# Patient Record
Sex: MALE | Race: WHITE | Employment: OTHER | URBAN - METROPOLITAN AREA
[De-identification: names, ages, dates, MRNs, and addresses within clinical notes are randomized per-mention and may not be internally consistent; named-entity substitution may affect disease eponyms.]

---

## 2017-01-09 PROBLEM — R31.29 MICROHEMATURIA: Status: ACTIVE | Noted: 2017-01-09

## 2017-01-09 PROBLEM — Z87.442 PERSONAL HISTORY OF KIDNEY STONES: Status: ACTIVE | Noted: 2017-01-09

## 2017-01-09 PROCEDURE — 88108 CYTOPATH CONCENTRATE TECH: CPT | Performed by: UROLOGY

## 2017-01-13 ENCOUNTER — APPOINTMENT (OUTPATIENT)
Dept: LAB | Facility: HOSPITAL | Age: 72
End: 2017-01-13
Attending: UROLOGY
Payer: MEDICARE

## 2017-01-13 ENCOUNTER — ANESTHESIA EVENT (OUTPATIENT)
Dept: SURGERY | Facility: HOSPITAL | Age: 72
End: 2017-01-13
Payer: MEDICARE

## 2017-01-13 DIAGNOSIS — R31.29 MICROHEMATURIA: ICD-10-CM

## 2017-01-13 LAB
ALBUMIN SERPL-MCNC: 3.4 G/DL (ref 3.5–4.8)
ALP LIVER SERPL-CCNC: 149 U/L (ref 45–117)
ALT SERPL-CCNC: 38 U/L (ref 17–63)
AST SERPL-CCNC: 25 U/L (ref 15–41)
ATRIAL RATE: 62 BPM
BILIRUB SERPL-MCNC: 0.4 MG/DL (ref 0.1–2)
BILIRUB UR QL STRIP.AUTO: NEGATIVE
BUN BLD-MCNC: 50 MG/DL (ref 8–20)
CALCIUM BLD-MCNC: 8.8 MG/DL (ref 8.3–10.3)
CHLORIDE: 107 MMOL/L (ref 101–111)
CLARITY UR REFRACT.AUTO: CLEAR
CO2: 28 MMOL/L (ref 22–32)
CREAT BLD-MCNC: 1.53 MG/DL (ref 0.7–1.3)
ERYTHROCYTE [DISTWIDTH] IN BLOOD BY AUTOMATED COUNT: 14.2 % (ref 11.5–16)
GLUCOSE BLD-MCNC: 99 MG/DL (ref 70–99)
GLUCOSE UR STRIP.AUTO-MCNC: NEGATIVE MG/DL
HCT VFR BLD AUTO: 29.4 % (ref 37–53)
HGB BLD-MCNC: 10.1 G/DL (ref 13–17)
KETONES UR STRIP.AUTO-MCNC: NEGATIVE MG/DL
LEUKOCYTE ESTERASE UR QL STRIP.AUTO: NEGATIVE
M PROTEIN MFR SERPL ELPH: 7.5 G/DL (ref 6.1–8.3)
MCH RBC QN AUTO: 31 PG (ref 27–33.2)
MCHC RBC AUTO-ENTMCNC: 34.4 G/DL (ref 31–37)
MCV RBC AUTO: 90.2 FL (ref 80–99)
NITRITE UR QL STRIP.AUTO: NEGATIVE
P AXIS: 43 DEGREES
P-R INTERVAL: 164 MS
PH UR STRIP.AUTO: 7 [PH] (ref 4.5–8)
PLATELET # BLD AUTO: 97 10(3)UL (ref 150–450)
POTASSIUM SERPL-SCNC: 4.6 MMOL/L (ref 3.6–5.1)
PROT UR STRIP.AUTO-MCNC: 100 MG/DL
Q-T INTERVAL: 440 MS
QRS DURATION: 106 MS
QTC CALCULATION (BEZET): 446 MS
R AXIS: 42 DEGREES
RBC # BLD AUTO: 3.26 X10(6)UL (ref 3.8–5.8)
RED CELL DISTRIBUTION WIDTH-SD: 46.8 FL (ref 35.1–46.3)
SODIUM SERPL-SCNC: 141 MMOL/L (ref 136–144)
SP GR UR STRIP.AUTO: 1.01 (ref 1–1.03)
T AXIS: 44 DEGREES
UROBILINOGEN UR STRIP.AUTO-MCNC: <2 MG/DL
VENTRICULAR RATE: 62 BPM
WBC # BLD AUTO: 6 X10(3) UL (ref 4–13)

## 2017-01-13 PROCEDURE — 93005 ELECTROCARDIOGRAM TRACING: CPT

## 2017-01-13 PROCEDURE — 85027 COMPLETE CBC AUTOMATED: CPT

## 2017-01-13 PROCEDURE — 36415 COLL VENOUS BLD VENIPUNCTURE: CPT

## 2017-01-13 PROCEDURE — 81001 URINALYSIS AUTO W/SCOPE: CPT

## 2017-01-13 PROCEDURE — 80053 COMPREHEN METABOLIC PANEL: CPT

## 2017-01-13 PROCEDURE — 87086 URINE CULTURE/COLONY COUNT: CPT

## 2017-01-13 PROCEDURE — 93010 ELECTROCARDIOGRAM REPORT: CPT | Performed by: INTERNAL MEDICINE

## 2017-01-16 ENCOUNTER — ANESTHESIA (OUTPATIENT)
Dept: SURGERY | Facility: HOSPITAL | Age: 72
End: 2017-01-16
Payer: MEDICARE

## 2017-01-16 ENCOUNTER — SURGERY (OUTPATIENT)
Age: 72
End: 2017-01-16

## 2017-01-16 ENCOUNTER — HOSPITAL ENCOUNTER (OUTPATIENT)
Facility: HOSPITAL | Age: 72
Setting detail: HOSPITAL OUTPATIENT SURGERY
Discharge: HOME | End: 2017-01-16
Attending: UROLOGY | Admitting: UROLOGY
Payer: MEDICARE

## 2017-01-16 VITALS
OXYGEN SATURATION: 99 % | HEART RATE: 55 BPM | RESPIRATION RATE: 16 BRPM | BODY MASS INDEX: 31.08 KG/M2 | DIASTOLIC BLOOD PRESSURE: 86 MMHG | TEMPERATURE: 98 F | HEIGHT: 71 IN | SYSTOLIC BLOOD PRESSURE: 181 MMHG | WEIGHT: 222 LBS

## 2017-01-16 DIAGNOSIS — R31.29 MICROHEMATURIA: Primary | ICD-10-CM

## 2017-01-16 LAB
GLUCOSE BLD-MCNC: 116 MG/DL (ref 65–99)
GLUCOSE BLD-MCNC: 122 MG/DL (ref 65–99)

## 2017-01-16 PROCEDURE — 82962 GLUCOSE BLOOD TEST: CPT

## 2017-01-16 PROCEDURE — A4216 STERILE WATER/SALINE, 10 ML: HCPCS | Performed by: UROLOGY

## 2017-01-16 PROCEDURE — 0TBB8ZX EXCISION OF BLADDER, VIA NATURAL OR ARTIFICIAL OPENING ENDOSCOPIC, DIAGNOSTIC: ICD-10-PCS | Performed by: UROLOGY

## 2017-01-16 PROCEDURE — 88342 IMHCHEM/IMCYTCHM 1ST ANTB: CPT | Performed by: UROLOGY

## 2017-01-16 PROCEDURE — 3E0K705 INTRODUCTION OF OTHER ANTINEOPLASTIC INTO GENITOURINARY TRACT, VIA NATURAL OR ARTIFICIAL OPENING: ICD-10-PCS | Performed by: UROLOGY

## 2017-01-16 PROCEDURE — 88341 IMHCHEM/IMCYTCHM EA ADD ANTB: CPT | Performed by: UROLOGY

## 2017-01-16 PROCEDURE — 88307 TISSUE EXAM BY PATHOLOGIST: CPT | Performed by: UROLOGY

## 2017-01-16 RX ORDER — SODIUM CHLORIDE, SODIUM LACTATE, POTASSIUM CHLORIDE, CALCIUM CHLORIDE 600; 310; 30; 20 MG/100ML; MG/100ML; MG/100ML; MG/100ML
INJECTION, SOLUTION INTRAVENOUS CONTINUOUS
Status: DISCONTINUED | OUTPATIENT
Start: 2017-01-16 | End: 2017-01-16

## 2017-01-16 RX ORDER — DEXTROSE MONOHYDRATE 25 G/50ML
50 INJECTION, SOLUTION INTRAVENOUS
Status: DISCONTINUED | OUTPATIENT
Start: 2017-01-16 | End: 2017-01-16 | Stop reason: HOSPADM

## 2017-01-16 NOTE — ANESTHESIA POSTPROCEDURE EVALUATION
[address] Patient Status:  Hospital Outpatient Surgery   Age/Gender 73year old male MRN [id number]   [facility] SURGERY Attending Joslyn Gunn MD   Hosp Day # 0 PCP KOBI ELIZONDO       Anesthesia Post-op Note    Procedu

## 2017-01-16 NOTE — H&P
[facility]  H&P    Katerin Steele Patient Status:  Hospital Outpatient Surgery    4/15/1945 MRN [id number]   [facility] SURGERY Attending Franklin Malagon MD   [facility] Day #  PCP KOBI ELIZONDO     Impression and Plan:  Microhematuria with ur this encounter.     History:  Past Medical History   Diagnosis Date   • High blood pressure    • Disorder of thyroid    • Diabetes ([facility] [address]    • Gout    • Visual impairment      reading glasses   • Hepatitis, unspecified      hepatitis C-just completed taking

## 2017-01-16 NOTE — OPERATIVE REPORT
[address] [facility] Status:  [facility] Outpatient Surgery    4/15/1945 MRN [id number]   Location [facility] Attending Franklin Malagon MD   Hosp Day # 0 SHEMAR Galvez Candesiree     Date of Operation; 2017 the  urethra, and then a 24-Uzbek bipolar loop was used to resect the tumor. The specimen was irrigated out of the bladder with a Toomy sringe. The base of the resection was coagulated, avoiding the area of resected ureteral orifice.   The orifice appeared

## 2017-01-16 NOTE — ANESTHESIA PREPROCEDURE EVALUATION
PRE-OP EVALUATION    Patient Name: Rashawn Leblanc    Pre-op Diagnosis: MICROSCOPIC HEMATURIA    Procedure(s):  CYSTOSCOPY, BILATERAL RETROGRADE PYELOGRAM, POSSIBLE URETEROSCOPY, POSSIBLE BLADDER BIOPSY    Surgeon(s) and Role:     * Aissatou Syed MD - Primary tolerance: good         (+) obesity  (+) hypertension   (+) hyperlipidemia                                  Endo/Other      (+) diabetes     (+) hypothyroidism    (+) anemia        (+) thrombocytopenia    (+) arthritis       Pulmonary

## 2017-01-26 PROBLEM — C67.9 MALIGNANT NEOPLASM OF URINARY BLADDER, UNSPECIFIED SITE (HCC): Status: ACTIVE | Noted: 2017-01-26

## 2017-01-26 PROCEDURE — 83550 IRON BINDING TEST: CPT | Performed by: FAMILY MEDICINE

## 2017-01-26 PROCEDURE — 82728 ASSAY OF FERRITIN: CPT | Performed by: FAMILY MEDICINE

## 2017-01-26 PROCEDURE — 83540 ASSAY OF IRON: CPT | Performed by: FAMILY MEDICINE

## 2017-01-26 PROCEDURE — 85045 AUTOMATED RETICULOCYTE COUNT: CPT | Performed by: FAMILY MEDICINE

## 2017-01-26 PROCEDURE — 84080 ASSAY ALKALINE PHOSPHATASES: CPT | Performed by: FAMILY MEDICINE

## 2017-01-26 PROCEDURE — 82607 VITAMIN B-12: CPT | Performed by: FAMILY MEDICINE

## 2017-01-26 PROCEDURE — 82746 ASSAY OF FOLIC ACID SERUM: CPT | Performed by: FAMILY MEDICINE

## 2017-01-26 PROCEDURE — 36415 COLL VENOUS BLD VENIPUNCTURE: CPT | Performed by: FAMILY MEDICINE

## 2017-01-26 PROCEDURE — 84075 ASSAY ALKALINE PHOSPHATASE: CPT | Performed by: FAMILY MEDICINE

## 2017-01-26 PROCEDURE — 85025 COMPLETE CBC W/AUTO DIFF WBC: CPT | Performed by: FAMILY MEDICINE

## 2017-01-31 PROBLEM — R74.8 ELEVATED ALKALINE PHOSPHATASE LEVEL: Status: ACTIVE | Noted: 2017-01-31

## 2017-02-06 PROBLEM — C67.0 MALIGNANT NEOPLASM OF TRIGONE OF URINARY BLADDER (HCC): Status: ACTIVE | Noted: 2017-02-06

## 2017-02-10 ENCOUNTER — ANESTHESIA EVENT (OUTPATIENT)
Dept: SURGERY | Facility: HOSPITAL | Age: 72
End: 2017-02-10
Payer: MEDICARE

## 2017-02-13 PROCEDURE — 87086 URINE CULTURE/COLONY COUNT: CPT | Performed by: FAMILY MEDICINE

## 2017-02-13 NOTE — H&P
Larry Rivera is a 73year old male. HPI:    Follow up high grade T1 Bladder cancer. Cysto, TURBT, installation of Mitomycin 1/16/17. Rt u.o. resected. Mild left flank \"ache\", but none on the right. No gross hematuria.  Discussed TCCaB; need for restaging Vitro Strip  TEST FOUR TIMES DAILY  Disp: 400 strip  Rfl: 1    Insulin Pen Needle (BD PEN NEEDLE MINI U/F) 31G X 5 MM Does not apply Misc  To use 4 a day  Disp: 200 each  Rfl: 6      No Known Allergies    Past Medical History    Diagnosis  Date    •  High

## 2017-02-15 ENCOUNTER — SURGERY (OUTPATIENT)
Age: 72
End: 2017-02-15

## 2017-02-15 ENCOUNTER — APPOINTMENT (OUTPATIENT)
Dept: GENERAL RADIOLOGY | Facility: HOSPITAL | Age: 72
End: 2017-02-15
Attending: UROLOGY
Payer: MEDICARE

## 2017-02-15 ENCOUNTER — HOSPITAL ENCOUNTER (OUTPATIENT)
Facility: HOSPITAL | Age: 72
Setting detail: HOSPITAL OUTPATIENT SURGERY
Discharge: HOME | End: 2017-02-15
Attending: UROLOGY | Admitting: UROLOGY
Payer: MEDICARE

## 2017-02-15 ENCOUNTER — ANESTHESIA (OUTPATIENT)
Dept: SURGERY | Facility: HOSPITAL | Age: 72
End: 2017-02-15
Payer: MEDICARE

## 2017-02-15 VITALS
BODY MASS INDEX: 31.36 KG/M2 | HEIGHT: 71 IN | RESPIRATION RATE: 18 BRPM | SYSTOLIC BLOOD PRESSURE: 164 MMHG | OXYGEN SATURATION: 98 % | HEART RATE: 57 BPM | TEMPERATURE: 98 F | WEIGHT: 224 LBS | DIASTOLIC BLOOD PRESSURE: 69 MMHG

## 2017-02-15 LAB
GLUCOSE BLD-MCNC: 126 MG/DL (ref 65–99)
GLUCOSE BLD-MCNC: 140 MG/DL (ref 65–99)

## 2017-02-15 PROCEDURE — 0TBB8ZX EXCISION OF BLADDER, VIA NATURAL OR ARTIFICIAL OPENING ENDOSCOPIC, DIAGNOSTIC: ICD-10-PCS | Performed by: UROLOGY

## 2017-02-15 PROCEDURE — 74420 UROGRAPHY RTRGR +-KUB: CPT

## 2017-02-15 PROCEDURE — 82962 GLUCOSE BLOOD TEST: CPT

## 2017-02-15 PROCEDURE — 88305 TISSUE EXAM BY PATHOLOGIST: CPT | Performed by: UROLOGY

## 2017-02-15 RX ORDER — MIDAZOLAM HYDROCHLORIDE 1 MG/ML
1 INJECTION INTRAMUSCULAR; INTRAVENOUS EVERY 5 MIN PRN
Status: DISCONTINUED | OUTPATIENT
Start: 2017-02-15 | End: 2017-02-15

## 2017-02-15 RX ORDER — DEXTROSE MONOHYDRATE 25 G/50ML
50 INJECTION, SOLUTION INTRAVENOUS
Status: DISCONTINUED | OUTPATIENT
Start: 2017-02-15 | End: 2017-02-15 | Stop reason: HOSPADM

## 2017-02-15 RX ORDER — SODIUM CHLORIDE, SODIUM LACTATE, POTASSIUM CHLORIDE, CALCIUM CHLORIDE 600; 310; 30; 20 MG/100ML; MG/100ML; MG/100ML; MG/100ML
INJECTION, SOLUTION INTRAVENOUS CONTINUOUS
Status: DISCONTINUED | OUTPATIENT
Start: 2017-02-15 | End: 2017-02-15

## 2017-02-15 RX ORDER — ONDANSETRON 2 MG/ML
4 INJECTION INTRAMUSCULAR; INTRAVENOUS AS NEEDED
Status: DISCONTINUED | OUTPATIENT
Start: 2017-02-15 | End: 2017-02-15

## 2017-02-15 RX ORDER — DEXTROSE MONOHYDRATE 25 G/50ML
50 INJECTION, SOLUTION INTRAVENOUS
Status: DISCONTINUED | OUTPATIENT
Start: 2017-02-15 | End: 2017-02-15

## 2017-02-15 RX ORDER — CEPHALEXIN 500 MG/1
500 CAPSULE ORAL 3 TIMES DAILY
Qty: 4 CAPSULE | Refills: 0 | Status: SHIPPED | OUTPATIENT
Start: 2017-02-15 | End: 2017-02-17

## 2017-02-15 RX ORDER — MEPERIDINE HYDROCHLORIDE 25 MG/ML
12.5 INJECTION INTRAMUSCULAR; INTRAVENOUS; SUBCUTANEOUS AS NEEDED
Status: DISCONTINUED | OUTPATIENT
Start: 2017-02-15 | End: 2017-02-15

## 2017-02-15 RX ORDER — NALOXONE HYDROCHLORIDE 0.4 MG/ML
80 INJECTION, SOLUTION INTRAMUSCULAR; INTRAVENOUS; SUBCUTANEOUS AS NEEDED
Status: DISCONTINUED | OUTPATIENT
Start: 2017-02-15 | End: 2017-02-15

## 2017-02-15 RX ORDER — METOCLOPRAMIDE HYDROCHLORIDE 5 MG/ML
10 INJECTION INTRAMUSCULAR; INTRAVENOUS AS NEEDED
Status: DISCONTINUED | OUTPATIENT
Start: 2017-02-15 | End: 2017-02-15

## 2017-02-15 RX ORDER — HYDROMORPHONE HYDROCHLORIDE 1 MG/ML
0.4 INJECTION, SOLUTION INTRAMUSCULAR; INTRAVENOUS; SUBCUTANEOUS EVERY 5 MIN PRN
Status: DISCONTINUED | OUTPATIENT
Start: 2017-02-15 | End: 2017-02-15

## 2017-02-15 RX ORDER — HYDROCODONE BITARTRATE AND ACETAMINOPHEN 5; 325 MG/1; MG/1
2 TABLET ORAL AS NEEDED
Status: DISCONTINUED | OUTPATIENT
Start: 2017-02-15 | End: 2017-02-15

## 2017-02-15 RX ORDER — LABETALOL HYDROCHLORIDE 5 MG/ML
5 INJECTION, SOLUTION INTRAVENOUS EVERY 5 MIN PRN
Status: DISCONTINUED | OUTPATIENT
Start: 2017-02-15 | End: 2017-02-15

## 2017-02-15 RX ORDER — HYDROCODONE BITARTRATE AND ACETAMINOPHEN 5; 325 MG/1; MG/1
1 TABLET ORAL AS NEEDED
Status: DISCONTINUED | OUTPATIENT
Start: 2017-02-15 | End: 2017-02-15

## 2017-02-15 NOTE — PROGRESS NOTES
Interviewed, examined pt, answered questions. No new history or findings. Discussed plan with pt ad son. Lungs: clear  Heart: No M,G,R  Abd: soft, Non tender  Neuro: Alert. Moving all extr  Procede as planned.   Letitia Saenz MD

## 2017-02-15 NOTE — OPERATIVE REPORT
[address] [facility] Patient Status:  Hospital Outpatient Surgery    4/15/1945 MRN [id number]   [facility] SURGERY Attending Rhona Ballesteros MD   [facility] Day # 0 [facility] Aziza Urena     Date of Operation; 2/15/2017    Procedure: Patel Lambert time-out was reviewed. A cystoscope was passed through the urethra under direct vision and the urethra and bladder were examined, with the findings as described above.   I used a cold cup biopsy forceps to biopsy normal-appearing tissue on the right and

## 2017-02-15 NOTE — ANESTHESIA POSTPROCEDURE EVALUATION
[address] Patient Status:  Hospital Outpatient Surgery   Age/Gender 73year old male MRN [id number]   [facility] SURGERY Attending Ivelisse Palacios MD   Hosp Day # 0 PCP KOBI ELIZONDO       Anesthesia Post-op Note    Procedu

## 2017-02-15 NOTE — ANESTHESIA PREPROCEDURE EVALUATION
PRE-OP EVALUATION    Patient Name: Kun Gutierrez    Pre-op Diagnosis: BLADDER CANCER    Procedure(s):  CYSTOSCOPY TRANSURETHRAL RESECTION OF BLADDER, BLADDER BIOPSY, BILATERAL RETROGRADE PYELOGRAM    Surgeon(s) and Role:     * Julieta Mueller MD - Primary    P Strip To test 4 times daily.  DX E 11.9 Disp: 400 each Rfl: 3   allopurinol 300 MG Oral Tab TK 1 T PO QD Disp:  Rfl: 6   lisinopril 40 MG Oral Tab TK 1 T PO D Disp:  Rfl: 6   carvedilol 12.5 MG Oral Tab Take 1 tablet (12.5 mg total) by mouth 2 (two) times d 10.3* 01/26/2017   HCT 30.8* 01/26/2017   MCV 90.9 01/26/2017   MCH 30.4 01/26/2017   MCHC 33.4 01/26/2017   RDW 14.1 01/26/2017   PLT 85.0* 01/26/2017       Lab Results  Component Value Date    01/13/2017   K 4.6 01/13/2017    01/13/2017   CO2

## 2017-02-28 PROCEDURE — 36415 COLL VENOUS BLD VENIPUNCTURE: CPT | Performed by: FAMILY MEDICINE

## 2017-02-28 PROCEDURE — 83036 HEMOGLOBIN GLYCOSYLATED A1C: CPT | Performed by: FAMILY MEDICINE

## 2017-02-28 PROCEDURE — 82570 ASSAY OF URINE CREATININE: CPT | Performed by: FAMILY MEDICINE

## 2017-02-28 PROCEDURE — 82306 VITAMIN D 25 HYDROXY: CPT | Performed by: FAMILY MEDICINE

## 2017-02-28 PROCEDURE — 80061 LIPID PANEL: CPT | Performed by: FAMILY MEDICINE

## 2017-02-28 PROCEDURE — 80053 COMPREHEN METABOLIC PANEL: CPT | Performed by: FAMILY MEDICINE

## 2017-02-28 PROCEDURE — 84443 ASSAY THYROID STIM HORMONE: CPT | Performed by: FAMILY MEDICINE

## 2017-02-28 PROCEDURE — 84550 ASSAY OF BLOOD/URIC ACID: CPT | Performed by: FAMILY MEDICINE

## 2017-02-28 PROCEDURE — 82043 UR ALBUMIN QUANTITATIVE: CPT | Performed by: FAMILY MEDICINE

## 2017-02-28 PROCEDURE — 81001 URINALYSIS AUTO W/SCOPE: CPT | Performed by: FAMILY MEDICINE

## 2017-02-28 PROCEDURE — 85025 COMPLETE CBC W/AUTO DIFF WBC: CPT | Performed by: FAMILY MEDICINE

## 2017-04-24 PROBLEM — N18.30 TYPE 2 DIABETES MELLITUS WITH STAGE 3 CHRONIC KIDNEY DISEASE, WITH LONG-TERM CURRENT USE OF INSULIN (HCC): Status: ACTIVE | Noted: 2017-04-24

## 2017-04-24 PROBLEM — I10 ESSENTIAL HYPERTENSION WITH GOAL BLOOD PRESSURE LESS THAN 130/80: Status: ACTIVE | Noted: 2017-04-24

## 2017-04-24 PROBLEM — Z79.4 TYPE 2 DIABETES MELLITUS WITH STAGE 3 CHRONIC KIDNEY DISEASE, WITH LONG-TERM CURRENT USE OF INSULIN (HCC): Status: ACTIVE | Noted: 2017-04-24

## 2017-04-24 PROBLEM — E11.22 TYPE 2 DIABETES MELLITUS WITH STAGE 3 CHRONIC KIDNEY DISEASE, WITH LONG-TERM CURRENT USE OF INSULIN (HCC): Status: ACTIVE | Noted: 2017-04-24

## 2017-07-26 PROCEDURE — 87081 CULTURE SCREEN ONLY: CPT | Performed by: FAMILY MEDICINE

## 2017-07-28 PROCEDURE — 81001 URINALYSIS AUTO W/SCOPE: CPT | Performed by: FAMILY MEDICINE

## 2017-08-04 ENCOUNTER — ANESTHESIA EVENT (OUTPATIENT)
Dept: SURGERY | Facility: HOSPITAL | Age: 72
End: 2017-08-04
Payer: MEDICARE

## 2017-08-04 PROBLEM — R94.39 ABNORMAL STRESS TEST: Status: ACTIVE | Noted: 2017-08-04

## 2017-08-04 NOTE — OR NURSING
Abnormal Stress test/elevated creatinine  reviewed by /anesthesia. He requests note of cardiac clearance pre op. Spoke ly Lowe (cardiologist office). She states she will notify MD and fax note to [facility] when complete.

## 2017-08-07 ENCOUNTER — ANESTHESIA (OUTPATIENT)
Dept: SURGERY | Facility: HOSPITAL | Age: 72
End: 2017-08-07
Payer: MEDICARE

## 2017-08-07 ENCOUNTER — SURGERY (OUTPATIENT)
Age: 72
End: 2017-08-07

## 2017-08-07 ENCOUNTER — HOSPITAL ENCOUNTER (OUTPATIENT)
Facility: HOSPITAL | Age: 72
Setting detail: HOSPITAL OUTPATIENT SURGERY
Discharge: HOME | End: 2017-08-07
Attending: UROLOGY | Admitting: UROLOGY
Payer: MEDICARE

## 2017-08-07 VITALS
WEIGHT: 228 LBS | DIASTOLIC BLOOD PRESSURE: 63 MMHG | RESPIRATION RATE: 16 BRPM | SYSTOLIC BLOOD PRESSURE: 141 MMHG | HEART RATE: 49 BPM | BODY MASS INDEX: 31.92 KG/M2 | HEIGHT: 71 IN | TEMPERATURE: 98 F | OXYGEN SATURATION: 98 %

## 2017-08-07 LAB
GLUCOSE BLD-MCNC: 129 MG/DL (ref 65–99)
GLUCOSE BLD-MCNC: 132 MG/DL (ref 65–99)

## 2017-08-07 PROCEDURE — 82962 GLUCOSE BLOOD TEST: CPT

## 2017-08-07 PROCEDURE — A4216 STERILE WATER/SALINE, 10 ML: HCPCS | Performed by: UROLOGY

## 2017-08-07 PROCEDURE — 0TBB8ZX EXCISION OF BLADDER, VIA NATURAL OR ARTIFICIAL OPENING ENDOSCOPIC, DIAGNOSTIC: ICD-10-PCS | Performed by: UROLOGY

## 2017-08-07 PROCEDURE — 88307 TISSUE EXAM BY PATHOLOGIST: CPT | Performed by: UROLOGY

## 2017-08-07 PROCEDURE — 3E0N805 INTRODUCTION OF OTHER ANTINEOPLASTIC INTO MALE REPRODUCTIVE, VIA NATURAL OR ARTIFICIAL OPENING ENDOSCOPIC: ICD-10-PCS | Performed by: UROLOGY

## 2017-08-07 RX ORDER — SODIUM CHLORIDE, SODIUM LACTATE, POTASSIUM CHLORIDE, CALCIUM CHLORIDE 600; 310; 30; 20 MG/100ML; MG/100ML; MG/100ML; MG/100ML
INJECTION, SOLUTION INTRAVENOUS CONTINUOUS
Status: DISCONTINUED | OUTPATIENT
Start: 2017-08-07 | End: 2017-08-07

## 2017-08-07 RX ORDER — ACETAMINOPHEN AND CODEINE PHOSPHATE 300; 30 MG/1; MG/1
1 TABLET ORAL AS NEEDED
Status: DISCONTINUED | OUTPATIENT
Start: 2017-08-07 | End: 2017-08-07

## 2017-08-07 RX ORDER — DEXTROSE MONOHYDRATE 25 G/50ML
50 INJECTION, SOLUTION INTRAVENOUS
Status: DISCONTINUED | OUTPATIENT
Start: 2017-08-07 | End: 2017-08-07 | Stop reason: HOSPADM

## 2017-08-07 RX ORDER — HYDROMORPHONE HYDROCHLORIDE 1 MG/ML
0.4 INJECTION, SOLUTION INTRAMUSCULAR; INTRAVENOUS; SUBCUTANEOUS EVERY 5 MIN PRN
Status: DISCONTINUED | OUTPATIENT
Start: 2017-08-07 | End: 2017-08-07

## 2017-08-07 RX ORDER — NALOXONE HYDROCHLORIDE 0.4 MG/ML
80 INJECTION, SOLUTION INTRAMUSCULAR; INTRAVENOUS; SUBCUTANEOUS AS NEEDED
Status: DISCONTINUED | OUTPATIENT
Start: 2017-08-07 | End: 2017-08-07

## 2017-08-07 RX ORDER — ONDANSETRON 2 MG/ML
4 INJECTION INTRAMUSCULAR; INTRAVENOUS AS NEEDED
Status: DISCONTINUED | OUTPATIENT
Start: 2017-08-07 | End: 2017-08-07

## 2017-08-07 RX ORDER — INSULIN ASPART 100 [IU]/ML
INJECTION, SOLUTION INTRAVENOUS; SUBCUTANEOUS ONCE
Status: DISCONTINUED | OUTPATIENT
Start: 2017-08-07 | End: 2017-08-07

## 2017-08-07 RX ORDER — CEFAZOLIN SODIUM 1 G/3ML
INJECTION, POWDER, FOR SOLUTION INTRAMUSCULAR; INTRAVENOUS
Status: DISCONTINUED | OUTPATIENT
Start: 2017-08-07 | End: 2017-08-07 | Stop reason: HOSPADM

## 2017-08-07 RX ORDER — ACETAMINOPHEN AND CODEINE PHOSPHATE 300; 30 MG/1; MG/1
2 TABLET ORAL AS NEEDED
Status: DISCONTINUED | OUTPATIENT
Start: 2017-08-07 | End: 2017-08-07

## 2017-08-07 RX ORDER — DEXTROSE MONOHYDRATE 25 G/50ML
50 INJECTION, SOLUTION INTRAVENOUS
Status: DISCONTINUED | OUTPATIENT
Start: 2017-08-07 | End: 2017-08-07

## 2017-08-07 NOTE — ANESTHESIA POSTPROCEDURE EVALUATION
[address] Patient Status:  Hospital Outpatient Surgery   Age/Gender 81year old male MRN [id number]   [facility] SURGERY Attending Fox Butcher MD   Hosp Day # 0 PCP KOBI ELIZONDO       Anesthesia Post-op Note    Procedu

## 2017-08-07 NOTE — OPERATIVE REPORT
[address] Patient Status:  [facility] Outpatient Surgery    4/15/1945 MRN [id number]   Location [address] [place] POST ANESTHESIA CARE UNIT Attending Qi Salazar MD    Day # 0 SHEMAR Isbell     Date of Operation;  2017 slightest pink tinge. The resection came up to the lateral aspect of the ureteral orifice. I passed a guidewire into the orifice and it was still patent. An 25 Thai three-way Vásquez catheter was placed and then the mitomycin solution was instilled.   Miller Wilkins

## 2017-08-07 NOTE — PROGRESS NOTES
Interviewed, examined pt, answered questions. Potassium was slightly elevated related to  Spironolactone. Normal today. Creat higher but U/S normal in Feb 2017. Dr May Ron spoke to cardiologist and he is cleared.   Lungs: clear  Heart: No M,G,R  Abd: soft,

## 2017-08-07 NOTE — PROGRESS NOTES
Rooming Clinician:      Pt presents for Cystoscopy   H/o Microhematuria, Kidney stones, UTI   S/p Cysto, TURBT, instillation of mitomycin 1/16/17  S/p Cysto, TURBT, B/L RGP 2/15/17                           LOV 4/6/17     Indication: Hx CaB     +F/H CaB-Br Disp: 1 kit Rfl: 0   Levothyroxine Sodium 200 MCG Oral Tab TAKE 1 TABLET(200 MCG) BY MOUTH BEFORE BREAKFAST Disp: 30 tablet Rfl: 5   LEVEMIR FLEXTOUCH 100 UNIT/ML Subcutaneous Solution Pen-injector INJECT 85 UNITS EVERY EVENING Disp: 90 mL Rfl: 1   Insulin pain on exertion  GI: denies abdominal pain and denies heartburn  :As in HPI  NEURO: no sensory or motor complaint      Lab Results  Component Value Date/Time   PSA 2.45 06/19/2017 02:23 PM   PSA 2.3 01/12/2012 02:34 PM         No results found for: [facility]

## 2017-08-07 NOTE — ANESTHESIA PREPROCEDURE EVALUATION
PRE-OP EVALUATION    Patient Name: Chari Leary    Pre-op Diagnosis: BLADDER CANCER      Procedure(s):  CYSTOSCOPY, TRANSURETHRAL RESECTION OF BLADDER TUMOR      Surgeon(s) and Role:     * Marya Martinez MD - Primary    Pre-op vitals reviewed.         Body ma maximal hr. Cardiology evaluation deemed low risk for anesthesia and surgery by [facility]  cardiologist Gail Dos Santos    ECG reviewed.             (+) hypertension   (+) hyperlipidemia               (+) dysrhythmias                   Endo/Other      (+) diabetes trauma, pain management modalities, transfusion if needed, etc. Questions answered. Accepts. The consent was signed without further questions.   Plan/risks discussed with: patient                Present on Admission:  **None**

## 2017-08-09 PROBLEM — E03.9 ACQUIRED HYPOTHYROIDISM: Status: ACTIVE | Noted: 2017-08-09

## 2017-08-23 PROBLEM — I25.10 CORONARY ARTERY DISEASE INVOLVING NATIVE CORONARY ARTERY OF NATIVE HEART WITHOUT ANGINA PECTORIS: Status: ACTIVE | Noted: 2017-08-23

## 2017-08-23 PROCEDURE — 81001 URINALYSIS AUTO W/SCOPE: CPT

## 2017-08-23 PROCEDURE — 82570 ASSAY OF URINE CREATININE: CPT

## 2017-08-23 PROCEDURE — 80053 COMPREHEN METABOLIC PANEL: CPT

## 2017-08-23 PROCEDURE — 84156 ASSAY OF PROTEIN URINE: CPT

## 2017-08-23 PROCEDURE — 36415 COLL VENOUS BLD VENIPUNCTURE: CPT

## 2017-08-24 ENCOUNTER — LAB ENCOUNTER (OUTPATIENT)
Dept: LAB | Age: 72
End: 2017-08-24
Attending: INTERNAL MEDICINE
Payer: MEDICARE

## 2017-08-24 DIAGNOSIS — N18.30 CHRONIC KIDNEY DISEASE, STAGE III (MODERATE) (HCC): Primary | ICD-10-CM

## 2017-08-24 LAB
ALBUMIN SERPL-MCNC: 3.9 G/DL (ref 3.5–4.8)
ALP LIVER SERPL-CCNC: 113 U/L (ref 45–117)
ALT SERPL-CCNC: 39 U/L (ref 17–63)
AST SERPL-CCNC: 20 U/L (ref 15–41)
BILIRUB SERPL-MCNC: 0.5 MG/DL (ref 0.1–2)
BILIRUB UR QL STRIP.AUTO: NEGATIVE
BUN BLD-MCNC: 62 MG/DL (ref 8–20)
CALCIUM BLD-MCNC: 9.1 MG/DL (ref 8.3–10.3)
CHLORIDE: 111 MMOL/L (ref 101–111)
CLARITY UR REFRACT.AUTO: CLEAR
CO2: 23 MMOL/L (ref 22–32)
CREAT BLD-MCNC: 1.68 MG/DL (ref 0.7–1.3)
CREAT UR-SCNC: 19.7 MG/DL
GLUCOSE BLD-MCNC: 37 MG/DL (ref 70–99)
GLUCOSE UR STRIP.AUTO-MCNC: NEGATIVE MG/DL
KETONES UR STRIP.AUTO-MCNC: NEGATIVE MG/DL
LEUKOCYTE ESTERASE UR QL STRIP.AUTO: NEGATIVE
M PROTEIN MFR SERPL ELPH: 7.4 G/DL (ref 6.1–8.3)
NITRITE UR QL STRIP.AUTO: NEGATIVE
PH UR STRIP.AUTO: 5 [PH] (ref 4.5–8)
POTASSIUM SERPL-SCNC: 4.7 MMOL/L (ref 3.6–5.1)
PROT UR STRIP.AUTO-MCNC: 100 MG/DL
PROT UR-MCNC: 71.9 MG/DL
RBC #/AREA URNS AUTO: >10 /HPF
SODIUM SERPL-SCNC: 142 MMOL/L (ref 136–144)
SP GR UR STRIP.AUTO: 1.01 (ref 1–1.03)
UROBILINOGEN UR STRIP.AUTO-MCNC: <2 MG/DL

## 2017-10-03 PROBLEM — Z79.4 TYPE 2 DIABETES MELLITUS WITH HYPERGLYCEMIA, WITH LONG-TERM CURRENT USE OF INSULIN (HCC): Status: ACTIVE | Noted: 2017-04-24

## 2017-10-03 PROBLEM — E11.65 TYPE 2 DIABETES MELLITUS WITH HYPERGLYCEMIA, WITH LONG-TERM CURRENT USE OF INSULIN (HCC): Status: ACTIVE | Noted: 2017-04-24

## 2017-10-03 PROBLEM — D64.9 ANEMIA, UNSPECIFIED TYPE: Status: ACTIVE | Noted: 2017-10-03

## 2017-12-11 PROBLEM — Z85.51 PERSONAL HISTORY OF BLADDER CANCER: Status: ACTIVE | Noted: 2017-12-11

## 2017-12-11 PROCEDURE — 81015 MICROSCOPIC EXAM OF URINE: CPT | Performed by: UROLOGY

## 2018-05-08 PROCEDURE — 82746 ASSAY OF FOLIC ACID SERUM: CPT | Performed by: FAMILY MEDICINE

## 2018-05-08 PROCEDURE — 82607 VITAMIN B-12: CPT | Performed by: FAMILY MEDICINE

## 2018-06-18 PROCEDURE — 88108 CYTOPATH CONCENTRATE TECH: CPT | Performed by: UROLOGY

## 2018-08-09 ENCOUNTER — LAB ENCOUNTER (OUTPATIENT)
Dept: LAB | Age: 73
End: 2018-08-09
Attending: INTERNAL MEDICINE
Payer: MEDICARE

## 2018-08-09 DIAGNOSIS — E11.21 DIABETIC GLOMERULOPATHY (HCC): ICD-10-CM

## 2018-08-09 DIAGNOSIS — N18.30 CHRONIC KIDNEY DISEASE, STAGE III (MODERATE) (HCC): Primary | ICD-10-CM

## 2018-08-09 PROCEDURE — 82570 ASSAY OF URINE CREATININE: CPT

## 2018-08-09 PROCEDURE — 84156 ASSAY OF PROTEIN URINE: CPT

## 2018-08-09 PROCEDURE — 81001 URINALYSIS AUTO W/SCOPE: CPT

## 2018-08-09 PROCEDURE — 80053 COMPREHEN METABOLIC PANEL: CPT

## 2018-08-10 LAB
ALBUMIN SERPL-MCNC: 3.2 G/DL (ref 3.5–4.8)
ALBUMIN/GLOB SERPL: 0.8 {RATIO} (ref 1–2)
ALP LIVER SERPL-CCNC: 149 U/L (ref 45–117)
ALT SERPL-CCNC: 34 U/L (ref 17–63)
ANION GAP SERPL CALC-SCNC: 7 MMOL/L (ref 0–18)
AST SERPL-CCNC: 24 U/L (ref 15–41)
BILIRUB SERPL-MCNC: 0.3 MG/DL (ref 0.1–2)
BILIRUB UR QL STRIP.AUTO: NEGATIVE
BUN BLD-MCNC: 70 MG/DL (ref 8–20)
BUN/CREAT SERPL: 32.4 (ref 10–20)
CALCIUM BLD-MCNC: 8.6 MG/DL (ref 8.3–10.3)
CHLORIDE SERPL-SCNC: 116 MMOL/L (ref 101–111)
CLARITY UR REFRACT.AUTO: CLEAR
CO2 SERPL-SCNC: 20 MMOL/L (ref 22–32)
COLOR UR AUTO: YELLOW
CREAT BLD-MCNC: 2.16 MG/DL (ref 0.7–1.3)
CREAT UR-SCNC: 68 MG/DL
GLOBULIN PLAS-MCNC: 3.8 G/DL (ref 2.5–3.7)
GLUCOSE BLD-MCNC: 101 MG/DL (ref 70–99)
GLUCOSE UR STRIP.AUTO-MCNC: NEGATIVE MG/DL
KETONES UR STRIP.AUTO-MCNC: NEGATIVE MG/DL
LEUKOCYTE ESTERASE UR QL STRIP.AUTO: NEGATIVE
M PROTEIN MFR SERPL ELPH: 7 G/DL (ref 6.1–8.3)
NITRITE UR QL STRIP.AUTO: NEGATIVE
OSMOLALITY SERPL CALC.SUM OF ELEC: 317 MOSM/KG (ref 275–295)
PH UR STRIP.AUTO: 5 [PH] (ref 4.5–8)
POTASSIUM SERPL-SCNC: 5.1 MMOL/L (ref 3.6–5.1)
PROT UR STRIP.AUTO-MCNC: >=500 MG/DL
PROT UR-MCNC: 283.9 MG/DL
SODIUM SERPL-SCNC: 143 MMOL/L (ref 136–144)
SP GR UR STRIP.AUTO: 1.01 (ref 1–1.03)
URINE PROTEIN/CREATININE RATIO, RANDOM: 4.18
UROBILINOGEN UR STRIP.AUTO-MCNC: <2 MG/DL

## 2018-08-28 PROBLEM — E11.22 CONTROLLED TYPE 2 DIABETES MELLITUS WITH CHRONIC KIDNEY DISEASE, WITH LONG-TERM CURRENT USE OF INSULIN, UNSPECIFIED CKD STAGE (HCC): Status: ACTIVE | Noted: 2017-04-24

## 2018-08-28 PROBLEM — Z79.4 CONTROLLED TYPE 2 DIABETES MELLITUS WITH CHRONIC KIDNEY DISEASE, WITH LONG-TERM CURRENT USE OF INSULIN, UNSPECIFIED CKD STAGE (HCC): Status: ACTIVE | Noted: 2017-04-24

## 2018-08-28 PROBLEM — E11.22 CONTROLLED TYPE 2 DIABETES MELLITUS WITH CHRONIC KIDNEY DISEASE, WITHOUT LONG-TERM CURRENT USE OF INSULIN, UNSPECIFIED CKD STAGE (HCC): Status: ACTIVE | Noted: 2017-04-24

## 2018-10-15 ENCOUNTER — LAB ENCOUNTER (OUTPATIENT)
Dept: LAB | Age: 73
End: 2018-10-15
Attending: INTERNAL MEDICINE
Payer: MEDICARE

## 2018-10-15 DIAGNOSIS — E79.0 URICACIDEMIA: ICD-10-CM

## 2018-10-15 DIAGNOSIS — R80.8 NEPHROGENOUS PROTEINURIA: ICD-10-CM

## 2018-10-15 DIAGNOSIS — N18.30 CHRONIC KIDNEY DISEASE, STAGE III (MODERATE) (HCC): Primary | ICD-10-CM

## 2018-10-15 PROCEDURE — 81001 URINALYSIS AUTO W/SCOPE: CPT

## 2018-10-15 PROCEDURE — 80053 COMPREHEN METABOLIC PANEL: CPT

## 2018-10-15 PROCEDURE — 84156 ASSAY OF PROTEIN URINE: CPT

## 2018-10-15 PROCEDURE — 84550 ASSAY OF BLOOD/URIC ACID: CPT

## 2018-10-15 PROCEDURE — 82570 ASSAY OF URINE CREATININE: CPT

## 2018-12-17 PROBLEM — R94.39 ABNORMAL STRESS TEST: Status: RESOLVED | Noted: 2017-08-04 | Resolved: 2018-12-17

## 2018-12-17 PROBLEM — R31.29 MICROHEMATURIA: Status: RESOLVED | Noted: 2017-01-09 | Resolved: 2018-12-17

## 2019-01-17 PROCEDURE — 88108 CYTOPATH CONCENTRATE TECH: CPT | Performed by: UROLOGY

## 2019-02-11 ENCOUNTER — LAB ENCOUNTER (OUTPATIENT)
Dept: LAB | Age: 74
End: 2019-02-11
Attending: INTERNAL MEDICINE
Payer: MEDICARE

## 2019-02-11 DIAGNOSIS — E11.21 DIABETIC GLOMERULOPATHY (HCC): Primary | ICD-10-CM

## 2019-02-11 PROBLEM — Z79.4 TYPE 2 DIABETES MELLITUS WITH KIDNEY COMPLICATION, WITH LONG-TERM CURRENT USE OF INSULIN (HCC): Status: ACTIVE | Noted: 2019-02-11

## 2019-02-11 PROBLEM — E11.29 TYPE 2 DIABETES MELLITUS WITH KIDNEY COMPLICATION, WITH LONG-TERM CURRENT USE OF INSULIN (HCC): Status: ACTIVE | Noted: 2019-02-11

## 2019-02-11 LAB
BILIRUB UR QL STRIP.AUTO: NEGATIVE
CLARITY UR REFRACT.AUTO: CLEAR
COLOR UR AUTO: YELLOW
CREAT UR-SCNC: 89.6 MG/DL
GLUCOSE UR STRIP.AUTO-MCNC: NEGATIVE MG/DL
KETONES UR STRIP.AUTO-MCNC: NEGATIVE MG/DL
LEUKOCYTE ESTERASE UR QL STRIP.AUTO: NEGATIVE
NITRITE UR QL STRIP.AUTO: NEGATIVE
PH UR STRIP.AUTO: 5 [PH] (ref 4.5–8)
PROT UR STRIP.AUTO-MCNC: 100 MG/DL
PROT UR-MCNC: 271.3 MG/DL
RBC UR QL AUTO: NEGATIVE
SP GR UR STRIP.AUTO: 1.01 (ref 1–1.03)
URINE PROTEIN/CREATININE RATIO, RANDOM: 3.03
UROBILINOGEN UR STRIP.AUTO-MCNC: <2 MG/DL

## 2019-02-11 PROCEDURE — 81001 URINALYSIS AUTO W/SCOPE: CPT

## 2019-02-11 PROCEDURE — 84156 ASSAY OF PROTEIN URINE: CPT

## 2019-02-11 PROCEDURE — 82570 ASSAY OF URINE CREATININE: CPT

## 2019-02-13 PROBLEM — I70.0 THORACIC AORTA ATHEROSCLEROSIS (HCC): Status: ACTIVE | Noted: 2019-02-13

## 2019-02-13 PROBLEM — E11.22 TYPE 2 DIABETES MELLITUS WITH STAGE 4 CHRONIC KIDNEY DISEASE, WITH LONG-TERM CURRENT USE OF INSULIN (HCC): Status: ACTIVE | Noted: 2019-02-11

## 2019-02-13 PROBLEM — Z79.4 TYPE 2 DIABETES MELLITUS WITH STAGE 4 CHRONIC KIDNEY DISEASE, WITH LONG-TERM CURRENT USE OF INSULIN (HCC): Status: ACTIVE | Noted: 2019-02-11

## 2019-02-13 PROBLEM — E11.65 TYPE 2 DIABETES MELLITUS WITH HYPERGLYCEMIA, WITH LONG-TERM CURRENT USE OF INSULIN (HCC): Status: ACTIVE | Noted: 2019-02-13

## 2019-02-13 PROBLEM — N18.4 TYPE 2 DIABETES MELLITUS WITH STAGE 4 CHRONIC KIDNEY DISEASE, WITH LONG-TERM CURRENT USE OF INSULIN (HCC): Status: ACTIVE | Noted: 2019-02-11

## 2019-02-13 PROBLEM — I70.0 THORACIC AORTA ATHEROSCLEROSIS: Status: ACTIVE | Noted: 2019-02-13

## 2019-02-13 PROBLEM — Z79.4 TYPE 2 DIABETES MELLITUS WITH HYPERGLYCEMIA, WITH LONG-TERM CURRENT USE OF INSULIN (HCC): Status: ACTIVE | Noted: 2019-02-13

## 2019-04-09 PROBLEM — C67.0 MALIGNANT NEOPLASM OF TRIGONE OF URINARY BLADDER (HCC): Status: RESOLVED | Noted: 2017-02-06 | Resolved: 2019-04-09

## 2019-05-03 ENCOUNTER — LAB ENCOUNTER (OUTPATIENT)
Dept: LAB | Age: 74
End: 2019-05-03
Attending: INTERNAL MEDICINE
Payer: MEDICARE

## 2019-05-03 DIAGNOSIS — D64.9 ANEMIA: Primary | ICD-10-CM

## 2019-05-03 PROBLEM — Z79.4 TYPE 2 DIABETES MELLITUS WITH COMPLICATION, WITH LONG-TERM CURRENT USE OF INSULIN (HCC): Status: ACTIVE | Noted: 2019-02-13

## 2019-05-03 PROBLEM — I10 ESSENTIAL HYPERTENSION: Status: ACTIVE | Noted: 2017-04-24

## 2019-05-03 PROBLEM — E06.3 HYPOTHYROIDISM DUE TO HASHIMOTO'S THYROIDITIS: Status: ACTIVE | Noted: 2019-05-03

## 2019-05-03 PROBLEM — E11.8 TYPE 2 DIABETES MELLITUS WITH COMPLICATION, WITH LONG-TERM CURRENT USE OF INSULIN (HCC): Status: ACTIVE | Noted: 2019-02-13

## 2019-05-03 PROBLEM — E03.8 HYPOTHYROIDISM DUE TO HASHIMOTO'S THYROIDITIS: Status: ACTIVE | Noted: 2019-05-03

## 2019-05-03 PROCEDURE — 85025 COMPLETE CBC W/AUTO DIFF WBC: CPT

## 2019-05-03 PROCEDURE — 83540 ASSAY OF IRON: CPT

## 2019-05-03 PROCEDURE — 85014 HEMATOCRIT: CPT

## 2019-05-03 PROCEDURE — 84443 ASSAY THYROID STIM HORMONE: CPT

## 2019-05-03 PROCEDURE — 82728 ASSAY OF FERRITIN: CPT

## 2019-05-03 PROCEDURE — 82607 VITAMIN B-12: CPT

## 2019-05-03 PROCEDURE — 85045 AUTOMATED RETICULOCYTE COUNT: CPT

## 2019-05-03 PROCEDURE — 83550 IRON BINDING TEST: CPT

## 2019-05-03 PROCEDURE — 82747 ASSAY OF FOLIC ACID RBC: CPT

## 2019-06-27 PROBLEM — K44.9 HIATAL HERNIA: Status: ACTIVE | Noted: 2019-06-27

## 2019-06-27 PROBLEM — I85.00 ESOPHAGEAL VARICES WITHOUT BLEEDING, UNSPECIFIED ESOPHAGEAL VARICES TYPE (HCC): Status: ACTIVE | Noted: 2019-06-27

## 2019-07-18 PROCEDURE — 88108 CYTOPATH CONCENTRATE TECH: CPT | Performed by: UROLOGY

## 2019-07-18 PROCEDURE — 81001 URINALYSIS AUTO W/SCOPE: CPT | Performed by: UROLOGY

## 2019-10-31 PROBLEM — E11.59 OBESITY, DIABETES, AND HYPERTENSION SYNDROME (HCC): Status: ACTIVE | Noted: 2019-10-31

## 2019-10-31 PROBLEM — E66.9 OBESITY, DIABETES, AND HYPERTENSION SYNDROME (HCC): Status: ACTIVE | Noted: 2019-10-31

## 2019-10-31 PROBLEM — I15.2 OBESITY, DIABETES, AND HYPERTENSION SYNDROME (HCC): Status: ACTIVE | Noted: 2019-10-31

## 2019-10-31 PROBLEM — E11.69 OBESITY, DIABETES, AND HYPERTENSION SYNDROME (HCC): Status: ACTIVE | Noted: 2019-10-31

## 2019-11-04 PROBLEM — K63.5 POLYP OF COLON, UNSPECIFIED PART OF COLON, UNSPECIFIED TYPE: Status: ACTIVE | Noted: 2019-11-04

## 2020-02-04 PROBLEM — E11.8 TYPE 2 DIABETES MELLITUS WITH COMPLICATION, WITH LONG-TERM CURRENT USE OF INSULIN (HCC): Status: RESOLVED | Noted: 2019-02-13 | Resolved: 2020-02-04

## 2020-02-04 PROBLEM — E66.9 OBESITY, DIABETES, AND HYPERTENSION SYNDROME (HCC): Status: RESOLVED | Noted: 2019-10-31 | Resolved: 2020-02-04

## 2020-02-04 PROBLEM — E11.69 OBESITY, DIABETES, AND HYPERTENSION SYNDROME (HCC): Status: RESOLVED | Noted: 2019-10-31 | Resolved: 2020-02-04

## 2020-02-04 PROBLEM — I15.2 OBESITY, DIABETES, AND HYPERTENSION SYNDROME (HCC): Status: RESOLVED | Noted: 2019-10-31 | Resolved: 2020-02-04

## 2020-02-04 PROBLEM — Z79.4 TYPE 2 DIABETES MELLITUS WITH COMPLICATION, WITH LONG-TERM CURRENT USE OF INSULIN (HCC): Status: RESOLVED | Noted: 2019-02-13 | Resolved: 2020-02-04

## 2020-02-04 PROBLEM — E11.59 OBESITY, DIABETES, AND HYPERTENSION SYNDROME (HCC): Status: RESOLVED | Noted: 2019-10-31 | Resolved: 2020-02-04

## 2020-02-06 PROCEDURE — 88108 CYTOPATH CONCENTRATE TECH: CPT | Performed by: UROLOGY

## 2020-02-25 PROBLEM — N18.30 ANEMIA DUE TO STAGE 3 CHRONIC KIDNEY DISEASE (HCC): Status: ACTIVE | Noted: 2017-10-03

## 2020-02-25 PROBLEM — D63.1 ANEMIA DUE TO STAGE 3 CHRONIC KIDNEY DISEASE (HCC): Status: ACTIVE | Noted: 2017-10-03

## 2020-02-25 PROBLEM — R94.31 ABNORMAL EKG: Status: ACTIVE | Noted: 2020-02-25

## 2020-02-25 PROBLEM — R07.89 CHEST TIGHTNESS: Status: ACTIVE | Noted: 2020-02-25

## 2020-02-25 PROBLEM — K92.1 MELENA: Status: ACTIVE | Noted: 2020-02-25

## 2020-03-03 PROBLEM — D50.0 IRON DEFICIENCY ANEMIA DUE TO CHRONIC BLOOD LOSS: Status: ACTIVE | Noted: 2020-03-03

## 2021-06-03 PROBLEM — I13.10 HYPERTENSIVE HEART AND KIDNEY DISEASE WITHOUT HEART FAILURE AND WITH STAGE 4 CHRONIC KIDNEY DISEASE (HCC): Status: ACTIVE | Noted: 2021-06-03

## 2021-06-03 PROBLEM — C67.9 MALIGNANT NEOPLASM OF URINARY BLADDER, UNSPECIFIED SITE (HCC): Status: RESOLVED | Noted: 2017-01-26 | Resolved: 2021-06-03

## 2021-06-03 PROBLEM — N18.4 HYPERTENSIVE HEART AND KIDNEY DISEASE WITHOUT HEART FAILURE AND WITH STAGE 4 CHRONIC KIDNEY DISEASE (HCC): Status: ACTIVE | Noted: 2021-06-03

## 2021-06-14 PROBLEM — I85.00 ESOPHAGEAL VARICES WITHOUT BLEEDING, UNSPECIFIED ESOPHAGEAL VARICES TYPE (HCC): Status: RESOLVED | Noted: 2019-06-27 | Resolved: 2021-06-14

## 2021-06-14 PROBLEM — E66.01 OBESITY, MORBID (HCC): Status: ACTIVE | Noted: 2021-06-14

## 2021-06-14 PROBLEM — K29.80 DUODENITIS: Status: ACTIVE | Noted: 2021-06-14

## 2021-06-14 PROBLEM — E13.8 OTHER SPECIFIED DIABETES MELLITUS WITH UNSPECIFIED COMPLICATIONS (HCC): Status: ACTIVE | Noted: 2019-02-11

## 2021-06-14 PROBLEM — R07.89 CHEST TIGHTNESS: Status: RESOLVED | Noted: 2020-02-25 | Resolved: 2021-06-14

## 2021-06-14 PROBLEM — K92.1 MELENA: Status: RESOLVED | Noted: 2020-02-25 | Resolved: 2021-06-14

## 2021-06-14 PROBLEM — G62.9 NEUROPATHY: Status: ACTIVE | Noted: 2021-06-14

## 2021-06-14 PROBLEM — R97.20 ELEVATED PSA: Status: ACTIVE | Noted: 2021-06-14

## 2022-01-20 PROBLEM — G62.9 NEUROPATHY: Status: RESOLVED | Noted: 2021-06-14 | Resolved: 2022-01-20

## 2022-01-20 PROBLEM — Z79.4 TYPE 2 DIABETES MELLITUS WITH CHRONIC KIDNEY DISEASE ON CHRONIC DIALYSIS, WITH LONG-TERM CURRENT USE OF INSULIN (HCC): Status: ACTIVE | Noted: 2022-01-20

## 2022-01-20 PROBLEM — Z99.2 HYPERTENSIVE HEART AND KIDNEY DISEASE WITHOUT HEART FAILURE AND WITH END STAGE CHRONIC KIDNEY DISEASE ON DIALYSIS (HCC): Status: ACTIVE | Noted: 2021-06-03

## 2022-01-20 PROBLEM — E11.29 TYPE 2 DIABETES MELLITUS WITH MICROALBUMINURIA, WITH LONG-TERM CURRENT USE OF INSULIN (HCC): Status: ACTIVE | Noted: 2022-01-20

## 2022-01-20 PROBLEM — I48.0 PAROXYSMAL ATRIAL FIBRILLATION (HCC): Status: ACTIVE | Noted: 2022-01-20

## 2022-01-20 PROBLEM — I25.119 ATHEROSCLEROSIS OF NATIVE CORONARY ARTERY OF NATIVE HEART WITH ANGINA PECTORIS: Status: ACTIVE | Noted: 2022-01-20

## 2022-01-20 PROBLEM — Z86.19 HISTORY OF HEPATITIS C: Status: ACTIVE | Noted: 2022-01-20

## 2022-01-20 PROBLEM — N18.6 TYPE 2 DIABETES MELLITUS WITH CHRONIC KIDNEY DISEASE ON CHRONIC DIALYSIS, WITH LONG-TERM CURRENT USE OF INSULIN (HCC): Status: ACTIVE | Noted: 2022-01-20

## 2022-01-20 PROBLEM — Z79.4 TYPE 2 DIABETES MELLITUS WITH MICROALBUMINURIA, WITH LONG-TERM CURRENT USE OF INSULIN (HCC): Status: ACTIVE | Noted: 2022-01-20

## 2022-01-20 PROBLEM — I27.20 MILD PULMONARY HYPERTENSION (HCC): Status: ACTIVE | Noted: 2022-01-20

## 2022-01-20 PROBLEM — I25.119 ATHEROSCLEROSIS OF NATIVE CORONARY ARTERY OF NATIVE HEART WITH ANGINA PECTORIS (HCC): Status: ACTIVE | Noted: 2022-01-20

## 2022-01-20 PROBLEM — E11.22 TYPE 2 DIABETES MELLITUS WITH STAGE 4 CHRONIC KIDNEY DISEASE, WITH LONG-TERM CURRENT USE OF INSULIN (HCC): Status: ACTIVE | Noted: 2022-01-20

## 2022-01-20 PROBLEM — N18.6 ESRD (END STAGE RENAL DISEASE) (HCC): Status: ACTIVE | Noted: 2022-01-20

## 2022-01-20 PROBLEM — Z01.818 PRE-OP EVALUATION: Status: ACTIVE | Noted: 2022-01-20

## 2022-01-20 PROBLEM — I13.11 HYPERTENSIVE HEART AND KIDNEY DISEASE WITHOUT HEART FAILURE AND WITH END STAGE CHRONIC KIDNEY DISEASE ON DIALYSIS (HCC): Status: ACTIVE | Noted: 2021-06-03

## 2022-01-20 PROBLEM — I25.10 CORONARY ARTERY DISEASE INVOLVING NATIVE CORONARY ARTERY OF NATIVE HEART WITHOUT ANGINA PECTORIS: Status: RESOLVED | Noted: 2017-08-23 | Resolved: 2022-01-20

## 2022-01-20 PROBLEM — Z79.4 TYPE 2 DIABETES MELLITUS WITH STAGE 4 CHRONIC KIDNEY DISEASE, WITH LONG-TERM CURRENT USE OF INSULIN (HCC): Status: ACTIVE | Noted: 2022-01-20

## 2022-01-20 PROBLEM — N18.4 TYPE 2 DIABETES MELLITUS WITH STAGE 4 CHRONIC KIDNEY DISEASE, WITH LONG-TERM CURRENT USE OF INSULIN (HCC): Status: ACTIVE | Noted: 2022-01-20

## 2022-01-20 PROBLEM — N18.6 HYPERTENSIVE HEART AND KIDNEY DISEASE WITHOUT HEART FAILURE AND WITH END STAGE CHRONIC KIDNEY DISEASE ON DIALYSIS (HCC): Status: ACTIVE | Noted: 2021-06-03

## 2022-01-20 PROBLEM — E11.22 TYPE 2 DIABETES MELLITUS WITH CHRONIC KIDNEY DISEASE ON CHRONIC DIALYSIS, WITH LONG-TERM CURRENT USE OF INSULIN (HCC): Status: ACTIVE | Noted: 2022-01-20

## 2022-01-20 PROBLEM — Z99.2 TYPE 2 DIABETES MELLITUS WITH CHRONIC KIDNEY DISEASE ON CHRONIC DIALYSIS, WITH LONG-TERM CURRENT USE OF INSULIN (HCC): Status: ACTIVE | Noted: 2022-01-20

## 2022-01-20 PROBLEM — R80.9 TYPE 2 DIABETES MELLITUS WITH MICROALBUMINURIA, WITH LONG-TERM CURRENT USE OF INSULIN (HCC): Status: ACTIVE | Noted: 2022-01-20

## 2022-01-20 PROBLEM — E66.01 OBESITY, MORBID (HCC): Status: RESOLVED | Noted: 2021-06-14 | Resolved: 2022-01-20

## 2022-03-17 PROBLEM — Z95.5 S/P PRIMARY ANGIOPLASTY WITH CORONARY STENT: Status: ACTIVE | Noted: 2022-03-17

## 2022-03-17 PROBLEM — I13.2 HYPERTENSIVE HEART AND KIDNEY DISEASE WITH CHRONIC SYSTOLIC CONGESTIVE HEART FAILURE AND STAGE 5 CHRONIC KIDNEY DISEASE ON CHRONIC DIALYSIS (HCC): Status: ACTIVE | Noted: 2021-06-03

## 2022-03-17 PROBLEM — I50.22 HYPERTENSIVE HEART AND KIDNEY DISEASE WITH CHRONIC SYSTOLIC CONGESTIVE HEART FAILURE AND STAGE 5 CHRONIC KIDNEY DISEASE ON CHRONIC DIALYSIS (HCC): Status: ACTIVE | Noted: 2021-06-03

## 2022-03-17 PROBLEM — Z01.818 PRE-OP EXAM: Status: ACTIVE | Noted: 2022-01-20

## 2022-03-17 PROBLEM — K57.91 GASTROINTESTINAL HEMORRHAGE ASSOCIATED WITH INTESTINAL DIVERTICULOSIS: Status: ACTIVE | Noted: 2022-03-17

## 2022-03-17 PROBLEM — Z99.2 HYPERTENSIVE HEART AND KIDNEY DISEASE WITH CHRONIC SYSTOLIC CONGESTIVE HEART FAILURE AND STAGE 5 CHRONIC KIDNEY DISEASE ON CHRONIC DIALYSIS (HCC): Status: ACTIVE | Noted: 2021-06-03

## 2022-03-17 PROBLEM — E11.36 DIABETES MELLITUS WITH CATARACT (HCC): Status: ACTIVE | Noted: 2022-03-17

## 2022-03-17 PROBLEM — N18.6 HYPERTENSIVE HEART AND KIDNEY DISEASE WITH CHRONIC SYSTOLIC CONGESTIVE HEART FAILURE AND STAGE 5 CHRONIC KIDNEY DISEASE ON CHRONIC DIALYSIS (HCC): Status: ACTIVE | Noted: 2021-06-03

## 2022-03-17 PROBLEM — D63.1 ANEMIA DUE TO STAGE 5 CHRONIC KIDNEY DISEASE, NOT ON CHRONIC DIALYSIS (HCC): Status: ACTIVE | Noted: 2022-03-17

## 2022-03-17 PROBLEM — N18.5 ANEMIA DUE TO STAGE 5 CHRONIC KIDNEY DISEASE, NOT ON CHRONIC DIALYSIS (HCC): Status: ACTIVE | Noted: 2022-03-17

## 2022-07-03 ENCOUNTER — HOSPITAL ENCOUNTER (INPATIENT)
Facility: HOSPITAL | Age: 77
LOS: 1 days | Discharge: HOME | End: 2022-07-05
Attending: EMERGENCY MEDICINE | Admitting: HOSPITALIST
Payer: MEDICARE

## 2022-07-03 ENCOUNTER — APPOINTMENT (OUTPATIENT)
Dept: CT IMAGING | Facility: HOSPITAL | Age: 77
End: 2022-07-03
Attending: EMERGENCY MEDICINE
Payer: MEDICARE

## 2022-07-03 DIAGNOSIS — R73.9 HYPERGLYCEMIA: ICD-10-CM

## 2022-07-03 DIAGNOSIS — D69.6 THROMBOCYTOPENIA (HCC): ICD-10-CM

## 2022-07-03 DIAGNOSIS — N18.6 ESRD ON HEMODIALYSIS (HCC): ICD-10-CM

## 2022-07-03 DIAGNOSIS — K62.5 RECTAL BLEEDING: Primary | ICD-10-CM

## 2022-07-03 DIAGNOSIS — R06.00 DYSPNEA ON EXERTION: ICD-10-CM

## 2022-07-03 DIAGNOSIS — D64.9 ANEMIA, UNSPECIFIED TYPE: ICD-10-CM

## 2022-07-03 DIAGNOSIS — K52.9 COLITIS: ICD-10-CM

## 2022-07-03 DIAGNOSIS — K57.90 DIVERTICULOSIS: ICD-10-CM

## 2022-07-03 DIAGNOSIS — Z99.2 ESRD ON HEMODIALYSIS (HCC): ICD-10-CM

## 2022-07-03 LAB
ALBUMIN SERPL-MCNC: 3.4 G/DL (ref 3.4–5)
ALBUMIN/GLOB SERPL: 1.1 {RATIO} (ref 1–2)
ALP LIVER SERPL-CCNC: 130 U/L
ALT SERPL-CCNC: 40 U/L
ANION GAP SERPL CALC-SCNC: 14 MMOL/L (ref 0–18)
ANTIBODY SCREEN: NEGATIVE
AST SERPL-CCNC: 13 U/L (ref 15–37)
BASOPHILS # BLD AUTO: 0.03 X10(3) UL (ref 0–0.2)
BASOPHILS NFR BLD AUTO: 0.6 %
BILIRUB SERPL-MCNC: 0.3 MG/DL (ref 0.1–2)
BUN BLD-MCNC: 97 MG/DL (ref 7–18)
CALCIUM BLD-MCNC: 8.9 MG/DL (ref 8.5–10.1)
CHLORIDE SERPL-SCNC: 100 MMOL/L (ref 98–112)
CO2 SERPL-SCNC: 24 MMOL/L (ref 21–32)
CREAT BLD-MCNC: 7.97 MG/DL
EOSINOPHIL # BLD AUTO: 0.21 X10(3) UL (ref 0–0.7)
EOSINOPHIL NFR BLD AUTO: 4 %
ERYTHROCYTE [DISTWIDTH] IN BLOOD BY AUTOMATED COUNT: 15.8 %
GLOBULIN PLAS-MCNC: 3 G/DL (ref 2.8–4.4)
GLUCOSE BLD-MCNC: 132 MG/DL (ref 70–99)
HCT VFR BLD AUTO: 24.6 %
HGB BLD-MCNC: 7.9 G/DL
IMM GRANULOCYTES # BLD AUTO: 0.02 X10(3) UL (ref 0–1)
IMM GRANULOCYTES NFR BLD: 0.4 %
LYMPHOCYTES # BLD AUTO: 0.97 X10(3) UL (ref 1–4)
LYMPHOCYTES NFR BLD AUTO: 18.3 %
MCH RBC QN AUTO: 32.5 PG (ref 26–34)
MCHC RBC AUTO-ENTMCNC: 32.1 G/DL (ref 31–37)
MCV RBC AUTO: 101.2 FL
MONOCYTES # BLD AUTO: 0.41 X10(3) UL (ref 0.1–1)
MONOCYTES NFR BLD AUTO: 7.8 %
NEUTROPHILS # BLD AUTO: 3.65 X10 (3) UL (ref 1.5–7.7)
NEUTROPHILS # BLD AUTO: 3.65 X10(3) UL (ref 1.5–7.7)
NEUTROPHILS NFR BLD AUTO: 68.9 %
OSMOLALITY SERPL CALC.SUM OF ELEC: 318 MOSM/KG (ref 275–295)
PLATELET # BLD AUTO: 82 10(3)UL (ref 150–450)
POTASSIUM SERPL-SCNC: 3.7 MMOL/L (ref 3.5–5.1)
PROT SERPL-MCNC: 6.4 G/DL (ref 6.4–8.2)
RBC # BLD AUTO: 2.43 X10(6)UL
RH BLOOD TYPE: POSITIVE
SARS-COV-2 RNA RESP QL NAA+PROBE: NOT DETECTED
SODIUM SERPL-SCNC: 138 MMOL/L (ref 136–145)
WBC # BLD AUTO: 5.3 X10(3) UL (ref 4–11)

## 2022-07-03 PROCEDURE — 30233N1 TRANSFUSION OF NONAUTOLOGOUS RED BLOOD CELLS INTO PERIPHERAL VEIN, PERCUTANEOUS APPROACH: ICD-10-PCS | Performed by: INTERNAL MEDICINE

## 2022-07-03 PROCEDURE — 74178 CT ABD&PLV WO CNTR FLWD CNTR: CPT | Performed by: EMERGENCY MEDICINE

## 2022-07-03 RX ORDER — IOHEXOL 350 MG/ML
90 INJECTION, SOLUTION INTRAVENOUS
Status: COMPLETED | OUTPATIENT
Start: 2022-07-03 | End: 2022-07-03

## 2022-07-04 PROBLEM — K62.5 RECTAL BLEEDING: Status: ACTIVE | Noted: 2022-07-04

## 2022-07-04 PROBLEM — R06.09 DYSPNEA ON EXERTION: Status: ACTIVE | Noted: 2022-07-04

## 2022-07-04 PROBLEM — N18.6 ESRD ON HEMODIALYSIS (HCC): Status: ACTIVE | Noted: 2022-07-04

## 2022-07-04 PROBLEM — R06.00 DYSPNEA ON EXERTION: Status: ACTIVE | Noted: 2022-07-04

## 2022-07-04 PROBLEM — Z99.2 ESRD ON HEMODIALYSIS (HCC): Status: ACTIVE | Noted: 2022-07-04

## 2022-07-04 PROBLEM — D64.9 ANEMIA, UNSPECIFIED TYPE: Status: ACTIVE | Noted: 2022-07-04

## 2022-07-04 PROBLEM — K57.90 DIVERTICULOSIS: Status: ACTIVE | Noted: 2022-07-04

## 2022-07-04 PROBLEM — K52.9 COLITIS: Status: ACTIVE | Noted: 2022-07-04

## 2022-07-04 PROBLEM — R73.9 HYPERGLYCEMIA: Status: ACTIVE | Noted: 2022-07-04

## 2022-07-04 LAB
GLUCOSE BLD-MCNC: 117 MG/DL (ref 70–99)
GLUCOSE BLD-MCNC: 141 MG/DL (ref 70–99)
GLUCOSE BLD-MCNC: 192 MG/DL (ref 70–99)
GLUCOSE BLD-MCNC: 227 MG/DL (ref 70–99)
HBV CORE AB SERPL QL IA: NONREACTIVE
HBV SURFACE AB SER QL: REACTIVE
HBV SURFACE AB SERPL IA-ACNC: 174.46 MIU/ML
HBV SURFACE AG SER-ACNC: <0.1 [IU]/L
HBV SURFACE AG SERPL QL IA: NONREACTIVE
HGB BLD-MCNC: 8.8 G/DL

## 2022-07-04 PROCEDURE — 99222 1ST HOSP IP/OBS MODERATE 55: CPT | Performed by: INTERNAL MEDICINE

## 2022-07-04 PROCEDURE — 5A1D70Z PERFORMANCE OF URINARY FILTRATION, INTERMITTENT, LESS THAN 6 HOURS PER DAY: ICD-10-PCS | Performed by: INTERNAL MEDICINE

## 2022-07-04 RX ORDER — SEVELAMER CARBONATE 800 MG/1
800 TABLET, FILM COATED ORAL
COMMUNITY

## 2022-07-04 RX ORDER — AMIODARONE HYDROCHLORIDE 200 MG/1
200 TABLET ORAL DAILY
Status: DISCONTINUED | OUTPATIENT
Start: 2022-07-04 | End: 2022-07-05

## 2022-07-04 RX ORDER — CARVEDILOL 12.5 MG/1
12.5 TABLET ORAL 2 TIMES DAILY WITH MEALS
Status: DISCONTINUED | OUTPATIENT
Start: 2022-07-04 | End: 2022-07-05

## 2022-07-04 RX ORDER — ACETAMINOPHEN 500 MG
500 TABLET ORAL EVERY 4 HOURS PRN
Status: DISCONTINUED | OUTPATIENT
Start: 2022-07-04 | End: 2022-07-05

## 2022-07-04 RX ORDER — ROSUVASTATIN CALCIUM 10 MG/1
10 TABLET, COATED ORAL NIGHTLY
Status: DISCONTINUED | OUTPATIENT
Start: 2022-07-04 | End: 2022-07-04

## 2022-07-04 RX ORDER — ZOLPIDEM TARTRATE 5 MG/1
5 TABLET ORAL NIGHTLY
Status: DISCONTINUED | OUTPATIENT
Start: 2022-07-04 | End: 2022-07-05

## 2022-07-04 RX ORDER — NICOTINE POLACRILEX 4 MG
30 LOZENGE BUCCAL
Status: DISCONTINUED | OUTPATIENT
Start: 2022-07-04 | End: 2022-07-05

## 2022-07-04 RX ORDER — LEVOTHYROXINE SODIUM 0.15 MG/1
150 TABLET ORAL EVERY OTHER DAY
Status: DISCONTINUED | OUTPATIENT
Start: 2022-07-04 | End: 2022-07-04

## 2022-07-04 RX ORDER — NICOTINE POLACRILEX 4 MG
15 LOZENGE BUCCAL
Status: DISCONTINUED | OUTPATIENT
Start: 2022-07-04 | End: 2022-07-05

## 2022-07-04 RX ORDER — NITROGLYCERIN 0.3 MG/1
0.3 TABLET SUBLINGUAL EVERY 5 MIN PRN
COMMUNITY

## 2022-07-04 RX ORDER — ROSUVASTATIN CALCIUM 20 MG/1
20 TABLET, COATED ORAL NIGHTLY
Status: DISCONTINUED | OUTPATIENT
Start: 2022-07-04 | End: 2022-07-05

## 2022-07-04 RX ORDER — AMLODIPINE BESYLATE 2.5 MG/1
2.5 TABLET ORAL DAILY
Status: DISCONTINUED | OUTPATIENT
Start: 2022-07-04 | End: 2022-07-05

## 2022-07-04 RX ORDER — ONDANSETRON 2 MG/ML
4 INJECTION INTRAMUSCULAR; INTRAVENOUS EVERY 6 HOURS PRN
Status: DISCONTINUED | OUTPATIENT
Start: 2022-07-04 | End: 2022-07-05

## 2022-07-04 RX ORDER — PANTOPRAZOLE SODIUM 40 MG/1
40 TABLET, DELAYED RELEASE ORAL
Status: DISCONTINUED | OUTPATIENT
Start: 2022-07-04 | End: 2022-07-05

## 2022-07-04 RX ORDER — METOCLOPRAMIDE HYDROCHLORIDE 5 MG/ML
5 INJECTION INTRAMUSCULAR; INTRAVENOUS EVERY 8 HOURS PRN
Status: DISCONTINUED | OUTPATIENT
Start: 2022-07-04 | End: 2022-07-05

## 2022-07-04 RX ORDER — LEVOTHYROXINE SODIUM 0.15 MG/1
150 TABLET ORAL
Status: DISCONTINUED | OUTPATIENT
Start: 2022-07-04 | End: 2022-07-05

## 2022-07-04 RX ORDER — HEPARIN SODIUM 1000 [USP'U]/ML
1.5 INJECTION, SOLUTION INTRAVENOUS; SUBCUTANEOUS ONCE
Status: COMPLETED | OUTPATIENT
Start: 2022-07-04 | End: 2022-07-04

## 2022-07-04 RX ORDER — DEXTROSE MONOHYDRATE 25 G/50ML
50 INJECTION, SOLUTION INTRAVENOUS
Status: DISCONTINUED | OUTPATIENT
Start: 2022-07-04 | End: 2022-07-05

## 2022-07-04 NOTE — ED QUICK NOTES
Orders for admission, patient is aware of plan and ready to go upstairs. Any questions, please call ED RN Klaus  at extension [phone]. Vaccinated? yes  Type of COVID test sent:rapid  COVID Suspicion level: Low      Titratable drug(s) infusing:prbc  Rate:125mL/hr    LOC at time of transport:a&ox4    Other pertinent information:    CIWA score=  NIH score=

## 2022-07-04 NOTE — PROGRESS NOTES
07/04/22 1507   Clinical Encounter Type   Visited With Health care provider  (FRANCIA Shabazz)   Nurse: Indigo Deshpande received POLST consult. After a physician's order for DNAR/ has been entered into Epic, please enter a POLST consult to the , followed by contacting pager [phone] . Spiritual care can be provided by a . For spiritual care requests, please enter a spiritual care consult followed by contacting pager [phone].

## 2022-07-04 NOTE — PLAN OF CARE
Received pt admitted to [facility] from ED for rectal bleeding. First unit of PRBCs infusing. Admission navigator completed. A&Ox4, forgetful at times  O2 sat WNL on RA. States he has shortness of breath with exertion. SR on tele  Continent of bladder and bowel   HD pt m-w-f.  Diaylsis scheduled for morning on 7/4   Up with SBA    Plan: Consults to see, NPO, monitor hgb, HD 7/4    Problem: Diabetes/Glucose Control  Goal: Glucose maintained within prescribed range  Description: INTERVENTIONS:  - Monitor Blood Glucose as ordered  - Assess for signs and symptoms of hyperglycemia and hypoglycemia  - Administer ordered medications to maintain glucose within target range  - Assess barriers to adequate nutritional intake and initiate nutrition consult as needed  - Instruct patient on self management of diabetes  Outcome: Progressing     Problem: Patient/Family Goals  Goal: Patient/Family Long Term Goal  Description: Patient's Long Term Goal: to stay out of the hospital     Interventions:  - understand POC  - take medications as prescribed by MD  - follow up appointments    - See additional Care Plan goals for specific interventions  Outcome: Progressing  Goal: Patient/Family Short Term Goal  Description: Patient's Short Term Goal: to feel better    Interventions:   - monitor on tele  - labs  - GI to see    - See additional Care Plan goals for specific interventions  Outcome: Progressing

## 2022-07-04 NOTE — PLAN OF CARE
Shift Note:  Assumed care of patient. Patient alert and oriented x4, glasses at bedside. Patient on room air, denies difficulty breathing, lung sounds clear. Denies any cardiac symptoms, NSR on tele. Denies pain at this time. Continent of bowel and bladder, last BM 7/3. Came in with 3 episodes of bloody stools, has not had a BM since 7/3 in afternoon and received 2 PRBC for drop in Hgb. Ambulates with stand by assist, call light within reach, tolerating care well.      POC:  - HD  - GI to see       Problem: Diabetes/Glucose Control  Goal: Glucose maintained within prescribed range  Description: INTERVENTIONS:  - Monitor Blood Glucose as ordered  - Assess for signs and symptoms of hyperglycemia and hypoglycemia  - Administer ordered medications to maintain glucose within target range  - Assess barriers to adequate nutritional intake and initiate nutrition consult as needed  - Instruct patient on self management of diabetes  Outcome: Progressing     Problem: Patient/Family Goals  Goal: Patient/Family Long Term Goal  Description: Patient's Long Term Goal: to stay out of the hospital     Interventions:  - understand POC  - take medications as prescribed by MD  - follow up appointments    - See additional Care Plan goals for specific interventions  Outcome: Progressing  Goal: Patient/Family Short Term Goal  Description: Patient's Short Term Goal: to feel better    Interventions:   - monitor on tele  - labs  - GI to see    - See additional Care Plan goals for specific interventions  Outcome: Progressing

## 2022-07-05 ENCOUNTER — APPOINTMENT (OUTPATIENT)
Dept: NUCLEAR MEDICINE | Facility: HOSPITAL | Age: 77
End: 2022-07-05
Attending: INTERNAL MEDICINE
Payer: MEDICARE

## 2022-07-05 VITALS
WEIGHT: 182.31 LBS | HEART RATE: 69 BPM | BODY MASS INDEX: 24.69 KG/M2 | HEIGHT: 72 IN | OXYGEN SATURATION: 99 % | DIASTOLIC BLOOD PRESSURE: 66 MMHG | RESPIRATION RATE: 15 BRPM | TEMPERATURE: 98 F | SYSTOLIC BLOOD PRESSURE: 141 MMHG

## 2022-07-05 LAB
ALBUMIN SERPL-MCNC: 3.1 G/DL (ref 3.4–5)
ALBUMIN/GLOB SERPL: 0.9 {RATIO} (ref 1–2)
ALP LIVER SERPL-CCNC: 115 U/L
ALT SERPL-CCNC: 30 U/L
ANION GAP SERPL CALC-SCNC: 8 MMOL/L (ref 0–18)
AST SERPL-CCNC: 22 U/L (ref 15–37)
ATRIAL RATE: 68 BPM
BASOPHILS # BLD AUTO: 0.05 X10(3) UL (ref 0–0.2)
BASOPHILS NFR BLD AUTO: 1 %
BILIRUB SERPL-MCNC: 0.4 MG/DL (ref 0.1–2)
BLOOD TYPE BARCODE: 5100
BLOOD TYPE BARCODE: 5100
BUN BLD-MCNC: 59 MG/DL (ref 7–18)
CALCIUM BLD-MCNC: 8.6 MG/DL (ref 8.5–10.1)
CHLORIDE SERPL-SCNC: 101 MMOL/L (ref 98–112)
CO2 SERPL-SCNC: 27 MMOL/L (ref 21–32)
CREAT BLD-MCNC: 6.16 MG/DL
EOSINOPHIL # BLD AUTO: 0.28 X10(3) UL (ref 0–0.7)
EOSINOPHIL NFR BLD AUTO: 5.7 %
ERYTHROCYTE [DISTWIDTH] IN BLOOD BY AUTOMATED COUNT: 18.2 %
GLOBULIN PLAS-MCNC: 3.3 G/DL (ref 2.8–4.4)
GLUCOSE BLD-MCNC: 109 MG/DL (ref 70–99)
GLUCOSE BLD-MCNC: 115 MG/DL (ref 70–99)
GLUCOSE BLD-MCNC: 131 MG/DL (ref 70–99)
GLUCOSE BLD-MCNC: 134 MG/DL (ref 70–99)
HCT VFR BLD AUTO: 28.3 %
HGB BLD-MCNC: 9.4 G/DL
IMM GRANULOCYTES # BLD AUTO: 0.02 X10(3) UL (ref 0–1)
IMM GRANULOCYTES NFR BLD: 0.4 %
LYMPHOCYTES # BLD AUTO: 1.36 X10(3) UL (ref 1–4)
LYMPHOCYTES NFR BLD AUTO: 27.5 %
MAGNESIUM SERPL-MCNC: 2.1 MG/DL (ref 1.6–2.6)
MCH RBC QN AUTO: 32.2 PG (ref 26–34)
MCHC RBC AUTO-ENTMCNC: 33.2 G/DL (ref 31–37)
MCV RBC AUTO: 96.9 FL
MONOCYTES # BLD AUTO: 0.47 X10(3) UL (ref 0.1–1)
MONOCYTES NFR BLD AUTO: 9.5 %
NEUTROPHILS # BLD AUTO: 2.76 X10 (3) UL (ref 1.5–7.7)
NEUTROPHILS # BLD AUTO: 2.76 X10(3) UL (ref 1.5–7.7)
NEUTROPHILS NFR BLD AUTO: 55.9 %
OSMOLALITY SERPL CALC.SUM OF ELEC: 299 MOSM/KG (ref 275–295)
P AXIS: 49 DEGREES
P-R INTERVAL: 178 MS
PLATELET # BLD AUTO: 85 10(3)UL (ref 150–450)
POTASSIUM SERPL-SCNC: 4.3 MMOL/L (ref 3.5–5.1)
PROT SERPL-MCNC: 6.4 G/DL (ref 6.4–8.2)
Q-T INTERVAL: 496 MS
QRS DURATION: 126 MS
QTC CALCULATION (BEZET): 527 MS
R AXIS: 33 DEGREES
RBC # BLD AUTO: 2.92 X10(6)UL
SODIUM SERPL-SCNC: 136 MMOL/L (ref 136–145)
T AXIS: 79 DEGREES
VENTRICULAR RATE: 68 BPM
WBC # BLD AUTO: 4.9 X10(3) UL (ref 4–11)

## 2022-07-05 PROCEDURE — 78830 RP LOCLZJ TUM SPECT W/CT 1: CPT | Performed by: INTERNAL MEDICINE

## 2022-07-05 PROCEDURE — 78290 INTESTINE IMAGING: CPT | Performed by: INTERNAL MEDICINE

## 2022-07-05 PROCEDURE — 99232 SBSQ HOSP IP/OBS MODERATE 35: CPT | Performed by: INTERNAL MEDICINE

## 2022-07-05 NOTE — PLAN OF CARE
A&Ox4  O2 sat WNL on RA, denies shortness of breath   SR on tele  Continent of bladder and bowel  Up with SBA     Small bloody, formed BM noted x1 overnight    Problem: Diabetes/Glucose Control  Goal: Glucose maintained within prescribed range  Description: INTERVENTIONS:  - Monitor Blood Glucose as ordered  - Assess for signs and symptoms of hyperglycemia and hypoglycemia  - Administer ordered medications to maintain glucose within target range  - Assess barriers to adequate nutritional intake and initiate nutrition consult as needed  - Instruct patient on self management of diabetes  Outcome: Progressing     Problem: Patient/Family Goals  Goal: Patient/Family Long Term Goal  Description: Patient's Long Term Goal: to stay out of the hospital     Interventions:  - understand POC  - take medications as prescribed by MD  - follow up appointments    - See additional Care Plan goals for specific interventions  Outcome: Progressing  Goal: Patient/Family Short Term Goal  Description: Patient's Short Term Goal: to feel better    Interventions:   - monitor on tele  - labs  - GI to see    - See additional Care Plan goals for specific interventions  Outcome: Progressing

## 2022-07-05 NOTE — OCCUPATIONAL THERAPY NOTE
OT order received and pt chart reviewed. Per RN discussion with pt, pt has no concerns about ADLs at this time. Pt d/c from PT, MOD I for 500ft mobility and up ad-travon. Pt presents with no skilled IP OT needs at this time. OT will sign off.

## 2022-07-05 NOTE — PROGRESS NOTES
Reviewed the AVS with patient. All questions answered. Information provided on GI low fiber/soft diet per VO/RB Dr. Ne Gallegos. Patient awaiting ride home.

## 2022-07-05 NOTE — PLAN OF CARE
N: AOx4  HEENT: Glasses  R: CTA bilaterally. Room air. C: SB on tele. No edema. GI: Patient reports a black bowel movement this morning between 00:30 and 01:00. Hgb stabe at this time. Merckles Scan today at 14:30. : WNL. HD on M, W,F. Right chest tunneled HD cath with dressing change dated 07/04/2022 and biopatch present. Dressing is C/D/I.     Will fax medical records request to [address] [place] [address],[address] [phone]      Problem: Patient/Family Goals  Goal: Patient/Family Long Term Goal  Description: Patient's Long Term Goal: to stay out of the hospital     Interventions:  - understand POC  - take medications as prescribed by MD  - follow up appointments    - See additional Care Plan goals for specific interventions  Outcome: Progressing  Goal: Patient/Family Short Term Goal  Description: Patient's Short Term Goal: to feel better    Interventions:   - monitor on tele  - labs  - GI to see    - See additional Care Plan goals for specific interventions  Outcome: Progressing     Problem: GASTROINTESTINAL - ADULT  Goal: Minimal or absence of nausea and vomiting  Description: INTERVENTIONS:  - Maintain adequate hydration with IV or PO as ordered and tolerated  - Nasogastric tube to low intermittent suction as ordered  - Evaluate effectiveness of ordered antiemetic medications  - Provide nonpharmacologic comfort measures as appropriate  - Advance diet as tolerated, if ordered  - Obtain nutritional consult as needed  - Evaluate fluid balance  Outcome: Progressing  Goal: Maintains or returns to baseline bowel function  Description: INTERVENTIONS:  - Assess bowel function  - Maintain adequate hydration with IV or PO as ordered and tolerated  - Evaluate effectiveness of GI medications  - Encourage mobilization and activity  - Obtain nutritional consult as needed  - Establish a toileting routine/schedule  - Consider collaborating with pharmacy to review patient's medication profile  Outcome: Not Progressing     Problem: HEMATOLOGIC - ADULT  Goal: Maintains hematologic stability  Description: INTERVENTIONS  - Assess for signs and symptoms of bleeding or hemorrhage  - Monitor labs and vital signs for trends  - Administer supportive blood products/factors, fluids and medications as ordered and appropriate  - Administer supportive blood products/factors as ordered and appropriate  Outcome: Progressing

## 2022-07-06 NOTE — PROGRESS NOTES
Pt wheeled down to [facility]/ transport via wheelchair at [facility]. Son present with pt. Belongings and paperwork sent with patient.

## 2022-07-26 ENCOUNTER — PATIENT OUTREACH (OUTPATIENT)
Dept: CASE MANAGEMENT | Age: 77
End: 2022-07-26

## 2022-07-26 NOTE — PROGRESS NOTES
VM received; pt requesting assistance w/scheduling apt (dc 07/05)  Nuno Rosales, Internal Medicine  [address] [facility]  [address] [phone]  Follow up 2-3 weeks    Dr. Virginia Mao office will call pt to schedule apt    Closing encounter

## 2022-07-26 NOTE — PROGRESS NOTES
VM received; pt requesting assistance w/scheduling apt (lorna 07/05)    Dr Nilam Turner [address] [facility]  [phone]  Follow up 1 week    Dr Kirk David, Internal Medicine  [address] [facility]  [address] [phone]  Follow up 2-3 weeks

## 2022-08-10 ENCOUNTER — PATIENT OUTREACH (OUTPATIENT)
Dept: CASE MANAGEMENT | Age: 77
End: 2022-08-10

## 2022-08-10 NOTE — PROGRESS NOTES
VM received; pt requesting assistance w/scheduling apt (lorna 07/05)    [address] [id number] [phone]  Apt for Aug 17 @ 9:20am    Confirmed w/pt  Closing encounter

## 2022-09-28 ENCOUNTER — HOSPITAL ENCOUNTER (INPATIENT)
Facility: HOSPITAL | Age: 77
LOS: 1 days | Discharge: HOME | End: 2022-09-30
Attending: EMERGENCY MEDICINE | Admitting: HOSPITALIST
Payer: MEDICARE

## 2022-09-28 ENCOUNTER — APPOINTMENT (OUTPATIENT)
Dept: GENERAL RADIOLOGY | Facility: HOSPITAL | Age: 77
End: 2022-09-28
Attending: EMERGENCY MEDICINE
Payer: MEDICARE

## 2022-09-28 DIAGNOSIS — K92.2 GASTROINTESTINAL HEMORRHAGE, UNSPECIFIED GASTROINTESTINAL HEMORRHAGE TYPE: ICD-10-CM

## 2022-09-28 DIAGNOSIS — R07.9 ACUTE CHEST PAIN: ICD-10-CM

## 2022-09-28 DIAGNOSIS — E87.6 HYPOKALEMIA: ICD-10-CM

## 2022-09-28 DIAGNOSIS — N18.6 ESRD (END STAGE RENAL DISEASE) (HCC): ICD-10-CM

## 2022-09-28 DIAGNOSIS — D62 ACUTE BLOOD LOSS ANEMIA: Primary | ICD-10-CM

## 2022-09-28 LAB
BASOPHILS # BLD AUTO: 0.02 X10(3) UL (ref 0–0.2)
BASOPHILS NFR BLD AUTO: 0.5 %
EOSINOPHIL # BLD AUTO: 0.22 X10(3) UL (ref 0–0.7)
EOSINOPHIL NFR BLD AUTO: 5.2 %
ERYTHROCYTE [DISTWIDTH] IN BLOOD BY AUTOMATED COUNT: 17.8 %
HCT VFR BLD AUTO: 20.5 %
HGB BLD-MCNC: 6.3 G/DL
IMM GRANULOCYTES # BLD AUTO: 0.02 X10(3) UL (ref 0–1)
IMM GRANULOCYTES NFR BLD: 0.5 %
INR BLD: 1.1 (ref 0.85–1.16)
LYMPHOCYTES # BLD AUTO: 0.97 X10(3) UL (ref 1–4)
LYMPHOCYTES NFR BLD AUTO: 23.1 %
MCH RBC QN AUTO: 32.3 PG (ref 26–34)
MCHC RBC AUTO-ENTMCNC: 30.7 G/DL (ref 31–37)
MCV RBC AUTO: 105.1 FL
MONOCYTES # BLD AUTO: 0.37 X10(3) UL (ref 0.1–1)
MONOCYTES NFR BLD AUTO: 8.8 %
NEUTROPHILS # BLD AUTO: 2.6 X10 (3) UL (ref 1.5–7.7)
NEUTROPHILS # BLD AUTO: 2.6 X10(3) UL (ref 1.5–7.7)
NEUTROPHILS NFR BLD AUTO: 61.9 %
PLATELET # BLD AUTO: 63 10(3)UL (ref 150–450)
PROTHROMBIN TIME: 14.2 SECONDS (ref 11.6–14.8)
RBC # BLD AUTO: 1.95 X10(6)UL
SARS-COV-2 RNA RESP QL NAA+PROBE: NOT DETECTED
WBC # BLD AUTO: 4.2 X10(3) UL (ref 4–11)

## 2022-09-28 PROCEDURE — 30233N1 TRANSFUSION OF NONAUTOLOGOUS RED BLOOD CELLS INTO PERIPHERAL VEIN, PERCUTANEOUS APPROACH: ICD-10-PCS | Performed by: INTERNAL MEDICINE

## 2022-09-28 PROCEDURE — 71045 X-RAY EXAM CHEST 1 VIEW: CPT | Performed by: EMERGENCY MEDICINE

## 2022-09-29 ENCOUNTER — APPOINTMENT (OUTPATIENT)
Dept: NUCLEAR MEDICINE | Facility: HOSPITAL | Age: 77
End: 2022-09-29
Attending: INTERNAL MEDICINE
Payer: MEDICARE

## 2022-09-29 PROBLEM — D62 ACUTE BLOOD LOSS ANEMIA: Status: ACTIVE | Noted: 2022-09-29

## 2022-09-29 PROBLEM — E87.6 HYPOKALEMIA: Status: ACTIVE | Noted: 2022-09-29

## 2022-09-29 PROBLEM — R07.9 ACUTE CHEST PAIN: Status: ACTIVE | Noted: 2022-09-29

## 2022-09-29 PROBLEM — K92.2 GI BLEED: Status: ACTIVE | Noted: 2022-09-29

## 2022-09-29 PROBLEM — K92.2 GASTROINTESTINAL HEMORRHAGE, UNSPECIFIED GASTROINTESTINAL HEMORRHAGE TYPE: Status: ACTIVE | Noted: 2022-09-29

## 2022-09-29 LAB
ALBUMIN SERPL-MCNC: 3.2 G/DL (ref 3.4–5)
ALBUMIN/GLOB SERPL: 1 {RATIO} (ref 1–2)
ALP LIVER SERPL-CCNC: 134 U/L
ALT SERPL-CCNC: 40 U/L
ANION GAP SERPL CALC-SCNC: 10 MMOL/L (ref 0–18)
ANTIBODY SCREEN: NEGATIVE
AST SERPL-CCNC: 30 U/L (ref 15–37)
ATRIAL RATE: 74 BPM
BILIRUB SERPL-MCNC: 0.3 MG/DL (ref 0.1–2)
BUN BLD-MCNC: 76 MG/DL (ref 7–18)
CALCIUM BLD-MCNC: 8 MG/DL (ref 8.5–10.1)
CHLORIDE SERPL-SCNC: 103 MMOL/L (ref 98–112)
CO2 SERPL-SCNC: 26 MMOL/L (ref 21–32)
CREAT BLD-MCNC: 6.2 MG/DL
GFR SERPLBLD BASED ON 1.73 SQ M-ARVRAT: 9 ML/MIN/1.73M2 (ref 60–?)
GLOBULIN PLAS-MCNC: 3.1 G/DL (ref 2.8–4.4)
GLUCOSE BLD-MCNC: 106 MG/DL (ref 70–99)
GLUCOSE BLD-MCNC: 111 MG/DL (ref 70–99)
GLUCOSE BLD-MCNC: 115 MG/DL (ref 70–99)
GLUCOSE BLD-MCNC: 220 MG/DL (ref 70–99)
GLUCOSE BLD-MCNC: 241 MG/DL (ref 70–99)
GLUCOSE BLD-MCNC: 314 MG/DL (ref 70–99)
HCT VFR BLD AUTO: 22.1 %
HGB BLD-MCNC: 6.6 G/DL
HGB BLD-MCNC: 7 G/DL
HGB BLD-MCNC: 7.5 G/DL
MAGNESIUM SERPL-MCNC: 1.9 MG/DL (ref 1.6–2.6)
NT-PROBNP SERPL-MCNC: 1954 PG/ML (ref ?–450)
OSMOLALITY SERPL CALC.SUM OF ELEC: 311 MOSM/KG (ref 275–295)
P AXIS: 70 DEGREES
P-R INTERVAL: 190 MS
POTASSIUM SERPL-SCNC: 3.2 MMOL/L (ref 3.5–5.1)
PROT SERPL-MCNC: 6.3 G/DL (ref 6.4–8.2)
Q-T INTERVAL: 482 MS
QRS DURATION: 128 MS
QTC CALCULATION (BEZET): 535 MS
R AXIS: 37 DEGREES
RH BLOOD TYPE: POSITIVE
SODIUM SERPL-SCNC: 139 MMOL/L (ref 136–145)
T AXIS: 87 DEGREES
TROPONIN I HIGH SENSITIVITY: 24 NG/L
TROPONIN I HIGH SENSITIVITY: 29 NG/L
VENTRICULAR RATE: 74 BPM

## 2022-09-29 PROCEDURE — 5A1D70Z PERFORMANCE OF URINARY FILTRATION, INTERMITTENT, LESS THAN 6 HOURS PER DAY: ICD-10-PCS | Performed by: INTERNAL MEDICINE

## 2022-09-29 PROCEDURE — 78830 RP LOCLZJ TUM SPECT W/CT 1: CPT | Performed by: INTERNAL MEDICINE

## 2022-09-29 PROCEDURE — 78278 ACUTE GI BLOOD LOSS IMAGING: CPT | Performed by: INTERNAL MEDICINE

## 2022-09-29 PROCEDURE — 99223 1ST HOSP IP/OBS HIGH 75: CPT | Performed by: INTERNAL MEDICINE

## 2022-09-29 RX ORDER — SODIUM CHLORIDE 9 MG/ML
INJECTION, SOLUTION INTRAVENOUS ONCE
Status: COMPLETED | OUTPATIENT
Start: 2022-09-29 | End: 2022-09-29

## 2022-09-29 RX ORDER — NICOTINE POLACRILEX 4 MG
15 LOZENGE BUCCAL
Status: DISCONTINUED | OUTPATIENT
Start: 2022-09-29 | End: 2022-09-30

## 2022-09-29 RX ORDER — NICOTINE POLACRILEX 4 MG
30 LOZENGE BUCCAL
Status: DISCONTINUED | OUTPATIENT
Start: 2022-09-29 | End: 2022-09-30

## 2022-09-29 RX ORDER — AMIODARONE HYDROCHLORIDE 200 MG/1
200 TABLET ORAL DAILY
Status: DISCONTINUED | OUTPATIENT
Start: 2022-09-29 | End: 2022-09-29

## 2022-09-29 RX ORDER — ACETAMINOPHEN 500 MG
500 TABLET ORAL EVERY 4 HOURS PRN
Status: DISCONTINUED | OUTPATIENT
Start: 2022-09-29 | End: 2022-09-30

## 2022-09-29 RX ORDER — HEPARIN SODIUM 1000 [USP'U]/ML
1.5 INJECTION, SOLUTION INTRAVENOUS; SUBCUTANEOUS ONCE
Status: COMPLETED | OUTPATIENT
Start: 2022-09-29 | End: 2022-09-29

## 2022-09-29 RX ORDER — CARVEDILOL 12.5 MG/1
12.5 TABLET ORAL 2 TIMES DAILY WITH MEALS
Status: DISCONTINUED | OUTPATIENT
Start: 2022-09-29 | End: 2022-09-30

## 2022-09-29 RX ORDER — SODIUM CHLORIDE 9 MG/ML
INJECTION, SOLUTION INTRAVENOUS CONTINUOUS
Status: DISCONTINUED | OUTPATIENT
Start: 2022-09-29 | End: 2022-09-30

## 2022-09-29 RX ORDER — SEVELAMER CARBONATE 800 MG/1
800 TABLET, FILM COATED ORAL
Status: DISCONTINUED | OUTPATIENT
Start: 2022-09-29 | End: 2022-09-30

## 2022-09-29 RX ORDER — AMLODIPINE BESYLATE 2.5 MG/1
2.5 TABLET ORAL DAILY
Status: DISCONTINUED | OUTPATIENT
Start: 2022-09-29 | End: 2022-09-30

## 2022-09-29 RX ORDER — ROSUVASTATIN CALCIUM 20 MG/1
20 TABLET, COATED ORAL NIGHTLY
Status: DISCONTINUED | OUTPATIENT
Start: 2022-09-29 | End: 2022-09-30

## 2022-09-29 RX ORDER — DEXTROSE MONOHYDRATE 25 G/50ML
50 INJECTION, SOLUTION INTRAVENOUS
Status: DISCONTINUED | OUTPATIENT
Start: 2022-09-29 | End: 2022-09-30

## 2022-09-29 RX ORDER — ISOSORBIDE MONONITRATE 30 MG/1
30 TABLET, EXTENDED RELEASE ORAL DAILY
Status: DISCONTINUED | OUTPATIENT
Start: 2022-09-29 | End: 2022-09-30

## 2022-09-29 RX ORDER — LEVOTHYROXINE SODIUM 0.15 MG/1
150 TABLET ORAL EVERY OTHER DAY
Status: DISCONTINUED | OUTPATIENT
Start: 2022-09-29 | End: 2022-09-30

## 2022-09-29 RX ORDER — ASPIRIN 81 MG/1
81 TABLET ORAL DAILY
COMMUNITY

## 2022-09-29 RX ORDER — ALBUMIN (HUMAN) 12.5 G/50ML
100 SOLUTION INTRAVENOUS AS NEEDED
Status: DISCONTINUED | OUTPATIENT
Start: 2022-09-29 | End: 2022-09-30

## 2022-09-29 NOTE — ED QUICK NOTES
Orders for admission, patient is aware of plan and ready to go upstairs. Any questions, please call ED RN [phone] at [address]. Patient Covid vaccination status: Fully vaccinated     COVID Test Ordered in ED: Rapid SARS-CoV-2 by PCR    COVID Suspicion at Admission: Low clinical suspicion for COVID    Running Infusions:  #1 PRBC INFUSING    Mental Status/LOC at time of transport: ALERT X 4    Other pertinent information: HEMODIALYSIS PT WITH KADI TUBE.    CIWA score: N/A   NIH score:  N/A

## 2022-09-29 NOTE — PROGRESS NOTES
NURSING ADMISSION NOTE      Patient admitted via Cart  Oriented to room. Safety precautions initiated. Bed in low position. Call light in reach. Patient a/ ox4. Denies pain. C/o black stool at home for weeks. Had completed GI work up in [address] [place] [address] recently. Admitted for Hgb 6.3, 1 unit prbc transfusing. HD on Tuesday, Thursday, Saturday. Navigators completed. Will call nephrology, cardiology in the morning. 0700- hgb 6.6 this am. 2 unit prbc transfusing. All consults paged.

## 2022-09-29 NOTE — ED INITIAL ASSESSMENT (HPI)
Presents with chest pain this evening while getting ready for bed. Pt took 1 SL NTG tab at 2140 with relief noted. Hgb from yesterday 6. 3(last hgb 7.1 last week)  Pt diaylsis patient T/TH/ Sat   family states intermittent chest pain over last couple days

## 2022-09-29 NOTE — CM/SW NOTE
MARGARET spoke with Dr. Jacki Bowers regarding recommended video capsule studio for occult GI bleed. Patient's son is not wanting surgery unless it can be verified that insurance will cover the procedure. MARGARET called patient's insurance to verify if surgery would be covered and was told by their representative that coverage cannot be verified until clinical documentation for the procedure is sent to them. MARGARET notified MD. Liliana Kerr will continue to follow for plan of care changes and remain available for any additional DC needs or concerns.      Damion Rea MSW, LSW  Discharge Planner   [phone]

## 2022-09-30 VITALS
DIASTOLIC BLOOD PRESSURE: 61 MMHG | SYSTOLIC BLOOD PRESSURE: 117 MMHG | WEIGHT: 198.19 LBS | HEART RATE: 61 BPM | RESPIRATION RATE: 16 BRPM | TEMPERATURE: 98 F | HEIGHT: 72 IN | OXYGEN SATURATION: 100 % | BODY MASS INDEX: 26.84 KG/M2

## 2022-09-30 LAB
ANION GAP SERPL CALC-SCNC: 9 MMOL/L (ref 0–18)
BASOPHILS # BLD AUTO: 0.03 X10(3) UL (ref 0–0.2)
BASOPHILS NFR BLD AUTO: 0.9 %
BLOOD TYPE BARCODE: 5100
BUN BLD-MCNC: 41 MG/DL (ref 7–18)
CALCIUM BLD-MCNC: 8.5 MG/DL (ref 8.5–10.1)
CHLORIDE SERPL-SCNC: 109 MMOL/L (ref 98–112)
CO2 SERPL-SCNC: 26 MMOL/L (ref 21–32)
CREAT BLD-MCNC: 4.13 MG/DL
EOSINOPHIL # BLD AUTO: 0.23 X10(3) UL (ref 0–0.7)
EOSINOPHIL NFR BLD AUTO: 7 %
ERYTHROCYTE [DISTWIDTH] IN BLOOD BY AUTOMATED COUNT: 19 %
GFR SERPLBLD BASED ON 1.73 SQ M-ARVRAT: 14 ML/MIN/1.73M2 (ref 60–?)
GLUCOSE BLD-MCNC: 230 MG/DL (ref 70–99)
GLUCOSE BLD-MCNC: 65 MG/DL (ref 70–99)
GLUCOSE BLD-MCNC: 76 MG/DL (ref 70–99)
GLUCOSE BLD-MCNC: 78 MG/DL (ref 70–99)
HBV SURFACE AG SER-ACNC: <0.1 [IU]/L
HBV SURFACE AG SERPL QL IA: NONREACTIVE
HCT VFR BLD AUTO: 24.9 %
HCT VFR BLD AUTO: 25.9 %
HCT VFR BLD AUTO: 29 %
HGB BLD-MCNC: 8.1 G/DL
HGB BLD-MCNC: 8.3 G/DL
HGB BLD-MCNC: 9.2 G/DL
IMM GRANULOCYTES # BLD AUTO: 0.01 X10(3) UL (ref 0–1)
IMM GRANULOCYTES NFR BLD: 0.3 %
LYMPHOCYTES # BLD AUTO: 0.78 X10(3) UL (ref 1–4)
LYMPHOCYTES NFR BLD AUTO: 23.9 %
MCH RBC QN AUTO: 31 PG (ref 26–34)
MCHC RBC AUTO-ENTMCNC: 32 G/DL (ref 31–37)
MCV RBC AUTO: 96.6 FL
MONOCYTES # BLD AUTO: 0.3 X10(3) UL (ref 0.1–1)
MONOCYTES NFR BLD AUTO: 9.2 %
NEUTROPHILS # BLD AUTO: 1.92 X10 (3) UL (ref 1.5–7.7)
NEUTROPHILS # BLD AUTO: 1.92 X10(3) UL (ref 1.5–7.7)
NEUTROPHILS NFR BLD AUTO: 58.7 %
OSMOLALITY SERPL CALC.SUM OF ELEC: 306 MOSM/KG (ref 275–295)
PLATELET # BLD AUTO: 57 10(3)UL (ref 150–450)
POTASSIUM SERPL-SCNC: 3.3 MMOL/L (ref 3.5–5.1)
RBC # BLD AUTO: 2.68 X10(6)UL
SODIUM SERPL-SCNC: 144 MMOL/L (ref 136–145)
WBC # BLD AUTO: 3.3 X10(3) UL (ref 4–11)

## 2022-09-30 PROCEDURE — 99232 SBSQ HOSP IP/OBS MODERATE 35: CPT | Performed by: INTERNAL MEDICINE

## 2022-09-30 RX ORDER — PANTOPRAZOLE SODIUM 40 MG/1
40 TABLET, DELAYED RELEASE ORAL
COMMUNITY

## 2022-09-30 RX ORDER — POTASSIUM CHLORIDE 20 MEQ/1
20 TABLET, EXTENDED RELEASE ORAL ONCE
Status: COMPLETED | OUTPATIENT
Start: 2022-09-30 | End: 2022-09-30

## 2022-09-30 RX ORDER — PANTOPRAZOLE SODIUM 40 MG/1
40 TABLET, DELAYED RELEASE ORAL
Qty: 30 TABLET | Refills: 0 | Status: SHIPPED | OUTPATIENT
Start: 2022-09-30 | End: 2022-09-30

## 2022-09-30 NOTE — PLAN OF CARE
Patient alert and oriented, on RA, vitals stable. Pt denies pain or SOB. Hgb stable post transfusion. Pt had blood loss study done last night, no active bleed noted. NPO after midnight. . IVF infusing at 100 ml/hr, tolerating well. No new complaints or acute events overnight. Will continue to monitor.      Problem: HEMATOLOLGIC  Goal: Maintain hematologic stability  Description: Interventions:  - Assess for signs and symptoms of bleeding or hemorrhage  - Monitor labs for bleeding or clotting disorders  - Administer blood products/factors as ordered  - Educate parent/family on condition and treatment plan  Outcome: Progressing     Problem: ANEMIA OF PREMATURITY  Goal: Hematocrit/Hemoblobin within normal range  Description: Interventions:  - Monitor CBC as ordered  - Administer Iron and nutrition supplements as ordered  - Administer epoetin eunice as ordered  - Administer blood products as ordered  - Educate parent/family on condition and treatment plan  Outcome: Progressing

## 2022-09-30 NOTE — PROGRESS NOTES
Patient discharged. RN removed PIV. Reviewed discharge instructions including stopping medications. Reviewed all continued medication including dosages/purpose and side effect. RN reviewed when to report to the ED for any future bleeding. Patient and son verbalized understanding with no additional questions. Patient escorted to [facility] by RN and son with no distress noted upon departure.

## 2022-10-25 ENCOUNTER — HOSPITAL ENCOUNTER (INPATIENT)
Facility: HOSPITAL | Age: 77
LOS: 2 days | Discharge: HOME | End: 2022-10-27
Attending: EMERGENCY MEDICINE | Admitting: HOSPITALIST
Payer: MEDICARE

## 2022-10-25 ENCOUNTER — APPOINTMENT (OUTPATIENT)
Dept: CT IMAGING | Facility: HOSPITAL | Age: 77
End: 2022-10-25
Attending: EMERGENCY MEDICINE
Payer: MEDICARE

## 2022-10-25 DIAGNOSIS — K92.2 GASTROINTESTINAL HEMORRHAGE, UNSPECIFIED GASTROINTESTINAL HEMORRHAGE TYPE: Primary | ICD-10-CM

## 2022-10-25 DIAGNOSIS — D69.6 THROMBOCYTOPENIA (HCC): ICD-10-CM

## 2022-10-25 LAB
ALBUMIN SERPL-MCNC: 3.3 G/DL (ref 3.4–5)
ALBUMIN/GLOB SERPL: 1 {RATIO} (ref 1–2)
ALP LIVER SERPL-CCNC: 173 U/L
ALT SERPL-CCNC: 47 U/L
ANION GAP SERPL CALC-SCNC: 15 MMOL/L (ref 0–18)
ANTIBODY SCREEN: NEGATIVE
AST SERPL-CCNC: 34 U/L (ref 15–37)
ATRIAL RATE: 64 BPM
BASOPHILS # BLD AUTO: 0.04 X10(3) UL (ref 0–0.2)
BASOPHILS NFR BLD AUTO: 0.7 %
BILIRUB SERPL-MCNC: 0.4 MG/DL (ref 0.1–2)
BUN BLD-MCNC: 149 MG/DL (ref 7–18)
CALCIUM BLD-MCNC: 7.8 MG/DL (ref 8.5–10.1)
CHLORIDE SERPL-SCNC: 105 MMOL/L (ref 98–112)
CO2 SERPL-SCNC: 18 MMOL/L (ref 21–32)
CREAT BLD-MCNC: 8.34 MG/DL
EOSINOPHIL # BLD AUTO: 0.35 X10(3) UL (ref 0–0.7)
EOSINOPHIL NFR BLD AUTO: 5.9 %
ERYTHROCYTE [DISTWIDTH] IN BLOOD BY AUTOMATED COUNT: 16.3 %
EST. AVERAGE GLUCOSE BLD GHB EST-MCNC: 108 MG/DL (ref 68–126)
GFR SERPLBLD BASED ON 1.73 SQ M-ARVRAT: 6 ML/MIN/1.73M2 (ref 60–?)
GLOBULIN PLAS-MCNC: 3.3 G/DL (ref 2.8–4.4)
GLUCOSE BLD-MCNC: 110 MG/DL (ref 70–99)
GLUCOSE BLD-MCNC: 124 MG/DL (ref 70–99)
GLUCOSE BLD-MCNC: 131 MG/DL (ref 70–99)
GLUCOSE BLD-MCNC: 137 MG/DL (ref 70–99)
HBA1C MFR BLD: 5.4 % (ref ?–5.7)
HCT VFR BLD AUTO: 26.5 %
HGB BLD-MCNC: 9 G/DL
IMM GRANULOCYTES # BLD AUTO: 0.02 X10(3) UL (ref 0–1)
IMM GRANULOCYTES NFR BLD: 0.3 %
LYMPHOCYTES # BLD AUTO: 1.14 X10(3) UL (ref 1–4)
LYMPHOCYTES NFR BLD AUTO: 19.4 %
MCH RBC QN AUTO: 33.2 PG (ref 26–34)
MCHC RBC AUTO-ENTMCNC: 34 G/DL (ref 31–37)
MCV RBC AUTO: 97.8 FL
MONOCYTES # BLD AUTO: 0.42 X10(3) UL (ref 0.1–1)
MONOCYTES NFR BLD AUTO: 7.1 %
NEUTROPHILS # BLD AUTO: 3.92 X10 (3) UL (ref 1.5–7.7)
NEUTROPHILS # BLD AUTO: 3.92 X10(3) UL (ref 1.5–7.7)
NEUTROPHILS NFR BLD AUTO: 66.6 %
OSMOLALITY SERPL CALC.SUM OF ELEC: 336 MOSM/KG (ref 275–295)
P AXIS: 46 DEGREES
P-R INTERVAL: 184 MS
PLATELET # BLD AUTO: 79 10(3)UL (ref 150–450)
POTASSIUM SERPL-SCNC: 4.3 MMOL/L (ref 3.5–5.1)
PROT SERPL-MCNC: 6.6 G/DL (ref 6.4–8.2)
Q-T INTERVAL: 498 MS
QRS DURATION: 126 MS
QTC CALCULATION (BEZET): 513 MS
R AXIS: 10 DEGREES
RBC # BLD AUTO: 2.71 X10(6)UL
RH BLOOD TYPE: POSITIVE
SARS-COV-2 RNA RESP QL NAA+PROBE: NOT DETECTED
SODIUM SERPL-SCNC: 138 MMOL/L (ref 136–145)
T AXIS: 82 DEGREES
VENTRICULAR RATE: 64 BPM
WBC # BLD AUTO: 5.9 X10(3) UL (ref 4–11)

## 2022-10-25 PROCEDURE — 74178 CT ABD&PLV WO CNTR FLWD CNTR: CPT | Performed by: EMERGENCY MEDICINE

## 2022-10-25 PROCEDURE — 5A1D70Z PERFORMANCE OF URINARY FILTRATION, INTERMITTENT, LESS THAN 6 HOURS PER DAY: ICD-10-PCS | Performed by: INTERNAL MEDICINE

## 2022-10-25 RX ORDER — HEPARIN SODIUM 1000 [USP'U]/ML
1.5 INJECTION, SOLUTION INTRAVENOUS; SUBCUTANEOUS ONCE
Status: COMPLETED | OUTPATIENT
Start: 2022-10-25 | End: 2022-10-25

## 2022-10-25 RX ORDER — ALBUMIN (HUMAN) 12.5 G/50ML
100 SOLUTION INTRAVENOUS AS NEEDED
Status: DISCONTINUED | OUTPATIENT
Start: 2022-10-25 | End: 2022-10-27

## 2022-10-25 RX ORDER — NICOTINE POLACRILEX 4 MG
30 LOZENGE BUCCAL
Status: DISCONTINUED | OUTPATIENT
Start: 2022-10-25 | End: 2022-10-27

## 2022-10-25 RX ORDER — NICOTINE POLACRILEX 4 MG
15 LOZENGE BUCCAL
Status: DISCONTINUED | OUTPATIENT
Start: 2022-10-25 | End: 2022-10-27

## 2022-10-25 RX ORDER — ONDANSETRON 2 MG/ML
4 INJECTION INTRAMUSCULAR; INTRAVENOUS ONCE
Status: COMPLETED | OUTPATIENT
Start: 2022-10-25 | End: 2022-10-25

## 2022-10-25 RX ORDER — ROSUVASTATIN CALCIUM 10 MG/1
10 TABLET, COATED ORAL NIGHTLY
Status: DISCONTINUED | OUTPATIENT
Start: 2022-10-25 | End: 2022-10-27

## 2022-10-25 RX ORDER — DEXTROSE MONOHYDRATE 25 G/50ML
50 INJECTION, SOLUTION INTRAVENOUS
Status: DISCONTINUED | OUTPATIENT
Start: 2022-10-25 | End: 2022-10-27

## 2022-10-25 RX ORDER — ONDANSETRON 2 MG/ML
4 INJECTION INTRAMUSCULAR; INTRAVENOUS EVERY 6 HOURS PRN
Status: DISCONTINUED | OUTPATIENT
Start: 2022-10-25 | End: 2022-10-27

## 2022-10-25 RX ORDER — SODIUM CHLORIDE 9 MG/ML
INJECTION, SOLUTION INTRAVENOUS CONTINUOUS
Status: DISCONTINUED | OUTPATIENT
Start: 2022-10-25 | End: 2022-10-27

## 2022-10-25 RX ORDER — METOCLOPRAMIDE HYDROCHLORIDE 5 MG/ML
5 INJECTION INTRAMUSCULAR; INTRAVENOUS EVERY 8 HOURS PRN
Status: DISCONTINUED | OUTPATIENT
Start: 2022-10-25 | End: 2022-10-27

## 2022-10-25 RX ORDER — IOHEXOL 350 MG/ML
100 INJECTION, SOLUTION INTRAVENOUS
Status: COMPLETED | OUTPATIENT
Start: 2022-10-25 | End: 2022-10-25

## 2022-10-25 RX ORDER — ACETAMINOPHEN 500 MG
500 TABLET ORAL EVERY 4 HOURS PRN
Status: DISCONTINUED | OUTPATIENT
Start: 2022-10-25 | End: 2022-10-27

## 2022-10-25 RX ORDER — LEVOTHYROXINE SODIUM 0.15 MG/1
150 TABLET ORAL EVERY OTHER DAY
Status: DISCONTINUED | OUTPATIENT
Start: 2022-10-25 | End: 2022-10-27

## 2022-10-25 NOTE — ED INITIAL ASSESSMENT (HPI)
Rectal bleeding since last night 3-4 episodes since last night . No abdominal pain . Patient had similer episode 1 month ago and his hemoglobin was low that time.  No vomiting,

## 2022-10-25 NOTE — PLAN OF CARE
Argelia contacted for inpatient hemodialysis. Patient name and room number noted. Ruthius to see patient today for hemodialysis.

## 2022-10-25 NOTE — ED PROVIDER NOTES
Rober Villagomez called about this patient, would like stat bleeding scan and a phone call afterwards

## 2022-10-25 NOTE — ED QUICK NOTES
Orders for admission, patient is aware of plan and ready to go upstairs. Any questions, please call ED RN Ashanti at extension [phone].      Patient Covid vaccination status: Fully vaccinated     COVID Test Ordered in ED: Rapid SARS-CoV-2 by PCR    COVID Suspicion at Admission: Low clinical suspicion for COVID    Running Infusions:  None    Mental Status/LOC at time of transport: A&Ox3    Other pertinent information:   CIWA score: N/A   NIH score:  N/A

## 2022-10-26 PROBLEM — Z99.2 DIABETES MELLITUS DUE TO UNDERLYING CONDITION WITH CHRONIC KIDNEY DISEASE ON CHRONIC DIALYSIS, WITH LONG-TERM CURRENT USE OF INSULIN (HCC): Status: ACTIVE | Noted: 2022-03-17

## 2022-10-26 PROBLEM — Z79.4 DIABETES MELLITUS DUE TO UNDERLYING CONDITION WITH CHRONIC KIDNEY DISEASE ON CHRONIC DIALYSIS, WITH LONG-TERM CURRENT USE OF INSULIN (HCC): Status: ACTIVE | Noted: 2022-03-17

## 2022-10-26 PROBLEM — E08.22 DIABETES MELLITUS DUE TO UNDERLYING CONDITION WITH CHRONIC KIDNEY DISEASE ON CHRONIC DIALYSIS, WITH LONG-TERM CURRENT USE OF INSULIN (HCC): Status: ACTIVE | Noted: 2022-03-17

## 2022-10-26 PROBLEM — N18.6 DIABETES MELLITUS DUE TO UNDERLYING CONDITION WITH CHRONIC KIDNEY DISEASE ON CHRONIC DIALYSIS, WITH LONG-TERM CURRENT USE OF INSULIN (HCC): Status: ACTIVE | Noted: 2022-03-17

## 2022-10-26 LAB
ANION GAP SERPL CALC-SCNC: 11 MMOL/L (ref 0–18)
BUN BLD-MCNC: 83 MG/DL (ref 7–18)
CALCIUM BLD-MCNC: 8.5 MG/DL (ref 8.5–10.1)
CHLORIDE SERPL-SCNC: 105 MMOL/L (ref 98–112)
CO2 SERPL-SCNC: 23 MMOL/L (ref 21–32)
CREAT BLD-MCNC: 5.67 MG/DL
ERYTHROCYTE [DISTWIDTH] IN BLOOD BY AUTOMATED COUNT: 16.5 %
ERYTHROCYTE [DISTWIDTH] IN BLOOD BY AUTOMATED COUNT: 16.6 %
ERYTHROCYTE [DISTWIDTH] IN BLOOD BY AUTOMATED COUNT: 16.7 %
GFR SERPLBLD BASED ON 1.73 SQ M-ARVRAT: 10 ML/MIN/1.73M2 (ref 60–?)
GLUCOSE BLD-MCNC: 100 MG/DL (ref 70–99)
GLUCOSE BLD-MCNC: 122 MG/DL (ref 70–99)
GLUCOSE BLD-MCNC: 145 MG/DL (ref 70–99)
GLUCOSE BLD-MCNC: 173 MG/DL (ref 70–99)
GLUCOSE BLD-MCNC: 181 MG/DL (ref 70–99)
HBV SURFACE AG SER-ACNC: <0.1 [IU]/L
HBV SURFACE AG SERPL QL IA: NONREACTIVE
HCT VFR BLD AUTO: 24 %
HCT VFR BLD AUTO: 24.2 %
HCT VFR BLD AUTO: 27.9 %
HGB BLD-MCNC: 7.9 G/DL
HGB BLD-MCNC: 8 G/DL
HGB BLD-MCNC: 9 G/DL
MCH RBC QN AUTO: 32.5 PG (ref 26–34)
MCH RBC QN AUTO: 32.7 PG (ref 26–34)
MCH RBC QN AUTO: 32.7 PG (ref 26–34)
MCHC RBC AUTO-ENTMCNC: 32.3 G/DL (ref 31–37)
MCHC RBC AUTO-ENTMCNC: 32.6 G/DL (ref 31–37)
MCHC RBC AUTO-ENTMCNC: 33.3 G/DL (ref 31–37)
MCV RBC AUTO: 101.5 FL
MCV RBC AUTO: 98 FL
MCV RBC AUTO: 99.6 FL
OSMOLALITY SERPL CALC.SUM OF ELEC: 316 MOSM/KG (ref 275–295)
PLATELET # BLD AUTO: 70 10(3)UL (ref 150–450)
PLATELET # BLD AUTO: 78 10(3)UL (ref 150–450)
PLATELET # BLD AUTO: 90 10(3)UL (ref 150–450)
POTASSIUM SERPL-SCNC: 3.7 MMOL/L (ref 3.5–5.1)
RBC # BLD AUTO: 2.43 X10(6)UL
RBC # BLD AUTO: 2.45 X10(6)UL
RBC # BLD AUTO: 2.75 X10(6)UL
SODIUM SERPL-SCNC: 139 MMOL/L (ref 136–145)
WBC # BLD AUTO: 3.9 X10(3) UL (ref 4–11)
WBC # BLD AUTO: 4 X10(3) UL (ref 4–11)
WBC # BLD AUTO: 7.1 X10(3) UL (ref 4–11)

## 2022-10-26 PROCEDURE — 99223 1ST HOSP IP/OBS HIGH 75: CPT | Performed by: INTERNAL MEDICINE

## 2022-10-26 NOTE — PLAN OF CARE
Patient A/O x4, VSS on RA, denies pain at this time. , tele, SCDs. HD TTS, had HD today. CLD started. Oliguric, last BM 10/25. Plan TBD. Safety measures in place.

## 2022-10-26 NOTE — PROGRESS NOTES
Nephrology Note    Went to see pt in ER- pt in radiology undergoing CT. Labs reviewed. Next HD Tuesday. Damaris Yu / Josef Salas to f/u in [place]ckenna Stevens MD  10/25/2022  725 PM

## 2022-10-26 NOTE — PLAN OF CARE
Patient alert and oriented x4. VSS, on RA. No pain reported. Patient up SBA. No BM reported. Diminished urine production, HD Tues/Thurs/Sat. Tolerating clear liquid diet, no reports of N/V.       Plan: Monitor hemoglobin        1458: patient had 1 medium sized dark/blood tinged stool

## 2022-10-27 VITALS
HEIGHT: 72 IN | RESPIRATION RATE: 20 BRPM | DIASTOLIC BLOOD PRESSURE: 77 MMHG | BODY MASS INDEX: 27.09 KG/M2 | WEIGHT: 200 LBS | OXYGEN SATURATION: 99 % | SYSTOLIC BLOOD PRESSURE: 139 MMHG | HEART RATE: 63 BPM | TEMPERATURE: 99 F

## 2022-10-27 LAB
ANION GAP SERPL CALC-SCNC: 9 MMOL/L (ref 0–18)
BUN BLD-MCNC: 95 MG/DL (ref 7–18)
CALCIUM BLD-MCNC: 8.6 MG/DL (ref 8.5–10.1)
CHLORIDE SERPL-SCNC: 107 MMOL/L (ref 98–112)
CO2 SERPL-SCNC: 22 MMOL/L (ref 21–32)
CREAT BLD-MCNC: 6.82 MG/DL
ERYTHROCYTE [DISTWIDTH] IN BLOOD BY AUTOMATED COUNT: 16 %
ERYTHROCYTE [DISTWIDTH] IN BLOOD BY AUTOMATED COUNT: 16.1 %
ERYTHROCYTE [DISTWIDTH] IN BLOOD BY AUTOMATED COUNT: 16.3 %
GFR SERPLBLD BASED ON 1.73 SQ M-ARVRAT: 8 ML/MIN/1.73M2 (ref 60–?)
GLUCOSE BLD-MCNC: 124 MG/DL (ref 70–99)
GLUCOSE BLD-MCNC: 205 MG/DL (ref 70–99)
GLUCOSE BLD-MCNC: 217 MG/DL (ref 70–99)
HCT VFR BLD AUTO: 23.8 %
HCT VFR BLD AUTO: 24 %
HCT VFR BLD AUTO: 29.1 %
HGB BLD-MCNC: 7.9 G/DL
HGB BLD-MCNC: 8.2 G/DL
HGB BLD-MCNC: 9.8 G/DL
MCH RBC QN AUTO: 32.9 PG (ref 26–34)
MCH RBC QN AUTO: 32.9 PG (ref 26–34)
MCH RBC QN AUTO: 33.3 PG (ref 26–34)
MCHC RBC AUTO-ENTMCNC: 33.2 G/DL (ref 31–37)
MCHC RBC AUTO-ENTMCNC: 33.7 G/DL (ref 31–37)
MCHC RBC AUTO-ENTMCNC: 34.2 G/DL (ref 31–37)
MCV RBC AUTO: 97.6 FL
MCV RBC AUTO: 97.7 FL
MCV RBC AUTO: 99.2 FL
OSMOLALITY SERPL CALC.SUM OF ELEC: 321 MOSM/KG (ref 275–295)
PLATELET # BLD AUTO: 121 10(3)UL (ref 150–450)
PLATELET # BLD AUTO: 67 10(3)UL (ref 150–450)
PLATELET # BLD AUTO: 68 10(3)UL (ref 150–450)
POTASSIUM SERPL-SCNC: 3.6 MMOL/L (ref 3.5–5.1)
RBC # BLD AUTO: 2.4 X10(6)UL
RBC # BLD AUTO: 2.46 X10(6)UL
RBC # BLD AUTO: 2.98 X10(6)UL
SODIUM SERPL-SCNC: 138 MMOL/L (ref 136–145)
WBC # BLD AUTO: 3.8 X10(3) UL (ref 4–11)
WBC # BLD AUTO: 4.5 X10(3) UL (ref 4–11)
WBC # BLD AUTO: 6.5 X10(3) UL (ref 4–11)

## 2022-10-27 PROCEDURE — 90935 HEMODIALYSIS ONE EVALUATION: CPT | Performed by: INTERNAL MEDICINE

## 2022-10-27 RX ORDER — ASPIRIN 81 MG/1
81 TABLET ORAL DAILY
Refills: 0 | Status: SHIPPED | COMMUNITY
Start: 2022-10-27

## 2022-10-27 RX ORDER — HEPARIN SODIUM 1000 [USP'U]/ML
1.5 INJECTION, SOLUTION INTRAVENOUS; SUBCUTANEOUS ONCE
Status: DISCONTINUED | OUTPATIENT
Start: 2022-10-27 | End: 2022-10-27

## 2022-10-27 NOTE — PLAN OF CARE
Assumed care at 1930  1 black stool at night shift  Denies pain  Latest Hgb - 7.9  Plan:HD, labs.   Problem: Patient/Family Goals  Goal: Patient/Family Long Term Goal  Description: Patient's Long Term Goal: discharge    Interventions:  - monitor HGB  - follow up care  -  - See additional Care Plan goals for specific interventions  Outcome: Progressing  Goal: Patient/Family Short Term Goal  Description: Patient's Short Term Goal: monitor for bleeding    Interventions:   - CLD  -GI consult  - See additional Care Plan goals for specific interventions  Outcome: Progressing     Problem: DISCHARGE PLANNING  Goal: Discharge to home or other facility with appropriate resources  Description: INTERVENTIONS:  - Identify barriers to discharge w/pt and caregiver  - Include patient/family/discharge partner in discharge planning  - Arrange for needed discharge resources and transportation as appropriate  - Identify discharge learning needs (meds, wound care, etc)  - Arrange for interpreters to assist at discharge as needed  - Consider post-discharge preferences of patient/family/discharge partner  - Complete POLST form as appropriate  - Assess patient's ability to be responsible for managing their own health  - Refer to Case Management Department for coordinating discharge planning if the patient needs post-hospital services based on physician/LIP order or complex needs related to functional status, cognitive ability or social support system  Outcome: Progressing     Problem: HEMATOLOGIC - ADULT  Goal: Maintains hematologic stability  Description: INTERVENTIONS  - Assess for signs and symptoms of bleeding or hemorrhage  - Monitor labs and vital signs for trends  - Administer supportive blood products/factors, fluids and medications as ordered and appropriate  - Administer supportive blood products/factors as ordered and appropriate  Outcome: Progressing  Goal: Free from bleeding injury  Description: (Example usage: patient with low platelets)  INTERVENTIONS:  - Avoid intramuscular injections, enemas and rectal medication administration  - Ensure safe mobilization of patient  - Hold pressure on venipuncture sites to achieve adequate hemostasis  - Assess for signs and symptoms of internal bleeding  - Monitor lab trends  - Patient is to report abnormal signs of bleeding to staff  - Avoid use of toothpicks and dental floss  - Use electric shaver for shaving  - Use soft bristle tooth brush  - Limit straining and forceful nose blowing  Outcome: Progressing

## 2022-10-27 NOTE — PLAN OF CARE
Patient alert and oriented x4. VSS, on RA. Tele in place; NSR. Patient denies pain. IVF infusing. Voiding in urinal/ up in the bathroom. One black soft stool this morning. Patient tolerating clear liquid diet, denies N/V.        Plan: monitor, HD, poss discharge home this afternoon

## 2022-10-28 NOTE — PAYOR COMM NOTE
--------------  DISCHARGE REVIEW    Payor: [employer]et[employer]  Subscriber #:  [id number]  Authorization Number: [id number]    Admit date: 10/25/22  Admit time:   6:03 PM  Discharge Date: 10/27/2022  7:27 PM     Admitting Physician: Yaz Morgan MD  Attending Physician:  Luz Marina lynn. providers found  Primary Care Physician: Collette Noa, MD

## 2023-04-04 ENCOUNTER — APPOINTMENT (OUTPATIENT)
Dept: GENERAL RADIOLOGY | Facility: HOSPITAL | Age: 78
End: 2023-04-04
Attending: EMERGENCY MEDICINE
Payer: MEDICARE

## 2023-04-04 ENCOUNTER — HOSPITAL ENCOUNTER (OUTPATIENT)
Facility: HOSPITAL | Age: 78
Setting detail: OBSERVATION
Discharge: HOME | End: 2023-04-07
Attending: EMERGENCY MEDICINE | Admitting: HOSPITALIST
Payer: MEDICARE

## 2023-04-04 DIAGNOSIS — K92.2 GASTROINTESTINAL HEMORRHAGE, UNSPECIFIED GASTROINTESTINAL HEMORRHAGE TYPE: ICD-10-CM

## 2023-04-04 DIAGNOSIS — R07.9 CHEST PAIN OF UNCERTAIN ETIOLOGY: Primary | ICD-10-CM

## 2023-04-04 DIAGNOSIS — E03.8 HYPOTHYROIDISM DUE TO HASHIMOTO'S THYROIDITIS: ICD-10-CM

## 2023-04-04 DIAGNOSIS — E06.3 HYPOTHYROIDISM DUE TO HASHIMOTO'S THYROIDITIS: ICD-10-CM

## 2023-04-04 LAB
ALBUMIN SERPL-MCNC: 3.4 G/DL (ref 3.4–5)
ALBUMIN/GLOB SERPL: 1 {RATIO} (ref 1–2)
ALP LIVER SERPL-CCNC: 107 U/L
ALT SERPL-CCNC: 29 U/L
ANION GAP SERPL CALC-SCNC: 15 MMOL/L (ref 0–18)
ANTIBODY SCREEN: NEGATIVE
APTT PPP: 30 SECONDS (ref 23.3–35.6)
AST SERPL-CCNC: 17 U/L (ref 15–37)
BASOPHILS # BLD AUTO: 0.04 X10(3) UL (ref 0–0.2)
BASOPHILS NFR BLD AUTO: 0.7 %
BILIRUB SERPL-MCNC: 0.4 MG/DL (ref 0.1–2)
BUN BLD-MCNC: 121 MG/DL (ref 7–18)
CALCIUM BLD-MCNC: 8.5 MG/DL (ref 8.5–10.1)
CHLORIDE SERPL-SCNC: 106 MMOL/L (ref 98–112)
CO2 SERPL-SCNC: 20 MMOL/L (ref 21–32)
CREAT BLD-MCNC: 7.35 MG/DL
EOSINOPHIL # BLD AUTO: 0.36 X10(3) UL (ref 0–0.7)
EOSINOPHIL NFR BLD AUTO: 6.2 %
ERYTHROCYTE [DISTWIDTH] IN BLOOD BY AUTOMATED COUNT: 18.6 %
GFR SERPLBLD BASED ON 1.73 SQ M-ARVRAT: 7 ML/MIN/1.73M2 (ref 60–?)
GLOBULIN PLAS-MCNC: 3.3 G/DL (ref 2.8–4.4)
GLUCOSE BLD-MCNC: 135 MG/DL (ref 70–99)
HCT VFR BLD AUTO: 26.9 %
HGB BLD-MCNC: 8.8 G/DL
IMM GRANULOCYTES # BLD AUTO: 0.02 X10(3) UL (ref 0–1)
IMM GRANULOCYTES NFR BLD: 0.3 %
INR BLD: 1.15 (ref 0.85–1.16)
LIPASE SERPL-CCNC: 41 U/L (ref 13–75)
LYMPHOCYTES # BLD AUTO: 0.76 X10(3) UL (ref 1–4)
LYMPHOCYTES NFR BLD AUTO: 13.1 %
MCH RBC QN AUTO: 30.9 PG (ref 26–34)
MCHC RBC AUTO-ENTMCNC: 32.7 G/DL (ref 31–37)
MCV RBC AUTO: 94.4 FL
MONOCYTES # BLD AUTO: 0.32 X10(3) UL (ref 0.1–1)
MONOCYTES NFR BLD AUTO: 5.5 %
NEUTROPHILS # BLD AUTO: 4.31 X10 (3) UL (ref 1.5–7.7)
NEUTROPHILS # BLD AUTO: 4.31 X10(3) UL (ref 1.5–7.7)
NEUTROPHILS NFR BLD AUTO: 74.2 %
OSMOLALITY SERPL CALC.SUM OF ELEC: 333 MOSM/KG (ref 275–295)
PLATELET # BLD AUTO: 70 10(3)UL (ref 150–450)
POTASSIUM SERPL-SCNC: 3.8 MMOL/L (ref 3.5–5.1)
PROT SERPL-MCNC: 6.7 G/DL (ref 6.4–8.2)
PROTHROMBIN TIME: 14.7 SECONDS (ref 11.6–14.8)
RBC # BLD AUTO: 2.85 X10(6)UL
RH BLOOD TYPE: POSITIVE
SODIUM SERPL-SCNC: 141 MMOL/L (ref 136–145)
TROPONIN I HIGH SENSITIVITY: 32 NG/L
WBC # BLD AUTO: 5.8 X10(3) UL (ref 4–11)

## 2023-04-04 PROCEDURE — 71045 X-RAY EXAM CHEST 1 VIEW: CPT | Performed by: EMERGENCY MEDICINE

## 2023-04-04 RX ORDER — FERRIC CITRATE 210 MG/1
TABLET, COATED ORAL
COMMUNITY
Start: 2022-07-16

## 2023-04-04 RX ORDER — LANTHANUM CARBONATE 1000 MG/1
1000 TABLET, CHEWABLE ORAL DAILY
COMMUNITY
Start: 2022-10-11

## 2023-04-05 ENCOUNTER — APPOINTMENT (OUTPATIENT)
Dept: CV DIAGNOSTICS | Facility: HOSPITAL | Age: 78
End: 2023-04-05
Attending: PHYSICIAN ASSISTANT
Payer: MEDICARE

## 2023-04-05 PROBLEM — R07.9 CHEST PAIN OF UNCERTAIN ETIOLOGY: Status: ACTIVE | Noted: 2023-04-05

## 2023-04-05 PROBLEM — K92.1 MELENA: Status: ACTIVE | Noted: 2023-04-05

## 2023-04-05 LAB
ANION GAP SERPL CALC-SCNC: 10 MMOL/L (ref 0–18)
ATRIAL RATE: 90 BPM
ATRIAL RATE: 91 BPM
BUN BLD-MCNC: 122 MG/DL (ref 7–18)
CALCIUM BLD-MCNC: 8.3 MG/DL (ref 8.5–10.1)
CHLORIDE SERPL-SCNC: 110 MMOL/L (ref 98–112)
CHOLEST SERPL-MCNC: 104 MG/DL (ref ?–200)
CO2 SERPL-SCNC: 21 MMOL/L (ref 21–32)
CREAT BLD-MCNC: 7.27 MG/DL
ERYTHROCYTE [DISTWIDTH] IN BLOOD BY AUTOMATED COUNT: 18.9 %
GFR SERPLBLD BASED ON 1.73 SQ M-ARVRAT: 7 ML/MIN/1.73M2 (ref 60–?)
GLUCOSE BLD-MCNC: 122 MG/DL (ref 70–99)
GLUCOSE BLD-MCNC: 129 MG/DL (ref 70–99)
GLUCOSE BLD-MCNC: 141 MG/DL (ref 70–99)
GLUCOSE BLD-MCNC: 147 MG/DL (ref 70–99)
GLUCOSE BLD-MCNC: 162 MG/DL (ref 70–99)
GLUCOSE BLD-MCNC: 183 MG/DL (ref 70–99)
GLUCOSE BLD-MCNC: 320 MG/DL (ref 70–99)
HCT VFR BLD AUTO: 25.4 %
HDLC SERPL-MCNC: 25 MG/DL (ref 40–59)
HGB BLD-MCNC: 10.8 G/DL
HGB BLD-MCNC: 7.4 G/DL
HGB BLD-MCNC: 8.4 G/DL
IRON SATN MFR SERPL: 20 %
IRON SERPL-MCNC: 64 UG/DL
LDLC SERPL CALC-MCNC: 46 MG/DL (ref ?–100)
MCH RBC QN AUTO: 31.6 PG (ref 26–34)
MCHC RBC AUTO-ENTMCNC: 33.1 G/DL (ref 31–37)
MCV RBC AUTO: 95.5 FL
NONHDLC SERPL-MCNC: 79 MG/DL (ref ?–130)
OSMOLALITY SERPL CALC.SUM OF ELEC: 333 MOSM/KG (ref 275–295)
P AXIS: 68 DEGREES
P AXIS: 74 DEGREES
P-R INTERVAL: 164 MS
P-R INTERVAL: 164 MS
PLATELET # BLD AUTO: 80 10(3)UL (ref 150–450)
POTASSIUM SERPL-SCNC: 4 MMOL/L (ref 3.5–5.1)
Q-T INTERVAL: 400 MS
Q-T INTERVAL: 418 MS
QRS DURATION: 110 MS
QRS DURATION: 112 MS
QTC CALCULATION (BEZET): 486 MS
QTC CALCULATION (BEZET): 514 MS
R AXIS: 13 DEGREES
R AXIS: 27 DEGREES
RBC # BLD AUTO: 2.66 X10(6)UL
SARS-COV-2 RNA RESP QL NAA+PROBE: NOT DETECTED
SODIUM SERPL-SCNC: 141 MMOL/L (ref 136–145)
T AXIS: 88 DEGREES
T AXIS: 89 DEGREES
TIBC SERPL-MCNC: 326 UG/DL (ref 240–450)
TRANSFERRIN SERPL-MCNC: 219 MG/DL (ref 200–360)
TRIGL SERPL-MCNC: 202 MG/DL (ref 30–149)
TROPONIN I HIGH SENSITIVITY: 215 NG/L
TROPONIN I HIGH SENSITIVITY: 36 NG/L
TROPONIN I HIGH SENSITIVITY: 84 NG/L
VENTRICULAR RATE: 89 BPM
VENTRICULAR RATE: 91 BPM
VLDLC SERPL CALC-MCNC: 29 MG/DL (ref 0–30)
WBC # BLD AUTO: 7.7 X10(3) UL (ref 4–11)

## 2023-04-05 PROCEDURE — 99222 1ST HOSP IP/OBS MODERATE 55: CPT | Performed by: INTERNAL MEDICINE

## 2023-04-05 PROCEDURE — 93306 TTE W/DOPPLER COMPLETE: CPT | Performed by: PHYSICIAN ASSISTANT

## 2023-04-05 RX ORDER — ISOSORBIDE MONONITRATE 60 MG/1
60 TABLET, EXTENDED RELEASE ORAL DAILY
Status: DISCONTINUED | OUTPATIENT
Start: 2023-04-06 | End: 2023-04-05

## 2023-04-05 RX ORDER — ACETAMINOPHEN 500 MG
500 TABLET ORAL EVERY 4 HOURS PRN
Status: DISCONTINUED | OUTPATIENT
Start: 2023-04-05 | End: 2023-04-07

## 2023-04-05 RX ORDER — SEVELAMER CARBONATE 800 MG/1
800 TABLET, FILM COATED ORAL
Status: DISCONTINUED | OUTPATIENT
Start: 2023-04-05 | End: 2023-04-07

## 2023-04-05 RX ORDER — AMLODIPINE BESYLATE 2.5 MG/1
2.5 TABLET ORAL DAILY
Status: DISCONTINUED | OUTPATIENT
Start: 2023-04-05 | End: 2023-04-07

## 2023-04-05 RX ORDER — NITROGLYCERIN 0.1MG/HR
1 PATCH, TRANSDERMAL 24 HOURS TRANSDERMAL DAILY
Status: DISCONTINUED | OUTPATIENT
Start: 2023-04-05 | End: 2023-04-05

## 2023-04-05 RX ORDER — AMLODIPINE BESYLATE 2.5 MG/1
2.5 TABLET ORAL DAILY
Status: DISCONTINUED | OUTPATIENT
Start: 2023-04-05 | End: 2023-04-05

## 2023-04-05 RX ORDER — HYDROMORPHONE HYDROCHLORIDE 1 MG/ML
0.5 INJECTION, SOLUTION INTRAMUSCULAR; INTRAVENOUS; SUBCUTANEOUS
Status: DISCONTINUED | OUTPATIENT
Start: 2023-04-05 | End: 2023-04-07

## 2023-04-05 RX ORDER — CARVEDILOL 12.5 MG/1
12.5 TABLET ORAL 2 TIMES DAILY WITH MEALS
Status: DISCONTINUED | OUTPATIENT
Start: 2023-04-05 | End: 2023-04-05

## 2023-04-05 RX ORDER — HEPARIN SODIUM 1000 [USP'U]/ML
1.5 INJECTION, SOLUTION INTRAVENOUS; SUBCUTANEOUS AS NEEDED
Status: DISCONTINUED | OUTPATIENT
Start: 2023-04-05 | End: 2023-04-07

## 2023-04-05 RX ORDER — SODIUM CHLORIDE 9 MG/ML
INJECTION, SOLUTION INTRAVENOUS ONCE
Status: COMPLETED | OUTPATIENT
Start: 2023-04-05 | End: 2023-04-05

## 2023-04-05 RX ORDER — ISOSORBIDE MONONITRATE 30 MG/1
30 TABLET, EXTENDED RELEASE ORAL DAILY
Status: DISCONTINUED | OUTPATIENT
Start: 2023-04-05 | End: 2023-04-05

## 2023-04-05 RX ORDER — NICOTINE POLACRILEX 4 MG
30 LOZENGE BUCCAL
Status: DISCONTINUED | OUTPATIENT
Start: 2023-04-05 | End: 2023-04-07

## 2023-04-05 RX ORDER — ROSUVASTATIN CALCIUM 10 MG/1
10 TABLET, COATED ORAL NIGHTLY
Status: DISCONTINUED | OUTPATIENT
Start: 2023-04-05 | End: 2023-04-07

## 2023-04-05 RX ORDER — DEXTROSE MONOHYDRATE 25 G/50ML
50 INJECTION, SOLUTION INTRAVENOUS
Status: DISCONTINUED | OUTPATIENT
Start: 2023-04-05 | End: 2023-04-07

## 2023-04-05 RX ORDER — NITROGLYCERIN 0.4 MG/1
0.4 TABLET SUBLINGUAL EVERY 5 MIN PRN
Status: DISCONTINUED | OUTPATIENT
Start: 2023-04-05 | End: 2023-04-07

## 2023-04-05 RX ORDER — ALBUMIN (HUMAN) 12.5 G/50ML
100 SOLUTION INTRAVENOUS AS NEEDED
Status: DISCONTINUED | OUTPATIENT
Start: 2023-04-05 | End: 2023-04-07

## 2023-04-05 RX ORDER — NICOTINE POLACRILEX 4 MG
15 LOZENGE BUCCAL
Status: DISCONTINUED | OUTPATIENT
Start: 2023-04-05 | End: 2023-04-07

## 2023-04-05 RX ORDER — NITROGLYCERIN 20 MG/100ML
INJECTION INTRAVENOUS CONTINUOUS
Status: DISCONTINUED | OUTPATIENT
Start: 2023-04-05 | End: 2023-04-06

## 2023-04-05 RX ORDER — LEVOTHYROXINE SODIUM 0.15 MG/1
150 TABLET ORAL EVERY OTHER DAY
Status: DISCONTINUED | OUTPATIENT
Start: 2023-04-05 | End: 2023-04-05

## 2023-04-05 RX ORDER — LEVOTHYROXINE SODIUM 0.15 MG/1
150 TABLET ORAL
Status: DISCONTINUED | OUTPATIENT
Start: 2023-04-05 | End: 2023-04-07

## 2023-04-05 RX ORDER — ISOSORBIDE MONONITRATE 30 MG/1
30 TABLET, EXTENDED RELEASE ORAL ONCE
Status: DISCONTINUED | OUTPATIENT
Start: 2023-04-05 | End: 2023-04-05

## 2023-04-05 NOTE — PROGRESS NOTES
04/05/23 0209 04/05/23 0211 04/05/23 0212   Vital Signs   Pulse 89 97 108   Heart Rate Source Monitor Monitor Monitor   Resp 18 18 18   Respiratory Quality Normal Normal Normal   BP (!) 177/70 150/77 125/69   MAP (mmHg) 98 95 82   BP Location Left arm Left arm Left arm   BP Method Automatic Automatic Automatic   Patient Position Lying Sitting Standing

## 2023-04-05 NOTE — PROGRESS NOTES
Pt. Dereje Found his first blood transfusion well. Prior to the start of the blood, pt. Had yet another episode of CP. Blood transfusion started and within 15 minutes pain went from a 7 down to a 1/10. Did [facility] cardiology PA Bunnie Cowden about his episdoe and pace with Nitroglycerine ordered for 1 inch. Will continue to monitor. Second unit of PRBC to be given with dialysis.

## 2023-04-05 NOTE — PLAN OF CARE
Pt arrived to room from ED at around 0200. A&Ox4. 2L o2 for comfort (baseline is room air/no home o2). NSR on telemetry. Total of x3 episodes of loose, watery black/red stools since coming to ER. Pt reports stools started on Monday. Oliguric (HD pt). No pain. Up with standby assist. QID accuchecks ordered. Right upper chest permacath dressing c/d/I, pt reports last completed dialysis was on 4/1/23.     @ 0330 pt c/o new onset Chest Pain. Pt took sublingual nitroglycerin and CP improved to 0/10. EKG done. Dr. Antonio George notified and pt's son given update. Morning labs re-scheduled for now, results pending. 0800 pt with intermittent chest pain occurring almost hourly, requiring prn sublingual nitroglycerin. Chest pain resolves after sublingual nitroglycerin. Chest pain onset starts back up again about an hour (trend since 0300). Cardiology MD given update, orders to give x1 po metoprolol tartrate 25mg as well as pt's scheduled cardiac meds early (po amlodipine and po isosorbide). Meds given at 0714(See MAR). Pt with mid-sternal chest pain onset at 0745, resolved after sublingual nitroglycerin. Pt and pt's son at bedside. Both explained pt's history and past admissions. They explained to this RN previous hospitalizations/admissions in past- pt has had these similar chest pain episodes and cp episodes become more frequent as hemoglobin drops.     Cardiology MD and NP given update at . Willis Goddard 134, cardiology MD to round on pt early this morning      Problem: Patient/Family Goals  Goal: Patient/Family Long Term Goal  Description: Patient's Long Term Goal: \"stay out of the hospital\"    Interventions:  - follow up appts  - adhere to medication regimen  - daily weights  - HD tues, thurs, Saturday  - Diabetes management  - See additional Care Plan goals for specific interventions  Outcome: Progressing  Goal: Patient/Family Short Term Goal  Description: Patient's Short Term Goal: \"discharge\"    Interventions:   - Consults to evaluate- GI, Cardiology, Renal, Hospitalist  - NPO  - Monitor HGB  - Trend Troponin    - See additional Care Plan goals for specific interventions  Outcome: Progressing

## 2023-04-05 NOTE — PLAN OF CARE
Thus far denying c/o cp, malaise, or cardiac symptoms. Remains in NSR, S1, S2, murmur auscultated. Imdur seems to have prolonged episodes of CP compared to the night. Lungs clear on RA. Abdomen soft and non tender to touch with active bowel sounds in all four quadrants. Negative for edema. POC:  2d echo, trending troponins, 3rd troponin slightly elevated. One more this afternoon. Plans to scope once stable hgb and from stable cardiology standpoint. CLD ordered. Administering 2 Units of PRBC, one prior to HD and one with HD. Tolerating all medications well with no adverse effects. All needs met by staff. Will continue to monitor.       Problem: Patient/Family Goals  Goal: Patient/Family Long Term Goal  Description: Patient's Long Term Goal: \"stay out of the hospital\"    Interventions:  - follow up appts  - adhere to medication regimen  - daily weights  - HD tues, thurs, Saturday  - Diabetes management  - See additional Care Plan goals for specific interventions  4/5/2023 1103 by Hilda Del Real RN  Outcome: Progressing  4/5/2023 1103 by Hilda Del Real RN  Outcome: Progressing  4/5/2023 1102 by Hilda Del Real RN  Outcome: Progressing  Goal: Patient/Family Short Term Goal  Description: Patient's Short Term Goal: \"discharge\"    Interventions:   - Consults to evaluate- GI, Cardiology, Renal, Hospitalist  - NPO  - Monitor HGB  - Trend Troponin    - See additional Care Plan goals for specific interventions  4/5/2023 1103 by Hilda Del Real RN  Outcome: Progressing  4/5/2023 1103 by Hilda Del Real RN  Outcome: Progressing  4/5/2023 1102 by Hilda Del Real RN  Outcome: Progressing     Problem: Patient/Family Goals  Goal: Patient/Family Short Term Goal  Description: Patient's Short Term Goal: \"discharge\"    Interventions:   - Consults to evaluate- GI, Cardiology, Renal, Hospitalist  - NPO  - Monitor HGB  - Trend Troponin    - See additional Care Plan goals for specific interventions  4/5/2023 1103 by Hilda Del Real RN  Outcome: Progressing  4/5/2023 1103 by Qian Layton RN  Outcome: Progressing  4/5/2023 1102 by Qian Layotn RN  Outcome: Progressing     Problem: PAIN - ADULT  Goal: Verbalizes/displays adequate comfort level or patient's stated pain goal  Description: INTERVENTIONS:  - Encourage pt to monitor pain and request assistance  - Assess pain using appropriate pain scale  - Administer analgesics based on type and severity of pain and evaluate response  - Implement non-pharmacological measures as appropriate and evaluate response  - Consider cultural and social influences on pain and pain management  - Manage/alleviate anxiety  - Utilize distraction and/or relaxation techniques  - Monitor for opioid side effects  - Notify MD/LIP if interventions unsuccessful or patient reports new pain  - Anticipate increased pain with activity and pre-medicate as appropriate  4/5/2023 1103 by Qian Layton RN  Outcome: Progressing  4/5/2023 1103 by Qian Layton RN  Outcome: Progressing     Problem: CARDIOVASCULAR - ADULT  Goal: Maintains optimal cardiac output and hemodynamic stability  Description: INTERVENTIONS:  - Monitor vital signs, rhythm, and trends  - Monitor for bleeding, hypotension and signs of decreased cardiac output  - Evaluate effectiveness of vasoactive medications to optimize hemodynamic stability  - Monitor arterial and/or venous puncture sites for bleeding and/or hematoma  - Assess quality of pulses, skin color and temperature  - Assess for signs of decreased coronary artery perfusion - ex.  Angina  - Evaluate fluid balance, assess for edema, trend weights  4/5/2023 1103 by Qian Layton RN  Outcome: Progressing  4/5/2023 1103 by Qian Layton RN  Outcome: Progressing  Goal: Absence of cardiac arrhythmias or at baseline  Description: INTERVENTIONS:  - Continuous cardiac monitoring, monitor vital signs, obtain 12 lead EKG if indicated  - Evaluate effectiveness of antiarrhythmic and heart rate control medications as ordered  - Initiate emergency measures for life threatening arrhythmias  - Monitor electrolytes and administer replacement therapy as ordered  4/5/2023 1103 by Adriano Mak RN  Outcome: Progressing  4/5/2023 1103 by Adriano Mak RN  Outcome: Progressing     Problem: GASTROINTESTINAL - ADULT  Goal: Minimal or absence of nausea and vomiting  Description: INTERVENTIONS:  - Maintain adequate hydration with IV or PO as ordered and tolerated  - Nasogastric tube to low intermittent suction as ordered  - Evaluate effectiveness of ordered antiemetic medications  - Provide nonpharmacologic comfort measures as appropriate  - Advance diet as tolerated, if ordered  - Obtain nutritional consult as needed  - Evaluate fluid balance  4/5/2023 1103 by Adriano Mak RN  Outcome: Progressing  4/5/2023 1103 by Adriano Mak RN  Outcome: Progressing  Goal: Maintains or returns to baseline bowel function  Description: INTERVENTIONS:  - Assess bowel function  - Maintain adequate hydration with IV or PO as ordered and tolerated  - Evaluate effectiveness of GI medications  - Encourage mobilization and activity  - Obtain nutritional consult as needed  - Establish a toileting routine/schedule  - Consider collaborating with pharmacy to review patient's medication profile  4/5/2023 1103 by Adriano Mak RN  Outcome: Progressing  4/5/2023 1103 by Adriano Mak RN  Outcome: Progressing  Goal: Maintains adequate nutritional intake (undernourished)  Description: INTERVENTIONS:  - Monitor percentage of each meal consumed  - Identify factors contributing to decreased intake, treat as appropriate  - Assist with meals as needed  - Monitor I&O, WT and lab values  - Obtain nutritional consult as needed  - Optimize oral hygiene and moisture  - Encourage food from home; allow for food preferences  - Enhance eating environment  4/5/2023 1103 by Adriano Mak RN  Outcome: Progressing  4/5/2023 1103 by Adriano Mak RN  Outcome: Progressing  Goal: Achieves appropriate nutritional intake (bariatric)  Description: INTERVENTIONS:  - Monitor for over-consumption  - Identify factors contributing to increased intake, treat as appropriate  - Monitor I&O, WT and lab values  - Obtain nutritional consult as needed  - Evaluate psychosocial factors contributing to over-consumption  4/5/2023 1103 by Valentino Nieves RN  Outcome: Completed  4/5/2023 1103 by Valentino Nieves RN  Outcome: Progressing

## 2023-04-05 NOTE — ED QUICK NOTES
Assumed care of pt from [facility]. Pt resting on cart, updated on POC, awaiting inpatient room assignment. Son at bedside, denies any pain, nausea, or dizziness.  Denies any needs at this time

## 2023-04-05 NOTE — PROGRESS NOTES
[facility] Pharmacy Note:  Renal Dose Adjustment for Rosuvastatin (Crestor)    Epifanio Hickman has been prescribed Rosuvastatin (Crestor) 20 mg daily. Estimated Creatinine Clearance: 9.5 mL/min (A) (based on SCr of 7.35 mg/dL (H)). The dose has been changed to 10 mg daily per P&T approved protocol. Pharmacy will follow and resume original order if renal function improves.     Thank you,  Dinora Jenkins, [facility]  4/5/2023 4:04 AM

## 2023-04-06 LAB
ANION GAP SERPL CALC-SCNC: 8 MMOL/L (ref 0–18)
BLOOD TYPE BARCODE: 5100
BLOOD TYPE BARCODE: 5100
BUN BLD-MCNC: 53 MG/DL (ref 7–18)
CALCIUM BLD-MCNC: 8.4 MG/DL (ref 8.5–10.1)
CHLORIDE SERPL-SCNC: 105 MMOL/L (ref 98–112)
CO2 SERPL-SCNC: 27 MMOL/L (ref 21–32)
CREAT BLD-MCNC: 4.55 MG/DL
ERYTHROCYTE [DISTWIDTH] IN BLOOD BY AUTOMATED COUNT: 19.5 %
GFR SERPLBLD BASED ON 1.73 SQ M-ARVRAT: 13 ML/MIN/1.73M2 (ref 60–?)
GLUCOSE BLD-MCNC: 121 MG/DL (ref 70–99)
GLUCOSE BLD-MCNC: 140 MG/DL (ref 70–99)
GLUCOSE BLD-MCNC: 174 MG/DL (ref 70–99)
GLUCOSE BLD-MCNC: 231 MG/DL (ref 70–99)
GLUCOSE BLD-MCNC: 284 MG/DL (ref 70–99)
HBV SURFACE AG SER-ACNC: <0.1 [IU]/L
HBV SURFACE AG SERPL QL IA: NONREACTIVE
HCT VFR BLD AUTO: 27.1 %
HGB BLD-MCNC: 9 G/DL
HGB BLD-MCNC: 9.1 G/DL
MCH RBC QN AUTO: 28.8 PG (ref 26–34)
MCHC RBC AUTO-ENTMCNC: 33.2 G/DL (ref 31–37)
MCV RBC AUTO: 86.9 FL
OSMOLALITY SERPL CALC.SUM OF ELEC: 306 MOSM/KG (ref 275–295)
PLATELET # BLD AUTO: 57 10(3)UL (ref 150–450)
POTASSIUM SERPL-SCNC: 3.5 MMOL/L (ref 3.5–5.1)
RBC # BLD AUTO: 3.12 X10(6)UL
SODIUM SERPL-SCNC: 140 MMOL/L (ref 136–145)
TROPONIN I HIGH SENSITIVITY: 122 NG/L
TROPONIN I HIGH SENSITIVITY: 163 NG/L
UNIT VOLUME: 308 ML
UNIT VOLUME: 350 ML
WBC # BLD AUTO: 3.7 X10(3) UL (ref 4–11)

## 2023-04-06 PROCEDURE — 99233 SBSQ HOSP IP/OBS HIGH 50: CPT | Performed by: INTERNAL MEDICINE

## 2023-04-06 RX ORDER — CEFAZOLIN SODIUM/WATER 2 G/20 ML
2 SYRINGE (ML) INTRAVENOUS EVERY 8 HOURS
Status: CANCELLED | OUTPATIENT
Start: 2023-04-06 | End: 2023-04-07

## 2023-04-06 RX ORDER — ONDANSETRON 2 MG/ML
4 INJECTION INTRAMUSCULAR; INTRAVENOUS EVERY 6 HOURS PRN
Status: CANCELLED | OUTPATIENT
Start: 2023-04-06

## 2023-04-06 NOTE — PLAN OF CARE
Assumed care of pt this am, he is sitting in chair with no c/o pain or discomfort at this time. Order to resume paste and discontinue drip. Pt with son at bedside has declined angio and colonoscopy at this time.  Will continue to monitor

## 2023-04-06 NOTE — PLAN OF CARE
Assumed pt care at 2230, received transfer from CTU. A&Ox4, glases at bedside. RA, lung sounds clear. NSR Rhythm on tele, murmur auscultated. Voids. Up with SBA. Nitroglycerin infusing, possible cath, NPO after midnight. R permacath. POC updated with pt and family, questions answered, verbalized understanding. Will continue to monitor.      Problem: Patient/Family Goals  Goal: Patient/Family Long Term Goal  Description: Patient's Long Term Goal: \"stay out of the hospital\"    Interventions:  - follow up appts  - adhere to medication regimen  - daily weights  - HD tues, thurs, Saturday  - Diabetes management  - See additional Care Plan goals for specific interventions  Outcome: Progressing  Goal: Patient/Family Short Term Goal  Description: Patient's Short Term Goal: \"discharge\"    Interventions:   - Consults to evaluate- GI, Cardiology, Renal, Hospitalist  - NPO  - Monitor HGB  - Trend Troponin    - See additional Care Plan goals for specific interventions  Outcome: Progressing     Problem: PAIN - ADULT  Goal: Verbalizes/displays adequate comfort level or patient's stated pain goal  Description: INTERVENTIONS:  - Encourage pt to monitor pain and request assistance  - Assess pain using appropriate pain scale  - Administer analgesics based on type and severity of pain and evaluate response  - Implement non-pharmacological measures as appropriate and evaluate response  - Consider cultural and social influences on pain and pain management  - Manage/alleviate anxiety  - Utilize distraction and/or relaxation techniques  - Monitor for opioid side effects  - Notify MD/LIP if interventions unsuccessful or patient reports new pain  - Anticipate increased pain with activity and pre-medicate as appropriate  Outcome: Progressing     Problem: CARDIOVASCULAR - ADULT  Goal: Maintains optimal cardiac output and hemodynamic stability  Description: INTERVENTIONS:  - Monitor vital signs, rhythm, and trends  - Monitor for bleeding, hypotension and signs of decreased cardiac output  - Evaluate effectiveness of vasoactive medications to optimize hemodynamic stability  - Monitor arterial and/or venous puncture sites for bleeding and/or hematoma  - Assess quality of pulses, skin color and temperature  - Assess for signs of decreased coronary artery perfusion - ex.  Angina  - Evaluate fluid balance, assess for edema, trend weights  Outcome: Progressing  Goal: Absence of cardiac arrhythmias or at baseline  Description: INTERVENTIONS:  - Continuous cardiac monitoring, monitor vital signs, obtain 12 lead EKG if indicated  - Evaluate effectiveness of antiarrhythmic and heart rate control medications as ordered  - Initiate emergency measures for life threatening arrhythmias  - Monitor electrolytes and administer replacement therapy as ordered  Outcome: Progressing     Problem: GASTROINTESTINAL - ADULT  Goal: Minimal or absence of nausea and vomiting  Description: INTERVENTIONS:  - Maintain adequate hydration with IV or PO as ordered and tolerated  - Nasogastric tube to low intermittent suction as ordered  - Evaluate effectiveness of ordered antiemetic medications  - Provide nonpharmacologic comfort measures as appropriate  - Advance diet as tolerated, if ordered  - Obtain nutritional consult as needed  - Evaluate fluid balance  Outcome: Progressing  Goal: Maintains or returns to baseline bowel function  Description: INTERVENTIONS:  - Assess bowel function  - Maintain adequate hydration with IV or PO as ordered and tolerated  - Evaluate effectiveness of GI medications  - Encourage mobilization and activity  - Obtain nutritional consult as needed  - Establish a toileting routine/schedule  - Consider collaborating with pharmacy to review patient's medication profile  Outcome: Progressing  Goal: Maintains adequate nutritional intake (undernourished)  Description: INTERVENTIONS:  - Monitor percentage of each meal consumed  - Identify factors contributing to decreased intake, treat as appropriate  - Assist with meals as needed  - Monitor I&O, WT and lab values  - Obtain nutritional consult as needed  - Optimize oral hygiene and moisture  - Encourage food from home; allow for food preferences  - Enhance eating environment  Outcome: Progressing     Problem: RISK FOR INFECTION - ADULT  Goal: Absence of fever/infection during anticipated neutropenic period  Description: INTERVENTIONS  - Monitor WBC  - Administer growth factors as ordered  - Implement neutropenic guidelines  Outcome: Progressing     Problem: SAFETY ADULT - FALL  Goal: Free from fall injury  Description: INTERVENTIONS:  - Assess pt frequently for physical needs  - Identify cognitive and physical deficits and behaviors that affect risk of falls.   - Rayne fall precautions as indicated by assessment.  - Educate pt/family on patient safety including physical limitations  - Instruct pt to call for assistance with activity based on assessment  - Modify environment to reduce risk of injury  - Provide assistive devices as appropriate  - Consider OT/PT consult to assist with strengthening/mobility  - Encourage toileting schedule  Outcome: Progressing     Problem: DISCHARGE PLANNING  Goal: Discharge to home or other facility with appropriate resources  Description: INTERVENTIONS:  - Identify barriers to discharge w/pt and caregiver  - Include patient/family/discharge partner in discharge planning  - Arrange for needed discharge resources and transportation as appropriate  - Identify discharge learning needs (meds, wound care, etc)  - Arrange for interpreters to assist at discharge as needed  - Consider post-discharge preferences of patient/family/discharge partner  - Complete POLST form as appropriate  - Assess patient's ability to be responsible for managing their own health  - Refer to Case Management Department for coordinating discharge planning if the patient needs post-hospital services based on physician/LIP order or complex needs related to functional status, cognitive ability or social support system  Outcome: Progressing     Problem: Diabetes/Glucose Control  Goal: Glucose maintained within prescribed range  Description: INTERVENTIONS:  - Monitor Blood Glucose as ordered  - Assess for signs and symptoms of hyperglycemia and hypoglycemia  - Administer ordered medications to maintain glucose within target range  - Assess barriers to adequate nutritional intake and initiate nutrition consult as needed  - Instruct patient on self management of diabetes  Outcome: Progressing

## 2023-04-06 NOTE — PROGRESS NOTES
At ~8pm pt still c/o 8/10 chest pain even with the NTG paste on. MD notified and ordered transfer to CCU for NTG drip. Orders initiated and report given to VIJAY JOYA [facility] RN patient transferred to [facility]. Belongings sent with patient. Spoke with son Tesfaye Lepe before transfer to update on situation and updated him with his room via voicemail.

## 2023-04-07 VITALS
DIASTOLIC BLOOD PRESSURE: 76 MMHG | TEMPERATURE: 98 F | SYSTOLIC BLOOD PRESSURE: 147 MMHG | BODY MASS INDEX: 28 KG/M2 | WEIGHT: 204.38 LBS | HEART RATE: 82 BPM | RESPIRATION RATE: 16 BRPM | OXYGEN SATURATION: 98 %

## 2023-04-07 LAB
ANION GAP SERPL CALC-SCNC: 9 MMOL/L (ref 0–18)
BASOPHILS # BLD AUTO: 0.02 X10(3) UL (ref 0–0.2)
BASOPHILS NFR BLD AUTO: 0.6 %
BUN BLD-MCNC: 59 MG/DL (ref 7–18)
CALCIUM BLD-MCNC: 8.6 MG/DL (ref 8.5–10.1)
CHLORIDE SERPL-SCNC: 105 MMOL/L (ref 98–112)
CO2 SERPL-SCNC: 26 MMOL/L (ref 21–32)
CREAT BLD-MCNC: 5.58 MG/DL
EOSINOPHIL # BLD AUTO: 0.24 X10(3) UL (ref 0–0.7)
EOSINOPHIL NFR BLD AUTO: 6.9 %
ERYTHROCYTE [DISTWIDTH] IN BLOOD BY AUTOMATED COUNT: 19.1 %
ERYTHROCYTE [DISTWIDTH] IN BLOOD BY AUTOMATED COUNT: 19.1 %
GFR SERPLBLD BASED ON 1.73 SQ M-ARVRAT: 10 ML/MIN/1.73M2 (ref 60–?)
GLUCOSE BLD-MCNC: 134 MG/DL (ref 70–99)
GLUCOSE BLD-MCNC: 138 MG/DL (ref 70–99)
GLUCOSE BLD-MCNC: 146 MG/DL (ref 70–99)
GLUCOSE BLD-MCNC: 162 MG/DL (ref 70–99)
HCT VFR BLD AUTO: 27 %
HCT VFR BLD AUTO: 27 %
HGB BLD-MCNC: 9 G/DL
HGB BLD-MCNC: 9 G/DL
IMM GRANULOCYTES # BLD AUTO: 0.01 X10(3) UL (ref 0–1)
IMM GRANULOCYTES NFR BLD: 0.3 %
LYMPHOCYTES # BLD AUTO: 1.03 X10(3) UL (ref 1–4)
LYMPHOCYTES NFR BLD AUTO: 29.7 %
MCH RBC QN AUTO: 29.7 PG (ref 26–34)
MCH RBC QN AUTO: 29.7 PG (ref 26–34)
MCHC RBC AUTO-ENTMCNC: 33.3 G/DL (ref 31–37)
MCHC RBC AUTO-ENTMCNC: 33.3 G/DL (ref 31–37)
MCV RBC AUTO: 89.1 FL
MCV RBC AUTO: 89.1 FL
MONOCYTES # BLD AUTO: 0.33 X10(3) UL (ref 0.1–1)
MONOCYTES NFR BLD AUTO: 9.5 %
NEUTROPHILS # BLD AUTO: 1.84 X10 (3) UL (ref 1.5–7.7)
NEUTROPHILS # BLD AUTO: 1.84 X10(3) UL (ref 1.5–7.7)
NEUTROPHILS NFR BLD AUTO: 53 %
OSMOLALITY SERPL CALC.SUM OF ELEC: 309 MOSM/KG (ref 275–295)
PLATELET # BLD AUTO: 46 10(3)UL (ref 150–450)
PLATELET # BLD AUTO: 46 10(3)UL (ref 150–450)
POTASSIUM SERPL-SCNC: 3.4 MMOL/L (ref 3.5–5.1)
RBC # BLD AUTO: 3.03 X10(6)UL
RBC # BLD AUTO: 3.03 X10(6)UL
SODIUM SERPL-SCNC: 140 MMOL/L (ref 136–145)
WBC # BLD AUTO: 3.5 X10(3) UL (ref 4–11)
WBC # BLD AUTO: 3.5 X10(3) UL (ref 4–11)

## 2023-04-07 PROCEDURE — 99233 SBSQ HOSP IP/OBS HIGH 50: CPT | Performed by: INTERNAL MEDICINE

## 2023-04-07 RX ORDER — ISOSORBIDE MONONITRATE 60 MG/1
60 TABLET, EXTENDED RELEASE ORAL DAILY
Status: DISCONTINUED | OUTPATIENT
Start: 2023-04-07 | End: 2023-04-07

## 2023-04-07 RX ORDER — ASPIRIN 81 MG/1
81 TABLET ORAL EVERY OTHER DAY
Refills: 0 | Status: SHIPPED | COMMUNITY
Start: 2023-04-07

## 2023-04-07 RX ORDER — LEVOTHYROXINE SODIUM 0.15 MG/1
150 TABLET ORAL EVERY OTHER DAY
Status: SHIPPED | COMMUNITY
Start: 2023-04-07

## 2023-04-07 RX ORDER — ISOSORBIDE MONONITRATE 60 MG/1
60 TABLET, EXTENDED RELEASE ORAL DAILY
Qty: 90 TABLET | Refills: 3 | Status: SHIPPED | OUTPATIENT
Start: 2023-04-07

## 2023-04-07 NOTE — PLAN OF CARE
Assumed care of patient at approximately 1930. Pt A&Ox4. Pt maintaining saturations on room air . On telemetry, NSR . Pt denies c/o pain. Pt up  with standby assist.  Nitroglycerin paste L chest. Pt updated on plan of care and verbalized understanding.      Problem: Patient/Family Goals  Goal: Patient/Family Long Term Goal  Description: Patient's Long Term Goal: \"stay out of the hospital\"    Interventions:  - follow up appts  - adhere to medication regimen  - daily weights  - HD tues, thurs, Saturday  - Diabetes management  - See additional Care Plan goals for specific interventions  Outcome: Progressing  Goal: Patient/Family Short Term Goal  Description: Patient's Short Term Goal: \"discharge\"    Interventions:   - Consults to evaluate- GI, Cardiology, Renal, Hospitalist  - NPO  - Monitor HGB  - Trend Troponin    - See additional Care Plan goals for specific interventions  Outcome: Progressing     Problem: PAIN - ADULT  Goal: Verbalizes/displays adequate comfort level or patient's stated pain goal  Description: INTERVENTIONS:  - Encourage pt to monitor pain and request assistance  - Assess pain using appropriate pain scale  - Administer analgesics based on type and severity of pain and evaluate response  - Implement non-pharmacological measures as appropriate and evaluate response  - Consider cultural and social influences on pain and pain management  - Manage/alleviate anxiety  - Utilize distraction and/or relaxation techniques  - Monitor for opioid side effects  - Notify MD/LIP if interventions unsuccessful or patient reports new pain  - Anticipate increased pain with activity and pre-medicate as appropriate  Outcome: Progressing     Problem: CARDIOVASCULAR - ADULT  Goal: Maintains optimal cardiac output and hemodynamic stability  Description: INTERVENTIONS:  - Monitor vital signs, rhythm, and trends  - Monitor for bleeding, hypotension and signs of decreased cardiac output  - Evaluate effectiveness of vasoactive medications to optimize hemodynamic stability  - Monitor arterial and/or venous puncture sites for bleeding and/or hematoma  - Assess quality of pulses, skin color and temperature  - Assess for signs of decreased coronary artery perfusion - ex.  Angina  - Evaluate fluid balance, assess for edema, trend weights  Outcome: Progressing  Goal: Absence of cardiac arrhythmias or at baseline  Description: INTERVENTIONS:  - Continuous cardiac monitoring, monitor vital signs, obtain 12 lead EKG if indicated  - Evaluate effectiveness of antiarrhythmic and heart rate control medications as ordered  - Initiate emergency measures for life threatening arrhythmias  - Monitor electrolytes and administer replacement therapy as ordered  Outcome: Progressing     Problem: GASTROINTESTINAL - ADULT  Goal: Minimal or absence of nausea and vomiting  Description: INTERVENTIONS:  - Maintain adequate hydration with IV or PO as ordered and tolerated  - Nasogastric tube to low intermittent suction as ordered  - Evaluate effectiveness of ordered antiemetic medications  - Provide nonpharmacologic comfort measures as appropriate  - Advance diet as tolerated, if ordered  - Obtain nutritional consult as needed  - Evaluate fluid balance  Outcome: Progressing  Goal: Maintains or returns to baseline bowel function  Description: INTERVENTIONS:  - Assess bowel function  - Maintain adequate hydration with IV or PO as ordered and tolerated  - Evaluate effectiveness of GI medications  - Encourage mobilization and activity  - Obtain nutritional consult as needed  - Establish a toileting routine/schedule  - Consider collaborating with pharmacy to review patient's medication profile  Outcome: Progressing  Goal: Maintains adequate nutritional intake (undernourished)  Description: INTERVENTIONS:  - Monitor percentage of each meal consumed  - Identify factors contributing to decreased intake, treat as appropriate  - Assist with meals as needed  - Monitor I&O, WT and lab values  - Obtain nutritional consult as needed  - Optimize oral hygiene and moisture  - Encourage food from home; allow for food preferences  - Enhance eating environment  Outcome: Progressing     Problem: RISK FOR INFECTION - ADULT  Goal: Absence of fever/infection during anticipated neutropenic period  Description: INTERVENTIONS  - Monitor WBC  - Administer growth factors as ordered  - Implement neutropenic guidelines  Outcome: Progressing     Problem: SAFETY ADULT - FALL  Goal: Free from fall injury  Description: INTERVENTIONS:  - Assess pt frequently for physical needs  - Identify cognitive and physical deficits and behaviors that affect risk of falls.   - [place] fall precautions as indicated by assessment.  - Educate pt/family on patient safety including physical limitations  - Instruct pt to call for assistance with activity based on assessment  - Modify environment to reduce risk of injury  - Provide assistive devices as appropriate  - Consider OT/PT consult to assist with strengthening/mobility  - Encourage toileting schedule  Outcome: Progressing     Problem: DISCHARGE PLANNING  Goal: Discharge to home or other facility with appropriate resources  Description: INTERVENTIONS:  - Identify barriers to discharge w/pt and caregiver  - Include patient/family/discharge partner in discharge planning  - Arrange for needed discharge resources and transportation as appropriate  - Identify discharge learning needs (meds, wound care, etc)  - Arrange for interpreters to assist at discharge as needed  - Consider post-discharge preferences of patient/family/discharge partner  - Complete POLST form as appropriate  - Assess patient's ability to be responsible for managing their own health  - Refer to Case Management Department for coordinating discharge planning if the patient needs post-hospital services based on physician/LIP order or complex needs related to functional status, cognitive ability or social support system  Outcome: Progressing     Problem: Diabetes/Glucose Control  Goal: Glucose maintained within prescribed range  Description: INTERVENTIONS:  - Monitor Blood Glucose as ordered  - Assess for signs and symptoms of hyperglycemia and hypoglycemia  - Administer ordered medications to maintain glucose within target range  - Assess barriers to adequate nutritional intake and initiate nutrition consult as needed  - Instruct patient on self management of diabetes  Outcome: Progressing

## 2023-04-07 NOTE — PLAN OF CARE
Assumed pt care at 0730. Pt up in chair. HD this am- 1L off. Patient and son requesting for patient to receive 1 unit prbc. No signs of active bleeding. Hgb 9. VSS. Denies pain. Per patient- having dark stools. Renal, cardiology, GI and hospitalist notified. No new orders. Pt and son updated. Plan for discharge this afternoon. Will continue to monitor. Discharge instructions given and reviewed with patient. All questions answered.

## 2024-03-17 ENCOUNTER — APPOINTMENT (OUTPATIENT)
Dept: GENERAL RADIOLOGY | Facility: HOSPITAL | Age: 79
End: 2024-03-17
Attending: EMERGENCY MEDICINE
Payer: MEDICARE

## 2024-03-17 ENCOUNTER — APPOINTMENT (OUTPATIENT)
Dept: INTERVENTIONAL RADIOLOGY/VASCULAR | Facility: HOSPITAL | Age: 79
End: 2024-03-17
Attending: INTERNAL MEDICINE
Payer: MEDICARE

## 2024-03-17 ENCOUNTER — HOSPITAL ENCOUNTER (INPATIENT)
Facility: HOSPITAL | Age: 79
LOS: 6 days | Discharge: HOME | End: 2024-03-24
Attending: EMERGENCY MEDICINE | Admitting: INTERNAL MEDICINE
Payer: MEDICARE

## 2024-03-17 DIAGNOSIS — R79.89 ELEVATED TROPONIN: ICD-10-CM

## 2024-03-17 DIAGNOSIS — R07.9 ACUTE CHEST PAIN: Primary | ICD-10-CM

## 2024-03-17 DIAGNOSIS — Z99.2 ESRD (END STAGE RENAL DISEASE) ON DIALYSIS (HCC): ICD-10-CM

## 2024-03-17 DIAGNOSIS — I44.7 NEW ONSET LEFT BUNDLE BRANCH BLOCK (LBBB): ICD-10-CM

## 2024-03-17 DIAGNOSIS — N18.6 ESRD (END STAGE RENAL DISEASE) ON DIALYSIS (HCC): ICD-10-CM

## 2024-03-17 LAB
BASOPHILS # BLD AUTO: 0.03 X10(3) UL (ref 0–0.2)
BASOPHILS NFR BLD AUTO: 0.4 %
EOSINOPHIL # BLD AUTO: 0.34 X10(3) UL (ref 0–0.7)
EOSINOPHIL NFR BLD AUTO: 4 %
ERYTHROCYTE [DISTWIDTH] IN BLOOD BY AUTOMATED COUNT: 14.5 %
HCT VFR BLD AUTO: 27.6 %
HGB BLD-MCNC: 9.2 G/DL
IMM GRANULOCYTES # BLD AUTO: 0.05 X10(3) UL (ref 0–1)
IMM GRANULOCYTES NFR BLD: 0.6 %
LYMPHOCYTES # BLD AUTO: 2.22 X10(3) UL (ref 1–4)
LYMPHOCYTES NFR BLD AUTO: 26.2 %
MCH RBC QN AUTO: 34.7 PG (ref 26–34)
MCHC RBC AUTO-ENTMCNC: 33.3 G/DL (ref 31–37)
MCV RBC AUTO: 104.2 FL
MONOCYTES # BLD AUTO: 0.7 X10(3) UL (ref 0.1–1)
MONOCYTES NFR BLD AUTO: 8.3 %
NEUTROPHILS # BLD AUTO: 5.13 X10 (3) UL (ref 1.5–7.7)
NEUTROPHILS # BLD AUTO: 5.13 X10(3) UL (ref 1.5–7.7)
NEUTROPHILS NFR BLD AUTO: 60.5 %
PLATELET # BLD AUTO: 118 10(3)UL (ref 150–450)
RBC # BLD AUTO: 2.65 X10(6)UL
SARS-COV-2 RNA RESP QL NAA+PROBE: NOT DETECTED
WBC # BLD AUTO: 8.5 X10(3) UL (ref 4–11)

## 2024-03-17 PROCEDURE — 71045 X-RAY EXAM CHEST 1 VIEW: CPT | Performed by: EMERGENCY MEDICINE

## 2024-03-17 PROCEDURE — 4A023N7 MEASUREMENT OF CARDIAC SAMPLING AND PRESSURE, LEFT HEART, PERCUTANEOUS APPROACH: ICD-10-PCS | Performed by: INTERNAL MEDICINE

## 2024-03-17 PROCEDURE — B211YZZ FLUOROSCOPY OF MULTIPLE CORONARY ARTERIES USING OTHER CONTRAST: ICD-10-PCS | Performed by: INTERNAL MEDICINE

## 2024-03-17 RX ORDER — LIDOCAINE HYDROCHLORIDE 10 MG/ML
INJECTION, SOLUTION EPIDURAL; INFILTRATION; INTRACAUDAL; PERINEURAL
Status: COMPLETED
Start: 2024-03-17 | End: 2024-03-17

## 2024-03-17 RX ORDER — IODIXANOL 320 MG/ML
100 INJECTION, SOLUTION INTRAVASCULAR
Status: COMPLETED | OUTPATIENT
Start: 2024-03-17 | End: 2024-03-18

## 2024-03-17 RX ORDER — NITROGLYCERIN 0.4 MG/1
0.4 TABLET SUBLINGUAL ONCE
Status: COMPLETED | OUTPATIENT
Start: 2024-03-17 | End: 2024-03-17

## 2024-03-17 RX ORDER — ASPIRIN 81 MG/1
324 TABLET, CHEWABLE ORAL ONCE
Status: COMPLETED | OUTPATIENT
Start: 2024-03-17 | End: 2024-03-17

## 2024-03-17 RX ORDER — HEPARIN SODIUM 5000 [USP'U]/ML
INJECTION, SOLUTION INTRAVENOUS; SUBCUTANEOUS
Status: COMPLETED
Start: 2024-03-17 | End: 2024-03-17

## 2024-03-17 RX ORDER — NITROGLYCERIN 20 MG/100ML
INJECTION INTRAVENOUS
Status: DISCONTINUED | OUTPATIENT
Start: 2024-03-17 | End: 2024-03-19

## 2024-03-18 ENCOUNTER — APPOINTMENT (OUTPATIENT)
Dept: CV DIAGNOSTICS | Facility: HOSPITAL | Age: 79
End: 2024-03-18
Attending: INTERNAL MEDICINE
Payer: MEDICARE

## 2024-03-18 PROBLEM — I44.7 NEW ONSET LEFT BUNDLE BRANCH BLOCK (LBBB): Status: ACTIVE | Noted: 2024-03-18

## 2024-03-18 PROBLEM — R09.02 HYPOXIA: Status: ACTIVE | Noted: 2024-03-18

## 2024-03-18 PROBLEM — D63.1 ANEMIA DUE TO CHRONIC KIDNEY DISEASE, ON CHRONIC DIALYSIS (HCC): Status: ACTIVE | Noted: 2024-03-18

## 2024-03-18 PROBLEM — Z99.2 ESRD (END STAGE RENAL DISEASE) ON DIALYSIS (HCC): Status: ACTIVE | Noted: 2024-03-18

## 2024-03-18 PROBLEM — N18.6 ESRD (END STAGE RENAL DISEASE) ON DIALYSIS (HCC): Status: ACTIVE | Noted: 2024-03-18

## 2024-03-18 PROBLEM — R79.89 ELEVATED TROPONIN: Status: ACTIVE | Noted: 2024-03-18

## 2024-03-18 PROBLEM — I50.30 HEART FAILURE WITH PRESERVED EJECTION FRACTION (HCC): Status: ACTIVE | Noted: 2024-03-18

## 2024-03-18 PROBLEM — N18.6 ANEMIA DUE TO CHRONIC KIDNEY DISEASE, ON CHRONIC DIALYSIS (HCC): Status: ACTIVE | Noted: 2024-03-18

## 2024-03-18 PROBLEM — Z99.2 ANEMIA DUE TO CHRONIC KIDNEY DISEASE, ON CHRONIC DIALYSIS (HCC): Status: ACTIVE | Noted: 2024-03-18

## 2024-03-18 LAB
ALBUMIN SERPL-MCNC: 3.7 G/DL (ref 3.4–5)
ALBUMIN/GLOB SERPL: 1 {RATIO} (ref 1–2)
ALP LIVER SERPL-CCNC: 181 U/L
ALT SERPL-CCNC: 37 U/L
ANION GAP SERPL CALC-SCNC: 12 MMOL/L (ref 0–18)
APTT PPP: 30.9 SECONDS (ref 23.3–35.6)
AST SERPL-CCNC: 33 U/L (ref 15–37)
BILIRUB SERPL-MCNC: 0.7 MG/DL (ref 0.1–2)
BUN BLD-MCNC: 66 MG/DL (ref 9–23)
CALCIUM BLD-MCNC: 8.6 MG/DL (ref 8.5–10.1)
CHLORIDE SERPL-SCNC: 98 MMOL/L (ref 98–112)
CHOLEST SERPL-MCNC: 174 MG/DL (ref ?–200)
CO2 SERPL-SCNC: 25 MMOL/L (ref 21–32)
CREAT BLD-MCNC: 7.26 MG/DL
DEPRECATED HBV CORE AB SER IA-ACNC: 416 NG/ML
EGFRCR SERPLBLD CKD-EPI 2021: 7 ML/MIN/1.73M2 (ref 60–?)
EST. AVERAGE GLUCOSE BLD GHB EST-MCNC: 126 MG/DL (ref 68–126)
GLOBULIN PLAS-MCNC: 3.6 G/DL (ref 2.8–4.4)
GLUCOSE BLD-MCNC: 165 MG/DL (ref 70–99)
GLUCOSE BLD-MCNC: 220 MG/DL (ref 70–99)
GLUCOSE BLD-MCNC: 237 MG/DL (ref 70–99)
GLUCOSE BLD-MCNC: 256 MG/DL (ref 70–99)
GLUCOSE BLD-MCNC: 279 MG/DL (ref 70–99)
HBA1C MFR BLD: 6 % (ref ?–5.7)
HDLC SERPL-MCNC: 44 MG/DL (ref 40–59)
INR BLD: 1.04 (ref 0.8–1.2)
IRON SATN MFR SERPL: 34 %
IRON SERPL-MCNC: 130 UG/DL
LDLC SERPL CALC-MCNC: 93 MG/DL (ref ?–100)
MRSA DNA SPEC QL NAA+PROBE: NEGATIVE
NONHDLC SERPL-MCNC: 130 MG/DL (ref ?–130)
NT-PROBNP SERPL-MCNC: ABNORMAL PG/ML (ref ?–450)
OSMOLALITY SERPL CALC.SUM OF ELEC: 307 MOSM/KG (ref 275–295)
POTASSIUM SERPL-SCNC: 4.5 MMOL/L (ref 3.5–5.1)
PROT SERPL-MCNC: 7.3 G/DL (ref 6.4–8.2)
PROTHROMBIN TIME: 13.6 SECONDS (ref 11.6–14.8)
SODIUM SERPL-SCNC: 135 MMOL/L (ref 136–145)
TIBC SERPL-MCNC: 381 UG/DL (ref 240–450)
TRANSFERRIN SERPL-MCNC: 256 MG/DL (ref 200–360)
TRIGL SERPL-MCNC: 215 MG/DL (ref 30–149)
TROPONIN I SERPL HS-MCNC: 1590 NG/L
TROPONIN I SERPL HS-MCNC: 2138 NG/L
TROPONIN I SERPL HS-MCNC: 321 NG/L
VLDLC SERPL CALC-MCNC: 35 MG/DL (ref 0–30)

## 2024-03-18 PROCEDURE — 99223 1ST HOSP IP/OBS HIGH 75: CPT | Performed by: INTERNAL MEDICINE

## 2024-03-18 PROCEDURE — 93306 TTE W/DOPPLER COMPLETE: CPT | Performed by: INTERNAL MEDICINE

## 2024-03-18 RX ORDER — INSULIN DEGLUDEC 100 U/ML
10 INJECTION, SOLUTION SUBCUTANEOUS NIGHTLY
Status: DISCONTINUED | OUTPATIENT
Start: 2024-03-18 | End: 2024-03-19

## 2024-03-18 RX ORDER — ASPIRIN 81 MG/1
81 TABLET ORAL DAILY
Status: DISCONTINUED | OUTPATIENT
Start: 2024-03-18 | End: 2024-03-22

## 2024-03-18 RX ORDER — PANTOPRAZOLE SODIUM 40 MG/1
40 TABLET, DELAYED RELEASE ORAL
Status: DISCONTINUED | OUTPATIENT
Start: 2024-03-18 | End: 2024-03-24

## 2024-03-18 RX ORDER — AMLODIPINE BESYLATE 2.5 MG/1
2.5 TABLET ORAL DAILY
Status: DISCONTINUED | OUTPATIENT
Start: 2024-03-18 | End: 2024-03-18

## 2024-03-18 RX ORDER — NITROGLYCERIN 0.3 MG/1
0.3 TABLET SUBLINGUAL EVERY 5 MIN PRN
Status: DISCONTINUED | OUTPATIENT
Start: 2024-03-18 | End: 2024-03-18

## 2024-03-18 RX ORDER — ZOLPIDEM TARTRATE 5 MG/1
5 TABLET ORAL NIGHTLY PRN
Status: DISCONTINUED | OUTPATIENT
Start: 2024-03-18 | End: 2024-03-24

## 2024-03-18 RX ORDER — ONDANSETRON 2 MG/ML
4 INJECTION INTRAMUSCULAR; INTRAVENOUS EVERY 6 HOURS PRN
Status: DISCONTINUED | OUTPATIENT
Start: 2024-03-18 | End: 2024-03-24

## 2024-03-18 RX ORDER — MELATONIN
3 NIGHTLY PRN
Status: DISCONTINUED | OUTPATIENT
Start: 2024-03-18 | End: 2024-03-24

## 2024-03-18 RX ORDER — ROSUVASTATIN CALCIUM 10 MG/1
10 TABLET, COATED ORAL NIGHTLY
Status: DISCONTINUED | OUTPATIENT
Start: 2024-03-18 | End: 2024-03-24

## 2024-03-18 RX ORDER — ASPIRIN 81 MG/1
81 TABLET ORAL EVERY OTHER DAY
Status: DISCONTINUED | OUTPATIENT
Start: 2024-03-18 | End: 2024-03-18 | Stop reason: DRUGHIGH

## 2024-03-18 RX ORDER — POLYETHYLENE GLYCOL 3350 17 G/17G
17 POWDER, FOR SOLUTION ORAL DAILY PRN
Status: DISCONTINUED | OUTPATIENT
Start: 2024-03-18 | End: 2024-03-24

## 2024-03-18 RX ORDER — HEPARIN SODIUM 5000 [USP'U]/ML
5000 INJECTION, SOLUTION INTRAVENOUS; SUBCUTANEOUS EVERY 8 HOURS SCHEDULED
Status: DISCONTINUED | OUTPATIENT
Start: 2024-03-18 | End: 2024-03-24

## 2024-03-18 RX ORDER — NICOTINE POLACRILEX 4 MG
15 LOZENGE BUCCAL
Status: DISCONTINUED | OUTPATIENT
Start: 2024-03-18 | End: 2024-03-24

## 2024-03-18 RX ORDER — SENNOSIDES 8.6 MG
17.2 TABLET ORAL NIGHTLY PRN
Status: DISCONTINUED | OUTPATIENT
Start: 2024-03-18 | End: 2024-03-24

## 2024-03-18 RX ORDER — DEXTROSE MONOHYDRATE 25 G/50ML
50 INJECTION, SOLUTION INTRAVENOUS
Status: DISCONTINUED | OUTPATIENT
Start: 2024-03-18 | End: 2024-03-24

## 2024-03-18 RX ORDER — ISOSORBIDE MONONITRATE 60 MG/1
60 TABLET, EXTENDED RELEASE ORAL DAILY
Status: DISCONTINUED | OUTPATIENT
Start: 2024-03-18 | End: 2024-03-24

## 2024-03-18 RX ORDER — NICOTINE POLACRILEX 4 MG
30 LOZENGE BUCCAL
Status: DISCONTINUED | OUTPATIENT
Start: 2024-03-18 | End: 2024-03-24

## 2024-03-18 RX ORDER — SEVELAMER CARBONATE 800 MG/1
800 TABLET, FILM COATED ORAL
Status: DISCONTINUED | OUTPATIENT
Start: 2024-03-18 | End: 2024-03-24

## 2024-03-18 RX ORDER — ACETAMINOPHEN 500 MG
500 TABLET ORAL EVERY 6 HOURS PRN
Status: DISCONTINUED | OUTPATIENT
Start: 2024-03-18 | End: 2024-03-24

## 2024-03-18 RX ORDER — MIDAZOLAM HYDROCHLORIDE 1 MG/ML
INJECTION INTRAMUSCULAR; INTRAVENOUS
Status: COMPLETED
Start: 2024-03-18 | End: 2024-03-18

## 2024-03-18 RX ORDER — BISACODYL 10 MG
10 SUPPOSITORY, RECTAL RECTAL
Status: DISCONTINUED | OUTPATIENT
Start: 2024-03-18 | End: 2024-03-24

## 2024-03-18 RX ORDER — NITROGLYCERIN 0.4 MG/1
0.4 TABLET SUBLINGUAL EVERY 5 MIN PRN
Status: DISCONTINUED | OUTPATIENT
Start: 2024-03-18 | End: 2024-03-24

## 2024-03-18 RX ORDER — LEVOTHYROXINE SODIUM 0.15 MG/1
150 TABLET ORAL EVERY OTHER DAY
Status: DISCONTINUED | OUTPATIENT
Start: 2024-03-18 | End: 2024-03-24

## 2024-03-18 NOTE — CONSULTS
[facility] Cardiology  Report of Consultation    Jonny Haque Patient Status:  Emergency    4/15/1945 MRN [id number]   Location [facility] EMERGENCY DEPARTMENT Attending Bharat Frias,    Hosp Day # 0 PCP Gee Jimenez MD     Reason for Consultation:   Cardiac alert, LBBB    History of Present Illness:   Jonny Haque is a(n) 78 year old male with known complex ischemic CM (although EF now normal by recent echo), CAD (rota stenting to LAD x 2) and ESRD.  He also has a significant hx of cirrhosis, varacies and GIB. He has been intolerant of ASA and has recently been off all antiplatelet agents.  From previous notes it appears he would have angina when his hemoglobin dropped below 8g/dl approximately.      He had some CP this AM. It was \"dull\".  Center chest.  8/10 severity.  No radiation.  Associated mild dyspnea.  Resolved with NTG.  Was non-exertional.  Came back 12 hrs later tonight at 10PM.  His son noted him having CP and convinced him to go to the ER.    He is presently CP free and comfortable. He denies dyspnea or nausea.      ROS recently is otherwise negative.  No recent covid.    Past Medical History:   Past Medical History:   Diagnosis Date    Calculus of kidney     Coronary atherosclerosis     bare metal stents at [facility] 2021    Diabetes (HCC)     Disorder of thyroid     Esophageal varices without bleeding, unspecified esophageal varices type (HCC) 2019    Hepatitis, unspecified diagnosed many years ago.    hepatitis C-just completed taking Harvoni in 2016    High blood pressure     Malignant neoplasm of trigone of urinary bladder (HCC) 2017    Malignant neoplasm of urinary bladder, unspecified site (HCC) 2017    Malignant neoplasm of urinary bladder, unspecified site (HCC) 2017    Renal disorder     Visual impairment     reading glasses       Social History:   Smoking:  None  Alcohol:  None    Family History:   No family history of  premature arthrosclerotic heart disease     Medications:   Scheduled:       Continuous Infusion:    nitroGLYCERIN in dextrose 5% 5 mcg/min (03/17/24 7095)       PRN Medications:   iodixanol    Outpatient Medications:   Current Facility-Administered Medications on File Prior to Encounter   Medication Dose Route Frequency Provider Last Rate Last Admin    BCG Live 50 mg in sodium chloride 0.9 % 50 mL Intravesicle  50 mg Intravesical Once Siva Lees MD         Current Outpatient Medications on File Prior to Encounter   Medication Sig Dispense Refill    isosorbide mononitrate ER 60 MG Oral Tablet 24 Hr Take 1 tablet (60 mg total) by mouth daily. 90 tablet 3    aspirin 81 MG Oral Tab EC Take 1 tablet (81 mg total) by mouth every other day.  0    levothyroxine 150 MCG Oral Tab Take 1 tablet (150 mcg total) by mouth every other day. Take one tablet every morning before breakfast      Ferric Citrate (AURYXIA) 1  MG(Fe) Oral Tab       Lanthanum Carbonate 1000 MG Oral Chew Tab Chew 1 tablet (1,000 mg total) by mouth daily.      pantoprazole 40 MG Oral Tab EC Take 1 tablet (40 mg total) by mouth 2 (two) times daily before meals.      Sevelamer 800 MG Oral Tab Take 1 tablet (800 mg total) by mouth 3 (three) times daily with meals.      nitroGLYCERIN 0.3 MG Sublingual SL Tab Place 1 tablet (0.3 mg total) under the tongue every 5 (five) minutes as needed for Chest pain (as needed for chest pain).      amLODIPine 2.5 MG Oral Tab Take 1 tablet (2.5 mg total) by mouth daily.      zolpidem 10 MG Oral Tab 1 tablet (10 mg total) nightly as needed.      Insulin Pen Needle (TRUEPLUS PEN NEEDLES) 31G X 5 MM Does not apply Misc Use 5 per day 500 each 2    Insulin Glargine, 2 Unit Dial, (TOUJEO MAX SOLOSTAR) 300 UNIT/ML Subcutaneous Solution Pen-injector Inject 20 Units into the skin nightly. 6 mL 2    rosuvastatin 10 MG Oral Tab Take 1 tablet (10 mg total) by mouth nightly. (Patient taking differently: Take 1 tablet (10 mg total)  by mouth nightly.) 90 tablet 0    Tuberculin PPD (TUBERSOL ID) Inject 0.1 mL into the skin.      Glucose Blood (ONETOUCH ULTRA) In Vitro Strip 6 times a day 400 each 3    Insulin Lispro, 1 Unit Dial, (HUMALOG KWIKPEN) 100 UNIT/ML Subcutaneous Solution Pen-injector 12 units before meals with sliding scale. Max daily dose: 50 units. (Patient taking differently: 10 Units. Three times a day. Before meals) 60 mL 3    CONTOUR NEXT TEST In Vitro Strip TEST BLOOD SUGAR FOUR TIMES DAILY 400 strip 3    Blood Glucose Monitoring Suppl ([email or web address] CONTOUR NEXT MONITOR) W/DEVICE Does not apply Kit 1 Device by Does not apply route 4 (four) times daily. 1 kit 0       Allergies:   No Known Allergies    Review of Systems:   No fevers, chills, change in weight or bowel habits.  Ten point review of systems is otherwise negative or unremarkable.    Physical Exam:   Vitals:    03/18/24 0011   BP: 118/71   Pulse: 81   Resp: 14     Wt Readings from Last 3 Encounters:   04/06/23 204 lb 6.4 oz (92.7 kg)   10/25/22 200 lb (90.7 kg)   09/30/22 198 lb 3.2 oz (89.9 kg)           General: Well developed, well nourished male.  Pt is in no acute distress.  HEENT:   Normocephalic.  Atraumatic.  Eyes with no scleral icterus.  Neck: Supple.  No JVD.  Carotids 2+ and equal in symmetric fashion.  No bruits are noted.  Cardiac: Regular rate and rhythm.   There is a normal S1 and S2.  No S3 or S4.  No murmurs, rubs, or gallops.  PMI is non-displaced with a normal apical impulse.  Lungs: Clear to ascultation bilaterally.  No focal rales, rhonchi, or wheezes.  Good air movement is noted throughout all lung fields.  Abdomen: Soft.  Non-distended.  Non-tender.  Bowel sounds are present and normoactive.  No guarding or rebound.   Extremities: Extremities do not demonstrate any evidence of peripheral edema.   No cyanosis or clubbing of the digits is appreciated.  Femoral, Dorsalis Pedis, and Posterior Tibialis  pulses are 2+ and equal in a symmetric  fashion.  Neurologic: Alert and oriented, normal affect.  No gross deficit appreciated.  Integument:  No visible rashes are appreciated.      Laboratories and Data:   Labs:    No results for input(s): \"GLU\", \"BUN\", \"CREATSERUM\", \"GFRAA\", \"GFRNAA\", \"CA\", \"ALB\", \"NA\", \"K\", \"CL\", \"CO2\", \"ALKPHO\", \"AST\", \"ALT\", \"BILT\", \"TP\" in the last 168 hours.    Recent Labs   Lab 03/17/24  2329   RBC 2.65*   HGB 9.2*   HCT 27.6*   .2*   MCH 34.7*   MCHC 33.3   RDW 14.5   NEPRELIM 5.13   WBC 8.5   .0*       Recent Labs   Lab 03/17/24  2335   PTP 13.6   INR 1.04       No results for input(s): \"TROP\", \"CK\" in the last 168 hours.    Diagnostics:   Tele: Sinus.  EKG: Sinus with left bundle.  No Sgarbossa STEMI criteria met.  Had IVCD/ILBBB/ALBBB in the past (see 9/28/34).  Echo: Conclusions:     1. Left ventricle: The cavity size was normal. Wall thickness was at the      upper limits of normal. Systolic function was normal. The estimated      ejection fraction was 55-60%. Unable to assess LV diastolic function.   2. Left atrium: The left atrial volume was moderately increased.   3. Aortic valve: Transvalvular velocity was increased, due to stenosis. The      findings were consistent with mild stenosis. The peak systolic gradient      was 31mm Hg. The valve area (VTI) was 1.32cm^2. The valve area (VTI)      index was 0.61cm^2/m^2.   4. Aortic root: The aortic root was 3.7cm diameter.   5. Ascending aorta: The ascending aorta was 3.6cm diameter.   6. Pulmonic valve: There was trace regurgitation.   7. Pulmonary arteries: Systolic pressure was within the normal range, in the      range of 25mm Hg to 30mm Hg.       Assessment:  1. CAD  2. CP  3. LBBB (previous IVCD, atypical LBBB)  4. Moderate aortic stenosis  5. GIB, ASA intolerance  6. ESRD, HD dependent  7. DM  8. HTN      Plan:  1. I discussed options at length with the patient and his son.  We agreed diagnostic angio given CP at rest several times today and dificulty in  reading ischemia with LBBB.  We will be conservative and unless find an occluded vessel will be unlikely to immediately stent.  2. Medical management of CAD.  3. Nephrology consult.  4. Hospitalist consult.    Satya Donald MD  3/18/2024  12:15 AM

## 2024-03-18 NOTE — ED QUICK NOTES
Orders for admission, patient is aware of plan and ready to go upstairs. Any questions, please call ED FRANCIA Garner at extension [phone].     Patient Covid vaccination status: Fully vaccinated     COVID Test Ordered in ED: Rapid SARS-CoV-2 by PCR    COVID Suspicion at Admission: N/A    Running Infusions:    nitroGLYCERIN in dextrose 5% 5 mcg/min (03/17/24 6243)        Mental Status/LOC at time of transport: x4    Other pertinent information: HD TU/TH/SAT  CIWA score: N/A   NIH score:  N/A

## 2024-03-18 NOTE — PROCEDURES
[facility]       Jonny Haque Location: Cath Lab    CSN [id number] MRN [id number]   Admission Date 3/17/2024 Procedure Date 3/18/2024   Attending Physician Bharat Frias DO Procedure Physician Satya Donald MD         CARDIAC CATHETERIZATION/PERCUTANEOUS CORONARY INTERVENTION      PREOPERATIVE DIAGNOSIS:  CAD  POSTOPERATIVE DIAGNOSIS:  CAD  PROCEDURE PERFORMED:  Coronary angiogram      PROCEDURE:  The patient was brought to the cardiac catheterization lab in the fasting state.  Informed consent was obtained.  Moderate sedation was employed using 2mg IV Versed and 100mcg IV fentanyl.  I directly observed the patient from 0036 to 0056, watching the heart rate, blood pressure, oximetry, and rhythm.     ACCESS/CATHETER PLACEMENT:  6F right femoral, slender sheath.  JL$ catheter for LM and JR4 fpr RCA engagement.  Standard technique.  Standard views.       FINDINGS:    Selective coronary angiography:    LMCA: The left main artery is a large vessel with eccentric, ostial 90% disease.  See cranial views in particular.  LAD:  The left anterior descending artery is a large vessel with proximal 75%  disease proximal to stents which are patent with no ISR.  There is a mid 60% stenosis.  LCX: The left circumflex artery is a dominant vessel, large.  There is an OM1 with ostial 80% disease, eccentric (see [place] caudal view) and then tapers to a 90% stenosis.  OM1 is large in caliber overall.  The CX continues in the AV groove with mild to moderate disease and gives rise to posterolaterals and a moderate PDA system with moderate, diffuse, non-obstructive CAD.   RCA:  The right coronary artery is a essentially non-dominant vessel (the AM does swing around but is not significant in territory or caliber).  Moderate disease.     MEDICATIONS:  See nursing record.     COMPLICATIONS:  None.     IMPRESSION:    CAD  90% ostial LM  90% OM1  75% proximal LAD  Moderate, diffuse disease    RECOMMENDATIONS:   The patient is stable and has TIMI3  flow in all vessels; no urgent/emergent PCI.  Will need to consider revascularization options.  Likewise need to re-assess the aortic stenosis as it may be severe now.  Will consider CABG vs impella supported PCI vs medical management.  For tonight will follow clinically and medically manage pain.  Start with ASA 81 and see  how it's tolerated.  NTG drip for pain.  Low dose BB and home meds.  Need several services to consult before we proceed including GI.  Lastly we should contact his primary cardiologist to involve him in any complex decision making.     CARLEY HOWE MD

## 2024-03-18 NOTE — CONSULTS
[facility]  Report of Consultation    Jonny Haque Patient Status:  Inpatient    4/15/1945 MRN [id number]   Location [facility] 6NE-A Attending Jem Buckley MD   Hosp Day # 0 PCP Gee Jimenez MD       Assessment / Plan:    1) ESRD- due to longstanding DM 2; on HD since . Lytes / volume OK- next HD Tues per usual routine    2) CAD s/p intervention  with preserved EF- presents with CP at rest -> for complex PCI today (Dr. Donald)    3) h/o hep C with cirrhosis / varices / h/o GIB    4) DM 2 / HTN    5) Anemia- due to CKD. Check Fe stores; EPO with HD    D/W son at bedside      Reason for Consultation:  ESRD / anemia    History of Present Illness:  Jonny Haque is a a(n) 78 year old male.  Very pleasant 78-year-old male well-known to our service with a history of ESRD, complex ischemic cardiomyopathy, multiple interventions, cirrhosis with varices and history of GI bleeding due to hepatitis C, type 2 diabetes and hypertension presents with dull chest discomfort on the morning of admission 8 out of 10 in severity.  There was some associated dyspnea but did not radiate.  Resolved with nitroglycerin.  It recurred later in the evening.  He has not been having issues prior to this.  His last dialysis was on Saturday.    History:  Past Medical History:   Diagnosis Date    Calculus of kidney     Coronary atherosclerosis     bare metal stents at [facility] 2021    Diabetes (HCC)     Disorder of thyroid     Esophageal varices without bleeding, unspecified esophageal varices type (HCC) 2019    Hepatitis, unspecified diagnosed many years ago.    hepatitis C-just completed taking Harvoni in 2016    High blood pressure     Malignant neoplasm of trigone of urinary bladder (HCC) 2017    Malignant neoplasm of urinary bladder, unspecified site (HCC) 2017    Malignant neoplasm of urinary bladder, unspecified site (HCC) 2017    Renal disorder     Visual impairment      reading glasses     Past Surgical History:   Procedure Laterality Date    ANGIOGRAM  08/15/2017    small caliber RCA with 80% mid lesion.  LAD and left circumflex with 50% lesions.  LV shows inferobasilar aneurysm with scar.  Overall LV function preserved at the exterior 65%.    CATH BARE METAL STENT ([facility])      OTHER SURGICAL HISTORY      Upper GI surgery    OTHER SURGICAL HISTORY  12/11/2017    Caroline Lees    OTHER SURGICAL HISTORY  07/18/2019    [facility] Cysttrell Lees     OTHER SURGICAL HISTORY  02/06/2020    [facility] Cysto (Dr. Lees)     Family History   Problem Relation Age of Onset    Cancer Father     Hypertension Mother      Denies family history of kidney disease.    reports that he quit smoking about 44 years ago. He has a 7.5 pack-year smoking history. He has never used smokeless tobacco. He reports that he does not drink alcohol and does not use drugs.    Allergies:  No Known Allergies    Medications:    Current Facility-Administered Medications:     rosuvastatin (Crestor) tab 10 mg, 10 mg, Oral, Nightly    glucose (Dex4) 15 GM/59ML oral liquid 15 g, 15 g, Oral, Q15 Min PRN **OR** glucose (Glutose) 40% oral gel 15 g, 15 g, Oral, Q15 Min PRN **OR** glucose-vitamin C (Dex-4) chewable tab 4 tablet, 4 tablet, Oral, Q15 Min PRN **OR** dextrose 50% injection 50 mL, 50 mL, Intravenous, Q15 Min PRN **OR** glucose (Dex4) 15 GM/59ML oral liquid 30 g, 30 g, Oral, Q15 Min PRN **OR** glucose (Glutose) 40% oral gel 30 g, 30 g, Oral, Q15 Min PRN **OR** glucose-vitamin C (Dex-4) chewable tab 8 tablet, 8 tablet, Oral, Q15 Min PRN    acetaminophen (Tylenol Extra Strength) tab 500 mg, 500 mg, Oral, Q6H PRN    melatonin tab 3 mg, 3 mg, Oral, Nightly PRN    polyethylene glycol (PEG 3350) (Miralax) 17 g oral packet 17 g, 17 g, Oral, Daily PRN    sennosides (Senokot) tab 17.2 mg, 17.2 mg, Oral, Nightly PRN    bisacodyl (Dulcolax) 10 MG rectal suppository 10 mg, 10 mg, Rectal, Daily PRN    ondansetron (Zofran) 4 MG/2ML injection 4  mg, 4 mg, Intravenous, Q6H PRN    insulin aspart (NovoLOG) 100 Units/mL FlexPen 1-5 Units, 1-5 Units, Subcutaneous, TID AC and HS    amLODIPine (Norvasc) tab 2.5 mg, 2.5 mg, Oral, Daily    isosorbide mononitrate ER (Imdur) 24 hr tab 60 mg, 60 mg, Oral, Daily    levothyroxine (Synthroid) tab 150 mcg, 150 mcg, Oral, QOD    [Held by provider] nitroGLYCERIN (NITROSTAT) SL tab 0.3 mg, 0.3 mg, Sublingual, Q5 Min PRN    pantoprazole (Protonix) DR tab 40 mg, 40 mg, Oral, BID AC    zolpidem (Ambien) tab 5 mg, 5 mg, Oral, Nightly PRN    metoprolol tartrate (Lopressor) partial tab 12.5 mg, 12.5 mg, Oral, 2x Daily(Beta Blocker)    aspirin DR tab 81 mg, 81 mg, Oral, Daily    nitroGLYCERIN in dextrose 5% 50 mg/250mL infusion premix, 5-20 mcg/min, Intravenous, Titrated    Facility-Administered Medications Ordered in Other Encounters:     BCG Live 50 mg in sodium chloride 0.9 % 50 mL Intravesicle, 50 mg, Intravesical, Once  No current outpatient medications on file.       Review of Systems:  Please see HPI for pertinent positives. 10 point review of systems otherwise reviewed and negative.     Physical Exam:  /74   Pulse 82   Temp 97.7 °F (36.5 °C) (Temporal)   Resp 17   Wt 221 lb 9 oz (100.5 kg)   SpO2 90%   BMI 30.05 kg/m²   Temp (24hrs), Av.8 °F (36.6 °C), Min:97.6 °F (36.4 °C), Max:98 °F (36.7 °C)       Intake/Output Summary (Last 24 hours) at 3/18/2024 0912  Last data filed at 3/18/2024 0800  Gross per 24 hour   Intake 485 ml   Output --   Net 485 ml     Wt Readings from Last 3 Encounters:   24 221 lb 9 oz (100.5 kg)   23 204 lb 6.4 oz (92.7 kg)   10/25/22 200 lb (90.7 kg)     General: awake alert  HEENT: No scleral icterus, MMM  Neck: Supple, no JADE or thyromegaly  Cardiac: Regular rate and rhythm, S1, S2 normal, no murmur, rub, or gallop  Lungs: Decreased breath sounds at the bases bilaterally.   Abdomen: Soft, non-tender. + bowel sounds, no palpable organomegaly  Extremities: Without clubbing,  cyanosis; no edema  Neurologic: Cranial nerves grossly intact, moving all extremities  Skin: Warm and dry, no rashes      Laboratory Data:  Lab Results   Component Value Date    WBC 8.5 03/17/2024    HGB 9.2 03/17/2024    HCT 27.6 03/17/2024    .0 03/17/2024    CREATSERUM 7.26 03/17/2024    BUN 66 03/17/2024     03/17/2024    K 4.5 03/17/2024    CL 98 03/17/2024    CO2 25.0 03/17/2024     03/17/2024    CA 8.6 03/17/2024    ALB 3.7 03/17/2024    ALKPHO 181 03/17/2024    BILT 0.7 03/17/2024    TP 7.3 03/17/2024    AST 33 03/17/2024    ALT 37 03/17/2024    PTT 30.9 03/17/2024    INR 1.04 03/17/2024    PTP 13.6 03/17/2024    PGLU 165 03/18/2024       Imaging:  All imaging studies reviewed.      Thank you for allowing me to participate in the care of your patient.    Durga Fernando MD  3/18/2024  9:12 AM

## 2024-03-18 NOTE — PLAN OF CARE
Assumed care this morning. Pt denies pain at rest but c/o some \"heaviness\" with minimal activity, like using the washroom. NTG gtt started. Pressure resolved with rest and NTG gtt. NSR on monitor, BP stable. Right groin site, c/d/I, soft/ no bleeding or hematoma. Tolerating diet. Dialysis called for tomorrow. Dr MARIAM Montgomery here today to discuss options. At this point will have cards revisit complex PCI. Family at bedside, questions encouraged and answered. POC discussed.     Problem: CARDIOVASCULAR - ADULT  Goal: Maintains optimal cardiac output and hemodynamic stability  Description: INTERVENTIONS:  - Monitor vital signs, rhythm, and trends  - Monitor for bleeding, hypotension and signs of decreased cardiac output  - Evaluate effectiveness of vasoactive medications to optimize hemodynamic stability  - Monitor arterial and/or venous puncture sites for bleeding and/or hematoma  - Assess quality of pulses, skin color and temperature  - Assess for signs of decreased coronary artery perfusion - ex. Angina  - Evaluate fluid balance, assess for edema, trend weights  Outcome: Progressing  Goal: Absence of cardiac arrhythmias or at baseline  Description: INTERVENTIONS:  - Continuous cardiac monitoring, monitor vital signs, obtain 12 lead EKG if indicated  - Evaluate effectiveness of antiarrhythmic and heart rate control medications as ordered  - Initiate emergency measures for life threatening arrhythmias  - Monitor electrolytes and administer replacement therapy as ordered  Outcome: Progressing     Problem: PAIN - ADULT  Goal: Verbalizes/displays adequate comfort level or patient's stated pain goal  Description: INTERVENTIONS:  - Encourage pt to monitor pain and request assistance  - Assess pain using appropriate pain scale  - Administer analgesics based on type and severity of pain and evaluate response  - Implement non-pharmacological measures as appropriate and evaluate response  - Consider cultural and social influences  on pain and pain management  - Manage/alleviate anxiety  - Utilize distraction and/or relaxation techniques  - Monitor for opioid side effects  - Notify MD/LIP if interventions unsuccessful or patient reports new pain  - Anticipate increased pain with activity and pre-medicate as appropriate  Outcome: Progressing

## 2024-03-18 NOTE — ED INITIAL ASSESSMENT (HPI)
Patient here for chest pain that started about 90 minutes ago associated with shortness of breath.    Aspirin Enteric Coated 81 mg oral delayed release tablet: 1 tab(s) orally once a day  Cozaar 50 mg oral tablet: 1 tab(s) orally once a day  cyanocobalamin 1000 mcg oral tablet: 1 tab(s) orally once a day  ergocalciferol 1.25 mg (50,000 intl units) oral tablet: orally once a week  famotidine 40 mg oral tablet: 1 tab(s) orally once a day  ferrous fumarate 325 mg (106 mg elemental iron) oral tablet: 1 tab(s) orally once a day  folic acid 1 mg oral tablet: 1 tab(s) orally once a day  Lexapro 5 mg oral tablet: 2 tab(s) orally once a day  Lipitor 40 mg oral tablet: 1 tab(s) orally once a day  Melatonin 3 mg oral tablet: 1 tab(s) orally once a day  Senna 8.6 mg oral tablet: 2 tab(s) orally once a day (at bedtime)  Synthroid 137 mcg (0.137 mg) oral tablet: 1 tab(s) orally once a day  Xalatan 0.005% ophthalmic solution: 1 drop(s) in each eye once a day (at bedtime)   aspirin 81 mg oral delayed release tablet: 1 tab(s) orally once a day  Cozaar 50 mg oral tablet: 1 tab(s) orally once a day  cyanocobalamin 1000 mcg oral tablet: 1 tab(s) orally once a day  ergocalciferol 1.25 mg (50,000 intl units) oral tablet: orally once a week  famotidine 40 mg oral tablet: 1 tab(s) orally once a day  ferrous fumarate 325 mg (106 mg elemental iron) oral tablet: 1 tab(s) orally once a day  folic acid 1 mg oral tablet: 1 tab(s) orally once a day  Lexapro 5 mg oral tablet: 2 tab(s) orally once a day  Lipitor 40 mg oral tablet: 1 tab(s) orally once a day  Metoprolol Tartrate 25 mg oral tablet: 0.5 tab(s) orally 2 times a day  Senna 8.6 mg oral tablet: 2 tab(s) orally once a day (at bedtime)  Synthroid 137 mcg (0.137 mg) oral tablet: 1 tab(s) orally once a day  Xalatan 0.005% ophthalmic solution: 1 drop(s) in each eye once a day (at bedtime)

## 2024-03-18 NOTE — H&P
[facility] Hospitalist H&P       CC:   Chief Complaint   Patient presents with    Chest Pain Angina        PCP: Gee Jimenez MD    History of Present Illness:      Patient is 78-year-old male significant past medical history of ESRD on dialysis, known a complex ischemic cardiomyopathy with recently normalized ejection fraction ON echo, CAD with stent to the LAD x 2, history of cirrhosis, history of GI bleed in the past, type 2 diabetes, hypothyroidism presented with chest pain.  Patient stated that chest pain began with associated shortness of breath last night, persisted which is why he came into the emergency room..  Chest pain was worst at rest as well as ambulation.  Cardiac alert was called because of chest pain, initial troponin was 321 proBNP was at 17,634      BMP showed labs consistent with ESRD creatinine 7.26 glucose was elevated at 237, chest x-ray was done that was remarkable for pulmonary venous congestion mild, increased interstitial markings secondary to pulmonary edema lipids show an elevation of triglycerides    Hemoglobin was 9.2, platelets of 118    Cardiology was consulted and patient was taken to the Cath Lab secondary to new left bundle branch block and elevated troponin-concerning for STEMI      Patient's chest pain also resolved with nitroglycerin with recurrence 12 hours later.      He currently denies any chest pain or shortness of breath no nausea no vomiting, however he did have some chest pain when he got up and went to the bathroom so nitroglycerin drip was started    Patient also completed the angiogram last night that showed a 90% ostial, 90% OM1, 75% proximal LAD and moderate diffuse disease.  There was no PCI done and aspirin was started, nitroglycerin for chest pain was also started and beta-blocker.  CV surgery was also contacted    PM  Past Medical History:   Diagnosis Date    Calculus of kidney     Coronary atherosclerosis     bare metal stents at [facility] Jan 2021     Diabetes (HCC)     Disorder of thyroid     Esophageal varices without bleeding, unspecified esophageal varices type (HCC) 06/27/2019    Hepatitis, unspecified diagnosed many years ago.    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  LAD and left circumflex with 50% lesions.  LV shows inferobasilar aneurysm with scar.  Overall LV function preserved at the exterior 65%.    CATH BARE METAL STENT (BMS)      OTHER SURGICAL HISTORY      Upper GI surgery    OTHER SURGICAL HISTORY  12/11/2017    Cysto Dr. Lees    OTHER SURGICAL HISTORY  07/18/2019    BTS Cysto Dr. Lees     OTHER SURGICAL HISTORY  02/06/2020    BTS Cysto (Dr. Lees)        ALL:  No Known Allergies     Home Medications:  Outpatient Medications Marked as Taking for the 3/17/24 encounter (Hospital Encounter)   Medication Sig Dispense Refill    isosorbide mononitrate ER 60 MG Oral Tablet 24 Hr Take 1 tablet (60 mg total) by mouth daily. 90 tablet 3    aspirin 81 MG Oral Tab EC Take 1 tablet (81 mg total) by mouth every other day.  0    levothyroxine 150 MCG Oral Tab Take 1 tablet (150 mcg total) by mouth every other day. Take one tablet every morning before breakfast      Ferric Citrate (AURYXIA) 1  MG(Fe) Oral Tab       Lanthanum Carbonate 1000 MG Oral Chew Tab Chew 1 tablet (1,000 mg total) by mouth daily.      pantoprazole 40 MG Oral Tab EC Take 1 tablet (40 mg total) by mouth 2 (two) times daily before meals.      Sevelamer 800 MG Oral Tab Take 1 tablet (800 mg total) by mouth 3 (three) times daily with meals.       nitroGLYCERIN 0.3 MG Sublingual SL Tab Place 1 tablet (0.3 mg total) under the tongue every 5 (five) minutes as needed for Chest pain (as needed for chest pain).      amLODIPine 2.5 MG Oral Tab Take 1 tablet (2.5 mg total) by mouth daily.      Insulin Pen Needle (TRUEPLUS PEN NEEDLES) 31G X 5 MM Does not apply Misc Use 5 per day 500 each 2    Insulin Glargine, 2 Unit Dial, (TOUJEO MAX SOLOSTAR) 300 UNIT/ML Subcutaneous Solution Pen-injector Inject 20 Units into the skin nightly. 6 mL 2    rosuvastatin 10 MG Oral Tab Take 1 tablet (10 mg total) by mouth nightly. (Patient taking differently: Take 1 tablet (10 mg total) by mouth nightly.) 90 tablet 0    Tuberculin PPD (TUBERSOL ID) Inject 0.1 mL into the skin.      Glucose Blood (ONETOUCH ULTRA) In Vitro Strip 6 times a day 400 each 3    Insulin Lispro, 1 Unit Dial, (HUMALOG KWIKPEN) 100 UNIT/ML Subcutaneous Solution Pen-injector 12 units before meals with sliding scale. Max daily dose: 50 units. (Patient taking differently: 10 Units. Three times a day. Before meals) 60 mL 3    CONTOUR NEXT TEST In Vitro Strip TEST BLOOD SUGAR FOUR TIMES DAILY 400 strip 3    Blood Glucose Monitoring Suppl ([employer] CONTOUR NEXT MONITOR) W/DEVICE Does not apply Kit 1 Device by Does not apply route 4 (four) times daily. 1 kit 0         Soc Hx  Social History     Tobacco Use    Smoking status: Former     Packs/day: 0.50     Years: 15.00     Additional pack years: 0.00     Total pack years: 7.50     Types: Cigarettes     Quit date: 1980     Years since quittin.1    Smokeless tobacco: Never    Tobacco comments:     quit at age 34   Substance Use Topics    Alcohol use: No        Fam Hx  Family History   Problem Relation Age of Onset    Cancer Father     Hypertension Mother        Review of Systems  General: Denies unintentional weight loss, fevers, or chills-negative except for above  HEENT: Denies vision loss or double vision, denies hearing loss  Cardiovascular: Denies chest pain,  palpitations, peripheral edema  Pulmonary: Denies cough, shortness of breath, or wheezing  Gastrointestinal: Denies abdominal pain, melena, or hematochezia  Genitourinary: Denies urinary frequency, urgency, and dysuria  Neurologic: Denies numbness, headaches, focal weakness  Skin: Denies rashes, sores  Endocrine: Denies heat or cold intolerance, denies polydipsia  Hematologic: Denies abnormal bleeding or bruising     OBJECTIVE:  /76   Pulse 81   Temp 98.4 °F (36.9 °C) (Temporal)   Resp 17   Wt 221 lb 9 oz (100.5 kg)   SpO2 97%   BMI 30.05 kg/m²     BP Readings from Last 3 Encounters:   24 129/76   23 147/76   10/27/22 139/77     Wt Readings from Last 3 Encounters:   24 221 lb 9 oz (100.5 kg)   23 204 lb 6.4 oz (92.7 kg)   10/25/22 200 lb (90.7 kg)       Wt Readings from Last 6 Encounters:   24 221 lb 9 oz (100.5 kg)   23 204 lb 6.4 oz (92.7 kg)   10/25/22 200 lb (90.7 kg)   22 198 lb 3.2 oz (89.9 kg)   22 182 lb 5.1 oz (82.7 kg)   22 205 lb (93 kg)     Gen: No acute distress, alert and oriented x 3  Pulm: Lungs clear bilaterally, good inspiratory effort   CV:  nL S1/S2  Abd: soft, NT/ND, no hepatomegaly, +BS  MSK: moving all extremities, no edema-right groin without hematoma, dressing clean dry and intact  Neuro: no focal deficits  Skin: no rashes/lesions  Psych: normal mood/affect          Diagnostic Data:    CBC/Chem  Recent Labs   Lab 24  2335   WBC 8.5  --    HGB 9.2*  --    .2*  --    .0*  --    INR  --  1.04       Recent Labs   Lab 24   *   K 4.5   CL 98   CO2 25.0   BUN 66*   CREATSERUM 7.26*   *   CA 8.6       Recent Labs   Lab 03/17/24  2329   ALT 37   AST 33   ALB 3.7       No results for input(s): \"TROP\" in the last 168 hours.      Radiology: CARD ECHO 2D DOPPLER (CPT=93306)    Result Date: 3/18/2024  Transthoracic Echocardiogram Name:Jonny Haque Date: 2024 :  04/15/1945  Ht:  (72in)  BP: 138 / 86 MRN:  [id number]    Age:  78years    Wt:  (221lb) HR: 82bpm Loc:  [facility]       Gndr: M          BSA: 2.22m^2 Sonographer: Aj SAMSS RVT Ordering:    Satya Donald MD Consulting:  Wandy Alcocer ---------------------------------------------------------------------------- History/Indications:  Chest Tightness for few days. ---------------------------------------------------------------------------- Procedure information:  A transthoracic complete 2D study was performed. Additional evaluation included M-mode, complete spectral Doppler, and color Doppler.  Patient status:  Inpatient.  Location:  [facility].    Comparison was made to the study of 04/05/2023.    This was a STAT study. Transthoracic echocardiography for ventricular function evaluation and assessment of valvular function. Image quality was adequate. ECG rhythm:   Normal sinus ---------------------------------------------------------------------------- Conclusions: 1. Left ventricle: The cavity size was normal. Wall thickness was normal.    Systolic function was moderately reduced. The estimated ejection fraction    was 35%, by visual assessment. Hypokinesis of the anterior wall.    Hypokinesis of the apical wall. Hypokinesis of the anteroseptal wall.    Left ventricular diastolic function parameters were normal for the    patient's age. 2. Left atrium: The left atrial volume was mildly increased. 3. Aortic valve: Transvalvular velocity was increased. The findings were    consistent with moderate stenosis. The peak systolic velocity was    2.4m/sec. The mean systolic gradient was 17mm Hg. The valve area (VTI)    was 1.34cm^2. The valve area (VTI) index was 0.6cm^2/m^2. 4. Mitral valve: There was mild regurgitation. 5. Pulmonary arteries: Systolic pressure was within the normal range,    estimated to be 25mm Hg. Impressions:  This study is compared with previous dated 4/5/23: *  ---------------------------------------------------------------------------- * Findings: Left ventricle:  The cavity size was normal. Wall thickness was normal. Systolic function was moderately reduced. The estimated ejection fraction was 35%, by visual assessment.  Regional wall motion abnormalities: Hypokinesis of the anterior wall.  Hypokinesis of the apical wall. Hypokinesis of the anteroseptal wall. Left ventricular diastolic function parameters were normal for the patient's age. Left atrium:  The left atrial volume was mildly increased. Right ventricle:  The cavity size was normal. Systolic function was normal. Systolic pressure was within the normal range. Right atrium:  The atrium was normal in size. Mitral valve:  The annulus was mildly calcified. The leaflets were mildly calcified. Leaflet separation was normal.  Doppler:  Transvalvular velocity was within the normal range. There was no evidence for stenosis. There was mild regurgitation. Aortic valve:   The valve was trileaflet. The leaflets were mildly to moderately calcified. Cusp separation was reduced.  Doppler:  Transvalvular velocity was increased. The findings were consistent with moderate stenosis. There was no significant regurgitation.    The valve area (VTI) was 1.34cm^2. The valve area (VTI) index was 0.6cm^2/m^2.    The mean systolic gradient was 17mm Hg. The peak systolic gradient was 29mm Hg. Tricuspid valve:  The valve is structurally normal. Leaflet separation was normal.  Doppler:  Transvalvular velocity was within the normal range. There was no evidence for stenosis. There was mild regurgitation. Pulmonic valve:   The valve is structurally normal. Cusp separation was normal.  Doppler:  Transvalvular velocity was within the normal range. There was no evidence for stenosis. There was no significant regurgitation. Pericardium:   There was no pericardial effusion. Aorta: Aortic root: The aortic root was normal. Ascending aorta: The  ascending aorta was normal. Pulmonary arteries: The main pulmonary artery was normal-sized. Systolic pressure was within the normal range, estimated to be 25mm Hg. ---------------------------------------------------------------------------- Measurements  Left ventricle       Value          Ref       04/05/2023  IVS thickness,   (H) 1.1   cm       0.6 - 1.0 ----------  ED, PLAX  LV ID, ED, PLAX      5.7   cm       4.2 - 5.8 ----------  LV ID, ES, PLAX  (H) 4.4   cm       2.5 - 4.0 ----------  LV PW thickness, (H) 1.1   cm       0.6 - 1.0 ----------  ED, PLAX  IVS/LV PW ratio,     1.09           --------- ----------  ED, PLAX  LV PW/LV ID          0.18           --------- ----------  ratio, ED, PLAX  LV ejection      (L) 46    %        52 - 72   ----------  fraction  Stroke               34    ml/m^2   --------- 38  volume/bsa, 2D  LV e', lateral   (L) 5.8   cm/sec   >=10.0    3.8  LV E/e', lateral     13             <=13      ----------  LV e', medial    (L) 5.1   cm/sec   >=7.0     3.9  LV E/e', medial      15             --------- ----------  LV e', average       5.4   cm/sec   --------- 3.9  LV E/e', average     14             <=14      ----------  LVOT                 Value          Ref       04/05/2023  LVOT ID              2.3   cm       --------- 2.2  LVOT peak            0.86  m/sec    --------- 0.97  velocity, S  LVOT VTI, S          18.4  cm       --------- 21.3  LVOT mean            2     mm Hg    --------- 2  gradient, S  Stroke volume        76    ml       --------- 81  (SV), LVOT DP  Stroke index         34    ml/m^2   --------- 37  (SV/bsa), LVOT  DP  Aortic valve         Value          Ref       04/05/2023  Aortic valve         2.4   m/sec    --------- ----------  peak velocity, S  Aortic valve         56.9  cm       --------- ----------  VTI, S  Aortic mean          17    mm Hg    --------- ----------  gradient, S  Aortic peak          29    mm Hg    --------- 31  gradient, S  Aortic valve          1.34  cm^2     --------- 1.32  area, VTI  Aortic valve         0.6   cm^2/m^2 --------- 0.61  area/bsa, VTI  Velocity ratio,      0.32           --------- 0.35  peak, LVOT/AV  Aortic root          Value          Ref       04/05/2023  Aortic root ID,      3.6   cm       2.8 - 4.3 3.7  ED  Ascending aorta      Value          Ref       04/05/2023  Ascending aorta      3.2   cm       2.2 - 3.8 3.6  ID, A-P, ED  Left atrium          Value          Ref       04/05/2023  LA ID, A-P, ES   (H) 4.3   cm       3.0 - 4.0 ----------  LA volume, S     (H) 83    ml       18 - 58   96  LA volume/bsa, S (H) 37    ml/m^2   16 - 34   44  LA volume, ES,   (H) 70    ml       18 - 58   ----------  1-p A4C  LA volume, ES,   (H) 90    ml       18 - 58   112  1-p A2C  LA volume, ES,       84    ml       --------- ----------  A/L  LA volume/bsa,   (H) 38    ml/m^2   16 - 34   ----------  ES, A/L  LA/aortic root       1.19           --------- ----------  ratio  Mitral valve         Value          Ref       04/05/2023  Mitral E-wave        0.75  m/sec    --------- ----------  peak velocity  Mitral A-wave        0.99  m/sec    --------- ----------  peak velocity  Mitral               95    ms       --------- ----------  deceleration  time  Mitral peak          2     mm Hg    --------- ----------  gradient, D  Mitral E/A           0.8            --------- ----------  ratio, peak  Pulmonary artery     Value          Ref       04/05/2023  PA pressure, S,      25    mm Hg    --------- ----------  DP  Tricuspid valve      Value          Ref       04/05/2023  Tricuspid regurg     2.37  m/sec    <=2.8     2.44  peak velocity  Tricuspid peak       22    mm Hg    --------- 24  RV-RA gradient  Systemic veins       Value          Ref       04/05/2023  Estimated CVP        3     mm Hg    --------- 3  Right ventricle      Value          [facility]       04/05/2023  TAPSE, 2D            2.25  cm       >=1.70    ----------  RV pressure, S,      25    mm Hg     --------- 27  DP Legend: (L)  and  (H)  gali values outside specified reference range. ---------------------------------------------------------------------------- Prepared and electronically signed by Francisco Espinoza MD 03/18/2024 10:28     CATH ANGIO    Result Date: 3/18/2024  This exam has been completed. Please refer to Notes for the results to this procedure.    XR CHEST AP PORTABLE  (CPT=71045)    Result Date: 3/17/2024  PROCEDURE:  XR CHEST AP PORTABLE  (CPT=71045)  TECHNIQUE:  AP chest radiograph was obtained.  COMPARISON:  [facility] , XR, XR CHEST AP PORTABLE  (CPT=71045), 4/04/2023, 9:57 PM.  INDICATIONS:  chest tightness for a few days  PATIENT STATED HISTORY: (As transcribed by Technologist)               CONCLUSION:  Heart size upper limits of normal.  Mild pulmonary venous congestion.  Increased interstitial markings in the mid-lungs and bases.  This may be sec dairy to pulmonary edema versus an acute infectious process.  No focal consolidation.  Central venous catheter tips in RA and SVC.   LOCATION:  [facility]      Dictated by (CST): Linnea Conn MD on 3/17/2024 at 11:46 PM     Finalized by (CST): Linnea Conn MD on 3/17/2024 at 11:47 PM              ASSESSMENT / PLAN:       Patient is 78-year-old male significant past medical history of ESRD on dialysis, known a complex ischemic cardiomyopathy with recently normalized ejection fraction ON echo, CAD with stent to the LAD x 2, history of cirrhosis, history of GI bleed in the past, type 2 diabetes, hypothyroidism presented with chest pain.  Patient stated that chest pain began with associated shortness of breath last night, persisted which is why he came into the emergency room.    # Chest pain likely cardiac in nature, STEMI  -Elevated troponin, EKG with new onset left bundle branch block  -Recent drug-eluting stent to the proximal and mid LAD, x 2 in 2022  -Repeat angiogram done shows left main, LAD and left circumflex stenosis  -Titrate  beta-blocker per cardiology,  -Echo completed-  -CV surgery and consult, on nitroglycerin drip  -Given aspirin 325 in the past, aspirin 81 to continue  -Continue statin  -Outpatient patient was on Imdur, hydralazine, carvedilol and amlodipine as well as Ranexa defer these medications to cardiology-      # ESRD on dialysis  -Last dialysis session was Saturday, resume dialysis as per nephrology  -Dr. Reyna on board    # Ischemic cardiomyopathy  Echo completed that shows an EF of 35%-  -worsened since the prior echo, will need guideline directed medical therapy likely secondary to ischemic disease  -Already on aspirin, statin, beta-blocker, defer ACE as well as Entresto to cardiology    # History of cirrhosis  -No recent bleed, continue aspirin at this point    # Type 2 diabetes  -Patient on long-acting and short acting insulin at home, resume long-acting insulin, sliding scale insulin titrate as needed    # Hypothyroidism  -Synthroid    # 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  # Chronic pancytopenia  -Appears to be compensated at this time, platelets with normal limits no recent episodes of any GI bleed      Prophy:  DVT: Heparin subcu  Deconditioning prevention: PT OT    Dispo: admit to ICU 95    Outpatient records reviewed confirming patient's medical history and medications.     Further recommendations pending patient's clinical course.  [facility] hospitalist to continue to follow patient while in house    Time spent: greater than 95 minutes spent in d/w pt/family, coordination of care, and d/w staff.   Lance delgado MD   Internal Medicine  DM Hospitalist  Pager: [phone]      **Certification      PHYSICIAN Certification of Need for Inpatient Hospitalization - Initial Certification    Patient will require inpatient services that will reasonably be expected to span two midnight's based on the clinical documentation in H+P.   Based on patients current state of illness, I anticipate that, after discharge, patient will  require TBD.

## 2024-03-18 NOTE — PROGRESS NOTES
[facility] Cardiology Progress Note        Jonny Haque Patient Status:  Inpatient    4/15/1945 MRN [id number]   Location [facility] 6NE-A Attending Jem Buckley MD   Hosp Day # 0 PCP Gee Jimenez MD     Subjective:  The patient denies  shortness of breath.  He reports chest pain with minor activity      Medications:   rosuvastatin  10 mg Oral Nightly    insulin aspart  1-5 Units Subcutaneous TID AC and HS    amLODIPine  2.5 mg Oral Daily    isosorbide mononitrate ER  60 mg Oral Daily    levothyroxine  150 mcg Oral QOD    pantoprazole  40 mg Oral BID AC    metoprolol tartrate  12.5 mg Oral 2x Daily(Beta Blocker)    aspirin  81 mg Oral Daily    [START ON 3/19/2024] epoetin eunice  10,000 Units Intravenous Once       Continuous Infusions:   nitroGLYCERIN in dextrose 5% Stopped (24 0400)         Allergies:  No Known Allergies      Objective:        Intake/Output:      Intake/Output Summary (Last 24 hours) at 3/18/2024 0944  Last data filed at 3/18/2024 0800  Gross per 24 hour   Intake 485 ml   Output --   Net 485 ml     Wt Readings from Last 3 Encounters:   24 221 lb 9 oz (100.5 kg)   23 204 lb 6.4 oz (92.7 kg)   10/25/22 200 lb (90.7 kg)       Physical Exam:        Vitals:    24 0600 24 0700 24 0800 24 0900   BP: 126/82 138/86 113/74 131/84   BP Location:       Pulse: 81 80 82 86   Resp: 15 17 17 16   Temp:   97.7 °F (36.5 °C)    TempSrc:   Temporal    SpO2: 98% 96% 90% 96%   Weight:           Temp:  [97.6 °F (36.4 °C)-98 °F (36.7 °C)] 97.7 °F (36.5 °C)  Pulse:  [80-92] 86  Resp:  [12-20] 16  BP: (113-138)/(68-90) 131/84  SpO2:  [89 %-100 %] 96 %      Temp: 97.7 °F (36.5 °C)  Pulse: 86  Resp: 16  BP: 131/84  General:  Appears comfortable  HEENT: No focal deficits.  Neck: No JVD, carotids 2+ no bruits.  Cardiac: Regular S1S2.  No S3, S4, rub, click.  No murmur.  Lungs: Clear to auscultation and percussion.  Abdomen: Soft, non-tender.    Extremities: No LE edema.  No clubbing or cyanosis.    Neurologic: Alert and oriented, normal affect.  Skin: Warm and dry.           LABS:      HEM:  Recent Labs   Lab 03/17/24 2329   WBC 8.5   HGB 9.2*   HCT 27.6*   .0*       Chem:  Recent Labs   Lab 03/17/24 2329   *   K 4.5   CL 98   CO2 25.0   BUN 66*   CREATSERUM 7.26*   CA 8.6   *       Recent Labs   Lab 03/17/24 2329   ALT 37   AST 33   ALB 3.7       Recent Labs   Lab 03/17/24  2335   PTT 30.9   INR 1.04           No results found for: \"TROP\", \"CKMB\"      Invalid input(s): \"PBNPML\"                       Diagnostics:   Telemetry: SR    EKG, 3/18/2024,         Echo:  3/18/24:    1. Left ventricle: The cavity size was normal. Wall thickness was normal.      Systolic function was moderately reduced. The estimated ejection fraction      was 35%, by visual assessment. Hypokinesis of the anterior wall.      Hypokinesis of the apical wall. Hypokinesis of the anteroseptal wall.      Left ventricular diastolic function parameters were normal for the      patient's age.   2. Left atrium: The left atrial volume was mildly increased.   3. Aortic valve: Transvalvular velocity was increased. The findings were      consistent with moderate stenosis. The peak systolic velocity was      2.4m/sec. The mean systolic gradient was 17mm Hg. The valve area (VTI)      was 1.34cm^2. The valve area (VTI) index was 0.6cm^2/m^2.   4. Mitral valve: There was mild regurgitation.   5. Pulmonary arteries: Systolic pressure was within the normal range,      estimated to be 25mm Hg.         Cardiac Cath. 3/18/24      90% ostial LM  90% OM1  75% proximal LAD  Moderate, diffuse disease           Impression:      1. CAD:     - s/p rotablation and TRACY to 90% proximal LAD stenosis and rotablation and TRACY to 90% mid LAD stenosis to 0% residual 1/24/22 .       -cath, 3/18/24:  LM, LAD, LCx stenoses as above      2. Ischemic Cardiomyopathy.  LVEF has declined to 35% .  May  reflect stunning   3. LBBB (previous IVCD, atypical LBBB)  4. Moderate aortic stenosis  5. GIB, ASA intolerance.  GI eval's in past have been unrevealing.  No varices seen  6. ESRD, HD dependent  7. DM  8. HTN    Plan:    CCU today  Advance beta blocker  Trend troponins  CV surgery consultation      Yogi Espinoza MD  3/18/2024  9:44 AM

## 2024-03-18 NOTE — PAYOR COMM NOTE
--------------  ADMISSION REVIEW     Payor: [employer] MEDICARE ADV PPO  Subscriber #:  [id number]  Authorization Number: [id number]    Admit date: 3/18/24  Admit time:  1:17 AM       REVIEW DOCUMENTATION:  Patient Seen in: [facility] Emergency Department      History     Chief Complaint   Patient presents with    Chest Pain Angina     Stated Complaint: chest tightness for a few days    Subjective:   HPI    78-year-old male with history of end-stage renal disease status Tuesday, Thursday, Saturday, coronary disease, presents emergency room for evaluation of chest pain.  Patient reports this week he has been having intermittent chest discomfort, has been using nitroglycerin more frequent, tonight he started having increasing chest discomfort, took nitro and the symptoms did not improve, he feels sweaty and nauseous.  Admits to feeling some shortness of breath.  Denies any fevers or chills denies calf pain.  Denies missing any dialysis appointments    Review of Systems    Positive for stated complaint: chest tightness for a few days  Other systems are as noted in HPI.  Constitutional and vital signs reviewed.      All other systems reviewed and negative except as noted above.      ED Course     Labs Reviewed   COMP METABOLIC PANEL (14) - Abnormal; Notable for the following components:       Result Value    Glucose 237 (*)     Sodium 135 (*)     BUN 66 (*)     Creatinine 7.26 (*)     Calculated Osmolality 307 (*)     eGFR-Cr 7 (*)     Alkaline Phosphatase 181 (*)     All other components within normal limits   TROPONIN I HIGH SENSITIVITY - Abnormal; Notable for the following components:    Troponin I (High Sensitivity) 321 (*)     All other components within normal limits   PRO BETA NATRIURETIC PEPTIDE - Abnormal; Notable for the following components:    Pro-Beta Natriuretic Peptide 17,634 (*)     All other components within normal limits   CBC W/ DIFFERENTIAL - Abnormal; Notable for the following components:    RBC  2.65 (*)     HGB 9.2 (*)     HCT 27.6 (*)     .0 (*)     .2 (*)     MCH 34.7 (*)    EKG    Rate, intervals and axes as noted on EKG Report.  Rate: 100  Rhythm: Sinus Rhythm  Reading: Normal sinus rhythm.  Left bundle branch block.  This is new compared to previous EKG dated April 5, 2023         A total of 45 minutes of critical care time (exclusive of billable procedures) was administered to manage the patient's cardiovascular instability due to his acute chest pain with new left bundle branch block taken to cardiac Cath Lab for  STEMI.  This involved direct patient intervention, complex decision making, and/or extensive discussions with the patient, family, and clinical staff.          MDM        Medications Provided: Aspirin, sublingual nitroglycerin, nitroglycerin infusion    Course of Events during Emergency Room Visit include upon arrival to emergency room EKG performed, patient did have new left bundle branch block, patient with active chest pain and was diaphoretic, cardiac alert was called.  Discussed with duly cardiology, patient has been given aspirin and nitroglycerin, patient reports his symptoms have been improving.  Chemistry sodium 135 potassium 4.5 BUN 66 creatinine 7.26 glucose 237.  Troponin elevated 321.  CBC white count 8.5 hemoglobin 9.2 platelet 118.  Discussed with hospitalist physician, nephrology was also put on consult.  Patient was taken to cardiac Cath Lab  By Dr Donald.     Shared decision making was utilized     Disposition:    Admission  I have discussed with the patient the results of test, differential diagnosis, and treatment plan. They expressed clear understanding of these instructions and agrees to the plan provided.           Disposition and Plan     Clinical Impression:  1. Acute chest pain    2. New onset left bundle branch block (LBBB)    3. Elevated troponin    4. ESRD (end stage renal disease) on dialysis (HCC)         Disposition:  Admit  3/18/2024 12:16  am          [facility] Hospitalist H&P       CC:   Chief Complaint   Patient presents with    Chest Pain Angina        PCP: Gee Jimenez MD    History of Present Illness:      Patient is 78-year-old male significant past medical history of ESRD on dialysis, known a complex ischemic cardiomyopathy with recently normalized ejection fraction ON echo, CAD with stent to the LAD x 2, history of cirrhosis, history of GI bleed in the past, type 2 diabetes, hypothyroidism presented with chest pain.  Patient stated that chest pain began with associated shortness of breath last night, persisted which is why he came into the emergency room..  Chest pain was worst at rest as well as ambulation.  Cardiac alert was called because of chest pain, initial troponin was 321 proBNP was at 17,634      BMP showed labs consistent with ESRD creatinine 7.26 glucose was elevated at 237, chest x-ray was done that was remarkable for pulmonary venous congestion mild, increased interstitial markings secondary to pulmonary edema lipids show an elevation of triglycerides    Hemoglobin was 9.2, platelets of 118    Cardiology was consulted and patient was taken to the Cath Lab secondary to new left bundle branch block and elevated troponin-concerning for STEMI      Patient's chest pain also resolved with nitroglycerin with recurrence 12 hours later.      He currently denies any chest pain or shortness of breath no nausea no vomiting, however he did have some chest pain when he got up and went to the bathroom so nitroglycerin drip was started    Patient also completed the angiogram last night that showed a 90% ostial, 90% OM1, 75% proximal LAD and moderate diffuse disease.  There was no PCI done and aspirin was started, nitroglycerin for chest pain was also started and beta-blocker.  CV surgery was also contacted    PMH  Past Medical History:   Diagnosis Date    Calculus of kidney     Coronary atherosclerosis     [place] [facility]    Diabetes (HCC)     Disorder of thyroid     Esophageal varices without bleeding, unspecified esophageal varices type (HCC) 06/27/2019    Hepatitis, unspecified diagnosed many years ago.    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  LAD and left circumflex with 50% lesions.  LV shows inferobasilar aneurysm with scar.  Overall LV function preserved at the exterior 65%.    CATH BARE METAL STENT (BMS)      OTHER SURGICAL HISTORY      Upper GI surgery    OTHER SURGICAL HISTORY  12/11/2017    Cysto Dr. Lees    OTHER SURGICAL HISTORY  07/18/2019    BTS Cysto Dr. Lees     OTHER SURGICAL HISTORY  02/06/2020    BTS Cysto (Dr. Lees)        ALL:  No Known Allergies     Home Medications:  Outpatient Medications Marked as Taking for the 3/17/24 encounter (Hospital Encounter)   Medication Sig Dispense Refill    isosorbide mononitrate ER 60 MG Oral Tablet 24 Hr Take 1 tablet (60 mg total) by mouth daily. 90 tablet 3    aspirin 81 MG Oral Tab EC Take 1 tablet (81 mg total) by mouth every other day.  0    levothyroxine 150 MCG Oral Tab Take 1 tablet (150 mcg total) by mouth every other day. Take one tablet every morning before breakfast      Ferric Citrate (AURYXIA) 1  MG(Fe) Oral Tab       Lanthanum Carbonate 1000 MG Oral Chew Tab Chew 1 tablet (1,000 mg total) by mouth daily.      pantoprazole 40 MG Oral Tab EC Take 1 tablet (40 mg total) by mouth 2 (two) times daily before meals.      Sevelamer 800 MG Oral Tab Take 1 tablet (800 mg total) by mouth 3 (three) times daily  with meals.      nitroGLYCERIN 0.3 MG Sublingual SL Tab Place 1 tablet (0.3 mg total) under the tongue every 5 (five) minutes as needed for Chest pain (as needed for chest pain).      amLODIPine 2.5 MG Oral Tab Take 1 tablet (2.5 mg total) by mouth daily.      Insulin Pen Needle (TRUEPLUS PEN NEEDLES) 31G X 5 MM Does not apply Misc Use 5 per day 500 each 2    Insulin Glargine, 2 Unit Dial, (TOUJEO MAX SOLOSTAR) 300 UNIT/ML Subcutaneous Solution Pen-injector Inject 20 Units into the skin nightly. 6 mL 2    rosuvastatin 10 MG Oral Tab Take 1 tablet (10 mg total) by mouth nightly. (Patient taking differently: Take 1 tablet (10 mg total) by mouth nightly.) 90 tablet 0    Tuberculin PPD (TUBERSOL ID) Inject 0.1 mL into the skin.      Glucose Blood (ONETOUCH ULTRA) In Vitro Strip 6 times a day 400 each 3    Insulin Lispro, 1 Unit Dial, (HUMALOG KWIKPEN) 100 UNIT/ML Subcutaneous Solution Pen-injector 12 units before meals with sliding scale. Max daily dose: 50 units. (Patient taking differently: 10 Units. Three times a day. Before meals) 60 mL 3    CONTOUR NEXT TEST In Vitro Strip TEST BLOOD SUGAR FOUR TIMES DAILY 400 strip 3    Blood Glucose Monitoring Suppl ([employer] [place] [employer] CONTOUR NEXT MONITOR) W/DEVICE Does not apply Kit 1 Device by Does not apply route 4 (four) times daily. 1 kit 0         Soc Hx  Social History     Tobacco Use    Smoking status: Former     Packs/day: 0.50     Years: 15.00     Additional pack years: 0.00     Total pack years: 7.50     Types: Cigarettes     Quit date: 1980     Years since quittin.1    Smokeless tobacco: Never    Tobacco comments:     quit at age 34   Substance Use Topics    Alcohol use: No          Diagnostic Data:    CBC/Chem  Radiology: CARD ECHO 2D DOPPLER (CPT=93306)    Result Date: 3/18/2024  Transthoracic Echocardiogram Name:Jonny Haque Date: 2024 :  04/15/1945 Ht:  (72in)  BP: 138 / 86 MRN:  [id number]    Age:  78years    Wt:  (221lb) HR: 82bpm Loc:  [facility]       Gndr: M           BSA: 2.22m^2 Sonographer: Aj AKBAR RVS RVT Ordering:    Satya Donald MD Consulting:  Wandy Alcocer ---------------------------------------------------------------------------- History/Indications:  Chest Tightness for few days. ---------------------------------------------------------------------------- Procedure information:  A transthoracic complete 2D study was performed. Additional evaluation included M-mode, complete spectral Doppler, and color Doppler.  Patient status:  Inpatient.  Location:  [facility].    Comparison was made to the study of 04/05/2023.    This was a STAT study. Transthoracic echocardiography for ventricular function evaluation and assessment of valvular function. Image quality was adequate. ECG rhythm:   Normal sinus ---------------------------------------------------------------------------- Conclusions: 1. Left ventricle: The cavity size was normal. Wall thickness was normal.    Systolic function was moderately reduced. The estimated ejection fraction    was 35%, by visual assessment. Hypokinesis of the anterior wall.    Hypokinesis of the apical wall. Hypokinesis of the anteroseptal wall.    Left ventricular diastolic function parameters were normal for the    patient's age. 2. Left atrium: The left atrial volume was mildly increased. 3. Aortic valve: Transvalvular velocity was increased. The findings were    consistent with moderate stenosis. The peak systolic velocity was    2.4m/sec. The mean systolic gradient was 17mm Hg. The valve area (VTI)    was 1.34cm^2. The valve area (VTI) index was 0.6cm^2/m^2. 4. Mitral valve: There was mild regurgitation. 5. Pulmonary arteries: Systolic pressure was within the normal range,    estimated to be 25mm Hg. Impressions:  This study is compared with previous dated 4/5/23: * ---------------------------------------------------------------------------- * Findings: Left ventricle:  The cavity size was normal. Wall thickness was normal. Systolic  function was moderately reduced. The estimated ejection fraction was 35%, by visual assessment.  Regional wall motion abnormalities: Hypokinesis of the anterior wall.  Hypokinesis of the apical wall. Hypokinesis of the anteroseptal wall. Left ventricular diastolic function parameters were normal for the patient's age. Left atrium:  The left atrial volume was mildly increased. Right ventricle:  The cavity size was normal. Systolic function was normal. Systolic pressure was within the normal range. Right atrium:  The atrium was normal in size. Mitral valve:  The annulus was mildly calcified. The leaflets were mildly calcified. Leaflet separation was normal.  Doppler:  Transvalvular velocity was within the normal range. There was no evidence for stenosis. There was mild regurgitation. Aortic valve:   The valve was trileaflet. The leaflets were mildly to moderately calcified. Cusp separation was reduced.  Doppler:  Transvalvular velocity was increased. The findings were consistent with moderate stenosis. There was no significant regurgitation.    The valve area (VTI) was 1.34cm^2. The valve area (VTI) index was 0.6cm^2/m^2.    The mean systolic gradient was 17mm Hg. The peak systolic gradient was 29mm Hg. Tricuspid valve:  The valve is structurally normal. Leaflet separation was normal.  Doppler:  Transvalvular velocity was within the normal range. There was no evidence for stenosis. There was mild regurgitation. Pulmonic valve:   The valve is structurally normal. Cusp separation was normal.  Doppler:  Transvalvular velocity was within the normal range. There was no evidence for stenosis. There was no significant regurgitation. Pericardium:   There was no pericardial effusion. Aorta: Aortic root: The aortic root was normal. Ascending aorta: The ascending aorta was normal. Pulmonary arteries: The main pulmonary artery was normal-sized. Systolic pressure was within the normal range, estimated to be 25mm Hg.  ---------------------------------------------------------------------------- Measurements  Left ventricle       Value          Ref       04/05/2023  IVS thickness,   (H) 1.1   cm       0.6 - 1.0 ----------  ED, PLAX  LV ID, ED, PLAX      5.7   cm       4.2 - 5.8 ----------  LV ID, ES, PLAX  (H) 4.4   cm       2.5 - 4.0 ----------  LV PW thickness, (H) 1.1   cm       0.6 - 1.0 ----------  ED, PLAX  IVS/LV PW ratio,     1.09           --------- ----------  ED, PLAX  LV PW/LV ID          0.18           --------- ----------  ratio, ED, PLAX  LV ejection      (L) 46    %        52 - 72   ----------  fraction  Stroke               34    ml/m^2   --------- 38  volume/bsa, 2D  LV e', lateral   (L) 5.8   cm/sec   >=10.0    3.8  LV E/e', lateral     13             <=13      ----------  LV e', medial    (L) 5.1   cm/sec   >=7.0     3.9  LV E/e', medial      15             --------- ----------  LV e', average       5.4   cm/sec   --------- 3.9  LV E/e', average     14             <=14      ----------  LVOT                 Value          Ref       04/05/2023  LVOT ID              2.3   cm       --------- 2.2  LVOT peak            0.86  m/sec    --------- 0.97  velocity, S  LVOT VTI, S          18.4  cm       --------- 21.3  LVOT mean            2     mm Hg    --------- 2  gradient, S  Stroke volume        76    ml       --------- 81  (SV), LVOT DP  Stroke index         34    ml/m^2   --------- 37  (SV/bsa), LVOT  DP  Aortic valve         Value          Ref       04/05/2023  Aortic valve         2.4   m/sec    --------- ----------  peak velocity, S  Aortic valve         56.9  cm       --------- ----------  VTI, S  Aortic mean          17    mm Hg    --------- ----------  gradient, S  Aortic peak          29    mm Hg    --------- 31  gradient, S  Aortic valve         1.34  cm^2     --------- 1.32  area, VTI  Aortic valve         0.6   cm^2/m^2 --------- 0.61  area/bsa, VTI  Velocity ratio,      0.32           --------- 0.35  peak,  LVOT/AV  Aortic root          Value          Ref       04/05/2023  Aortic root ID,      3.6   cm       2.8 - 4.3 3.7  ED  Ascending aorta      Value          Ref       04/05/2023  Ascending aorta      3.2   cm       2.2 - 3.8 3.6  ID, A-P, ED  Left atrium          Value          Ref       04/05/2023  LA ID, A-P, ES   (H) 4.3   cm       3.0 - 4.0 ----------  LA volume, S     (H) 83    ml       18 - 58   96  LA volume/bsa, S (H) 37    ml/m^2   16 - 34   44  LA volume, ES,   (H) 70    ml       18 - 58   ----------  1-p A4C  LA volume, ES,   (H) 90    ml       18 - 58   112  1-p A2C  LA volume, ES,       84    ml       --------- ----------  A/L  LA volume/bsa,   (H) 38    ml/m^2   16 - 34   ----------  ES, A/L  LA/aortic root       1.19           --------- ----------  ratio  Mitral valve         Value          Ref       04/05/2023  Mitral E-wave        0.75  m/sec    --------- ----------  peak velocity  Mitral A-wave        0.99  m/sec    --------- ----------  peak velocity  Mitral               95    ms       --------- ----------  deceleration  time  Mitral peak          2     mm Hg    --------- ----------  gradient, D  Mitral E/A           0.8            --------- ----------  ratio, peak  Pulmonary artery     Value          Ref       04/05/2023  PA pressure, S,      25    mm Hg    --------- ----------  DP  Tricuspid valve      Value          Ref       04/05/2023  Tricuspid regurg     2.37  m/sec    <=2.8     2.44  peak velocity  Tricuspid peak       22    mm Hg    --------- 24  RV-RA gradient  Systemic veins       Value          Ref       04/05/2023  Estimated CVP        3     mm Hg    --------- 3  Right ventricle      Value          Ref       04/05/2023  TAPSE, 2D            2.25  cm       >=1.70    ----------  RV pressure, S,      25    mm Hg    --------- 27  DP Legend: (L)  and  (H)  gali values outside specified reference range. ---------------------------------------------------------------------------- Prepared  and electronically signed by Francisco Espinoza MD 03/18/2024 10:28     CATH ANGIO    Result Date: 3/18/2024  This exam has been completed. Please refer to Notes for the results to this procedure.    XR CHEST AP PORTABLE  (CPT=71045)    Result Date: 3/17/2024  PROCEDURE:  XR CHEST AP PORTABLE  (CPT=71045)  TECHNIQUE:  AP chest radiograph was obtained.  COMPARISON:  [facility] , XR, XR CHEST AP PORTABLE  (CPT=71045), 4/04/2023, 9:57 PM.  INDICATIONS:  chest tightness for a few days  PATIENT STATED HISTORY: (As transcribed by Technologist)               CONCLUSION:  Heart size upper limits of normal.  Mild pulmonary venous congestion.  Increased interstitial markings in the mid-lungs and bases.  This may be sec dairy to pulmonary edema versus an acute infectious process.  No focal consolidation.  Central venous catheter tips in RA and SVC.   LOCATION:  [facility]      Dictated by (CST): Linnea Conn MD on 3/17/2024 at 11:46 PM     Finalized by (CST): Linnea Conn MD on 3/17/2024 at 11:47 PM              ASSESSMENT / PLAN:       Patient is 78-year-old male significant past medical history of ESRD on dialysis, known a complex ischemic cardiomyopathy with recently normalized ejection fraction ON echo, CAD with stent to the LAD x 2, history of cirrhosis, history of GI bleed in the past, type 2 diabetes, hypothyroidism presented with chest pain.  Patient stated that chest pain began with associated shortness of breath last night, persisted which is why he came into the emergency room.    # Chest pain likely cardiac in nature, STEMI  -Elevated troponin, EKG with new onset left bundle branch block  -Recent drug-eluting stent to the proximal and mid LAD, x 2 in 2022  -Repeat angiogram done shows left main, LAD and left circumflex stenosis  -Titrate beta-blocker per cardiology,  -Echo completed-  -CV surgery and consult, on nitroglycerin drip  -Given aspirin 325 in the past, aspirin 81 to continue  -Continue statin  -Outpatient  patient was on Imdur, hydralazine, carvedilol and amlodipine as well as Ranexa defer these medications to cardiology-      # ESRD on dialysis  -Last dialysis session was Saturday, resume dialysis as per nephrology  -Dr. Reyna on board    # Ischemic cardiomyopathy  Echo completed that shows an EF of 35%-  -worsened since the prior echo, will need guideline directed medical therapy likely secondary to ischemic disease  -Already on aspirin, statin, beta-blocker, defer ACE as well as Entresto to cardiology    # History of cirrhosis  -No recent bleed, continue aspirin at this point    # Type 2 diabetes  -Patient on long-acting and short acting insulin at home, resume long-acting insulin, sliding scale insulin titrate as needed    # Hypothyroidism  -Synthroid    # 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  # Chronic pancytopenia  -Appears to be compensated at this time, platelets with normal limits no recent episodes of any GI bleed      Prophy:  DVT: Heparin subcu  Deconditioning prevention: PT OT    Dispo: admit to ICU 95    Outpatient records reviewed confirming patient's medical history and medications.     Further recommendations pending patient's clinical course.  DM hospitalist to continue to follow patient while in house    Time spent: greater than 95 minutes spent in d/w pt/family, coordination of care, and d/w staff.   Lance delgado MD   Internal Medicine  [facility] Hospitalist  Pager: [phone]      **Certification  PHYSICIAN Certification of Need for Inpatient Hospitalization - Initial Certification      3/18/24 Cardiology   Reason for Consultation:   Cardiac alert, LBBB     History of Present Illness:   Jonny Haque is a(n) 78 year old male with known complex ischemic CM (although EF now normal by recent echo), CAD (rota stenting to LAD x 2) and ESRD.  He also has a significant hx of cirrhosis, varacies and GIB. He has been intolerant of ASA and has recently been off all antiplatelet agents.  From previous notes it  appears he would have angina when his hemoglobin dropped below 8g/dl approximately.       He had some CP this AM. It was \"dull\".  Center chest.  8/10 severity.  No radiation.  Associated mild dyspnea.  Resolved with NTG.  Was non-exertional.  Came back 12 hrs later tonight at 10PM.  His son noted him having CP and convinced him to go to the ER.  Diagnostics:   Tele: Sinus.  EKG: Sinus with left bundle.  No Sgarbossa STEMI criteria met.  Had IVCD/ILBBB/ALBBB in the past (see 9/28/34).  Echo: Conclusions:     1. Left ventricle: The cavity size was normal. Wall thickness was at the      upper limits of normal. Systolic function was normal. The estimated      ejection fraction was 55-60%. Unable to assess LV diastolic function.   2. Left atrium: The left atrial volume was moderately increased.   3. Aortic valve: Transvalvular velocity was increased, due to stenosis. The      findings were consistent with mild stenosis. The peak systolic gradient      was 31mm Hg. The valve area (VTI) was 1.32cm^2. The valve area (VTI)      index was 0.61cm^2/m^2.   4. Aortic root: The aortic root was 3.7cm diameter.   5. Ascending aorta: The ascending aorta was 3.6cm diameter.   6. Pulmonic valve: There was trace regurgitation.   7. Pulmonary arteries: Systolic pressure was within the normal range, in the      range of 25mm Hg to 30mm Hg.         Assessment:  1. CAD  2. CP  3. LBBB (previous IVCD, atypical LBBB)  4. Moderate aortic stenosis  5. GIB, ASA intolerance  6. ESRD, HD dependent  7. DM  8. HTN        Plan:  1. I discussed options at length with the patient and his son.  We agreed diagnostic angio given CP at rest several times today and dificulty in reading ischemia with LBBB.  We will be conservative and unless find an occluded vessel will be unlikely to immediately stent.  2. Medical management of CAD.  3. Nephrology consult.  4. Hospitalist consult.     Satya Donald MD    PREOPERATIVE DIAGNOSIS:  CAD  POSTOPERATIVE  DIAGNOSIS:  CAD  PROCEDURE PERFORMED:  Coronary angiogram      PROCEDURE:  The patient was brought to the cardiac catheterization lab in the fasting state.  Informed consent was obtained.  Moderate sedation was employed using 2mg IV Versed and 100mcg IV fentanyl.  I directly observed the patient from 0036 to 0056, watching the heart rate, blood pressure, oximetry, and rhythm.     ACCESS/CATHETER PLACEMENT:  6F right femoral, slender sheath.  JL$ catheter for LM and JR4 fpr RCA engagement.  Standard technique.  Standard views.       FINDINGS:    Selective coronary angiography:    LMCA: The left main artery is a large vessel with eccentric, ostial 90% disease.  See cranial views in particular.  LAD:  The left anterior descending artery is a large vessel with proximal 75%  disease proximal to stents which are patent with no ISR.  There is a mid 60% stenosis.  LCX: The left circumflex artery is a dominant vessel, large.  There is an OM1 with ostial 80% disease, eccentric (see GOPAL caudal view) and then tapers to a 90% stenosis.  OM1 is large in caliber overall.  The CX continues in the AV groove with mild to moderate disease and gives rise to posterolaterals and a moderate PDA system with moderate, diffuse, non-obstructive CAD.   RCA:  The right coronary artery is a essentially non-dominant vessel (the AM does swing around but is not significant in territory or caliber).  Moderate disease.     MEDICATIONS:  See nursing record.     COMPLICATIONS:  None.     IMPRESSION:    CAD  90% ostial LM  90% OM1  75% proximal LAD  Moderate, diffuse disease     RECOMMENDATIONS:   The patient is stable and has TIMI3 flow in all vessels; no urgent/emergent PCI.  Will need to consider revascularization options.  Likewise need to re-assess the aortic stenosis as it may be severe now.  Will consider CABG vs impella supported PCI vs medical management.  For tonight will follow clinically and medically manage pain.  Start with ASA 81 and see   how it's tolerated.  NTG drip for pain.  Low dose BB and home meds.  Need several services to consult before we proceed including GI.  Lastly we should contact his primary cardiologist to involve him in any complex decision making.        3/18/24 Cardiology     cath, 3/18/24:  LM, LAD, LCx stenoses as above        2. Ischemic Cardiomyopathy.  LVEF has declined to 35% .  May reflect stunning   3. LBBB (previous IVCD, atypical LBBB)  4. Moderate aortic stenosis  5. GIB, ASA intolerance.  GI eval's in past have been unrevealing.  No varices seen  6. ESRD, HD dependent  7. DM  8. HTN     Plan:     CCU today  Advance beta blocker  Trend troponins  CV surgery consultation       Latest Reference Range & Units 03/17/24 23:29 03/18/24 09:53   Troponin I (High Sensitivity) <=79 ng/L 321 (HH) 1,590 (HH)   pro-BETA NATRIURETIC PEPTIDE <450 pg/mL 17,634 (H)    (HH): Data is critically high  (H): Data is abnormally high    MEDICATIONS ADMINISTERED IN LAST 1 DAY:  amLODIPine (Norvasc) tab 2.5 mg       Date Action Dose Route User    3/18/2024 0927 Given 2.5 mg Oral Yasmine Paris RN    3/18/2024 0255 Given 2.5 mg Oral Shai Blanchard RN          aspirin chewable tab 324 mg       Date Action Dose Route User    3/17/2024 2327 Given 324 mg Oral Patsy Gallo RN          aspirin DR tab 81 mg       Date Action Dose Route User    3/18/2024 0927 Given 81 mg Oral Yasmine Paris RN          heparin (Porcine) 5000 UNIT/ML injection 5,000 Units       Date Action Dose Route User    3/18/2024 1612 Given 5,000 Units Subcutaneous (Left Lower Abdomen) Yasmine Paris RN          insulin aspart (NovoLOG) 100 Units/mL FlexPen 1-5 Units       Date Action Dose Route User    3/18/2024 1203 Given 4 Units Subcutaneous (Left Lower Abdomen) Emperatriz Worthington RN    3/18/2024 0654 Given 1 Units Subcutaneous (Right Upper Arm) Shai Blanchard RN          iodixanol (VISIPAQUE) 320 MG/ML injection 100 mL       Date Action Dose Route User    3/18/2024  0102 Given 90 mL Injection Satya Donald MD          isosorbide mononitrate ER (Imdur) 24 hr tab 60 mg       Date Action Dose Route User    3/18/2024 0927 Given 60 mg Oral Yasmine Paris RN    3/18/2024 0254 Given 60 mg Oral Shai Blanchard RN          levothyroxine (Synthroid) tab 150 mcg       Date Action Dose Route User    3/18/2024 0654 Given 150 mcg Oral Shai Blanchard RN          metoprolol tartrate (Lopressor) partial tab 12.5 mg       Date Action Dose Route User    3/18/2024 0622 Given 12.5 mg Oral Shai Blanchard RN          metoprolol tartrate (Lopressor) tab 25 mg       Date Action Dose Route User    3/18/2024 1612 Given 25 mg Oral Yasmine Paris RN          nitroglycerin (Nitrostat) SL tab 0.4 mg       Date Action Dose Route User    3/17/2024 2327 Given 0.4 mg Sublingual Patsy Gallo RN          nitroGLYCERIN in dextrose 5% 50 mg/250mL infusion premix       Date Action Dose Route User    3/18/2024 1042 Restarted 5 mcg/min Intravenous Yasmine Paris RN    3/18/2024 0122 New Bag 5 mcg/min Intravenous Shai Blanchard RN    3/17/2024 2333 New Bag 5 mcg/min Intravenous Patsy Gallo RN          pantoprazole (Protonix) DR tab 40 mg       Date Action Dose Route User    3/18/2024 1612 Given 40 mg Oral Yasmine Paris RN    3/18/2024 0622 Given 40 mg Oral Shai Blanchard RN          rosuvastatin (Crestor) tab 10 mg       Date Action Dose Route User    3/18/2024 0255 Given 10 mg Oral Shai Blanchard RN            Vitals (last day)       Date/Time Temp Pulse Resp BP SpO2 Weight O2 Device O2 Flow Rate (L/min) [facility]    03/18/24 1600 98 °F (36.7 °C) 82 17 126/77 99 % -- Nasal cannula 3 L/min AM    03/18/24 1500 -- 81 19 119/77 97 % -- -- -- AM    03/18/24 1400 -- 81 17 129/76 97 % -- -- -- AM    03/18/24 1300 -- 82 20 135/72 98 % -- -- -- AM    03/18/24 1200 -- 84 19 119/70 98 % -- Nasal cannula 3 L/min HB    03/18/24 1200 98.4 °F (36.9 °C) -- -- -- -- -- -- -- AM    03/18/24 1100 -- 81 18 105/78 98 %  -- -- -- AM    03/18/24 1042 -- 84 20 126/75 93 % -- -- -- AM    03/18/24 1023 -- 81 17 130/98 97 % -- Nasal cannula 3 L/min AM    03/18/24 0900 -- 86 16 131/84 96 % -- Nasal cannula 3 L/min AM    03/18/24 0800 97.7 °F (36.5 °C) 82 17 113/74 90 % -- Nasal cannula 5 L/min AM    03/18/24 0757 -- -- -- -- -- -- Nasal cannula 4 L/min MA    03/18/24 0700 -- 80 17 138/86 96 % -- -- -- TT    03/18/24 0600 -- 81 15 126/82 98 % -- -- -- TT    03/18/24 0500 -- 81 17 134/80 98 % -- -- -- TT    03/18/24 0400 98 °F (36.7 °C) 83 13 113/68 91 % -- -- 4 L/min TT    03/18/24 0300 -- 86 17 133/86 95 % -- -- 4 L/min TT    03/18/24 0200 -- 89 20 129/90 92 % -- -- -- TT    03/18/24 0150 -- 88 19 124/75 91 % -- -- -- TT    03/18/24 0145 -- 87 19 124/75 91 % -- -- -- TT    03/18/24 0130 -- 92 19 121/74 93 % -- -- -- TT    03/18/24 0122 97.6 °F (36.4 °C) 92 14 125/82 91 % 221 lb 9 oz Nasal cannula 6 L/min TT    03/18/24 0011 -- 81 14 118/71 100 % -- -- -- ET    03/18/24 0000 -- 80 14 124/71 100 % -- -- -- ET    03/17/24 2352 -- -- -- -- -- -- -- 2 L/min ET    03/17/24 2351 -- 84 18 123/69 100 % -- -- -- ET    03/17/24 2342 -- 84 14 118/72 93 % -- -- -- ET    03/17/24 2341 -- -- -- -- 92 % -- Nasal cannula 4 L/min ET    03/17/24 2331 -- -- -- -- -- -- Nasal cannula 3 L/min ET    03/17/24 2330 -- 84 12 119/71 89 %  -- -- -- ET    03/17/24 2324 -- 92 17 124/76 94 % -- None (Room air) -- ET

## 2024-03-18 NOTE — PLAN OF CARE
Rec'd pt from CCL @ 0122 s/p LHC w/ nitroglycerin gtt infusing. Consults notified. Right groin access site soft w/o hematoma. Right pedal pulse palpable. CMS intact. Initially c/o 3/10 CP. Currently denies any CP. Ntg weaned off. NSR w/ LBBB on tele. No ectopy. Titrating 02 flow rate via NC to maintain 02 saturations >90%. Plan for ECHO this AM. Pt updated on POC.    Problem: CARDIOVASCULAR - ADULT  Goal: Maintains optimal cardiac output and hemodynamic stability  Description: INTERVENTIONS:  - Monitor vital signs, rhythm, and trends  - Monitor for bleeding, hypotension and signs of decreased cardiac output  - Evaluate effectiveness of vasoactive medications to optimize hemodynamic stability  - Monitor arterial and/or venous puncture sites for bleeding and/or hematoma  - Assess quality of pulses, skin color and temperature  - Assess for signs of decreased coronary artery perfusion - ex. Angina  - Evaluate fluid balance, assess for edema, trend weights  Outcome: Progressing  Goal: Absence of cardiac arrhythmias or at baseline  Description: INTERVENTIONS:  - Continuous cardiac monitoring, monitor vital signs, obtain 12 lead EKG if indicated  - Evaluate effectiveness of antiarrhythmic and heart rate control medications as ordered  - Initiate emergency measures for life threatening arrhythmias  - Monitor electrolytes and administer replacement therapy as ordered  Outcome: Progressing     Problem: PAIN - ADULT  Goal: Verbalizes/displays adequate comfort level or patient's stated pain goal  Description: INTERVENTIONS:  - Encourage pt to monitor pain and request assistance  - Assess pain using appropriate pain scale  - Administer analgesics based on type and severity of pain and evaluate response  - Implement non-pharmacological measures as appropriate and evaluate response  - Consider cultural and social influences on pain and pain management  - Manage/alleviate anxiety  - Utilize distraction and/or relaxation  techniques  - Monitor for opioid side effects  - Notify MD/LIP if interventions unsuccessful or patient reports new pain  - Anticipate increased pain with activity and pre-medicate as appropriate  Outcome: Progressing

## 2024-03-18 NOTE — ED PROVIDER NOTES
Patient Seen in: [facility] Emergency Department      History     Chief Complaint   Patient presents with    Chest Pain Angina     Stated Complaint: chest tightness for a few days    Subjective:   HPI    78-year-old male with history of end-stage renal disease status Tuesday, Thursday, Saturday, coronary disease, presents emergency room for evaluation of chest pain.  Patient reports this week he has been having intermittent chest discomfort, has been using nitroglycerin more frequent, tonight he started having increasing chest discomfort, took nitro and the symptoms did not improve, he feels sweaty and nauseous.  Admits to feeling some shortness of breath.  Denies any fevers or chills denies calf pain.  Denies missing any dialysis appointments    Objective:   Past Medical History:   Diagnosis Date    Calculus of kidney     Coronary atherosclerosis     bare metal stents at [facility] Jan 2021    Diabetes (HCC)     Disorder of thyroid     Esophageal varices without bleeding, unspecified esophageal varices type (HCC) 06/27/2019    Hepatitis, unspecified diagnosed many years ago.    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  LAD and left circumflex with 50% lesions.  LV shows inferobasilar aneurysm with scar.  Overall LV function preserved at the exterior 65%.    CATH BARE METAL STENT (BMS)      OTHER SURGICAL HISTORY      Upper GI surgery    OTHER SURGICAL HISTORY  12/11/2017    Caroline Lees    OTHER SURGICAL HISTORY  07/18/2019    S Caroline Lees     OTHER  SURGICAL HISTORY  2020    [facility] Cysto (Dr. Lees)                Social History     Socioeconomic History    Marital status:    Tobacco Use    Smoking status: Former     Packs/day: 0.50     Years: 15.00     Additional pack years: 0.00     Total pack years: 7.50     Types: Cigarettes     Quit date: 1980     Years since quittin.1    Smokeless tobacco: Never    Tobacco comments:     quit at age 34   Vaping Use    Vaping Use: Never used   Substance and Sexual Activity    Alcohol use: No    Drug use: No              Review of Systems    Positive for stated complaint: chest tightness for a few days  Other systems are as noted in HPI.  Constitutional and vital signs reviewed.      All other systems reviewed and negative except as noted above.    Physical Exam     ED Triage Vitals [24 2324]   /76   Pulse 92   Resp 17   Temp    Temp src    SpO2 94 %   O2 Device None (Room air)       Current:/71   Pulse 81   Resp 14   SpO2 100%         Physical Exam    GENERAL: Patient is awake, alert,  HEENT:  no scleral icterus.  Mucous membranes are moist  HEART: Regular rate and rhythm, no murmurs.  LUNGS: Clear to auscultation bilaterally.  No Rales, no rhonchi, no wheezing, no stridor.  ABDOMEN: Soft, nondistended,non tender  EXTREMITIES: No peripheral edema, no calf tenderness,  ED Course     Labs Reviewed   COMP METABOLIC PANEL (14) - Abnormal; Notable for the following components:       Result Value    Glucose 237 (*)     Sodium 135 (*)     BUN 66 (*)     Creatinine 7.26 (*)     Calculated Osmolality 307 (*)     eGFR-Cr 7 (*)     Alkaline Phosphatase 181 (*)     All other components within normal limits   TROPONIN I HIGH SENSITIVITY - Abnormal; Notable for the following components:    Troponin I (High Sensitivity) 321 (*)     All other components within normal limits   PRO BETA NATRIURETIC PEPTIDE - Abnormal; Notable for the following components:    Pro-Beta Natriuretic Peptide 17,634 (*)      All other components within normal limits   CBC W/ DIFFERENTIAL - Abnormal; Notable for the following components:    RBC 2.65 (*)     HGB 9.2 (*)     HCT 27.6 (*)     .0 (*)     .2 (*)     MCH 34.7 (*)     All other components within normal limits   PROTHROMBIN TIME (PT) - Normal   PTT, ACTIVATED - Normal   RAPID SARS-COV-2 BY PCR - Normal   CBC WITH DIFFERENTIAL WITH PLATELET    Narrative:     The following orders were created for panel order CBC With Differential With Platelet.  Procedure                               Abnormality         Status                     ---------                               -----------         ------                     CBC W/ DIFFERENTIAL[[id number]]          Abnormal            Final result                 Please view results for these tests on the individual orders.   LIPID PANEL     EKG    Rate, intervals and axes as noted on EKG Report.  Rate: 100  Rhythm: Sinus Rhythm  Reading: Normal sinus rhythm.  Left bundle branch block.  This is new compared to previous EKG dated April 5, 2023                 A total of 45 minutes of critical care time (exclusive of billable procedures) was administered to manage the patient's cardiovascular instability due to his acute chest pain with new left bundle branch block taken to cardiac Cath Lab for  STEMI.  This involved direct patient intervention, complex decision making, and/or extensive discussions with the patient, family, and clinical staff.           MDM        Differential diagnosis before testing includes but not limited to acute coronary syndrome, pneumonia, pneumothorax, electrolyte abnormality, dysrhythmia, which is a medical condition that poses a threat to life/function    Past Medical History/comorbidities-coronary disease, end-stage renal disease on dialysis    Radiographic images  I personally reviewed the radiographs and my individual interpretation shows chest x-ray no pneumothorax  I also reviewed the official  reports that showed chest x-ray heart size upper limits normal, mild pulmonary venous congestion.  Interstitial markings mid lungs and bases.    Discussion of management (consult/physicians, social work, pharmacy,ect) Dr. Donald cardiology, santy hospitalist physician    Medications Provided: Aspirin, sublingual nitroglycerin, nitroglycerin infusion    Course of Events during Emergency Room Visit include upon arrival to emergency room EKG performed, patient did have new left bundle branch block, patient with active chest pain and was diaphoretic, cardiac alert was called.  Discussed with santy cardiology, patient has been given aspirin and nitroglycerin, patient reports his symptoms have been improving.  Chemistry sodium 135 potassium 4.5 BUN 66 creatinine 7.26 glucose 237.  Troponin elevated 321.  CBC white count 8.5 hemoglobin 9.2 platelet 118.  Discussed with hospitalist physician, nephrology was also put on consult.  Patient was taken to cardiac Cath Lab  By Dr Donald.     Shared decision making was utilized           Disposition:    Admission  I have discussed with the patient the results of test, differential diagnosis, and treatment plan. They expressed clear understanding of these instructions and agrees to the plan provided.       Note to patient: The 21st Century Cures Act makes medical notes like these available to patients in the interest of transparency. However, this is a medical document intended as peer to peer communication. It is written in medical language and may contain abbreviations or verbiage that are unfamiliar. It may appear blunt or direct. Medical documents are intended to carry relevant information, facts as evident, and the clinical opinion of the practitioner.           Admission disposition: 3/18/2024 12:16 AM                                        Medical Decision Making      Disposition and Plan     Clinical Impression:  1. Acute chest pain    2. New onset left bundle branch block (LBBB)     3. Elevated troponin    4. ESRD (end stage renal disease) on dialysis (HCC)         Disposition:  Admit  3/18/2024 12:16 am    Follow-up:  No follow-up provider specified.        Medications Prescribed:  Current Discharge Medication List                            Hospital Problems       Present on Admission  Date Reviewed: 3/17/2022            ICD-10-CM Noted POA    * (Principal) Acute chest pain R07.9 9/29/2022 Unknown

## 2024-03-19 ENCOUNTER — APPOINTMENT (OUTPATIENT)
Dept: INTERVENTIONAL RADIOLOGY/VASCULAR | Facility: HOSPITAL | Age: 79
End: 2024-03-19
Attending: INTERNAL MEDICINE
Payer: MEDICARE

## 2024-03-19 LAB
ANION GAP SERPL CALC-SCNC: 7 MMOL/L (ref 0–18)
ATRIAL RATE: 100 BPM
ATRIAL RATE: 79 BPM
ATRIAL RATE: 82 BPM
BASOPHILS # BLD AUTO: 0.03 X10(3) UL (ref 0–0.2)
BASOPHILS NFR BLD AUTO: 0.4 %
BUN BLD-MCNC: 85 MG/DL (ref 9–23)
CALCIUM BLD-MCNC: 8.4 MG/DL (ref 8.5–10.1)
CHLORIDE SERPL-SCNC: 99 MMOL/L (ref 98–112)
CHOLEST SERPL-MCNC: 154 MG/DL (ref ?–200)
CO2 SERPL-SCNC: 27 MMOL/L (ref 21–32)
CREAT BLD-MCNC: 8.83 MG/DL
EGFRCR SERPLBLD CKD-EPI 2021: 6 ML/MIN/1.73M2 (ref 60–?)
EOSINOPHIL # BLD AUTO: 0.3 X10(3) UL (ref 0–0.7)
EOSINOPHIL NFR BLD AUTO: 3.6 %
ERYTHROCYTE [DISTWIDTH] IN BLOOD BY AUTOMATED COUNT: 15.6 %
ERYTHROCYTE [DISTWIDTH] IN BLOOD BY AUTOMATED COUNT: 16.4 %
GLUCOSE BLD-MCNC: 129 MG/DL (ref 70–99)
GLUCOSE BLD-MCNC: 146 MG/DL (ref 70–99)
GLUCOSE BLD-MCNC: 168 MG/DL (ref 70–99)
GLUCOSE BLD-MCNC: 196 MG/DL (ref 70–99)
GLUCOSE BLD-MCNC: 217 MG/DL (ref 70–99)
HCT VFR BLD AUTO: 24.4 %
HCT VFR BLD AUTO: 24.8 %
HDLC SERPL-MCNC: 33 MG/DL (ref 40–59)
HGB BLD-MCNC: 8.1 G/DL
HGB BLD-MCNC: 8.4 G/DL
IMM GRANULOCYTES # BLD AUTO: 0.05 X10(3) UL (ref 0–1)
IMM GRANULOCYTES NFR BLD: 0.6 %
ISTAT ACTIVATED CLOTTING TIME: 250 SECONDS (ref 74–137)
LDLC SERPL CALC-MCNC: 72 MG/DL (ref ?–100)
LYMPHOCYTES # BLD AUTO: 1.08 X10(3) UL (ref 1–4)
LYMPHOCYTES NFR BLD AUTO: 12.9 %
MAGNESIUM SERPL-MCNC: 1.6 MG/DL (ref 1.6–2.6)
MCH RBC QN AUTO: 35.1 PG (ref 26–34)
MCH RBC QN AUTO: 35.7 PG (ref 26–34)
MCHC RBC AUTO-ENTMCNC: 32.7 G/DL (ref 31–37)
MCHC RBC AUTO-ENTMCNC: 34.4 G/DL (ref 31–37)
MCV RBC AUTO: 103.8 FL
MCV RBC AUTO: 107.4 FL
MONOCYTES # BLD AUTO: 0.68 X10(3) UL (ref 0.1–1)
MONOCYTES NFR BLD AUTO: 8.1 %
NEUTROPHILS # BLD AUTO: 6.21 X10 (3) UL (ref 1.5–7.7)
NEUTROPHILS # BLD AUTO: 6.21 X10(3) UL (ref 1.5–7.7)
NEUTROPHILS NFR BLD AUTO: 74.4 %
NONHDLC SERPL-MCNC: 121 MG/DL (ref ?–130)
OSMOLALITY SERPL CALC.SUM OF ELEC: 304 MOSM/KG (ref 275–295)
P AXIS: 49 DEGREES
P AXIS: 68 DEGREES
P AXIS: 76 DEGREES
P-R INTERVAL: 142 MS
P-R INTERVAL: 168 MS
P-R INTERVAL: 186 MS
PLATELET # BLD AUTO: 77 10(3)UL (ref 150–450)
PLATELET # BLD AUTO: 80 10(3)UL (ref 150–450)
POTASSIUM SERPL-SCNC: 4.1 MMOL/L (ref 3.5–5.1)
Q-T INTERVAL: 412 MS
Q-T INTERVAL: 426 MS
Q-T INTERVAL: 434 MS
QRS DURATION: 124 MS
QRS DURATION: 140 MS
QRS DURATION: 148 MS
QTC CALCULATION (BEZET): 481 MS
QTC CALCULATION (BEZET): 497 MS
QTC CALCULATION (BEZET): 549 MS
R AXIS: -6 DEGREES
R AXIS: 5 DEGREES
R AXIS: 7 DEGREES
RBC # BLD AUTO: 2.31 X10(6)UL
RBC # BLD AUTO: 2.35 X10(6)UL
SODIUM SERPL-SCNC: 133 MMOL/L (ref 136–145)
T AXIS: 113 DEGREES
T AXIS: 120 DEGREES
T AXIS: 151 DEGREES
TRIGL SERPL-MCNC: 304 MG/DL (ref 30–149)
VENTRICULAR RATE: 100 BPM
VENTRICULAR RATE: 79 BPM
VENTRICULAR RATE: 82 BPM
VLDLC SERPL CALC-MCNC: 47 MG/DL (ref 0–30)
WBC # BLD AUTO: 13.4 X10(3) UL (ref 4–11)
WBC # BLD AUTO: 8.4 X10(3) UL (ref 4–11)

## 2024-03-19 PROCEDURE — 02C03Z7 EXTIRPATION OF MATTER FROM CORONARY ARTERY, ONE ARTERY, ORBITAL ATHERECTOMY TECHNIQUE, PERCUTANEOUS APPROACH: ICD-10-PCS | Performed by: INTERNAL MEDICINE

## 2024-03-19 PROCEDURE — B240ZZ3 ULTRASONOGRAPHY OF SINGLE CORONARY ARTERY, INTRAVASCULAR: ICD-10-PCS | Performed by: INTERNAL MEDICINE

## 2024-03-19 PROCEDURE — 99232 SBSQ HOSP IP/OBS MODERATE 35: CPT | Performed by: INTERNAL MEDICINE

## 2024-03-19 PROCEDURE — 5A0221D ASSISTANCE WITH CARDIAC OUTPUT USING IMPELLER PUMP, CONTINUOUS: ICD-10-PCS | Performed by: INTERNAL MEDICINE

## 2024-03-19 PROCEDURE — 027035Z DILATION OF CORONARY ARTERY, ONE ARTERY WITH TWO DRUG-ELUTING INTRALUMINAL DEVICES, PERCUTANEOUS APPROACH: ICD-10-PCS | Performed by: INTERNAL MEDICINE

## 2024-03-19 PROCEDURE — B215YZZ FLUOROSCOPY OF LEFT HEART USING OTHER CONTRAST: ICD-10-PCS | Performed by: INTERNAL MEDICINE

## 2024-03-19 PROCEDURE — 5A1D70Z PERFORMANCE OF URINARY FILTRATION, INTERMITTENT, LESS THAN 6 HOURS PER DAY: ICD-10-PCS | Performed by: INTERNAL MEDICINE

## 2024-03-19 PROCEDURE — 4A023N7 MEASUREMENT OF CARDIAC SAMPLING AND PRESSURE, LEFT HEART, PERCUTANEOUS APPROACH: ICD-10-PCS | Performed by: INTERNAL MEDICINE

## 2024-03-19 PROCEDURE — 02HA3RJ INSERTION OF SHORT-TERM EXTERNAL HEART ASSIST SYSTEM INTO HEART, INTRAOPERATIVE, PERCUTANEOUS APPROACH: ICD-10-PCS | Performed by: INTERNAL MEDICINE

## 2024-03-19 PROCEDURE — B211YZZ FLUOROSCOPY OF MULTIPLE CORONARY ARTERIES USING OTHER CONTRAST: ICD-10-PCS | Performed by: INTERNAL MEDICINE

## 2024-03-19 RX ORDER — CLOPIDOGREL BISULFATE 75 MG/1
TABLET ORAL
Status: COMPLETED
Start: 2024-03-19 | End: 2024-03-19

## 2024-03-19 RX ORDER — IODIXANOL 320 MG/ML
100 INJECTION, SOLUTION INTRAVASCULAR
Status: COMPLETED | OUTPATIENT
Start: 2024-03-19 | End: 2024-03-19

## 2024-03-19 RX ORDER — CLOPIDOGREL BISULFATE 75 MG/1
75 TABLET ORAL DAILY
Status: DISCONTINUED | OUTPATIENT
Start: 2024-03-20 | End: 2024-03-22

## 2024-03-19 RX ORDER — DOPAMINE HYDROCHLORIDE 320 MG/100ML
INJECTION, SOLUTION INTRAVENOUS
Status: COMPLETED
Start: 2024-03-19 | End: 2024-03-19

## 2024-03-19 RX ORDER — HEPARIN SODIUM 5000 [USP'U]/ML
INJECTION, SOLUTION INTRAVENOUS; SUBCUTANEOUS
Status: COMPLETED
Start: 2024-03-19 | End: 2024-03-19

## 2024-03-19 RX ORDER — INSULIN DEGLUDEC 100 U/ML
15 INJECTION, SOLUTION SUBCUTANEOUS NIGHTLY
Status: DISCONTINUED | OUTPATIENT
Start: 2024-03-19 | End: 2024-03-20

## 2024-03-19 RX ORDER — NOREPINEPHRINE BITARTRATE 1 MG/ML
INJECTION, SOLUTION INTRAVENOUS
Status: COMPLETED
Start: 2024-03-19 | End: 2024-03-19

## 2024-03-19 RX ORDER — MORPHINE SULFATE 4 MG/ML
4 INJECTION, SOLUTION INTRAMUSCULAR; INTRAVENOUS
Status: DISCONTINUED | OUTPATIENT
Start: 2024-03-19 | End: 2024-03-21

## 2024-03-19 RX ORDER — MIDAZOLAM HYDROCHLORIDE 1 MG/ML
INJECTION INTRAMUSCULAR; INTRAVENOUS
Status: COMPLETED
Start: 2024-03-19 | End: 2024-03-19

## 2024-03-19 RX ORDER — PROTAMINE SULFATE 10 MG/ML
INJECTION, SOLUTION INTRAVENOUS
Status: COMPLETED
Start: 2024-03-19 | End: 2024-03-19

## 2024-03-19 RX ORDER — SODIUM CHLORIDE 9 MG/ML
INJECTION, SOLUTION INTRAVENOUS CONTINUOUS
Status: ACTIVE | OUTPATIENT
Start: 2024-03-19 | End: 2024-03-19

## 2024-03-19 RX ORDER — LIDOCAINE HYDROCHLORIDE 10 MG/ML
INJECTION, SOLUTION EPIDURAL; INFILTRATION; INTRACAUDAL; PERINEURAL
Status: COMPLETED
Start: 2024-03-19 | End: 2024-03-19

## 2024-03-19 RX ORDER — HEPARIN SODIUM 1000 [USP'U]/ML
4000 INJECTION, SOLUTION INTRAVENOUS; SUBCUTANEOUS
Status: DISCONTINUED | OUTPATIENT
Start: 2024-03-19 | End: 2024-03-24

## 2024-03-19 RX ORDER — MORPHINE SULFATE 4 MG/ML
4 INJECTION, SOLUTION INTRAMUSCULAR; INTRAVENOUS EVERY 2 HOUR PRN
Status: DISCONTINUED | OUTPATIENT
Start: 2024-03-19 | End: 2024-03-19

## 2024-03-19 RX ORDER — NITROGLYCERIN 20 MG/100ML
INJECTION INTRAVENOUS
Status: DISCONTINUED | OUTPATIENT
Start: 2024-03-19 | End: 2024-03-24

## 2024-03-19 RX ORDER — MORPHINE SULFATE 4 MG/ML
INJECTION, SOLUTION INTRAMUSCULAR; INTRAVENOUS
Status: COMPLETED
Start: 2024-03-19 | End: 2024-03-19

## 2024-03-19 RX ORDER — ASPIRIN 81 MG/1
81 TABLET ORAL DAILY
Status: DISCONTINUED | OUTPATIENT
Start: 2024-03-20 | End: 2024-03-19

## 2024-03-19 RX ORDER — PROTAMINE SULFATE 10 MG/ML
20 INJECTION, SOLUTION INTRAVENOUS ONCE
Status: COMPLETED | OUTPATIENT
Start: 2024-03-19 | End: 2024-03-19

## 2024-03-19 NOTE — CDS QUERY
Dear Dr Aguayo,    Clinical information contains conflicting types of Myocardial Infarction.  Please clarify the diagnosis that applies:    (  x ) Non-ST elevation myocardial infarction confirmed, ST elevation myocardial infarction ruled out    (   ) Other, please specify: ____________        Clinical indicators: 3/18 ED 78 Y/M- chest tightness for a few days    Troponin- 321> 1590> 2138    EKG- Normal sinus rhythm Left bundle branch block     Echo- EF 35%, Hypokinesis of the anterior wall. Hypokinesis of the apical wall. Hypokinesis of the anteroseptal wall     LHC: 90% ostial LM 3. 90% OM1 4. 75% proximal LAD    3/18 Hospitalist PN- # Chest pain likely cardiac in nature, STEMI    3/18 Cardiology PN- EKG: Sinus with left bundle.  No Sgarbossa STEMI criteria met.  Had IVCD/ILBBB/ALBBB in the past (see 9/28/34).       3/18 CV surgery- severe multivessel coronary artery disease complicated by NSTEMI in addition to moderate aortic valve stenosis.     Risks: CAD with stents, obesity, HLD, former smoker    Treatment: Cardiology Consult, EKG, Serial Cardiac Troponins, Echocardiogram, cardiac catheterization, CV surgery consult, ASA, statin, nitro gtt, BB, CNICU monitoring    Use of terms such as suspected, possible, or probable (associated with a specific diagnosis that is being evaluated, monitored, or treated as if it exists) are acceptable and can be coded in the inpatient setting, when documented at the time of discharge.     Please add any additional documentation to your progress note and continue to document this through discharge.      For questions, please contact clinical  Eda Montano RN, [phone]. Thank you.

## 2024-03-19 NOTE — PROGRESS NOTES
[facility] Cardiology Progress Note        Jonny Haque Patient Status:  Inpatient    4/15/1945 MRN [id number]   Location [facility] 6NE-A Attending Jem Buckley MD   Hosp Day # 1 PCP Gee Jimenez MD     Subjective:  Having CP with rest today.  Didn't resolve with NTG drip.  Improved with morphine but came back.      Medications:   rosuvastatin  10 mg Oral Nightly    isosorbide mononitrate ER  60 mg Oral Daily    levothyroxine  150 mcg Oral QOD    pantoprazole  40 mg Oral BID AC    aspirin  81 mg Oral Daily    metoprolol tartrate  25 mg Oral TID Beta Blocker/Cardiac    sevelamer carbonate  800 mg Oral TID CC    insulin degludec  10 Units Subcutaneous Nightly    insulin aspart  4-20 Units Subcutaneous TID AC and HS    heparin  5,000 Units Subcutaneous Q8H ANNEL       Continuous Infusions:   nitroGLYCERIN in dextrose 5% 100 mcg/min (24 1056)         Allergies:  No Known Allergies      Objective:        Intake/Output:      Intake/Output Summary (Last 24 hours) at 3/19/2024 1222  Last data filed at 3/19/2024 1112  Gross per 24 hour   Intake 1092 ml   Output 2200 ml   Net -1108 ml     Wt Readings from Last 3 Encounters:   24 223 lb 3.2 oz (101.2 kg)   23 204 lb 6.4 oz (92.7 kg)   10/25/22 200 lb (90.7 kg)       Physical Exam:        Vitals:    24 0800 24 0900 24 1000 24 1100   BP: 115/71 112/55 106/54 130/72   BP Location: Left arm      Pulse: 82 80 87 91   Resp: 15 13 16 20   Temp: 98.4 °F (36.9 °C)      TempSrc: Temporal      SpO2: 100% 98% 98% 98%   Weight:           Temp:  [97.8 °F (36.6 °C)-98.5 °F (36.9 °C)] 98.4 °F (36.9 °C)  Pulse:  [72-91] 91  Resp:  [13-23] 20  BP: ()/(54-80) 130/72  SpO2:  [94 %-100 %] 98 %      Temp: 98.4 °F (36.9 °C)  Pulse: 91  Resp: 20  BP: 130/72  General:  Appears comfortable  HEENT: No focal deficits.  Neck: No JVD, carotids 2+ no bruits.  Cardiac: Regular S1S2.  No S3, S4, rub, click.  No  murmur.  Lungs: Clear to auscultation and percussion.  Abdomen: Soft, non-tender.   Extremities: No LE edema.  No clubbing or cyanosis.    Neurologic: Alert and oriented, normal affect.  Skin: Warm and dry.           LABS:      HEM:  Recent Labs   Lab 03/17/24 2329 03/19/24 0451   WBC 8.5 8.4   HGB 9.2* 8.4*   HCT 27.6* 24.4*   .0* 80.0*       Chem:  Recent Labs   Lab 03/17/24 2329 03/19/24 0451   * 133*   K 4.5 4.1   CL 98 99   CO2 25.0 27.0   BUN 66* 85*   CREATSERUM 7.26* 8.83*   CA 8.6 8.4*   MG  --  1.6   * 129*       Recent Labs   Lab 03/17/24 2329   ALT 37   AST 33   ALB 3.7       Recent Labs   Lab 03/17/24 2335   PTT 30.9   INR 1.04           No results found for: \"TROP\", \"CKMB\"      Invalid input(s): \"PBNPML\"                       Diagnostics:   Telemetry: SR    EKG, 3/18/2024,         Echo:  3/18/24:    1. Left ventricle: The cavity size was normal. Wall thickness was normal.      Systolic function was moderately reduced. The estimated ejection fraction      was 35%, by visual assessment. Hypokinesis of the anterior wall.      Hypokinesis of the apical wall. Hypokinesis of the anteroseptal wall.      Left ventricular diastolic function parameters were normal for the      patient's age.   2. Left atrium: The left atrial volume was mildly increased.   3. Aortic valve: Transvalvular velocity was increased. The findings were      consistent with moderate stenosis. The peak systolic velocity was      2.4m/sec. The mean systolic gradient was 17mm Hg. The valve area (VTI)      was 1.34cm^2. The valve area (VTI) index was 0.6cm^2/m^2.   4. Mitral valve: There was mild regurgitation.   5. Pulmonary arteries: Systolic pressure was within the normal range,      estimated to be 25mm Hg.         Cardiac Cath. 3/18/24      90% ostial LM  90% OM1  75% proximal LAD  Moderate, diffuse disease           Impression:      1. CAD:     - s/p rotablation and TRACY to 90% proximal LAD stenosis and  rotablation and TRCAY to 90% mid LAD stenosis to 0% residual 1/24/22 .       -cath, 3/18/24:  LM, LAD, LCx stenoses as above      2. Ischemic Cardiomyopathy.  LVEF has declined to 35% .  May reflect stunning   3. LBBB (previous IVCD, atypical LBBB)  4. Moderate aortic stenosis  5. GIB, ASA intolerance.  GI eval's in past have been unrevealing.  No varices seen  6. ESRD, HD dependent  7. DM  8. HTN    Plan:  CT surgery felt he would be severe risk for CABG.  Patient and family do not want conservative therapy at this point.  Thus they have opted for PCI.  I discussed risks including risk of death.  I quoted approximately 3% mortality risk with PCI.  We also discussed his previous ASA/plavix/AC intolerance.  He will need 1 year of DAPT with LM stenting, no exceptions.  Standard PCI risks clearly also apply.  They understand and wish to proceed.      CARLEY DODGE      >35 min critical care time spent on this complex CCU patient

## 2024-03-19 NOTE — PROGRESS NOTES
[facility]   part of [facility]     Hospitalist Progress Note     Jonny Haque Patient Status:  Inpatient    4/15/1945 MRN [id number]   Location [facility] 6NE-A Attending Lance Aguayo MD   Hosp Day # 1 PCP Gee Jimenez MD     Chief Complaint: NSTEMI    Subjective:     Patient is doing well no chest pain or shortness of breath at this time just returned from PCI and angiogram, did have chest pain early this morning not relieved by nitroglycerin        Objective:    Review of Systems:   A comprehensive review of systems was completed; pertinent positive and negatives stated in subjective.    Vital signs:  Temp:  [97.8 °F (36.6 °C)-98.5 °F (36.9 °C)] 98.4 °F (36.9 °C)  Pulse:  [72-91] 91  Resp:  [13-23] 20  BP: ()/(54-80) 130/72  SpO2:  [94 %-100 %] 98 %    Physical Exam:      Gen: No acute distress, alert and oriented x 3  Pulm: Lungs clear bilaterally, good inspiratory effort   CV:  nL S1/S2  Abd: soft, NT/ND, no hepatomegaly, +BS  MSK: moving all extremities, no edema-right groin without hematoma, dressing clean dry and intact  Neuro: no focal deficits  Skin: no rashes/lesions  Psych: normal mood/affect            Diagnostic Data:    Labs:  Recent Labs   Lab 24  0451   WBC 8.5  --  8.4   HGB 9.2*  --  8.4*   .2*  --  103.8*   .0*  --  80.0*   INR  --  1.04  --        Recent Labs   Lab 24  0451   * 129*   BUN 66* 85*   CREATSERUM 7.26* 8.83*   CA 8.6 8.4*   ALB 3.7  --    * 133*   K 4.5 4.1   CL 98 99   CO2 25.0 27.0   ALKPHO 181*  --    AST 33  --    ALT 37  --    BILT 0.7  --    TP 7.3  --        CrCl cannot be calculated (Unknown ideal weight.).    Recent Labs   Lab 24  0953 24  1803   TROPHS 321* 1,590* 2,138*       Recent Labs   Lab 24  2335   PTP 13.6   INR 1.04                  Microbiology    No results found for this visit on 24.      Imaging: Reviewed  in Epic.    Medications:    [START ON 3/20/2024] clopidogrel  75 mg Oral Daily    insulin degludec  15 Units Subcutaneous Nightly    rosuvastatin  10 mg Oral Nightly    isosorbide mononitrate ER  60 mg Oral Daily    levothyroxine  150 mcg Oral QOD    pantoprazole  40 mg Oral BID AC    aspirin  81 mg Oral Daily    metoprolol tartrate  25 mg Oral TID Beta Blocker/Cardiac    sevelamer carbonate  800 mg Oral TID CC    insulin aspart  4-20 Units Subcutaneous TID AC and HS    heparin  5,000 Units Subcutaneous Q8H [facility]       Assessment & Plan:        Patient is 78-year-old male significant past medical history of ESRD on dialysis, known a complex ischemic cardiomyopathy with recently normalized ejection fraction ON echo, CAD with stent to the LAD x 2, history of cirrhosis, history of GI bleed in the past, type 2 diabetes, hypothyroidism presented with chest pain.  Patient stated that chest pain began with associated shortness of breath last night, persisted which is why he came into the emergency room.     # Chest pain likely cardiac in nature, NSTEMI  -Elevated troponin, EKG with new onset left bundle branch block  -Recent drug-eluting stent to the proximal and mid LAD, x 2 in 2022  -Repeat angiogram done shows left main, LAD and left circumflex stenosis  -Titrate beta-blocker per cardiology,  -Echo completed-  -CV surgery and consult, has poor candidate for CABG given ESRD as well as cirrhosis  -Given recurrence of chest pain patient taken for repeat angiogram by Dr. Carlos in the effort to do rotablation and left main stenting  -Given aspirin 325 in the past, aspirin 81 to continue, Plavix,  -Continue statin  -Outpatient patient was on Imdur, hydralazine, carvedilol and amlodipine as well as Ranexa defer these medications to cardiology-        # ESRD on dialysis  -Last dialysis session was Saturday, resume dialysis as per nephrology  -Dr. Reyna on board     # Ischemic cardiomyopathy  Echo completed that shows an EF of  35%-  -worsened since the prior echo, will need guideline directed medical therapy likely secondary to ischemic disease  -Already on aspirin, statin, beta-blocker, defer ACE as well as Entresto to cardiology     # History of cirrhosis  -No recent bleed, continue aspirin at this point  -GI was consulted however no acute intervention at this time continue PPI twice daily     # Type 2 diabetes  -Patient on long-acting and short acting insulin at home, resume long-acting insulin, sliding scale insulin titrate as needed  -Titrate his insulin as patient was hyperglycemic     # Hypothyroidism  -Synthroid     # 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  # Chronic pancytopenia  -Appears to be compensated at this time, platelets with normal limits no recent episodes of any GI bleed          Lance Aguayo MD    Supplementary Documentation:     Quality:  DVT Mechanical Prophylaxis:   SCDs,    DVT Pharmacologic Prophylaxis   Medication    heparin (Porcine) 1000 UNIT/ML injection 4,000 Units    heparin (Porcine) 5000 UNIT/ML injection 5,000 Units         DVT Pharmacologic prophylaxis: Aspirin 81 mg      Code Status: Full Code  Vásquez: No urinary catheter in place  Vásquez Duration (in days):   Central line:    MOUNA:     The 21st Century Cures Act makes medical notes like these available to patients in the interest of transparency. Please be advised this is a medical document. Medical documents are intended to carry relevant information, facts as evident, and the clinical opinion of the practitioner. The medical note is intended as peer to peer communication and may appear blunt or direct. It is written in medical language and may contain abbreviations or verbiage that are unfamiliar.

## 2024-03-19 NOTE — PROGRESS NOTES
Cards    PCI:  RCFA  Preclose  15F impella sheath  Impella CP in LV  Rota 1.75mm jani  3.0mm NC  IVUS  4.0x18mm [place] to ostium LAD  3.0x12mm [place] to mid LAD  4.5mm NC to LAD  IVUS  Perclose for hemostasis.  Some oozing  Femstop to RCFA        PLAN:  DAPT  Med management CHF and CAD  Consider PCI to OM later this week or as outpatient  NTG off  Pressors only used during case.  Off now        CARLEY DODGE

## 2024-03-19 NOTE — PLAN OF CARE
Assumed pt care this evening. VSS on 3L02 per NC. NSR on tele. No ectopy. Chest pressure noted w/ exertion. Titrating nitroglycerin gtt as needed. R groin site intact. Pt updated on POC.    0400- C/o increasing chest pressure at rest. Ntg up-titrated. 12-lead EKG taken. VS remain stable. Relief w/ higher ntg dose.     Problem: CARDIOVASCULAR - ADULT  Goal: Maintains optimal cardiac output and hemodynamic stability  Description: INTERVENTIONS:  - Monitor vital signs, rhythm, and trends  - Monitor for bleeding, hypotension and signs of decreased cardiac output  - Evaluate effectiveness of vasoactive medications to optimize hemodynamic stability  - Monitor arterial and/or venous puncture sites for bleeding and/or hematoma  - Assess quality of pulses, skin color and temperature  - Assess for signs of decreased coronary artery perfusion - ex. Angina  - Evaluate fluid balance, assess for edema, trend weights  Outcome: Progressing  Goal: Absence of cardiac arrhythmias or at baseline  Description: INTERVENTIONS:  - Continuous cardiac monitoring, monitor vital signs, obtain 12 lead EKG if indicated  - Evaluate effectiveness of antiarrhythmic and heart rate control medications as ordered  - Initiate emergency measures for life threatening arrhythmias  - Monitor electrolytes and administer replacement therapy as ordered  Outcome: Progressing     Problem: PAIN - ADULT  Goal: Verbalizes/displays adequate comfort level or patient's stated pain goal  Description: INTERVENTIONS:  - Encourage pt to monitor pain and request assistance  - Assess pain using appropriate pain scale  - Administer analgesics based on type and severity of pain and evaluate response  - Implement non-pharmacological measures as appropriate and evaluate response  - Consider cultural and social influences on pain and pain management  - Manage/alleviate anxiety  - Utilize distraction and/or relaxation techniques  - Monitor for opioid side effects  - Notify  MD/LIP if interventions unsuccessful or patient reports new pain  - Anticipate increased pain with activity and pre-medicate as appropriate  Outcome: Progressing

## 2024-03-19 NOTE — CONSULTS
[facility] IP Report of Consultation Gastroenterology    3/19/2024    Jonny Haque  male   Berlin Ibrahim MD     [id number]  4/15/1945 Primary Care Physician  Gee Jimenez MD     Reason for Consultation: Chest pain    HPI: 78M w/ extensive multivessel complicated CAD on a/c, ESRD on HD, chronic HCV cirrhosis c/b portal HTN,  among multiple other medical issues. Consulted for ongoing CP in setting of significant multivessel CAD.    Case reviewed w/ Cardiology today, went for PCI this afternoon (see Cards note). No active GI concerns at this time; no overt GIB sxs, no yuliet dysphagia, no other localizing GI sxs.    PROBLEM LIST:     Patient Active Problem List   Diagnosis    Arthritis    Hepatic cirrhosis (HCC)    Diabetic peripheral neuropathy (HCC)    Encounter for screening for malignant neoplasm of prostate    Erectile dysfunction of nonorganic origin    Hyperlipidemia    Portal hypertension (HCC)    Thrombocytopenia (HCC)    PAC (premature atrial contraction)    Personal history of kidney stones    Elevated alkaline phosphatase level    Essential hypertension    Personal history of bladder cancer    Thoracic aorta atherosclerosis (HCC)    Hypothyroidism due to Hashimoto's thyroiditis    Hiatal hernia    Polyp of colon, unspecified part of colon, unspecified type    Abnormal EKG    Iron deficiency anemia due to chronic blood loss    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 stage 5 chronic kidney disease on chronic dialysis (HCC)    Duodenitis    Elevated PSA    Type 2 diabetes mellitus with chronic kidney disease on chronic dialysis, with long-term current use of insulin (HCC)    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)    ESRD (end stage renal disease) (HCC)    History of hepatitis C    Type 2 diabetes mellitus with microalbuminuria, with long-term current use of insulin (HCC)    Mild pulmonary hypertension (HCC)     Paroxysmal atrial fibrillation (HCC)    Pre-op exam    S/P primary angioplasty with coronary stent    Gastrointestinal hemorrhage associated with intestinal diverticulosis    Diabetes mellitus due to underlying condition with chronic kidney disease on chronic dialysis, with long-term current use of insulin (HCC)    Anemia due to stage 5 chronic kidney disease, not on chronic dialysis (HCC)    Rectal bleeding    Anemia, unspecified type    Dyspnea on exertion    Colitis    Diverticulosis    ESRD on hemodialysis (HCC)    Hyperglycemia    Primary hypertension    Type 2 diabetes mellitus with diabetic nephropathy (HCC)    GI bleed    Acute blood loss anemia    Gastrointestinal hemorrhage, unspecified gastrointestinal hemorrhage type    Acute chest pain    Hypokalemia    Anemia in chronic kidney disease, on chronic dialysis (HCC)    Chest pain of uncertain etiology    Melena    Diabetes mellitus due to underlying condition with chronic kidney disease on chronic dialysis (HCC)    New onset left bundle branch block (LBBB)    Elevated troponin    Anemia due to chronic kidney disease, on chronic dialysis (HCC)    Heart failure with preserved ejection fraction (HCC)    Hypoxia       PATIENT HISTORY:     Past Medical History:   Diagnosis Date    Calculus of kidney     Coronary atherosclerosis     bare metal stents at [facility] Jan 2021    Diabetes (HCC)     Disorder of thyroid     Esophageal varices without bleeding, unspecified esophageal varices type (HCC) 06/27/2019    Hepatitis, unspecified diagnosed many years ago.    hepatitis C-just completed taking Harvoni in November 2016    High blood pressure     Malignant neoplasm of trigone of urinary bladder (HCC) 02/06/2017    Malignant neoplasm of urinary bladder, unspecified site (HCC) 01/26/2017    Malignant neoplasm of urinary bladder, unspecified site (HCC) 01/26/2017    Renal disorder     Visual impairment     reading glasses      Past Surgical History:    Procedure Laterality Date    ANGIOGRAM  08/15/2017    small caliber RCA with 80% mid lesion.  LAD and left circumflex with 50% lesions.  LV shows inferobasilar aneurysm with scar.  Overall LV function preserved at the exterior 65%.    CATH BARE METAL STENT (BMS)      OTHER SURGICAL HISTORY      Upper GI surgery    OTHER SURGICAL HISTORY  2017    Caroline Lees    OTHER SURGICAL HISTORY  2019    [facility]TS Cysttrell Lees     OTHER SURGICAL HISTORY  2020    [facility]TS Cysto (Dr. Lees)      Family History   Problem Relation Age of Onset    Cancer Father     Hypertension Mother       Social History     Socioeconomic History    Marital status:    Tobacco Use    Smoking status: Former     Packs/day: 0.50     Years: 15.00     Additional pack years: 0.00     Total pack years: 7.50     Types: Cigarettes     Quit date: 1980     Years since quittin.1    Smokeless tobacco: Never    Tobacco comments:     quit at age 34   Vaping Use    Vaping Use: Never used   Substance and Sexual Activity    Alcohol use: No    Drug use: No     Social Determinants of Health     Food Insecurity: No Food Insecurity (3/18/2024)    Food Insecurity     Food Insecurity: Never true   Transportation Needs: No Transportation Needs (3/18/2024)    Transportation Needs     Lack of Transportation: No   Housing Stability: Low Risk  (3/18/2024)    Housing Stability     Housing Instability: No        Allergies;  No Known Allergies     Medications:    Current Facility-Administered Medications:     nitroGLYCERIN in dextrose 5% 50 mg/250mL infusion premix, 5-300 mcg/min, Intravenous, Titrated    morphINE PF 4 MG/ML injection 4 mg, 4 mg, Intravenous, Q1H PRN    heparin (Porcine) 1000 UNIT/ML injection 4,000 Units, 4,000 Units, Intravenous, PRN Dialysis    sodium chloride 0.9% infusion, , Intravenous, Continuous    [START ON 3/20/2024] clopidogrel (Plavix) tab 75 mg, 75 mg, Oral, Daily    [START ON 3/20/2024] aspirin DR tab 81 mg, 81 mg, Oral,  Daily    rosuvastatin (Crestor) tab 10 mg, 10 mg, Oral, Nightly    glucose (Dex4) 15 GM/59ML oral liquid 15 g, 15 g, Oral, Q15 Min PRN **OR** glucose (Glutose) 40% oral gel 15 g, 15 g, Oral, Q15 Min PRN **OR** glucose-vitamin C (Dex-4) chewable tab 4 tablet, 4 tablet, Oral, Q15 Min PRN **OR** dextrose 50% injection 50 mL, 50 mL, Intravenous, Q15 Min PRN **OR** glucose (Dex4) 15 GM/59ML oral liquid 30 g, 30 g, Oral, Q15 Min PRN **OR** glucose (Glutose) 40% oral gel 30 g, 30 g, Oral, Q15 Min PRN **OR** glucose-vitamin C (Dex-4) chewable tab 8 tablet, 8 tablet, Oral, Q15 Min PRN    acetaminophen (Tylenol Extra Strength) tab 500 mg, 500 mg, Oral, Q6H PRN    melatonin tab 3 mg, 3 mg, Oral, Nightly PRN    polyethylene glycol (PEG 3350) (Miralax) 17 g oral packet 17 g, 17 g, Oral, Daily PRN    sennosides (Senokot) tab 17.2 mg, 17.2 mg, Oral, Nightly PRN    bisacodyl (Dulcolax) 10 MG rectal suppository 10 mg, 10 mg, Rectal, Daily PRN    ondansetron (Zofran) 4 MG/2ML injection 4 mg, 4 mg, Intravenous, Q6H PRN    isosorbide mononitrate ER (Imdur) 24 hr tab 60 mg, 60 mg, Oral, Daily    levothyroxine (Synthroid) tab 150 mcg, 150 mcg, Oral, QOD    pantoprazole (Protonix) DR tab 40 mg, 40 mg, Oral, BID AC    zolpidem (Ambien) tab 5 mg, 5 mg, Oral, Nightly PRN    aspirin DR tab 81 mg, 81 mg, Oral, Daily    metoprolol tartrate (Lopressor) tab 25 mg, 25 mg, Oral, TID Beta Blocker/Cardiac    nitroglycerin (Nitrostat) SL tab 0.4 mg, 0.4 mg, Sublingual, Q5 Min PRN    sevelamer carbonate (Renvela) tab 800 mg, 800 mg, Oral, TID CC    insulin degludec 100 units/mL flextouch 10 Units, 10 Units, Subcutaneous, Nightly    insulin aspart (NovoLOG) 100 Units/mL FlexPen 4-20 Units, 4-20 Units, Subcutaneous, TID AC and HS    heparin (Porcine) 5000 UNIT/ML injection 5,000 Units, 5,000 Units, Subcutaneous, Q8H [facility]    Facility-Administered Medications Ordered in Other Encounters:     BCG Live 50 mg in sodium chloride 0.9 % 50 mL Intravesicle, 50 mg,  Intravesical, Once     REVIEW OF SYSTEMS:   10pt ROS performed and o/w negative, see HPI for pertinent details.      EXAM:   /72   Pulse 91   Temp 98.4 °F (36.9 °C) (Temporal)   Resp 20   Wt 223 lb 3.2 oz (101.2 kg)   SpO2 98%   BMI 30.27 kg/m²  Body mass index is 30.27 kg/m².  Gen: cooperative, pleasant, not diaphoretic, nad   HEENT: ncat, normal neck ROM, normal op w/o ulcer/exudate, anicteric, mmm   Resp/Chest: normal respiratory effort, not dyspneic, no incr WOB/cough  CV: no reported palpitations or syncope  Abd: nt, nd, no clinical features suggestive of peritoneal s/s noted  Ext: no c/c/e   Skin: warm, perfused, no jaundice, no pallor  Neuro: aaox3, grossly intact, no asterixis noted, normal speech       LAB/IMAGING RESULTS:     Lab Results   Component Value Date    WBC 8.4 03/19/2024    HGB 8.4 03/19/2024    HCT 24.4 03/19/2024    PLT 80.0 03/19/2024     [unfilled]  Lab Results   Component Value Date    WBC 8.4 03/19/2024    HGB 8.4 03/19/2024    HCT 24.4 03/19/2024    PLT 80.0 03/19/2024    CREATSERUM 8.83 03/19/2024    BUN 85 03/19/2024     03/19/2024    K 4.1 03/19/2024    CL 99 03/19/2024    CO2 27.0 03/19/2024     03/19/2024    CA 8.4 03/19/2024    MG 1.6 03/19/2024    PGLU 146 03/19/2024     Per GI Consult 4/5/23  Pt has had numerous GI evaluations for the bleeding with numerous EGD, colonoscopies, small bowel video capsule endoscopies, CT angiogram.  All negative for active bleeding at the time of the exams.  After having numerous GI evaluations, pt and his son have been very adamant about not pursuing any further GI w/u for GI bleeding and have refused ongoing w/u during bleeding events last year with Dr. Stover, Dr. Aponte and Dr. Bragg.    CT AP 10/2022  LUNG BASE:  Coronary artery disease.  LIVER:  Cirrhotic liver morphology.  BILIARY:  Mild to moderate intrahepatic bile duct dilatation.  SPLEEN:  Unremarkable.  PANCREAS:  There are small hypodense lesions within the  uncinate process of the pancreas.  A contrast-enhanced abdominal MRI is recommended for further assessment.  ADRENALS:  Unremarkable.  KIDNEYS:  There are subcentimeter hypodense foci within the kidneys, which are too small to characterize, but statistically represent simple cysts.  AORTA/VASCULAR:  Prominent perisplenic varix noted.  Extensive atherosclerosis.  RETROPERITONEUM:  Unremarkable.  BOWEL/MESENTERY:  Colonic diverticulosis.  Unremarkable appendix.  There is nonspecific hyperdensity within the hepatic flexure which may represent blood products or debris.  No definitive active extravasation is identified.  ABDOMINAL WALL:  Moderate-sized fat containing umbilical hernia.  Soft tissue attenuation within the lower left anterior abdominal subcutaneous fat may relate to subcutaneous injections but clinical correlation with physical exam is recommended to  exclude cellulitis.  There is a nonspecific dermal nodule at the superior anterior right pelvic subcutaneous fat.  Clinical correlation with physical exam is recommended.  Small fat containing right inguinal hernia.  PELVIC ORGANS:  Decompressed bladder.  There is bladder wall thickening.  Prominent prostate gland.  LYMPH NODES:  Unremarkable.  BONES:  Degenerative changes in the spine.  OTHER:  None.                   Impression   CONCLUSION:    1. No active extravasation is identified within the colon.  2. Cirrhotic liver morphology is noted.  There are sequelae of portal hypertension such as perisplenic varices.  3. There are small hypodense lesions within the uncinate process of the pancreas, incompletely characterized on this CT examination.  A contrast-enhanced abdominal MRI is recommended for further assessment.  4. There is bladder wall thickening.  Clinical correlation with urinalysis is recommended to assess for cystitis.  5. Mild to moderate intrahepatic bile duct dilatation.  Correlation with liver function tests is suggested.  6. Moderate-sized  fat containing umbilical hernia.  There is soft tissue attenuation within the lower left anterior abdominal subcutaneous fat which may relate to subcutaneous injections but cellulitis is not excluded.  There is a dermal nodule within  the superior right pelvic subcutaneous fat.  Clinical correlation with physical exam is recommended.        MELD-Na 23    HBsAb+ w/ negative sAg from 2022, sAb+ titer c/w likely prior vaccination.    ASSESSMENT AND PLAN:   #Chest pain + multivessel occlusive CAD + NSTEMI, s/p repeat PCI today  #H/o chronic HCV cirrhosis, portal HTN features on prior imaging  #ESRD on HD    No indication for EGD at this time in light of the ongoing chest pain s/p PCI today.  Continue Protonix BID for now.  Supportive care advised, pt/family preferred non-invasive approach per prior consultations.  Longstanding likely compensated cirrhosis (presumably d/t chronic HCV), normal Tbili + INR in setting of ESRD on HD.  Could obtain Comp Abd US + Doppler study this admission, but likely no acute mgmt required at this time.  Viral hepatitis markers ordered, but given largely normal LFTs, mgmt is purely supportive for now. Unlikely additional chronic hepatitis serologic markers will change mgmt at present.  MELD currently calculated at 23, but this is predominantly being driven by ESRD rather than liver synthetic markers (may be essentially Humberto A o/w).   Still may have impacts on overall morbidity/mortality w/ surgery, may warrant academic Hepatology consultation if concerns re: chronic liver disease impacts on higher-risk surgery (could be pursued as an outpt if responds well to repeat PCI?).  Will follow peripherally for now, call if questions/issues arise.  Case reviewed w/ primary team + Cardiology this afternoon.    The patient indicates understanding of these issues and agrees to the plan.      A total of 60 minutes was spent in direct patient care + assessment, chart review, and care coordination  today.    Berlin Ibrahim MD  Department of Gastroenterology  [facility]  3/19/2024  3:03 PM

## 2024-03-19 NOTE — PROGRESS NOTES
[facility]  Nephrology Progress Note    Jonny Haque Attending:  Lance Aguayo MD       Assessment and Plan:    1) ESRD- due to longstanding DM 2; on HD since . Lytes / volume OK- HD today per usual routine     2) CAD s/p complex intervention  with preserved EF- presents with CP at rest -> for angio per cards     3) h/o hep C with cirrhosis / varices / h/o GIB     4) DM 2 / HTN     5) Anemia- due to CKD. Fe stores OK; continue EPO with HD      Subjective:  Pt in cath lab    Physical Exam:   /72   Pulse 91   Temp 98.4 °F (36.9 °C) (Temporal)   Resp 20   Wt 223 lb 3.2 oz (101.2 kg)   SpO2 98%   BMI 30.27 kg/m²   Temp (24hrs), Av.1 °F (36.7 °C), Min:97.8 °F (36.6 °C), Max:98.5 °F (36.9 °C)       Intake/Output Summary (Last 24 hours) at 3/19/2024 1236  Last data filed at 3/19/2024 1112  Gross per 24 hour   Intake 1092 ml   Output 2200 ml   Net -1108 ml     Wt Readings from Last 3 Encounters:   24 223 lb 3.2 oz (101.2 kg)   23 204 lb 6.4 oz (92.7 kg)   10/25/22 200 lb (90.7 kg)     General: awake  HEENT: No scleral icterus, MMM  Neck: Supple, no JADE or thyromegaly  Cardiac: Regular rate and rhythm, S1, S2 normal, no murmur or tub  Lungs: Decreased BS at bases bilaterally   Abdomen: Soft, non-tender. + bowel sounds, no palpable organomegaly  Extremities: Without clubbing, cyanosis; no edema  Neurologic: Cranial nerves grossly intact, moving all extremities  Skin: Warm and dry, no rashes       Labs:   Lab Results   Component Value Date    WBC 8.4 2024    HGB 8.4 2024    HCT 24.4 2024    PLT 80.0 2024    CREATSERUM 8.83 2024    BUN 85 2024     2024    K 4.1 2024    CL 99 2024    CO2 27.0 2024     2024    CA 8.4 2024    MG 1.6 2024    PGLU 146 2024       Imaging:  All imaging studies reviewed.    Meds:   Current Facility-Administered Medications   Medication Dose Route Frequency     nitroGLYCERIN in dextrose 5% 50 mg/250mL infusion premix  5-300 mcg/min Intravenous Titrated    morphINE PF 4 MG/ML injection 4 mg  4 mg Intravenous Q1H PRN    heparin (Porcine) 1000 UNIT/ML injection 4,000 Units  4,000 Units Intravenous PRN Dialysis    iodixanol (VISIPAQUE) 320 MG/ML injection 100 mL  100 mL Injection ONCE PRN    rosuvastatin (Crestor) tab 10 mg  10 mg Oral Nightly    glucose (Dex4) 15 GM/59ML oral liquid 15 g  15 g Oral Q15 Min PRN    Or    glucose (Glutose) 40% oral gel 15 g  15 g Oral Q15 Min PRN    Or    glucose-vitamin C (Dex-4) chewable tab 4 tablet  4 tablet Oral Q15 Min PRN    Or    dextrose 50% injection 50 mL  50 mL Intravenous Q15 Min PRN    Or    glucose (Dex4) 15 GM/59ML oral liquid 30 g  30 g Oral Q15 Min PRN    Or    glucose (Glutose) 40% oral gel 30 g  30 g Oral Q15 Min PRN    Or    glucose-vitamin C (Dex-4) chewable tab 8 tablet  8 tablet Oral Q15 Min PRN    acetaminophen (Tylenol Extra Strength) tab 500 mg  500 mg Oral Q6H PRN    melatonin tab 3 mg  3 mg Oral Nightly PRN    polyethylene glycol (PEG 3350) (Miralax) 17 g oral packet 17 g  17 g Oral Daily PRN    sennosides (Senokot) tab 17.2 mg  17.2 mg Oral Nightly PRN    bisacodyl (Dulcolax) 10 MG rectal suppository 10 mg  10 mg Rectal Daily PRN    ondansetron (Zofran) 4 MG/2ML injection 4 mg  4 mg Intravenous Q6H PRN    isosorbide mononitrate ER (Imdur) 24 hr tab 60 mg  60 mg Oral Daily    levothyroxine (Synthroid) tab 150 mcg  150 mcg Oral QOD    pantoprazole (Protonix) DR tab 40 mg  40 mg Oral BID AC    zolpidem (Ambien) tab 5 mg  5 mg Oral Nightly PRN    aspirin DR tab 81 mg  81 mg Oral Daily    metoprolol tartrate (Lopressor) tab 25 mg  25 mg Oral TID Beta Blocker/Cardiac    nitroglycerin (Nitrostat) SL tab 0.4 mg  0.4 mg Sublingual Q5 Min PRN    sevelamer carbonate (Renvela) tab 800 mg  800 mg Oral TID CC    insulin degludec 100 units/mL flextouch 10 Units  10 Units Subcutaneous Nightly    insulin aspart (NovoLOG) 100  Units/mL FlexPen 4-20 Units  4-20 Units Subcutaneous TID AC and HS    heparin (Porcine) 5000 UNIT/ML injection 5,000 Units  5,000 Units Subcutaneous Q8H [facility]     Facility-Administered Medications Ordered in Other Encounters   Medication Dose Route Frequency    BCG Live 50 mg in sodium chloride 0.9 % 50 mL Intravesicle  50 mg Intravesical Once         Questions/concerns were discussed with patient and/or family by bedside.          Durga Fernando MD  3/19/2024  12:36 PM

## 2024-03-19 NOTE — DIETARY NOTE
Clinical Nutrition     Dietitian consult received per cardiac rehab standing order. Pt to be educated by cardiac rehab staff and encouraged to attend outpatient classes taught by [facility]. [facility] available PRN.    Gi Hagan RD, LDN, [facility]  Clinical Dietitian  Phone [phone]

## 2024-03-20 ENCOUNTER — APPOINTMENT (OUTPATIENT)
Dept: ULTRASOUND IMAGING | Facility: HOSPITAL | Age: 79
End: 2024-03-20
Attending: INTERNAL MEDICINE
Payer: MEDICARE

## 2024-03-20 PROBLEM — E87.5 HYPERKALEMIA: Status: ACTIVE | Noted: 2024-03-20

## 2024-03-20 LAB
ANION GAP SERPL CALC-SCNC: 7 MMOL/L (ref 0–18)
ATRIAL RATE: 84 BPM
BUN BLD-MCNC: 55 MG/DL (ref 9–23)
CALCIUM BLD-MCNC: 8.8 MG/DL (ref 8.5–10.1)
CHLORIDE SERPL-SCNC: 101 MMOL/L (ref 98–112)
CO2 SERPL-SCNC: 27 MMOL/L (ref 21–32)
CREAT BLD-MCNC: 6.89 MG/DL
EGFRCR SERPLBLD CKD-EPI 2021: 8 ML/MIN/1.73M2 (ref 60–?)
ERYTHROCYTE [DISTWIDTH] IN BLOOD BY AUTOMATED COUNT: 17 %
GLUCOSE BLD-MCNC: 185 MG/DL (ref 70–99)
GLUCOSE BLD-MCNC: 200 MG/DL (ref 70–99)
GLUCOSE BLD-MCNC: 210 MG/DL (ref 70–99)
GLUCOSE BLD-MCNC: 228 MG/DL (ref 70–99)
GLUCOSE BLD-MCNC: 253 MG/DL (ref 70–99)
GLUCOSE BLD-MCNC: 257 MG/DL (ref 70–99)
HBV CORE AB SERPL QL IA: NONREACTIVE
HCT VFR BLD AUTO: 24.5 %
HGB BLD-MCNC: 8 G/DL
MCH RBC QN AUTO: 34.9 PG (ref 26–34)
MCHC RBC AUTO-ENTMCNC: 32.7 G/DL (ref 31–37)
MCV RBC AUTO: 107 FL
OSMOLALITY SERPL CALC.SUM OF ELEC: 300 MOSM/KG (ref 275–295)
P AXIS: 49 DEGREES
P-R INTERVAL: 176 MS
PLATELET # BLD AUTO: 82 10(3)UL (ref 150–450)
POTASSIUM SERPL-SCNC: 5.4 MMOL/L (ref 3.5–5.1)
Q-T INTERVAL: 398 MS
QRS DURATION: 128 MS
QTC CALCULATION (BEZET): 470 MS
R AXIS: 15 DEGREES
RBC # BLD AUTO: 2.29 X10(6)UL
SODIUM SERPL-SCNC: 135 MMOL/L (ref 136–145)
T AXIS: 97 DEGREES
VENTRICULAR RATE: 84 BPM
WBC # BLD AUTO: 10.3 X10(3) UL (ref 4–11)

## 2024-03-20 PROCEDURE — 99232 SBSQ HOSP IP/OBS MODERATE 35: CPT | Performed by: INTERNAL MEDICINE

## 2024-03-20 RX ORDER — INSULIN DEGLUDEC 100 U/ML
20 INJECTION, SOLUTION SUBCUTANEOUS NIGHTLY
Status: DISCONTINUED | OUTPATIENT
Start: 2024-03-20 | End: 2024-03-24

## 2024-03-20 NOTE — PLAN OF CARE
Assumed care of patient this morning. Patient c/o pressure/pain to chest 8-9/10. Morphine ivp given q1h. Cardiology updated. At bedside. Taken to cath lab approx 1200 for complex pci. Back from cath lab approx 1500 with successful  stenting to LAD/L main. Right groin with fem stop continued. Attempted to remove fem stop but bleeding noted. Reapplied dressing and fem stop in place. Cardiology updated. Protamine ivp given per orders. Pulses remain intact. Patient remains free from cp/chest pressure post intervention. Vss. See assessment for complete details. Sons updated on poc. Will continue to monitor.

## 2024-03-20 NOTE — PLAN OF CARE
Assumed pt care this evening. No acute events overnight. Femstop to R groin. Site soft & w/o hematoma. Palpable pedal pulse. CMS intact. C/o chest pressure, 6/10 at highest. Tolerated well w/ morphine prn. Remains on 3L02 per NC. NSR on tele. No ectopy. Up to chair in AM. Ambulated in kamara x1 SBA. Groin site remains stable. Pt updated on POC.    Problem: CARDIOVASCULAR - ADULT  Goal: Maintains optimal cardiac output and hemodynamic stability  Description: INTERVENTIONS:  - Monitor vital signs, rhythm, and trends  - Monitor for bleeding, hypotension and signs of decreased cardiac output  - Evaluate effectiveness of vasoactive medications to optimize hemodynamic stability  - Monitor arterial and/or venous puncture sites for bleeding and/or hematoma  - Assess quality of pulses, skin color and temperature  - Assess for signs of decreased coronary artery perfusion - ex. Angina  - Evaluate fluid balance, assess for edema, trend weights  Outcome: Progressing  Goal: Absence of cardiac arrhythmias or at baseline  Description: INTERVENTIONS:  - Continuous cardiac monitoring, monitor vital signs, obtain 12 lead EKG if indicated  - Evaluate effectiveness of antiarrhythmic and heart rate control medications as ordered  - Initiate emergency measures for life threatening arrhythmias  - Monitor electrolytes and administer replacement therapy as ordered  Outcome: Progressing     Problem: PAIN - ADULT  Goal: Verbalizes/displays adequate comfort level or patient's stated pain goal  Description: INTERVENTIONS:  - Encourage pt to monitor pain and request assistance  - Assess pain using appropriate pain scale  - Administer analgesics based on type and severity of pain and evaluate response  - Implement non-pharmacological measures as appropriate and evaluate response  - Consider cultural and social influences on pain and pain management  - Manage/alleviate anxiety  - Utilize distraction and/or relaxation techniques  - Monitor for opioid  side effects  - Notify MD/LIP if interventions unsuccessful or patient reports new pain  - Anticipate increased pain with activity and pre-medicate as appropriate  Outcome: Progressing

## 2024-03-20 NOTE — PAYOR COMM NOTE
--------------  CONTINUED STAY REVIEW    Payor: [employer] MED[employer] ADV PPO  Subscriber #:  [id number]  Authorization Number: [id number]    Admit date: 3/18/24  Admit time:  1:17 AM    Admitting Physician: Jem Buckley MD  Attending Physician:  Lance Aguayo MD  Primary Care Physician: Gee Jimenez MD    REVIEW DOCUMENTATION:    3/18/24 CVS    arch 2024     TO:     CHRISTINA Urias M.D.     RE:     Jonny HAQUE  :  04/15/1945     I had the pleasure of seeing your patient, Mr. Jonny Haque, today in consultation for his severe multivessel coronary artery disease complicated by NSTEMI in addition to moderate aortic valve stenosis.       Mr. Haque is a 78-year-old male with an extensive past medical history including coronary disease status post prior stenting to his LAD in 2021, hepatitis C treated in  complicated by cirrhosis and esophageal varices with recurrent GI bleeds when he was previously on dual antiplatelet therapy, and end-stage renal disease on hemodialysis.  The patient presented overnight with worsening chest pain over the past week and was subsequently found to have no ST changes on EKG but a slightly elevated troponin up to 122 that subsequently has risen to 1,500 today.  The patient underwent coronary catheterization which showed moderate disease in a nondominant right, 90% ostial mid left main lesion, 75% ostial LAD lesion with no clinically significant in-stent stenosis of the LAD stent, as well as a 90% proximal lesion in the first OM.  The patient subsequently underwent an echocardiogram which showed an EF of about at best 35% with diffuse hypokinesis of the anteroseptal and apical walls.  The aortic valve had a mean gradient of 17 mmHg with trivial aortic insufficiency and a peak velocity of 2.4 m/second with a valve area of 1.34 cm2 all consistent with at least moderate aortic valve stenosis.  The patient was also noted  to have mild mitral regurgitation, mild tricuspid regurgitation, and PA systolic pressure of 25 mmHg.       The patient states that he has been having worsening chest pain with activity for the past week or two which the son confirms.  The patient is not experiencing any active pain currently.  He hasn't noticed any increased swelling of his legs or new lightheadedness, or palpitations.  He primarily describes the pain more as an achy pressure sensation like someone is sitting on his chest.       The patient has a past medical history of cirrhosis confirmed on CT and ultrasound from hepatitis C which was treated with antiviral therapy in 2017, coronary artery stents status post prior stenting to his LAD in July of 2021, diabetes for which he is on insulin, and hyperlipidemia for which he is on rosuvastatin.  He has also been taking nitroglycerin to help with the chest pressure as well as sevelamer to help with phosphate for the hemodialysis which he gets three times a week.  He is also on Norvasc and Synthroid for hypothyroid and zolpidem to help him with sleeping.  The patient is not currently taking any aspirin due to the prior history of GI bleeds most recently noted in July and October of 2022.  Of note, the patient also at baseline has anemia.  The patient quit smoking after 15 pack years in 1980.  He denies alcohol or illicit drug use.       On review of systems, the patient complains of no pain at this moment but says that it was a very severe chest pressure that would resolve with nitroglycerin but then immediately come back; it was worse with activity.  He denies new lightheadedness, dizziness, syncope, fevers, chills, nausea, vomiting, or unintentional weight loss.  There is no gross motor sensory deficit or vision changes.       On physical examination, this is a 78-year-old gentleman who is 6' tall and weighs 221 pounds.  BMI is 30.  Eyes are equal and reactive to light.  There is no scleral icterus.   There is no jaundice.  Gross motor sensory is intact.  Lungs are clear to auscultation bilaterally with nonlabored breathing on nasal cannula.  Cardiac examination shows a normal S1 and S2 with a regular rate and rhythm; there is a 3/6 holosystolic murmur.  Bilateral lower extremities reveal scattered spider veins, varicose veins, and mild nonpitting edema.           The patient's white blood cell count is 8.5, hematocrit 27.6, hemoglobin 9.2, platelets 118,000, most recent creatinine 7.28, total bilirubin 0.7 with alkaline phosphatase of 180 and AST and ALT both in the 30s.  He has an albumen of 3.7, troponin as mentioned as trended up now to 1,500.  BNP at presentation was 17,600.  INR is 1.04.  EKG as mentioned showed no ST elevation but did show a new left bundle branch block.       Based on these lab values, the patient's MELD is calculated to be 21-22 with a three month mortality risk at baseline of 19.6%.       I had a lengthy conversation with Mr. Haque and his son today regarding his current cardiac condition of severe multivessel coronary artery disease complicated by NSTEMI and moderate aortic valve stenosis.  I discussed with the patient his current treatment options which include continued medical management, versus possible complex high-risk percutaneous intervention, versus possible surgical revascularization with consideration for also performing an aortic valve replacement for the moderate aortic valve stenosis.  I discussed with the patient that although his vessels do appear to have adequate targets to bypass, that he is not a candidate for surgery primarily due to his underlying comorbidities especially his cirrhosis.  His STS risk score for mortality is approximately 24.4% for CABG and AVR with risk of stroke of 2.2%.  His risk for CABG alone would be about 15.8% with a risk of stroke of 1.71%.  I have discussed the STS risk factors and score at length with the patient.  I mentioned that based on  an ejection fraction of 35% with his STS risk score for mortality jumping up to 28.7% for CABG and AVR if his ejection fraction was more along the lines of 25%.  However, this does not fully capture the risk that the cirrhosis presents for the patient if he underwent such a massive surgery in his current state.  The [facility] postoperative mortality risk for patients with cirrhosis suggest that the patient's seven day mortality after such a major operation would be about 18.2%, 30 day mortality would be 56.2%, and his 90 day mortality would be approximately 73.63%.  After discussing all of the risk factors and risk scores with the patient and his son they are in agreement that he would not want to proceed with surgery as he was already leaning against proceeding with surgery knowing his underlying comorbidities and his current state.  I did suggest that if they would like to pursue an opinion from an outside hospital, as I have already discussed the patient's status with my partner Doctor John Reyes, that we would be more than happy to assist with trying to get another opinion and whether it might be feasible.  At this point they would like to pursue getting further information on whether a complex percutaneous intervention may be feasible with the understanding that (1) it would be a high-risk intervention; and (2) the GI bleed risk for antiplatelet therapy would still be existent with his history of recurrent GI bleeds.       All the patient's questions and concerns were addressed as well as those of his son and we are all in agreement with the plan going forward to have further conversations about whether or not complex PCI may be feasible with the understanding that this option would likely be palliative in nature.       3/19/24 Cardiology     elemetry:      EKG, 3/18/2024,            Echo:  3/18/24:     1. Left ventricle: The cavity size was normal. Wall thickness was normal.      Systolic function was  moderately reduced. The estimated ejection fraction      was 35%, by visual assessment. Hypokinesis of the anterior wall.      Hypokinesis of the apical wall. Hypokinesis of the anteroseptal wall.      Left ventricular diastolic function parameters were normal for the      patient's age.   2. Left atrium: The left atrial volume was mildly increased.   3. Aortic valve: Transvalvular velocity was increased. The findings were      consistent with moderate stenosis. The peak systolic velocity was      2.4m/sec. The mean systolic gradient was 17mm Hg. The valve area (VTI)      was 1.34cm^2. The valve area (VTI) index was 0.6cm^2/m^2.   4. Mitral valve: There was mild regurgitation.   5. Pulmonary arteries: Systolic pressure was within the normal range,      estimated to be 25mm Hg.         Cardiac Cath. 3/18/24      90% ostial LM  90% OM1  75% proximal LAD  Moderate, diffuse disease              Impression:        1. CAD:      - s/p rotablation and TRACY to 90% proximal LAD stenosis and rotablation and TRACY to 90% mid LAD stenosis to 0% residual 1/24/22 .        -cath, 3/18/24:  LM, LAD, LCx stenoses as above        2. Ischemic Cardiomyopathy.  LVEF has declined to 35% .  May reflect stunning   3. LBBB (previous IVCD, atypical LBBB)  4. Moderate aortic stenosis  5. GIB, ASA intolerance.  GI eval's in past have been unrevealing.  No varices seen  6. ESRD, HD dependent  7. DM  8. HTN     Plan:  CT surgery felt he would be severe risk for CABG.  Patient and family do not want conservative therapy at this point.  Thus they have opted for PCI.  I discussed risks including risk of death.  I quoted approximately 3% mortality risk with PCI.  We also discussed his previous ASA/plavix/AC intolerance.  He will need 1 year of DAPT with LM stenting, no exceptions.  Standard PCI risks clearly also apply.  They understand and wish to proceed.       CARLEY DODGE  CI:  RCFA  Preclose  15F impella sheath  Impella CP in LV  Rota 1.75mm jani  3.0mm  NC  IVUS  4.0x18mm [place] to ostium LAD  3.0x12mm [place] to mid LAD  4.5mm NC to LAD  IVUS  Perclose for hemostasis.  Some oozing  Femstop to RCFA      PLAN:  DAPT  Med management CHF and CAD  Consider PCI to OM later this week or as outpatient  NTG off  Pressors only used during case.  Off now          3/19/24 Hospitalist      Patient is doing well no chest pain or shortness of breath at this time just returned from PCI and angiogram, did have chest pain early this morning not relieved by nitroglycerin     Medications:    [START ON 3/20/2024] clopidogrel  75 mg Oral Daily    insulin degludec  15 Units Subcutaneous Nightly    rosuvastatin  10 mg Oral Nightly    isosorbide mononitrate ER  60 mg Oral Daily    levothyroxine  150 mcg Oral QOD    pantoprazole  40 mg Oral BID AC    aspirin  81 mg Oral Daily    metoprolol tartrate  25 mg Oral TID Beta Blocker/Cardiac    sevelamer carbonate  800 mg Oral TID CC    insulin aspart  4-20 Units Subcutaneous TID AC and HS    heparin  5,000 Units Subcutaneous Q8H ANNEL          Assessment & Plan:   Patient is 78-year-old male significant past medical history of ESRD on dialysis, known a complex ischemic cardiomyopathy with recently normalized ejection fraction ON echo, CAD with stent to the LAD x 2, history of cirrhosis, history of GI bleed in the past, type 2 diabetes, hypothyroidism presented with chest pain.  Patient stated that chest pain began with associated shortness of breath last night, persisted which is why he came into the emergency room.     # Chest pain likely cardiac in nature, NSTEMI  -Elevated troponin, EKG with new onset left bundle branch block  -Recent drug-eluting stent to the proximal and mid LAD, x 2 in 2022  -Repeat angiogram done shows left main, LAD and left circumflex stenosis  -Titrate beta-blocker per cardiology,  -Echo completed-  -CV surgery and consult, has poor candidate for CABG given ESRD as well as cirrhosis  -Given recurrence of chest pain patient  taken for repeat angiogram by Dr. Carlos in the effort to do rotablation and left main stenting  -Given aspirin 325 in the past, aspirin 81 to continue, Plavix,  -Continue statin  -Outpatient patient was on Imdur, hydralazine, carvedilol and amlodipine as well as Ranexa defer these medications to cardiology-        # ESRD on dialysis  -Last dialysis session was Saturday, resume dialysis as per nephrology  -Dr. Reyna on board     # Ischemic cardiomyopathy  Echo completed that shows an EF of 35%-  -worsened since the prior echo, will need guideline directed medical therapy likely secondary to ischemic disease  -Already on aspirin, statin, beta-blocker, defer ACE as well as Entresto to cardiology     # History of cirrhosis  -No recent bleed, continue aspirin at this point  -GI was consulted however no acute intervention at this time continue PPI twice daily     # Type 2 diabetes  -Patient on long-acting and short acting insulin at home, resume long-acting insulin, sliding scale insulin titrate as needed  -Titrate his insulin as patient was hyperglycemic     # Hypothyroidism  -Synthroid     # 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  # Chronic pancytopenia  -Appears to be compensated at this time, platelets with normal limits no recent episodes of any GI bleed     Lance Aguayo MD         3/19 GI    MELD-Na 23     HBsAb+ w/ negative sAg from 2022, sAb+ titer c/w likely prior vaccination.     ASSESSMENT AND PLAN:   #Chest pain + multivessel occlusive CAD + NSTEMI, s/p repeat PCI today  #H/o chronic HCV cirrhosis, portal HTN features on prior imaging  #ESRD on HD     No indication for EGD at this time in light of the ongoing chest pain s/p PCI today.  Continue Protonix BID for now.  Supportive care advised, pt/family preferred non-invasive approach per prior consultations.  Longstanding likely compensated cirrhosis (presumably d/t chronic HCV), normal Tbili + INR in setting of ESRD on HD.  Could obtain Comp Abd US +  Doppler study this admission, but likely no acute mgmt required at this time.  Viral hepatitis markers ordered, but given largely normal LFTs, mgmt is purely supportive for now. Unlikely additional chronic hepatitis serologic markers will change mgmt at present.  MELD currently calculated at 23, but this is predominantly being driven by ESRD rather than liver synthetic markers (may be essentially Humberto A o/w).   Still may have impacts on overall morbidity/mortality w/ surgery, may warrant academic Hepatology consultation if concerns re: chronic liver disease impacts on higher-risk surgery (could be pursued as an outpt if responds well to repeat PCI?).  Will follow peripherally for now, call if questions/issues arise.  Case reviewed w/ primary team + Cardiology this afternoon.     The patient indicates understanding of these issues and agrees to the plan.   Berlin Ibrahim MD  Department of Gastroenterology           3/20/24  Nephrology    Assessment and Plan:     1) ESRD- due to longstanding DM 2; on HD since 7/21. Lytes / volume OK- next HD Thurs per usual routine   2) CAD with preserved EF- s/p complex PCI / stents LAD; may need further intervention OM- mgmt as outlined by Dr. Donald   3) h/o hep C with cirrhosis / varices / h/o GIB   4) DM 2 / HTN   5) Anemia- due to CKD. Fe stores OK; continue EPO with HD      3/20/24 GI    ASSESSMENT AND PLAN:   #Chest pain + multivessel occlusive CAD + NSTEMI, s/p repeat PCI 3/19  #H/o chronic HCV cirrhosis, portal HTN features on prior imaging  #ESRD on HD     Await HBcAb testing to r/o prior HBV exposure (though prior HBsAb testing most suggestive of post-vax immune response).  HCV Ab positive, awaiting confirmation of genotype + viral load.  Could potentially consider outpt Hepatology f/u if desired to discuss HCV treatment options, unlikely to change acute outcomes though. No urgency to this given the other significant medical comorbidities at play (particularly ongoing  anginal sxs + post-PCI recovery).  Comp Abd US + Doppler order to reassess underlying portal HTN findings, unlikely to change current acute mgmt. Still pending.  No immediate indication for endoscopic evaluation at this time.  Will follow peripherally for now until US returns, call if questions/issues arise.  Case reviewed w/ primary team yesterday.  Case reviewed w/ pt/family this morning.     The patient indicates understanding of these issues and agrees to the plan.     A total of 35 minutes was spent in direct patient care + assessment, chart review, and care coordination today.     Berlin Ibrahim MD  Department of Gastroenterology  [facility]      3/20/24 Hospitalist    Outpatient patient was on Imdur, hydralazine, carvedilol and amlodipine as well as Ranexa defer these medications to cardiology-  -further angiograms and stent placement per cardiology        # ESRD on dialysis  -Last dialysis session was Saturday, resume dialysis as per nephrology  -Dr. Reyna on board     # Ischemic cardiomyopathy  Echo completed that shows an EF of 35%-  -worsened since the prior echo, will need guideline directed medical therapy likely secondary to ischemic disease  -Already on aspirin, statin, beta-blocker, defer ACE as well as Entresto to cardiology     # History of cirrhosis  # HCV antibody positive  -No recent bleed, continue aspirin at this point  -GI was consulted however no acute intervention at this time continue PPI twice daily  -Getting viral load as well as complete abdominal ultrasound to assess portal hypertension       Latest Reference Range & Units 03/19/24 04:51 03/20/24 04:16   Glucose 70 - 99 mg/dL 129 (H) 185 (H)   Sodium 136 - 145 mmol/L 133 (L) 135 (L)   Potassium 3.5 - 5.1 mmol/L 4.1 5.4 (H)   Chloride 98 - 112 mmol/L 99 101   Carbon Dioxide, Total 21.0 - 32.0 mmol/L 27.0 27.0   BUN 9 - 23 mg/dL 85 (H) 55 (H)   CREATININE 0.70 - 1.30 mg/dL 8.83 (H) 6.89 (H)   (H): Data is abnormally high  (L):  Data is abnormally low     Latest Reference Range & Units 03/19/24 17:17 03/20/24 04:15   WBC 4.0 - 11.0 x10(3) uL 13.4 (H) 10.3   Hemoglobin 13.0 - 17.5 g/dL 8.1 (L) 8.0 (L)   Hematocrit 39.0 - 53.0 % 24.8 (L) 24.5 (L)   Platelet Count 150.0 - 450.0 10(3)uL 77.0 (L) 82.0 (L)   (H): Data is abnormally high  (L): Data is abnormally low    MEDICATIONS ADMINISTERED IN LAST 1 DAY:  aspirin DR tab 81 mg       Date Action Dose Route User    3/20/2024 0913 Given 81 mg Oral Fela Larkin RN          clopidogrel (Plavix) tab 75 mg       Date Action Dose Route User    3/20/2024 0912 Given 75 mg Oral Fela Larkin RN          heparin (Porcine) 5000 UNIT/ML injection 5,000 Units       Date Action Dose Route User    3/20/2024 1447 Given 5,000 Units Subcutaneous (Right Lower Abdomen) Fela Larkin RN    3/20/2024 0535 Given 5,000 Units Subcutaneous (Left Lower Abdomen) Shai Blanchard RN    3/19/2024 2100 Given 5,000 Units Subcutaneous (Right Lower Abdomen) Shai Blanchard RN          insulin aspart (NovoLOG) 100 Units/mL FlexPen 4-20 Units       Date Action Dose Route User    3/20/2024 1230 Given 12 Units Subcutaneous (Right Lower Abdomen) Fela Larkin RN    3/20/2024 0604 Given 8 Units Subcutaneous (Left Lower Abdomen) Shai Blanchard RN    3/19/2024 2100 Given 8 Units Subcutaneous (Right Lower Abdomen) Shai Blanchard RN    3/19/2024 1713 Given 8 Units Subcutaneous (Right Upper Arm) Beverly Ramon RN          insulin degludec 100 units/mL flextouch 15 Units       Date Action Dose Route User    3/19/2024 2100 Given 15 Units Subcutaneous (Left Lower Abdomen) Shai Blanchard RN          isosorbide mononitrate ER (Imdur) 24 hr tab 60 mg       Date Action Dose Route User    3/20/2024 0913 Given 60 mg Oral Fela Larkin, RN          levothyroxine (Synthroid) tab 150 mcg       Date Action Dose Route User    3/20/2024 0604 Given 150 mcg Oral Shai Blanchard, RN          metoprolol tartrate  (Lopressor) tab 25 mg       Date Action Dose Route User    3/20/2024 1453 Given 25 mg Oral Fela Larkin RN    3/20/2024 0604 Given 25 mg Oral Shai Blanchard RN    3/19/2024 2100 Given 25 mg Oral Shai Blanchard RN          morphINE PF 4 MG/ML injection 4 mg       Date Action Dose Route User    3/20/2024 0919 Given 4 mg Intravenous Fela Larkin RN    3/20/2024 0604 Given 4 mg Intravenous Shai Blanchard RN    3/20/2024 0424 Given 4 mg Intravenous Shai Blanchard RN          pantoprazole (Protonix) DR tab 40 mg       Date Action Dose Route User    3/20/2024 0604 Given 40 mg Oral Shai Blanchard RN    3/19/2024 1713 Given 40 mg Oral Beverly Ramon RN          protamine 10 mg/mL injection 20 mg       Date Action Dose Route User    3/19/2024 1735 Given 20 mg Intravenous Beverly Ramon RN          rosuvastatin (Crestor) tab 10 mg       Date Action Dose Route User    3/19/2024 2100 Given 10 mg Oral Shai Blanchard RN          sevelamer carbonate (Renvela) tab 800 mg       Date Action Dose Route User    3/20/2024 1226 Given 800 mg Oral Fela Larkin RN    3/20/2024 0912 Given 800 mg Oral Fela Larkin RN          Sodium Zirconium Cyclosilicate (Lokelma) oral packet 5 g       Date Action Dose Route User    3/20/2024 1448 Given 5 g Oral Fela Larkin RN            Vitals (last day)       Date/Time Temp Pulse Resp BP SpO2 Weight O2 Device O2 Flow Rate (L/min) Who    03/20/24 1500 -- 78 15 110/58 99 % -- -- -- AN    03/20/24 1410 -- 80 15 94/79 99 % -- -- -- AN    03/20/24 1400 -- 82 18 94/79 88 % -- -- -- AN    03/20/24 1300 -- 79 18 109/61 99 % -- -- -- KT    03/20/24 1200 -- -- -- -- -- -- Nasal cannula -- AN    03/20/24 1200 98 °F (36.7 °C) 83 16 118/59 96 % -- -- 2 L/min KT    03/20/24 1100 -- 79 21 110/74 94 % -- -- -- KT    03/20/24 1000 -- 79 15 103/56 100 % -- -- -- KT    03/20/24 0905 -- 85 17 118/49 99 % -- -- -- KT    03/20/24 0900 -- 84 18 81/45 100 % -- -- --  KT    03/20/24 0800 97.6 °F (36.4 °C) -- -- -- -- -- Nasal cannula 3 L/min AN    03/20/24 0800 -- 92 13 133/80 99 % -- -- -- KT    03/20/24 0700 -- 93 21 118/73 93 % -- -- -- TT    03/20/24 0600 -- 101 16 108/66 100 % 221 lb 1.9 oz -- -- TT    03/20/24 0500 -- 85 22 128/72 93 % -- -- -- TT    03/20/24 0400 98 °F (36.7 °C) 83 20 108/72 100 % -- Nasal cannula 3 L/min TT    03/20/24 0300 -- 79 20 114/65 99 % -- -- -- TT    03/20/24 0200 -- 78 15 108/63 100 % -- -- -- TT    03/20/24 0100 -- 79 14 103/78 100 % -- -- -- TT    03/20/24 0000 97.8 °F (36.6 °C) 77 16 109/63 100 % -- Nasal cannula 3 L/min TT    03/19/24 2300 -- 76 14 108/64 99 % -- -- -- TT    03/19/24 2200 -- 76 17 103/57 100 % -- -- -- TT    03/19/24 2100 -- 78 18 91/70 93 % -- -- -- TT    03/19/24 2000 98.5 °F (36.9 °C) 79 19 106/59 100 % -- Nasal cannula 3 L/min TT    03/19/24 1900 -- 84 14 108/73 99 % -- -- -- AC    03/19/24 1800 -- 80 23 110/74 100 % -- -- -- AC    03/19/24 1700 -- 86 18 113/62 95 % -- -- -- AC    03/19/24 1630 -- 84 16 118/53 100 % -- -- -- AC    03/19/24 1615 -- 84 16 102/56 100 % -- -- -- AC    03/19/24 1600 98.2 °F (36.8 °C) 87 18 102/58 94 % -- Simple mask 6 L/min AC    03/19/24 1545 -- 86 16 112/53 100 % -- -- -- AC    03/19/24 1530 -- 93 14 123/63 100 % -- -- -- AC    03/19/24 1515 -- 90 11 106/70 100 % -- -- -- AC    03/19/24 1500 -- 90 15 122/61 100 % -- -- -- AC    03/19/24 1140 -- 90 17 -- 99 % -- -- -- AC    03/19/24 1100 -- 91 20 130/72 98 % -- -- -- AC    03/19/24 1000 -- 87 16 106/54 98 % -- -- -- AC    03/19/24 0900 -- 80 13 112/55 98 % -- -- -- AC    03/19/24 0800 98.4 °F (36.9 °C) 82 15 115/71 100 % -- Nasal cannula 3 L/min AC    03/19/24 0700 -- 84 15 100/67 99 % -- -- -- TT    03/19/24 0630 -- 83 15 97/65 97 % -- -- -- TT    03/19/24 0600 -- 79 13 103/69 99 % -- -- -- TT    03/19/24 0500 -- 80 23 134/76 94 % -- -- -- TT    03/19/24 0400 98 °F (36.7 °C) 74 17 135/75 100 % 223 lb 3.2 oz Nasal cannula 3 L/min TT     03/19/24 0300 -- 75 18 120/72 100 % -- -- -- TT    03/19/24 0200 -- 72 17 120/66 98 % -- -- -- TT    03/19/24 0100 -- 74 16 137/80 96 % -- -- -- TT    03/19/24 0000 97.8 °F (36.6 °C) 77 17 121/75 100 % -- Nasal cannula 3 L/min TT          CIWA Scores (since admission)       None              Procedures:      Plan:

## 2024-03-20 NOTE — PLAN OF CARE
Assumed patient care at 0730. Patient sitting up in chair, alert/oriented. Denies SOB. Weaning O2 as tolerated. Monitor shows sinus rhythm. SBP stable. Right groin soft/no hematoma. Pulses palpable.  Patient complained of 9/10 chest pain this morning, stated pain worsens w/ deep breathing and activity, morphine prn given, Dr. Donald notified. Plan for possible PCI tomorrow. NPO at midnight. Patient and family updated with plan of care, all questions answered.

## 2024-03-20 NOTE — PROGRESS NOTES
[facility]   part of [facility]     Hospitalist Progress Note     Jonny Haque Patient Status:  Inpatient    4/15/1945 MRN [id number]   Location [facility] 6NE-A Attending Lance Aguayo MD   Hosp Day # 2 PCP Gee Jimenez MD     Chief Complaint: NSTEMI    Subjective:       Doing well, sitting up, patient had slight oozing in the right groin after the angiogram yesterday which has not been controlled, son at bedside no complaints at this time    Stated that was he told by the cardiologist about going for repeat angiogram      Objective:    Review of Systems:   A comprehensive review of systems was completed; pertinent positive and negatives stated in subjective.    Vital signs:  Temp:  [97.6 °F (36.4 °C)-98.5 °F (36.9 °C)] 97.6 °F (36.4 °C)  Pulse:  [] 80  Resp:  [11-23] 15  BP: ()/(45-80) 94/79  SpO2:  [88 %-100 %] 99 %    Physical Exam:      Gen: No acute distress, alert and oriented x 3  Pulm: Lungs clear bilaterally, good inspiratory effort   CV:  nL S1/S2  Abd: soft, NT/ND, no hepatomegaly, +BS  MSK: moving all extremities, no edema-right groin without hematoma, dressing clean dry and intact  Neuro: no focal deficits  Skin: no rashes/lesions  Psych: normal mood/affect            Diagnostic Data:    Labs:  Recent Labs   Lab 247 245   WBC 8.5  --  8.4 13.4* 10.3   HGB 9.2*  --  8.4* 8.1* 8.0*   .2*  --  103.8* 107.4* 107.0*   .0*  --  80.0* 77.0* 82.0*   INR  --  1.04  --   --   --        Recent Labs   Lab 24  0416   * 129* 185*   BUN 66* 85* 55*   CREATSERUM 7.26* 8.83* 6.89*   CA 8.6 8.4* 8.8   ALB 3.7  --   --    * 133* 135*   K 4.5 4.1 5.4*   CL 98 99 101   CO2 25.0 27.0 27.0   ALKPHO 181*  --   --    AST 33  --   --    ALT 37  --   --    BILT 0.7  --   --    TP 7.3  --   --        CrCl cannot be calculated (Unknown ideal weight.).    Recent  Labs   Lab 03/17/24  2329 03/18/24  0953 03/18/24  1803   TROPHS 321* 1,590* 2,138*       Recent Labs   Lab 03/17/24  2335   PTP 13.6   INR 1.04                  Microbiology    No results found for this visit on 03/17/24.      Imaging: Reviewed in Epic.    Medications:    sodium zirconium cyclosilicate  5 g Oral Q8H    [START ON 3/21/2024] epoetin eunice  10,000 Units Intravenous Once    insulin degludec  20 Units Subcutaneous Nightly    clopidogrel  75 mg Oral Daily    rosuvastatin  10 mg Oral Nightly    isosorbide mononitrate ER  60 mg Oral Daily    levothyroxine  150 mcg Oral QOD    pantoprazole  40 mg Oral BID AC    aspirin  81 mg Oral Daily    metoprolol tartrate  25 mg Oral TID Beta Blocker/Cardiac    sevelamer carbonate  800 mg Oral TID CC    insulin aspart  4-20 Units Subcutaneous TID AC and HS    heparin  5,000 Units Subcutaneous Q8H ANNEL       Assessment & Plan:        Patient is 78-year-old male significant past medical history of ESRD on dialysis, known a complex ischemic cardiomyopathy with recently normalized ejection fraction ON echo, CAD with stent to the LAD x 2, history of cirrhosis, history of GI bleed in the past, type 2 diabetes, hypothyroidism presented with chest pain.  Patient stated that chest pain began with associated shortness of breath last night, persisted which is why he came into the emergency room.     # Chest pain likely cardiac in nature, NSTEMI  -Elevated troponin, EKG with new onset left bundle branch block  -Recent drug-eluting stent to the proximal and mid LAD, x 2 in 2022  -Repeat angiogram done shows left main, LAD and left circumflex stenosis  -Titrate beta-blocker per cardiology,  -Echo completed-  -CV surgery and consult, has poor candidate for CABG given ESRD as well as cirrhosis  -Given recurrence of chest pain patient taken for repeat angiogram by Dr. Carlos in the effort to do rotablation and left main stenting  -Given aspirin 325 in the past, aspirin 81 to continue,  Plavix,  -Continue statin  -Outpatient patient was on Imdur, hydralazine, carvedilol and amlodipine as well as Ranexa defer these medications to cardiology-  -further angiograms and stent placement per cardiology        # ESRD on dialysis  -Last dialysis session was Saturday, resume dialysis as per nephrology  -Dr. Reyna on board     # Ischemic cardiomyopathy  Echo completed that shows an EF of 35%-  -worsened since the prior echo, will need guideline directed medical therapy likely secondary to ischemic disease  -Already on aspirin, statin, beta-blocker, defer ACE as well as Entresto to cardiology     # History of cirrhosis  # HCV antibody positive  -No recent bleed, continue aspirin at this point  -GI was consulted however no acute intervention at this time continue PPI twice daily  -Getting viral load as well as complete abdominal ultrasound to assess portal hypertension     # Type 2 diabetes  -Patient on long-acting and short acting insulin at home, resume long-acting insulin, sliding scale insulin titrate as needed  -Titrate his insulin as patient was hyperglycemic     # Hypothyroidism  -Synthroid     # 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  # Chronic pancytopenia  -Appears to be compensated at this time, platelets with normal limits no recent episodes of any GI bleed  -As above          Lance Aguayo MD    Supplementary Documentation:     Quality:  DVT Mechanical Prophylaxis:   SCDs,    DVT Pharmacologic Prophylaxis   Medication    heparin (Porcine) 1000 UNIT/ML injection 4,000 Units    heparin (Porcine) 5000 UNIT/ML injection 5,000 Units         DVT Pharmacologic prophylaxis: Aspirin 81 mg      Code Status: Full Code  Vásquez: No urinary catheter in place  Vásquez Duration (in days):   Central line:    MOUNA:     The 21st Century Cures Act makes medical notes like these available to patients in the interest of transparency. Please be advised this is a medical document. Medical documents are intended to carry  relevant information, facts as evident, and the clinical opinion of the practitioner. The medical note is intended as peer to peer communication and may appear blunt or direct. It is written in medical language and may contain abbreviations or verbiage that are unfamiliar.

## 2024-03-20 NOTE — PROGRESS NOTES
[facility] GI Follow-up    3/20/2024    Jonny Haque  male   Berlin Ibrahim MD     [id number]  4/15/1945 Primary Care Physician  Gee Jimenez MD     S: NAEO, less CP than yesterday following repeat PCI + angioplasty. No localizing GI sxs, no overt GIB noted.    PROBLEM LIST:     Patient Active Problem List   Diagnosis    Arthritis    Hepatic cirrhosis (HCC)    Diabetic peripheral neuropathy (HCC)    Encounter for screening for malignant neoplasm of prostate    Erectile dysfunction of nonorganic origin    Hyperlipidemia    Portal hypertension (HCC)    Thrombocytopenia (HCC)    PAC (premature atrial contraction)    Personal history of kidney stones    Elevated alkaline phosphatase level    Essential hypertension    Personal history of bladder cancer    Thoracic aorta atherosclerosis (HCC)    Hypothyroidism due to Hashimoto's thyroiditis    Hiatal hernia    Polyp of colon, unspecified part of colon, unspecified type    Abnormal EKG    Iron deficiency anemia due to chronic blood loss    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 stage 5 chronic kidney disease on chronic dialysis (HCC)    Duodenitis    Elevated PSA    Type 2 diabetes mellitus with chronic kidney disease on chronic dialysis, with long-term current use of insulin (HCC)    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)    ESRD (end stage renal disease) (HCC)    History of hepatitis C    Type 2 diabetes mellitus with microalbuminuria, with long-term current use of insulin (HCC)    Mild pulmonary hypertension (HCC)    Paroxysmal atrial fibrillation (HCC)    Pre-op exam    S/P primary angioplasty with coronary stent    Gastrointestinal hemorrhage associated with intestinal diverticulosis    Diabetes mellitus due to underlying condition with chronic kidney disease on chronic dialysis, with long-term current use of insulin (HCC)    Anemia due to stage 5 chronic  kidney disease, not on chronic dialysis ([facility])    Rectal bleeding    Anemia, unspecified type    Dyspnea on exertion    Colitis    Diverticulosis    ESRD on hemodialysis ([facility])    Hyperglycemia    Primary hypertension    Type 2 diabetes mellitus with diabetic nephropathy ([facility])    GI bleed    Acute blood loss anemia    Gastrointestinal hemorrhage, unspecified gastrointestinal hemorrhage type    Acute chest pain    Hypokalemia    Anemia in chronic kidney disease, on chronic dialysis ([facility])    Chest pain of uncertain etiology    Melena    Diabetes mellitus due to underlying condition with chronic kidney disease on chronic dialysis ([facility])    New onset left bundle branch block (LBBB)    Elevated troponin    Anemia due to chronic kidney disease, on chronic dialysis ([facility])    Heart failure with preserved ejection fraction ([facility])    Hypoxia    Hyperkalemia       Medications:    Current Facility-Administered Medications:     Sodium Zirconium Cyclosilicate (Lokelma) oral packet 5 g, 5 g, Oral, Q8H    [START ON 3/21/2024] epoetin eunice (Epogen, Procrit) 81516 UNIT/ML injection 10,000 Units, 10,000 Units, Intravenous, Once    nitroGLYCERIN in dextrose 5% 50 mg/250mL infusion premix, 5-300 mcg/min, Intravenous, Titrated    morphINE PF 4 MG/ML injection 4 mg, 4 mg, Intravenous, Q1H PRN    heparin (Porcine) 1000 UNIT/ML injection 4,000 Units, 4,000 Units, Intravenous, PRN Dialysis    clopidogrel (Plavix) tab 75 mg, 75 mg, Oral, Daily    insulin degludec 100 units/mL flextouch 15 Units, 15 Units, Subcutaneous, Nightly    rosuvastatin (Crestor) tab 10 mg, 10 mg, Oral, Nightly    glucose (Dex4) 15 GM/59ML oral liquid 15 g, 15 g, Oral, Q15 Min PRN **OR** glucose (Glutose) 40% oral gel 15 g, 15 g, Oral, Q15 Min PRN **OR** glucose-vitamin C (Dex-4) chewable tab 4 tablet, 4 tablet, Oral, Q15 Min PRN **OR** dextrose 50% injection 50 mL, 50 mL, Intravenous, Q15 Min PRN **OR** glucose (Dex4) 15 GM/59ML oral liquid 30 g, 30 g, Oral, Q15 Min PRN  **OR** glucose (Glutose) 40% oral gel 30 g, 30 g, Oral, Q15 Min PRN **OR** glucose-vitamin C (Dex-4) chewable tab 8 tablet, 8 tablet, Oral, Q15 Min PRN    acetaminophen (Tylenol Extra Strength) tab 500 mg, 500 mg, Oral, Q6H PRN    melatonin tab 3 mg, 3 mg, Oral, Nightly PRN    polyethylene glycol (PEG 3350) (Miralax) 17 g oral packet 17 g, 17 g, Oral, Daily PRN    sennosides (Senokot) tab 17.2 mg, 17.2 mg, Oral, Nightly PRN    bisacodyl (Dulcolax) 10 MG rectal suppository 10 mg, 10 mg, Rectal, Daily PRN    ondansetron (Zofran) 4 MG/2ML injection 4 mg, 4 mg, Intravenous, Q6H PRN    isosorbide mononitrate ER (Imdur) 24 hr tab 60 mg, 60 mg, Oral, Daily    levothyroxine (Synthroid) tab 150 mcg, 150 mcg, Oral, QOD    pantoprazole (Protonix) DR tab 40 mg, 40 mg, Oral, BID AC    zolpidem (Ambien) tab 5 mg, 5 mg, Oral, Nightly PRN    aspirin DR tab 81 mg, 81 mg, Oral, Daily    metoprolol tartrate (Lopressor) tab 25 mg, 25 mg, Oral, TID Beta Blocker/Cardiac    nitroglycerin (Nitrostat) SL tab 0.4 mg, 0.4 mg, Sublingual, Q5 Min PRN    sevelamer carbonate (Renvela) tab 800 mg, 800 mg, Oral, TID CC    insulin aspart (NovoLOG) 100 Units/mL FlexPen 4-20 Units, 4-20 Units, Subcutaneous, TID AC and HS    heparin (Porcine) 5000 UNIT/ML injection 5,000 Units, 5,000 Units, Subcutaneous, Q8H [facility]    Facility-Administered Medications Ordered in Other Encounters:     BCG Live 50 mg in sodium chloride 0.9 % 50 mL Intravesicle, 50 mg, Intravesical, Once       EXAM:   /56   Pulse 79   Temp 97.6 °F (36.4 °C) (Temporal)   Resp 15   Wt 221 lb 1.9 oz (100.3 kg)   SpO2 100%   BMI 29.99 kg/m²  Body mass index is 29.99 kg/m².  Gen: cooperative, pleasant, not diaphoretic, nad   HEENT: ncat, normal neck ROM, normal op w/o ulcer/exudate, anicteric, mmm   Resp/Chest: normal respiratory effort, not dyspneic, no incr WOB/cough  CV: no reported palpitations or syncope  Abd: nt, nd, no clinical features suggestive of peritoneal s/s noted  Ext:  no c/c/e   Skin: warm, perfused, no jaundice, no pallor  Neuro: aaox3, grossly intact, no asterixis noted, normal speech       LAB/IMAGING RESULTS:     Lab Results   Component Value Date    WBC 10.3 03/20/2024    HGB 8.0 03/20/2024    HCT 24.5 03/20/2024    PLT 82.0 03/20/2024     [unfilled]  Lab Results   Component Value Date    WBC 10.3 03/20/2024    HGB 8.0 03/20/2024    HCT 24.5 03/20/2024    PLT 82.0 03/20/2024    CREATSERUM 6.89 03/20/2024    BUN 55 03/20/2024     03/20/2024    K 5.4 03/20/2024     03/20/2024    CO2 27.0 03/20/2024     03/20/2024    CA 8.8 03/20/2024    PGLU 228 03/20/2024         ASSESSMENT AND PLAN:   #Chest pain + multivessel occlusive CAD + NSTEMI, s/p repeat PCI 3/19  #H/o chronic HCV cirrhosis, portal HTN features on prior imaging  #ESRD on HD    Await HBcAb testing to r/o prior HBV exposure (though prior HBsAb testing most suggestive of post-vax immune response).  HCV Ab positive, awaiting confirmation of genotype + viral load.  Could potentially consider outpt Hepatology f/u if desired to discuss HCV treatment options, unlikely to change acute outcomes though. No urgency to this given the other significant medical comorbidities at play (particularly ongoing anginal sxs + post-PCI recovery).  Comp Abd US + Doppler order to reassess underlying portal HTN findings, unlikely to change current acute mgmt. Still pending.  No immediate indication for endoscopic evaluation at this time.  Will follow peripherally for now until US returns, call if questions/issues arise.  Case reviewed w/ primary team yesterday.  Case reviewed w/ pt/family this morning.    The patient indicates understanding of these issues and agrees to the plan.    A total of 35 minutes was spent in direct patient care + assessment, chart review, and care coordination today.    Berlin Ibrahim MD  Department of Gastroenterology  [facility]  3/20/2024  1:09 PM

## 2024-03-20 NOTE — PROGRESS NOTES
[facility]  Nephrology Progress Note    Jonny Haque Attending:  Lance Aguayo MD       Assessment and Plan:    1) ESRD- due to longstanding DM 2; on HD since . Lytes / volume OK- next HD Thurs per usual routine     2) CAD with preserved EF- s/p complex PCI / stents LAD; may need further intervention OM- mgmt as outlined by Dr. Donald     3) h/o hep C with cirrhosis / varices / h/o GIB     4) DM 2 / HTN     5) Anemia- due to CKD. Fe stores OK; continue EPO with HD      Subjective:  Pt awake alert up in chair no c/o some chest discomfort since procedure    Physical Exam:   /56   Pulse 79   Temp 97.6 °F (36.4 °C) (Temporal)   Resp 15   Wt 221 lb 1.9 oz (100.3 kg)   SpO2 100%   BMI 29.99 kg/m²   Temp (24hrs), Av °F (36.7 °C), Min:97.6 °F (36.4 °C), Max:98.5 °F (36.9 °C)       Intake/Output Summary (Last 24 hours) at 3/20/2024 1133  Last data filed at 3/20/2024 0400  Gross per 24 hour   Intake 639 ml   Output --   Net 639 ml     Wt Readings from Last 3 Encounters:   24 221 lb 1.9 oz (100.3 kg)   23 204 lb 6.4 oz (92.7 kg)   10/25/22 200 lb (90.7 kg)     General: awake  HEENT: No scleral icterus, MMM  Neck: Supple, no JADE or thyromegaly  Cardiac: Regular rate and rhythm, S1, S2 normal, no murmur or tub  Lungs: Decreased BS at bases bilaterally   Abdomen: Soft, non-tender. + bowel sounds, no palpable organomegaly  Extremities: Without clubbing, cyanosis; no edema  Neurologic: Cranial nerves grossly intact, moving all extremities  Skin: Warm and dry, no rashes       Labs:   Lab Results   Component Value Date    WBC 10.3 2024    HGB 8.0 2024    HCT 24.5 2024    PLT 82.0 2024    CREATSERUM 6.89 2024    BUN 55 2024     2024    K 5.4 2024     2024    CO2 27.0 2024     2024    CA 8.8 2024    PGLU 253 2024       Imaging:  All imaging studies reviewed.    Meds:           Questions/concerns were  discussed with patient and/or family by bedside.          Durga Fernando MD  3/20/2024  1133 AM

## 2024-03-20 NOTE — CARDIAC REHAB
CAD teaching initiated with Pt and son ( Juan José) Pt lives with Ashwin not present currently. Folder left at bedside stent card given to son. Pt sleepy just received some pain medication. Cardiac rehab explained not sure approp.  to come. Attends dialysis Tues, Thurs Sat. Will check back when Pt more alert and son Ashwin at bedside.

## 2024-03-21 ENCOUNTER — APPOINTMENT (OUTPATIENT)
Dept: ULTRASOUND IMAGING | Facility: HOSPITAL | Age: 79
End: 2024-03-21
Attending: INTERNAL MEDICINE
Payer: MEDICARE

## 2024-03-21 LAB
ANION GAP SERPL CALC-SCNC: 12 MMOL/L (ref 0–18)
BUN BLD-MCNC: 73 MG/DL (ref 9–23)
CALCIUM BLD-MCNC: 8.5 MG/DL (ref 8.5–10.1)
CHLORIDE SERPL-SCNC: 94 MMOL/L (ref 98–112)
CO2 SERPL-SCNC: 24 MMOL/L (ref 21–32)
CREAT BLD-MCNC: 8.98 MG/DL
EGFRCR SERPLBLD CKD-EPI 2021: 6 ML/MIN/1.73M2 (ref 60–?)
ERYTHROCYTE [DISTWIDTH] IN BLOOD BY AUTOMATED COUNT: 16.8 %
GLUCOSE BLD-MCNC: 133 MG/DL (ref 70–99)
GLUCOSE BLD-MCNC: 172 MG/DL (ref 70–99)
GLUCOSE BLD-MCNC: 175 MG/DL (ref 70–99)
GLUCOSE BLD-MCNC: 247 MG/DL (ref 70–99)
GLUCOSE BLD-MCNC: 261 MG/DL (ref 70–99)
HCT VFR BLD AUTO: 23.3 %
HGB BLD-MCNC: 7.7 G/DL
MCH RBC QN AUTO: 35 PG (ref 26–34)
MCHC RBC AUTO-ENTMCNC: 33 G/DL (ref 31–37)
MCV RBC AUTO: 105.9 FL
OSMOLALITY SERPL CALC.SUM OF ELEC: 293 MOSM/KG (ref 275–295)
PLATELET # BLD AUTO: 72 10(3)UL (ref 150–450)
POTASSIUM SERPL-SCNC: 4.2 MMOL/L (ref 3.5–5.1)
RBC # BLD AUTO: 2.2 X10(6)UL
SODIUM SERPL-SCNC: 130 MMOL/L (ref 136–145)
WBC # BLD AUTO: 8.2 X10(3) UL (ref 4–11)

## 2024-03-21 PROCEDURE — 99232 SBSQ HOSP IP/OBS MODERATE 35: CPT | Performed by: INTERNAL MEDICINE

## 2024-03-21 PROCEDURE — 93975 VASCULAR STUDY: CPT | Performed by: INTERNAL MEDICINE

## 2024-03-21 PROCEDURE — 76700 US EXAM ABDOM COMPLETE: CPT | Performed by: INTERNAL MEDICINE

## 2024-03-21 RX ORDER — DILTIAZEM HYDROCHLORIDE 5 MG/ML
5 INJECTION INTRAVENOUS ONCE
Status: DISCONTINUED | OUTPATIENT
Start: 2024-03-21 | End: 2024-03-21

## 2024-03-21 NOTE — PLAN OF CARE
Assumed care of pt at 0723. Pt A/Ox4. 2LNC attempted to wean off to RA. Pt desats to mid 80s. LS clear/dim. Mild chest pain/tightness with inspiration. Dr Donald aware. -120s. Pt ate 2 meals, pt felt nauseous at 1700 Zofran given. Pt refusing to eat dinner. Pt up to the commode pt unable to go feeling constipated. Miralax given. Rt groin soft, bruising, no hematoma occlusive dressing in place. Old blood noted. HD today 1.5L off. Pt and pt's sons updated with plan of care.    1100: Converted to Afib with -160s. Dr Plummer and Dr Donald aware. Pt started on Amio gtt with bolus per orders.    1646: Pt converted to NSR 60s while on the commode.    Problem: CARDIOVASCULAR - ADULT  Goal: Maintains optimal cardiac output and hemodynamic stability  Description: INTERVENTIONS:  - Monitor vital signs, rhythm, and trends  - Monitor for bleeding, hypotension and signs of decreased cardiac output  - Evaluate effectiveness of vasoactive medications to optimize hemodynamic stability  - Monitor arterial and/or venous puncture sites for bleeding and/or hematoma  - Assess quality of pulses, skin color and temperature  - Assess for signs of decreased coronary artery perfusion - ex. Angina  - Evaluate fluid balance, assess for edema, trend weights  Outcome: Progressing  Goal: Absence of cardiac arrhythmias or at baseline  Description: INTERVENTIONS:  - Continuous cardiac monitoring, monitor vital signs, obtain 12 lead EKG if indicated  - Evaluate effectiveness of antiarrhythmic and heart rate control medications as ordered  - Initiate emergency measures for life threatening arrhythmias  - Monitor electrolytes and administer replacement therapy as ordered  Outcome: Progressing     Problem: PAIN - ADULT  Goal: Verbalizes/displays adequate comfort level or patient's stated pain goal  Description: INTERVENTIONS:  - Encourage pt to monitor pain and request assistance  - Assess pain using appropriate pain scale  - Administer  analgesics based on type and severity of pain and evaluate response  - Implement non-pharmacological measures as appropriate and evaluate response  - Consider cultural and social influences on pain and pain management  - Manage/alleviate anxiety  - Utilize distraction and/or relaxation techniques  - Monitor for opioid side effects  - Notify MD/LIP if interventions unsuccessful or patient reports new pain  - Anticipate increased pain with activity and pre-medicate as appropriate  Outcome: Progressing     Problem: SAFETY ADULT - FALL  Goal: Free from fall injury  Description: INTERVENTIONS:  - Assess pt frequently for physical needs  - Identify cognitive and physical deficits and behaviors that affect risk of falls.  - [place] fall precautions as indicated by assessment.  - Educate pt/family on patient safety including physical limitations  - Instruct pt to call for assistance with activity based on assessment  - Modify environment to reduce risk of injury  - Provide assistive devices as appropriate  - Consider OT/PT consult to assist with strengthening/mobility  - Encourage toileting schedule  Outcome: Progressing     Problem: DISCHARGE PLANNING  Goal: Discharge to home or other facility with appropriate resources  Description: INTERVENTIONS:  - Identify barriers to discharge w/pt and caregiver  - Include patient/family/discharge partner in discharge planning  - Arrange for needed discharge resources and transportation as appropriate  - Identify discharge learning needs (meds, wound care, etc)  - Arrange for interpreters to assist at discharge as needed  - Consider post-discharge preferences of patient/family/discharge partner  - Complete POLST form as appropriate  - Assess patient's ability to be responsible for managing their own health  - Refer to Case Management Department for coordinating discharge planning if the patient needs post-hospital services based on physician/LIP order or complex needs related to  functional status, cognitive ability or social support system  Outcome: Progressing

## 2024-03-21 NOTE — PLAN OF CARE
Assumed care of pt around 1930. A+Ox4. Mild chest pain with inspiration. SR. Breath sounds clear. Pulses palpable. R groin soft, no hematoma. NPO at midnight for abdominal US and possible PCI tomorrow.    Forgetful this AM, able to reorient.

## 2024-03-21 NOTE — PROGRESS NOTES
[facility] [place]  Nephrology Progress Note    Jonny Haque Attending:  Lance Aguayo MD       Assessment and Plan:    1) ESRD- due to longstanding DM 2; on HD since . Lytes / volume OK- HD today per usual routine     2) CAD with preserved EF- s/p complex PCI / stents LAD; for PCI OM today per Dr. Donald     3) h/o hep C with cirrhosis / varices / h/o GIB- US pending; hep C PCR pending per GI     4) DM 2 / HTN     5) Anemia- due to CKD. Fe stores OK; continue EPO with HD      Subjective:  Pt awake alert no issues overnight; was confused yesterday after receiving morphine    Physical Exam:   /74   Pulse 78   Temp 97.9 °F (36.6 °C) (Temporal)   Resp 16   Wt 225 lb 8 oz (102.3 kg)   SpO2 100%   BMI 30.58 kg/m²   Temp (24hrs), Av.8 °F (36.6 °C), Min:97.5 °F (36.4 °C), Max:98.2 °F (36.8 °C)       Intake/Output Summary (Last 24 hours) at 3/21/2024 0940  Last data filed at 3/21/2024 0900  Gross per 24 hour   Intake 795 ml   Output --   Net 795 ml     Wt Readings from Last 3 Encounters:   24 225 lb 8 oz (102.3 kg)   23 204 lb 6.4 oz (92.7 kg)   10/25/22 200 lb (90.7 kg)     General: awake  HEENT: No scleral icterus, MMM  Neck: Supple, no JADE or thyromegaly  Cardiac: Regular rate and rhythm, S1, S2 normal, no murmur or tub  Lungs: Decreased BS at bases bilaterally   Abdomen: Soft, non-tender. + bowel sounds, no palpable organomegaly  Extremities: Without clubbing, cyanosis; no edema  Neurologic: Cranial nerves grossly intact, moving all extremities  Skin: Warm and dry, no rashes       Labs:   Lab Results   Component Value Date    WBC 8.2 2024    HGB 7.7 2024    HCT 23.3 2024    PLT 72.0 2024    CREATSERUM 8.98 2024    BUN 73 2024     2024    K 4.2 2024    CL 94 2024    CO2 24.0 2024     2024    CA 8.5 2024    PGLU 172 2024       Imaging:  All imaging studies reviewed.    Meds:           Questions/concerns  were discussed with patient and/or family by bedside.          Durga Fernando MD  3/21/2024  940 AM

## 2024-03-21 NOTE — PROGRESS NOTES
[facility]   part of [facility]     Hospitalist Progress Note     Jonny Haque Patient Status:  Inpatient    4/15/1945 MRN [id number]   Location [facility] 6NE-A Attending Lance Aguayo MD   Hosp Day # 3 PCP Gee Jimenez MD     Chief Complaint: NSTEMI    Subjective:     Patient has looked into A-fib with RVR today, started on amiodarone bolus as well as drip, currently much more awake and alert, son at bedside feels like the morphine yesterday had needed more out of it.  Seen while patient was getting dialysis no plan for any procedure today  Given A-fib      Objective:    Review of Systems:   A comprehensive review of systems was completed; pertinent positive and negatives stated in subjective.    Vital signs:  Temp:  [97.5 °F (36.4 °C)-98.2 °F (36.8 °C)] 97.9 °F (36.6 °C)  Pulse:  [] 150  Resp:  [11-22] 16  BP: ()/() 118/67  SpO2:  [85 %-100 %] 98 %    Physical Exam:      Gen: No acute distress, alert and oriented x 3  Pulm: Lungs clear bilaterally, good inspiratory effort   CV:  nL S1/S2  Abd: soft, NT/ND, no hepatomegaly, +BS  MSK: moving all extremities, no edema-right groin without hematoma, dressing clean dry and intact  Neuro: no focal deficits  Skin: no rashes/lesions  Psych: normal mood/affect            Diagnostic Data:    Labs:  Recent Labs   Lab 24  23324  1717 24  0415 24  0455   WBC 8.5  --  8.4 13.4* 10.3 8.2   HGB 9.2*  --  8.4* 8.1* 8.0* 7.7*   .2*  --  103.8* 107.4* 107.0* 105.9*   .0*  --  80.0* 77.0* 82.0* 72.0*   INR  --  1.04  --   --   --   --        Recent Labs   Lab 24  0416 24  0455   * 129* 185* 133*   BUN 66* 85* 55* 73*   CREATSERUM 7.26* 8.83* 6.89* 8.98*   CA 8.6 8.4* 8.8 8.5   ALB 3.7  --   --   --    * 133* 135* 130*   K 4.5 4.1 5.4* 4.2   CL 98 99 101 94*   CO2 25.0 27.0 27.0 24.0   ALKPHO 181*  --   --   --    AST  33  --   --   --    ALT 37  --   --   --    BILT 0.7  --   --   --    TP 7.3  --   --   --        CrCl cannot be calculated (Unknown ideal weight.).    Recent Labs   Lab 03/17/24  2329 03/18/24  0953 03/18/24  1803   TROPHS 321* 1,590* 2,138*       Recent Labs   Lab 03/17/24  2335   PTP 13.6   INR 1.04                  Microbiology    No results found for this visit on 03/17/24.      Imaging: Reviewed in Epic.    Medications:    insulin degludec  20 Units Subcutaneous Nightly    clopidogrel  75 mg Oral Daily    rosuvastatin  10 mg Oral Nightly    isosorbide mononitrate ER  60 mg Oral Daily    levothyroxine  150 mcg Oral QOD    pantoprazole  40 mg Oral BID AC    aspirin  81 mg Oral Daily    metoprolol tartrate  25 mg Oral TID Beta Blocker/Cardiac    sevelamer carbonate  800 mg Oral TID CC    insulin aspart  4-20 Units Subcutaneous TID AC and HS    heparin  5,000 Units Subcutaneous Q8H ANNEL       Assessment & Plan:        Patient is 78-year-old male significant past medical history of ESRD on dialysis, known a complex ischemic cardiomyopathy with recently normalized ejection fraction ON echo, CAD with stent to the LAD x 2, history of cirrhosis, history of GI bleed in the past, type 2 diabetes, hypothyroidism presented with chest pain.  Patient stated that chest pain began with associated shortness of breath last night, persisted which is why he came into the emergency room.     # Chest pain likely cardiac in nature, NSTEMI  -Elevated troponin, EKG with new onset left bundle branch block  -Recent drug-eluting stent to the proximal and mid LAD, x 2 in 2022  -Repeat angiogram done shows left main, LAD and left circumflex stenosis  -Titrate beta-blocker per cardiology,  -Echo completed-  -CV surgery and consult, has poor candidate for CABG given ESRD as well as cirrhosis  -Given recurrence of chest pain patient taken for repeat angiogram by Dr. Donald in the effort to do rotablation and left main stenting, completed  -Given  aspirin 325 in the past, aspirin 81 to continue, Plavix,  -Continue statin  -Outpatient patient was on Imdur, hydralazine, carvedilol and amlodipine as well as Ranexa defer these medications to cardiology-  -further angiograms and stent placement per cardiology  -Plan to do third angiogram however awaiting heart rate to stabilize    # New onset A-fib with RVR  -Started on amiodarone bolus as well as drip, heart rate continues to be elevated in the 150s with symptoms  -Continue beta-blocker  Patient currently not on any anticoagulation at this time, will defer anticoagulation to cardiology-Only on prophylactic anticoagulation as well as dual antiplatelet therapy        # ESRD on dialysis  -Last dialysis session was Saturday, resume dialysis as per nephrology  -Dr. Reyna on board     # Ischemic cardiomyopathy  Echo completed that shows an EF of 35%-  -worsened since the prior echo, will need guideline directed medical therapy likely secondary to ischemic disease  -Already on aspirin, statin, beta-blocker, defer ACE as well as Entresto to cardiology     # History of cirrhosis  # HCV antibody positive  -No recent bleed, continue aspirin at this point  -GI was consulted however no acute intervention at this time continue PPI twice daily  -Abdominal ultrasound completed, shows cirrhotic liver however no evidence of any portal hypertension at this time  -GI signed off     # Type 2 diabetes  -Patient on long-acting and short acting insulin at home, resume long-acting insulin, sliding scale insulin titrate as needed  -Titrate his insulin as patient was hyperglycemic  -Glucose much better controlled currently     # Hypothyroidism  -Synthroid     # 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  # Chronic pancytopenia  -Appears to be compensated at this time, platelets with normal limits no recent episodes of any GI bleed  -As above          Lance Aguayo MD    Supplementary Documentation:     Quality:  DVT Mechanical Prophylaxis:    SCDs,    DVT Pharmacologic Prophylaxis   Medication    heparin (Porcine) 1000 UNIT/ML injection 4,000 Units    heparin (Porcine) 5000 UNIT/ML injection 5,000 Units         DVT Pharmacologic prophylaxis: Aspirin 81 mg      Code Status: Full Code  Vásquez: No urinary catheter in place  Vásquez Duration (in days):   Central line:    MOUNA:     The 21st Century Cures Act makes medical notes like these available to patients in the interest of transparency. Please be advised this is a medical document. Medical documents are intended to carry relevant information, facts as evident, and the clinical opinion of the practitioner. The medical note is intended as peer to peer communication and may appear blunt or direct. It is written in medical language and may contain abbreviations or verbiage that are unfamiliar.

## 2024-03-21 NOTE — PROGRESS NOTES
BRIEF DULY GI PROGRESS NOTE    Comp Abd US completed today. Asymptomatic biliary sludge + known cirrhosis w/o severe portal HTN features. No ascites, no overt IHD/EHD dilation noted. No immediate concerns at this time.    HBcAb negative as suspected, prior HBsAb positivity c/w effective vax-derived immunity.    No acute mgmt required at this time, HCV studies still pending. Can f/u outpt w/ Hepatology if HCV studies are positive for active viral load and if treatment discussion desired by pt.    LIVER:  Heterogeneous echotexture with increased echogenicity throughout the liver consistent with hepatic infiltrative process/cirrhosis.  BILIARY:  There is sludge in the gallbladder.  No intrahepatic or common bile duct dilatation.  Common bile duct measures 4 mm.  PANCREAS:  Obscured by bowel gas.  SPLEEN:  Splenomegaly.  Spleen measures 15.3 cm. .  KIDNEYS:  Echogenic renal cortices bilaterally.  No hydronephrosis.  Right kidney measures 9.9 cm, left kidney measures 10.7 cm pole to pole..    AORTA/IVC:  Abdominal aorta and IVC are obscured by bowel gas.     DOPPLER WAVE FORMS  FLOW:  There is flow in the appropriate direction involving the right middle and left hepatic veins and main right and left portal veins.  There is flow in the main hepatic artery.  There is flow in the appropriate direction involving the splenic vein.                   Impression   CONCLUSION:  Cirrhotic morphology to the liver with splenomegaly.     There is flow in the appropriate direction involving the main portal vein, right and left portal veins and middle left and right hepatic veins as well as splenic vein.     Abdominal aorta and IVC are obscured by bowel gas.     Sludge in the gallbladder.     Will sign off for now, call if questions/issues arise.    Berlin Ibrahim MD  [facility]  Department of Gastroenterology

## 2024-03-21 NOTE — PROGRESS NOTES
[facility] Cardiology Progress Note        Jonny Haque Patient Status:  Inpatient    4/15/1945 MRN [id number]   Location [facility] 6NE-A Attending Jem Buckley MD   Hosp Day # 3 PCP Gee Jimenez MD     Subjective:  Still having CP.  Somewhat atypical.  Has improved with morphine.  Groin stable after impella and fem-stop.  No events overnight.;      Medications:   insulin degludec  20 Units Subcutaneous Nightly    clopidogrel  75 mg Oral Daily    rosuvastatin  10 mg Oral Nightly    isosorbide mononitrate ER  60 mg Oral Daily    levothyroxine  150 mcg Oral QOD    pantoprazole  40 mg Oral BID AC    aspirin  81 mg Oral Daily    metoprolol tartrate  25 mg Oral TID Beta Blocker/Cardiac    sevelamer carbonate  800 mg Oral TID CC    insulin aspart  4-20 Units Subcutaneous TID AC and HS    heparin  5,000 Units Subcutaneous Q8H ANNEL       Continuous Infusions:   amiodarone      nitroGLYCERIN in dextrose 5% Stopped (24 1200)         Allergies:  No Known Allergies      Objective:        Intake/Output:      Intake/Output Summary (Last 24 hours) at 3/21/2024 1616  Last data filed at 3/21/2024 0944  Gross per 24 hour   Intake 405 ml   Output --   Net 405 ml     Wt Readings from Last 3 Encounters:   24 225 lb 8 oz (102.3 kg)   23 204 lb 6.4 oz (92.7 kg)   10/25/22 200 lb (90.7 kg)       Physical Exam:        Vitals:    24 1500 24 1600 24 1602 24 1604   BP: 118/67  (!) 77/63 107/74   BP Location:       Pulse: (!) 150 (!) 127 115 116   Resp:    Temp:  98.1 °F (36.7 °C)     TempSrc:  Temporal     SpO2: 98% 94% 93% 95%   Weight:           Temp:  [97.5 °F (36.4 °C)-98.1 °F (36.7 °C)] 98.1 °F (36.7 °C)  Pulse:  [] 116  Resp:  [11-23] 23  BP: ()/() 107/74  SpO2:  [85 %-100 %] 95 %      Temp: 98.1 °F (36.7 °C)  Pulse: 116  Resp: 23  BP: 107/74  General:  Appears comfortable  HEENT: No focal deficits.  Neck: No JVD, carotids  2+ no bruits.  Cardiac: Regular S1S2.  No S3, S4, rub, click.  No murmur.  Lungs: Clear to auscultation and percussion.  Abdomen: Soft, non-tender.   Extremities: No LE edema.  No clubbing or cyanosis.    Neurologic: Alert and oriented, normal affect.  Skin: Warm and dry.           LABS:      HEM:  Recent Labs   Lab 03/17/24 2329 03/19/24 0451 03/19/24  1717 03/20/24 0415 03/21/24  0455   WBC 8.5 8.4 13.4* 10.3 8.2   HGB 9.2* 8.4* 8.1* 8.0* 7.7*   HCT 27.6* 24.4* 24.8* 24.5* 23.3*   .0* 80.0* 77.0* 82.0* 72.0*       Chem:  Recent Labs   Lab 03/17/24 2329 03/19/24 0451 03/20/24 0416 03/21/24 0455   * 133* 135* 130*   K 4.5 4.1 5.4* 4.2   CL 98 99 101 94*   CO2 25.0 27.0 27.0 24.0   BUN 66* 85* 55* 73*   CREATSERUM 7.26* 8.83* 6.89* 8.98*   CA 8.6 8.4* 8.8 8.5   MG  --  1.6  --   --    * 129* 185* 133*       Recent Labs   Lab 03/17/24 2329   ALT 37   AST 33   ALB 3.7       Recent Labs   Lab 03/17/24  2335   PTT 30.9   INR 1.04           No results found for: \"TROP\", \"CKMB\"      Invalid input(s): \"PBNPML\"                       Diagnostics:   Telemetry: SR    EKG, 3/18/2024,         Echo:  3/18/24:    1. Left ventricle: The cavity size was normal. Wall thickness was normal.      Systolic function was moderately reduced. The estimated ejection fraction      was 35%, by visual assessment. Hypokinesis of the anterior wall.      Hypokinesis of the apical wall. Hypokinesis of the anteroseptal wall.      Left ventricular diastolic function parameters were normal for the      patient's age.   2. Left atrium: The left atrial volume was mildly increased.   3. Aortic valve: Transvalvular velocity was increased. The findings were      consistent with moderate stenosis. The peak systolic velocity was      2.4m/sec. The mean systolic gradient was 17mm Hg. The valve area (VTI)      was 1.34cm^2. The valve area (VTI) index was 0.6cm^2/m^2.   4. Mitral valve: There was mild regurgitation.   5. Pulmonary  arteries: Systolic pressure was within the normal range,      estimated to be 25mm Hg.         Cardiac Cath. 3/18/24      90% ostial LM  90% OM1  75% proximal LAD  Moderate, diffuse disease           Impression:      1. CAD:     - s/p rotablation and TRACY to 90% proximal LAD stenosis and rotablation and TRACY to 90% mid LAD stenosis to 0% residual 1/24/22 .       -cath, 3/18/24:  LM, LAD, LCx stenoses as above      2. Ischemic Cardiomyopathy.  LVEF has declined to 35% .  May reflect stunning   3. LBBB (previous IVCD, atypical LBBB)  4. Moderate aortic stenosis  5. GIB, ASA intolerance.  GI eval's in past have been unrevealing.  No varices seen  6. ESRD, HD dependent  7. DM  8. HTN    Plan:  DAPT.  Consider PCI to OM, maybe tomorrow schedule permitting.  Nephrology for ESRD.  ICU for now.  Stable post complex impella/rota PCI.    [facility] MD      >35 min critical care time spent on this complex CCU patient

## 2024-03-21 NOTE — PROGRESS NOTES
[facility] Cardiology Progress Note        Jonny Haque Patient Status:  Inpatient    4/15/1945 MRN [id number]   Location [facility] 6NE-A Attending Jem Buckley MD   Hosp Day # 3 PCP Gee Jimenez MD     Subjective:  Some CP today.  Pleurtic mainly.  Breathing comfortably.  Afib since this AM.  Amio bolus and drip started.      Medications:   insulin degludec  20 Units Subcutaneous Nightly    clopidogrel  75 mg Oral Daily    rosuvastatin  10 mg Oral Nightly    isosorbide mononitrate ER  60 mg Oral Daily    levothyroxine  150 mcg Oral QOD    pantoprazole  40 mg Oral BID AC    aspirin  81 mg Oral Daily    metoprolol tartrate  25 mg Oral TID Beta Blocker/Cardiac    sevelamer carbonate  800 mg Oral TID CC    insulin aspart  4-20 Units Subcutaneous TID AC and HS    heparin  5,000 Units Subcutaneous Q8H ANNEL       Continuous Infusions:   amiodarone      nitroGLYCERIN in dextrose 5% Stopped (24 1200)         Allergies:  No Known Allergies      Objective:        Intake/Output:      Intake/Output Summary (Last 24 hours) at 3/21/2024 1611  Last data filed at 3/21/2024 0944  Gross per 24 hour   Intake 405 ml   Output --   Net 405 ml     Wt Readings from Last 3 Encounters:   24 225 lb 8 oz (102.3 kg)   23 204 lb 6.4 oz (92.7 kg)   10/25/22 200 lb (90.7 kg)       Physical Exam:        Vitals:    24 1200 24 1300 24 1400 24 1500   BP: 129/72 138/71 (!) 123/104 118/67   BP Location: Left arm      Pulse: (!) 157 (!) 143 (!) 131 (!) 150   Resp: 15 16 17 16   Temp: 97.8 °F (36.6 °C)      TempSrc: Temporal      SpO2: 100% 100% 100% 98%   Weight:           Temp:  [97.5 °F (36.4 °C)-97.9 °F (36.6 °C)] 97.8 °F (36.6 °C)  Pulse:  [] 150  Resp:  [11-22] 16  BP: ()/() 118/67  SpO2:  [85 %-100 %] 98 %      Temp: 97.8 °F (36.6 °C)  Pulse: 150  Resp: 16  BP: 118/67  General:  Appears comfortable  HEENT: No focal deficits.  Neck: No JVD,  carotids 2+ no bruits.  Cardiac: Regular S1S2.  No S3, S4, rub, click.  No murmur.  Lungs: Clear to auscultation and percussion.  Abdomen: Soft, non-tender.   Extremities: No LE edema.  No clubbing or cyanosis.    Neurologic: Alert and oriented, normal affect.  Skin: Warm and dry.           LABS:      HEM:  Recent Labs   Lab 03/17/24 2329 03/19/24 0451 03/19/24  1717 03/20/24 0415 03/21/24 0455   WBC 8.5 8.4 13.4* 10.3 8.2   HGB 9.2* 8.4* 8.1* 8.0* 7.7*   HCT 27.6* 24.4* 24.8* 24.5* 23.3*   .0* 80.0* 77.0* 82.0* 72.0*       Chem:  Recent Labs   Lab 03/17/24 2329 03/19/24 0451 03/20/24 0416 03/21/24 0455   * 133* 135* 130*   K 4.5 4.1 5.4* 4.2   CL 98 99 101 94*   CO2 25.0 27.0 27.0 24.0   BUN 66* 85* 55* 73*   CREATSERUM 7.26* 8.83* 6.89* 8.98*   CA 8.6 8.4* 8.8 8.5   MG  --  1.6  --   --    * 129* 185* 133*       Recent Labs   Lab 03/17/24 2329   ALT 37   AST 33   ALB 3.7       Recent Labs   Lab 03/17/24 2335   PTT 30.9   INR 1.04           No results found for: \"TROP\", \"CKMB\"      Invalid input(s): \"PBNPML\"                       Diagnostics:   Telemetry: SR    EKG, 3/18/2024,         Echo:  3/18/24:    1. Left ventricle: The cavity size was normal. Wall thickness was normal.      Systolic function was moderately reduced. The estimated ejection fraction      was 35%, by visual assessment. Hypokinesis of the anterior wall.      Hypokinesis of the apical wall. Hypokinesis of the anteroseptal wall.      Left ventricular diastolic function parameters were normal for the      patient's age.   2. Left atrium: The left atrial volume was mildly increased.   3. Aortic valve: Transvalvular velocity was increased. The findings were      consistent with moderate stenosis. The peak systolic velocity was      2.4m/sec. The mean systolic gradient was 17mm Hg. The valve area (VTI)      was 1.34cm^2. The valve area (VTI) index was 0.6cm^2/m^2.   4. Mitral valve: There was mild regurgitation.   5.  Pulmonary arteries: Systolic pressure was within the normal range,      estimated to be 25mm Hg.         Cardiac Cath. 3/18/24      90% ostial LM  90% OM1  75% proximal LAD  Moderate, diffuse disease           Impression:      1. CAD:     - s/p rotablation and TRACY to 90% proximal LAD stenosis and rotablation and TRACY to 90% mid LAD stenosis to 0% residual 1/24/22 .       -cath, 3/18/24:  LM, LAD, LCx stenoses as above      2. Ischemic Cardiomyopathy.  LVEF has declined to 35% .  May reflect stunning   3. LBBB (previous IVCD, atypical LBBB)  4. Moderate aortic stenosis  5. GIB, ASA intolerance.  GI eval's in past have been unrevealing.  No varices seen  6. ESRD, HD dependent  7. DM  8. HTN    Plan:  Consider PCI to OM tomorrow.  Amiodarone for AF.  Dilt drip, low dose, if needed for rate control.  DAPT.  Metoprolol 25 TID for now.  Will assess LV for recovery as an outpatient.    CARLEY DODGE    >35 min critical care time spent on this complex CCU patient.

## 2024-03-22 ENCOUNTER — APPOINTMENT (OUTPATIENT)
Dept: INTERVENTIONAL RADIOLOGY/VASCULAR | Facility: HOSPITAL | Age: 79
End: 2024-03-22
Attending: INTERNAL MEDICINE
Payer: MEDICARE

## 2024-03-22 LAB
ANION GAP SERPL CALC-SCNC: 11 MMOL/L (ref 0–18)
ATRIAL RATE: 133 BPM
ATRIAL RATE: 144 BPM
BUN BLD-MCNC: 55 MG/DL (ref 9–23)
CALCIUM BLD-MCNC: 8.5 MG/DL (ref 8.5–10.1)
CHLORIDE SERPL-SCNC: 100 MMOL/L (ref 98–112)
CO2 SERPL-SCNC: 26 MMOL/L (ref 21–32)
CREAT BLD-MCNC: 7.2 MG/DL
EGFRCR SERPLBLD CKD-EPI 2021: 7 ML/MIN/1.73M2 (ref 60–?)
ERYTHROCYTE [DISTWIDTH] IN BLOOD BY AUTOMATED COUNT: 16.3 %
GLUCOSE BLD-MCNC: 108 MG/DL (ref 70–99)
GLUCOSE BLD-MCNC: 127 MG/DL (ref 70–99)
GLUCOSE BLD-MCNC: 146 MG/DL (ref 70–99)
GLUCOSE BLD-MCNC: 162 MG/DL (ref 70–99)
GLUCOSE BLD-MCNC: 226 MG/DL (ref 70–99)
HCT VFR BLD AUTO: 22.7 %
HGB BLD-MCNC: 7.3 G/DL
MCH RBC QN AUTO: 34.6 PG (ref 26–34)
MCHC RBC AUTO-ENTMCNC: 32.2 G/DL (ref 31–37)
MCV RBC AUTO: 107.6 FL
OSMOLALITY SERPL CALC.SUM OF ELEC: 300 MOSM/KG (ref 275–295)
PLATELET # BLD AUTO: 82 10(3)UL (ref 150–450)
POTASSIUM SERPL-SCNC: 4.3 MMOL/L (ref 3.5–5.1)
Q-T INTERVAL: 350 MS
Q-T INTERVAL: 352 MS
QRS DURATION: 134 MS
QRS DURATION: 136 MS
QTC CALCULATION (BEZET): 530 MS
QTC CALCULATION (BEZET): 533 MS
R AXIS: 11 DEGREES
R AXIS: 12 DEGREES
RBC # BLD AUTO: 2.11 X10(6)UL
SODIUM SERPL-SCNC: 137 MMOL/L (ref 136–145)
T AXIS: 194 DEGREES
T AXIS: 199 DEGREES
VENTRICULAR RATE: 138 BPM
VENTRICULAR RATE: 138 BPM
WBC # BLD AUTO: 7.9 X10(3) UL (ref 4–11)

## 2024-03-22 PROCEDURE — 99232 SBSQ HOSP IP/OBS MODERATE 35: CPT | Performed by: INTERNAL MEDICINE

## 2024-03-22 PROCEDURE — B210YZZ FLUOROSCOPY OF SINGLE CORONARY ARTERY USING OTHER CONTRAST: ICD-10-PCS | Performed by: INTERNAL MEDICINE

## 2024-03-22 PROCEDURE — 02F03ZZ FRAGMENTATION IN CORONARY ARTERY, ONE ARTERY, PERCUTANEOUS APPROACH: ICD-10-PCS | Performed by: INTERNAL MEDICINE

## 2024-03-22 PROCEDURE — 027034Z DILATION OF CORONARY ARTERY, ONE ARTERY WITH DRUG-ELUTING INTRALUMINAL DEVICE, PERCUTANEOUS APPROACH: ICD-10-PCS | Performed by: INTERNAL MEDICINE

## 2024-03-22 PROCEDURE — 4A023N7 MEASUREMENT OF CARDIAC SAMPLING AND PRESSURE, LEFT HEART, PERCUTANEOUS APPROACH: ICD-10-PCS | Performed by: INTERNAL MEDICINE

## 2024-03-22 RX ORDER — IODIXANOL 320 MG/ML
100 INJECTION, SOLUTION INTRAVASCULAR
Status: COMPLETED | OUTPATIENT
Start: 2024-03-22 | End: 2024-03-22

## 2024-03-22 RX ORDER — CLOPIDOGREL BISULFATE 75 MG/1
75 TABLET ORAL DAILY
Status: DISCONTINUED | OUTPATIENT
Start: 2024-03-23 | End: 2024-03-24

## 2024-03-22 RX ORDER — SODIUM CHLORIDE 9 MG/ML
INJECTION, SOLUTION INTRAVENOUS CONTINUOUS
Status: ACTIVE | OUTPATIENT
Start: 2024-03-22 | End: 2024-03-22

## 2024-03-22 RX ORDER — HYDROMORPHONE HYDROCHLORIDE 1 MG/ML
0.4 INJECTION, SOLUTION INTRAMUSCULAR; INTRAVENOUS; SUBCUTANEOUS EVERY 2 HOUR PRN
Status: DISCONTINUED | OUTPATIENT
Start: 2024-03-22 | End: 2024-03-24

## 2024-03-22 RX ORDER — LIDOCAINE HYDROCHLORIDE 10 MG/ML
INJECTION, SOLUTION EPIDURAL; INFILTRATION; INTRACAUDAL; PERINEURAL
Status: COMPLETED
Start: 2024-03-22 | End: 2024-03-22

## 2024-03-22 RX ORDER — HEPARIN SODIUM 1000 [USP'U]/ML
INJECTION, SOLUTION INTRAVENOUS; SUBCUTANEOUS
Status: COMPLETED
Start: 2024-03-22 | End: 2024-03-22

## 2024-03-22 RX ORDER — PROTAMINE SULFATE 10 MG/ML
INJECTION, SOLUTION INTRAVENOUS
Status: COMPLETED
Start: 2024-03-22 | End: 2024-03-22

## 2024-03-22 RX ORDER — AMIODARONE HYDROCHLORIDE 200 MG/1
200 TABLET ORAL 2 TIMES DAILY WITH MEALS
Status: DISCONTINUED | OUTPATIENT
Start: 2024-03-22 | End: 2024-03-24

## 2024-03-22 RX ORDER — MIDAZOLAM HYDROCHLORIDE 1 MG/ML
INJECTION INTRAMUSCULAR; INTRAVENOUS
Status: COMPLETED
Start: 2024-03-22 | End: 2024-03-22

## 2024-03-22 RX ORDER — HEPARIN SODIUM 5000 [USP'U]/ML
INJECTION, SOLUTION INTRAVENOUS; SUBCUTANEOUS
Status: COMPLETED
Start: 2024-03-22 | End: 2024-03-22

## 2024-03-22 RX ORDER — HYDROMORPHONE HYDROCHLORIDE 1 MG/ML
0.8 INJECTION, SOLUTION INTRAMUSCULAR; INTRAVENOUS; SUBCUTANEOUS EVERY 2 HOUR PRN
Status: DISCONTINUED | OUTPATIENT
Start: 2024-03-22 | End: 2024-03-24

## 2024-03-22 RX ORDER — HYDROMORPHONE HYDROCHLORIDE 1 MG/ML
0.2 INJECTION, SOLUTION INTRAMUSCULAR; INTRAVENOUS; SUBCUTANEOUS EVERY 2 HOUR PRN
Status: DISCONTINUED | OUTPATIENT
Start: 2024-03-22 | End: 2024-03-24

## 2024-03-22 RX ORDER — ASPIRIN 81 MG/1
81 TABLET ORAL DAILY
Status: DISCONTINUED | OUTPATIENT
Start: 2024-03-23 | End: 2024-03-24

## 2024-03-22 NOTE — CARDIAC REHAB
CAD education completed.  Pt/family to call if patient able to do outpatient cardiac rehab  Phone number left.

## 2024-03-22 NOTE — PROGRESS NOTES
[facility] Cardiology Progress Note        Jonny Haque Patient Status:  Inpatient    4/15/1945 MRN [id number]   Location [facility] 6NE-A Attending Jem Buckley MD   Hosp Day # 4 PCP Gee Jimenez MD     Subjective:  Some CP today.  Breathing comfortably.  AF converted.      Medications:   [START ON 3/23/2024] epoetin euncie  10,000 Units Intravenous Once    amiodarone  200 mg Oral BID with meals    insulin degludec  20 Units Subcutaneous Nightly    clopidogrel  75 mg Oral Daily    rosuvastatin  10 mg Oral Nightly    isosorbide mononitrate ER  60 mg Oral Daily    levothyroxine  150 mcg Oral QOD    pantoprazole  40 mg Oral BID AC    aspirin  81 mg Oral Daily    metoprolol tartrate  25 mg Oral TID Beta Blocker/Cardiac    sevelamer carbonate  800 mg Oral TID CC    insulin aspart  4-20 Units Subcutaneous TID AC and HS    heparin  5,000 Units Subcutaneous Q8H ANNEL       Continuous Infusions:   nitroGLYCERIN in dextrose 5% Stopped (24 1200)         Allergies:  No Known Allergies      Objective:        Intake/Output:      Intake/Output Summary (Last 24 hours) at 3/22/2024 1827  Last data filed at 3/22/2024 1100  Gross per 24 hour   Intake 207 ml   Output 175 ml   Net 32 ml     Wt Readings from Last 3 Encounters:   24 225 lb 8 oz (102.3 kg)   23 204 lb 6.4 oz (92.7 kg)   10/25/22 200 lb (90.7 kg)       Physical Exam:        Vitals:    24 1200 24 1300 24 1400 24 1500   BP: 110/57 124/54 116/58 120/51   BP Location: Right arm      Pulse: 64 66 66 67   Resp: 18 16 14 14   Temp: 98 °F (36.7 °C)      TempSrc: Temporal      SpO2: 100% 100% 100% 100%   Weight:           Temp:  [97.3 °F (36.3 °C)-98.2 °F (36.8 °C)] 98 °F (36.7 °C)  Pulse:  [] 67  Resp:  [12-22] 14  BP: ()/() 120/51  SpO2:  [90 %-100 %] 100 %      Temp: 98 °F (36.7 °C)  Pulse: 67  Resp: 14  BP: 120/51  General:  Appears comfortable  HEENT: No focal deficits.  Neck:  No JVD, carotids 2+ no bruits.  Cardiac: Regular S1S2.  No S3, S4, rub, click.  No murmur.  Lungs: Clear to auscultation and percussion.  Abdomen: Soft, non-tender.   Extremities: No LE edema.  No clubbing or cyanosis.    Neurologic: Alert and oriented, normal affect.  Skin: Warm and dry.           LABS:      HEM:  Recent Labs   Lab 03/19/24 0451 03/19/24  1717 03/20/24 0415 03/21/24 0455 03/22/24  0431   WBC 8.4 13.4* 10.3 8.2 7.9   HGB 8.4* 8.1* 8.0* 7.7* 7.3*   HCT 24.4* 24.8* 24.5* 23.3* 22.7*   PLT 80.0* 77.0* 82.0* 72.0* 82.0*       Chem:  Recent Labs   Lab 03/17/24 2329 03/19/24 0451 03/20/24 0416 03/21/24 0455 03/22/24  0431   * 133* 135* 130* 137   K 4.5 4.1 5.4* 4.2 4.3   CL 98 99 101 94* 100   CO2 25.0 27.0 27.0 24.0 26.0   BUN 66* 85* 55* 73* 55*   CREATSERUM 7.26* 8.83* 6.89* 8.98* 7.20*   CA 8.6 8.4* 8.8 8.5 8.5   MG  --  1.6  --   --   --    * 129* 185* 133* 108*       Recent Labs   Lab 03/17/24 2329   ALT 37   AST 33   ALB 3.7       Recent Labs   Lab 03/17/24  2335   PTT 30.9   INR 1.04           No results found for: \"TROP\", \"CKMB\"      Invalid input(s): \"PBNPML\"                       Diagnostics:   Telemetry: SR    EKG, 3/18/2024,         Echo:  3/18/24:    1. Left ventricle: The cavity size was normal. Wall thickness was normal.      Systolic function was moderately reduced. The estimated ejection fraction      was 35%, by visual assessment. Hypokinesis of the anterior wall.      Hypokinesis of the apical wall. Hypokinesis of the anteroseptal wall.      Left ventricular diastolic function parameters were normal for the      patient's age.   2. Left atrium: The left atrial volume was mildly increased.   3. Aortic valve: Transvalvular velocity was increased. The findings were      consistent with moderate stenosis. The peak systolic velocity was      2.4m/sec. The mean systolic gradient was 17mm Hg. The valve area (VTI)      was 1.34cm^2. The valve area (VTI) index was  0.6cm^2/m^2.   4. Mitral valve: There was mild regurgitation.   5. Pulmonary arteries: Systolic pressure was within the normal range,      estimated to be 25mm Hg.         Cardiac Cath. 3/18/24      90% ostial LM  90% OM1  75% proximal LAD  Moderate, diffuse disease           Impression:      1. CAD:     - s/p rotablation and TRACY to 90% proximal LAD stenosis and rotablation and TRACY to 90% mid LAD stenosis to 0% residual 1/24/22 .       -cath, 3/18/24:  LM, LAD, LCx stenoses as above      2. Ischemic Cardiomyopathy.  LVEF has declined to 35% .  May reflect stunning   3. LBBB (previous IVCD, atypical LBBB)  4. Moderate aortic stenosis  5. GIB, ASA intolerance.  GI eval's in past have been unrevealing.  No varices seen  6. ESRD, HD dependent  7. DM  8. HTN    Plan:  Consider PCI to OM today.  Amiodarone PO for AF.  DAPT.  Metoprolol 25 TID for now.  Will assess LV for recovery as an outpatient.    Prob xfer to floor tomorrow if stable and DC home Sunday if no issues post PCI.    CARLEY DODGE    >35 min critical care time spent on this complex CCU patient.

## 2024-03-22 NOTE — PLAN OF CARE
Assumed care of pt at 0723. Pt A/Ox4. [place] in R ear per pt's son. On 2L NC pt unable to be weaned off. LS clear/dim. No chest pain or SOB. NSR w/BBB on tele. Pt normotensive. Pt NPO for cath lab procedure with Dr Donald. Pt bladder scanned 400ml since. Pt voided 175ml tea colored urine. HD is ordered for tomorrow 3/23. Rt groin site CDI, soft with bruising, no hematoma ARTURO. Abhilash pedal pulses palpable. +CMS. Pt's son Carmine at the bedside. Pt and pt's family updated with POC.      1845 Received pt from cath lab. L groin perclose. CDI, occlusive dressing. Soft, tender, no hematoma. L pedal palpable. Pt's sons at the bedside.     Problem: CARDIOVASCULAR - ADULT  Goal: Maintains optimal cardiac output and hemodynamic stability  Description: INTERVENTIONS:  - Monitor vital signs, rhythm, and trends  - Monitor for bleeding, hypotension and signs of decreased cardiac output  - Evaluate effectiveness of vasoactive medications to optimize hemodynamic stability  - Monitor arterial and/or venous puncture sites for bleeding and/or hematoma  - Assess quality of pulses, skin color and temperature  - Assess for signs of decreased coronary artery perfusion - ex. Angina  - Evaluate fluid balance, assess for edema, trend weights  Outcome: Progressing  Goal: Absence of cardiac arrhythmias or at baseline  Description: INTERVENTIONS:  - Continuous cardiac monitoring, monitor vital signs, obtain 12 lead EKG if indicated  - Evaluate effectiveness of antiarrhythmic and heart rate control medications as ordered  - Initiate emergency measures for life threatening arrhythmias  - Monitor electrolytes and administer replacement therapy as ordered  Outcome: Progressing     Problem: PAIN - ADULT  Goal: Verbalizes/displays adequate comfort level or patient's stated pain goal  Description: INTERVENTIONS:  - Encourage pt to monitor pain and request assistance  - Assess pain using appropriate pain scale  - Administer analgesics based on type and severity  of pain and evaluate response  - Implement non-pharmacological measures as appropriate and evaluate response  - Consider cultural and social influences on pain and pain management  - Manage/alleviate anxiety  - Utilize distraction and/or relaxation techniques  - Monitor for opioid side effects  - Notify MD/LIP if interventions unsuccessful or patient reports new pain  - Anticipate increased pain with activity and pre-medicate as appropriate  Outcome: Progressing     Problem: SAFETY ADULT - FALL  Goal: Free from fall injury  Description: INTERVENTIONS:  - Assess pt frequently for physical needs  - Identify cognitive and physical deficits and behaviors that affect risk of falls.  - [place] fall precautions as indicated by assessment.  - Educate pt/family on patient safety including physical limitations  - Instruct pt to call for assistance with activity based on assessment  - Modify environment to reduce risk of injury  - Provide assistive devices as appropriate  - Consider OT/PT consult to assist with strengthening/mobility  - Encourage toileting schedule  Outcome: Progressing     Problem: DISCHARGE PLANNING  Goal: Discharge to home or other facility with appropriate resources  Description: INTERVENTIONS:  - Identify barriers to discharge w/pt and caregiver  - Include patient/family/discharge partner in discharge planning  - Arrange for needed discharge resources and transportation as appropriate  - Identify discharge learning needs (meds, wound care, etc)  - Arrange for interpreters to assist at discharge as needed  - Consider post-discharge preferences of patient/family/discharge partner  - Complete POLST form as appropriate  - Assess patient's ability to be responsible for managing their own health  - Refer to Case Management Department for coordinating discharge planning if the patient needs post-hospital services based on physician/LIP order or complex needs related to functional status, cognitive ability or  social support system  Outcome: Progressing

## 2024-03-22 NOTE — PROGRESS NOTES
[facility]  Nephrology Progress Note    Jonny Haque Attending:  Lance Aguayo MD       Assessment and Plan:    1) ESRD- due to longstanding DM 2; on HD since . Lytes / volume OK- next HD Sat per usual routine     2) CAD with preserved EF- s/p complex PCI / stents LAD; for PCI OM today per Dr. Donald     3) h/o hep C with cirrhosis / varices / h/o GIB- US / doppler unremarkable; hep C PCR pending     4) DM 2 / HTN     5) Anemia- due to CKD. Fe stores OK; continue EPO with HD    D/W son at bedside.       Subjective:  Pt awake alert no issues overnight notes reviewed    Physical Exam:   /67   Pulse 70   Temp 97.3 °F (36.3 °C) (Temporal)   Resp 15   Wt 225 lb 8 oz (102.3 kg)   SpO2 100%   BMI 30.58 kg/m²   Temp (24hrs), Av.8 °F (36.6 °C), Min:97.3 °F (36.3 °C), Max:98.2 °F (36.8 °C)       Intake/Output Summary (Last 24 hours) at 3/22/2024 0711  Last data filed at 3/21/2024 1800  Gross per 24 hour   Intake 1206 ml   Output 1500 ml   Net -294 ml     Wt Readings from Last 3 Encounters:   24 225 lb 8 oz (102.3 kg)   23 204 lb 6.4 oz (92.7 kg)   10/25/22 200 lb (90.7 kg)     General: awake  HEENT: No scleral icterus, MMM  Neck: Supple, no JADE or thyromegaly  Cardiac: Regular rate and rhythm, S1, S2 normal, no murmur or tub  Lungs: Decreased BS at bases bilaterally   Abdomen: Soft, non-tender. + bowel sounds, no palpable organomegaly  Extremities: Without clubbing, cyanosis; no edema  Neurologic: Cranial nerves grossly intact, moving all extremities  Skin: Warm and dry, no rashes       Labs:   Lab Results   Component Value Date    WBC 7.9 2024    HGB 7.3 2024    HCT 22.7 2024    PLT 82.0 2024    CREATSERUM 7.20 2024    BUN 55 2024     2024    K 4.3 2024     2024    CO2 26.0 2024     2024    CA 8.5 2024    PGLU 127 2024       Imaging:  All imaging studies reviewed.    Meds:            Questions/concerns were discussed with patient and/or family by bedside.          Durga Fernando MD  3/22/2024  711 AM

## 2024-03-22 NOTE — PAYOR COMM NOTE
--------------  CONTINUED STAY REVIEW    Payor: [employer] MEDICARE ADV PPO  Subscriber #:  [id number]  Authorization Number: [id number]    Admit date: 3/18/24  Admit time:  1:17 AM    Admitting Physician: Jem Buckley MD  Attending Physician:  Lance Aguayo MD  Primary Care Physician: Gee Jimenez MD    REVIEW DOCUMENTATION:    3/21/24 Hospitalist    Patient has looked into A-fib with RVR today, started on amiodarone bolus as well as drip, currently much more awake and alert, son at bedside feels like the morphine yesterday had needed more out of it.  Seen while patient was getting dialysis no plan for any procedure today  Given A-fib      Cardiologist    1. CAD:      - s/p rotablation and TRACY to 90% proximal LAD stenosis and rotablation and TRACY to 90% mid LAD stenosis to 0% residual 1/24/22 .        -cath, 3/18/24:  LM, LAD, LCx stenoses as above        2. Ischemic Cardiomyopathy.  LVEF has declined to 35% .  May reflect stunning   3. LBBB (previous IVCD, atypical LBBB)  4. Moderate aortic stenosis  5. GIB, ASA intolerance.  GI eval's in past have been unrevealing.  No varices seen  6. ESRD, HD dependent  7. DM  8. HTN     Plan:  Consider PCI to OM tomorrow.  Amiodarone for AF.  Dilt drip, low dose, if needed for rate control.  DAPT.  Metoprolol 25 TID for now.  Will assess LV for recovery as an outpatient.        3/22/ Nursing     Assumed care of pt at approximately 1930. NSR with BBB on tele. On 2L NC overnight, successfully weaned to room air while awake. Pt NPO at midnight for possible stent today. Amio gtt remains infusing. R groin site remains bruised, dressing has old drainage. Son, Ashwin, called this RN and was updated on POC. Pt had short burst of Afib RVR earlier this morning, HR in 120s - 130s, but quickly converted back to NSR.     3/22/24 Nephrology     Assessment and Plan:     1) ESRD- due to longstanding DM 2; on HD since 7/21. Lytes / volume OK- next HD Sat per usual routine     2)  CAD with preserved EF- s/p complex PCI / stents LAD; for PCI OM today per Dr. Donald     3) h/o hep C with cirrhosis / varices / h/o GIB- US / doppler unremarkable; hep C PCR pending     4) DM 2 / HTN     5) Anemia- due to CKD. Fe stores OK; continue EPO with HD            MEDICATIONS ADMINISTERED IN LAST 1 DAY:  amiodarone (Cordarone) 450 mg in dextrose 5% 250 mL infusion       Date Action Dose Route User    3/22/2024 0400 Rate/Dose Verify 0.5 mg/min Intravenous Victoriano Clay RN    3/22/2024 0000 Rate/Dose Verify 0.5 mg/min Intravenous Victoriano Clay RN    3/21/2024 2051 New Bag 0.5 mg/min Intravenous Victoriano Clay RN    3/21/2024 1800 Rate/Dose Change 0.5 mg/min Intravenous Roseline Velasquez RN          amiodarone (Pacerone) tab 200 mg       Date Action Dose Route User    3/22/2024 1138 Given 200 mg Oral Roseline Velasquez RN          amiodarone (Cordarone) 150 mg in dextrose 5% 100 mL IV bolus       Date Action Dose Route User    3/21/2024 1505 New Bag 150 mg Intravenous Roseline Velasquez RN          aspirin DR tab 81 mg       Date Action Dose Route User    3/22/2024 0836 Given 81 mg Oral Roseline Velasquez RN          clopidogrel (Plavix) tab 75 mg       Date Action Dose Route User    3/22/2024 0836 Given 75 mg Oral Roseline Velasquez RN          heparin (Porcine) 1000 UNIT/ML injection 4,000 Units       Date Action Dose Route User    3/21/2024 1407 Given 4,000 Units Intravenous Roseline Velasquez RN          heparin (Porcine) 5000 UNIT/ML injection 5,000 Units       Date Action Dose Route User    3/22/2024 0538 Given 5,000 Units Subcutaneous (Right Lower Abdomen) Victoriano Clay RN    3/21/2024 2232 Given 5,000 Units Subcutaneous (Right Upper Abdomen) Victoriano Clay RN    3/21/2024 1407 Given 5,000 Units Subcutaneous (Left Lower Abdomen) Roseline Velasquez RN          insulin aspart (NovoLOG) 100 Units/mL FlexPen 4-20 Units       Date Action Dose Route User    3/21/2024 2047  Given 12 Units Subcutaneous (Right Upper Arm) Victoriano Clay RN    3/21/2024 1814 Given 16 Units Subcutaneous (Left Lower Abdomen) Roseline Velasquez RN    3/21/2024 1302 Given 4 Units Subcutaneous (Left Upper Arm) Yasmine Paris RN          insulin degludec 100 units/mL flextouch 20 Units       Date Action Dose Route User    3/21/2024 2048 Given 20 Units Subcutaneous (Left Upper Arm) Victoriano Clay RN          isosorbide mononitrate ER (Imdur) 24 hr tab 60 mg       Date Action Dose Route User    3/22/2024 0836 Given 60 mg Oral Roseline Velasquez RN          levothyroxine (Synthroid) tab 150 mcg       Date Action Dose Route User    3/22/2024 0537 Given 150 mcg Oral Victoriano Clay RN          metoprolol tartrate (Lopressor) tab 25 mg       Date Action Dose Route User    3/22/2024 0537 Given 25 mg Oral Victoriano Clay RN    3/21/2024 2232 Given 25 mg Oral Victoriano Clay RN    3/21/2024 1407 Given 25 mg Oral Roseline Velasquez RN          ondansetron (Zofran) 4 MG/2ML injection 4 mg       Date Action Dose Route User    3/21/2024 1703 Given 4 mg Intravenous Roseline Velasquez RN          pantoprazole (Protonix) DR tab 40 mg       Date Action Dose Route User    3/22/2024 0537 Given 40 mg Oral Victoriano Clay RN    3/21/2024 1703 Given 40 mg Oral Roseline Velasquez RN          polyethylene glycol (PEG 3350) (Miralax) 17 g oral packet 17 g       Date Action Dose Route User    3/21/2024 1710 Given 17 g Oral Roseline Velasquez RN          rosuvastatin (Crestor) tab 10 mg       Date Action Dose Route User    3/21/2024 2046 Given 10 mg Oral Victoriano Clay RN          sevelamer carbonate (Renvela) tab 800 mg       Date Action Dose Route User    3/22/2024 0836 Given 800 mg Oral Roseline Velasquez RN    3/21/2024 1703 Given 800 mg Oral Roseline Velasquez RN    3/21/2024 1418 Given 800 mg Oral Roseline Velasquez, RN            Vitals (last day)       Date/Time Temp Pulse Resp BP SpO2  Weight O2 Device O2 Flow Rate (L/min) [facility]    03/22/24 1200 98 °F (36.7 °C) 64 18 110/57 100 % -- Nasal cannula 2 L/min     03/22/24 1133 -- 65 12 122/52 -- -- -- --     03/22/24 1133 -- -- -- -- 100 % -- Nasal cannula 2 L/min     03/22/24 1000 -- 66 14 132/66 100 % -- -- --     03/22/24 0900 -- 69 22 129/63 100 % -- -- --     03/22/24 0800 98.1 °F (36.7 °C) 71 16 128/65 100 % -- Nasal cannula 2 L/min     03/22/24 0700 -- 70 15 126/67 100 % -- -- -- CR    03/22/24 0642 -- 70 15 -- 90 % -- Nasal cannula 2 L/min     03/22/24 0600 -- 129 13 136/100 92 % -- -- -- CR    03/22/24 0536 -- 132 13 -- 95 % -- -- -- CR    03/22/24 0500 -- 68 14 120/64 94 % -- -- -- CR    03/22/24 0410 -- 70 16 133/63 97 % -- -- -- CR    03/22/24 0400 97.3 °F (36.3 °C) 66 14 133/63 100 % -- None (Room air) -- CR    03/22/24 0300 -- 65 16 118/59 100 % -- -- -- CR    03/22/24 0200 -- 64 13 108/55 100 % -- -- -- CR    03/22/24 0110 -- 63 14 87/47 100 % -- -- -- CR    03/22/24 0100 -- 63 15 83/47 100 % -- -- -- CR    03/22/24 0000 97.5 °F (36.4 °C) 65 14 122/63 97 % -- Nasal cannula 2 L/min CR    03/21/24 2300 -- 63 15 90/60 99 % -- -- -- CR    03/21/24 2200 -- 67 17 122/67 95 % -- -- -- CR    03/21/24 2100 -- 67 13 104/90 100 % -- -- -- CR    03/21/24 2000 98.2 °F (36.8 °C) 66 15 109/60 98 % -- Nasal cannula 2 L/min CR    03/21/24 1900 -- 67 13 110/62 98 % -- -- -- JJ    03/21/24 1800 -- 67 17 97/52 96 % -- -- -- JJ    03/21/24 1700 -- 69 18 100/60 98 % -- -- -- JJ    03/21/24 1646 -- 66 15 -- 98 % -- -- -- J    03/21/24 1604 -- 116 23 107/74 95 % -- -- -- J    03/21/24 1602 -- 115 21 77/63 93 % -- Nasal cannula 2 L/min J    03/21/24 1600 98.1 °F (36.7 °C) 127 14 -- 94 % -- None (Room air) -- J    03/21/24 1500 -- 150 16 118/67 98 % -- -- -- J    03/21/24 1430 -- 143 19 99/78 100 % -- -- -- J    03/21/24 1400 -- 131 17 123/104 100 % -- -- -- J    03/21/24 1345 -- 144 16 112/70 97 % -- -- -- J    03/21/24 1300 -- 143 16 138/71  100 % -- -- -- JJ    03/21/24 1200 97.8 °F (36.6 °C) 157 15 129/72 100 % -- Nasal cannula -- JJ    03/21/24 1145 -- 140 14 109/74 93 % -- -- -- JJ    03/21/24 1130 -- 142 21 111/84 85 % -- -- -- JJ    03/21/24 1115 -- 149 13 102/73 100 % -- -- -- JJ    03/21/24 1100 -- 128 14 114/95 -- -- -- -- JJ    03/21/24 1045 -- 71 12 128/63 100 % -- -- -- JJ    03/21/24 1000 -- 75 11 112/55 100 % -- -- -- JJ    03/21/24 0900 -- 78 16 138/74 100 % -- -- -- JJ    03/21/24 0815 -- 76 13 129/58 100 % -- -- -- JJ    03/21/24 0800 97.9 °F (36.6 °C) 86 22 -- 100 % -- Nasal cannula -- JJ    03/21/24 0700 -- 77 13 113/60 100 % -- -- -- JJ    03/21/24 0638 -- -- -- -- -- 225 lb 8 oz -- -- MW    03/21/24 0600 -- 76 15 112/63 98 % -- -- -- MW    03/21/24 0500 -- 78 16 110/56 100 % -- -- -- MW    03/21/24 0400 97.6 °F (36.4 °C) 76 13 109/55 93 % -- Nasal cannula 2 L/min MW    03/21/24 0300 -- 76 13 118/107 98 % -- -- -- MW    03/21/24 0200 -- 76 13 110/55 91 % -- -- -- MW    03/21/24 0100 -- 72 15 93/58 95 % -- -- -- MW    03/21/24 0000 97.7 °F (36.5 °C) 79 18 117/52 100 % -- Nasal cannula 2 L/min MW

## 2024-03-22 NOTE — PROCEDURES
[facility]       Jonny Haque Location: Cath Lab    CSN [id number] MRN [id number]   Admission Date 3/17/2024 Procedure Date 3/22/2024   Attending Physician Lance Aguayo MD Procedure Physician Satya Donald MD         CARDIAC CATHETERIZATION/PERCUTANEOUS CORONARY INTERVENTION      PREOPERATIVE DIAGNOSIS:  CAD  POSTOPERATIVE DIAGNOSIS:  CAD  PROCEDURE PERFORMED:  Lithotripsy angioplasty, PCI to OM      PROCEDURE:  The patient was brought to the cardiac catheterization lab in the fasting state.  Informed consent was obtained.  Moderate sedation was employed using 3mg IV Versed and 50mcg IV fentanyl.  I directly observed the patient from 1634 to 1824, watching the heart rate, blood pressure, oximetry, and rhythm.     ACCESS/CATHETER PLACEMENT:  6F right radial, slender sheath.  Candis catheter for LM and RCA engagement.  Standard technique.  Standard views.       INTERVENTIONAL PROCEDURE: Stafted with 6F.  Slender sheath.  6F EBU 3.5 guide.  Bryce blue in OM, prowater in CX.  Heparin for anticoag.  Ballooned with 2.0mm balloon, couldn't pass stent.  Ballooned more with 2.0 balloon.  Then 2.25NC.  Couldn't pass stent.  Ballooned more and accepted that 6F did not have suffient support.  Upgraded to 7F.  Used 7F EBU 3.5.  After trying the same things with 7F we tried to pass shockwave which wouldn't pass.  So I used a corsair and Bryce wire and exchanged for [employer] [place] wire.  Then ballooned with 2.5mm NC to high pressure, then finally passed Shockwave balloon.  Then made several pulse runs with the lithotripsy device in the standard fashion (inflating to 4->6 with shocks delivered).  Then got 2.0x12mm [place] to pass into the OM.  It covered the subtotal, calcific lesion.  Deployed at 16ATM.  Posted with 3.0mm NC to 20ATM.  Excellent result.  AV groove CX widely patent and no worse.  LM still widely patent and OM widely patent with good run-off.  As it has been subtotally occluded, the mid vessel should dilate with  improved perfusion/pressure.  Case ended.  Perclose for left femoral hemostasis.     MEDICATIONS:  See nursing record.     COMPLICATIONS:  None.     IMPRESSION:    CAD  S/P PCI to OM with TRACY    RECOMMENDATIONS:   DAPT 1 year.  Angio in 2-3 months for surveillance given risks (DM, ESRD).  CCU overnight.  Telemetry floor tomorrow if no events.    [facility] MD

## 2024-03-22 NOTE — PLAN OF CARE
Assumed care of pt at approximately 1930. NSR with BBB on tele. On 2L NC overnight, successfully weaned to room air while awake. Pt NPO at midnight for possible stent today. Amio gtt remains infusing. R groin site remains bruised, dressing has old drainage. Son, Ashwin, called this RN and was updated on POC. Pt had short burst of Afib RVR earlier this morning, HR in 120s - 130s, but quickly converted back to NSR. See flowsheets for full assessments.    Problem: CARDIOVASCULAR - ADULT  Goal: Maintains optimal cardiac output and hemodynamic stability  Description: INTERVENTIONS:  - Monitor vital signs, rhythm, and trends  - Monitor for bleeding, hypotension and signs of decreased cardiac output  - Evaluate effectiveness of vasoactive medications to optimize hemodynamic stability  - Monitor arterial and/or venous puncture sites for bleeding and/or hematoma  - Assess quality of pulses, skin color and temperature  - Assess for signs of decreased coronary artery perfusion - ex. Angina  - Evaluate fluid balance, assess for edema, trend weights  Outcome: Progressing  Goal: Absence of cardiac arrhythmias or at baseline  Description: INTERVENTIONS:  - Continuous cardiac monitoring, monitor vital signs, obtain 12 lead EKG if indicated  - Evaluate effectiveness of antiarrhythmic and heart rate control medications as ordered  - Initiate emergency measures for life threatening arrhythmias  - Monitor electrolytes and administer replacement therapy as ordered  Outcome: Progressing     Problem: PAIN - ADULT  Goal: Verbalizes/displays adequate comfort level or patient's stated pain goal  Description: INTERVENTIONS:  - Encourage pt to monitor pain and request assistance  - Assess pain using appropriate pain scale  - Administer analgesics based on type and severity of pain and evaluate response  - Implement non-pharmacological measures as appropriate and evaluate response  - Consider cultural and social influences on pain and pain  management  - Manage/alleviate anxiety  - Utilize distraction and/or relaxation techniques  - Monitor for opioid side effects  - Notify MD/LIP if interventions unsuccessful or patient reports new pain  - Anticipate increased pain with activity and pre-medicate as appropriate  Outcome: Progressing     Problem: SAFETY ADULT - FALL  Goal: Free from fall injury  Description: INTERVENTIONS:  - Assess pt frequently for physical needs  - Identify cognitive and physical deficits and behaviors that affect risk of falls.  - [place] fall precautions as indicated by assessment.  - Educate pt/family on patient safety including physical limitations  - Instruct pt to call for assistance with activity based on assessment  - Modify environment to reduce risk of injury  - Provide assistive devices as appropriate  - Consider OT/PT consult to assist with strengthening/mobility  - Encourage toileting schedule  Outcome: Progressing     Problem: DISCHARGE PLANNING  Goal: Discharge to home or other facility with appropriate resources  Description: INTERVENTIONS:  - Identify barriers to discharge w/pt and caregiver  - Include patient/family/discharge partner in discharge planning  - Arrange for needed discharge resources and transportation as appropriate  - Identify discharge learning needs (meds, wound care, etc)  - Arrange for interpreters to assist at discharge as needed  - Consider post-discharge preferences of patient/family/discharge partner  - Complete POLST form as appropriate  - Assess patient's ability to be responsible for managing their own health  - Refer to Case Management Department for coordinating discharge planning if the patient needs post-hospital services based on physician/LIP order or complex needs related to functional status, cognitive ability or social support system  Outcome: Progressing

## 2024-03-22 NOTE — PROGRESS NOTES
[facility]   part of [facility]     Hospitalist Progress Note     Jonny Haque Patient Status:  Inpatient    4/15/1945 MRN [id number]   Location [facility] 6NE-A Attending Lance Aguayo MD   Hosp Day # 4 PCP Gee Jimenez MD     Chief Complaint: NSTEMI    Subjective:     Patient is doing well, no chest pain very slight or minimal chest pain at this point, converted from A-fib to normal sinus rhythm with good rate control  Has been n.p.o. for angiogram again today      Objective:    Review of Systems:   A comprehensive review of systems was completed; pertinent positive and negatives stated in subjective.    Vital signs:  Temp:  [97.3 °F (36.3 °C)-98.2 °F (36.8 °C)] 98 °F (36.7 °C)  Pulse:  [] 66  Resp:  [12-23] 14  BP: ()/() 116/58  SpO2:  [90 %-100 %] 100 %    Physical Exam:      Gen: No acute distress, alert and oriented x 3  Pulm: Lungs clear bilaterally, good inspiratory effort   CV:  nL S1/S2  Abd: soft, NT/ND, no hepatomegaly, +BS  MSK: moving all extremities, no edema-right groin without hematoma, dressing clean dry and intact  Neuro: no focal deficits  Skin: no rashes/lesions  Psych: normal mood/affect            Diagnostic Data:    Labs:  Recent Labs   Lab 24  2335 24  1717 245 24  0455 24  0431   WBC  --  8.4 13.4* 10.3 8.2 7.9   HGB  --  8.4* 8.1* 8.0* 7.7* 7.3*   MCV  --  103.8* 107.4* 107.0* 105.9* 107.6*   PLT  --  80.0* 77.0* 82.0* 72.0* 82.0*   INR 1.04  --   --   --   --   --        Recent Labs   Lab 24  2329 24  0451 24  0416 24  0455 24  0431   *   < > 185* 133* 108*   BUN 66*   < > 55* 73* 55*   CREATSERUM 7.26*   < > 6.89* 8.98* 7.20*   CA 8.6   < > 8.8 8.5 8.5   ALB 3.7  --   --   --   --    *   < > 135* 130* 137   K 4.5   < > 5.4* 4.2 4.3   CL 98   < > 101 94* 100   CO2 25.0   < > 27.0 24.0 26.0   ALKPHO 181*  --   --   --   --    AST 33  --   --   --   --     ALT 37  --   --   --   --    BILT 0.7  --   --   --   --    TP 7.3  --   --   --   --     < > = values in this interval not displayed.       CrCl cannot be calculated (Unknown ideal weight.).    Recent Labs   Lab 03/17/24  2329 03/18/24  0953 03/18/24  1803   TROPHS 321* 1,590* 2,138*       Recent Labs   Lab 03/17/24  2335   PTP 13.6   INR 1.04                  Microbiology    No results found for this visit on 03/17/24.      Imaging: Reviewed in Epic.    Medications:    [START ON 3/23/2024] epoetin eunice  10,000 Units Intravenous Once    amiodarone  200 mg Oral BID with meals    insulin degludec  20 Units Subcutaneous Nightly    clopidogrel  75 mg Oral Daily    rosuvastatin  10 mg Oral Nightly    isosorbide mononitrate ER  60 mg Oral Daily    levothyroxine  150 mcg Oral QOD    pantoprazole  40 mg Oral BID AC    aspirin  81 mg Oral Daily    metoprolol tartrate  25 mg Oral TID Beta Blocker/Cardiac    sevelamer carbonate  800 mg Oral TID CC    insulin aspart  4-20 Units Subcutaneous TID AC and HS    heparin  5,000 Units Subcutaneous Q8H ANNEL       Assessment & Plan:        Patient is 78-year-old male significant past medical history of ESRD on dialysis, known a complex ischemic cardiomyopathy with recently normalized ejection fraction ON echo, CAD with stent to the LAD x 2, history of cirrhosis, history of GI bleed in the past, type 2 diabetes, hypothyroidism presented with chest pain.  Patient stated that chest pain began with associated shortness of breath last night, persisted which is why he came into the emergency room.     # Chest pain likely cardiac in nature, NSTEMI  -Elevated troponin, EKG with new onset left bundle branch block  -Recent drug-eluting stent to the proximal and mid LAD, x 2 in 2022  -Repeat angiogram done shows left main, LAD and left circumflex stenosis  -Titrate beta-blocker per cardiology,  -Echo completed-  -CV surgery and consult, has poor candidate for CABG given ESRD as well as  cirrhosis  -Given recurrence of chest pain plan is for staged PCI, patient taken for repeat angiogram by Dr. Donald in the effort to do rotablation and left main stenting, completed  -Given aspirin 325 in the past, aspirin 81 to continue, Plavix,  -Continue statin  -Outpatient patient was on Imdur, hydralazine, carvedilol and amlodipine as well as Ranexa defer these medications to cardiology-  -repeat angiogram again today for PCI to OM      # New onset A-fib with RVR  -Back in normal sinus rhythm  -Was started on amiodarone bolus as well as drip,  -Now on p.o. amnio  -Continue beta-blocker  Patient currently not on any anticoagulation at this time, will defer anticoagulation to cardiology-Only on prophylactic anticoagulation as well as dual antiplatelet therapy-will likely need anticoagulation prior to discharge        # ESRD on dialysis  -Resume dialysis per schedule     # Ischemic cardiomyopathy  Echo completed that shows an EF of 35%-  -worsened since the prior echo, will need guideline directed medical therapy likely secondary to ischemic disease  -Already on aspirin, statin, beta-blocker, defer ACE as well as Entresto to cardiology     # History of cirrhosis  # HCV antibody positive  -No recent bleed, continue aspirin at this point  -GI was consulted however no acute intervention at this time continue PPI twice daily  -Abdominal ultrasound completed, shows cirrhotic liver however no evidence of any portal hypertension at this time  -GI signed off     # Type 2 diabetes  -Patient on long-acting and short acting insulin at home, resume long-acting insulin, sliding scale insulin titrate as needed  -Titrate his insulin as patient was hyperglycemic  -Glucose much better controlled currently     # Hypothyroidism  -Synthroid     # 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  # Chronic pancytopenia  -Appears to be compensated at this time, platelets with normal limits no recent episodes of any GI bleed  -As above           Lance Aguayo MD    Supplementary Documentation:     Quality:  DVT Mechanical Prophylaxis:   SCDs,    DVT Pharmacologic Prophylaxis   Medication    heparin (Porcine) 1000 UNIT/ML injection 4,000 Units    heparin (Porcine) 5000 UNIT/ML injection 5,000 Units         DVT Pharmacologic prophylaxis: Aspirin 81 mg      Code Status: Full Code  Vásquez: No urinary catheter in place  Vásquez Duration (in days):   Central line:    MOUNA:     The 21st Century Cures Act makes medical notes like these available to patients in the interest of transparency. Please be advised this is a medical document. Medical documents are intended to carry relevant information, facts as evident, and the clinical opinion of the practitioner. The medical note is intended as peer to peer communication and may appear blunt or direct. It is written in medical language and may contain abbreviations or verbiage that are unfamiliar.

## 2024-03-23 LAB
ANION GAP SERPL CALC-SCNC: 11 MMOL/L (ref 0–18)
ANTIBODY SCREEN: NEGATIVE
BUN BLD-MCNC: 77 MG/DL (ref 9–23)
CALCIUM BLD-MCNC: 8.4 MG/DL (ref 8.5–10.1)
CHLORIDE SERPL-SCNC: 100 MMOL/L (ref 98–112)
CO2 SERPL-SCNC: 23 MMOL/L (ref 21–32)
CREAT BLD-MCNC: 8.81 MG/DL
EGFRCR SERPLBLD CKD-EPI 2021: 6 ML/MIN/1.73M2 (ref 60–?)
ERYTHROCYTE [DISTWIDTH] IN BLOOD BY AUTOMATED COUNT: 16.4 %
GLUCOSE BLD-MCNC: 155 MG/DL (ref 70–99)
GLUCOSE BLD-MCNC: 169 MG/DL (ref 70–99)
GLUCOSE BLD-MCNC: 209 MG/DL (ref 70–99)
GLUCOSE BLD-MCNC: 223 MG/DL (ref 70–99)
GLUCOSE BLD-MCNC: 263 MG/DL (ref 70–99)
HCT VFR BLD AUTO: 19.9 %
HGB BLD-MCNC: 6.4 G/DL
HGB BLD-MCNC: 7.8 G/DL
MCH RBC QN AUTO: 34.4 PG (ref 26–34)
MCHC RBC AUTO-ENTMCNC: 32.2 G/DL (ref 31–37)
MCV RBC AUTO: 107 FL
OSMOLALITY SERPL CALC.SUM OF ELEC: 304 MOSM/KG (ref 275–295)
PLATELET # BLD AUTO: 98 10(3)UL (ref 150–450)
POTASSIUM SERPL-SCNC: 5 MMOL/L (ref 3.5–5.1)
RBC # BLD AUTO: 1.86 X10(6)UL
RH BLOOD TYPE: POSITIVE
SODIUM SERPL-SCNC: 134 MMOL/L (ref 136–145)
WBC # BLD AUTO: 7.8 X10(3) UL (ref 4–11)

## 2024-03-23 PROCEDURE — 99232 SBSQ HOSP IP/OBS MODERATE 35: CPT | Performed by: INTERNAL MEDICINE

## 2024-03-23 PROCEDURE — 30233N1 TRANSFUSION OF NONAUTOLOGOUS RED BLOOD CELLS INTO PERIPHERAL VEIN, PERCUTANEOUS APPROACH: ICD-10-PCS | Performed by: HOSPITALIST

## 2024-03-23 NOTE — DIETARY NOTE
Clinical Nutrition    Dietitian consult received per cardiac rehab standing order. Pt to be educated by cardiac rehab staff and encouraged to attend outpatient classes taught by RD. RD available PRN.    Fior Perkins MS, RD, LDN  Clinical Dietitian  Pager #: [phone]

## 2024-03-23 NOTE — PLAN OF CARE
Assumed patient care at 0730. Patient resting in bed, alert/oriented. 1unit PRBC infusing, hgb recheck stable. VSS. Bilateral groins soft, bruising outlined, dressings C/D/I. Pedal pulses palpable. Tolerating diet. Minimal urine output. HD 1.5L removed. Denies pain. Per Mds, okay to transfer to [facility], waiting for a bed. Patient and family updated with plan of care, all questions answered.

## 2024-03-23 NOTE — PLAN OF CARE
Assumed care of pt at approximately 1930. NSR on tele. On RA, 2L NC while asleep. HOB flat per post-procedure protocol. Pulses palpable, groin site is C/D/I and soft to touch, no hematoma. At approximately 2030, HOB raised < 20, large left groin site hematoma formed. This RN applied manual pressure, femstop, and outlined hematoma. Dr. Randal Correia updated, pt requested pain meds, no new orders at this time. Dr. Reagan updated, ordered dilaudid, pain subsided. Hematoma resolved, manual pressure removed but femstop remained in place. Frequent groin and pulse checks per protocol, no hematoma appeared upon recheck assessments.     At approximately 2245, hematoma appeared to have returned. Dr. Tee notified again, ordered to hold heparin. Manual pressure reapplied, femstop remained in place. Hematoma decreased in size, femstop remained in place. Straight cath x1. Hospitalist notified about consistent pain per pt request, no further orders at this time. Fem stop removed early in morning.    Dr. Tee notified about low hgb, ordered 1 unit PRBC replacement, and to hold 0600 heparin dose.

## 2024-03-23 NOTE — PROGRESS NOTES
[facility]   part of [facility]     Nephrology Progress Note    Jonny Haque Patient Status:  Inpatient    4/15/1945 MRN [id number]   [facility] 6NE-A Attending Lance Aguayo MD   Hosp Day # 5 PCP Gee Jimenez MD       SUBJECTIVE:  Pt stable this AM        Physical Exam:   /60   Pulse 64   Temp 97 °F (36.1 °C) (Temporal)   Resp 12   Wt 225 lb 8 oz (102.3 kg)   SpO2 100%   BMI 30.58 kg/m²   Temp (24hrs), Av.8 °F (36.6 °C), Min:97 °F (36.1 °C), Max:98.6 °F (37 °C)       Intake/Output Summary (Last 24 hours) at 3/23/2024 0754  Last data filed at 3/23/2024 0400  Gross per 24 hour   Intake 240 ml   Output 375 ml   Net -135 ml     Last 3 Weights   24 0638 225 lb 8 oz (102.3 kg)   24 0600 221 lb 1.9 oz (100.3 kg)   24 0400 223 lb 3.2 oz (101.2 kg)   24 0122 221 lb 9 oz (100.5 kg)   23 0600 204 lb 6.4 oz (92.7 kg)   23 0212 207 lb 3.7 oz (94 kg)   10/25/22 1835 200 lb (90.7 kg)   10/25/22 1206 200 lb (90.7 kg)   22 0400 198 lb 3.2 oz (89.9 kg)   22 0305 200 lb (90.7 kg)   22 2234 200 lb (90.7 kg)   22 0753 182 lb 5.1 oz (82.7 kg)   22 0100 197 lb 12 oz (89.7 kg)   22 204 lb (92.5 kg)     General: Alert and oriented in no apparent distress.  HEENT: No scleral icterus, MMM  Neck: Supple, no JADE or thyromegaly  Cardiac: Regular rate and rhythm, S1, S2 normal, no murmur or rub  Lungs: Clear without wheezes, rales, rhonchi.    Abdomen: Soft, non-tender. + bowel sounds, no palpable organomegaly  Extremities: Without clubbing, cyanosis or edema.  Neurologic: , moving all extremities  Skin: Warm and dry, no rash        Labs:     Recent Labs   Lab 24  2335 24  0451 24  1717 24  0415 24  0455 24  0431 24  0442   WBC  --    < > 13.4* 10.3 8.2 7.9 7.8   HGB  --    < > 8.1* 8.0* 7.7* 7.3* 6.4*   MCV  --    < > 107.4* 107.0* 105.9* 107.6* 107.0*   PLT  --    < > 77.0*  82.0* 72.0* 82.0* 98.0*   INR 1.04  --   --   --   --   --   --     < > = values in this interval not displayed.       Recent Labs   Lab 03/19/24  0451 03/20/24  0416 03/21/24  0455 03/22/24  0431 03/23/24  0442   * 135* 130* 137 134*   K 4.1 5.4* 4.2 4.3 5.0   CL 99 101 94* 100 100   CO2 27.0 27.0 24.0 26.0 23.0   BUN 85* 55* 73* 55* 77*   CREATSERUM 8.83* 6.89* 8.98* 7.20* 8.81*   CA 8.4* 8.8 8.5 8.5 8.4*   MG 1.6  --   --   --   --    * 185* 133* 108* 155*       Recent Labs   Lab 03/17/24  2329   ALT 37   AST 33   ALB 3.7       Recent Labs   Lab 03/22/24  0645 03/22/24  1055 03/22/24  1543 03/22/24  2125 03/23/24  0627   PGLU 127* 146* 162* 226* 169*       Meds:   Current Facility-Administered Medications   Medication Dose Route Frequency    epoetin eunice (Epogen, Procrit) 64725 UNIT/ML injection 10,000 Units  10,000 Units Intravenous Once    amiodarone (Pacerone) tab 200 mg  200 mg Oral BID with meals    clopidogrel (Plavix) tab 75 mg  75 mg Oral Daily    aspirin DR tab 81 mg  81 mg Oral Daily    HYDROmorphone (Dilaudid) 1 MG/ML injection 0.2 mg  0.2 mg Intravenous Q2H PRN    Or    HYDROmorphone (Dilaudid) 1 MG/ML injection 0.4 mg  0.4 mg Intravenous Q2H PRN    Or    HYDROmorphone (Dilaudid) 1 MG/ML injection 0.8 mg  0.8 mg Intravenous Q2H PRN    insulin degludec 100 units/mL flextouch 20 Units  20 Units Subcutaneous Nightly    nitroGLYCERIN in dextrose 5% 50 mg/250mL infusion premix  5-300 mcg/min Intravenous Titrated    heparin (Porcine) 1000 UNIT/ML injection 4,000 Units  4,000 Units Intravenous PRN [facility]    rosuvastatin (Crestor) tab 10 mg  10 mg Oral Nightly    glucose (Dex4) 15 GM/59ML oral liquid 15 g  15 g Oral Q15 Min PRN    Or    glucose (Glutose) 40% oral gel 15 g  15 g Oral Q15 Min PRN    Or    glucose-vitamin C (Dex-4) chewable tab 4 tablet  4 tablet Oral Q15 Min PRN    Or    dextrose 50% injection 50 mL  50 mL Intravenous Q15 Min PRN    Or    glucose (Dex4) 15 GM/59ML oral liquid 30  g  30 g Oral Q15 Min PRN    Or    glucose (Glutose) 40% oral gel 30 g  30 g Oral Q15 Min PRN    Or    glucose-vitamin C (Dex-4) chewable tab 8 tablet  8 tablet Oral Q15 Min PRN    acetaminophen (Tylenol Extra Strength) tab 500 mg  500 mg Oral Q6H PRN    melatonin tab 3 mg  3 mg Oral Nightly PRN    polyethylene glycol (PEG 3350) (Miralax) 17 g oral packet 17 g  17 g Oral Daily PRN    sennosides (Senokot) tab 17.2 mg  17.2 mg Oral Nightly PRN    bisacodyl (Dulcolax) 10 MG rectal suppository 10 mg  10 mg Rectal Daily PRN    ondansetron (Zofran) 4 MG/2ML injection 4 mg  4 mg Intravenous Q6H PRN    isosorbide mononitrate ER (Imdur) 24 hr tab 60 mg  60 mg Oral Daily    levothyroxine (Synthroid) tab 150 mcg  150 mcg Oral QOD    pantoprazole (Protonix) DR tab 40 mg  40 mg Oral BID AC    zolpidem (Ambien) tab 5 mg  5 mg Oral Nightly PRN    metoprolol tartrate (Lopressor) tab 25 mg  25 mg Oral TID Beta Blocker/Cardiac    nitroglycerin (Nitrostat) SL tab 0.4 mg  0.4 mg Sublingual Q5 Min PRN    sevelamer carbonate (Renvela) tab 800 mg  800 mg Oral TID CC    insulin aspart (NovoLOG) 100 Units/mL FlexPen 4-20 Units  4-20 Units Subcutaneous TID AC and HS    heparin (Porcine) 5000 UNIT/ML injection 5,000 Units  5,000 Units Subcutaneous Q8H [facility]     Facility-Administered Medications Ordered in Other Encounters   Medication Dose Route Frequency    BCG Live 50 mg in sodium chloride 0.9 % 50 mL Intravesicle  50 mg Intravesical Once         Impression/Plan:      1) ESRD- due to longstanding DM 2; on HD since 7/21. HD to cont TTHS per usual routine     2) CAD with preserved EF- s/p complex PCI.  Mgmt per cards     3) h/o hep C with cirrhosis / varices / h/o GIB- US / doppler unremarkable; hep C PCR pending     4) Anemia- due to ESRD with acute drop in setting of hematoma. Fe stores OK; continue EPO with HD    Questions/concerns were discussed with patient and/or family by bedside.          Johnson Vera MD  3/23/2024  7:54 AM

## 2024-03-23 NOTE — PROGRESS NOTES
[facility]   part of [facility]     Hospitalist Progress Note     Jonny Haque Patient Status:  Inpatient    4/15/1945 MRN [id number]   Location [facility] 6NE-A Attending Lance Aguayo MD   Hosp Day # 5 PCP Gee Jimenez MD     Chief Complaint: NSTEMI    Subjective:     Status post angiogram yesterday with PCI to OM with a drug-eluting stent, post angiogram complicated by hematoma in the left groin that recurred, also received a unit of transfusion overnight,     Currently patient is completely symptomatic, no chest pain sitting up no oxygen  Continues to be normal sinus rhythm,  Objective:    Review of Systems:   A comprehensive review of systems was completed; pertinent positive and negatives stated in subjective.    Vital signs:  Temp:  [97 °F (36.1 °C)-98.6 °F (37 °C)] 97.2 °F (36.2 °C)  Pulse:  [64-75] 70  Resp:  [7-20] 11  BP: ()/(47-97) 108/55  SpO2:  [90 %-100 %] 99 %    Physical Exam:      Gen: No acute distress, alert and oriented x 3  Pulm: Lungs clear bilaterally, good inspiratory effort   CV:  nL S1/S2  Abd: soft, NT/ND, no hepatomegaly, +BS  MSK: moving all extremities, no edema-right groin without hematoma, dressing clean dry and intact  Neuro: no focal deficits  Skin: no rashes/lesions  Psych: normal mood/affect            Diagnostic Data:    Labs:  Recent Labs   Lab 24  2335 24  0451 24  1717 24  0415 24  0455 24  0431 24  0442 24  1052   WBC  --    < > 13.4* 10.3 8.2 7.9 7.8  --    HGB  --    < > 8.1* 8.0* 7.7* 7.3* 6.4* 7.8*   MCV  --    < > 107.4* 107.0* 105.9* 107.6* 107.0*  --    PLT  --    < > 77.0* 82.0* 72.0* 82.0* 98.0*  --    INR 1.04  --   --   --   --   --   --   --     < > = values in this interval not displayed.       Recent Labs   Lab 24  2329 24  0451 24  0455 24  0431 24  0442   *   < > 133* 108* 155*   BUN 66*   < > 73* 55* 77*   CREATSERUM 7.26*   < > 8.98* 7.20*  8.81*   CA 8.6   < > 8.5 8.5 8.4*   ALB 3.7  --   --   --   --    *   < > 130* 137 134*   K 4.5   < > 4.2 4.3 5.0   CL 98   < > 94* 100 100   CO2 25.0   < > 24.0 26.0 23.0   ALKPHO 181*  --   --   --   --    AST 33  --   --   --   --    ALT 37  --   --   --   --    BILT 0.7  --   --   --   --    TP 7.3  --   --   --   --     < > = values in this interval not displayed.       CrCl cannot be calculated (Unknown ideal weight.).    Recent Labs   Lab 03/17/24  2329 03/18/24  0953 03/18/24  1803   TROPHS 321* 1,590* 2,138*       Recent Labs   Lab 03/17/24  2335   PTP 13.6   INR 1.04                  Microbiology    No results found for this visit on 03/17/24.      Imaging: Reviewed in Epic.    Medications:    amiodarone  200 mg Oral BID with meals    clopidogrel  75 mg Oral Daily    aspirin  81 mg Oral Daily    insulin degludec  20 Units Subcutaneous Nightly    rosuvastatin  10 mg Oral Nightly    isosorbide mononitrate ER  60 mg Oral Daily    levothyroxine  150 mcg Oral QOD    pantoprazole  40 mg Oral BID AC    metoprolol tartrate  25 mg Oral TID Beta Blocker/Cardiac    sevelamer carbonate  800 mg Oral TID CC    insulin aspart  4-20 Units Subcutaneous TID AC and HS    heparin  5,000 Units Subcutaneous Q8H [facility]       Assessment & Plan:        Patient is 78-year-old male significant past medical history of ESRD on dialysis, known a complex ischemic cardiomyopathy with recently normalized ejection fraction ON echo, CAD with stent to the LAD x 2, history of cirrhosis, history of GI bleed in the past, type 2 diabetes, hypothyroidism presented with chest pain.  Patient stated that chest pain began with associated shortness of breath last night, persisted which is why he came into the emergency room.     # Chest pain likely cardiac in nature, NSTEMI  -Elevated troponin, EKG with new onset left bundle branch block  -Recent drug-eluting stent to the proximal and mid LAD, x 2 in 2022  -Repeat angiogram done shows left main,  LAD and left circumflex stenosis  -Titrate beta-blocker per cardiology,  -Echo completed-  -CV surgery and consult, has poor candidate for CABG given ESRD as well as cirrhosis  -Given recurrence of chest pain plan is for staged PCI, patient taken for repeat angiogram by Dr. Donald in the effort to do rotablation and left main stenting, completed  -Given aspirin 325 in the past, aspirin 81 to continue, Plavix,  -Continue statin  -Outpatient patient was on Imdur, hydralazine, carvedilol and amlodipine as well as Ranexa defer these medications to cardiology-  -another angiogram with PCI to the OM completed yesterday    # Acute blood loss anemia secondary to hematoma from the angiogram  Status post 1 unit of PRBCs given yesterday, postop hemoglobin is stable at 7.8-  -exacerbated by anemia chronic kidney disease  -Continue dual antiplatelet therapy  -Prophylactic heparin dose was held      # New onset A-fib with RVR  -Back in normal sinus rhythm  -Was started on amiodarone bolus as well as drip,  -Now on p.o. amnio  -Continue beta-blocker  Patient currently not on any anticoagulation at this time, will defer anticoagulation to cardiology-  -defer therapeutic anticoagulation cardiology as patient already on aspirin as well as Plavix        # ESRD on dialysis  -Resume dialysis per schedule     # Ischemic cardiomyopathy  Echo completed that shows an EF of 35%-  -worsened since the prior echo, will need guideline directed medical therapy likely secondary to ischemic disease  -Already on aspirin, statin, beta-blocker, defer ACE as well as Entresto to cardiology     # History of cirrhosis  # HCV antibody positive  -No recent bleed, continue aspirin at this point  -GI was consulted however no acute intervention at this time continue PPI twice daily  -Abdominal ultrasound completed, shows cirrhotic liver however no evidence of any portal hypertension at this time  -GI signed off     # Type 2 diabetes  -Patient on long-acting and  short acting insulin at home, resume long-acting insulin, sliding scale insulin titrate as needed  -Titrate his insulin as patient was hyperglycemic  -Glucose much better controlled currently     # Hypothyroidism  -Synthroid     # 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  # Chronic pancytopenia  -Appears to be compensated at this time, platelets with normal limits no recent episodes of any GI bleed  -As above          Lance Aguayo MD    Supplementary Documentation:     Quality:  DVT Mechanical Prophylaxis:   SCDs,    DVT Pharmacologic Prophylaxis   Medication    heparin (Porcine) 1000 UNIT/ML injection 4,000 Units    heparin (Porcine) 5000 UNIT/ML injection 5,000 Units         DVT Pharmacologic prophylaxis: Aspirin 81 mg      Code Status: Full Code  Vásquez: No urinary catheter in place  Vásquez Duration (in days):   Central line:    MOUNA:     The 21st Century Cures Act makes medical notes like these available to patients in the interest of transparency. Please be advised this is a medical document. Medical documents are intended to carry relevant information, facts as evident, and the clinical opinion of the practitioner. The medical note is intended as peer to peer communication and may appear blunt or direct. It is written in medical language and may contain abbreviations or verbiage that are unfamiliar.

## 2024-03-23 NOTE — PROGRESS NOTES
[facility] Cardiology Progress Note        Jonny Haque Patient Status:  Inpatient    4/15/1945 MRN [id number]   Location [facility] 6NE-A Attending Jem Buckley MD   Hosp Day # 5 PCP Gee Jimenez MD     Subjective:  No CP  NSR  Had hematoma last night.  Getting xfusion.  But stable and soft now.      Medications:   epoetin eunice  10,000 Units Intravenous Once    amiodarone  200 mg Oral BID with meals    clopidogrel  75 mg Oral Daily    aspirin  81 mg Oral Daily    insulin degludec  20 Units Subcutaneous Nightly    rosuvastatin  10 mg Oral Nightly    isosorbide mononitrate ER  60 mg Oral Daily    levothyroxine  150 mcg Oral QOD    pantoprazole  40 mg Oral BID AC    metoprolol tartrate  25 mg Oral TID Beta Blocker/Cardiac    sevelamer carbonate  800 mg Oral TID CC    insulin aspart  4-20 Units Subcutaneous TID AC and HS    heparin  5,000 Units Subcutaneous Q8H ANNEL       Continuous Infusions:   nitroGLYCERIN in dextrose 5% Stopped (24 1200)         Allergies:  No Known Allergies      Objective:        Intake/Output:      Intake/Output Summary (Last 24 hours) at 3/23/2024 0709  Last data filed at 3/23/2024 0400  Gross per 24 hour   Intake 240 ml   Output 375 ml   Net -135 ml     Wt Readings from Last 3 Encounters:   24 225 lb 8 oz (102.3 kg)   23 204 lb 6.4 oz (92.7 kg)   10/25/22 200 lb (90.7 kg)       Physical Exam:        Vitals:    24 0300 24 0400 24 0415 24 0500   BP: 123/66 (!) 84/56 106/56 (!) 125/97   BP Location:  Left arm     Pulse: 65 65 65 67   Resp: 13 16 (!) 9 14   Temp:  98.6 °F (37 °C)     TempSrc:  Temporal     SpO2: 94% 95% 92% 100%   Weight:           Temp:  [97.3 °F (36.3 °C)-98.6 °F (37 °C)] 98.6 °F (37 °C)  Pulse:  [64-75] 67  Resp:  [7-22] 14  BP: ()/(47-97) 125/97  SpO2:  [90 %-100 %] 100 %      Temp: 98.6 °F (37 °C)  Pulse: 67  Resp: 14  BP: 125/97  General:  Appears comfortable  HEENT: No focal  deficits.  Neck: No JVD, carotids 2+ no bruits.  Cardiac: Regular S1S2.  No S3, S4, rub, click.  No murmur.  Lungs: Clear to auscultation and percussion.  Abdomen: Soft, non-tender.   Extremities: No LE edema.  No clubbing or cyanosis.    Neurologic: Alert and oriented, normal affect.  Skin: Warm and dry.           LABS:      HEM:  Recent Labs   Lab 03/19/24  1717 03/20/24  0415 03/21/24  0455 03/22/24  0431 03/23/24  0442   WBC 13.4* 10.3 8.2 7.9 7.8   HGB 8.1* 8.0* 7.7* 7.3* 6.4*   HCT 24.8* 24.5* 23.3* 22.7* 19.9*   PLT 77.0* 82.0* 72.0* 82.0* 98.0*       Chem:  Recent Labs   Lab 03/19/24  0451 03/20/24  0416 03/21/24  0455 03/22/24  0431 03/23/24  0442   * 135* 130* 137 134*   K 4.1 5.4* 4.2 4.3 5.0   CL 99 101 94* 100 100   CO2 27.0 27.0 24.0 26.0 23.0   BUN 85* 55* 73* 55* 77*   CREATSERUM 8.83* 6.89* 8.98* 7.20* 8.81*   CA 8.4* 8.8 8.5 8.5 8.4*   MG 1.6  --   --   --   --    * 185* 133* 108* 155*       Recent Labs   Lab 03/17/24  2329   ALT 37   AST 33   ALB 3.7       Recent Labs   Lab 03/17/24  2335   PTT 30.9   INR 1.04           No results found for: \"TROP\", \"CKMB\"      Invalid input(s): \"PBNPML\"                       Diagnostics:   Telemetry:     EKG, 3/18/2024,         Echo:  3/18/24:    1. Left ventricle: The cavity size was normal. Wall thickness was normal.      Systolic function was moderately reduced. The estimated ejection fraction      was 35%, by visual assessment. Hypokinesis of the anterior wall.      Hypokinesis of the apical wall. Hypokinesis of the anteroseptal wall.      Left ventricular diastolic function parameters were normal for the      patient's age.   2. Left atrium: The left atrial volume was mildly increased.   3. Aortic valve: Transvalvular velocity was increased. The findings were      consistent with moderate stenosis. The peak systolic velocity was      2.4m/sec. The mean systolic gradient was 17mm Hg. The valve area (VTI)      was 1.34cm^2. The valve area (VTI)  index was 0.6cm^2/m^2.   4. Mitral valve: There was mild regurgitation.   5. Pulmonary arteries: Systolic pressure was within the normal range,      estimated to be 25mm Hg.         Cardiac Cath. 3/18/24      90% ostial LM  90% OM1  75% proximal LAD  Moderate, diffuse disease           Impression:      1. CAD:     - s/p rotablation and TRACY to 90% proximal LAD stenosis and rotablation and TRACY to 90% mid LAD stenosis to 0% residual 1/24/22 .       -cath, 3/18/24:  LM, LAD, LCx stenoses as above      2. Ischemic Cardiomyopathy.  LVEF has declined to 35% .  May reflect stunning   3. LBBB (previous IVCD, atypical LBBB)  4. Moderate aortic stenosis  5. GIB, ASA intolerance.  GI eval's in past have been unrevealing.  No varices seen  6. ESRD, HD dependent  7. DM  8. HTN    Plan:  DAPT  Amiodarone PO for AF.  Metoprolol 25 TID for now.  Will assess LV for recovery as an outpatient.  Pressure \"soft\" will hold entresto for now and let recover from PCI/hematoma.  Follow tele.    Prob xfer to floor today if continues to be stable and DC home Sunday if no issues.    CARLEY DODGE    >35 min critical care time spent on this complex CCU patient.

## 2024-03-24 VITALS
RESPIRATION RATE: 16 BRPM | WEIGHT: 223.13 LBS | DIASTOLIC BLOOD PRESSURE: 61 MMHG | HEART RATE: 70 BPM | BODY MASS INDEX: 30 KG/M2 | OXYGEN SATURATION: 100 % | SYSTOLIC BLOOD PRESSURE: 125 MMHG | TEMPERATURE: 97 F

## 2024-03-24 LAB
BLOOD TYPE BARCODE: 5100
GLUCOSE BLD-MCNC: 209 MG/DL (ref 70–99)
GLUCOSE BLD-MCNC: 290 MG/DL (ref 70–99)
HGB BLD-MCNC: 7.6 G/DL
UNIT VOLUME: 350 ML

## 2024-03-24 PROCEDURE — 99232 SBSQ HOSP IP/OBS MODERATE 35: CPT | Performed by: INTERNAL MEDICINE

## 2024-03-24 RX ORDER — METOPROLOL SUCCINATE 50 MG/1
50 TABLET, EXTENDED RELEASE ORAL
Qty: 90 TABLET | Refills: 3 | Status: ON HOLD | OUTPATIENT
Start: 2024-03-24

## 2024-03-24 RX ORDER — LOSARTAN POTASSIUM 50 MG/1
50 TABLET ORAL DAILY
Status: DISCONTINUED | OUTPATIENT
Start: 2024-03-24 | End: 2024-03-24

## 2024-03-24 RX ORDER — AMIODARONE HYDROCHLORIDE 200 MG/1
200 TABLET ORAL DAILY
Qty: 30 TABLET | Refills: 1 | Status: ON HOLD | OUTPATIENT
Start: 2024-03-25

## 2024-03-24 RX ORDER — METOPROLOL SUCCINATE 50 MG/1
50 TABLET, EXTENDED RELEASE ORAL
Status: DISCONTINUED | OUTPATIENT
Start: 2024-03-24 | End: 2024-03-24

## 2024-03-24 RX ORDER — CLOPIDOGREL BISULFATE 75 MG/1
75 TABLET ORAL DAILY
Qty: 90 TABLET | Refills: 3 | Status: ON HOLD | OUTPATIENT
Start: 2024-03-25

## 2024-03-24 RX ORDER — LOSARTAN POTASSIUM 50 MG/1
50 TABLET ORAL DAILY
Qty: 90 TABLET | Refills: 3 | Status: ON HOLD | OUTPATIENT
Start: 2024-03-24

## 2024-03-24 RX ORDER — AMIODARONE HYDROCHLORIDE 200 MG/1
200 TABLET ORAL DAILY
Status: DISCONTINUED | OUTPATIENT
Start: 2024-03-25 | End: 2024-03-24

## 2024-03-24 NOTE — PLAN OF CARE
Assumed patient care at 0730. Patient sitting up in chair, alert/oriented. VSS. Ambulating halls with standby assist. Per Mds okay to discharge home. AVS reviewed with patient and family, all questions answered. Patient belongings sent home with patient.

## 2024-03-24 NOTE — PLAN OF CARE
Received care at approximately 1930  A&Ox4  2L NC overnight, NSR on tele  Bilateral groin sites CDI, bruising at site remains unchanged. Soft w/ no signs of hematoma.   Tylenol given for groin pain with relief  Up in chair    POC  discussed with patient

## 2024-03-24 NOTE — PROGRESS NOTES
[facility] Cardiology Progress Note        Jonny Haque Patient Status:  Inpatient    4/15/1945 MRN [id number]   Location [facility] 6NE-A Attending Lance Aguayo MD   Hosp Day # 6 PCP Gee Jimenez MD     Subjective:  The patient denies  chest pain and shortness of breath.  Some left groin pain 2.2 hematoma    Medications:   amiodarone  200 mg Oral BID with meals    clopidogrel  75 mg Oral Daily    aspirin  81 mg Oral Daily    insulin degludec  20 Units Subcutaneous Nightly    rosuvastatin  10 mg Oral Nightly    isosorbide mononitrate ER  60 mg Oral Daily    levothyroxine  150 mcg Oral QOD    pantoprazole  40 mg Oral BID AC    metoprolol tartrate  25 mg Oral TID Beta Blocker/Cardiac    sevelamer carbonate  800 mg Oral TID CC    insulin aspart  4-20 Units Subcutaneous TID AC and HS    heparin  5,000 Units Subcutaneous Q8H ANNEL       Continuous Infusions:   nitroGLYCERIN in dextrose 5% Stopped (24 1200)         Allergies:  No Known Allergies      Objective:        Intake/Output:      Intake/Output Summary (Last 24 hours) at 3/24/2024 0812  Last data filed at 3/24/2024 0400  Gross per 24 hour   Intake 862.5 ml   Output 1500 ml   Net -637.5 ml     Wt Readings from Last 3 Encounters:   24 223 lb 1.6 oz (101.2 kg)   23 204 lb 6.4 oz (92.7 kg)   10/25/22 200 lb (90.7 kg)       Physical Exam:        Vitals:    24 0300 24 0400 24 0500 24 0600   BP: 105/60 99/81 148/66 (!) 161/77   BP Location:  Left arm     Pulse: 70 72 70 79   Resp: 12 12    Temp:  97.7 °F (36.5 °C)     TempSrc:  Temporal     SpO2: 100% 100% 100% 99%   Weight:   223 lb 1.6 oz (101.2 kg)        Temp:  [97.6 °F (36.4 °C)-98.6 °F (37 °C)] 97.7 °F (36.5 °C)  Pulse:  [64-80] 79  Resp:  [11-26] 26  BP: ()/(43-81) 161/77  SpO2:  [89 %-100 %] 99 %      Temp: 97.7 °F (36.5 °C)  Pulse: 79  Resp: 26  BP: 161/77  General:  Appears comfortable  HEENT: No focal deficits.  Neck: No  JVD, carotids 2+ no bruits.  Cardiac: Regular S1S2.  No S3, S4, rub, click.  No murmur.  Lungs: Clear to auscultation and percussion.  Abdomen: Soft, non-tender.   Extremities: No LE edema.  No clubbing or cyanosis.  + left groin hematoma.  No bruit  Neurologic: Alert and oriented, normal affect.  Skin: Warm and dry.           LABS:      HEM:  Recent Labs   Lab 03/19/24  1717 03/20/24  0415 03/21/24  0455 03/22/24  0431 03/23/24  0442 03/23/24  1052 03/24/24  0422   WBC 13.4* 10.3 8.2 7.9 7.8  --   --    HGB 8.1* 8.0* 7.7* 7.3* 6.4* 7.8* 7.6*   HCT 24.8* 24.5* 23.3* 22.7* 19.9*  --   --    PLT 77.0* 82.0* 72.0* 82.0* 98.0*  --   --        Chem:  Recent Labs   Lab 03/19/24  0451 03/20/24  0416 03/21/24  0455 03/22/24  0431 03/23/24  0442   * 135* 130* 137 134*   K 4.1 5.4* 4.2 4.3 5.0   CL 99 101 94* 100 100   CO2 27.0 27.0 24.0 26.0 23.0   BUN 85* 55* 73* 55* 77*   CREATSERUM 8.83* 6.89* 8.98* 7.20* 8.81*   CA 8.4* 8.8 8.5 8.5 8.4*   MG 1.6  --   --   --   --    * 185* 133* 108* 155*       Recent Labs   Lab 03/17/24  2329   ALT 37   AST 33   ALB 3.7       Recent Labs   Lab 03/17/24  2335   PTT 30.9   INR 1.04           No results found for: \"TROP\", \"CKMB\"      Invalid input(s): \"PBNPML\"                       Diagnostics:   Telemetry:     Echo:  3/18/24:     1. Left ventricle: The cavity size was normal. Wall thickness was normal.      Systolic function was moderately reduced. The estimated ejection fraction      was 35%, by visual assessment. Hypokinesis of the anterior wall.      Hypokinesis of the apical wall. Hypokinesis of the anteroseptal wall.      Left ventricular diastolic function parameters were normal for the      patient's age.   2. Left atrium: The left atrial volume was mildly increased.   3. Aortic valve: Transvalvular velocity was increased. The findings were      consistent with moderate stenosis. The peak systolic velocity was      2.4m/sec. The mean systolic gradient was 17mm Hg.  The valve area (VTI)      was 1.34cm^2. The valve area (VTI) index was 0.6cm^2/m^2.   4. Mitral valve: There was mild regurgitation.   5. Pulmonary arteries: Systolic pressure was within the normal range,      estimated to be 25mm Hg.            Cardiac Cath. 3/18/24        90% ostial LM  90% OM1  75% proximal LAD  Moderate, diffuse disease              Impression:        1. CAD:      - s/p rotablation and TRACY to 90% proximal LAD stenosis and rotablation and TRACY to 90% mid LAD stenosis to 0% residual 1/24/22 .        -cath, 3/18/24:  LM, LAD, LCx stenoses as above  -TRACY OM, 3/22/24        2. Ischemic Cardiomyopathy.  LVEF has declined to 35% .  May reflect stunning   3. Paroxysmal Atrial Fibrillation. NSR on amiodarone.  Avoiding ac for now given GIB hx  4. Moderate aortic stenosis  5. GIB, ASA intolerance.  GI eval's in past have been unrevealing.  No varices seen  6. ESRD, HD dependent  7. DM  8. HTN  9. Hematoma 2/2 cath of 3/22/24--> TXn.  Stable Hgb  10.  LBBB (previous IVCD, atypical LBBB)    Plan:  DAPT  Reduce amiodarone to 200 mg daily  Toprol 50 mg daily. Losartan   Will assess LV for recovery as an outpatient.  BB, ARB  Home today         Yogi Espinoza MD  3/24/2024  8:12 AM

## 2024-03-24 NOTE — PROGRESS NOTES
[facility]   part of [facility]     Nephrology Progress Note    Jonny Haque Patient Status:  Inpatient    4/15/1945 MRN [id number]   [facility] 6NE-A Attending Lance Aguayo MD   Hosp Day # 6 PCP Gee Jimenez MD       SUBJECTIVE:  Pt up in chair, stable        Physical Exam:   BP (!) 161/77   Pulse 79   Temp 97.7 °F (36.5 °C) (Temporal)   Resp 26   Wt 223 lb 1.6 oz (101.2 kg)   SpO2 99%   BMI 30.26 kg/m²   Temp (24hrs), Av.9 °F (36.6 °C), Min:97.2 °F (36.2 °C), Max:98.6 °F (37 °C)       Intake/Output Summary (Last 24 hours) at 3/24/2024 0755  Last data filed at 3/24/2024 0400  Gross per 24 hour   Intake 1112.5 ml   Output 1500 ml   Net -387.5 ml     Last 3 Weights   24 0500 223 lb 1.6 oz (101.2 kg)   24 0638 225 lb 8 oz (102.3 kg)   24 0600 221 lb 1.9 oz (100.3 kg)   24 0400 223 lb 3.2 oz (101.2 kg)   24 0122 221 lb 9 oz (100.5 kg)   23 0600 204 lb 6.4 oz (92.7 kg)   23 0212 207 lb 3.7 oz (94 kg)   10/25/22 1835 200 lb (90.7 kg)   10/25/22 1206 200 lb (90.7 kg)   22 0400 198 lb 3.2 oz (89.9 kg)   22 0305 200 lb (90.7 kg)   22 2234 200 lb (90.7 kg)   22 0753 182 lb 5.1 oz (82.7 kg)   22 0100 197 lb 12 oz (89.7 kg)   22 204 lb (92.5 kg)     General: Alert and oriented in no apparent distress.  HEENT: No scleral icterus, MMM  Neck: Supple, no JADE or thyromegaly  Cardiac: Regular rate and rhythm, S1, S2 normal, no murmur or rub  Lungs: Clear without wheezes, rales, rhonchi.    Abdomen: Soft, non-tender. + bowel sounds, no palpable organomegaly  Extremities: Without clubbing, cyanosis or edema.  Neurologic: , moving all extremities  Skin: Warm and dry, no rash        Labs:     Recent Labs   Lab 24  2335 24  0451 24  1717 24  0415 24  0455 24  0431 24  0442 24  1052 24  0422   WBC  --    < > 13.4* 10.3 8.2 7.9 7.8  --   --    HGB  --    < >  8.1* 8.0* 7.7* 7.3* 6.4* 7.8* 7.6*   MCV  --    < > 107.4* 107.0* 105.9* 107.6* 107.0*  --   --    PLT  --    < > 77.0* 82.0* 72.0* 82.0* 98.0*  --   --    INR 1.04  --   --   --   --   --   --   --   --     < > = values in this interval not displayed.       Recent Labs   Lab 03/19/24  0451 03/20/24  0416 03/21/24  0455 03/22/24  0431 03/23/24  0442   * 135* 130* 137 134*   K 4.1 5.4* 4.2 4.3 5.0   CL 99 101 94* 100 100   CO2 27.0 27.0 24.0 26.0 23.0   BUN 85* 55* 73* 55* 77*   CREATSERUM 8.83* 6.89* 8.98* 7.20* 8.81*   CA 8.4* 8.8 8.5 8.5 8.4*   MG 1.6  --   --   --   --    * 185* 133* 108* 155*       Recent Labs   Lab 03/17/24  2329   ALT 37   AST 33   ALB 3.7       Recent Labs   Lab 03/23/24  0627 03/23/24  1202 03/23/24  1742 03/23/24  2035 03/24/24  0629   PGLU 169* 223* 263* 209* 209*       Meds:           Impression/Plan:      1) ESRD- due to longstanding DM 2; on HD since 7/21. HD to cont TTHS per usual routine     2) CAD with preserved EF- s/p complex PCI.  Mgmt per cards     3)  Anemia- due to ESRD with acute drop in setting of hematoma. Fe stores OK; continue EPO with HD    Questions/concerns were discussed with patient and/or family by bedside.    No objection to home from nephrology standpoint.  F/u at outpt HD on Tuesday    Johnson Vera MD  3/24/2024  7:56 AM

## 2024-03-24 NOTE — DISCHARGE SUMMARY
General Medicine Discharge Summary     Patient ID:  Jonny Haque  78 year old  4/15/1945    Admit date: 3/17/2024    Discharge date and time: 3/24/24    Attending Physician: Lance Aguayo MD     Primary Care Physician: Gee Jimenez MD     Reason for admission: see HPI    Discharge Diagnoses: Elevated troponin [R79.89]  Acute chest pain [R07.9]  ESRD (end stage renal disease) on dialysis (HCC) [N18.6, Z99.2]  New onset left bundle branch block (LBBB) [I44.7]    Discharged Condition: good    Exam: (see progress notes for full details)  No acute distress, alert and oriented    Hospital Course: Patient is 78-year-old male significant past medical history of ESRD on dialysis, known a complex ischemic cardiomyopathy with recently normalized ejection fraction ON echo, CAD with stent to the LAD x 2, history of cirrhosis, history of GI bleed in the past, type 2 diabetes, hypothyroidism presented with chest pain.  Patient stated that chest pain began with associated shortness of breath last night, persisted which is why he came into the emergency room..  Chest pain was worst at rest as well as ambulation.  Cardiac alert was called because of chest pain, initial troponin was 321 proBNP was at 17,634        BMP showed labs consistent with ESRD creatinine 7.26 glucose was elevated at 237, chest x-ray was done that was remarkable for pulmonary venous congestion mild, increased interstitial markings secondary to pulmonary edema lipids show an elevation of triglycerides     Hemoglobin was 9.2, platelets of 118     Cardiology was consulted and patient was taken to the Cath Lab secondary to new left bundle branch block and elevated troponin-concerning for STEMI        Patient's chest pain also resolved with nitroglycerin with recurrence 12 hours later.       He currently denies any chest pain or shortness of breath no nausea no vomiting, however he did have some chest pain when he got up and went to the bathroom so  nitroglycerin drip was started     Patient also completed the angiogram last night that showed a 90% ostial, 90% OM1, 75% proximal LAD and moderate diffuse disease.  There was no PCI done and aspirin was started, nitroglycerin for chest pain was also started and beta-blocker.  CV surgery was also contacted       # Chest pain likely cardiac in nature, NSTEMI  -Elevated troponin, EKG with new onset left bundle branch block  -Recent drug-eluting stent to the proximal and mid LAD, x 2 in 2022  -Repeat angiogram done shows left main, LAD and left circumflex stenosis  -Titrate beta-blocker per cardiology,  -Echo completed-  -CV surgery and consult, has poor candidate for CABG given ESRD as well as cirrhosis  -Given recurrence of chest pain plan is for staged PCI, patient taken for repeat angiogram by Dr. Donald in the effort to do rotablation and left main stenting, completed  -Given aspirin 325 in the past, aspirin 81 to continue, Plavix,  -Continue statin  -Outpatient patient was on Imdur, hydralazine, carvedilol and amlodipine as well as Ranexa defer these medications to cardiology-  -another angiogram with PCI to the OM completed,  -Medically cleared to be discharged      # Acute blood loss anemia secondary to hematoma from the angiogram  Status post 1 unit of PRBCs given yesterday, postop hemoglobin is stable at 7.8-  -exacerbated by anemia chronic kidney disease  -Continue dual antiplatelet therapy  -Prophylactic heparin dose was held  -Holding off anticoagulation for paroxysmal A-fib        # New onset A-fib with RVR  -Back in normal sinus rhythm  -Was started on amiodarone bolus as well as drip,  -Now on p.o. amnio  -Continue beta-blocker  Patient currently not on any anticoagulation at this time, will defer anticoagulation to cardiology-  -defer therapeutic anticoagulation cardiology as patient already on aspirin as well as Plavix  -Holding off anticoagulation secondary to history of GI bleed per cardiology         # ESRD on dialysis  -Resume dialysis per schedule     # Ischemic cardiomyopathy  Echo completed that shows an EF of 35%-  -worsened since the prior echo, will need guideline directed medical therapy likely secondary to ischemic disease  -Already on aspirin, statin, beta-blocker, defer ACE as well as Entresto to cardiology     # History of cirrhosis  # HCV antibody positive  -No recent bleed, continue aspirin at this point  -GI was consulted however no acute intervention at this time continue PPI twice daily  -Abdominal ultrasound completed, shows cirrhotic liver however no evidence of any portal hypertension at this time  -GI signed off     # Type 2 diabetes  -Patient on long-acting and short acting insulin at home, resume long-acting insulin, sliding scale insulin titrate as needed  -Titrate his insulin as patient was hyperglycemic  -Glucose much better controlled currently     # Hypothyroidism  -Synthroid     # 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  # Chronic pancytopenia  -Appears to be compensated at this time, platelets with normal limits no recent episodes of any GI bleed  -As above    Consults: IP CONSULT TO CARDIOLOGY  IP CONSULT TO HOSPITALIST  IP CONSULT TO NEPHROLOGY  IP CONSULT TO HOSPITALIST  IP CONSULT TO GASTROENTEROLOGY  IP CONSULT TO CARDIAC REHAB  IP CONSULT TO FOOD AND NUTRITION SERVICES  IP CONSULT TO CARDIAC REHAB  IP CONSULT TO FOOD AND NUTRITION SERVICES    Operative Procedures:      Disposition: home    Patient Instructions:   Current Discharge Medication List        START taking these medications    Details   amiodarone 200 MG Oral Tab Take 1 tablet (200 mg total) by mouth daily.      clopidogrel 75 MG Oral Tab Take 1 tablet (75 mg total) by mouth daily.      losartan 50 MG Oral Tab Take 1 tablet (50 mg total) by mouth daily.      metoprolol succinate ER 50 MG Oral Tablet 24 Hr Take 1 tablet (50 mg total) by mouth Daily Beta Blocker.           CONTINUE these medications which have NOT  CHANGED    Details   aspirin 81 MG Oral Tab EC Take 1 tablet (81 mg total) by mouth every other day.      levothyroxine 150 MCG Oral Tab Take 1 tablet (150 mcg total) by mouth every other day. Take one tablet every morning before breakfast      Ferric Citrate (AURYXIA) 1  MG(Fe) Oral Tab       pantoprazole 40 MG Oral Tab EC Take 1 tablet (40 mg total) by mouth 2 (two) times daily before meals.      Sevelamer 800 MG Oral Tab Take 1 tablet (800 mg total) by mouth 3 (three) times daily with meals.      nitroGLYCERIN 0.3 MG Sublingual SL Tab Place 1 tablet (0.3 mg total) under the tongue every 5 (five) minutes as needed for Chest pain (as needed for chest pain).      Insulin Pen Needle (TRUEPLUS PEN NEEDLES) 31G X 5 MM Does not apply Misc Use 5 per day      Insulin Glargine, 2 Unit Dial, (TOUJEO MAX SOLOSTAR) 300 UNIT/ML Subcutaneous Solution Pen-injector Inject 20 Units into the skin nightly.      rosuvastatin 10 MG Oral Tab Take 1 tablet (10 mg total) by mouth nightly.      !! Glucose Blood (ONETOUCH ULTRA) In Vitro Strip 6 times a day      Insulin Lispro, 1 Unit Dial, (HUMALOG KWIKPEN) 100 UNIT/ML Subcutaneous Solution Pen-injector 12 units before meals with sliding scale. Max daily dose: 50 units.      !! CONTOUR NEXT TEST In Vitro Strip TEST BLOOD SUGAR FOUR TIMES DAILY      Blood Glucose Monitoring Suppl ([employer] CONTOUR NEXT MONITOR) W/DEVICE Does not apply Kit 1 Device by Does not apply route 4 (four) times daily.      zolpidem 10 MG Oral Tab 1 tablet (10 mg total) nightly as needed.       !! - Potential duplicate medications found. Please discuss with provider.        STOP taking these medications       isosorbide mononitrate ER 60 MG Oral Tablet 24 Hr        Lanthanum Carbonate 1000 MG Oral Chew Tab        amLODIPine 2.5 MG Oral Tab        Tuberculin PPD (TUBERSOL ID)                I reconciled current and discharge medications on day of discharge    Follow-up with PCP, cardiology    No orders of the  defined types were placed in this encounter.        Total Time Coordinating Care: Greater than 30 minutes    Patient had opportunity to ask questions and state understand and agree with therapeutic plan as outlined above.     Thank You,  Lance vasquez MD

## 2024-03-25 ENCOUNTER — PATIENT OUTREACH (OUTPATIENT)
Dept: CASE MANAGEMENT | Age: 79
End: 2024-03-25

## 2024-03-25 NOTE — PAYOR COMM NOTE
--------------  DISCHARGE REVIEW    Payor: [employer] MED[employer]ARE ADV PPO  Subscriber #:  [id number]  Authorization Number: [id number]    Admit date: 3/18/24  Admit time:   1:17 AM  Discharge Date: 3/24/2024  1:46 PM     Admitting Physician: Jem Buckley MD  Attending Physician:  No att. providers found  Primary Care Physician: Gee Jimenez MD          Discharge Summary Notes        Discharge Summary signed by Lance Aguayo MD at 3/24/2024 11:32 AM       Author: Lance Aguayo MD Specialty: HOSPITALIST Author Type: Physician    Filed: 3/24/2024 11:32 AM Date of Service: 3/24/2024 11:14 AM Status: Signed    : Lance Aguayo MD (Physician)           General Medicine Discharge Summary     Patient ID:  Jonny Haque  78 year old  4/15/1945    Admit date: 3/17/2024    Discharge date and time: 3/24/24    Attending Physician: Lance Aguayo MD     Primary Care Physician: Gee Jimenez MD     Reason for admission: see HPI    Discharge Diagnoses: Elevated troponin [R79.89]  Acute chest pain [R07.9]  ESRD (end stage renal disease) on dialysis (HCC) [N18.6, Z99.2]  New onset left bundle branch block (LBBB) [I44.7]    Discharged Condition: good    Exam: (see progress notes for full details)  No acute distress, alert and oriented    Hospital Course: Patient is 78-year-old male significant past medical history of ESRD on dialysis, known a complex ischemic cardiomyopathy with recently normalized ejection fraction ON echo, CAD with stent to the LAD x 2, history of cirrhosis, history of GI bleed in the past, type 2 diabetes, hypothyroidism presented with chest pain.  Patient stated that chest pain began with associated shortness of breath last night, persisted which is why he came into the emergency room..  Chest pain was worst at rest as well as ambulation.  Cardiac alert was called because of chest pain, initial troponin was 321 proBNP was at 17,634        BMP showed labs consistent with ESRD  creatinine 7.26 glucose was elevated at 237, chest x-ray was done that was remarkable for pulmonary venous congestion mild, increased interstitial markings secondary to pulmonary edema lipids show an elevation of triglycerides     Hemoglobin was 9.2, platelets of 118     Cardiology was consulted and patient was taken to the Cath Lab secondary to new left bundle branch block and elevated troponin-concerning for STEMI        Patient's chest pain also resolved with nitroglycerin with recurrence 12 hours later.       He currently denies any chest pain or shortness of breath no nausea no vomiting, however he did have some chest pain when he got up and went to the bathroom so nitroglycerin drip was started     Patient also completed the angiogram last night that showed a 90% ostial, 90% OM1, 75% proximal LAD and moderate diffuse disease.  There was no PCI done and aspirin was started, nitroglycerin for chest pain was also started and beta-blocker.  CV surgery was also contacted       # Chest pain likely cardiac in nature, NSTEMI  -Elevated troponin, EKG with new onset left bundle branch block  -Recent drug-eluting stent to the proximal and mid LAD, x 2 in 2022  -Repeat angiogram done shows left main, LAD and left circumflex stenosis  -Titrate beta-blocker per cardiology,  -Echo completed-  -CV surgery and consult, has poor candidate for CABG given ESRD as well as cirrhosis  -Given recurrence of chest pain plan is for staged PCI, patient taken for repeat angiogram by Dr. Donald in the effort to do rotablation and left main stenting, completed  -Given aspirin 325 in the past, aspirin 81 to continue, Plavix,  -Continue statin  -Outpatient patient was on Imdur, hydralazine, carvedilol and amlodipine as well as Ranexa defer these medications to cardiology-  -another angiogram with PCI to the OM completed,  -Medically cleared to be discharged      # Acute blood loss anemia secondary to hematoma from the angiogram  Status post 1  unit of PRBCs given yesterday, postop hemoglobin is stable at 7.8-  -exacerbated by anemia chronic kidney disease  -Continue dual antiplatelet therapy  -Prophylactic heparin dose was held  -Holding off anticoagulation for paroxysmal A-fib        # New onset A-fib with RVR  -Back in normal sinus rhythm  -Was started on amiodarone bolus as well as drip,  -Now on p.o. amnio  -Continue beta-blocker  Patient currently not on any anticoagulation at this time, will defer anticoagulation to cardiology-  -defer therapeutic anticoagulation cardiology as patient already on aspirin as well as Plavix  -Holding off anticoagulation secondary to history of GI bleed per cardiology        # ESRD on dialysis  -Resume dialysis per schedule     # Ischemic cardiomyopathy  Echo completed that shows an EF of 35%-  -worsened since the prior echo, will need guideline directed medical therapy likely secondary to ischemic disease  -Already on aspirin, statin, beta-blocker, defer ACE as well as Entresto to cardiology     # History of cirrhosis  # HCV antibody positive  -No recent bleed, continue aspirin at this point  -GI was consulted however no acute intervention at this time continue PPI twice daily  -Abdominal ultrasound completed, shows cirrhotic liver however no evidence of any portal hypertension at this time  -GI signed off     # Type 2 diabetes  -Patient on long-acting and short acting insulin at home, resume long-acting insulin, sliding scale insulin titrate as needed  -Titrate his insulin as patient was hyperglycemic  -Glucose much better controlled currently     # Hypothyroidism  -Synthroid     # 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  # Chronic pancytopenia  -Appears to be compensated at this time, platelets with normal limits no recent episodes of any GI bleed  -As above    Consults: IP CONSULT TO CARDIOLOGY  IP CONSULT TO HOSPITALIST  IP CONSULT TO NEPHROLOGY  IP CONSULT TO HOSPITALIST  IP CONSULT TO GASTROENTEROLOGY  IP CONSULT  TO CARDIAC REHAB  IP CONSULT TO FOOD AND NUTRITION SERVICES  IP CONSULT TO CARDIAC REHAB  IP CONSULT TO FOOD AND NUTRITION SERVICES    Operative Procedures:      Disposition: home    Patient Instructions:   Current Discharge Medication List        START taking these medications    Details   amiodarone 200 MG Oral Tab Take 1 tablet (200 mg total) by mouth daily.      clopidogrel 75 MG Oral Tab Take 1 tablet (75 mg total) by mouth daily.      losartan 50 MG Oral Tab Take 1 tablet (50 mg total) by mouth daily.      metoprolol succinate ER 50 MG Oral Tablet 24 Hr Take 1 tablet (50 mg total) by mouth Daily Beta Blocker.           CONTINUE these medications which have NOT CHANGED    Details   aspirin 81 MG Oral Tab EC Take 1 tablet (81 mg total) by mouth every other day.      levothyroxine 150 MCG Oral Tab Take 1 tablet (150 mcg total) by mouth every other day. Take one tablet every morning before breakfast      Ferric Citrate (AURYXIA) 1  MG(Fe) Oral Tab       pantoprazole 40 MG Oral Tab EC Take 1 tablet (40 mg total) by mouth 2 (two) times daily before meals.      Sevelamer 800 MG Oral Tab Take 1 tablet (800 mg total) by mouth 3 (three) times daily with meals.      nitroGLYCERIN 0.3 MG Sublingual SL Tab Place 1 tablet (0.3 mg total) under the tongue every 5 (five) minutes as needed for Chest pain (as needed for chest pain).      Insulin Pen Needle (TRUEPLUS PEN NEEDLES) 31G X 5 MM Does not apply Misc Use 5 per day      Insulin Glargine, 2 Unit Dial, (TOUJEO MAX SOLOSTAR) 300 UNIT/ML Subcutaneous Solution Pen-injector Inject 20 Units into the skin nightly.      rosuvastatin 10 MG Oral Tab Take 1 tablet (10 mg total) by mouth nightly.      !! Glucose Blood (ONETOUCH ULTRA) In Vitro Strip 6 times a day      Insulin Lispro, 1 Unit Dial, (HUMALOG KWIKPEN) 100 UNIT/ML Subcutaneous Solution Pen-injector 12 units before meals with sliding scale. Max daily dose: 50 units.      !! CONTOUR NEXT TEST In Vitro Strip TEST BLOOD  SUGAR FOUR TIMES DAILY      Blood Glucose Monitoring Suppl ([employer] CONTOUR NEXT MONITOR) W/DEVICE Does not apply Kit 1 Device by Does not apply route 4 (four) times daily.      zolpidem 10 MG Oral Tab 1 tablet (10 mg total) nightly as needed.       !! - Potential duplicate medications found. Please discuss with provider.        STOP taking these medications       isosorbide mononitrate ER 60 MG Oral Tablet 24 Hr        Lanthanum Carbonate 1000 MG Oral Chew Tab        amLODIPine 2.5 MG Oral Tab        Tuberculin PPD (TUBERSOL ID)              Follow-up with PCP, cardiology    Total Time Coordinating Care: Greater than 30 minutes      Thank You,  Lance vasquez MD      Electronically signed by Lance Vasquez MD on 3/24/2024 11:32 AM       3/23  SUBJECTIVE:  Pt stable this AM           Physical Exam:   /60   Pulse 64   Temp 97 °F (36.1 °C) (Temporal)   Resp 12   Wt 225 lb 8 oz (102.3 kg)   SpO2 100%   BMI 30.58 kg/m²   Temp (24hrs), Av.8 °F (36.6 °C), Min:97 °F (36.1 °C), Max:98.6 °F (37 °C)        Intake/Output Summary (Last 24 hours) at 3/23/2024 0754  Last data filed at 3/23/2024 0400      Gross per 24 hour   Intake 240 ml   Output 375 ml   Net -135 ml           Last 3 Weights   24 0638 225 lb 8 oz (102.3 kg)   24 0600 221 lb 1.9 oz (100.3 kg)   24 0400 223 lb 3.2 oz (101.2 kg)   24 0122 221 lb 9 oz (100.5 kg)   23 0600 204 lb 6.4 oz (92.7 kg)   23 0212 207 lb 3.7 oz (94 kg)   10/25/22 1835 200 lb (90.7 kg)   10/25/22 1206 200 lb (90.7 kg)   22 0400 198 lb 3.2 oz (89.9 kg)   22 0305 200 lb (90.7 kg)   22 2234 200 lb (90.7 kg)   22 0753 182 lb 5.1 oz (82.7 kg)   22 010 197 lb 12 oz (89.7 kg)   22 204 lb (92.5 kg)      General: Alert and oriented in no apparent distress.  HEENT: No scleral icterus, MMM  Neck: Supple, no JADE or thyromegaly  Cardiac: Regular rate and rhythm, S1, S2 normal, no murmur or rub  Lungs: Clear  without wheezes, rales, rhonchi.    Abdomen: Soft, non-tender. + bowel sounds, no palpable organomegaly  Extremities: Without clubbing, cyanosis or edema.  Neurologic: , moving all extremities  Skin: Warm and dry, no rash           Labs:                Recent Labs   Lab 03/17/24  2335 03/19/24  0451 03/19/24  1717 03/20/24  0415 03/21/24  0455 03/22/24  0431 03/23/24  0442   WBC  --    < > 13.4* 10.3 8.2 7.9 7.8   HGB  --    < > 8.1* 8.0* 7.7* 7.3* 6.4*   MCV  --    < > 107.4* 107.0* 105.9* 107.6* 107.0*   PLT  --    < > 77.0* 82.0* 72.0* 82.0* 98.0*   INR 1.04  --   --   --   --   --   --     < > = values in this interval not displayed.                 Recent Labs   Lab 03/19/24  0451 03/20/24  0416 03/21/24  0455 03/22/24  0431 03/23/24  0442   * 135* 130* 137 134*   K 4.1 5.4* 4.2 4.3 5.0   CL 99 101 94* 100 100   CO2 27.0 27.0 24.0 26.0 23.0   BUN 85* 55* 73* 55* 77*   CREATSERUM 8.83* 6.89* 8.98* 7.20* 8.81*   CA 8.4* 8.8 8.5 8.5 8.4*   MG 1.6  --   --   --   --    * 185* 133* 108* 155*             Recent Labs   Lab 03/17/24  2329   ALT 37   AST 33   ALB 3.7                 Recent Labs   Lab 03/22/24  0645 03/22/24  1055 03/22/24  1543 03/22/24  2125 03/23/24  0627   PGLU 127* 146* 162* 226* 169*         Meds:          Current Facility-Administered Medications   Medication Dose Route Frequency    epoetin eunice (Epogen, Procrit) 95210 UNIT/ML injection 10,000 Units  10,000 Units Intravenous Once    amiodarone (Pacerone) tab 200 mg  200 mg Oral BID with meals    clopidogrel (Plavix) tab 75 mg  75 mg Oral Daily    aspirin DR tab 81 mg  81 mg Oral Daily    HYDROmorphone (Dilaudid) 1 MG/ML injection 0.2 mg  0.2 mg Intravenous Q2H PRN     Or    HYDROmorphone (Dilaudid) 1 MG/ML injection 0.4 mg  0.4 mg Intravenous Q2H PRN     Or    HYDROmorphone (Dilaudid) 1 MG/ML injection 0.8 mg  0.8 mg Intravenous Q2H PRN    insulin degludec 100 units/mL flextouch 20 Units  20 Units Subcutaneous Nightly    nitroGLYCERIN  in dextrose 5% 50 mg/250mL infusion premix  5-300 mcg/min Intravenous Titrated    heparin (Porcine) 1000 UNIT/ML injection 4,000 Units  4,000 Units Intravenous PRN Dialysis    rosuvastatin (Crestor) tab 10 mg  10 mg Oral Nightly    glucose (Dex4) 15 GM/59ML oral liquid 15 g  15 g Oral Q15 Min PRN     Or    glucose (Glutose) 40% oral gel 15 g  15 g Oral Q15 Min PRN     Or    glucose-vitamin C (Dex-4) chewable tab 4 tablet  4 tablet Oral Q15 Min PRN     Or    dextrose 50% injection 50 mL  50 mL Intravenous Q15 Min PRN     Or    glucose (Dex4) 15 GM/59ML oral liquid 30 g  30 g Oral Q15 Min PRN     Or    glucose (Glutose) 40% oral gel 30 g  30 g Oral Q15 Min PRN     Or    glucose-vitamin C (Dex-4) chewable tab 8 tablet  8 tablet Oral Q15 Min PRN    acetaminophen (Tylenol Extra Strength) tab 500 mg  500 mg Oral Q6H PRN    melatonin tab 3 mg  3 mg Oral Nightly PRN    polyethylene glycol (PEG 3350) (Miralax) 17 g oral packet 17 g  17 g Oral Daily PRN    sennosides (Senokot) tab 17.2 mg  17.2 mg Oral Nightly PRN    bisacodyl (Dulcolax) 10 MG rectal suppository 10 mg  10 mg Rectal Daily PRN    ondansetron (Zofran) 4 MG/2ML injection 4 mg  4 mg Intravenous Q6H PRN    isosorbide mononitrate ER (Imdur) 24 hr tab 60 mg  60 mg Oral Daily    levothyroxine (Synthroid) tab 150 mcg  150 mcg Oral QOD    pantoprazole (Protonix) DR tab 40 mg  40 mg Oral BID AC    zolpidem (Ambien) tab 5 mg  5 mg Oral Nightly PRN    metoprolol tartrate (Lopressor) tab 25 mg  25 mg Oral TID Beta Blocker/Cardiac    nitroglycerin (Nitrostat) SL tab 0.4 mg  0.4 mg Sublingual Q5 Min PRN    sevelamer carbonate (Renvela) tab 800 mg  800 mg Oral TID CC    insulin aspart (NovoLOG) 100 Units/mL FlexPen 4-20 Units  4-20 Units Subcutaneous TID AC and HS    heparin (Porcine) 5000 UNIT/ML injection 5,000 Units  5,000 Units Subcutaneous Q8H [facility]             Facility-Administered Medications Ordered in Other Encounters   Medication Dose Route Frequency    BCG Live 50 mg  in sodium chloride 0.9 % 50 mL Intravesicle  50 mg Intravesical Once            Impression/Plan:       1) ESRD- due to longstanding DM 2; on HD since 7/21. HD to cont TTHS per usual routine     2) CAD with preserved EF- s/p complex PCI.  Mgmt per cards     3) h/o hep C with cirrhosis / varices / h/o GIB- US / doppler unremarkable; hep C PCR pending     4) Anemia- due to ESRD with acute drop in setting of hematoma. Fe stores OK; continue EPO with HD          Johnson Vera MD  3/23/2024

## 2024-03-25 NOTE — PROGRESS NOTES
Hospital follow up    Jacinto Dos Santos MD  Or  Satya Donald MD  3 weeks    Patient will call the office to schedule.  Confirmed with patient.    Closing encounter.

## 2024-03-26 ENCOUNTER — PATIENT OUTREACH (OUTPATIENT)
Dept: CASE MANAGEMENT | Age: 79
End: 2024-03-26

## 2024-03-26 NOTE — PROGRESS NOTES
VM received; pt requesting assistance w/scheduling apt (discharged 03/24)    Dr Satya Donald  CARDIOLOGY  [address]  [facility] [address] [phone]  Follow up 1 week  Apt made:  Fri 03/29 @2:00pm w/HERB Spivey, FMP-BC  [address]  [place], [place] [id number] [phone]  Confirmed w/pt  Closing encounter

## 2024-03-30 NOTE — PROCEDURES
[facility]       Jonny Haque Location: Cath Lab    CSN [id number] MRN [id number]   Admission Date 3/17/2024 Procedure Date 3/19/2024   Attending Physician No att. providers found Procedure Physician Satya Donald MD         CARDIAC CATHETERIZATION/PERCUTANEOUS CORONARY INTERVENTION      PREOPERATIVE DIAGNOSIS:  CAD  POSTOPERATIVE DIAGNOSIS:  CAD  PROCEDURE PERFORMED:  Impella (percutaneous ventricular assist device) placement, PCI to LAD with TRACY (x2), rotational atherectomy (rotablator)      PROCEDURE:  The patient was brought to the cardiac catheterization lab in the fasting state.  Informed consent was obtained.  Moderate sedation was employed using 6mg IV Versed and 125mcg IV fentanyl.  I directly observed the patient from 104 to 152p, watching the heart rate, blood pressure, oximetry, and rhythm.  And an independent, trained observer was present throughout the procedure and assisted in the monitoring of the patient's level of consciousness and physiologic states.    INTERVENTIONAL PROCEDURE: We planned to perform Impella (pLVAD) assissted PCI.  This was due to his ventricular function and the complexity of the LAD/unprotected left man intervention.    We gained right femoral access with a micokit and did an anigo.  We \"preclosed\" the right CFA with 2 Perclose devices.  We then placed the 15F Impella sheath.  We gave heparin for anticoag.  We crossed the aortic valve with a AL1 and a J wire.  I exchanged for a 0.018\" wire in the LV.  I then placed the Impella into the LV over the wire.  The current tracing was excellent as were the pressures and flows.  We had adequate supplementation of CO and proceeded. We hung low dose vasopressor to support pressure and use in the case of no reflow.  It was turned off at the end of the case after impella removal.    I used a micro needle to place a 7F sheath in the Impella sheath next to the Impella shaft.  I used a 7F EBU 3.5 guide.  I used a BMW wire and a corsair and  exchanged for a rota floppy.  I used a 1.75mm jani.  I did rotation atherectomy.  I made several runs.  It was tolerate hemodynamically.  I exchanged for the BMW wire in the distal LAD.  I dilated with a 3.0mm NC.  Full expansion.  I used IVUS to evaluate the anatomy.  There were nearly separate ostia for the LAD and CX and the CX ostium was wide open.  I used a 3.0x12mm Juan Francisco stent in the mid LAD and deployed at 16ATM.  Excellent result.  I used a 4.0x18mm Juan Francisco for the left main into the proximal LAD.  Deployed at 14ATM and then post dilated with a 4.5mm NC to high pressure.  Excellent result.  Re checked IVUS again to stent coverage.  Minimal extension into the aorta.  Final pics taken.  Impella weaned and pulled out of the LV.  I then removed it from the impella sheath.  I then used the perclose sutures for hemostasis.  Case ended.     MEDICATIONS:  See nursing record.     COMPLICATIONS:  None.     IMPRESSION:    CAD  PCI to mid LAD and left main into proximal LAD  Impella (pLVAD) used as well as rotational atherectomy    RECOMMENDATIONS:   DAPT  CCU  Consider PCI to CX  Medical management of CAD and CHF      CARLEY DODGE

## 2024-03-31 ENCOUNTER — HOSPITAL ENCOUNTER (INPATIENT)
Facility: HOSPITAL | Age: 79
LOS: 4 days | Discharge: HOME | End: 2024-04-04
Attending: EMERGENCY MEDICINE | Admitting: INTERNAL MEDICINE
Payer: MEDICARE

## 2024-03-31 ENCOUNTER — APPOINTMENT (OUTPATIENT)
Dept: GENERAL RADIOLOGY | Facility: HOSPITAL | Age: 79
End: 2024-03-31
Payer: MEDICARE

## 2024-03-31 ENCOUNTER — HOSPITAL ENCOUNTER (INPATIENT)
Facility: HOSPITAL | Age: 79
LOS: 4 days | Discharge: HOME HEALTH | End: 2024-04-04
Attending: EMERGENCY MEDICINE | Admitting: INTERNAL MEDICINE
Payer: MEDICARE

## 2024-03-31 DIAGNOSIS — N18.5 CHRONIC RENAL FAILURE, STAGE 5 (HCC): ICD-10-CM

## 2024-03-31 DIAGNOSIS — D64.9 ANEMIA, UNSPECIFIED TYPE: ICD-10-CM

## 2024-03-31 DIAGNOSIS — K92.2 GASTROINTESTINAL HEMORRHAGE, UNSPECIFIED GASTROINTESTINAL HEMORRHAGE TYPE: Primary | ICD-10-CM

## 2024-03-31 LAB
ALBUMIN SERPL-MCNC: 3.1 G/DL (ref 3.4–5)
ALBUMIN/GLOB SERPL: 0.9 {RATIO} (ref 1–2)
ALP LIVER SERPL-CCNC: 140 U/L
ALT SERPL-CCNC: 23 U/L
ANION GAP SERPL CALC-SCNC: 12 MMOL/L (ref 0–18)
ANTIBODY SCREEN: NEGATIVE
APTT PPP: 34.5 SECONDS (ref 23.3–35.6)
AST SERPL-CCNC: 24 U/L (ref 15–37)
ATRIAL RATE: 80 BPM
BASOPHILS # BLD AUTO: 0.03 X10(3) UL (ref 0–0.2)
BASOPHILS NFR BLD AUTO: 0.4 %
BILIRUB SERPL-MCNC: 0.8 MG/DL (ref 0.1–2)
BUN BLD-MCNC: 58 MG/DL (ref 9–23)
CALCIUM BLD-MCNC: 8.8 MG/DL (ref 8.5–10.1)
CHLORIDE SERPL-SCNC: 95 MMOL/L (ref 98–112)
CO2 SERPL-SCNC: 29 MMOL/L (ref 21–32)
CREAT BLD-MCNC: 6.51 MG/DL
EGFRCR SERPLBLD CKD-EPI 2021: 8 ML/MIN/1.73M2 (ref 60–?)
EOSINOPHIL # BLD AUTO: 0.15 X10(3) UL (ref 0–0.7)
EOSINOPHIL NFR BLD AUTO: 1.9 %
ERYTHROCYTE [DISTWIDTH] IN BLOOD BY AUTOMATED COUNT: 17.2 %
GLOBULIN PLAS-MCNC: 3.6 G/DL (ref 2.8–4.4)
GLUCOSE BLD-MCNC: 150 MG/DL (ref 70–99)
GLUCOSE BLD-MCNC: 164 MG/DL (ref 70–99)
GLUCOSE BLD-MCNC: 202 MG/DL (ref 70–99)
HCT VFR BLD AUTO: 22.1 %
HGB BLD-MCNC: 7.1 G/DL
HGB BLD-MCNC: 7.8 G/DL
IMM GRANULOCYTES # BLD AUTO: 0.02 X10(3) UL (ref 0–1)
IMM GRANULOCYTES NFR BLD: 0.3 %
INR BLD: 1.13 (ref 0.8–1.2)
LACTATE SERPL-SCNC: 1.5 MMOL/L (ref 0.4–2)
LYMPHOCYTES # BLD AUTO: 1.02 X10(3) UL (ref 1–4)
LYMPHOCYTES NFR BLD AUTO: 13 %
MCH RBC QN AUTO: 33.8 PG (ref 26–34)
MCHC RBC AUTO-ENTMCNC: 32.1 G/DL (ref 31–37)
MCV RBC AUTO: 105.2 FL
MONOCYTES # BLD AUTO: 0.82 X10(3) UL (ref 0.1–1)
MONOCYTES NFR BLD AUTO: 10.4 %
NEUTROPHILS # BLD AUTO: 5.81 X10 (3) UL (ref 1.5–7.7)
NEUTROPHILS # BLD AUTO: 5.81 X10(3) UL (ref 1.5–7.7)
NEUTROPHILS NFR BLD AUTO: 74 %
OSMOLALITY SERPL CALC.SUM OF ELEC: 302 MOSM/KG (ref 275–295)
P AXIS: 61 DEGREES
P-R INTERVAL: 192 MS
PLATELET # BLD AUTO: 101 10(3)UL (ref 150–450)
POTASSIUM SERPL-SCNC: 3.9 MMOL/L (ref 3.5–5.1)
PROCALCITONIN SERPL-MCNC: 0.98 NG/ML (ref ?–0.16)
PROT SERPL-MCNC: 6.7 G/DL (ref 6.4–8.2)
PROTHROMBIN TIME: 14.6 SECONDS (ref 11.6–14.8)
Q-T INTERVAL: 448 MS
QRS DURATION: 140 MS
QTC CALCULATION (BEZET): 516 MS
R AXIS: 1 DEGREES
RBC # BLD AUTO: 2.1 X10(6)UL
RH BLOOD TYPE: POSITIVE
SODIUM SERPL-SCNC: 136 MMOL/L (ref 136–145)
T AXIS: 104 DEGREES
TROPONIN I SERPL HS-MCNC: 51 NG/L
VENTRICULAR RATE: 80 BPM
WBC # BLD AUTO: 7.9 X10(3) UL (ref 4–11)

## 2024-03-31 PROCEDURE — 30233N1 TRANSFUSION OF NONAUTOLOGOUS RED BLOOD CELLS INTO PERIPHERAL VEIN, PERCUTANEOUS APPROACH: ICD-10-PCS | Performed by: EMERGENCY MEDICINE

## 2024-03-31 PROCEDURE — 71045 X-RAY EXAM CHEST 1 VIEW: CPT

## 2024-03-31 RX ORDER — LOSARTAN POTASSIUM 50 MG/1
50 TABLET ORAL DAILY
Status: DISCONTINUED | OUTPATIENT
Start: 2024-04-01 | End: 2024-04-04

## 2024-03-31 RX ORDER — PANTOPRAZOLE SODIUM 40 MG/1
40 TABLET, DELAYED RELEASE ORAL
Status: DISCONTINUED | OUTPATIENT
Start: 2024-03-31 | End: 2024-04-04

## 2024-03-31 RX ORDER — ZOLPIDEM TARTRATE 5 MG/1
10 TABLET ORAL NIGHTLY PRN
Status: DISCONTINUED | OUTPATIENT
Start: 2024-03-31 | End: 2024-04-04

## 2024-03-31 RX ORDER — NICOTINE POLACRILEX 4 MG
15 LOZENGE BUCCAL
Status: DISCONTINUED | OUTPATIENT
Start: 2024-03-31 | End: 2024-04-04

## 2024-03-31 RX ORDER — ACETAMINOPHEN 500 MG
500 TABLET ORAL EVERY 4 HOURS PRN
Status: DISCONTINUED | OUTPATIENT
Start: 2024-03-31 | End: 2024-04-01

## 2024-03-31 RX ORDER — AZITHROMYCIN 250 MG/1
500 TABLET, FILM COATED ORAL
Status: DISCONTINUED | OUTPATIENT
Start: 2024-03-31 | End: 2024-03-31

## 2024-03-31 RX ORDER — NITROGLYCERIN 0.4 MG/1
0.4 TABLET SUBLINGUAL EVERY 5 MIN PRN
Status: DISCONTINUED | OUTPATIENT
Start: 2024-03-31 | End: 2024-04-04

## 2024-03-31 RX ORDER — INSULIN DEGLUDEC 100 U/ML
20 INJECTION, SOLUTION SUBCUTANEOUS NIGHTLY
Status: DISCONTINUED | OUTPATIENT
Start: 2024-03-31 | End: 2024-04-04

## 2024-03-31 RX ORDER — ASPIRIN 81 MG/1
81 TABLET ORAL EVERY OTHER DAY
Status: DISCONTINUED | OUTPATIENT
Start: 2024-04-01 | End: 2024-04-04

## 2024-03-31 RX ORDER — METOPROLOL SUCCINATE 50 MG/1
50 TABLET, EXTENDED RELEASE ORAL
Status: DISCONTINUED | OUTPATIENT
Start: 2024-04-01 | End: 2024-04-04

## 2024-03-31 RX ORDER — NICOTINE POLACRILEX 4 MG
30 LOZENGE BUCCAL
Status: DISCONTINUED | OUTPATIENT
Start: 2024-03-31 | End: 2024-04-04

## 2024-03-31 RX ORDER — LEVOTHYROXINE SODIUM 0.15 MG/1
150 TABLET ORAL
Status: DISCONTINUED | OUTPATIENT
Start: 2024-04-01 | End: 2024-04-04

## 2024-03-31 RX ORDER — CLOPIDOGREL BISULFATE 75 MG/1
75 TABLET ORAL DAILY
Status: DISCONTINUED | OUTPATIENT
Start: 2024-04-01 | End: 2024-04-04

## 2024-03-31 RX ORDER — DOXYCYCLINE HYCLATE 100 MG/1
100 CAPSULE ORAL EVERY 12 HOURS SCHEDULED
Status: DISCONTINUED | OUTPATIENT
Start: 2024-03-31 | End: 2024-04-04

## 2024-03-31 RX ORDER — DEXTROSE MONOHYDRATE 25 G/50ML
50 INJECTION, SOLUTION INTRAVENOUS
Status: DISCONTINUED | OUTPATIENT
Start: 2024-03-31 | End: 2024-04-04

## 2024-03-31 RX ORDER — ONDANSETRON 2 MG/ML
4 INJECTION INTRAMUSCULAR; INTRAVENOUS EVERY 6 HOURS PRN
Status: DISCONTINUED | OUTPATIENT
Start: 2024-03-31 | End: 2024-04-04

## 2024-03-31 RX ORDER — SEVELAMER CARBONATE 800 MG/1
800 TABLET, FILM COATED ORAL
Status: DISCONTINUED | OUTPATIENT
Start: 2024-03-31 | End: 2024-04-04

## 2024-03-31 RX ORDER — AMIODARONE HYDROCHLORIDE 200 MG/1
200 TABLET ORAL DAILY
Status: DISCONTINUED | OUTPATIENT
Start: 2024-04-01 | End: 2024-04-04

## 2024-03-31 RX ORDER — ROSUVASTATIN CALCIUM 10 MG/1
10 TABLET, COATED ORAL NIGHTLY
Status: DISCONTINUED | OUTPATIENT
Start: 2024-03-31 | End: 2024-04-04

## 2024-03-31 NOTE — H&P
[facility]   part of [facility]    History and Physical     Jonny Haque Patient Status:  Emergency    4/15/1945 MRN [id number]   Location [facility] EMERGENCY DEPARTMENT Attending William Muñoz MD   Hosp Day # 0 PCP Gee Jimenez MD     Chief Complaint:   Chief Complaint   Patient presents with    Difficulty Breathing       History of Present Illness: Jonny Haque is a 78 year old male with DM2, ESRD, CAD with recent MI, presents to the ED for evaluation of worsening weakness/fatigue, shoulder and neck tightness.  He has a remote history of GI bleed /was never found.    On arrival he was afebrile, hemodynamically stable saturating well on room air.  Labs notable for hemoglobin 7.1 (last admission 6.4-7.8), procalcitonin 0.98, otherwise unremarkable.  Chest x-ray showed questionable retrocardiac opacity.  A FOBT was reportedly positive.    GI and cardiology was consulted and he was admitted for further evaluation.  He was also given a dose of Rocephin.    Following admission to the floor he denies any new or worsening symptoms.     Past Medical History:  Past Medical History:   Diagnosis Date    Calculus of kidney     Coronary atherosclerosis     bare metal stents at [facility] 2021    Diabetes (HCC)     Disorder of thyroid     Esophageal varices without bleeding, unspecified esophageal varices type (HCC) 2019    Hepatitis, unspecified diagnosed many years ago.    hepatitis C-just completed taking Harvoni in 2016    High blood pressure     Malignant neoplasm of trigone of urinary bladder (HCC) 2017    Malignant neoplasm of urinary bladder, unspecified site (HCC) 2017    Malignant neoplasm of urinary bladder, unspecified site (HCC) 2017    Renal disorder     Visual impairment     reading glasses        Past Surgical History:   Past Surgical History:   Procedure Laterality Date    ANGIOGRAM  08/15/2017    small caliber RCA with 80% mid lesion.  LAD and  left circumflex with 50% lesions.  LV shows inferobasilar aneurysm with scar.  Overall LV function preserved at the exterior 65%.    CATH BARE METAL STENT (BMS)      OTHER SURGICAL HISTORY      Upper GI surgery    OTHER SURGICAL HISTORY  2017    Cysto Dr. Lees    OTHER SURGICAL HISTORY  2019    BTS Cysto Dr. Lees     OTHER SURGICAL HISTORY  2020    BTS Cysto (Dr. Lees)       Social History:  reports that he quit smoking about 44 years ago. He has a 7.5 pack-year smoking history. He has never used smokeless tobacco. He reports that he does not drink alcohol and does not use drugs.    Family History:   Family History   Problem Relation Age of Onset    Cancer Father     Hypertension Mother        Allergies: No Known Allergies    Medications:    Current Facility-Administered Medications on File Prior to Encounter   Medication Dose Route Frequency Provider Last Rate Last Admin    [COMPLETED] epoetin eunice (Epogen, Procrit) 10379 UNIT/ML injection 10,000 Units  10,000 Units Intravenous Once Durga Fernando MD   10,000 Units at 24 1407    [COMPLETED] iodixanol (VISIPAQUE) 320 MG/ML injection 100 mL  100 mL Injection ONCE PRN Satya Donald MD   240 mL at 24 1829    [COMPLETED] lidocaine PF (Xylocaine-MPF) 1 % injection             [COMPLETED] heparin (Porcine) 5000 UNIT/ML injection             [COMPLETED] fentaNYL (Sublimaze) 50 mcg/mL injection             [COMPLETED] midazolam (Versed) 2 MG/2ML injection             [COMPLETED] heparin (Porcine) 5000 UNIT/ML injection             [COMPLETED] heparin (Porcine) 1000 UNIT/ML injection             [COMPLETED] protamine 10 mg/mL injection             [] sodium chloride 0.9% infusion   Intravenous Continuous Sayta Donald MD 50 mL/hr at 24 1901 New Bag at 24 1901    [COMPLETED] amiodarone (Cordarone) 150 mg in dextrose 5% 100 mL IV bolus  150 mg Intravenous Once Satya Donald  mL/hr at 24 1154 150 mg at 24 1154     [COMPLETED] amiodarone (Cordarone) 150 mg in dextrose 5% 100 mL IV bolus  150 mg Intravenous Once Satya Donald  mL/hr at 24 1505 150 mg at 24 1505    [COMPLETED] Sodium Zirconium Cyclosilicate (Lokelma) oral packet 5 g  5 g Oral Q8H Durga Fernando MD   5 g at 24 0851    [COMPLETED] epoetin eunice (Epogen, Procrit) 15920 UNIT/ML injection 10,000 Units  10,000 Units Intravenous Once Durga Fernnado MD   10,000 Units at 24 1035    [COMPLETED] epoetin eunice (Epogen, Procrit) 90747 UNIT/ML injection 10,000 Units  10,000 Units Intravenous Once Durga Fernando MD   10,000 Units at 24 0955    [COMPLETED] lidocaine PF (Xylocaine-MPF) 1 % injection             [COMPLETED] heparin (Porcine) 5000 UNIT/ML injection             [COMPLETED] fentaNYL (Sublimaze) 50 mcg/mL injection             [COMPLETED] midazolam (Versed) 2 MG/2ML injection             [COMPLETED] midazolam (Versed) 2 MG/2ML injection             [COMPLETED] heparin (Porcine) 5000 UNIT/ML injection             [COMPLETED] iodixanol (VISIPAQUE) 320 MG/ML injection 100 mL  100 mL Injection ONCE PRN Satya Donald MD   260 mL at 24 1356    [COMPLETED] DOPamine in dextrose 5% (Intropin) 800 mg/250mL infusion premix             [COMPLETED] norepinephrine (Levophed) 1 mg/mL injection             [COMPLETED] heparin (Porcine) 5000 UNIT/ML injection             [COMPLETED] protamine 10 mg/mL injection             [COMPLETED] clopidogrel (Plavix) 75 MG tab             [] sodium chloride 0.9% infusion   Intravenous Continuous Satya Donald MD 50 mL/hr at 24 1514 New Bag at 24 1514    [COMPLETED] protamine 10 mg/mL injection 20 mg  20 mg Intravenous Once Satya Donald MD   20 mg at 24 1735    [COMPLETED] fentaNYL (Sublimaze) 50 mcg/mL injection             [COMPLETED] midazolam (Versed) 2 MG/2ML injection             [COMPLETED] aspirin chewable tab 324 mg  324 mg Oral Once Bharat Frias DO   324 mg at  03/17/24 2327    [COMPLETED] nitroglycerin (Nitrostat) SL tab 0.4 mg  0.4 mg Sublingual Once Bharat Frias DO   0.4 mg at 03/17/24 2327    [COMPLETED] iodixanol (VISIPAQUE) 320 MG/ML injection 100 mL  100 mL Injection ONCE PRN Satya Donald MD   90 mL at 03/18/24 0102    [COMPLETED] lidocaine PF (Xylocaine-MPF) 1 % injection             [COMPLETED] heparin (Porcine) 5000 UNIT/ML injection             BCG Live 50 mg in sodium chloride 0.9 % 50 mL Intravesicle  50 mg Intravesical Once Siva Lees MD         Current Outpatient Medications on File Prior to Encounter   Medication Sig Dispense Refill    amiodarone 200 MG Oral Tab Take 1 tablet (200 mg total) by mouth daily. 30 tablet 1    clopidogrel 75 MG Oral Tab Take 1 tablet (75 mg total) by mouth daily. 90 tablet 3    losartan 50 MG Oral Tab Take 1 tablet (50 mg total) by mouth daily. 90 tablet 3    metoprolol succinate ER 50 MG Oral Tablet 24 Hr Take 1 tablet (50 mg total) by mouth Daily Beta Blocker. 90 tablet 3    aspirin 81 MG Oral Tab EC Take 1 tablet (81 mg total) by mouth every other day.  0    levothyroxine 150 MCG Oral Tab Take 1 tablet (150 mcg total) by mouth every other day. Take one tablet every morning before breakfast      Ferric Citrate (AURYXIA) 1  MG(Fe) Oral Tab       pantoprazole 40 MG Oral Tab EC Take 1 tablet (40 mg total) by mouth 2 (two) times daily before meals.      Sevelamer 800 MG Oral Tab Take 1 tablet (800 mg total) by mouth 3 (three) times daily with meals.      nitroGLYCERIN 0.3 MG Sublingual SL Tab Place 1 tablet (0.3 mg total) under the tongue every 5 (five) minutes as needed for Chest pain (as needed for chest pain).      zolpidem 10 MG Oral Tab 1 tablet (10 mg total) nightly as needed.      Insulin Pen Needle (TRUEPLUS PEN NEEDLES) 31G X 5 MM Does not apply Misc Use 5 per day 500 each 2    Insulin Glargine, 2 Unit Dial, (TOUJEO MAX SOLOSTAR) 300 UNIT/ML Subcutaneous Solution Pen-injector Inject 20 Units into the skin  nightly. 6 mL 2    rosuvastatin 10 MG Oral Tab Take 1 tablet (10 mg total) by mouth nightly. (Patient taking differently: Take 1 tablet (10 mg total) by mouth nightly.) 90 tablet 0    Glucose Blood (ONETOUCH ULTRA) In Vitro Strip 6 times a day 400 each 3    Insulin Lispro, 1 Unit Dial, (HUMALOG KWIKPEN) 100 UNIT/ML Subcutaneous Solution Pen-injector 12 units before meals with sliding scale. Max daily dose: 50 units. (Patient taking differently: 10 Units. Three times a day. Before meals) 60 mL 3    CONTOUR NEXT TEST In Vitro Strip TEST BLOOD SUGAR FOUR TIMES DAILY 400 strip 3    Blood Glucose Monitoring Suppl (Jumpido CONTOUR NEXT MONITOR) W/DEVICE Does not apply Kit 1 Device by Does not apply route 4 (four) times daily. 1 kit 0       Review of Systems:   A comprehensive 14 point review of systems was completed.    Pertinent positives and negatives noted in the HPI.    Physical Exam:    /58   Pulse 72   Temp 98.6 °F (37 °C) (Oral)   Resp 14   Ht 5' 11\" (1.803 m)   Wt 223 lb (101.2 kg)   SpO2 100%   BMI 31.10 kg/m²     General: No acute distress. Comfortable, nontoxic   HEENT: Normocephalic atraumatic. Moist mucous membranes; Sclera anicteric.  Neck: Supple, no JVD  Respiratory: Clear to auscultation bilaterally. Reg resp rate & effort, no wheezes/crackles   Cardiovascular: S1, S2. Regular rate and rhythm. No murmurs appreciable   Chest and Back: No tenderness or deformity.  Abdomen: Soft, nontender, nondistended.  Positive bowel sounds. No rebound, guarding or organomegaly.  Neurologic: No focal neurological deficits. CNII-XII grossly intact.  Musculoskeletal: Moves all extremities.  Extremities: No edema or cyanosis.  Integument: No rashes or lesions.   Psychiatric: Appropriate mood and affect.      Diagnostic Data:      Labs:  Recent Labs   Lab 03/31/24  1245   WBC 7.9   HGB 7.1*   .2*   .0*   INR 1.13       Recent Labs   Lab 03/31/24  1245   *   BUN 58*   CREATSERUM 6.51*   CA 8.8    ALB 3.1*      K 3.9   CL 95*   CO2 29.0   ALKPHO 140*   AST 24   ALT 23   BILT 0.8   TP 6.7       Estimated Creatinine Clearance: 10.3 mL/min (A) (based on SCr of 6.51 mg/dL (H)).    Recent Labs   Lab 03/31/24  1245   PTP 14.6   INR 1.13       No results for input(s): \"TROP\", \"CK\" in the last 168 hours.    Imaging: Imaging data reviewed in Epic.      ASSESSMENT / PLAN:     Jonny Haque Is a a 78 year old male who presents with weakness, acute on chronic anemia    Problem List / Diagnoses    Weakness  Acute on Chronic Anemia  Possible GI Bleed  Possible Bacterial Pneumonia  CAD with recent MI & PCI   ESRD On HD   HCV Cirrhosis   Pancytopenia, Chronic     Plan    Weakness  -- likely multifactorial from deconditioning, recent MI, acute on chronic anemia, possible acute bacterial pneumonia   -- fall precautions  -- PT/OT     Acute on Chronic Anemia  Possible GI Bleed  -- Hgb 7.1 on presentation, recent baseline 7.1   -- FOBT reportedly positive in ED  -- 1 u pRBC ordered, repeat pending  -- GI consulted, plan discussed, continue ppi bid for now  -- pt reports history GIB and with known cirrhosis   -- trend Hgb daily     Possible Bacterial Pneumonia  -- CXR on presentation with \"development of a moderate amount of left retrocardiac consolidation with new small left effusion\" suspicious for aspiration pneumonia  -- procalcitonin likewise elevated  -- s/p ceftriaxone/doxycycline in ED< will continue x 7 days     CAD with recent MI & PCI   -- resume DAPT given recent PCI (<2 weeks PTA)     ESRD On HD   -- npehrology consult in AM for HD    HCV Cirrhosis   Pancytopenia, Chronic   -- generally stable at baseline, monitor     DVT Ppx: SCDs; avoid chemoppx at this time given GIB    Code Status: Full Code    Dispo: inpatient; MOUNA > 2 midnights     Plan of care discussed with patient and/or family at bedside.    MARCUS Venegas MD  [facility]   [phone]

## 2024-03-31 NOTE — ED QUICK NOTES
Orders for admission, patient is aware of plan and ready to go upstairs. Any questions, please call ED RN erica at [facility] nurses station.     Patient Covid vaccination status: Fully vaccinated     COVID Test Ordered in ED: None    COVID Suspicion at Admission: N/A    Running Infusions:  None    Mental Status/LOC at time of transport: a&ox3. Dialysis last on Saturday.    Other pertinent information:   CIWA score: N/A   NIH score:  N/A

## 2024-03-31 NOTE — ED PROVIDER NOTES
Patient Seen in: [facility] Emergency Department      History     Chief Complaint   Patient presents with    Difficulty Breathing     Stated Complaint: increased weakness/fatigue, tightness in shoulders and neck, SOB, discharged la*    Subjective:   HPI    Patient is a 78-year-old male who has been having increasing weakness and fatigue along with a little tightness in his shoulders and neck.  Also has felt little shortness of breath and had a dry cough.  Patient was discharged last week due to a STEMI and had stents placed.  He also is on dialysis.  States she just been feeling more weak and did notice his stools to turn black.  Does have a history of a GI bleed in the past but states he could never find it.  States his hemoglobin usually does run on the lower side.  He is on Plavix and aspirin.    Objective:   Past Medical History:   Diagnosis Date    Calculus of kidney     Coronary atherosclerosis     bare metal stents at [facility] Jan 2021    Diabetes (HCC)     Disorder of thyroid     Esophageal varices without bleeding, unspecified esophageal varices type (HCC) 06/27/2019    Hepatitis, unspecified diagnosed many years ago.    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  LAD and left circumflex with 50% lesions.  LV shows inferobasilar aneurysm with scar.  Overall LV function preserved at the exterior 65%.    CATH BARE METAL STENT (BMS)      OTHER SURGICAL HISTORY      Upper GI surgery    OTHER SURGICAL HISTORY  12/11/2017    Cysto Dr. Lees    OTHER  SURGICAL HISTORY  2019    BTS Cysto Dr. Lees     OTHER SURGICAL HISTORY  2020    BTS Cysto (Dr. Lees)                Social History     Socioeconomic History    Marital status:    Tobacco Use    Smoking status: Former     Packs/day: 0.50     Years: 15.00     Additional pack years: 0.00     Total pack years: 7.50     Types: Cigarettes     Quit date: 1980     Years since quittin.1    Smokeless tobacco: Never    Tobacco comments:     quit at age 34   Vaping Use    Vaping Use: Never used   Substance and Sexual Activity    Alcohol use: No    Drug use: No     Social Determinants of Health     Food Insecurity: No Food Insecurity (3/18/2024)    Food Insecurity     Food Insecurity: Never true   Transportation Needs: No Transportation Needs (3/18/2024)    Transportation Needs     Lack of Transportation: No   Housing Stability: Low Risk  (3/18/2024)    Housing Stability     Housing Instability: No              Review of Systems    Positive for stated complaint: increased weakness/fatigue, tightness in shoulders and neck, SOB, discharged la*  Other systems are as noted in HPI.  Constitutional and vital signs reviewed.      All other systems reviewed and negative except as noted above.    Physical Exam     ED Triage Vitals [24 1225]   /73   Pulse 79   Resp 18   Temp 99.3 °F (37.4 °C)   Temp src Temporal   SpO2 98 %   O2 Device None (Room air)       Current:/64   Pulse 74   Temp 99.3 °F (37.4 °C) (Temporal)   Resp 16   Ht 180.3 cm (5' 11\")   Wt 101.2 kg   SpO2 98%   BMI 31.10 kg/m²         Physical Exam      Vital signs reviewed  General appearance: Patient is alert and in no acute distress, pale  HEENT: Pupils equal react to light extraocular muscles intact no scleral icterus, mucous membranes are moist, there is no erythema or exudate in the posterior pharynx  Neck: Supple no JVD no lymphadenopathy no meningismus no carotid bruit  CV: Regular rate and rhythm no murmur  rub  Respiratory: Clear to auscultation bilaterally no crackles no wheezes no accessory muscle use  Abdomen: Soft nontender nondistended, no rebound no guarding  no hepatosplenomegaly bowel sounds are present , no pulsatile mass, patient does have heme black stools that were positive on Hemoccult card.  Extremities: No clubbing cyanosis or edema 2+ distal pulses.  Neuro: Cranial nerves II through XII intact with no gross focal sensory or motor abnormality.      ED Course     Labs Reviewed   COMP METABOLIC PANEL (14) - Abnormal; Notable for the following components:       Result Value    Glucose 164 (*)     Chloride 95 (*)     BUN 58 (*)     Creatinine 6.51 (*)     Calculated Osmolality 302 (*)     eGFR-Cr 8 (*)     Alkaline Phosphatase 140 (*)     Albumin 3.1 (*)     A/G Ratio 0.9 (*)     All other components within normal limits   PROCALCITONIN - Abnormal; Notable for the following components:    Procalcitonin 0.98 (*)     All other components within normal limits    Narrative:     Resulted by: batch: TNIH, K, CO2, CL, NA, ALB, TBIL, ALT, AST, ALP, CA, CREA, BUN, GLUC,    CBC W/ DIFFERENTIAL - Abnormal; Notable for the following components:    RBC 2.10 (*)     HGB 7.1 (*)     HCT 22.1 (*)     .0 (*)     .2 (*)     All other components within normal limits   TROPONIN I HIGH SENSITIVITY - Normal   LACTIC ACID, PLASMA - Normal   PTT, ACTIVATED - Normal   PROTHROMBIN TIME (PT) - Normal   CBC WITH DIFFERENTIAL WITH PLATELET    Narrative:     The following orders were created for panel order CBC With Differential With Platelet.  Procedure                               Abnormality         Status                     ---------                               -----------         ------                     CBC W/ DIFFERENTIAL[[id number]]          Abnormal            Final result                 Please view results for these tests on the individual orders.   TYPE AND SCREEN    Narrative:     The following orders  were created for panel order Type and screen.  Procedure                               Abnormality         Status                     ---------                               -----------         ------                     ABORH (Blood Type)[[id number]]                               Final result               Antibody Screen[[id number]]                                  Final result                 Please view results for these tests on the individual orders.   ABORH (BLOOD TYPE)   ANTIBODY SCREEN   PREPARE RBC   RAINBOW DRAW LAVENDER   RAINBOW DRAW LIGHT GREEN   RAINBOW DRAW BLUE   RAINBOW DRAW GOLD   BLOOD CULTURE   BLOOD CULTURE     EKG    Rate, intervals and axes as noted on EKG Report.  Rate: 80  Rhythm: Sinus Rhythm  Reading: Normal sinus rhythm with a nonspecific intraventricular block.                 Patient was evaluated in the emergency department had a CBC procalcitonin troponin lactic acid done.  Also a chest x-ray.  Patient was also found to be heme positive below.  Hemoglobin was low.  Ordered 1 unit of blood.  I did discuss case with cardiology and the hospitalist along with GI.  Patient will be admitted as may need to have some anticoagulant medication adjusted and GI to evaluate to see if there is any reason to scope him.  Clinically he does appear well except a little pale.  Chest x-ray also showed a questionable retrocardiac consolidation however white count is normal but procalcitonin is slightly elevated we will give him a dose of Rocephin prophylactically just in case he has an early pneumonia developing.  He has had a dry cough.    XR CHEST AP PORTABLE  (CPT=71045)    Result Date: 3/31/2024  CONCLUSION:  Dialysis catheter noted in good position, tip is in the SVC.  No pneumothorax.  Unchanged. There is cardiomegaly with slight vascular redistribution without interstitial or alveolar edema.  Previous interstitial edema has resolved compared to 3/17/2024. There is development of a moderate amount of  left retrocardiac consolidation with a new small left effusion.  Milder changes are noted in the medial right lung base.  Findings are suspicious for aspiration pneumonia.   LOCATION:  [place] [address]      Dictated by (CST): Zeyad Ponce MD on 3/31/2024 at 1:03 PM     Finalized by (CST): Zeyad Ponce MD on 3/31/2024 at 1:05 PM               Patient was seen immediately upon arrival due to a high probability of sudden, clinically significant, or life threatening deterioration of the patient's clinical status.  The patient required my time at the bedside performing direct personal management, the absence of which would likely result in sudden, clinically significant or life threatening deterioration of  clinical condition.   The patient required my constant attention. Time was spent ordering, reviewing, and interpreting ancillary testing and imaging. The patient was frequently reevaluated, and I made frequent checks of  vital signs and monitor. Nursing notes were reviewed.     Critical care time was[60 minutes], and exclusive of procedures.    MDM      Differential diagnosis reflecting the complexity of care include: GI bleed, anemia, chronic renal failure, pneumonia    Comorbidities that add complexity to management include: Make renal failure and history of anemia    External chart review was done and was noted: Discharge summary from 3/24/2024 was reviewed.  Patient had presented with chest pain was found to ST elevation MI.  Angiogram that showed a 90% ostial and 90% CHRIS and 75% proximal LAD however PCI was not done and possibly needs a CABG.    Discussions of management was done with: Hospitalist cardiology and GI were contacted and agree with management and plan    My independent interpretation of studies of: Chest x-ray did show a questionable consolidation left retrocardiac and small pleural effusion.  Consistent with possible early pneumonia      Shared decision making was done by myself patient and his  son along with cardiology GI and the hospitalist.  Patient will be admitted for further management blood transfusion and monitoring.    Patient was evaluated in the emergency department and at this point patient will need admission for further management of medical condition.  Patient was stable in the emergency department and will be transferred to floor for further definitive care.  Patient's questions were answered.    This note was prepared using Dragon Medical voice recognition dictation software. As a result errors may occur. When identified these errors have been corrected. While every attempt is made to correct errors during dictation discrepancies may still exist      Admission disposition: 3/31/2024  2:55 PM                                        Medical Decision Making      Disposition and Plan     Clinical Impression:  1. Gastrointestinal hemorrhage, unspecified gastrointestinal hemorrhage type    2. Chronic renal failure, stage 5 (HCC)    3. Anemia, unspecified type         Disposition:  Admit  3/31/2024  2:55 pm    Follow-up:  No follow-up provider specified.        Medications Prescribed:  Current Discharge Medication List                            Hospital Problems       Present on Admission  Date Reviewed: 3/19/2024            ICD-10-CM Noted POA    * (Principal) Gastrointestinal hemorrhage, unspecified gastrointestinal hemorrhage type K92.2 9/29/2022 Unknown

## 2024-03-31 NOTE — PLAN OF CARE
Patient admitted into 8611 from ED just before 1600, accompanied by son. Oriented to room, call light and safety. AO x4. NSR on tele monitor. O2 sat adequate on RA. Denies chest pain and shortness of breath. PRBC infusing. Plan of care updated. Bed locked, in lowest position. Call light and personal items in reach. All needs met.    Problem: Patient/Family Goals  Goal: Patient/Family Long Term Goal  Description: Patient's Long Term Goal: stay out of the hospital    Interventions:  - take meds as prescribed  - attend follow up appointments  - follow recommended diet  - See additional Care Plan goals for specific interventions  Outcome: Progressing  Goal: Patient/Family Short Term Goal  Description: Patient's Short Term Goal: go home    Interventions:   - meds, labs, tele monitoring  - transfuse PRBC and monitor Hgb  - appropriate consults  - See additional Care Plan goals for specific interventions  Outcome: Progressing     Problem: SAFETY ADULT - FALL  Goal: Free from fall injury  Description: INTERVENTIONS:  - Assess pt frequently for physical needs  - Identify cognitive and physical deficits and behaviors that affect risk of falls.  - [place] fall precautions as indicated by assessment.  - Educate pt/family on patient safety including physical limitations  - Instruct pt to call for assistance with activity based on assessment  - Modify environment to reduce risk of injury  - Provide assistive devices as appropriate  - Consider OT/PT consult to assist with strengthening/mobility  - Encourage toileting schedule  Outcome: Progressing

## 2024-03-31 NOTE — CONSULTS
[facility]    Jonny Haque Patient Status:  Inpatient    4/15/1945 MRN [id number]   Location [facility] 8NE-A Attending Tadeo Nieves MD   Hosp Day # 0 PCP Gee Jimenez MD       Jonny Haque is a 78 year old male for anemia consult.   Had recent MI stent placed 2 weeks ago.  Started on Plavix along with ASA.  Noted dark stool today.  Hx of DM and ESRD on HD,afib, liver cirrhosis with a splenorenal shunt, no gastric or esophageal varices and recurrent GI bleeding.    Pt has had numerous GI evaluations for the bleeding with numerous EGD, colonoscopies, small bowel video capsule endoscopies, CT angiogram.  All negative for active bleeding at the time of the exams. Mostly as at [facility].  Saw Dr Granger last year, deferred eval.      Also seen Dr. Stover, Dr. Aponte and Dr. Bragg as well were reluctant for eval then.  Lab Results   Component Value Date    WBC 7.9 2024    HGB 7.1 2024    HCT 22.1 2024    .0 2024    CREATSERUM 6.51 2024    BUN 58 2024     2024    K 3.9 2024    CL 95 2024    CO2 29.0 2024     2024    CA 8.8 2024    ALB 3.1 2024    ALKPHO 140 2024    BILT 0.8 2024    TP 6.7 2024    AST 24 2024    ALT 23 2024    PTT 34.5 2024    INR 1.13 2024    TROPHS 51 2024       Allergies: No Known Allergies   No current outpatient medications on file.      Past Medical History:   Diagnosis Date    Calculus of kidney     Coronary atherosclerosis     bare metal stents at [facility] 2021    Diabetes (HCC)     Disorder of thyroid     Esophageal varices without bleeding, unspecified esophageal varices type (HCC) 2019    Hepatitis, unspecified diagnosed many years ago.    hepatitis C-just completed taking Harvoni in 2016    High blood pressure     Malignant neoplasm of trigone of urinary bladder (HCC) 2017    Malignant neoplasm of  urinary bladder, unspecified site (HCC) 2017    Malignant neoplasm of urinary bladder, unspecified site (HCC) 2017    Renal disorder     Visual impairment     reading glasses      Past Surgical History:   Procedure Laterality Date    ANGIOGRAM  08/15/2017    small caliber RCA with 80% mid lesion.  LAD and left circumflex with 50% lesions.  LV shows inferobasilar aneurysm with scar.  Overall LV function preserved at the exterior 65%.    CATH BARE METAL STENT (BMS)      OTHER SURGICAL HISTORY      Upper GI surgery    OTHER SURGICAL HISTORY  2017    Cysto Dr. Lees    OTHER SURGICAL HISTORY  2019    BTS Cysto Dr. Lees     OTHER SURGICAL HISTORY  2020    BTS Cysto (Dr. Lees)      Social History     Socioeconomic History    Marital status:    Tobacco Use    Smoking status: Former     Packs/day: 0.50     Years: 15.00     Additional pack years: 0.00     Total pack years: 7.50     Types: Cigarettes     Quit date: 1980     Years since quittin.1    Smokeless tobacco: Never    Tobacco comments:     quit at age 34   Vaping Use    Vaping Use: Never used   Substance and Sexual Activity    Alcohol use: No    Drug use: No     Social Determinants of Health     Food Insecurity: No Food Insecurity (3/18/2024)    Food Insecurity     Food Insecurity: Never true   Transportation Needs: No Transportation Needs (3/18/2024)    Transportation Needs     Lack of Transportation: No   Housing Stability: Low Risk  (3/18/2024)    Housing Stability     Housing Instability: No     Occupation:    FAMILY HX: GI HX: None, no hx of colon cancer  Family History   Problem Relation Age of Onset    Cancer Father     Hypertension Mother         REVIEW OF SYSTEMS:   GEN: feels well otherwise, no F/C, no wt loss  SKIN: no skin lesions  HEENT: no jaundice   LUNGS: no SOB   CV: no chest pain GI: no dysphagia, N/V  : no hematuria    MSK: no joint pain  ENDOCR: no diabetes history    EXAM:   /64 (BP Location:  Right arm)   Pulse 79   Temp 98.1 °F (36.7 °C) (Oral)   Resp 18   Ht 5' 11\" (1.803 m)   Wt 220 lb 10.9 oz (100.1 kg)   SpO2 100%   BMI 30.78 kg/m²  - Body mass index is 30.78 kg/m².  GEN: well developed, well nourished, NAD  HEENT: non-icteric   LUNGS: CTA  CARDIO: RRR  GI: good BS's,no masses, HSM or tenderness RECTAL: Exam not done. EXTREM.: no edema NEURO: A + O    ASSESSMENT AND PLAN:   Jonny Haque is a 78 year old male with anemia.  Pt recent MI stent placed 2 weeks ago resumed Plavix.  Noted black stool today.  Has ESRD on HD Hb stable.  No sig active bleed.    Has had recurrent anemia in the past on Plavix with extensive eval per HPI.  No bleeding x 2 years when off Plavix.  Seen lasrt year eval deferred.  EGD colons angios SB capsule.  Has cirrhosis no varices mentioned.  Would consider colitis, AVM's, portal gastropathy, PUD, gastritis or other. Has deferred endoscopic evaluation in the past. .      Resume diet.  Cont BID PPI.  Will readdress role for colonoscopy and EGD of Hb cont to decrease despite supportive care and if agreeable.  If not agreeable, plan BID PPI and resume Plavix if needed and follow.    Total time spent on patient care:  45 minutes    cc: Dr. Raza ref. provider found    The patient indicates understanding of these issues and agrees to the plan.    Sb Talley MD  3/31/2024  4:43 PM

## 2024-03-31 NOTE — ED INITIAL ASSESSMENT (HPI)
To the ED with c/o increased weakness/fatigue, tightness in shoulders and neck, SOB, discharged last week s/p STEMI and stent placement

## 2024-03-31 NOTE — PROGRESS NOTES
03/31/24 1559 03/31/24 1601 03/31/24 1603   Vital Signs   /60 119/55 133/64   MAP (mmHg) 81 73 82   BP Location Right arm Right arm Right arm   BP Method Automatic Automatic Automatic   Patient Position Lying Sitting Standing

## 2024-04-01 PROBLEM — D62 ABLA (ACUTE BLOOD LOSS ANEMIA): Status: ACTIVE | Noted: 2022-09-29

## 2024-04-01 LAB
ANION GAP SERPL CALC-SCNC: 13 MMOL/L (ref 0–18)
BASOPHILS # BLD AUTO: 0.03 X10(3) UL (ref 0–0.2)
BASOPHILS NFR BLD AUTO: 0.4 %
BLOOD TYPE BARCODE: 5100
BUN BLD-MCNC: 67 MG/DL (ref 9–23)
CALCIUM BLD-MCNC: 8.7 MG/DL (ref 8.5–10.1)
CHLORIDE SERPL-SCNC: 95 MMOL/L (ref 98–112)
CO2 SERPL-SCNC: 26 MMOL/L (ref 21–32)
CREAT BLD-MCNC: 7.58 MG/DL
EGFRCR SERPLBLD CKD-EPI 2021: 7 ML/MIN/1.73M2 (ref 60–?)
EOSINOPHIL # BLD AUTO: 0.15 X10(3) UL (ref 0–0.7)
EOSINOPHIL NFR BLD AUTO: 1.9 %
ERYTHROCYTE [DISTWIDTH] IN BLOOD BY AUTOMATED COUNT: 19.9 %
GLUCOSE BLD-MCNC: 151 MG/DL (ref 70–99)
GLUCOSE BLD-MCNC: 157 MG/DL (ref 70–99)
GLUCOSE BLD-MCNC: 163 MG/DL (ref 70–99)
GLUCOSE BLD-MCNC: 171 MG/DL (ref 70–99)
GLUCOSE BLD-MCNC: 192 MG/DL (ref 70–99)
GLUCOSE BLD-MCNC: 261 MG/DL (ref 70–99)
HCT VFR BLD AUTO: 25.4 %
HGB BLD-MCNC: 7.9 G/DL
HGB BLD-MCNC: 8.2 G/DL
HGB BLD-MCNC: 8.2 G/DL
IMM GRANULOCYTES # BLD AUTO: 0.05 X10(3) UL (ref 0–1)
IMM GRANULOCYTES NFR BLD: 0.6 %
LYMPHOCYTES # BLD AUTO: 0.98 X10(3) UL (ref 1–4)
LYMPHOCYTES NFR BLD AUTO: 12.2 %
MCH RBC QN AUTO: 32.1 PG (ref 26–34)
MCHC RBC AUTO-ENTMCNC: 31.1 G/DL (ref 31–37)
MCV RBC AUTO: 103.3 FL
MONOCYTES # BLD AUTO: 0.78 X10(3) UL (ref 0.1–1)
MONOCYTES NFR BLD AUTO: 9.7 %
NEUTROPHILS # BLD AUTO: 6.07 X10 (3) UL (ref 1.5–7.7)
NEUTROPHILS # BLD AUTO: 6.07 X10(3) UL (ref 1.5–7.7)
NEUTROPHILS NFR BLD AUTO: 75.2 %
OSMOLALITY SERPL CALC.SUM OF ELEC: 301 MOSM/KG (ref 275–295)
PLATELET # BLD AUTO: 101 10(3)UL (ref 150–450)
POTASSIUM SERPL-SCNC: 4.1 MMOL/L (ref 3.5–5.1)
RBC # BLD AUTO: 2.46 X10(6)UL
SODIUM SERPL-SCNC: 134 MMOL/L (ref 136–145)
UNIT VOLUME: 350 ML
WBC # BLD AUTO: 8.1 X10(3) UL (ref 4–11)

## 2024-04-01 PROCEDURE — 99233 SBSQ HOSP IP/OBS HIGH 50: CPT | Performed by: INTERNAL MEDICINE

## 2024-04-01 RX ORDER — ACETAMINOPHEN 500 MG
500 TABLET ORAL EVERY 4 HOURS PRN
Status: DISCONTINUED | OUTPATIENT
Start: 2024-04-01 | End: 2024-04-04

## 2024-04-01 NOTE — CM/SW NOTE
Spoke with patient at bedside to discuss discharge plans along with any  transportation needs  he might have in going to dialysis. Patient is able to drive himself to and from dialysis, which is the [facility] location in [place] near his home. The patient lives with his son,Ashwin.

## 2024-04-01 NOTE — PROGRESS NOTES
[facility]   part of [facility]    Progress Note     Jonny Haque Patient Status:  Inpatient    4/15/1945 MRN [id number]   Location [facility] 8NE-A Attending Tadeo Nieves MD   Hosp Day # 1 PCP Gee Jimenez MD     Follow up for: The primary encounter diagnosis was Gastrointestinal hemorrhage, unspecified gastrointestinal hemorrhage type. Diagnoses of Chronic renal failure, stage 5 (HCC) and Anemia, unspecified type were also pertinent to this visit.      Interval History/Subjective:     PEDRO overnight  Afebrile, HDS    No new or worsening symptoms. Still with melena and shoulder pain R > L. No new or worsening symptoms.     Vital signs:  Temp:  [98.1 °F (36.7 °C)-99.3 °F (37.4 °C)] 98.2 °F (36.8 °C)  Pulse:  [72-79] 76  Resp:  [10-18] 11  BP: (106-133)/(54-96) 123/66  SpO2:  [92 %-100 %] 92 %    Physical Exam:    General: NAD, Comfortable, Nontoxic   Respiratory: CTAB; reg resp rate & effort, no wheezes/crackles  Cardiovascular: S1, S2. Regular rate and rhythm. No murmurs appreciated  Abdomen: Soft, NTND, no guarding/rebound   Neurologic: No focal neurological deficits.   Extremities: No edema.   Skin: Dry, no rashes, ulcers or lesions     Diagnostic Data:      Labs:  Recent Labs   Lab 24  1245 244 24  0557   WBC 7.9  --  8.1   HGB 7.1* 7.8* 7.9*   .2*  --  103.3*   .0*  --  101.0*   INR 1.13  --   --        Recent Labs   Lab 24  1245 24  0557   * 157*   BUN 58* 67*   CREATSERUM 6.51* 7.58*   CA 8.8 8.7   ALB 3.1*  --     134*   K 3.9 4.1   CL 95* 95*   CO2 29.0 26.0   ALKPHO 140*  --    AST 24  --    ALT 23  --    BILT 0.8  --    TP 6.7  --        Recent Labs   Lab 24  1245   PTP 14.6   INR 1.13       No results for input(s): \"TROP\", \"CK\" in the last 168 hours.         Imaging: Imaging data reviewed in Epic.    Medications:    cefTRIAXone  2 g Intravenous Q24H    amiodarone  200 mg Oral Daily    aspirin  81 mg Oral QOD     clopidogrel  75 mg Oral Daily    insulin degludec  20 Units Subcutaneous Nightly    levothyroxine  150 mcg Oral Daily @ 0700    losartan  50 mg Oral Daily    metoprolol succinate ER  50 mg Oral Daily Beta Blocker    pantoprazole  40 mg Oral BID AC    rosuvastatin  10 mg Oral Nightly    sevelamer carbonate  800 mg Oral TID CC    insulin aspart  2-10 Units Subcutaneous TID AC and HS    doxycycline  100 mg Oral 2 times per day       ASSESSMENT / PLAN:     Jonny Haque Is a a 78 year old male who presents with weakness, acute on chronic anemia     Problem List / Diagnoses     Weakness  Acute on Chronic Anemia  Possible GI Bleed  Possible Bacterial Pneumonia  CAD with recent MI & PCI   ESRD On HD   HCV Cirrhosis   Pancytopenia, Chronic      Plan     Weakness  -- likely multifactorial from deconditioning, recent MI, acute on chronic anemia, possible acute bacterial pneumonia   -- fall precautions  -- PT/OT      Acute on Chronic Anemia  Possible GI Bleed  -- Hgb 7.1 on presentation, recent baseline 7.4  -- FOBT reportedly positive in ED  -- 1 u pRBC ordered, post transfusion Hgb 7.8, repeat 8.2   -- GI consulted, plan discussed, continue ppi bid for now  -- pt reports history GIB and with known cirrhosis   -- trend Hgb q8h   -- NPO at midnight in case of more rapid bleeding      Possible Bacterial Pneumonia  -- CXR on presentation with \"development of a moderate amount of left retrocardiac consolidation with new small left effusion\" suspicious for aspiration pneumonia  -- procalcitonin likewise elevated  -- s/p ceftriaxone/doxycycline in ED, will continue x 7 days      CAD with recent MI & PCI   -- resume DAPT given recent PCI (<2 weeks PTA)      ESRD On HD   -- npehrology consult in AM for HD     HCV Cirrhosis   Pancytopenia, Chronic   -- generally stable at baseline, monitor     DVT Mechanical Prophylaxis:   SCDs,    DVT Pharmacologic Prophylaxis   Medication   None         DVT Pharmacologic prophylaxis: Aspirin 81  mg      Code Status: Full Code    Dispo: inpatient; MOUNA > 2 midnights     Plan of care discussed with patient and/or family at bedside.    MARCUS Venegas MD  [facility]   [phone]      This note was created using voice recognition technology. It may include inadvertent transcriptional errors. Any such errors should be contextually interpreted and should not be taken to alter the content or the meaning.     Note to Patient: The 21st Century Cures Act makes medical notes like these available to patients in the interest of transparency. However, be advised this is a medical document. It is intended as peer to peer communication. It is written in medical language and may contain abbreviations or verbiage that are unfamiliar. It may appear blunt or direct. Medical documents are intended to carry relevant information, facts as evident, and the clinical opinion of the practitioner and not intended to be the primary source of your information.  Please refer directly to myself or clinical staff for information regarding plan of care.

## 2024-04-01 NOTE — CONSULTS
[facility] hospital Pharmacy Note: Antimicrobial Weight Based Dose Adjustment for: ceftriaxone (ROCEPHIN)    Jonny Haque is a 78 year old patient who has been prescribed ceftriaxone (ROCEPHIN) 1 g every 24 hours.    Estimated Creatinine Clearance: 8.8 mL/min (A) (based on SCr of 7.58 mg/dL (H)).  Wt Readings from Last 6 Encounters:   04/01/24 100.8 kg (222 lb 4.8 oz)   03/24/24 101.2 kg (223 lb 1.6 oz)   04/06/23 92.7 kg (204 lb 6.4 oz)   10/25/22 90.7 kg (200 lb)   09/30/22 89.9 kg (198 lb 3.2 oz)   07/05/22 82.7 kg (182 lb 5.1 oz)     Body mass index is 31 kg/m².    Based on this criteria and renal function, dose will be adjusted to ceftriaxone (ROCEPHIN) 2 g every 24 hours.    Thank you,    Camelia Julien, PharmD  4/1/2024  7:41 AM

## 2024-04-01 NOTE — SLP NOTE
ADULT SWALLOWING EVALUATION    ASSESSMENT    ASSESSMENT/OVERALL IMPRESSION:  Patient seen for swallowing evaluation due to concern for aspiration raised on imaging. Patient presented to ED due to increased weakness/fatigue, tightness in shoulders and back and SOB. Patient was recently hospitalized for MI and had stent placed. Patient denied any perceived change in swallow function. Patient denied any change in voice or speech.    Oral mechanism exam unremarkable. Patient with intact oral retrieval and containment with solids and thin liquids presented by straw. Mastication WFL with complete oral clearance. Laryngeal excursion judged to be of adequate strength and timeliness to palpation. There were no overt signs of aspiration noted.    Discussed above in detail with patient. Discussed low concern for aspiration based on clinical exam though discussed unable to rule out silent aspiration without instrumental assessment. Reviewed VFSS procedure with patient. Patient would like to discuss with his son prior to agreeing to proceed with VFSS. SLP will follow up with patient 4/2 and proceed with VFSS if patient agreeable.          RECOMMENDATIONS   Diet Recommendations - Solids: Regular  Diet Recommendations - Liquids: Thin Liquids                           Aspiration Precautions: Upright position;Slow rate;Small bites and sips  Medication Administration Recommendations: No restrictions  Treatment Plan/Recommendations:  (VFSS if patient agreeable)    HISTORY   MEDICAL HISTORY  Reason for Referral: R/O aspiration    Problem List  Principal Problem:    Gastrointestinal hemorrhage, unspecified gastrointestinal hemorrhage type  Active Problems:    Anemia, unspecified type    ABLA (acute blood loss anemia)    Chronic renal failure, stage 5 (HCC)      Past Medical History  Past Medical History:   Diagnosis Date    Calculus of kidney     Coronary atherosclerosis     bare metal stents at [facility] Jan 2021    Diabetes  (HCC)     Disorder of thyroid     Esophageal varices without bleeding, unspecified esophageal varices type (HCC) 06/27/2019    Hepatitis, unspecified diagnosed many years ago.    hepatitis C-just completed taking Harvoni in November 2016    High blood pressure     Malignant neoplasm of trigone of urinary bladder (HCC) 02/06/2017    Malignant neoplasm of urinary bladder, unspecified site (HCC) 01/26/2017    Malignant neoplasm of urinary bladder, unspecified site (HCC) 01/26/2017    Renal disorder     Visual impairment     reading glasses          Diet Prior to Admission: Regular;Thin liquids  Precautions: Aspiration    Patient/Family Goals: to get better    SWALLOWING HISTORY  Current Diet Consistency: Regular;Thin liquids  Dysphagia History: denied  Imaging Results:   CXR from 3/31/24 revealed:  CONCLUSION:    Dialysis catheter noted in good position, tip is in the SVC.  No pneumothorax.  Unchanged.   There is cardiomegaly with slight vascular redistribution without interstitial or alveolar edema.  Previous interstitial edema has resolved compared to 3/17/2024.   There is development of a moderate amount of left retrocardiac consolidation with a new small left effusion.  Milder changes are noted in the medial right lung base.  Findings are suspicious for aspiration pneumonia.         LOCATION:  [place] [address]                  Dictated by (CST): Zeyad Ponce MD on 3/31/2024 at 1:03 PM       Finalized by (CST): Zeyad Ponce MD on 3/31/2024 at 1:05 PM       SUBJECTIVE       OBJECTIVE   ORAL MOTOR EXAMINATION  Dentition: Functional;Natural  Symmetry: Within Functional Limits  Strength: Within Functional Limits  Tone: Within Functional Limits  Range of Motion: Within Functional Limits  Rate of Motion: Within Functional Limits    Voice Quality: Clear  Respiratory Status: Unlabored  Consistencies Trialed: Thin liquids;Hard solid  Method of Presentation: Self presentation;Straw;Consecutive swallows  Patient  Positioning: Upright;Midline (unsupported at EOB)    Oral Phase of Swallow: Within Functional Limits                      Pharyngeal Phase of Swallow: Within Functional Limits           (Please note: Silent aspiration cannot be evaluated clinically. Videofluoroscopic Swallow Study is required to rule-out silent aspiration.)    Esophageal Phase of Swallow: No complaints consistent with possible esophageal involvement              GOALS  Goal #1 Patient will participate in VFSS if agreeable  In Progress     FOLLOW UP  Treatment Plan/Recommendations:  (VFSS if patient agreeable)  Number of Visits to Meet Established Goals: 1  Follow Up Needed (Documentation Required): Yes  SLP Follow-up Date: 04/02/24    Thank you for your referral.   If you have any questions, please contact Anoop Marin MS CCC-SLP  Pager [phone]

## 2024-04-01 NOTE — PLAN OF CARE
Shift Note:  Assumed care of patient. Patient alert and oriented x4, pale, very pleasant, sitting edge of the bed.   Patient on room air, denies difficulty breathing, lung sounds diminished.   Denies any cardiac symptoms, NSR with HR in the 80s on tele. Denies pain at this time.   Continent of bowel and bladder, last BM 3/31.   Ambulates with stand by assist, call light within reach, tolerating care well.     POC:  - Monitor Hgb  - HD and Video swallow tomorrow  - ABX for PNA

## 2024-04-01 NOTE — PROGRESS NOTES
[facility]  Nephrology Progress Note    Jonny Garland Attending:  Tadeo Nieves MD       Assessment and Plan:    1) ESRD- due to longstanding DM 2; on HD since . Lytes / volume OK- next HD Tues per usual routine     2) CAD with preserved EF- s/p PCI / stents LAD + OM 3/24 (Dr. Donald)      3) h/o hep C with cirrhosis / varices- recent US / doppler unremarkable     4) DM 2 / HTN     5) Anemia of CKD / chronic disease- on EPO with HD    6) GI blood loss- no melena x 18 months off plavix, but required multiple transfusions while on plavix 2 yrs ago. GI eval noted    D/W son at bedside- he would prefer to hold off on endoscopy for now and follow hgb / clinical status. Transfuse 1 unit PRBC Tues for hgb < 8      Subjective:  Awake alert in pretty good spirits    Physical Exam:   /66 (BP Location: Right arm)   Pulse 76   Temp 98.2 °F (36.8 °C) (Oral)   Resp 11   Ht 5' 11\" (1.803 m)   Wt 222 lb 4.8 oz (100.8 kg)   SpO2 92%   BMI 31.00 kg/m²   Temp (24hrs), Av.5 °F (36.9 °C), Min:98.1 °F (36.7 °C), Max:99.3 °F (37.4 °C)       Intake/Output Summary (Last 24 hours) at 2024 0808  Last data filed at 3/31/2024 2017  Gross per 24 hour   Intake 480 ml   Output --   Net 480 ml     Wt Readings from Last 3 Encounters:   24 222 lb 4.8 oz (100.8 kg)   24 223 lb 1.6 oz (101.2 kg)   23 204 lb 6.4 oz (92.7 kg)     General: awake alert  HEENT: No scleral icterus, MMM  Neck: Supple, no JADE or thyromegaly  Cardiac: Regular rate and rhythm, S1, S2 normal, no murmur or tub  Lungs: Decreased BS at bases bilaterally   Abdomen: Soft, non-tender. + bowel sounds, no palpable organomegaly  Extremities: Without clubbing, cyanosis; no edema  Neurologic: Cranial nerves grossly intact, moving all extremities  Skin: Warm and dry, no rashes       Labs:   Lab Results   Component Value Date    WBC 8.1 2024    HGB 7.9 2024    HCT 25.4 2024    .0 2024    CREATSERUM 7.58  04/01/2024    BUN 67 04/01/2024     04/01/2024    K 4.1 04/01/2024    CL 95 04/01/2024    CO2 26.0 04/01/2024     04/01/2024    CA 8.7 04/01/2024    ALB 3.1 03/31/2024    ALKPHO 140 03/31/2024    BILT 0.8 03/31/2024    TP 6.7 03/31/2024    AST 24 03/31/2024    ALT 23 03/31/2024    PTT 34.5 03/31/2024    INR 1.13 03/31/2024    PTP 14.6 03/31/2024    PGLU 171 04/01/2024       Imaging:  All imaging studies reviewed.    Meds:   Current Facility-Administered Medications   Medication Dose Route Frequency    acetaminophen (Tylenol Extra Strength) tab 500 mg  500 mg Oral Q4H PRN    cefTRIAXone (Rocephin) 2 g in D5W 100 mL IVPB-ADD  2 g Intravenous Q24H    amiodarone (Pacerone) tab 200 mg  200 mg Oral Daily    aspirin DR tab 81 mg  81 mg Oral QOD    clopidogrel (Plavix) tab 75 mg  75 mg Oral Daily    insulin degludec 100 units/mL flextouch 20 Units  20 Units Subcutaneous Nightly    levothyroxine (Synthroid) tab 150 mcg  150 mcg Oral Daily @ 0700    losartan (Cozaar) tab 50 mg  50 mg Oral Daily    metoprolol succinate ER (Toprol XL) 24 hr tab 50 mg  50 mg Oral Daily Beta Blocker    nitroglycerin (Nitrostat) SL tab 0.4 mg  0.4 mg Sublingual Q5 Min PRN    pantoprazole (Protonix) DR tab 40 mg  40 mg Oral BID AC    rosuvastatin (Crestor) tab 10 mg  10 mg Oral Nightly    sevelamer carbonate (Renvela) tab 800 mg  800 mg Oral TID CC    zolpidem (Ambien) tab 10 mg  10 mg Oral Nightly PRN    glucose (Dex4) 15 GM/59ML oral liquid 15 g  15 g Oral Q15 Min PRN    Or    glucose (Glutose) 40% oral gel 15 g  15 g Oral Q15 Min PRN    Or    glucose-vitamin C (Dex-4) chewable tab 4 tablet  4 tablet Oral Q15 Min PRN    Or    dextrose 50% injection 50 mL  50 mL Intravenous Q15 Min PRN    Or    glucose (Dex4) 15 GM/59ML oral liquid 30 g  30 g Oral Q15 Min PRN    Or    glucose (Glutose) 40% oral gel 30 g  30 g Oral Q15 Min PRN    Or    glucose-vitamin C (Dex-4) chewable tab 8 tablet  8 tablet Oral Q15 Min PRN    ondansetron (Zofran) 4  MG/2ML injection 4 mg  4 mg Intravenous Q6H PRN    insulin aspart (NovoLOG) 100 Units/mL FlexPen 2-10 Units  2-10 Units Subcutaneous TID AC and HS    doxycycline (Vibramycin) cap 100 mg  100 mg Oral 2 times per day     Facility-Administered Medications Ordered in Other Encounters   Medication Dose Route Frequency    BCG Live 50 mg in sodium chloride 0.9 % 50 mL Intravesicle  50 mg Intravesical Once         Questions/concerns were discussed with patient and/or family by bedside.          Durga Fernando MD  4/1/2024  8:08 AM

## 2024-04-01 NOTE — PROGRESS NOTES
[facility]  Progress Note    Jonny Haque Patient Status:  Inpatient    4/15/1945 MRN [id number]   Location [facility] 8NE-A Attending Tadeo Nieves MD   Hosp Day # 1 PCP Gee Jimenez MD     Subjective:  Jonny Haque is a(n) 78 year old male.      No family at bedside, pt called son (Ashwin) on speaker phone to discuss his care and options etc        Current complaints: none    Small bm this morning, still dark brown per pt.  Denies overt gi bleeding or red rectal bleeding    Risk of egd, colonoscopy including prep, radiology studies, sedation, bleeding, perforation, infection, miss rate or issues with accuracy, etc and possible morbidity/mortalty discussed.  We discussed risk, benefit, alternatives, limitations as well as not having the procedures. Risk of intermittent bleeding or small bowel bleeding that may not be accessible by traditional egd/colonoscopy/enteroscopy etc reviewed.   All questions answered, pt/son demonstrated understanding.      Current Facility-Administered Medications   Medication Dose Route Frequency    acetaminophen (Tylenol Extra Strength) tab 500 mg  500 mg Oral Q4H PRN    amiodarone (Pacerone) tab 200 mg  200 mg Oral Daily    aspirin DR tab 81 mg  81 mg Oral QOD    clopidogrel (Plavix) tab 75 mg  75 mg Oral Daily    insulin degludec 100 units/mL flextouch 20 Units  20 Units Subcutaneous Nightly    levothyroxine (Synthroid) tab 150 mcg  150 mcg Oral Daily @ 0700    losartan (Cozaar) tab 50 mg  50 mg Oral Daily    metoprolol succinate ER (Toprol XL) 24 hr tab 50 mg  50 mg Oral Daily Beta Blocker    nitroglycerin (Nitrostat) SL tab 0.4 mg  0.4 mg Sublingual Q5 Min PRN    pantoprazole (Protonix) DR tab 40 mg  40 mg Oral BID AC    rosuvastatin (Crestor) tab 10 mg  10 mg Oral Nightly    sevelamer carbonate (Renvela) tab 800 mg  800 mg Oral TID CC    zolpidem (Ambien) tab 10 mg  10 mg Oral Nightly PRN    glucose (Dex4) 15 GM/59ML oral liquid 15 g  15 g Oral Q15 Min PRN     Or    glucose (Glutose) 40% oral gel 15 g  15 g Oral Q15 Min PRN    Or    glucose-vitamin C (Dex-4) chewable tab 4 tablet  4 tablet Oral Q15 Min PRN    Or    dextrose 50% injection 50 mL  50 mL Intravenous Q15 Min PRN    Or    glucose (Dex4) 15 GM/59ML oral liquid 30 g  30 g Oral Q15 Min PRN    Or    glucose (Glutose) 40% oral gel 30 g  30 g Oral Q15 Min PRN    Or    glucose-vitamin C (Dex-4) chewable tab 8 tablet  8 tablet Oral Q15 Min PRN    ondansetron (Zofran) 4 MG/2ML injection 4 mg  4 mg Intravenous Q6H PRN    insulin aspart (NovoLOG) 100 Units/mL FlexPen 2-10 Units  2-10 Units Subcutaneous TID AC and HS    doxycycline (Vibramycin) cap 100 mg  100 mg Oral 2 times per day     Facility-Administered Medications Ordered in Other Encounters   Medication Dose Route Frequency    BCG Live 50 mg in sodium chloride 0.9 % 50 mL Intravesicle  50 mg Intravesical Once        Objective:    /66 (BP Location: Right arm)   Pulse 76   Temp 98.2 °F (36.8 °C) (Oral)   Resp 11   Ht 5' 11\" (1.803 m)   Wt 222 lb 4.8 oz (100.8 kg)   SpO2 92%   BMI 31.00 kg/m²   General appearance: alert, appears stated age, and cooperative  Lungs: clear to auscultation bilaterally  Heart: reg rate   Abdomen: soft, non-tender; bowel sounds normal; no masses,  no organomegaly   Skin; some bruising on left upper leg and llq and lower mid pelvic area  Rectal; no stool in vault, smear of dark brown , no gross blood  Neurologic: Grossly normal, ox3        Labs:     Recent Labs   Lab 03/31/24  1245 03/31/24 2044   RBC 2.10*  --    HGB 7.1* 7.8*   HCT 22.1*  --    .2*  --    MCH 33.8  --    MCHC 32.1  --    RDW 17.2  --    NEPRELIM 5.81  --    WBC 7.9  --    .0*  --        Lab Results   Component Value Date    CREATSERUM 6.51 03/31/2024    BUN 58 03/31/2024     03/31/2024    K 3.9 03/31/2024    CL 95 03/31/2024    CO2 29.0 03/31/2024     03/31/2024    CA 8.8 03/31/2024       Lab Results   Component Value Date    ALB  3.1 03/31/2024    ALKPHO 140 03/31/2024    BILT 0.8 03/31/2024    TP 6.7 03/31/2024    AST 24 03/31/2024    ALT 23 03/31/2024       Lab Results   Component Value Date    PTT 34.5 03/31/2024    INR 1.13 03/31/2024   )    Lab Results   Component Value Date    PTP 14.6 03/31/2024    PGLU 171 04/01/2024           Assessment and Plan:  Patient Active Problem List   Diagnosis    Arthritis    Hepatic cirrhosis (HCC)    Diabetic peripheral neuropathy (HCC)    Encounter for screening for malignant neoplasm of prostate    Erectile dysfunction of nonorganic origin    Hyperlipidemia    Portal hypertension (HCC)    Thrombocytopenia (HCC)    PAC (premature atrial contraction)    Personal history of kidney stones    Elevated alkaline phosphatase level    Essential hypertension    Personal history of bladder cancer    Thoracic aorta atherosclerosis (HCC)    Hypothyroidism due to Hashimoto's thyroiditis    Hiatal hernia    Polyp of colon, unspecified part of colon, unspecified type    Abnormal EKG    Iron deficiency anemia due to chronic blood loss    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 stage 5 chronic kidney disease on chronic dialysis (HCC)    Duodenitis    Elevated PSA    Type 2 diabetes mellitus with chronic kidney disease on chronic dialysis, with long-term current use of insulin (HCC)    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)    ESRD (end stage renal disease) (HCC)    History of hepatitis C    Type 2 diabetes mellitus with microalbuminuria, with long-term current use of insulin (HCC)    Mild pulmonary hypertension (HCC)    Paroxysmal atrial fibrillation (HCC)    Pre-op exam    S/P primary angioplasty with coronary stent    Gastrointestinal hemorrhage associated with intestinal diverticulosis    Diabetes mellitus due to underlying condition with chronic kidney disease on chronic dialysis, with long-term current use of insulin  (HCC)    Anemia due to stage 5 chronic kidney disease, not on chronic dialysis (HCC)    Rectal bleeding    Anemia, unspecified type    Dyspnea on exertion    Colitis    Diverticulosis    ESRD on hemodialysis (HCC)    Hyperglycemia    Primary hypertension    Type 2 diabetes mellitus with diabetic nephropathy (HCC)    GI bleed    Acute blood loss anemia    Gastrointestinal hemorrhage, unspecified gastrointestinal hemorrhage type    Acute chest pain    Hypokalemia    Anemia in ESRD (end-stage renal disease) (HCC)    Chest pain of uncertain etiology    Melena    Diabetes mellitus due to underlying condition with chronic kidney disease on chronic dialysis (HCC)    New onset left bundle branch block (LBBB)    Elevated troponin    Anemia due to chronic kidney disease, on chronic dialysis (HCC)    Heart failure with preserved ejection fraction (HCC)    Hypoxia    Hyperkalemia    Chronic renal failure, stage 5 (HCC)       1 melena    Back on asa and plavix and now on ppi.  Prior w/u w/out source. Reviewed 2021 scanned colonoscopy w/ small polyps, diverticulosis, hemorrhoids and egd w/ gastritis (see reports)    Chart hx of cirrhosis and portal htn and portal gastropathy etc possible    Variceal bleeding typically more overt/massive    Hg stable after transfusion    Has bruising in leg/lower abd that pt states occurred after the cardiac cath, possible this contributed to the drop in hg    He has eaten today already, wants food    Currently pt/son deferring any eval for gi bleeding including radiology/endoscopy etc    [facility] reviewed w/ them, they were agreeable to the following ...  --serial hg, will do q8 for now  --clears, npo at [place] in case more rapid bleeding  --cont ppi  --bruising and asa/plavix as per cardio/hospitalist            Son/pt demonstrated understanding of risks of morbidity/mortality if diagnoses and/or treatments were delayed balanced against risks of further investigation and/or treatment.        --son/patient demonstrated understanding of the results and recommendations and risks, benefits, limitations, and alternatives to the plan. All of their questions and concerns were addressed and were agreeable to the plan as listed      Serjio Stover MD

## 2024-04-01 NOTE — PLAN OF CARE
Assumed care of patient 3/31/24 approximately 2320. Patient returning from bathroom at time of bedside report. Patient is alert, oriented x4. Pale. Sitting at bedside without assistance. O2 sats > 94% on RA. IS provided. NSR on tele. Pt had small, dark BM. Oliguric. (R) permacath dressed. Patient with large area of ecchymosis extending from (L) groin, down thigh, up around flank. Recent stent procedure, pt on plavix. EF 35% (3/18/24). Complains of stiff neck/(L) shldr discomfort - hot packs and tylenol provided. Pt up w/assist, fall risk d/t hemoglobin. Denies dizziness/lightheadedness. Plan: Labs in AM, weight, po abx (pna). Trend labs.  Problem: Patient/Family Goals  Goal: Patient/Family Long Term Goal  Description: Patient's Long Term Goal: stay out of the hospital    Interventions:  - take meds as prescribed  - attend follow up appointments  - follow recommended diet  - See additional Care Plan goals for specific interventions  Outcome: Progressing  Note: Home  Goal: Patient/Family Short Term Goal  Description: Patient's Short Term Goal: go home    Interventions:   - meds, labs, tele monitoring  - transfuse PRBC and monitor Hgb  - appropriate consults  - See additional Care Plan goals for specific interventions  Outcome: Progressing  Note: Identify reason for dark stool  Resolve low hgb     Problem: SAFETY ADULT - FALL  Goal: Free from fall injury  Description: INTERVENTIONS:  - Assess pt frequently for physical needs  - Identify cognitive and physical deficits and behaviors that affect risk of falls.  - [place] fall precautions as indicated by assessment.  - Educate pt/family on patient safety including physical limitations  - Instruct pt to call for assistance with activity based on assessment  - Modify environment to reduce risk of injury  - Provide assistive devices as appropriate  - Consider OT/PT consult to assist with strengthening/mobility  - Encourage toileting schedule  Outcome: Progressing

## 2024-04-01 NOTE — PAYOR COMM NOTE
--------------  ADMISSION REVIEW     Payor: [employer] MEDICARE ADV PPO  Subscriber #:  [id number]  Authorization Number: [id number]    Admit date: 3/31/24  Admit time:  3:49 PM       REVIEW DOCUMENTATION:      Patient Seen in: [facility] Emergency Department      History     Chief Complaint   Patient presents with    Difficulty Breathing     Stated Complaint: increased weakness/fatigue, tightness in shoulders and neck, SOB, discharged la*    Subjective:   HPI    Patient is a 78-year-old male who has been having increasing weakness and fatigue along with a little tightness in his shoulders and neck.  Also has felt little shortness of breath and had a dry cough.  Patient was discharged last week due to a STEMI and had stents placed.  He also is on dialysis.  States she just been feeling more weak and did notice his stools to turn black.  Does have a history of a GI bleed in the past but states he could never find it.  States his hemoglobin usually does run on the lower side.  He is on Plavix and aspirin.      Social Determinants of Health     Food Insecurity: No Food Insecurity (3/18/2024)    Food Insecurity     Food Insecurity: Never true   Transportation Needs: No Transportation Needs (3/18/2024)    Transportation Needs     Lack of Transportation: No   Housing Stability: Low Risk  (3/18/2024)    Housing Stability     Housing Instability: No       Review of Systems    Positive for stated complaint: increased weakness/fatigue, tightness in shoulders and neck, SOB, discharged la*  Physical Exam     ED Triage Vitals [03/31/24 1225]   /73   Pulse 79   Resp 18   Temp 99.3 °F (37.4 °C)   Temp src Temporal   SpO2 98 %   O2 Device None (Room air)       Current:/64   Pulse 74   Temp 99.3 °F (37.4 °C) (Temporal)   Resp 16   Ht 180.3 cm (5' 11\")   Wt 101.2 kg   SpO2 98%   BMI 31.10 kg/m²       Physical Exam    Vital signs reviewed  General appearance: Patient is alert and in no acute distress, pale  HEENT:  Pupils equal react to light extraocular muscles intact no scleral icterus, mucous membranes are moist, there is no erythema or exudate in the posterior pharynx  Neck: Supple no JVD no lymphadenopathy no meningismus no carotid bruit  CV: Regular rate and rhythm no murmur rub  Respiratory: Clear to auscultation bilaterally no crackles no wheezes no accessory muscle use  Abdomen: Soft nontender nondistended, no rebound no guarding  no hepatosplenomegaly bowel sounds are present , no pulsatile mass, patient does have heme black stools that were positive on Hemoccult card.  Extremities: No clubbing cyanosis or edema 2+ distal pulses.  Neuro: Cranial nerves II through XII intact with no gross focal sensory or motor abnormality.      ED Course     Labs Reviewed   COMP METABOLIC PANEL (14) - Abnormal; Notable for the following components:       Result Value    Glucose 164 (*)     Chloride 95 (*)     BUN 58 (*)     Creatinine 6.51 (*)     Calculated Osmolality 302 (*)     eGFR-Cr 8 (*)     Alkaline Phosphatase 140 (*)     Albumin 3.1 (*)     A/G Ratio 0.9 (*)     All other components within normal limits   PROCALCITONIN - Abnormal; Notable for the following components:    Procalcitonin 0.98 (*)     All other components within normal limits    Narrative:     Resulted by: batch: TNIH, K, CO2, CL, NA, ALB, TBIL, ALT, AST, ALP, CA, CREA, BUN, GLUC,    CBC W/ DIFFERENTIAL - Abnormal; Notable for the following components:    RBC 2.10 (*)     HGB 7.1 (*)     HCT 22.1 (*)     .0 (*)     .2 (*)    EKG    Rate, intervals and axes as noted on EKG Report.  Rate: 80  Rhythm: Sinus Rhythm  Reading: Normal sinus rhythm with a nonspecific intraventricular block.      atient was evaluated in the emergency department had a CBC procalcitonin troponin lactic acid done.  Also a chest x-ray.  Patient was also found to be heme positive below.  Hemoglobin was low.  Ordered 1 unit of blood.  I did discuss case with cardiology and the  hospitalist along with GI.  Patient will be admitted as may need to have some anticoagulant medication adjusted and GI to evaluate to see if there is any reason to scope him.  Clinically he does appear well except a little pale.  Chest x-ray also showed a questionable retrocardiac consolidation however white count is normal but procalcitonin is slightly elevated we will give him a dose of Rocephin prophylactically just in case he has an early pneumonia developing.  He has had a dry cough.    XR CHEST AP PORTABLE  (CPT=71045)    Result Date: 3/31/2024  CONCLUSION:  Dialysis catheter noted in good position, tip is in the SVC.  No pneumothorax.  Unchanged. There is cardiomegaly with slight vascular redistribution without interstitial or alveolar edema.  Previous interstitial edema has resolved compared to 3/17/2024. There is development of a moderate amount of left retrocardiac consolidation with a new small left effusion.  Milder changes are noted in the medial right lung base.  Findings are suspicious for aspiration pneumonia.   LOCATION:  [place] [id number]      Dictated by (CST): Zeyad Ponce MD on 3/31/2024 at 1:03 PM     Finalized by (CST): Zeyad Ponce MD on 3/31/2024 at 1:05 PM            Medical Decision Making      Disposition and Plan     Clinical Impression:  1. Gastrointestinal hemorrhage, unspecified gastrointestinal hemorrhage type    2. Chronic renal failure, stage 5 (HCC)    3. Anemia, unspecified type         Disposition:  Admit  3/31/2024  2:55 pm           [facility]   part of [facility]    History and Physical     Jonny Haque Patient Status:  Emergency    4/15/1945 MRN [id number]   Location [facility] EMERGENCY DEPARTMENT Attending William Muñoz MD   Hosp Day # 0 PCP Gee Jimenez MD     Chief Complaint:   Chief Complaint   Patient presents with    Difficulty Breathing       History of Present Illness: Jonny Haque is a 78 year old male with DM2, ESRD, CAD with recent MI,  presents to the ED for evaluation of worsening weakness/fatigue, shoulder and neck tightness.  He has a remote history of GI bleed /was never found.    On arrival he was afebrile, hemodynamically stable saturating well on room air.  Labs notable for hemoglobin 7.1 (last admission 6.4-7.8), procalcitonin 0.98, otherwise unremarkable.  Chest x-ray showed questionable retrocardiac opacity.  A FOBT was reportedly positive.    GI and cardiology was consulted and he was admitted for further evaluation.  He was also given a dose of Rocephin.    Following admission to the floor he denies any new or worsening symptoms.     Past Medical History:  Past Medical History:   Diagnosis Date    Calculus of kidney     Coronary atherosclerosis     bare metal stents at [facility] Jan 2021    Diabetes (HCC)     Disorder of thyroid     Esophageal varices without bleeding, unspecified esophageal varices type (HCC) 06/27/2019    Hepatitis, unspecified diagnosed many years ago.    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  LAD and left circumflex with 50% lesions.  LV shows inferobasilar aneurysm with scar.  Overall LV function preserved at the exterior 65%.    CATH BARE METAL STENT (BMS)      OTHER SURGICAL HISTORY      Upper GI surgery    OTHER SURGICAL HISTORY  12/11/2017    Caroline Lees    OTHER SURGICAL HISTORY  07/18/2019    BTS Cysto Dr. Lees     OTHER SURGICAL HISTORY  02/06/2020    BTS Cysto (Dr. Lees)       Social History:  reports that he quit smoking about 44  years ago. He has a 7.5 pack-year smoking history. He has never used smokeless tobacco. He reports that he does not drink alcohol and does not use drugs.    Family History:   Family History   Problem Relation Age of Onset    Cancer Father     Hypertension Mother        Allergies: No Known Allergies    Current Outpatient Medications on File Prior to Encounter   Medication Sig Dispense Refill    amiodarone 200 MG Oral Tab Take 1 tablet (200 mg total) by mouth daily. 30 tablet 1    clopidogrel 75 MG Oral Tab Take 1 tablet (75 mg total) by mouth daily. 90 tablet 3    losartan 50 MG Oral Tab Take 1 tablet (50 mg total) by mouth daily. 90 tablet 3    metoprolol succinate ER 50 MG Oral Tablet 24 Hr Take 1 tablet (50 mg total) by mouth Daily Beta Blocker. 90 tablet 3    aspirin 81 MG Oral Tab EC Take 1 tablet (81 mg total) by mouth every other day.  0    levothyroxine 150 MCG Oral Tab Take 1 tablet (150 mcg total) by mouth every other day. Take one tablet every morning before breakfast      Ferric Citrate (AURYXIA) 1  MG(Fe) Oral Tab       pantoprazole 40 MG Oral Tab EC Take 1 tablet (40 mg total) by mouth 2 (two) times daily before meals.      Sevelamer 800 MG Oral Tab Take 1 tablet (800 mg total) by mouth 3 (three) times daily with meals.      nitroGLYCERIN 0.3 MG Sublingual SL Tab Place 1 tablet (0.3 mg total) under the tongue every 5 (five) minutes as needed for Chest pain (as needed for chest pain).      zolpidem 10 MG Oral Tab 1 tablet (10 mg total) nightly as needed.      Insulin Pen Needle (TRUEPLUS PEN NEEDLES) 31G X 5 MM Does not apply Misc Use 5 per day 500 each 2    Insulin Glargine, 2 Unit Dial, (TOUJEO MAX SOLOSTAR) 300 UNIT/ML Subcutaneous Solution Pen-injector Inject 20 Units into the skin nightly. 6 mL 2    rosuvastatin 10 MG Oral Tab Take 1 tablet (10 mg total) by mouth nightly. (Patient taking differently: Take 1 tablet (10 mg total) by mouth nightly.) 90 tablet 0    Glucose Blood (ONETOUCH ULTRA)  In Vitro Strip 6 times a day 400 each 3    Insulin Lispro, 1 Unit Dial, (HUMALOG KWIKPEN) 100 UNIT/ML Subcutaneous Solution Pen-injector 12 units before meals with sliding scale. Max daily dose: 50 units. (Patient taking differently: 10 Units. Three times a day. Before meals) 60 mL 3    CONTOUR NEXT TEST In Vitro Strip TEST BLOOD SUGAR FOUR TIMES DAILY 400 strip 3    Blood Glucose Monitoring Suppl (Findery CONTOUR NEXT MONITOR) W/DEVICE Does not apply Kit 1 Device by Does not apply route 4 (four) times daily. 1 kit 0       Review of Systems:   A comprehensive 14 point review of systems was completed.    Pertinent positives and negatives noted in the HPI.    Physical Exam:    /58   Pulse 72   Temp 98.6 °F (37 °C) (Oral)   Resp 14   Ht 5' 11\" (1.803 m)   Wt 223 lb (101.2 kg)   SpO2 100%   BMI 31.10 kg/m²     General: No acute distress. Comfortable, nontoxic   HEENT: Normocephalic atraumatic. Moist mucous membranes; Sclera anicteric.  Neck: Supple, no JVD  Respiratory: Clear to auscultation bilaterally. Reg resp rate & effort, no wheezes/crackles   Cardiovascular: S1, S2. Regular rate and rhythm. No murmurs appreciable   Chest and Back: No tenderness or deformity.  Abdomen: Soft, nontender, nondistended.  Positive bowel sounds. No rebound, guarding or organomegaly.  Neurologic: No focal neurological deficits. CNII-XII grossly intact.  Musculoskeletal: Moves all extremities.  Extremities: No edema or cyanosis.  Integument: No rashes or lesions.   Psychiatric: Appropriate mood and affect.        ASSESSMENT / PLAN:     Jonny Haque Is a a 78 year old male who presents with weakness, acute on chronic anemia    Problem List / Diagnoses    Weakness  Acute on Chronic Anemia  Possible GI Bleed  Possible Bacterial Pneumonia  CAD with recent MI & PCI   ESRD On HD   HCV Cirrhosis   Pancytopenia, Chronic     Plan    Weakness  -- likely multifactorial from deconditioning, recent MI, acute on chronic anemia, possible acute  bacterial pneumonia   -- fall precautions  -- PT/OT     Acute on Chronic Anemia  Possible GI Bleed  -- Hgb 7.1 on presentation, recent baseline 7.1   -- FOBT reportedly positive in ED  -- 1 u pRBC ordered, repeat pending  -- GI consulted, plan discussed, continue ppi bid for now  -- pt reports history GIB and with known cirrhosis   -- trend Hgb daily     Possible Bacterial Pneumonia  -- CXR on presentation with \"development of a moderate amount of left retrocardiac consolidation with new small left effusion\" suspicious for aspiration pneumonia  -- procalcitonin likewise elevated  -- s/p ceftriaxone/doxycycline in ED< will continue x 7 days     CAD with recent MI & PCI   -- resume DAPT given recent PCI (<2 weeks PTA)     ESRD On HD   -- npehrology consult in AM for HD    HCV Cirrhosis   Pancytopenia, Chronic   -- generally stable at baseline, monitor     DVT Ppx: SCDs; avoid chemoppx at this time given GIB    Code Status: Full Code    Dispo: inpatient; MOUNA > 2 midnights     Plan of care discussed with patient and/or family at bedside.    MARCUS Venegas MD  [facility]   [phone]    3/31/24 gi    Jonny Haque is a 78 year old male for anemia consult.   Had recent MI stent placed 2 weeks ago.  Started on Plavix along with ASA.  Noted dark stool today.  Hx of DM and ESRD on HD,afib, liver cirrhosis with a splenorenal shunt, no gastric or esophageal varices and recurrent GI bleeding.     Pt has had numerous GI evaluations for the bleeding with numerous EGD, colonoscopies, small bowel video capsule endoscopies, CT angiogram.  All negative for active bleeding at the time of the exams. Mostly as at [facility].  Saw Dr Granger last year, deferred eval.          Has had recurrent anemia in the past on Plavix with extensive eval per HPI.  No bleeding x 2 years when off Plavix.  Seen lasrt year eval deferred.  EGD colons angios SB capsule.  Has cirrhosis no varices mentioned.  Would consider colitis, AVM's, portal  gastropathy, PUD, gastritis or other. Has deferred endoscopic evaluation in the past. .       Resume diet.  Cont BID PPI.  Will readdress role for colonoscopy and EGD of Hb cont to decrease despite supportive care and if agreeable.  If not agreeable, plan BID PPI and resume Plavix if needed and follow.      4/1 Gi    1 melena     Back on asa and plavix and now on ppi.  Prior w/u w/out source. Reviewed 2021 scanned colonoscopy w/ small polyps, diverticulosis, hemorrhoids and egd w/ gastritis (see reports)     Chart hx of cirrhosis and portal htn and portal gastropathy etc possible     Variceal bleeding typically more overt/massive     Hg stable after transfusion     Has bruising in leg/lower abd that pt states occurred after the cardiac cath, possible this contributed to the drop in hg     He has eaten today already, wants food     Currently pt/son deferring any eval for gi bleeding including radiology/endoscopy etc     [facility] reviewed w/ them, they were agreeable to the following ...  --serial hg, will do q8 for now  --clears, npo at [place]n in case more rapid bleeding  --cont ppi  --bruising and asa/plavix as per cardio/hospitalist         4/1 Hospitalist    Acute on Chronic Anemia  Possible GI Bleed  -- Hgb 7.1 on presentation, recent baseline 7.4  -- FOBT reportedly positive in ED  -- 1 u pRBC ordered, post transfusion Hgb 7.8, repeat 8.2   -- GI consulted, plan discussed, continue ppi bid for now  -- pt reports history GIB and with known cirrhosis   -- trend Hgb q8h   -- NPO at midnight in case of more rapid bleeding       4/1 Nephrology       1) ESRD- due to longstanding DM 2; on HD since 7/21. Lytes / volume OK- next HD Tues per usual routine     2) CAD with preserved EF- s/p PCI / stents LAD + OM 3/24 (Dr. Donald)      3) h/o hep C with cirrhosis / varices- recent US / doppler unremarkable     4) DM 2 / HTN     5) Anemia of CKD / chronic disease- on EPO with HD     6) GI blood loss- no melena x 18 months off  plavix, but required multiple transfusions while on plavix 2 yrs ago. GI verónica noted     D/W son at bedside- he would prefer to hold off on endoscopy for now and follow hgb / clinical status. Transfuse 1 unit PRBC Tues for hgb < 8        Latest Reference Range & Units 04/01/24 05:57 04/01/24 11:52 04/01/24 16:11   WBC 4.0 - 11.0 x10(3) uL 8.1     Hemoglobin 13.0 - 17.5 g/dL 7.9 (L) 8.2 (L) 8.2 (L)   Hematocrit 39.0 - 53.0 % 25.4 (L)     Platelet Count 150.0 - 450.0 10(3)uL 101.0 (L)     (L): Data is abnormally low    MEDICATIONS ADMINISTERED IN LAST 1 DAY:  acetaminophen (Tylenol Extra Strength) tab 500 mg       Date Action Dose Route User    4/1/2024 0615 Given 500 mg Oral Lulu Duenas RN    3/31/2024 2321 Given 500 mg Oral Lulu Duenas RN    3/31/2024 1816 Given 500 mg Oral Maryellen cMgovern RN          amiodarone (Pacerone) tab 200 mg       Date Action Dose Route User    4/1/2024 0810 Given 200 mg Oral Mikki Muhammad RN          aspirin DR tab 81 mg       Date Action Dose Route User    4/1/2024 0810 Given 81 mg Oral Mikki Muhammad RN          cefTRIAXone (Rocephin) 2 g in D5W 100 mL IVPB-ADD       Date Action Dose Route User    4/1/2024 1438 New Bag 2 g Intravenous Mikki Muhammad RN          clopidogrel (Plavix) tab 75 mg       Date Action Dose Route User    4/1/2024 0810 Given 75 mg Oral Mikki Muhammad RN          doxycycline (Vibramycin) cap 100 mg       Date Action Dose Route User    4/1/2024 0810 Given 100 mg Oral Mikki Muhammad RN    3/31/2024 2045 Given 100 mg Oral Hailey Good RN          insulin aspart (NovoLOG) 100 Units/mL FlexPen 2-10 Units       Date Action Dose Route User    4/1/2024 1212 Given 8 Units Subcutaneous (Left Lower Arm) Mikki Muhammad RN    4/1/2024 0609 Given 2 Units Subcutaneous (Left Lower Arm) Lulu Duenas, RN    3/31/2024 2128 Given 3 Units Subcutaneous (Left Upper Arm) Hailey Good RN    3/31/2024 1719 Given 2 Units Subcutaneous (Left Upper  Arm) Maryellen Mcgovern RN          insulin degludec 100 units/mL flextouch 20 Units       Date Action Dose Route User    3/31/2024 2045 Given 20 Units Subcutaneous (Left Lower Abdomen) Hailey Good RN          levothyroxine (Synthroid) tab 150 mcg       Date Action Dose Route User    4/1/2024 0607 Given 150 mcg Oral Lulu Duenas RN          lidocaine-menthol 4-1 % patch 1 patch       Date Action Dose Route User    4/1/2024 1438 Patch Applied 1 patch Transdermal (Right Upper Back) Mikki Muhammad RN          losartan (Cozaar) tab 50 mg       Date Action Dose Route User    4/1/2024 0810 Given 50 mg Oral Mikki Muhammad RN          metoprolol succinate ER (Toprol XL) 24 hr tab 50 mg       Date Action Dose Route User    4/1/2024 0607 Given 50 mg Oral Lulu Duenas RN          pantoprazole (Protonix) DR tab 40 mg       Date Action Dose Route User    4/1/2024 0607 Given 40 mg Oral Lulu Duenas RN    3/31/2024 1718 Given 40 mg Oral Maryellen Mcgovern RN          rosuvastatin (Crestor) tab 10 mg       Date Action Dose Route User    3/31/2024 2045 Given 10 mg Oral Hailey Good RN          sevelamer carbonate (Renvela) tab 800 mg       Date Action Dose Route User    4/1/2024 1212 Given 800 mg Oral Mikki Muhammad RN    4/1/2024 0810 Given 800 mg Oral Mikki Muhammad RN    3/31/2024 1718 Given 800 mg Oral Maryellen Mcgovern RN            Vitals (last day)       Date/Time Temp Pulse Resp BP SpO2 Weight O2 Device O2 Flow Rate (L/min) Who    04/01/24 1541 98.5 °F (36.9 °C) 75 21 131/69 95 % -- None (Room air) -- CM    04/01/24 1202 98.1 °F (36.7 °C) 75 19 122/65 95 % -- None (Room air) -- CM    04/01/24 0808 98.1 °F (36.7 °C) 75 16 114/64 94 % -- None (Room air) -- CM    04/01/24 0530 98.2 °F (36.8 °C) 76 11 123/66 92 % 222 lb 4.8 oz None (Room air) -- AT    03/31/24 2328 98.2 °F (36.8 °C) -- -- -- -- -- None (Room air) -- CB    03/31/24 2017 98.9 °F (37.2 °C) 78 17 121/59 95 % -- -- -- RS     03/31/24 1845 98.3 °F (36.8 °C) 77 18 106/96 -- -- -- -- AE    03/31/24 1603 -- 79 18 133/64 100 % -- -- -- LS    03/31/24 1601 -- 77 11 119/55 100 % -- -- -- LS    03/31/24 1559 98.1 °F (36.7 °C) 76 10 131/60 96 % 220 lb 10.9 oz None (Room air) -- LS    03/31/24 1530 98.6 °F (37 °C) 72 14 121/58 100 % -- -- -- MM    03/31/24 1522 -- 72 15 121/58 100 % -- None (Room air) -- MM    03/31/24 1515 -- 74 15 126/64 99 % -- None (Room air) -- MM    03/31/24 1509 98.2 °F (36.8 °C) 75 10 123/65 100 % -- -- -- ES    03/31/24 1500 -- 72 -- 121/54 99 % -- -- -- MM    03/31/24 1500 -- -- 16 -- -- -- None (Room air) -- ES    03/31/24 1400 -- 74 16 120/64 98 % -- None (Room air) -- ES    03/31/24 1345 -- 78 16 124/63 100 % -- None (Room air) -- ES    03/31/24 1300 -- 79 16 112/61 100 % -- None (Room air) -- ES    03/31/24 1247 -- -- -- -- -- -- None (Room air) -- ES    03/31/24 1225 99.3 °F (37.4 °C) 79 18 129/73 98 % 223 lb None (Room air) -- BS          Blood Transfusion Record       Product Unit Status Volume Start End            Transfuse RBC       24  [id number]  [id number] Stopped  03/31/24 1511 03/31/24 1830

## 2024-04-01 NOTE — PLAN OF CARE
Assumed care  Patient resting in bed.  Repeat Hgb 7.8.   Abx started.  Left groin site bruising, stable, soft.  Updated with plan.  Care ongoing.    Problem: SAFETY ADULT - FALL  Goal: Free from fall injury  Description: INTERVENTIONS:  - Assess pt frequently for physical needs  - Identify cognitive and physical deficits and behaviors that affect risk of falls.  - [place] fall precautions as indicated by assessment.  - Educate pt/family on patient safety including physical limitations  - Instruct pt to call for assistance with activity based on assessment  - Modify environment to reduce risk of injury  - Provide assistive devices as appropriate  - Consider OT/PT consult to assist with strengthening/mobility  - Encourage toileting schedule  Outcome: Progressing

## 2024-04-02 ENCOUNTER — APPOINTMENT (OUTPATIENT)
Dept: CV DIAGNOSTICS | Facility: HOSPITAL | Age: 79
End: 2024-04-02
Attending: INTERNAL MEDICINE
Payer: MEDICARE

## 2024-04-02 LAB
ANION GAP SERPL CALC-SCNC: 11 MMOL/L (ref 0–18)
BASOPHILS # BLD AUTO: 0.03 X10(3) UL (ref 0–0.2)
BASOPHILS NFR BLD AUTO: 0.4 %
BUN BLD-MCNC: 79 MG/DL (ref 9–23)
CALCIUM BLD-MCNC: 8.7 MG/DL (ref 8.5–10.1)
CHLORIDE SERPL-SCNC: 94 MMOL/L (ref 98–112)
CO2 SERPL-SCNC: 26 MMOL/L (ref 21–32)
CREAT BLD-MCNC: 8.97 MG/DL
EGFRCR SERPLBLD CKD-EPI 2021: 6 ML/MIN/1.73M2 (ref 60–?)
EOSINOPHIL # BLD AUTO: 0.13 X10(3) UL (ref 0–0.7)
EOSINOPHIL NFR BLD AUTO: 1.6 %
ERYTHROCYTE [DISTWIDTH] IN BLOOD BY AUTOMATED COUNT: 18.8 %
GLUCOSE BLD-MCNC: 107 MG/DL (ref 70–99)
GLUCOSE BLD-MCNC: 149 MG/DL (ref 70–99)
GLUCOSE BLD-MCNC: 164 MG/DL (ref 70–99)
GLUCOSE BLD-MCNC: 231 MG/DL (ref 70–99)
GLUCOSE BLD-MCNC: 243 MG/DL (ref 70–99)
HBV CORE AB SERPL QL IA: NONREACTIVE
HBV SURFACE AB SER QL: REACTIVE
HBV SURFACE AB SERPL IA-ACNC: 35.8 MIU/ML
HBV SURFACE AG SER-ACNC: <0.1 [IU]/L
HBV SURFACE AG SERPL QL IA: NONREACTIVE
HCT VFR BLD AUTO: 22.9 %
HGB BLD-MCNC: 7.5 G/DL
HGB BLD-MCNC: 7.7 G/DL
HGB BLD-MCNC: 7.9 G/DL
IMM GRANULOCYTES # BLD AUTO: 0.02 X10(3) UL (ref 0–1)
IMM GRANULOCYTES NFR BLD: 0.2 %
LYMPHOCYTES # BLD AUTO: 0.95 X10(3) UL (ref 1–4)
LYMPHOCYTES NFR BLD AUTO: 11.8 %
MCH RBC QN AUTO: 33 PG (ref 26–34)
MCHC RBC AUTO-ENTMCNC: 32.8 G/DL (ref 31–37)
MCV RBC AUTO: 100.9 FL
MONOCYTES # BLD AUTO: 0.84 X10(3) UL (ref 0.1–1)
MONOCYTES NFR BLD AUTO: 10.4 %
NEUTROPHILS # BLD AUTO: 6.07 X10 (3) UL (ref 1.5–7.7)
NEUTROPHILS # BLD AUTO: 6.07 X10(3) UL (ref 1.5–7.7)
NEUTROPHILS NFR BLD AUTO: 75.6 %
OSMOLALITY SERPL CALC.SUM OF ELEC: 298 MOSM/KG (ref 275–295)
PLATELET # BLD AUTO: 90 10(3)UL (ref 150–450)
POTASSIUM SERPL-SCNC: 4.3 MMOL/L (ref 3.5–5.1)
PROCALCITONIN SERPL-MCNC: 0.82 NG/ML (ref ?–0.16)
RBC # BLD AUTO: 2.27 X10(6)UL
SODIUM SERPL-SCNC: 131 MMOL/L (ref 136–145)
WBC # BLD AUTO: 8 X10(3) UL (ref 4–11)

## 2024-04-02 PROCEDURE — 93306 TTE W/DOPPLER COMPLETE: CPT | Performed by: INTERNAL MEDICINE

## 2024-04-02 PROCEDURE — 99233 SBSQ HOSP IP/OBS HIGH 50: CPT | Performed by: INTERNAL MEDICINE

## 2024-04-02 PROCEDURE — 5A1D70Z PERFORMANCE OF URINARY FILTRATION, INTERMITTENT, LESS THAN 6 HOURS PER DAY: ICD-10-PCS | Performed by: INTERNAL MEDICINE

## 2024-04-02 RX ORDER — SODIUM CHLORIDE 9 MG/ML
INJECTION, SOLUTION INTRAVENOUS ONCE
Status: DISCONTINUED | OUTPATIENT
Start: 2024-04-02 | End: 2024-04-04

## 2024-04-02 RX ORDER — HEPARIN SODIUM 1000 [USP'U]/ML
3000 INJECTION, SOLUTION INTRAVENOUS; SUBCUTANEOUS ONCE
Status: DISCONTINUED | OUTPATIENT
Start: 2024-04-02 | End: 2024-04-02

## 2024-04-02 RX ORDER — HEPARIN SODIUM 1000 [USP'U]/ML
3000 INJECTION, SOLUTION INTRAVENOUS; SUBCUTANEOUS ONCE
Status: COMPLETED | OUTPATIENT
Start: 2024-04-02 | End: 2024-04-02

## 2024-04-02 NOTE — CONSULTS
[facility]  Report of Consultation    Jonny Haque Patient Status:  Inpatient    4/15/1945 MRN [id number]   [address] Attending Tadeo Nieves MD   Hosp Day # 2 PCP Gee Jimenez MD     Reason for Consultation:   CAD, recent PCI/NSTEMI    History of Present Illness:   Jonny Haque is a(n) 78 year old male well known to me.  He has known CAD (RF of DM, ESRD (HD dependent), HTN, dyslipidemia) and recently underwent PCI for his multivessel disease as he was felt to be a very high risk surgical candidate.  I did PCI to his LM into the LAD and then PTCA to the first OM.  I was unable to pass a stent into the OM but we had an excellent result.  He had previously been unable to tolerate blood thinners or DAPT due to GI bleeds.  I discussed this at length with the family and all the GI recommendations were reviewed.  The family felt given his ongoing CP and reduced EF he would take DAPT knowing it could cause bleeding.  He left with a stable hemoglobin and no known GI bleeding.    The patient was initially stable post PCI/discharge.  He however quickly developed exertional fatigue.  He noted TOMLIN.  No orthopnea of SOB at rest but with mild exertion he was becoming SOB.  He then had several \"black stools\" and his family brought him to the ER.      His hemoglobin upon admission/ER was 7.1.  CXR suggested a retrocardiac opacity with mild PC positivity.  He did not have any CP (which during the previous admission was very prominent).  He was started on antibiotics and CBC was followed.    He is currently comfortable and CP/SOB free.  Currently breathing well during conversation and no complaints.    Past Medical History:   Past Medical History:   Diagnosis Date    Calculus of kidney     Coronary atherosclerosis     bare metal stents at [facility] 2021    Diabetes (HCC)     Disorder of thyroid     Esophageal varices without bleeding, unspecified esophageal varices  type (HCC) 06/27/2019    Hepatitis, unspecified diagnosed many years ago.    hepatitis C-just completed taking Harvoni in November 2016    High blood pressure     Malignant neoplasm of trigone of urinary bladder (HCC) 02/06/2017    Malignant neoplasm of urinary bladder, unspecified site (HCC) 01/26/2017    Malignant neoplasm of urinary bladder, unspecified site (HCC) 01/26/2017    Renal disorder     Visual impairment     reading glasses       Social History:   Smoking:  None  Alcohol:  None    Family History:   No family history of premature arthrosclerotic heart disease     Medications:   Scheduled:    sodium chloride   Intravenous Once    epoetin eunice  10,000 Units Intravenous Once    cefTRIAXone  2 g Intravenous Q24H    lidocaine-menthol  1 patch Transdermal Daily    amiodarone  200 mg Oral Daily    aspirin  81 mg Oral QOD    clopidogrel  75 mg Oral Daily    insulin degludec  20 Units Subcutaneous Nightly    levothyroxine  150 mcg Oral Daily @ 0700    losartan  50 mg Oral Daily    metoprolol succinate ER  50 mg Oral Daily Beta Blocker    pantoprazole  40 mg Oral BID AC    rosuvastatin  10 mg Oral Nightly    sevelamer carbonate  800 mg Oral TID CC    insulin aspart  2-10 Units Subcutaneous TID AC and HS    doxycycline  100 mg Oral 2 times per day       Continuous Infusion:       PRN Medications:   acetaminophen, dextromethorphan-guaiFENesin, nitroglycerin, zolpidem, glucose **OR** glucose **OR** glucose-vitamin C **OR** dextrose **OR** glucose **OR** glucose **OR** glucose-vitamin C, ondansetron    Outpatient Medications:   Current Facility-Administered Medications on File Prior to Encounter   Medication Dose Route Frequency Provider Last Rate Last Admin    [COMPLETED] epoetin eunice (Epogen, Procrit) 24737 UNIT/ML injection 10,000 Units  10,000 Units Intravenous Once Durga Fernando MD   10,000 Units at 03/23/24 1407    [COMPLETED] iodixanol (VISIPAQUE) 320 MG/ML injection 100 mL  100 mL Injection ONCE PRN Odilon  MD Satya   240 mL at 24 1829    [COMPLETED] lidocaine PF (Xylocaine-MPF) 1 % injection             [COMPLETED] heparin (Porcine) 5000 UNIT/ML injection             [COMPLETED] fentaNYL (Sublimaze) 50 mcg/mL injection             [COMPLETED] midazolam (Versed) 2 MG/2ML injection             [COMPLETED] heparin (Porcine) 5000 UNIT/ML injection             [COMPLETED] heparin (Porcine) 1000 UNIT/ML injection             [COMPLETED] protamine 10 mg/mL injection             [] sodium chloride 0.9% infusion   Intravenous Continuous Satya Donald MD 50 mL/hr at 24 1901 New Bag at 24 190    [COMPLETED] amiodarone (Cordarone) 150 mg in dextrose 5% 100 mL IV bolus  150 mg Intravenous Once Satya Donald  mL/hr at 24 1154 150 mg at 24 1154    [COMPLETED] amiodarone (Cordarone) 150 mg in dextrose 5% 100 mL IV bolus  150 mg Intravenous Once Satya Donald  mL/hr at 24 1505 150 mg at 24 1505    [COMPLETED] Sodium Zirconium Cyclosilicate (Lokelma) oral packet 5 g  5 g Oral Q8H Durga Fernando MD   5 g at 24 0851    [COMPLETED] epoetin eunice (Epogen, Procrit) 19732 UNIT/ML injection 10,000 Units  10,000 Units Intravenous Once Durga Fernando MD   10,000 Units at 24 1035    [COMPLETED] epoetin eunice (Epogen, Procrit) 57228 UNIT/ML injection 10,000 Units  10,000 Units Intravenous Once Durga Fernando MD   10,000 Units at 24 0955    [COMPLETED] lidocaine PF (Xylocaine-MPF) 1 % injection             [COMPLETED] heparin (Porcine) 5000 UNIT/ML injection             [COMPLETED] fentaNYL (Sublimaze) 50 mcg/mL injection             [COMPLETED] midazolam (Versed) 2 MG/2ML injection             [COMPLETED] midazolam (Versed) 2 MG/2ML injection             [COMPLETED] heparin (Porcine) 5000 UNIT/ML injection             [COMPLETED] iodixanol (VISIPAQUE) 320 MG/ML injection 100 mL  100 mL Injection ONCE PRN Satya Donald MD   260 mL at 24 1356    [COMPLETED]  DOPamine in dextrose 5% (Intropin) 800 mg/250mL infusion premix             [COMPLETED] norepinephrine (Levophed) 1 mg/mL injection             [COMPLETED] heparin (Porcine) 5000 UNIT/ML injection             [COMPLETED] protamine 10 mg/mL injection             [COMPLETED] clopidogrel (Plavix) 75 MG tab             [] sodium chloride 0.9% infusion   Intravenous Continuous Satya Donald MD 50 mL/hr at 24 1514 New Bag at 24 1514    [COMPLETED] protamine 10 mg/mL injection 20 mg  20 mg Intravenous Once Satya Donald MD   20 mg at 24 1735    [COMPLETED] fentaNYL (Sublimaze) 50 mcg/mL injection             [COMPLETED] midazolam (Versed) 2 MG/2ML injection             [COMPLETED] aspirin chewable tab 324 mg  324 mg Oral Once Bharat Frias,    324 mg at 24    [COMPLETED] nitroglycerin (Nitrostat) SL tab 0.4 mg  0.4 mg Sublingual Once Bharat Frias DO   0.4 mg at 24    [COMPLETED] iodixanol (VISIPAQUE) 320 MG/ML injection 100 mL  100 mL Injection ONCE PRN Satya Donald MD   90 mL at 24 0102    [COMPLETED] lidocaine PF (Xylocaine-MPF) 1 % injection             [COMPLETED] heparin (Porcine) 5000 UNIT/ML injection             BCG Live 50 mg in sodium chloride 0.9 % 50 mL Intravesicle  50 mg Intravesical Once Siva Lees MD         Current Outpatient Medications on File Prior to Encounter   Medication Sig Dispense Refill    amiodarone 200 MG Oral Tab Take 1 tablet (200 mg total) by mouth daily. 30 tablet 1    clopidogrel 75 MG Oral Tab Take 1 tablet (75 mg total) by mouth daily. 90 tablet 3    losartan 50 MG Oral Tab Take 1 tablet (50 mg total) by mouth daily. 90 tablet 3    metoprolol succinate ER 50 MG Oral Tablet 24 Hr Take 1 tablet (50 mg total) by mouth Daily Beta Blocker. 90 tablet 3    aspirin 81 MG Oral Tab EC Take 1 tablet (81 mg total) by mouth every other day.  0    levothyroxine 150 MCG Oral Tab Take 1 tablet (150 mcg total) by mouth every other day. Take one  tablet every morning before breakfast      pantoprazole 40 MG Oral Tab EC Take 1 tablet (40 mg total) by mouth 2 (two) times daily before meals.      Sevelamer 800 MG Oral Tab Take 1 tablet (800 mg total) by mouth 3 (three) times daily with meals.      rosuvastatin 10 MG Oral Tab Take 1 tablet (10 mg total) by mouth nightly. (Patient taking differently: Take 1 tablet (10 mg total) by mouth nightly.) 90 tablet 0    Ferric Citrate (AURYXIA) 1  MG(Fe) Oral Tab       nitroGLYCERIN 0.3 MG Sublingual SL Tab Place 1 tablet (0.3 mg total) under the tongue every 5 (five) minutes as needed for Chest pain (as needed for chest pain).      zolpidem 10 MG Oral Tab 1 tablet (10 mg total) nightly as needed.      Insulin Pen Needle (TRUEPLUS PEN NEEDLES) 31G X 5 MM Does not apply Misc Use 5 per day 500 each 2    Insulin Glargine, 2 Unit Dial, (TOUJEO MAX SOLOSTAR) 300 UNIT/ML Subcutaneous Solution Pen-injector Inject 20 Units into the skin nightly. 6 mL 2    Glucose Blood (ONETOUCH ULTRA) In Vitro Strip 6 times a day 400 each 3    Insulin Lispro, 1 Unit Dial, (HUMALOG KWIKPEN) 100 UNIT/ML Subcutaneous Solution Pen-injector 12 units before meals with sliding scale. Max daily dose: 50 units. (Patient taking differently: 10 Units. Three times a day. Before meals) 60 mL 3    CONTOUR NEXT TEST In Vitro Strip TEST BLOOD SUGAR FOUR TIMES DAILY 400 strip 3    Blood Glucose Monitoring Suppl (revoPT CONTOUR NEXT MONITOR) W/DEVICE Does not apply Kit 1 Device by Does not apply route 4 (four) times daily. 1 kit 0       Allergies:   No Known Allergies    Review of Systems:   No fevers, chills, change in weight or bowel habits.  Ten point review of systems is otherwise negative or unremarkable.    Physical Exam:   Vitals:    04/02/24 0826   BP: 125/58   Pulse: 71   Resp: 14   Temp: 97.9 °F (36.6 °C)     Wt Readings from Last 3 Encounters:   04/02/24 223 lb 8.7 oz (101.4 kg)   03/24/24 223 lb 1.6 oz (101.2 kg)   04/06/23 204 lb 6.4 oz (92.7  kg)           General: Well developed, well nourished male.  Pt is in no acute distress.  HEENT:   Normocephalic.  Atraumatic.  Eyes with no scleral icterus.  Neck: Supple.  No JVD.  Carotids 2+ and equal in symmetric fashion.  No bruits are noted.  Cardiac: Regular rate and rhythm.   There is a normal S1 and S2.  No S3 or S4.  No murmurs, rubs, or gallops.  PMI is non-displaced with a normal apical impulse.  Lungs: Clear to ascultation bilaterally.  No focal rales, rhonchi, or wheezes.  Good air movement is noted throughout all lung fields.  Abdomen: Soft.  Non-distended.  Non-tender.  Bowel sounds are present and normoactive.  No guarding or rebound.   Extremities: Extremities do not demonstrate any evidence of peripheral edema.   No cyanosis or clubbing of the digits is appreciated.  Femoral, Dorsalis Pedis, and Posterior Tibialis  pulses are 2+ and equal in a symmetric fashion.  Neurologic: Alert and oriented, normal affect.  No gross deficit appreciated.  Integument:  No visible rashes are appreciated.      Laboratories and Data:   Labs:    Recent Labs   Lab 03/31/24  1245 04/01/24  0557 04/02/24  0446   * 157* 149*   BUN 58* 67* 79*   CREATSERUM 6.51* 7.58* 8.97*   CA 8.8 8.7 8.7   ALB 3.1*  --   --     134* 131*   K 3.9 4.1 4.3   CL 95* 95* 94*   CO2 29.0 26.0 26.0   ALKPHO 140*  --   --    AST 24  --   --    ALT 23  --   --    BILT 0.8  --   --    TP 6.7  --   --        Recent Labs   Lab 03/31/24  1245 03/31/24  2044 04/01/24  0557 04/01/24  1152 04/02/24  0018 04/02/24  0446 04/02/24  0759   RBC 2.10*  --  2.46*  --   --  2.27*  --    HGB 7.1*   < > 7.9*   < > 7.9* 7.5* 7.7*   HCT 22.1*  --  25.4*  --   --  22.9*  --    .2*  --  103.3*  --   --  100.9*  --    MCH 33.8  --  32.1  --   --  33.0  --    MCHC 32.1  --  31.1  --   --  32.8  --    RDW 17.2  --  19.9  --   --  18.8  --    NEPRELIM 5.81  --  6.07  --   --  6.07  --    WBC 7.9  --  8.1  --   --  8.0  --    .0*  --  101.0*   --   --  90.0*  --     < > = values in this interval not displayed.       Recent Labs   Lab 03/31/24  1245   PTP 14.6   INR 1.13       No results for input(s): \"TROP\", \"CK\" in the last 168 hours.    Diagnostics:   Tele: NSR  EKG: Sinus.  LBBB (atypical LBBB),  Echo: 1. Left ventricle: The cavity size was normal. Wall thickness was normal.      Systolic function was moderately reduced. The estimated ejection fraction      was 35%, by visual assessment. Hypokinesis of the anterior wall.      Hypokinesis of the apical wall. Hypokinesis of the anteroseptal wall.      Left ventricular diastolic function parameters were normal for the      patient's age.   2. Left atrium: The left atrial volume was mildly increased.   3. Aortic valve: Transvalvular velocity was increased. The findings were      consistent with moderate stenosis. The peak systolic velocity was      2.4m/sec. The mean systolic gradient was 17mm Hg. The valve area (VTI)      was 1.34cm^2. The valve area (VTI) index was 0.6cm^2/m^2.   4. Mitral valve: There was mild regurgitation.   5. Pulmonary arteries: Systolic pressure was within the normal range,      estimated to be 25mm Hg.   Cath: Reviewed.  PCI.  CTA Chest:       Assessment:  1. CAD, s/p high risk PCI  2. ESRD, HD dependent  3. PAF, one episode during last admission  4. Anemia, hx GIB with difficult eval and treatment  5. Possible PNA  6. HCV/Cirrhosis      Plan:  1. Follow clinically for now.  Does not need an angiogram for current symptoms.  CAN NOT HOLD DAPT unless it is life threatening.  Will check echo.  2. Follow CBC, GI consult, xfuse as needed  3. HD per nephrology    Satya Donald MD  4/2/2024

## 2024-04-02 NOTE — PROGRESS NOTES
[facility]  Nephrology Progress Note    Jonny Garland Attending:  Tadeo Nieves MD       Assessment and Plan:    1) ESRD- due to longstanding DM 2; on HD since . Lytes / volume OK- HD today per usual routine     2) CAD with preserved EF- s/p PCI / stents LAD + OM 3/24 (Dr. Donald)      3) h/o hep C with cirrhosis / varices- recent US / doppler unremarkable     4) DM 2 / HTN     5) Anemia of CKD / chronic disease- on EPO with HD    6) GI blood loss- no melena x 18 months off plavix, but required multiple transfusions while on plavix 2 yrs ago. Tx 1 unit PRBC with HD today    D/W Dr. Stover. Hgb relatively stable; no BM yest and 1 BM this AM \"turning brown\"; consider holding off on further intervention. Also- consider DC abx. CXR relatively unimpressive, no WBC / cough / SOB, afebrile, etc.       Subjective:  Awake alert in pretty good spirits    Physical Exam:   /58 (BP Location: Right arm)   Pulse 71   Temp 97.9 °F (36.6 °C) (Oral)   Resp 14   Ht 5' 11\" (1.803 m)   Wt 223 lb 8.7 oz (101.4 kg)   SpO2 92%   BMI 31.18 kg/m²   Temp (24hrs), Av.3 °F (36.8 °C), Min:97.9 °F (36.6 °C), Max:98.6 °F (37 °C)       Intake/Output Summary (Last 24 hours) at 2024 0921  Last data filed at 2024 0826  Gross per 24 hour   Intake 1430 ml   Output 0 ml   Net 1430 ml     Wt Readings from Last 3 Encounters:   24 223 lb 8.7 oz (101.4 kg)   24 223 lb 1.6 oz (101.2 kg)   23 204 lb 6.4 oz (92.7 kg)     General: awake alert  HEENT: No scleral icterus, MMM  Neck: Supple, no JADE or thyromegaly  Cardiac: Regular rate and rhythm, S1, S2 normal, no murmur or tub  Lungs: Decreased BS at bases bilaterally   Abdomen: Soft, non-tender. + bowel sounds, no palpable organomegaly  Extremities: Without clubbing, cyanosis; no edema  Neurologic: Cranial nerves grossly intact, moving all extremities  Skin: Warm and dry, no rashes       Labs:   Lab Results   Component Value Date    WBC 8.0 2024    HGB  7.7 04/02/2024    HCT 22.9 04/02/2024    PLT 90.0 04/02/2024    CREATSERUM 8.97 04/02/2024    BUN 79 04/02/2024     04/02/2024    K 4.3 04/02/2024    CL 94 04/02/2024    CO2 26.0 04/02/2024     04/02/2024    CA 8.7 04/02/2024    PGLU 164 04/02/2024       Imaging:  All imaging studies reviewed.    Meds:           Questions/concerns were discussed with patient and/or family by bedside.          Durga Fernando MD  4/2/2024  921 AM

## 2024-04-02 NOTE — PROGRESS NOTES
[facility]  Progress Note    Jonny Haque Patient Status:  Inpatient    4/15/1945 MRN [id number]   Location [facility] 8NE-A Attending Tadeo Nieves MD   Hosp Day # 2 PCP Gee Jimenez MD     Assessment:  1. CAD, s/p high risk PCI  2. ESRD, HD dependent  3. PAF, one episode during last admission  4. Anemia, hx GIB with difficult eval and treatment  5. Possible PNA  6. HCV/Cirrhosis        Plan:  1. Follow clinically for now.  Does not need an angiogram for current symptoms.  CAN NOT HOLD DAPT unless it is life threatening.  Will check echo (still pending).  2. Follow CBC, GI consult with no procedures, xfuse as needed with HD  3. HD per nephrology    From CV standpoint, if he can ambulate without significant dyspnea, he could be discharged home.  However another day of observation to follow CBC and symptoms is very reasonable.      Subjective:  The patient denies any chest pain or dyspnea at this time.    Objective:  /58 (BP Location: Right arm)   Pulse 71   Temp 97.9 °F (36.6 °C) (Oral)   Resp 14   Ht 71\"   Wt 223 lb 8.7 oz (101.4 kg)   SpO2 92%   BMI 31.18 kg/m²   Temp (24hrs), Av.3 °F (36.8 °C), Min:97.9 °F (36.6 °C), Max:98.6 °F (37 °C)      Intake/Output Summary (Last 24 hours) at 2024 1119  Last data filed at 2024 0826  Gross per 24 hour   Intake 1430 ml   Output 0 ml   Net 1430 ml     Wt Readings from Last 3 Encounters:   24 223 lb 8.7 oz (101.4 kg)   24 223 lb 1.6 oz (101.2 kg)   23 204 lb 6.4 oz (92.7 kg)       General: Awake and alert; in no acute distress  Cardiac: Regular rate and regular rhythm; no murmurs/rubs/gallops are appreciated  Lungs: Clear to auscultation bilaterally; no accessory muscle use  Abdomen: Soft, non-tender; bowel sounds are normoactive  Extremities: No clubbing/cyanosis; moves all 4 extremities normally    Current Facility-Administered Medications   Medication Dose Route Frequency    sodium chloride  0.9% infusion   Intravenous Once    heparin (Porcine) 1000 UNIT/ML injection 3,000 Units  3,000 Units Intravenous Once    acetaminophen (Tylenol Extra Strength) tab 500 mg  500 mg Oral Q4H PRN    cefTRIAXone (Rocephin) 2 g in D5W 100 mL IVPB-ADD  2 g Intravenous Q24H    dextromethorphan-guaiFENesin (Robitussin-DM)  mg/5mL oral solution 5 mL  5 mL Oral Q6H PRN    lidocaine-menthol 4-1 % patch 1 patch  1 patch Transdermal Daily    amiodarone (Pacerone) tab 200 mg  200 mg Oral Daily    aspirin DR tab 81 mg  81 mg Oral QOD    clopidogrel (Plavix) tab 75 mg  75 mg Oral Daily    insulin degludec 100 units/mL flextouch 20 Units  20 Units Subcutaneous Nightly    levothyroxine (Synthroid) tab 150 mcg  150 mcg Oral Daily @ 0700    losartan (Cozaar) tab 50 mg  50 mg Oral Daily    metoprolol succinate ER (Toprol XL) 24 hr tab 50 mg  50 mg Oral Daily Beta Blocker    nitroglycerin (Nitrostat) SL tab 0.4 mg  0.4 mg Sublingual Q5 Min PRN    pantoprazole (Protonix) DR tab 40 mg  40 mg Oral BID AC    rosuvastatin (Crestor) tab 10 mg  10 mg Oral Nightly    sevelamer carbonate (Renvela) tab 800 mg  800 mg Oral TID CC    zolpidem (Ambien) tab 10 mg  10 mg Oral Nightly PRN    glucose (Dex4) 15 GM/59ML oral liquid 15 g  15 g Oral Q15 Min PRN    Or    glucose (Glutose) 40% oral gel 15 g  15 g Oral Q15 Min PRN    Or    glucose-vitamin C (Dex-4) chewable tab 4 tablet  4 tablet Oral Q15 Min PRN    Or    dextrose 50% injection 50 mL  50 mL Intravenous Q15 Min PRN    Or    glucose (Dex4) 15 GM/59ML oral liquid 30 g  30 g Oral Q15 Min PRN    Or    glucose (Glutose) 40% oral gel 30 g  30 g Oral Q15 Min PRN    Or    glucose-vitamin C (Dex-4) chewable tab 8 tablet  8 tablet Oral Q15 Min PRN    ondansetron (Zofran) 4 MG/2ML injection 4 mg  4 mg Intravenous Q6H PRN    insulin aspart (NovoLOG) 100 Units/mL FlexPen 2-10 Units  2-10 Units Subcutaneous TID AC and HS    doxycycline (Vibramycin) cap 100 mg  100 mg Oral 2 times per day      Facility-Administered Medications Ordered in Other Encounters   Medication Dose Route Frequency    BCG Live 50 mg in sodium chloride 0.9 % 50 mL Intravesicle  50 mg Intravesical Once       Laboratory Data:  Lab Results   Component Value Date    WBC 8.0 04/02/2024    HGB 7.7 04/02/2024    HCT 22.9 04/02/2024    PLT 90.0 04/02/2024     Lab Results   Component Value Date    INR 1.13 03/31/2024    INR 1.04 03/17/2024    INR 1.15 04/04/2023     Lab Results   Component Value Date     04/02/2024    K 4.3 04/02/2024    CL 94 04/02/2024    CO2 26.0 04/02/2024    BUN 79 04/02/2024    CREATSERUM 8.97 04/02/2024     04/02/2024    CA 8.7 04/02/2024       Telemetry: No significant arrhythmias.      Thank you for allowing our practice to participate in the care of your patient. Please do not hesitate to contact me if you have any questions.    Satya Donald MD  4/2/2024  11:19 AM

## 2024-04-02 NOTE — PLAN OF CARE
Nursing Notes:  0800: Pt received AOx4. Room air. SR. Saline lock. R permacath. For dialysis today. NPO.   1000: Hemodialysis on progress. 1 unit of PRBC transfusing.   1200: Renal diet. No more H and H every 8hrs. Dr. Stover signed off (GI).    1300: HD completed. 2L off.   1500: Pt ambulated-tolerated.    Problem: GASTROINTESTINAL - ADULT  Goal: Maintains or returns to baseline bowel function  Description: INTERVENTIONS:  - Assess bowel function  - Maintain adequate hydration with IV or PO as ordered and tolerated  - Evaluate effectiveness of GI medications  - Encourage mobilization and activity  - Obtain nutritional consult as needed  - Establish a toileting routine/schedule  - Consider collaborating with pharmacy to review patient's medication profile  Outcome: Progressing  Goal: Maintains adequate nutritional intake (undernourished)  Description: INTERVENTIONS:  - Monitor percentage of each meal consumed  - Identify factors contributing to decreased intake, treat as appropriate  - Assist with meals as needed  - Monitor I&O, WT and lab values  - Obtain nutritional consult as needed  - Optimize oral hygiene and moisture  - Encourage food from home; allow for food preferences  - Enhance eating environment  Outcome: Progressing

## 2024-04-02 NOTE — PHYSICAL THERAPY NOTE
PT orders received and chart reviewed. Pt requesting to wait for and eat dinner prior to PT eval. Will follow and re-attempt as able and appropriate.

## 2024-04-02 NOTE — PAYOR COMM NOTE
--------------  CONTINUED STAY REVIEW    Payor: [employer] MEDICARE ADV PPO  Subscriber #:  [id number]  Authorization Number: [id number]    Admit date: 3/31/24  Admit time:  3:49 PM    Admitting Physician: Tadeo Nieves MD  Attending Physician:  Tadeo Nieves MD  Primary Care Physician: Gee Jimenez MD    REVIEW DOCUMENTATION:    4/2/24      Nursing  1000: Hemodialysis on progress. 1 unit of PRBC transfusing.   1200: Renal diet. No more H and H every 8hrs. Dr. Stover signed off (GI).    1300: HD completed. 2L off.       Gi    1 melena  2 anemia        Currently pt/son deferring any eval for gi bleeding including radiology/endoscopy etc     Hg currently stable     Mult optiosn reviewed w/ them, they were agreeable to the following ...  --hg as per primary team  --cont ppi      Can f/u w/ Dr Bragg as outpt as needed, if they do not want gi eval, they can f/u prn       Cardiology     Assessment:  1. CAD, s/p high risk PCI  2. ESRD, HD dependent  3. PAF, one episode during last admission  4. Anemia, hx GIB with difficult eval and treatment  5. Possible PNA  6. HCV/Cirrhosis        Plan:  1. Follow clinically for now.  Does not need an angiogram for current symptoms.  CAN NOT HOLD DAPT unless it is life threatening.  Will check echo (still pending).  2. Follow CBC, GI consult with no procedures, xfuse as needed with HD  3. HD per nephrology     From CV standpoint, if he can ambulate without significant dyspnea, he could be discharged home.  However another day of observation to follow CBC and symptoms is very reasonable.            Labs:               Recent Labs   Lab 03/31/24  1245 03/31/24  2044 04/01/24  0557 04/01/24  1152 04/02/24  0446 04/02/24  0759   RBC 2.10*  --  2.46*  --  2.27*  --    HGB 7.1*   < > 7.9*   < > 7.5* 7.7*   HCT 22.1*  --  25.4*  --  22.9*  --    .2*  --  103.3*  --  100.9*  --    MCH 33.8  --  32.1  --  33.0  --    MCHC 32.1  --  31.1  --  32.8  --    RDW 17.2  --   19.9  --  18.8  --    NEPRELIM 5.81  --  6.07  --  6.07  --    WBC 7.9  --  8.1  --  8.0  --    .0*  --  101.0*  --  90.0*  --     < > = values in this interval not displayed.       MEDICATIONS ADMINISTERED IN LAST 1 DAY:  Transfuse RBC       Date Action Dose Route User    4/2/2024 0944 New Bag (none) Intravenous Penelope Olson RN          amiodarone (Pacerone) tab 200 mg       Date Action Dose Route User    4/2/2024 1037 Given 200 mg Oral Penelope Olson RN          clopidogrel (Plavix) tab 75 mg       Date Action Dose Route User    4/2/2024 1037 Given 75 mg Oral Penelope Olson RN          dextromethorphan-guaiFENesin (Robitussin-DM)  mg/5mL oral solution 5 mL       Date Action Dose Route User    4/1/2024 1714 Given 5 mL Oral Mikki Muhammad RN          doxycycline (Vibramycin) cap 100 mg       Date Action Dose Route User    4/2/2024 1037 Given 100 mg Oral Penelope Olson RN    4/1/2024 2119 Given 100 mg Oral Devora Dacosta RN          epoetin eunice (Epogen, Procrit) 71376 UNIT/ML injection 10,000 Units       Date Action Dose Route User    4/2/2024 1043 Given 10,000 Units Intravenous Penelope Olson RN          heparin (Porcine) 1000 UNIT/ML injection 3,000 Units       Date Action Dose Route User    4/2/2024 1130 Given 3,000 Units Intracatheter Penelope Olson RN          insulin aspart (NovoLOG) 100 Units/mL FlexPen 2-10 Units       Date Action Dose Route User    4/2/2024 0524 Given 2 Units Subcutaneous (Right Upper Arm) Devora Dacosta RN    4/1/2024 2117 Given 2 Units Subcutaneous (Right Upper Arm) Devora Dacosta RN    4/1/2024 1710 Given 3 Units Subcutaneous (Left Lower Arm) Mikki Muhammad RN          insulin degludec 100 units/mL flextouch 20 Units       Date Action Dose Route User    4/1/2024 2117 Given 20 Units Subcutaneous (Right Upper Arm) Devora Dacosta, RN          lidocaine-menthol 4-1 % patch 1 patch       Date Action Dose Route User     4/2/2024 1038 Patch Applied 1 patch Transdermal (Right Upper Back) Penelope Olson RN          losartan (Cozaar) tab 50 mg       Date Action Dose Route User    4/2/2024 1244 Given 50 mg Oral Penelope Olson RN          ondansetron (Zofran) 4 MG/2ML injection 4 mg       Date Action Dose Route User    4/1/2024 2206 Given 4 mg Intravenous Devora Dacosta RN          pantoprazole (Protonix) DR tab 40 mg       Date Action Dose Route User    4/1/2024 1710 Given 40 mg Oral Mikki Muhammad RN          rosuvastatin (Crestor) tab 10 mg       Date Action Dose Route User    4/1/2024 2119 Given 10 mg Oral Devora Dacosta RN          sevelamer carbonate (Renvela) tab 800 mg       Date Action Dose Route User    4/2/2024 1039 Given 800 mg Oral Penelope Olson RN    4/1/2024 1710 Given 800 mg Oral Mikki Muhammad RN            Vitals (last day)       Date/Time Temp Pulse Resp BP SpO2 Weight O2 Device O2 Flow Rate (L/min) [facility]    04/02/24 1200 -- 83 21 137/61 98 % -- -- --     04/02/24 1132 97.8 °F (36.6 °C) 72 18 132/62 98 % -- None (Room air) -- NN    04/02/24 1100 -- 69 17 119/67 97 % -- -- --     04/02/24 1000 -- 78 20 127/64 93 % -- -- --     04/02/24 0940 -- 74 17 124/55 92 % -- -- --     04/02/24 0826 97.9 °F (36.6 °C) 71 14 125/58 92 % -- None (Room air) -- NN    04/02/24 0654 -- 80 15 -- -- 223 lb 8.7 oz -- -- NN    04/02/24 0350 98.3 °F (36.8 °C) 75 16 118/58 92 % -- None (Room air) -- AD    04/01/24 2330 98.6 °F (37 °C) 76 16 107/59 92 % -- None (Room air) -- SH    04/01/24 1935 98.6 °F (37 °C) 75 21 126/63 94 % -- None (Room air) -- AD    04/01/24 1541 98.5 °F (36.9 °C) 75 21 131/69 95 % -- None (Room air) -- CM    04/01/24 1202 98.1 °F (36.7 °C) 75 19 122/65 95 % -- None (Room air) -- CM    04/01/24 0808 98.1 °F (36.7 °C) 75 16 114/64 94 % -- None (Room air) -- CM    04/01/24 0530 98.2 °F (36.8 °C) 76 11 123/66 92 % 222 lb 4.8 oz None (Room air) -- AT          Blood Transfusion Record        Product Unit Status Volume Start End            Transfuse RBC       24  [id number]  [id number] Stopped 350 mL 04/02/24 0944 04/02/24 1100       24  [id number]  [id number] Stopped  03/31/24 1511 03/31/24 1830

## 2024-04-02 NOTE — PROGRESS NOTES
[facility]   part of [facility]    Progress Note     Jonny Haque Patient Status:  Inpatient    4/15/1945 MRN [id number]   [facility] 8NE-A Attending Tadeo Nieves MD   Hosp Day # 2 PCP Gee Jimenez MD     Follow up for: The primary encounter diagnosis was Gastrointestinal hemorrhage, unspecified gastrointestinal hemorrhage type. Diagnoses of Chronic renal failure, stage 5 (HCC) and Anemia, unspecified type were also pertinent to this visit.      Interval History/Subjective:     PEDRO overnight  Afebrile, HDS  Stools beginning to form     No CP/SOB, neck / shoulder pain improving, energy levels better.     Vital signs:  Temp:  [97.4 °F (36.3 °C)-98.6 °F (37 °C)] 97.4 °F (36.3 °C)  Pulse:  [69-83] 80  Resp:  [14-22] 22  BP: (107-137)/(55-67) 131/61  SpO2:  [92 %-98 %] 93 %    Physical Exam:    General: NAD, Comfortable, Nontoxic   Respiratory: CTAB; reg resp rate & effort, no wheezes/crackles  Cardiovascular: S1, S2. Regular rate and rhythm. No murmurs appreciated  Abdomen: Soft, NTND, no guarding/rebound   Neurologic: No focal neurological deficits.   Extremities: No edema.   Skin: Dry, no rashes, ulcers or lesions     Diagnostic Data:      Labs:  Recent Labs   Lab 24  1245 24  2044 24  0557 24  1152 24  1611 24  0018 24  0446 24  0759   WBC 7.9  --  8.1  --   --   --  8.0  --    HGB 7.1*   < > 7.9* 8.2* 8.2* 7.9* 7.5* 7.7*   .2*  --  103.3*  --   --   --  100.9*  --    .0*  --  101.0*  --   --   --  90.0*  --    INR 1.13  --   --   --   --   --   --   --     < > = values in this interval not displayed.       Recent Labs   Lab 24  1245 24  0557 24  0446   * 157* 149*   BUN 58* 67* 79*   CREATSERUM 6.51* 7.58* 8.97*   CA 8.8 8.7 8.7   ALB 3.1*  --   --     134* 131*   K 3.9 4.1 4.3   CL 95* 95* 94*   CO2 29.0 26.0 26.0   ALKPHO 140*  --   --    AST 24  --   --    ALT 23  --   --    BILT  0.8  --   --    TP 6.7  --   --        Recent Labs   Lab 03/31/24  1245   PTP 14.6   INR 1.13       No results for input(s): \"TROP\", \"CK\" in the last 168 hours.         Imaging: Imaging data reviewed in Epic.    Medications:    sodium chloride   Intravenous Once    cefTRIAXone  2 g Intravenous Q24H    lidocaine-menthol  1 patch Transdermal Daily    amiodarone  200 mg Oral Daily    aspirin  81 mg Oral QOD    clopidogrel  75 mg Oral Daily    insulin degludec  20 Units Subcutaneous Nightly    levothyroxine  150 mcg Oral Daily @ 0700    losartan  50 mg Oral Daily    metoprolol succinate ER  50 mg Oral Daily Beta Blocker    pantoprazole  40 mg Oral BID AC    rosuvastatin  10 mg Oral Nightly    sevelamer carbonate  800 mg Oral TID CC    insulin aspart  2-10 Units Subcutaneous TID AC and HS    doxycycline  100 mg Oral 2 times per day       ASSESSMENT / PLAN:     Jonny Haque Is a a 78 year old male who presents with weakness, acute on chronic anemia     Problem List / Diagnoses     Weakness  Acute on Chronic Anemia  Possible GI Bleed  Possible Bacterial Pneumonia  CAD with recent MI & PCI   ESRD On HD   HCV Cirrhosis   Pancytopenia, Chronic      Plan     Weakness  -- likely multifactorial from deconditioning, recent MI, acute on chronic anemia, possible acute bacterial pneumonia   -- fall precautions  -- PT/OT      Acute on Chronic Anemia  Possible GI Bleed  -- Hgb 7.1 on presentation, recent baseline 7.4  -- FOBT reportedly positive in ED  -- 1 u pRBC ordered, post transfusion Hgb 7.8, repeat 8.2   -- GI consulted, plan discussed, continue ppi bid for now  -- pt reports history GIB and with known cirrhosis   -- stools turning brown, d/w GI/Renal, continue to monitor Hgb today     Possible Bacterial Pneumonia  -- CXR on presentation with \"development of a moderate amount of left retrocardiac consolidation with new small left effusion\" suspicious for aspiration pneumonia  -- procalcitonin likewise elevated, repeated today  and again elevated  -- SLP eval-> rec VFSS tomorrow   -- s/p ceftriaxone/doxycycline in ED, will continue x 7 days      CAD with recent MI & PCI   -- resume DAPT given recent PCI (<2 weeks PTA)   -- Cardiology consulted, plan discussed, -> can not hold DAPT given recent high risk PCI   -- echo pending     ESRD On HD   -- npehrology following, plan discussed      HCV Cirrhosis   Pancytopenia, Chronic   -- generally stable at baseline, monitor     DVT Mechanical Prophylaxis:   SCDs,    DVT Pharmacologic Prophylaxis   Medication   None         DVT Pharmacologic prophylaxis: Aspirin 81 mg      Code Status: Full Code    Dispo: inpatient; MOUNA > 1-2 days pending clinical stability, specialist clearance     Plan of care discussed with patient and/or family at bedside.    MARCUS Venegas MD  [facility]   [phone]      This note was created using voice recognition technology. It may include inadvertent transcriptional errors. Any such errors should be contextually interpreted and should not be taken to alter the content or the meaning.     Note to Patient: The 21st Century Cures Act makes medical notes like these available to patients in the interest of transparency. However, be advised this is a medical document. It is intended as peer to peer communication. It is written in medical language and may contain abbreviations or verbiage that are unfamiliar. It may appear blunt or direct. Medical documents are intended to carry relevant information, facts as evident, and the clinical opinion of the practitioner and not intended to be the primary source of your information.  Please refer directly to myself or clinical staff for information regarding plan of care.

## 2024-04-02 NOTE — SLP NOTE
Followed up with patient regarding possible VFSS. Patient beginning HD at the time of my visit. He reported he would prefer to defer VFSS at this time. SLP discussed low suspicion for prandial aspiration based on clinical exam completed 4/1/24 and that this test may be completed as an OP should he change his mind. Patient verbalized understanding and agreement.     Addendum: received notification from RN that hospitalist would like VFSS completed and patient is agreeable to this. Will plan for tomorrow.     Anoop Marin MS CCC-SLP  Pager [phone]

## 2024-04-02 NOTE — PROGRESS NOTES
[facility]  Progress Note    Jonny Haque Patient Status:  Inpatient    4/15/1945 MRN [id number]   Location [facility] 8NE-A Attending Tadeo Nieves MD   Hosp Day # 2 PCP Gee Jimenez MD     Subjective:  Jonny Haque is a(n) 78 year old male.      No family at bedside, pt called son (Ashwin) on speaker phone  again today to discuss his care and options etc    I also d/w Dr Fernando yesterday as well      Current complaints: none    States bms more brown now    Denies overt gi bleeding or red rectal bleeding    Rediscussed option for gi eval w/ pt/son, they do not want further eval, they want ppi and monitoring of hg/transfusion w/ dialysis         We discussed risks of long term PPI use with potential issues of calcium absorption, bone fractures, other issues of absorption as well as possible vitamin issues or even infections. Potential for renal, cardiac, neurologic disease, or morbidity/mortality risks discussed as well.  Possible complications/side effects were discussed. Several strategies to taper or reduce overall usage were discussed. The patient demonstrated understanding, all questions were answered            Objective:    /58 (BP Location: Right arm)   Pulse 71   Temp 97.9 °F (36.6 °C) (Oral)   Resp 14   Ht 5' 11\" (1.803 m)   Wt 223 lb 8.7 oz (101.4 kg)   SpO2 92%   BMI 31.18 kg/m²   General appearance: alert, appears stated age, and cooperative  Lungs: clear to auscultation bilaterally  Heart: reg rate   Abdomen: soft, non-tender; bowel sounds normal; no masses,  no organomegaly   Skin; some bruising on left upper leg and llq and lower mid pelvic area  Rectal; no stool in vault, smear of dark brown , no gross blood  Neurologic: Grossly normal, ox3        Labs:     Recent Labs   Lab 24  1245 24  2044 24  0557 24  1152 24  0446 24  0759   RBC 2.10*  --  2.46*  --  2.27*  --    HGB 7.1*   < > 7.9*   < > 7.5* 7.7*   HCT 22.1*  --  25.4*  --   22.9*  --    .2*  --  103.3*  --  100.9*  --    MCH 33.8  --  32.1  --  33.0  --    MCHC 32.1  --  31.1  --  32.8  --    RDW 17.2  --  19.9  --  18.8  --    NEPRELIM 5.81  --  6.07  --  6.07  --    WBC 7.9  --  8.1  --  8.0  --    .0*  --  101.0*  --  90.0*  --     < > = values in this interval not displayed.       Lab Results   Component Value Date    CREATSERUM 8.97 04/02/2024    BUN 79 04/02/2024     04/02/2024    K 4.3 04/02/2024    CL 94 04/02/2024    CO2 26.0 04/02/2024     04/02/2024    CA 8.7 04/02/2024                 )    Lab Results   Component Value Date    PGLU 164 04/02/2024           Assessment and Plan:  Patient Active Problem List   Diagnosis    Arthritis    Hepatic cirrhosis (HCC)    Diabetic peripheral neuropathy (HCC)    Encounter for screening for malignant neoplasm of prostate    Erectile dysfunction of nonorganic origin    Hyperlipidemia    Portal hypertension (HCC)    Thrombocytopenia (HCC)    PAC (premature atrial contraction)    Personal history of kidney stones    Elevated alkaline phosphatase level    Essential hypertension    Personal history of bladder cancer    Thoracic aorta atherosclerosis (HCC)    Hypothyroidism due to Hashimoto's thyroiditis    Hiatal hernia    Polyp of colon, unspecified part of colon, unspecified type    Abnormal EKG    Iron deficiency anemia due to chronic blood loss    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 stage 5 chronic kidney disease on chronic dialysis (HCC)    Duodenitis    Elevated PSA    Type 2 diabetes mellitus with chronic kidney disease on chronic dialysis, with long-term current use of insulin (HCC)    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)    ESRD (end stage renal disease) (HCC)    History of hepatitis C    Type 2 diabetes mellitus with microalbuminuria, with long-term current use of insulin (HCC)    Mild pulmonary hypertension  (HCC)    Paroxysmal atrial fibrillation (HCC)    Pre-op exam    S/P primary angioplasty with coronary stent    Gastrointestinal hemorrhage associated with intestinal diverticulosis    Diabetes mellitus due to underlying condition with chronic kidney disease on chronic dialysis, with long-term current use of insulin (HCC)    Anemia due to stage 5 chronic kidney disease, not on chronic dialysis (HCC)    Rectal bleeding    Anemia, unspecified type    Dyspnea on exertion    Colitis    Diverticulosis    ESRD on hemodialysis (HCC)    Hyperglycemia    Primary hypertension    Type 2 diabetes mellitus with diabetic nephropathy (HCC)    GI bleed    ABLA (acute blood loss anemia)    Gastrointestinal hemorrhage, unspecified gastrointestinal hemorrhage type    Acute chest pain    Hypokalemia    Anemia in ESRD (end-stage renal disease) (HCC)    Chest pain of uncertain etiology    Melena    Diabetes mellitus due to underlying condition with chronic kidney disease on chronic dialysis (HCC)    New onset left bundle branch block (LBBB)    Elevated troponin    Anemia due to chronic kidney disease, on chronic dialysis (HCC)    Heart failure with preserved ejection fraction (HCC)    Hypoxia    Hyperkalemia    Chronic renal failure, stage 5 (HCC)       1 melena  2 anemia       Currently pt/son deferring any eval for gi bleeding including radiology/endoscopy etc    Hg currently stable    Mult optiosn reviewed w/ them, they were agreeable to the following ...  --hg as per primary team  --cont ppi        Can f/u w/ Dr Bragg as outpt as needed, if they do not want gi eval, they can f/u prn    Will sign off for now, pls call w/ questions             Son/pt demonstrated understanding of risks of morbidity/mortality if diagnoses and/or treatments were delayed balanced against risks of further investigation and/or treatment.       --son/patient demonstrated understanding of the results and recommendations and risks, benefits, limitations, and  alternatives to the plan. All of their questions and concerns were addressed and were agreeable to the plan as listed      Serjio Stover MD

## 2024-04-02 NOTE — PLAN OF CARE
Assumed care at 1930. Pt is A&Ox4. Pt is on RA, O2 sats WNL. NSR on tele, VSS. Continent of B&B. Denies pain at this time. Up w/ SBA, tolerating well. Plan of care reviewed with patient, verbalizes understanding, all needs addressed at this time, pt seems to be resting comfortably. Call light within reach.    POC: HD tomorrow 4/2 around 0830, NPO at midnight, XR video swallow tomorrow, IV atbx, monitor labs     Problem: Patient/Family Goals  Goal: Patient/Family Long Term Goal  Description: Patient's Long Term Goal: stay out of the hospital    Interventions:  - take meds as prescribed  - attend follow up appointments  - follow recommended diet  - See additional Care Plan goals for specific interventions  Outcome: Progressing  Goal: Patient/Family Short Term Goal  Description: Patient's Short Term Goal: go home    Interventions:   - meds, labs, tele monitoring  - transfuse PRBC and monitor Hgb  - appropriate consults  - See additional Care Plan goals for specific interventions  Outcome: Progressing     Problem: SAFETY ADULT - FALL  Goal: Free from fall injury  Description: INTERVENTIONS:  - Assess pt frequently for physical needs  - Identify cognitive and physical deficits and behaviors that affect risk of falls.  - [place] fall precautions as indicated by assessment.  - Educate pt/family on patient safety including physical limitations  - Instruct pt to call for assistance with activity based on assessment  - Modify environment to reduce risk of injury  - Provide assistive devices as appropriate  - Consider OT/PT consult to assist with strengthening/mobility  - Encourage toileting schedule  Outcome: Progressing

## 2024-04-03 ENCOUNTER — APPOINTMENT (OUTPATIENT)
Dept: GENERAL RADIOLOGY | Facility: HOSPITAL | Age: 79
End: 2024-04-03
Attending: HOSPITALIST
Payer: MEDICARE

## 2024-04-03 LAB
ANION GAP SERPL CALC-SCNC: 10 MMOL/L (ref 0–18)
BASOPHILS # BLD AUTO: 0.03 X10(3) UL (ref 0–0.2)
BASOPHILS NFR BLD AUTO: 0.5 %
BLOOD TYPE BARCODE: 5100
BUN BLD-MCNC: 48 MG/DL (ref 9–23)
CALCIUM BLD-MCNC: 8.8 MG/DL (ref 8.5–10.1)
CHLORIDE SERPL-SCNC: 99 MMOL/L (ref 98–112)
CO2 SERPL-SCNC: 25 MMOL/L (ref 21–32)
CREAT BLD-MCNC: 6.7 MG/DL
EGFRCR SERPLBLD CKD-EPI 2021: 8 ML/MIN/1.73M2 (ref 60–?)
EOSINOPHIL # BLD AUTO: 0.17 X10(3) UL (ref 0–0.7)
EOSINOPHIL NFR BLD AUTO: 2.6 %
ERYTHROCYTE [DISTWIDTH] IN BLOOD BY AUTOMATED COUNT: 19.7 %
GLUCOSE BLD-MCNC: 168 MG/DL (ref 70–99)
GLUCOSE BLD-MCNC: 178 MG/DL (ref 70–99)
GLUCOSE BLD-MCNC: 181 MG/DL (ref 70–99)
GLUCOSE BLD-MCNC: 205 MG/DL (ref 70–99)
GLUCOSE BLD-MCNC: 250 MG/DL (ref 70–99)
HCT VFR BLD AUTO: 26.5 %
HGB BLD-MCNC: 8.8 G/DL
IMM GRANULOCYTES # BLD AUTO: 0.04 X10(3) UL (ref 0–1)
IMM GRANULOCYTES NFR BLD: 0.6 %
LYMPHOCYTES # BLD AUTO: 0.88 X10(3) UL (ref 1–4)
LYMPHOCYTES NFR BLD AUTO: 13.4 %
MCH RBC QN AUTO: 32 PG (ref 26–34)
MCHC RBC AUTO-ENTMCNC: 33.2 G/DL (ref 31–37)
MCV RBC AUTO: 96.4 FL
MONOCYTES # BLD AUTO: 0.8 X10(3) UL (ref 0.1–1)
MONOCYTES NFR BLD AUTO: 12.2 %
NEUTROPHILS # BLD AUTO: 4.66 X10 (3) UL (ref 1.5–7.7)
NEUTROPHILS # BLD AUTO: 4.66 X10(3) UL (ref 1.5–7.7)
NEUTROPHILS NFR BLD AUTO: 70.7 %
OSMOLALITY SERPL CALC.SUM OF ELEC: 295 MOSM/KG (ref 275–295)
PLATELET # BLD AUTO: 99 10(3)UL (ref 150–450)
POTASSIUM SERPL-SCNC: 4 MMOL/L (ref 3.5–5.1)
RBC # BLD AUTO: 2.75 X10(6)UL
SODIUM SERPL-SCNC: 134 MMOL/L (ref 136–145)
UNIT VOLUME: 350 ML
WBC # BLD AUTO: 6.6 X10(3) UL (ref 4–11)

## 2024-04-03 PROCEDURE — 74230 X-RAY XM SWLNG FUNCJ C+: CPT | Performed by: HOSPITALIST

## 2024-04-03 PROCEDURE — 99232 SBSQ HOSP IP/OBS MODERATE 35: CPT | Performed by: INTERNAL MEDICINE

## 2024-04-03 NOTE — PROGRESS NOTES
[facility]   part of [facility]    Progress Note     Jonny Haque Patient Status:  Inpatient    4/15/1945 MRN [id number]   Location [facility] 8NE-A Attending Tadeo Nieves MD   Hosp Day # 3 PCP Gee Jimenez MD     Follow up for: The primary encounter diagnosis was Gastrointestinal hemorrhage, unspecified gastrointestinal hemorrhage type. Diagnoses of Chronic renal failure, stage 5 (HCC) and Anemia, unspecified type were also pertinent to this visit.      Interval History/Subjective:     Responded appropriately to 1u pRBC yesterday,Hgb remained stable  Echo yesterday shows moderate pericardial effusion with evidence of hemodynamic compromise/RV collapse    PEDRO overnight  Afebrile, HDS    Feels improved from PTA, has some mild epigastric chest discomfort, has been intermittent since last admission; no new or worsening symptoms     Vital signs:  Temp:  [97 °F (36.1 °C)-99.4 °F (37.4 °C)] 97 °F (36.1 °C)  Pulse:  [69-83] 82  Resp:  [12-22] 14  BP: (112-137)/(51-70) 129/64  SpO2:  [92 %-98 %] 95 %    Physical Exam:    General: NAD, Comfortable, Nontoxic   Respiratory: CTAB; reg resp rate & effort, no wheezes/crackles  Cardiovascular: S1, S2. Regular rate and rhythm. No murmurs appreciated  Abdomen: Soft, NTND, no guarding/rebound   Neurologic: No focal neurological deficits.   Extremities: No edema.   Skin: Dry, no rashes, ulcers or lesions     Diagnostic Data:      Labs:  Recent Labs   Lab 24  1245 24  2044 24  0557 24  1152 24  1611 24  0018 24  0446 24  0759 24  0503   WBC 7.9  --  8.1  --   --   --  8.0  --  6.6   HGB 7.1*   < > 7.9*   < > 8.2* 7.9* 7.5* 7.7* 8.8*   .2*  --  103.3*  --   --   --  100.9*  --  96.4   .0*  --  101.0*  --   --   --  90.0*  --  99.0*   INR 1.13  --   --   --   --   --   --   --   --     < > = values in this interval not displayed.       Recent Labs   Lab 24  1245 24  0557  04/02/24  0446 04/03/24  0503   * 157* 149* 178*   BUN 58* 67* 79* 48*   CREATSERUM 6.51* 7.58* 8.97* 6.70*   CA 8.8 8.7 8.7 8.8   ALB 3.1*  --   --   --     134* 131* 134*   K 3.9 4.1 4.3 4.0   CL 95* 95* 94* 99   CO2 29.0 26.0 26.0 25.0   ALKPHO 140*  --   --   --    AST 24  --   --   --    ALT 23  --   --   --    BILT 0.8  --   --   --    TP 6.7  --   --   --        Recent Labs   Lab 03/31/24  1245   PTP 14.6   INR 1.13       No results for input(s): \"TROP\", \"CK\" in the last 168 hours.         Imaging: Imaging data reviewed in Epic.    Medications:    sodium chloride   Intravenous Once    cefTRIAXone  2 g Intravenous Q24H    lidocaine-menthol  1 patch Transdermal Daily    amiodarone  200 mg Oral Daily    aspirin  81 mg Oral QOD    clopidogrel  75 mg Oral Daily    insulin degludec  20 Units Subcutaneous Nightly    levothyroxine  150 mcg Oral Daily @ 0700    losartan  50 mg Oral Daily    metoprolol succinate ER  50 mg Oral Daily Beta Blocker    pantoprazole  40 mg Oral BID AC    rosuvastatin  10 mg Oral Nightly    sevelamer carbonate  800 mg Oral TID CC    insulin aspart  2-10 Units Subcutaneous TID AC and HS    doxycycline  100 mg Oral 2 times per day       ASSESSMENT / PLAN:     Jonny Haque Is a a 78 year old male who presents with weakness, acute on chronic anemia     Problem List / Diagnoses     Acute Pericardial Effusion  Weakness  Acute on Chronic Anemia  Possible GI Bleed  Possible Bacterial Pneumonia  CAD with recent MI & PCI   ESRD On HD   HCV Cirrhosis   Pancytopenia, Chronic      Plan     Weakness  -- likely multifactorial from deconditioning, recent MI, acute on chronic anemia, possible acute bacterial pneumonia   -- fall precautions  -- PT/OT -> home      Acute on Chronic Anemia  Possible GI Bleed  -- Hgb 7.1 on presentation, recent baseline 7.4  -- FOBT reportedly positive in ED  -- 1 u pRBC ordered, post transfusion Hgb 7.8, repeat 8.2   -- GI consulted, plan discussed, continue ppi bid  for now  -- pt reports history GIB and with known cirrhosis   -- stools turning brown, d/w GI/Renal, continue to trend Hgb, no further interventions as long as it remains stable     Possible Bacterial Pneumonia  -- CXR on presentation with \"development of a moderate amount of left retrocardiac consolidation with new small left effusion\" suspicious for aspiration pneumonia  -- procalcitonin likewise elevated, repeated today and again elevated  -- SLP eval-> rec VFSS -> no diet restrictions, avoid using straws, Speech Therapy as outpatient   -- s/p ceftriaxone/doxycycline in ED, will continue x 7 days      CAD with recent MI & PCI   -- resume DAPT given recent PCI (<2 weeks PTA)   -- Cardiology consulted, plan discussed, -> can not hold DAPT given recent high risk PCI   -- echo with moderate pericardial effusion, management per Cardiology  -- repeat echo tomorrow to monitor progression     ESRD On HD   -- npehrology following, plan discussed      HCV Cirrhosis   Pancytopenia, Chronic   -- generally stable at baseline, monitor     DVT Mechanical Prophylaxis:   SCDs,    DVT Pharmacologic Prophylaxis   Medication   None         DVT Pharmacologic prophylaxis: Aspirin 81 mg      Code Status: Full Code    Dispo: inpatient; MOUNA > 1-2 days pending clinical stability, specialist clearance     Plan of care discussed with patient and/or family at bedside.    MARCUS Venegas MD  [facility]   [phone]      This note was created using voice recognition technology. It may include inadvertent transcriptional errors. Any such errors should be contextually interpreted and should not be taken to alter the content or the meaning.     Note to Patient: The 21st Century Cures Act makes medical notes like these available to patients in the interest of transparency. However, be advised this is a medical document. It is intended as peer to peer communication. It is written in medical language and may contain abbreviations or  verbiage that are unfamiliar. It may appear blunt or direct. Medical documents are intended to carry relevant information, facts as evident, and the clinical opinion of the practitioner and not intended to be the primary source of your information.  Please refer directly to myself or clinical staff for information regarding plan of care.

## 2024-04-03 NOTE — PROGRESS NOTES
[facility] Cardiology  Progress Note    Jonny Haque Patient Status:  Inpatient    4/15/1945 MRN [id number]   Location [facility] 8NE-A Attending Tadeo Nieves MD   Hosp Day # 3 PCP Gee Jimenez MD     Assessment:  1. CAD, s/p high risk PCI  2. ESRD, HD dependent  3. PAF, one episode during last admission  4. Anemia, hx GIB with difficult eval and treatment  5. Possible PNA  6. HCV/Cirrhosis  7. New pericardial effusion        Plan:  1. Pericardial effusion: Has several reasons to have an effusion, high prob would be post MI and procedural.  I reviewed echo personally.  He does not have tamponade hemodynamics.  His BP is 130mmHg systolic currently.  He is asymptomatic this AM.  The effusion is smalles at the apex and around the RV (where one usually enters with a needle).  Thus would not tap at this point.  Echo tomorrow AM and tap in increasing.  Follow symptoms for now.  Continue DAPT.  2. Follow CBC, GI consulted already with no procedures, xfuse as needed with HD  3. HD per nephrology    He needs to stay given the finding of a new pericardial effusion.  FU echo tomrrow AM.      Subjective:  The patient denies any chest pain or dyspnea at this time. Ambulating. No complaints.    Objective:  /64 (BP Location: Right arm)   Pulse 72   Temp 98.1 °F (36.7 °C) (Oral)   Resp 12   Ht 71\"   Wt 219 lb 9.3 oz (99.6 kg)   SpO2 95%   BMI 30.62 kg/m²   Temp (24hrs), Av.1 °F (36.7 °C), Min:97 °F (36.1 °C), Max:99.4 °F (37.4 °C)      Intake/Output Summary (Last 24 hours) at 4/3/2024 1228  Last data filed at 4/3/2024 1158  Gross per 24 hour   Intake 960 ml   Output 2000 ml   Net -1040 ml     Wt Readings from Last 3 Encounters:   24 219 lb 9.3 oz (99.6 kg)   24 223 lb 1.6 oz (101.2 kg)   23 204 lb 6.4 oz (92.7 kg)       General: Awake and alert; in no acute distress  Cardiac: Regular rate and regular rhythm; no murmurs/rubs/gallops are appreciated  Lungs: Clear to  auscultation bilaterally; no accessory muscle use  Abdomen: Soft, non-tender; bowel sounds are normoactive  Extremities: No clubbing/cyanosis; moves all 4 extremities normally          Laboratory Data:  Lab Results   Component Value Date    WBC 6.6 04/03/2024    HGB 8.8 04/03/2024    HCT 26.5 04/03/2024    PLT 99.0 04/03/2024     Lab Results   Component Value Date    INR 1.13 03/31/2024    INR 1.04 03/17/2024    INR 1.15 04/04/2023     Lab Results   Component Value Date     04/03/2024    K 4.0 04/03/2024    CL 99 04/03/2024    CO2 25.0 04/03/2024    BUN 48 04/03/2024    CREATSERUM 6.70 04/03/2024     04/03/2024    CA 8.8 04/03/2024       Telemetry: No significant arrhythmias.      Thank you for allowing our practice to participate in the care of your patient. Please do not hesitate to contact me if you have any questions.    Satya Donald MD

## 2024-04-03 NOTE — VIDEO SWALLOW STUDY NOTE
ADULT VIDEOFLUOROSCOPIC SWALLOWING STUDY    Admission Date: 3/31/2024  Evaluation Date: 04/03/24  Radiologist: Dr. Trujillo    RECOMMENDATIONS   Diet Recommendations - Solids: Regular  Diet Recommendations - Liquids: Thin Liquids (no straw)    Further Follow-up:  Follow Up Needed (Documentation Required): No  SLP Follow-up Date: 04/02/24  Compensatory Strategies Recommended: Small bites and sips;Multiple swallows  Aspiration Precautions: Upright position;Slow rate;Small bites and sips  Medication Administration Recommendations: No restrictions  Treatment Plan/Recommendations: Aspiration precautions    HISTORY   Background/Objective Information:    Problem List  Principal Problem:    Gastrointestinal hemorrhage, unspecified gastrointestinal hemorrhage type  Active Problems:    Anemia, unspecified type    ABLA (acute blood loss anemia)    Chronic renal failure, stage 5 (HCC)      Past Medical History  Past Medical History:   Diagnosis Date    Calculus of kidney     Coronary atherosclerosis     bare metal stents at [facility] Jan 2021    Diabetes (HCC)     Disorder of thyroid     Esophageal varices without bleeding, unspecified esophageal varices type (HCC) 06/27/2019    Hepatitis, unspecified diagnosed many years ago.    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  No pneumothorax.  Unchanged.   There is cardiomegaly with  slight vascular redistribution without interstitial or alveolar edema.  Previous interstitial edema has resolved compared to 3/17/2024.   There is development of a moderate amount of left retrocardiac consolidation with a new small left effusion.  Milder changes are noted in the medial right lung base.  Findings are suspicious for aspiration pneumonia.         LOCATION:  [place] [address]                  Dictated by (CST): Zeyad Ponce MD on 3/31/2024 at 1:03 PM       Finalized by (CST): Zeyad Ponce MD on 3/31/2024 at 1:05 PM       Reason for Referral: R/O aspiration  Imaging concerning for aspiration    Family/Patient Goals:  To get home and get better     ASSESSMENT   DYSPHAGIA ASSESSMENT  Test completed in conjunction with Radiologist.  Patient Positioned: Upright;Midline;NIURKA chair.  Patient Viewed: Lateral.  Patient Alertness: Fully alert.  Consistencies Presented: Thin liquids;Puree;Hard solid to assess oropharyngeal swallow function and assess for compensatory strategies to improve safe swallow function.    THIN LIQUIDS  Method of Presentation: Cup (self presented)  Oral Phase of Swallow (VFSS - Thin Liquids): Within Functional Limits  Triggered at:  (hypopharynx)  Residue Severity, Location: Mild/Mod;Valleculae;Moderate;Pyriform sinuses  Cleared/Reduced with: Secondary swallow (intermittently needed cueing vs spontaneous)  Laryngeal Penetration: During the swallow (deep, to level of TVC)  Tracheal Aspiration: None  Cough/Throat Clear Response: No  Cough/Throat Clear Effective: Yes (cued)  Strategy(ies) Implemented (Thin Liquids): No straws;Slow rate;Multiple swallows  Effectiveness: Yes        PUREE  Oral Phase of Swallow (VFSS - Puree): Within Functional Limits  Triggered at: Valleculae  Residue Severity, Location:  (none)  Laryngeal Penetration: None  Tracheal Aspiration: None     HARD SOLID  Oral Phase of Swallow (VFSS - Hard Solid): Within Functional Limits  Triggered at:  (hypopharynx, just past  posterior margin of epiglottis)  Residue Severity, Location:  (none)  Laryngeal Penetration: None  Tracheal Aspiration: None  Penetration Aspiration Scale Score: Score 4: Material enters the airway, contacts the vocal folds, and is ejected from the airway       Overall Impression: Patient presented with mild pharyngeal dysphagia evidenced by intermittently reduced laryngeal vestibular closure resulting in intermittent episodes of laryngeal penetration with thin liquids but no contrast observed to advance inferior to true vocal folds. Also noted was mildly reduced base of tongue retraction leading to valleculae residue after thin liquid swallows. Valleculae residue spilled to pyriform sinuses via aryepiglottic folds.                  PLAN: Aspiration precautions, safe swallow strategies    EDUCATION/INSTRUCTION  Reviewed results and recommendations with patient/family/caregiver.  Agreement/Understanding verbalized and all questions answered to their apparent satisfaction. Met with patient following completion of VFSS and he was on the phone with his son, Ashwin. SLP reviewed VFSS findings and recommendations with both, who verbalized understanding and agreement.     INTERDISCIPLINARY COMMUNICATION  Reviewed results with Radiologist; agreement verbalized.  Updated RN and hospitalist    Patient Experiencing Pain: Yes     Pain Location: central sternal discomfort, ongoing     Nursing Aware of Pain?: Yes    FOLLOW UP  Treatment Plan/Recommendations: Aspiration precautions  Number of Visits to Meet Established Goals: 1    Thank you for your referral.   If you have any questions, please contact Anoop Marin MS CCC-SLP  Pager [phone]

## 2024-04-03 NOTE — PAYOR COMM NOTE
--------------  CONTINUED STAY REVIEW    Payor: [employer] MEDICARE ADV PPO  Subscriber #:  [id number]  Authorization Number: [id number]    Admit date: 3/31/24  Admit time:  3:49 PM    Admitting Physician: Tadeo Nieves MD  Attending Physician:  Tadeo Nieves MD  Primary Care Physician: Gee Jimenez MD    REVIEW DOCUMENTATION:  4/3  Nephrology Progress Note   Assessment and Plan:     1) ESRD- due to longstanding DM 2; on HD since 7/21. Lytes / volume OK- next HD Thurs per usual routine     2) CAD with preserved EF- s/p PCI / stents LAD + OM 3/24 (Dr. Donald)      3) h/o hep C with cirrhosis / varices- recent US / doppler unremarkable     4) DM 2 / HTN     5) Anemia of CKD / chronic disease- on EPO with HD     6) GI blood loss- no melena x 18 months off plavix, but required multiple transfusions while on plavix 2 yrs ago. Hgb increased appropriately after 1 unit PRBC 4/2- holding further intervention for now  Hospitalist Progress Note   ASSESSMENT / PLAN:      Jonny Haque Is a a 78 year old male who presents with weakness, acute on chronic anemia     Problem List / Diagnoses     Acute Pericardial Effusion  Weakness  Acute on Chronic Anemia  Possible GI Bleed  Possible Bacterial Pneumonia  CAD with recent MI & PCI   ESRD On HD   HCV Cirrhosis   Pancytopenia, Chronic      Plan     Weakness  -- likely multifactorial from deconditioning, recent MI, acute on chronic anemia, possible acute bacterial pneumonia   -- fall precautions  -- PT/OT -> home      Acute on Chronic Anemia  Possible GI Bleed  -- Hgb 7.1 on presentation, recent baseline 7.4  -- FOBT reportedly positive in ED  -- 1 u pRBC ordered, post transfusion Hgb 7.8, repeat 8.2   -- GI consulted, plan discussed, continue ppi bid for now  -- pt reports history GIB and with known cirrhosis   -- stools turning brown, d/w GI/Renal, continue to trend Hgb, no further interventions as long as it remains stable      Possible Bacterial Pneumonia  -- CXR on  presentation with \"development of a moderate amount of left retrocardiac consolidation with new small left effusion\" suspicious for aspiration pneumonia  -- procalcitonin likewise elevated, repeated today and again elevated  -- SLP eval-> rec VFSS -> no diet restrictions, avoid using straws, Speech Therapy as outpatient   -- s/p ceftriaxone/doxycycline in ED, will continue x 7 days      CAD with recent MI & PCI   -- resume DAPT given recent PCI (<2 weeks PTA)   -- Cardiology consulted, plan discussed, -> can not hold DAPT given recent high risk PCI   -- echo with moderate pericardial effusion, management per Cardiology  -- repeat echo tomorrow to monitor progression      ESRD On HD   -- npehrology following, plan discussed      HCV Cirrhosis   Pancytopenia, Chronic   -- generally stable at baseline, monitor      DVT Mechanical Prophylaxis:   SCDs,    DVT Pharmacologic Prophylaxis   Medication   None         DVT Pharmacologic prophylaxis: Aspirin 81 mg       Code Status: Full Code     Dispo: inpatient   [facility] Cardiology  Progress Note       Assessment:  1. CAD, s/p high risk PCI  2. ESRD, HD dependent  3. PAF, one episode during last admission  4. Anemia, hx GIB with difficult eval and treatment  5. Possible PNA  6. HCV/Cirrhosis  7. New pericardial effusion        Plan:  1. Pericardial effusion: Has several reasons to have an effusion, high prob would be post MI and procedural.  I reviewed echo personally.  He does not have tamponade hemodynamics.  His BP is 130mmHg systolic currently.  He is asymptomatic this AM.  The effusion is smalles at the apex and around the RV (where one usually enters with a needle).  Thus would not tap at this point.  Echo tomorrow AM and tap in increasing.  Follow symptoms for now.  Continue DAPT.  2. Follow CBC, GI consulted already with no procedures, xfuse as needed with HD  3. HD per nephrology     He needs to stay given the finding of a new pericardial effusion.  FU  echo tomrrow AM.    MEDICATIONS ADMINISTERED IN LAST 1 DAY:  amiodarone (Pacerone) tab 200 mg       Date Action Dose Route User    4/3/2024 0933 Given 200 mg Oral Penelope Olson RN          aspirin DR tab 81 mg       Date Action Dose Route User    4/3/2024 0933 Given 81 mg Oral Penelope Olson RN          cefTRIAXone (Rocephin) 2 g in D5W 100 mL IVPB-ADD       Date Action Dose Route User    4/3/2024 1441 New Bag 2 g Intravenous Penelope Olson RN          clopidogrel (Plavix) tab 75 mg       Date Action Dose Route User    4/3/2024 0933 Given 75 mg Oral Penelope Olson RN          doxycycline (Vibramycin) cap 100 mg       Date Action Dose Route User    4/3/2024 0933 Given 100 mg Oral Penelope Olson RN    4/2/2024 2134 Given 100 mg Oral Ashlee Miller RN          insulin aspart (NovoLOG) 100 Units/mL FlexPen 2-10 Units       Date Action Dose Route User    4/3/2024 1150 Given 6 Units Subcutaneous (Left Upper Arm) Penelope Olson RN    4/3/2024 0602 Given 3 Units Subcutaneous (Left Upper Arm) Ashlee Miller RN    4/2/2024 1612 Given 6 Units Subcutaneous (Left Lower Abdomen) Penelope Olson RN          insulin degludec 100 units/mL flextouch 20 Units       Date Action Dose Route User    4/2/2024 2134 Given 20 Units Subcutaneous (Right Lower Abdomen) Ashlee Miller RN          levothyroxine (Synthroid) tab 150 mcg       Date Action Dose Route User    4/3/2024 0602 Given 150 mcg Oral Ashlee Miller RN          lidocaine-menthol 4-1 % patch 1 patch       Date Action Dose Route User    4/3/2024 0935 Patch Applied 1 patch Transdermal (Right Upper Back) Penelope Olson RN          losartan (Cozaar) tab 50 mg       Date Action Dose Route User    4/3/2024 0933 Given 50 mg Oral Penelope Olson RN          metoprolol succinate ER (Toprol XL) 24 hr tab 50 mg       Date Action Dose Route User    4/3/2024 0602 Given 50 mg Oral Ashlee Miller, RN           pantoprazole (Protonix) DR tab 40 mg       Date Action Dose Route User    4/3/2024 0602 Given 40 mg Oral Ashlee Miller RN    4/2/2024 1614 Given 40 mg Oral Penelope Olson RN          rosuvastatin (Crestor) tab 10 mg       Date Action Dose Route User    4/2/2024 2134 Given 10 mg Oral Ashlee Miller RN          sevelamer carbonate (Renvela) tab 800 mg       Date Action Dose Route User    4/3/2024 1154 Given 800 mg Oral Penelope Olson RN    4/3/2024 0933 Given 800 mg Oral Penelope Olson RN    4/2/2024 1614 Given 800 mg Oral Penelope Olson RN            Vitals (last day)       Date/Time Temp Pulse Resp BP SpO2 Weight O2 Device O2 Flow Rate (L/min) Who          CIWA Scores (since admission)       None          Blood Transfusion Record       Product Unit Status Volume Start End            Transfuse RBC       24  [id number]  [id number] Stopped 350 mL 04/02/24 0944 04/02/24 1100       24  [id number]  [id number] Stopped  03/31/24 1511 03/31/24 1830                PLEASE FAX DAYS CERTIFIED AND NEXT REVIEW DATE [phone]

## 2024-04-03 NOTE — PHYSICAL THERAPY NOTE
PHYSICAL THERAPY EVALUATION - INPATIENT     Room Number: 8611/8611-A  Evaluation Date: 4/3/2024  Type of Evaluation: Initial  Physician Order: PT Eval and Treat    Presenting Problem: GI bleed, possible pneumonia  Co-Morbidities : DM, HTN, hepatitis, bladder cancer  Reason for Therapy: Mobility Dysfunction and Discharge Planning    PHYSICAL THERAPY ASSESSMENT   Patient is a 78 year old male admitted 3/31/2024 for weakness and fatigue. Patient was diagnosed with GI bleed and possible pneumonia. Patient is s/p recent MI and PCI.   Patient is currently functioning near baseline with bed mobility, transfers, and gait. Prior to admission, patient's baseline is independent with no AD. Patient denies concerns regarding his mobility or DC home.     Patient is functioning near baseline and does not have skilled PT needs upon DC.     PLAN  Patient has been evaluated and presents with no skilled Physical Therapy needs at this time.  Patient discharged from Physical Therapy services.  Please re-order if a new functional limitation presents during this admission.    GOALS  Patient was able to achieve the following goals ...    Patient was able to transfer Safely with supervision   Patient able to ambulate on level surfaces Safely with supervision         HOME SITUATION  Type of Home: House   Home Layout: Multi-level        Stairs to Bedroom:  (flight)  Railing: Yes    Lives With: Son  Drives: Yes  Patient Owned Equipment: None       Prior Level of [place]: Pt is typically independent with ADLs, ambulates with no AD, and drives.     SUBJECTIVE  \"I move around fine\"       OBJECTIVE  Precautions: Bed/chair alarm  Fall Risk: Standard fall risk    WEIGHT BEARING RESTRICTION  Weight Bearing Restriction: None                PAIN ASSESSMENT  Ratin          COGNITION  Overall Cognitive Status:  WFL - within functional limits    RANGE OF MOTION AND STRENGTH ASSESSMENT  Upper extremity ROM and strength are within functional  limits     Lower extremity ROM is within functional limits     Lower extremity strength is within functional limits       BALANCE  Static Sitting: Good  Dynamic Sitting: Good  Static Standing: Good  Dynamic Standing: Good    ADDITIONAL TESTS                                    ACTIVITY TOLERANCE   Vitals stable, denies dizziness                      O2 WALK       NEUROLOGICAL FINDINGS                        AM-PAC '6-Clicks' INPATIENT SHORT FORM - BASIC MOBILITY  How much difficulty does the patient currently have...  Patient Difficulty: Turning over in bed (including adjusting bedclothes, sheets and blankets)?: None   Patient Difficulty: Sitting down on and standing up from a chair with arms (e.g., wheelchair, bedside commode, etc.): None   Patient Difficulty: Moving from lying on back to sitting on the side of the bed?: None   How much help from another person does the patient currently need...   Help from Another: Moving to and from a bed to a chair (including a wheelchair)?: None   Help from Another: Need to walk in hospital room?: A Little   Help from Another: Climbing 3-5 steps with a railing?: A Little       AM-PAC Score:  Raw Score: 22   Approx Degree of Impairment: 20.91%   Standardized Score (AM-PAC Scale): 53.28   CMS Modifier (G-Code): CJ    FUNCTIONAL ABILITY STATUS  Gait Assessment   Functional Mobility/Gait Assessment  Gait Assistance: Supervision  Distance (ft): 20  Assistive Device: None  Pattern: Within Functional Limits    Skilled Therapy Provided: Per RN okay to work with pt. Pt received in supine and was agreeable to PT session. Transport present to take pt to video swallow study.     Bed Mobility:  Rolling: NT  Supine to sit: independent    Sit to supine: NT     Transfer Mobility:  Sit to stand: independent    Stand to sit: independent   Gait = pt ambulated with no AD from bed to video swallow study chair in the kamara with supervision    Pt reports no concerns regarding his mobility or DC home.      Therapist's comments:Pt educated on role of therapy, goals for session, safety, fall prevention, and activity recommendations. Educated pt that while still in the hospital he should be up to the chair for meals and ambulating 3-4 times per day with staff. Pt verbalized understanding.     Exercise/Education Provided:  Bed mobility  Energy conservation  Functional activity tolerated  Gait training  Posture  Strengthening  Transfer training    Patient End of Session: With  staff;RN aware of session/findings;All patient questions and concerns addressed (pt left with transport going down to [facility])    Patient Evaluation Complexity Level:  History Moderate - 1 or 2 personal factors and/or co-morbidities   Examination of body systems Low - addressing 1-2 elements   Clinical Presentation Low - Stable   Clinical Decision Making Low Complexity       PT Session Time: 15 minutes

## 2024-04-03 NOTE — OCCUPATIONAL THERAPY NOTE
OCCUPATIONAL THERAPY EVALUATION - INPATIENT    Room Number: 8611/8611-A  Evaluation Date: 4/3/2024     Type of Evaluation: Initial  Presenting Problem: GI hemorrhage    Physician Order: IP Consult to Occupational Therapy  Reason for Therapy:  ADL/IADL Dysfunction and Discharge Planning      OCCUPATIONAL THERAPY ASSESSMENT   Patient is a 78 year old male admitted on 3/31/2024 with Presenting Problem: GI hemorrhage. Co-Morbidities : bladder CA, ESRD  Patient is currently functioning at baseline with toileting, upper body dressing, lower body dressing, grooming, bed mobility, transfers, stating sitting balance, dynamic sitting balance, static standing balance, dynamic standing balance, maintaining seated position, and functional standing tolerance.  Prior to admission, patient's baseline is Mod I.  Patient met all OT goals at Sup level.  Patient reports no further questions/concerns at this time.         WEIGHT BEARING RESTRICTION                   Recommendations for nursing staff:   Transfers: Sup  Toileting location: Toilet    EVALUATION SESSION:  Patient at start of session: supine in bed for session  FUNCTIONAL TRANSFER ASSESSMENT  Sit to Stand: Edge of Bed  Edge of Bed: Supervision  Toilet Transfer: Supervision (simulated using low VFSS chair)    BED MOBILITY  Rolling: Supervision  Supine to Sit : Supervision  Scooting: Sup to EOB and onto VFSS chair    BALANCE ASSESSMENT  Static Sitting: Supervision  Sitting Bilateral: Supervision  Static Standing: Supervision  Standing Bilateral: Supervision    FUNCTIONAL ADL ASSESSMENT  UB Dressing Seated: Supervision (for robe on back)  LB Dressing Seated: Supervision (for socks)      ACTIVITY TOLERANCE: vitals stable                         O2 SATURATIONS       COGNITION  Overall Cognitive Status:  WFL - within functional limits  COGNITION ASSESSMENTS       Upper Extremity:   ROM: within functional limits   Strength: is within functional limits   Coordination:  Gross  motor: WNl  Fine motor: WNL  Sensation: Light touch:  intact    EDUCATION PROVIDED  Patient: Role of Occupational Therapy; Plan of Care  Patient's Response to Education: Verbalized Understanding; Returned Demonstration    Equipment used: none  Demonstrates functional use    Therapist comments: Pt reported fatigue at home, pt educated on work simplification and energy conservation for at home to assist with independence with fatigue at home.    Patient End of Session: Up in chair;Needs met;All patient questions and concerns addressed;With  staff (in [facility] chair with transport)    OCCUPATIONAL PROFILE    HOME SITUATION  Type of Home: House          Toilet and Equipment: Standard height toilet  Shower/Tub and Equipment: Walk-in shower  Other Equipment: None    Occupation/Status: retired  Hand Dominance: Right          Prior Level of Function: Pt typically independent with ADLs and mobility. Pt does not use AD.    SUBJECTIVE  Pt stated, \"I am doing better, can I go home?\"    PAIN ASSESSMENT  Ratin  Location: no pain at this time       OBJECTIVE     Fall Risk: Standard fall risk    WEIGHT BEARING RESTRICTION                   AM-PAC ‘6-Clicks’ Inpatient Daily Activity Short Form  -   Putting on and taking off regular lower body clothing?: A Little  -   Bathing (including washing, rinsing, drying)?: A Little  -   Toileting, which includes using toilet, bedpan or urinal? : A Little  -   Putting on and taking off regular upper body clothing?: A Little  -   Taking care of personal grooming such as brushing teeth?: A Little  -   Eating meals?: A Little    AM-PAC Score:  Score: 18  Approx Degree of Impairment: 46.65%  Standardized Score (AM-PAC Scale): 38.66      ADDITIONAL TESTS     NEUROLOGICAL FINDINGS        PLAN   Patient has been evaluated and presents with no skilled Occupational Therapy needs at this time.  Patient discharged from Occupational Therapy services.  Please re-order if a new functional limitation  presents during this admission.      Patient Evaluation Complexity Level:   Occupational Profile/Medical History LOW - Brief history including review of medical or therapy records    Specific performance deficits impacting engagement in ADL/IADL LOW  1 - 3 performance deficits    Client Assessment/Performance Deficits LOW - No comorbidities nor modifications of tasks    Clinical Decision Making LOW - Analysis of occupational profile, problem-focused assessments, limited treatment options    Overall Complexity LOW     OT Session Time: 10 minutes  Self-Care Home Management: 5 minutes  Therapeutic Activity: 0 minutes  Neuromuscular Re-education: 0 minutes  Therapeutic Exercise: 0 minutes  Cognitive Skills: 0 minutes  Sensory Integrative: 0 minutes  Orthotic Management and Trainin minutes  Can add/delete any of these

## 2024-04-03 NOTE — DISCHARGE INSTRUCTIONS
Avoid Straws   Complex Repair And Dermal Autograft Text: The defect edges were debeveled with a #15 scalpel blade.  The primary defect was closed partially with a complex linear closure.  Given the location of the defect, shape of the defect and the proximity to free margins an dermal autograft was deemed most appropriate to repair the remaining defect.  The graft was trimmed to fit the size of the remaining defect.  The graft was then placed in the primary defect, oriented appropriately, and sutured into place.

## 2024-04-03 NOTE — PLAN OF CARE
NURSING NOTES:  0800: Pt received AOx4. Room air. SR. Saline lock. R permacath.  1000: Pt back from Video swallow.   1200: Pt ambulated in the hallway-tolerated. [facility] notified of HD order for tomorrow.  1300: Dr. Donald ordered repeat 2D echo in am.  1700: Pt's son Ashwin at the bedside. Pt with no complain.    Problem: GASTROINTESTINAL - ADULT  Goal: Maintains or returns to baseline bowel function  Description: INTERVENTIONS:  - Assess bowel function  - Maintain adequate hydration with IV or PO as ordered and tolerated  - Evaluate effectiveness of GI medications  - Encourage mobilization and activity  - Obtain nutritional consult as needed  - Establish a toileting routine/schedule  - Consider collaborating with pharmacy to review patient's medication profile  4/3/2024 1309 by Penelope Olson, RN  Outcome: Progressing  4/3/2024 1309 by Penelope Olson, RN  Outcome: Progressing     Problem: CARDIOVASCULAR - ADULT  Goal: Maintains optimal cardiac output and hemodynamic stability  Description: INTERVENTIONS:  - Monitor vital signs, rhythm, and trends  - Monitor for bleeding, hypotension and signs of decreased cardiac output  - Evaluate effectiveness of vasoactive medications to optimize hemodynamic stability  - Monitor arterial and/or venous puncture sites for bleeding and/or hematoma  - Assess quality of pulses, skin color and temperature  - Assess for signs of decreased coronary artery perfusion - ex. Angina  - Evaluate fluid balance, assess for edema, trend weights  Outcome: Progressing  Goal: Absence of cardiac arrhythmias or at baseline  Description: INTERVENTIONS:  - Continuous cardiac monitoring, monitor vital signs, obtain 12 lead EKG if indicated  - Evaluate effectiveness of antiarrhythmic and heart rate control medications as ordered  - Initiate emergency measures for life threatening arrhythmias  - Monitor electrolytes and administer replacement therapy as ordered  Outcome: Progressing

## 2024-04-03 NOTE — PROGRESS NOTES
[facility]  Nephrology Progress Note    Jonny Garland Attending:  Tadeo Nieves MD       Assessment and Plan:    1) ESRD- due to longstanding DM 2; on HD since . Lytes / volume OK- next HD Thurs per usual routine     2) CAD with preserved EF- s/p PCI / stents LAD + OM 3/24 (Dr. Donald)      3) h/o hep C with cirrhosis / varices- recent US / doppler unremarkable     4) DM 2 / HTN     5) Anemia of CKD / chronic disease- on EPO with HD    6) GI blood loss- no melena x 18 months off plavix, but required multiple transfusions while on plavix 2 yrs ago. Hgb increased appropriately after 1 unit PRBC - holding further intervention for now      Subjective:  At video swallow    Physical Exam:   /70 (BP Location: Right arm)   Pulse 77   Temp 98.4 °F (36.9 °C) (Oral)   Resp 12   Ht 5' 11\" (1.803 m)   Wt 219 lb 9.3 oz (99.6 kg)   SpO2 94%   BMI 30.62 kg/m²   Temp (24hrs), Av.2 °F (36.8 °C), Min:97.4 °F (36.3 °C), Max:99.4 °F (37.4 °C)       Intake/Output Summary (Last 24 hours) at 4/3/2024 0713  Last data filed at 2024 2130  Gross per 24 hour   Intake 1220 ml   Output 2000 ml   Net -780 ml     Wt Readings from Last 3 Encounters:   24 219 lb 9.3 oz (99.6 kg)   24 223 lb 1.6 oz (101.2 kg)   23 204 lb 6.4 oz (92.7 kg)     General: awake alert  HEENT: No scleral icterus, MMM  Neck: Supple, no JADE or thyromegaly  Cardiac: Regular rate and rhythm, S1, S2 normal, no murmur or tub  Lungs: Decreased BS at bases bilaterally   Abdomen: Soft, non-tender. + bowel sounds, no palpable organomegaly  Extremities: Without clubbing, cyanosis; no edema  Neurologic: Cranial nerves grossly intact, moving all extremities  Skin: Warm and dry, no rashes       Labs:   Lab Results   Component Value Date    WBC 6.6 2024    HGB 8.8 2024    HCT 26.5 2024    PLT 99.0 2024    CREATSERUM 6.70 2024    BUN 48 2024     2024    K 4.0 2024    CL 99 2024     CO2 25.0 04/03/2024     04/03/2024    CA 8.8 04/03/2024    PGLU 181 04/03/2024       Imaging:  All imaging studies reviewed.    Meds:           Questions/concerns were discussed with patient and/or family by bedside.          Durga Fernando MD  4/3/2024  713 AM

## 2024-04-03 NOTE — PLAN OF CARE
Patient AOX4. O2 sat > 90% on room air. NSR on tele. VSS. Complains of generalized back and shoulder pain, relieved with warm compresses, declines need for pain medication. Dialysis permacath CDI. S/p 1 unit PRBCs, repeat CBC in a.m. Monitoring renal function labs daily. Continuing IV Rocephin, PO doxycycline. Use of incentive spirometer encouraged. Plan for video swallow today. Discharge planning ongoing. Patient updated on plan of care.     Problem: Patient/Family Goals  Goal: Patient/Family Long Term Goal  Description: Patient's Long Term Goal: stay out of the hospital    Interventions:  - take meds as prescribed  - attend follow up appointments  - follow recommended diet  - See additional Care Plan goals for specific interventions  Outcome: Progressing  Goal: Patient/Family Short Term Goal  Description: Patient's Short Term Goal: go home    Interventions:   - meds, labs, tele monitoring  - transfuse PRBC and monitor Hgb  - appropriate consults  - See additional Care Plan goals for specific interventions  Outcome: Progressing     Problem: SAFETY ADULT - FALL  Goal: Free from fall injury  Description: INTERVENTIONS:  - Assess pt frequently for physical needs  - Identify cognitive and physical deficits and behaviors that affect risk of falls.  - [place] fall precautions as indicated by assessment.  - Educate pt/family on patient safety including physical limitations  - Instruct pt to call for assistance with activity based on assessment  - Modify environment to reduce risk of injury  - Provide assistive devices as appropriate  - Consider OT/PT consult to assist with strengthening/mobility  - Encourage toileting schedule  Outcome: Progressing     Problem: GASTROINTESTINAL - ADULT  Goal: Maintains or returns to baseline bowel function  Description: INTERVENTIONS:  - Assess bowel function  - Maintain adequate hydration with IV or PO as ordered and tolerated  - Evaluate effectiveness of GI medications  - Encourage  mobilization and activity  - Obtain nutritional consult as needed  - Establish a toileting routine/schedule  - Consider collaborating with pharmacy to review patient's medication profile  Outcome: Progressing  Goal: Maintains adequate nutritional intake (undernourished)  Description: INTERVENTIONS:  - Monitor percentage of each meal consumed  - Identify factors contributing to decreased intake, treat as appropriate  - Assist with meals as needed  - Monitor I&O, WT and lab values  - Obtain nutritional consult as needed  - Optimize oral hygiene and moisture  - Encourage food from home; allow for food preferences  - Enhance eating environment  Outcome: Progressing

## 2024-04-04 ENCOUNTER — APPOINTMENT (OUTPATIENT)
Dept: CV DIAGNOSTICS | Facility: HOSPITAL | Age: 79
End: 2024-04-04
Attending: INTERNAL MEDICINE
Payer: MEDICARE

## 2024-04-04 VITALS
DIASTOLIC BLOOD PRESSURE: 81 MMHG | BODY MASS INDEX: 30.98 KG/M2 | WEIGHT: 221.31 LBS | SYSTOLIC BLOOD PRESSURE: 129 MMHG | HEIGHT: 71 IN | RESPIRATION RATE: 20 BRPM | OXYGEN SATURATION: 95 % | TEMPERATURE: 98 F | HEART RATE: 75 BPM

## 2024-04-04 LAB
ANION GAP SERPL CALC-SCNC: 10 MMOL/L (ref 0–18)
BASOPHILS # BLD AUTO: 0.02 X10(3) UL (ref 0–0.2)
BASOPHILS NFR BLD AUTO: 0.4 %
BUN BLD-MCNC: 60 MG/DL (ref 9–23)
CALCIUM BLD-MCNC: 8.9 MG/DL (ref 8.5–10.1)
CHLORIDE SERPL-SCNC: 100 MMOL/L (ref 98–112)
CO2 SERPL-SCNC: 25 MMOL/L (ref 21–32)
CREAT BLD-MCNC: 7.59 MG/DL
EGFRCR SERPLBLD CKD-EPI 2021: 7 ML/MIN/1.73M2 (ref 60–?)
EOSINOPHIL # BLD AUTO: 0.19 X10(3) UL (ref 0–0.7)
EOSINOPHIL NFR BLD AUTO: 3.5 %
ERYTHROCYTE [DISTWIDTH] IN BLOOD BY AUTOMATED COUNT: 18.2 %
GLUCOSE BLD-MCNC: 152 MG/DL (ref 70–99)
GLUCOSE BLD-MCNC: 167 MG/DL (ref 70–99)
GLUCOSE BLD-MCNC: 230 MG/DL (ref 70–99)
HCT VFR BLD AUTO: 26.8 %
HGB BLD-MCNC: 8.7 G/DL
IMM GRANULOCYTES # BLD AUTO: 0.04 X10(3) UL (ref 0–1)
IMM GRANULOCYTES NFR BLD: 0.7 %
LYMPHOCYTES # BLD AUTO: 0.66 X10(3) UL (ref 1–4)
LYMPHOCYTES NFR BLD AUTO: 12.3 %
MCH RBC QN AUTO: 31.6 PG (ref 26–34)
MCHC RBC AUTO-ENTMCNC: 32.5 G/DL (ref 31–37)
MCV RBC AUTO: 97.5 FL
MONOCYTES # BLD AUTO: 0.58 X10(3) UL (ref 0.1–1)
MONOCYTES NFR BLD AUTO: 10.8 %
NEUTROPHILS # BLD AUTO: 3.87 X10 (3) UL (ref 1.5–7.7)
NEUTROPHILS # BLD AUTO: 3.87 X10(3) UL (ref 1.5–7.7)
NEUTROPHILS NFR BLD AUTO: 72.3 %
OSMOLALITY SERPL CALC.SUM OF ELEC: 300 MOSM/KG (ref 275–295)
PLATELET # BLD AUTO: 97 10(3)UL (ref 150–450)
POTASSIUM SERPL-SCNC: 3.8 MMOL/L (ref 3.5–5.1)
RBC # BLD AUTO: 2.75 X10(6)UL
SODIUM SERPL-SCNC: 135 MMOL/L (ref 136–145)
WBC # BLD AUTO: 5.4 X10(3) UL (ref 4–11)

## 2024-04-04 PROCEDURE — 93308 TTE F-UP OR LMTD: CPT | Performed by: INTERNAL MEDICINE

## 2024-04-04 PROCEDURE — 99233 SBSQ HOSP IP/OBS HIGH 50: CPT | Performed by: INTERNAL MEDICINE

## 2024-04-04 RX ORDER — HEPARIN SODIUM 1000 [USP'U]/ML
2000 INJECTION, SOLUTION INTRAVENOUS; SUBCUTANEOUS
Status: DISCONTINUED | OUTPATIENT
Start: 2024-04-04 | End: 2024-04-04

## 2024-04-04 RX ORDER — DOXYCYCLINE HYCLATE 100 MG/1
100 CAPSULE ORAL 2 TIMES DAILY
Qty: 5 CAPSULE | Refills: 0 | Status: SHIPPED | OUTPATIENT
Start: 2024-04-04 | End: 2024-04-07

## 2024-04-04 RX ORDER — CEFADROXIL 500 MG/1
500 CAPSULE ORAL 2 TIMES DAILY
Qty: 4 CAPSULE | Refills: 0 | Status: SHIPPED | OUTPATIENT
Start: 2024-04-05 | End: 2024-04-07

## 2024-04-04 NOTE — PROGRESS NOTES
[facility]   part of [facility]    Progress Note     Jonny Haque Patient Status:  Inpatient    4/15/1945 MRN [id number]   [facility] 8NE-A Attending Tadeo Nieves MD   Hosp Day # 4 PCP Gee Jimenez MD     Follow up for: The primary encounter diagnosis was Gastrointestinal hemorrhage, unspecified gastrointestinal hemorrhage type. Diagnoses of Chronic renal failure, stage 5 (HCC) and Anemia, unspecified type were also pertinent to this visit.      Interval History/Subjective:     Responded appropriately to 1u pRBC yesterday,Hgb remained stable  Echo yesterday shows moderate pericardial effusion with evidence of hemodynamic compromise/RV collapse    PEDRO overnight  Afebrile, HDS    ***    Vital signs:  Temp:  [97.7 °F (36.5 °C)-98.5 °F (36.9 °C)] 98.5 °F (36.9 °C)  Pulse:  [69-81] 74  Resp:  [12-19] 14  BP: (124-160)/(63-80) 150/80  SpO2:  [85 %-96 %] 94 %    Physical Exam:    General: NAD, Comfortable, Nontoxic   Respiratory: CTAB; reg resp rate & effort, no wheezes/crackles  Cardiovascular: S1, S2. Regular rate and rhythm. No murmurs appreciated  Abdomen: Soft, NTND, no guarding/rebound   Neurologic: No focal neurological deficits.   Extremities: No edema.   Skin: Dry, no rashes, ulcers or lesions     Diagnostic Data:      Labs:  Recent Labs   Lab 24  1245 24  2044 24  0557 24  1152 24  0018 24  0446 24  0759 24  0503 24  0520   WBC 7.9  --  8.1  --   --  8.0  --  6.6 5.4   HGB 7.1*   < > 7.9*   < > 7.9* 7.5* 7.7* 8.8* 8.7*   .2*  --  103.3*  --   --  100.9*  --  96.4 97.5   .0*  --  101.0*  --   --  90.0*  --  99.0* 97.0*   INR 1.13  --   --   --   --   --   --   --   --     < > = values in this interval not displayed.       Recent Labs   Lab 24  1245 24  0557 24  0446 24  0503 24  0520   *   < > 149* 178* 152*   BUN 58*   < > 79* 48* 60*   CREATSERUM 6.51*   < > 8.97* 6.70*  7.59*   CA 8.8   < > 8.7 8.8 8.9   ALB 3.1*  --   --   --   --       < > 131* 134* 135*   K 3.9   < > 4.3 4.0 3.8   CL 95*   < > 94* 99 100   CO2 29.0   < > 26.0 25.0 25.0   ALKPHO 140*  --   --   --   --    AST 24  --   --   --   --    ALT 23  --   --   --   --    BILT 0.8  --   --   --   --    TP 6.7  --   --   --   --     < > = values in this interval not displayed.       Recent Labs   Lab 03/31/24  1245   PTP 14.6   INR 1.13       No results for input(s): \"TROP\", \"CK\" in the last 168 hours.         Imaging: Imaging data reviewed in Epic.    Medications:    epoetin eunice  10,000 Units Intravenous Once    sodium chloride   Intravenous Once    cefTRIAXone  2 g Intravenous Q24H    lidocaine-menthol  1 patch Transdermal Daily    amiodarone  200 mg Oral Daily    aspirin  81 mg Oral QOD    clopidogrel  75 mg Oral Daily    insulin degludec  20 Units Subcutaneous Nightly    levothyroxine  150 mcg Oral Daily @ 0700    losartan  50 mg Oral Daily    metoprolol succinate ER  50 mg Oral Daily Beta Blocker    pantoprazole  40 mg Oral BID AC    rosuvastatin  10 mg Oral Nightly    sevelamer carbonate  800 mg Oral TID CC    insulin aspart  2-10 Units Subcutaneous TID AC and HS    doxycycline  100 mg Oral 2 times per day       ASSESSMENT / PLAN:     Jonny Haque Is a a 78 year old male who presents with weakness, acute on chronic anemia     Problem List / Diagnoses     Acute Pericardial Effusion  Weakness  Acute on Chronic Anemia  Possible GI Bleed  Possible Bacterial Pneumonia  CAD with recent MI & PCI   ESRD On HD   HCV Cirrhosis   Pancytopenia, Chronic      Plan     Weakness  -- likely multifactorial from deconditioning, recent MI, acute on chronic anemia, possible acute bacterial pneumonia   -- fall precautions  -- PT/OT -> home      Acute on Chronic Anemia  Possible GI Bleed  -- Hgb 7.1 on presentation, recent baseline 7.4  -- FOBT reportedly positive in ED  -- 1 u pRBC ordered, post transfusion Hgb 7.8, repeat 8.2   --  GI consulted, plan discussed, continue ppi bid for now  -- pt reports history GIB and with known cirrhosis   -- stools turning brown, d/w GI/Renal, continue to trend Hgb, no further interventions as long as it remains stable     Possible Bacterial Pneumonia  -- CXR on presentation with \"development of a moderate amount of left retrocardiac consolidation with new small left effusion\" suspicious for aspiration pneumonia  -- procalcitonin likewise elevated, repeated today and again elevated  -- SLP eval-> rec VFSS -> no diet restrictions, avoid using straws, Speech Therapy as outpatient   -- s/p ceftriaxone/doxycycline in ED, will continue x 7 days      CAD with recent MI & PCI   -- resume DAPT given recent PCI (<2 weeks PTA)   -- Cardiology consulted, plan discussed, -> can not hold DAPT given recent high risk PCI   -- echo with moderate pericardial effusion, management per Cardiology  -- repeat echo tomorrow to monitor progression     ESRD On HD   -- npehrology following, plan discussed      HCV Cirrhosis   Pancytopenia, Chronic   -- generally stable at baseline, monitor     DVT Mechanical Prophylaxis:   SCDs,    DVT Pharmacologic Prophylaxis   Medication   None         DVT Pharmacologic prophylaxis: Aspirin 81 mg      Code Status: Full Code    Dispo: inpatient; MOUNA > 1-2 days pending clinical stability, specialist clearance     Plan of care discussed with patient and/or family at bedside.    MARCUS Venegas MD  [facility]   [phone]      This note was created using voice recognition technology. It may include inadvertent transcriptional errors. Any such errors should be contextually interpreted and should not be taken to alter the content or the meaning.     Note to Patient: The 21st Century Cures Act makes medical notes like these available to patients in the interest of transparency. However, be advised this is a medical document. It is intended as peer to peer communication. It is written in medical  language and may contain abbreviations or verbiage that are unfamiliar. It may appear blunt or direct. Medical documents are intended to carry relevant information, facts as evident, and the clinical opinion of the practitioner and not intended to be the primary source of your information.  Please refer directly to myself or clinical staff for information regarding plan of care.

## 2024-04-04 NOTE — PLAN OF CARE
Pt A&Ox4. Lungs dim on RA. NSR on tele. HD today 2 L off.  Waiting for repeat echo. IV rocephin, PO doxy. Pt ambulates SBA. All needs addressed at this time. Call light within reach.        Problem: Patient/Family Goals  Goal: Patient/Family Long Term Goal  Description: Patient's Long Term Goal: stay out of the hospital    Interventions:  - take meds as prescribed  - attend follow up appointments  - follow recommended diet  - See additional Care Plan goals for specific interventions  Outcome: Progressing  Goal: Patient/Family Short Term Goal  Description: Patient's Short Term Goal: go home    Interventions:   - meds, labs, tele monitoring  - transfuse PRBC and monitor Hgb  - appropriate consults  - See additional Care Plan goals for specific interventions  Outcome: Progressing     Problem: SAFETY ADULT - FALL  Goal: Free from fall injury  Description: INTERVENTIONS:  - Assess pt frequently for physical needs  - Identify cognitive and physical deficits and behaviors that affect risk of falls.  - [place] fall precautions as indicated by assessment.  - Educate pt/family on patient safety including physical limitations  - Instruct pt to call for assistance with activity based on assessment  - Modify environment to reduce risk of injury  - Provide assistive devices as appropriate  - Consider OT/PT consult to assist with strengthening/mobility  - Encourage toileting schedule  Outcome: Progressing     Problem: GASTROINTESTINAL - ADULT  Goal: Maintains or returns to baseline bowel function  Description: INTERVENTIONS:  - Assess bowel function  - Maintain adequate hydration with IV or PO as ordered and tolerated  - Evaluate effectiveness of GI medications  - Encourage mobilization and activity  - Obtain nutritional consult as needed  - Establish a toileting routine/schedule  - Consider collaborating with pharmacy to review patient's medication profile  Outcome: Progressing  Goal: Maintains adequate nutritional intake  (undernourished)  Description: INTERVENTIONS:  - Monitor percentage of each meal consumed  - Identify factors contributing to decreased intake, treat as appropriate  - Assist with meals as needed  - Monitor I&O, WT and lab values  - Obtain nutritional consult as needed  - Optimize oral hygiene and moisture  - Encourage food from home; allow for food preferences  - Enhance eating environment  Outcome: Progressing     Problem: CARDIOVASCULAR - ADULT  Goal: Maintains optimal cardiac output and hemodynamic stability  Description: INTERVENTIONS:  - Monitor vital signs, rhythm, and trends  - Monitor for bleeding, hypotension and signs of decreased cardiac output  - Evaluate effectiveness of vasoactive medications to optimize hemodynamic stability  - Monitor arterial and/or venous puncture sites for bleeding and/or hematoma  - Assess quality of pulses, skin color and temperature  - Assess for signs of decreased coronary artery perfusion - ex. Angina  - Evaluate fluid balance, assess for edema, trend weights  Outcome: Progressing  Goal: Absence of cardiac arrhythmias or at baseline  Description: INTERVENTIONS:  - Continuous cardiac monitoring, monitor vital signs, obtain 12 lead EKG if indicated  - Evaluate effectiveness of antiarrhythmic and heart rate control medications as ordered  - Initiate emergency measures for life threatening arrhythmias  - Monitor electrolytes and administer replacement therapy as ordered  Outcome: Progressing

## 2024-04-04 NOTE — PROGRESS NOTES
[facility]  Nephrology Progress Note    Jonny Garland Attending:  Tadeo Nieves MD       Assessment and Plan:    1) ESRD- due to longstanding DM 2; on HD since . Lytes / volume OK- HD today per usual routine     2) CAD with preserved EF- s/p PCI / stents LAD + OM 3/24 (Dr. Donald)      3) h/o hep C with cirrhosis / varices- recent US / doppler unremarkable     4) DM 2 / HTN     5) Anemia of CKD / chronic disease- on EPO with HD    6) GI blood loss- no melena x 18 months off plavix, but required multiple transfusions while on plavix 2 yrs ago. Hgb stable post-transfusion- holding further intervention for now    7) Pericardial effusion- repeat echo pending per Dr. Donald    8) ? PNA ?- asymptomatic, afeb, etc. Swallow study noted. On abx.     DC planning      Subjective:  Awake alert in good spirits wants to go home    Physical Exam:   /81 (BP Location: Right arm)   Pulse 75   Temp 97.9 °F (36.6 °C) (Oral)   Resp 20   Ht 5' 11\" (1.803 m)   Wt 221 lb 5.5 oz (100.4 kg)   SpO2 95%   BMI 30.87 kg/m²   Temp (24hrs), Av.2 °F (36.8 °C), Min:97.7 °F (36.5 °C), Max:98.5 °F (36.9 °C)       Intake/Output Summary (Last 24 hours) at 2024 1309  Last data filed at 2024 1000  Gross per 24 hour   Intake 960 ml   Output 2000 ml   Net -1040 ml     Wt Readings from Last 3 Encounters:   24 221 lb 5.5 oz (100.4 kg)   24 223 lb 1.6 oz (101.2 kg)   23 204 lb 6.4 oz (92.7 kg)     General: awake alert  HEENT: No scleral icterus, MMM  Neck: Supple, no JADE or thyromegaly  Cardiac: Regular rate and rhythm, S1, S2 normal, no murmur or tub  Lungs: Decreased BS at bases bilaterally   Abdomen: Soft, non-tender. + bowel sounds, no palpable organomegaly  Extremities: Without clubbing, cyanosis; no edema  Neurologic: Cranial nerves grossly intact, moving all extremities  Skin: Warm and dry, no rashes       Labs:   Lab Results   Component Value Date    WBC 5.4 2024    HGB 8.7 2024    HCT  26.8 04/04/2024    PLT 97.0 04/04/2024    CREATSERUM 7.59 04/04/2024    BUN 60 04/04/2024     04/04/2024    K 3.8 04/04/2024     04/04/2024    CO2 25.0 04/04/2024     04/04/2024    CA 8.9 04/04/2024    PGLU 230 04/04/2024       Imaging:  All imaging studies reviewed.    Meds:           Questions/concerns were discussed with patient and/or family by bedside.          Durga Fernando MD  4/4/2024  109 PM

## 2024-04-04 NOTE — PROGRESS NOTES
[facility] Cardiology  Progress Note    Jonny Haque Patient Status:  Inpatient    4/15/1945 MRN [id number]   Location [place] [id number]A Attending Tadeo Nieves MD   Hosp Day # 4 PCP Gee Jimenez MD     Assessment:  1. CAD, s/p high risk PCI  2. ESRD, HD dependent  3. PAF, one episode during last admission  4. Anemia, hx GIB with difficult eval and treatment  5. Possible PNA  6. HCV/Cirrhosis  7. New pericardial effusion        Plan:  1. Pericardial effusion: Etiology unclear.  Could be procedure related or MI related.  He does not have any significant hemodynamic compromise.  His BP is normal and he has been ambulating without any significant dyspnea.  The echo images are essentially unchanged.  Pericardiocentesis in this complex patient is not without risk.  Thus given clinical stability and unchanged imaging, I think he is ok to DC home but will need a FU echo at [place] [facility] (next to hospital) mid week.  He needs to continue his DAPT.  Since he has had AF recently and has improvement in LV function compared to 2 weeks ago, would continue BB and ARB.  BP this -130 range systolic.  Would DC on current meds.  2. Follow CBC, GI consulted already with no procedures, xfuse as needed with HD  3. HD per nephrology    If all other services are in agreement, could DC home later this afternoon/evening.  DC meds and FU as above.      Subjective:  The patient denies any chest pain or dyspnea at this time. Ambulating. No complaints.    Objective:  /81 (BP Location: Right arm)   Pulse 75   Temp 97.9 °F (36.6 °C) (Oral)   Resp 20   Ht 71\"   Wt 221 lb 5.5 oz (100.4 kg)   SpO2 95%   BMI 30.87 kg/m²   Temp (24hrs), Av.2 °F (36.8 °C), Min:97.7 °F (36.5 °C), Max:98.5 °F (36.9 °C)      Intake/Output Summary (Last 24 hours) at 2024 1333  Last data filed at 2024 1000  Gross per 24 hour   Intake 960 ml   Output 2000 ml   Net -1040 ml     Wt Readings from Last 3 Encounters:    04/04/24 221 lb 5.5 oz (100.4 kg)   03/24/24 223 lb 1.6 oz (101.2 kg)   04/06/23 204 lb 6.4 oz (92.7 kg)       General: Awake and alert; in no acute distress  Cardiac: Regular rate and regular rhythm; no murmurs/rubs/gallops are appreciated  Lungs: Clear to auscultation bilaterally; no accessory muscle use  Abdomen: Soft, non-tender; bowel sounds are normoactive  Extremities: No clubbing/cyanosis; moves all 4 extremities normally          Laboratory Data:  Lab Results   Component Value Date    WBC 5.4 04/04/2024    HGB 8.7 04/04/2024    HCT 26.8 04/04/2024    PLT 97.0 04/04/2024     Lab Results   Component Value Date    INR 1.13 03/31/2024    INR 1.04 03/17/2024    INR 1.15 04/04/2023     Lab Results   Component Value Date     04/04/2024    K 3.8 04/04/2024     04/04/2024    CO2 25.0 04/04/2024    BUN 60 04/04/2024    CREATSERUM 7.59 04/04/2024     04/04/2024    CA 8.9 04/04/2024       Telemetry: No significant arrhythmias.      Thank you for allowing our practice to participate in the care of your patient. Please do not hesitate to contact me if you have any questions.    Satya Donald MD

## 2024-04-04 NOTE — PAYOR COMM NOTE
--------------  CONTINUED STAY REVIEW    Payor: [employer] MEDICARE ADV PPO  Subscriber #:  [id number]  Authorization Number: [id number]    Admit date: 3/31/24  Admit time:  3:49 PM    Admitting Physician: Tadeo Nieves MD  Attending Physician:  Ajay Venegas MD  Primary Care Physician: Gee Jimenez MD    REVIEW DOCUMENTATION:    4/4/24 Nephrology     1) ESRD- due to longstanding DM 2; on HD since 7/21. Lytes / volume OK- HD today per usual routine      GI blood loss- no melena x 18 months off plavix, but required multiple transfusions while on plavix 2 yrs ago. Hgb stable post-transfusion- holding further intervention for now     Pericardial effusion- repeat echo pending per Dr. Andres    Cardiology   Assessment:  1. CAD, s/p high risk PCI  2. ESRD, HD dependent  3. PAF, one episode during last admission  4. Anemia, hx GIB with difficult eval and treatment  5. Possible PNA  6. HCV/Cirrhosis  7. New pericardial effusion      Plan:  1. Pericardial effusion: Etiology unclear.  Could be procedure related or MI related.  He does not have any significant hemodynamic compromise.  His BP is normal and he has been ambulating without any significant dyspnea.  The echo images are essentially unchanged.  Pericardiocentesis in this complex patient is not without risk.  Thus given clinical stability and unchanged imaging, I think he is ok to DC home but will need a FU echo at [facility] (next to hospital) mid week.  He needs to continue his DAPT.  Since he has had AF recently and has improvement in LV function compared to 2 weeks ago, would continue BB and ARB.  BP this -130 range systolic.  Would DC on current meds.  2. Follow CBC, GI consulted already with no procedures, xfuse as needed with HD  3. HD per nephrology     If all other services are in agreement, could DC home later this afternoon/evening.  DC meds and FU as above.       Latest Reference Range & Units 04/03/24 05:03 04/04/24 05:20   WBC 4.0 -  11.0 x10(3) uL 6.6 5.4   Hemoglobin 13.0 - 17.5 g/dL 8.8 (L) 8.7 (L)   Hematocrit 39.0 - 53.0 % 26.5 (L) 26.8 (L)   Platelet Count 150.0 - 450.0 10(3)uL 99.0 (L) 97.0 (L)   (L): Data is abnormally low   Latest Reference Range & Units 04/03/24 05:03 04/04/24 05:20   Glucose 70 - 99 mg/dL 178 (H) 152 (H)   Sodium 136 - 145 mmol/L 134 (L) 135 (L)   Potassium 3.5 - 5.1 mmol/L 4.0 3.8   Chloride 98 - 112 mmol/L 99 100   Carbon Dioxide, Total 21.0 - 32.0 mmol/L 25.0 25.0   BUN 9 - 23 mg/dL 48 (H) 60 (H)   CREATININE 0.70 - 1.30 mg/dL 6.70 (H) 7.59 (H)   (H): Data is abnormally high  (L): Data is abnormally low        MEDICATIONS ADMINISTERED IN LAST 1 DAY:  amiodarone (Pacerone) tab 200 mg       Date Action Dose Route User    4/4/2024 1051 Given 200 mg Oral Rosalee Stevens RN          cefTRIAXone (Rocephin) 2 g in D5W 100 mL IVPB-ADD       Date Action Dose Route User    4/3/2024 1441 New Bag 2 g Intravenous Penelope Olson RN          clopidogrel (Plavix) tab 75 mg       Date Action Dose Route User    4/4/2024 1052 Given 75 mg Oral Rosalee Stevens RN          doxycycline (Vibramycin) cap 100 mg       Date Action Dose Route User    4/4/2024 1051 Given 100 mg Oral Rosalee Stevens RN    4/3/2024 2137 Given 100 mg Oral Ashlee Miller RN          epoetin eunice (Epogen, Procrit) 29940 UNIT/ML injection 10,000 Units       Date Action Dose Route User    4/4/2024 1052 Given 10,000 Units Intravenous Rosalee Stevens RN          heparin (Porcine) 1000 UNIT/ML injection 2,000 Units       Date Action Dose Route User    4/4/2024 1052 Given 2,000 Units Intravenous Rosalee Stevens RN          insulin aspart (NovoLOG) 100 Units/mL FlexPen 2-10 Units       Date Action Dose Route User    4/4/2024 1241 Given 6 Units Subcutaneous (Right Anterior Thigh) Rosalee Stevens RN    4/4/2024 0556 Given 2 Units Subcutaneous (Left Lower Abdomen) Skye Carrington RN    4/3/2024 1725 Given 2 Units Subcutaneous (Left Upper Arm) Penelope Olson RN           insulin degludec 100 units/mL flextouch 20 Units       Date Action Dose Route User    4/3/2024 2142 Given 20 Units Subcutaneous (Left Anterior Thigh) Ashlee Miller RN          levothyroxine (Synthroid) tab 150 mcg       Date Action Dose Route User    4/4/2024 0503 Given 150 mcg Oral Skye Carrington RN          losartan (Cozaar) tab 50 mg       Date Action Dose Route User    4/4/2024 1052 Given 50 mg Oral Rosalee Stevens RN          metoprolol succinate ER (Toprol XL) 24 hr tab 50 mg       Date Action Dose Route User    4/4/2024 0503 Given 50 mg Oral Skye Carrington RN          pantoprazole (Protonix) DR tab 40 mg       Date Action Dose Route User    4/4/2024 0503 Given 40 mg Oral Skye Carrington RN    4/3/2024 1630 Given 40 mg Oral Penelope Olson RN          rosuvastatin (Crestor) tab 10 mg       Date Action Dose Route User    4/3/2024 2137 Given 10 mg Oral Ashlee Miller RN          sevelamer carbonate (Renvela) tab 800 mg       Date Action Dose Route User    4/4/2024 1241 Given 800 mg Oral Rosalee Stevens RN    4/4/2024 0836 Given 800 mg Oral Rosalee Stevens RN    4/3/2024 1725 Given 800 mg Oral Penelope Olson RN            Vitals (last day)       Date/Time Temp Pulse Resp BP SpO2 Weight O2 Device O2 Flow Rate (L/min) [facility]    04/04/24 1238 97.9 °F (36.6 °C) 75 20 129/81 95 % -- -- --     04/04/24 0900 -- 74 22 135/71 -- -- -- -- AW    04/04/24 0822 98.5 °F (36.9 °C) 74 14 150/80 -- -- -- --     04/04/24 0515 -- 77 19 -- 94 % 221 lb 5.5 oz -- -- AB    04/04/24 0422 -- -- -- -- 92 % -- None (Room air) -- AD    04/04/24 0421 -- -- -- -- 90 % -- None (Room air) -- AD    04/04/24 0415 98.4 °F (36.9 °C) 81 16 124/66 85 % -- -- -- AD    04/03/24 2255 98.3 °F (36.8 °C) 69 12 160/69 95 % -- None (Room air) 0 L/min MM    04/03/24 1940 97.7 °F (36.5 °C) 73 17 139/76 96 % -- None (Room air) 0 L/min MM    04/03/24 1549 98.1 °F (36.7 °C) 69 13 140/63 94 % -- -- -- LT    04/03/24 1158 98.1 °F (36.7  °C) 72 12 131/64 95 % -- None (Room air) -- LT    04/03/24 0814 97 °F (36.1 °C) 82 14 129/64 95 % -- None (Room air) -- NN    04/03/24 0559 98.4 °F (36.9 °C) 77 12 121/70 94 % 219 lb 9.3 oz None (Room air) -- BV    04/03/24 0013 99.4 °F (37.4 °C) 83 18 112/51 92 % -- None (Room air) -- BV          Blood Transfusion Record       Product Unit Status Volume Start End            Transfuse RBC       24  [id number]  [id number] Stopped 350 mL 04/02/24 0944 04/02/24 1100       24  [id number]  [id number] Stopped  03/31/24 1511 03/31/24 1830

## 2024-04-04 NOTE — PLAN OF CARE
NURSING DISCHARGE NOTE    Discharged Home via Wheelchair.  Accompanied by Family member  Belongings Taken by patient/family.    Discharge instructions reviewed with patient. New medications reviewed. Follow up appointments reviewed. All questions answered at this time. IV's removed. Tele removed.          Problem: Patient/Family Goals  Goal: Patient/Family Long Term Goal  Description: Patient's Long Term Goal: stay out of the hospital    Interventions:  - take meds as prescribed  - attend follow up appointments  - follow recommended diet  - See additional Care Plan goals for specific interventions  4/4/2024 1617 by Rosalee Stevens RN  Outcome: Adequate for Discharge  4/4/2024 1130 by Rosalee Stevens RN  Outcome: Progressing  Goal: Patient/Family Short Term Goal  Description: Patient's Short Term Goal: go home    Interventions:   - meds, labs, tele monitoring  - transfuse PRBC and monitor Hgb  - appropriate consults  - See additional Care Plan goals for specific interventions  4/4/2024 1617 by Rosalee Stevens RN  Outcome: Adequate for Discharge  4/4/2024 1130 by Rosalee Stevens RN  Outcome: Progressing     Problem: SAFETY ADULT - FALL  Goal: Free from fall injury  Description: INTERVENTIONS:  - Assess pt frequently for physical needs  - Identify cognitive and physical deficits and behaviors that affect risk of falls.  - [place] fall precautions as indicated by assessment.  - Educate pt/family on patient safety including physical limitations  - Instruct pt to call for assistance with activity based on assessment  - Modify environment to reduce risk of injury  - Provide assistive devices as appropriate  - Consider OT/PT consult to assist with strengthening/mobility  - Encourage toileting schedule  4/4/2024 1617 by Rosalee Stevens RN  Outcome: Adequate for Discharge  4/4/2024 1130 by Rosalee Stevens RN  Outcome: Progressing     Problem: GASTROINTESTINAL - ADULT  Goal: Maintains or returns to baseline bowel  function  Description: INTERVENTIONS:  - Assess bowel function  - Maintain adequate hydration with IV or PO as ordered and tolerated  - Evaluate effectiveness of GI medications  - Encourage mobilization and activity  - Obtain nutritional consult as needed  - Establish a toileting routine/schedule  - Consider collaborating with pharmacy to review patient's medication profile  4/4/2024 1617 by Rosalee Stevens RN  Outcome: Adequate for Discharge  4/4/2024 1130 by Rosalee Stevens RN  Outcome: Progressing  Goal: Maintains adequate nutritional intake (undernourished)  Description: INTERVENTIONS:  - Monitor percentage of each meal consumed  - Identify factors contributing to decreased intake, treat as appropriate  - Assist with meals as needed  - Monitor I&O, WT and lab values  - Obtain nutritional consult as needed  - Optimize oral hygiene and moisture  - Encourage food from home; allow for food preferences  - Enhance eating environment  4/4/2024 1617 by Rosalee Stevens RN  Outcome: Adequate for Discharge  4/4/2024 1130 by Rosalee Stevens RN  Outcome: Progressing     Problem: CARDIOVASCULAR - ADULT  Goal: Maintains optimal cardiac output and hemodynamic stability  Description: INTERVENTIONS:  - Monitor vital signs, rhythm, and trends  - Monitor for bleeding, hypotension and signs of decreased cardiac output  - Evaluate effectiveness of vasoactive medications to optimize hemodynamic stability  - Monitor arterial and/or venous puncture sites for bleeding and/or hematoma  - Assess quality of pulses, skin color and temperature  - Assess for signs of decreased coronary artery perfusion - ex. Angina  - Evaluate fluid balance, assess for edema, trend weights  4/4/2024 1617 by Rosalee Stevens RN  Outcome: Adequate for Discharge  4/4/2024 1130 by Rosalee Stevens RN  Outcome: Progressing  Goal: Absence of cardiac arrhythmias or at baseline  Description: INTERVENTIONS:  - Continuous cardiac monitoring, monitor vital signs, obtain 12  lead EKG if indicated  - Evaluate effectiveness of antiarrhythmic and heart rate control medications as ordered  - Initiate emergency measures for life threatening arrhythmias  - Monitor electrolytes and administer replacement therapy as ordered  4/4/2024 1617 by Rosalee Stevens, RN  Outcome: Adequate for Discharge  4/4/2024 1130 by Rosalee Stevens, RN  Outcome: Progressing

## 2024-04-04 NOTE — PLAN OF CARE
Patient AOX4. O2 sat > 90% on room air. NSR on tele. VSS. Denies pain. O2 sat > 90% on room air. VSS. Denies pain. Dialysis catheter with dressing CDI-plan for dialysis tomorrow. Monitoring hemoglobin and renal function labs daily. Plan for repeat echo in a.m. to re-assess pericardial effusion. Continuing IV and PO antibiotics as ordered. Patient updated on plan of care.    Problem: Patient/Family Goals  Goal: Patient/Family Long Term Goal  Description: Patient's Long Term Goal: stay out of the hospital    Interventions:  - take meds as prescribed  - attend follow up appointments  - follow recommended diet  - See additional Care Plan goals for specific interventions  4/3/2024 2258 by Ashlee Miller RN  Outcome: Progressing  4/3/2024 2258 by Ashlee Miller RN  Outcome: Progressing  Goal: Patient/Family Short Term Goal  Description: Patient's Short Term Goal: go home    Interventions:   - meds, labs, tele monitoring  - transfuse PRBC and monitor Hgb  - appropriate consults  - See additional Care Plan goals for specific interventions  4/3/2024 2258 by Ashlee Miller RN  Outcome: Progressing  4/3/2024 2258 by Ashlee Miller RN  Outcome: Progressing     Problem: SAFETY ADULT - FALL  Goal: Free from fall injury  Description: INTERVENTIONS:  - Assess pt frequently for physical needs  - Identify cognitive and physical deficits and behaviors that affect risk of falls.  - [place] fall precautions as indicated by assessment.  - Educate pt/family on patient safety including physical limitations  - Instruct pt to call for assistance with activity based on assessment  - Modify environment to reduce risk of injury  - Provide assistive devices as appropriate  - Consider OT/PT consult to assist with strengthening/mobility  - Encourage toileting schedule  4/3/2024 2258 by Ashlee Miller RN  Outcome: Progressing  4/3/2024 2258 by Ashlee Miller RN  Outcome: Progressing     Problem: GASTROINTESTINAL -  ADULT  Goal: Maintains or returns to baseline bowel function  Description: INTERVENTIONS:  - Assess bowel function  - Maintain adequate hydration with IV or PO as ordered and tolerated  - Evaluate effectiveness of GI medications  - Encourage mobilization and activity  - Obtain nutritional consult as needed  - Establish a toileting routine/schedule  - Consider collaborating with pharmacy to review patient's medication profile  4/3/2024 2258 by Ashlee Miller RN  Outcome: Progressing  4/3/2024 2258 by Ashlee Miller RN  Outcome: Progressing  Goal: Maintains adequate nutritional intake (undernourished)  Description: INTERVENTIONS:  - Monitor percentage of each meal consumed  - Identify factors contributing to decreased intake, treat as appropriate  - Assist with meals as needed  - Monitor I&O, WT and lab values  - Obtain nutritional consult as needed  - Optimize oral hygiene and moisture  - Encourage food from home; allow for food preferences  - Enhance eating environment  4/3/2024 2258 by Ashlee Miller RN  Outcome: Progressing  4/3/2024 2258 by Ashlee Miller RN  Outcome: Progressing     Problem: CARDIOVASCULAR - ADULT  Goal: Maintains optimal cardiac output and hemodynamic stability  Description: INTERVENTIONS:  - Monitor vital signs, rhythm, and trends  - Monitor for bleeding, hypotension and signs of decreased cardiac output  - Evaluate effectiveness of vasoactive medications to optimize hemodynamic stability  - Monitor arterial and/or venous puncture sites for bleeding and/or hematoma  - Assess quality of pulses, skin color and temperature  - Assess for signs of decreased coronary artery perfusion - ex. Angina  - Evaluate fluid balance, assess for edema, trend weights  4/3/2024 2258 by Ashlee Miller RN  Outcome: Progressing  4/3/2024 2258 by Ashlee Miller RN  Outcome: Progressing  Goal: Absence of cardiac arrhythmias or at baseline  Description: INTERVENTIONS:  - Continuous cardiac  monitoring, monitor vital signs, obtain 12 lead EKG if indicated  - Evaluate effectiveness of antiarrhythmic and heart rate control medications as ordered  - Initiate emergency measures for life threatening arrhythmias  - Monitor electrolytes and administer replacement therapy as ordered  4/3/2024 2258 by Ashlee Miller, RN  Outcome: Progressing  4/3/2024 2258 by Ashlee Miller, RN  Outcome: Progressing

## 2024-04-08 NOTE — PAYOR COMM NOTE
--------------  DISCHARGE REVIEW    Payor: [employer] MEDICARE ADV PPO  Subscriber #:  [id number]  Authorization Number: [id number]    Admit date: 3/31/24  Admit time:   3:49 PM  Discharge Date: 2024  5:03 PM     Admitting Physician: Tadeo Nieves MD  Attending Physician:  No att. providers found  Primary Care Physician: Gee Jimenez MD         Assessment:  1. CAD, s/p high risk PCI  2. ESRD, HD dependent  3. PAF, one episode during last admission  4. Anemia, hx GIB with difficult eval and treatment  5. Possible PNA  6. HCV/Cirrhosis  7. New pericardial effusion        Plan:  1. Pericardial effusion: Has several reasons to have an effusion, high prob would be post MI and procedural.  I reviewed echo personally.  He does not have tamponade hemodynamics.  His BP is 130mmHg systolic currently.  He is asymptomatic this AM.  The effusion is smalles at the apex and around the RV (where one usually enters with a needle).  Thus would not tap at this point.  Echo tomorrow AM and tap in increasing.  Follow symptoms for now.  Continue DAPT.  2. Follow CBC, GI consulted already with no procedures, xfuse as needed with HD  3. HD per nephrology     He needs to stay given the finding of a new pericardial effusion.  FU echo tomrrow AM.        Subjective:  The patient denies any chest pain or dyspnea at this time. Ambulating. No complaints.     Objective:  /64 (BP Location: Right arm)   Pulse 72   Temp 98.1 °F (36.7 °C) (Oral)   Resp 12   Ht 71\"   Wt 219 lb 9.3 oz (99.6 kg)   SpO2 95%   BMI 30.62 kg/m²   Temp (24hrs), Av.1 °F (36.7 °C), Min:97 °F (36.1 °C), Max:99.4 °F (37.4 °C)        Intake/Output Summary (Last 24 hours) at 4/3/2024 1228  Last data filed at 4/3/2024 1158      Gross per 24 hour   Intake 960 ml   Output 2000 ml   Net -1040 ml          Wt Readings from Last 3 Encounters:   24 219 lb 9.3 oz (99.6 kg)   24 223 lb 1.6 oz (101.2 kg)   23 204 lb 6.4 oz (92.7 kg)         General: Awake and alert; in no acute distress  Cardiac: Regular rate and regular rhythm; no murmurs/rubs/gallops are appreciated  Lungs: Clear to auscultation bilaterally; no accessory muscle use  Abdomen: Soft, non-tender; bowel sounds are normoactive  Extremities: No clubbing/cyanosis; moves all 4 extremities normally              Laboratory Data:        Lab Results   Component Value Date     WBC 6.6 04/03/2024     HGB 8.8 04/03/2024     HCT 26.5 04/03/2024     PLT 99.0 04/03/2024            Lab Results   Component Value Date     INR 1.13 03/31/2024     INR 1.04 03/17/2024     INR 1.15 04/04/2023            Lab Results   Component Value Date      04/03/2024     K 4.0 04/03/2024     CL 99 04/03/2024     CO2 25.0 04/03/2024     BUN 48 04/03/2024     CREATSERUM 6.70 04/03/2024      04/03/2024     CA 8.8 04/03/2024         Telemetry: No significant arrhythmias.        Thank you for allowing our practice to participate in the care of your patient. Please do not hesitate to contact me if you have any questions.     Satya Donald MD                     Electronically signed by Satya Donald MD at 4/3/2024 12:32 PM      Assessment and Plan:     1) ESRD- due to longstanding DM 2; on HD since 7/21. Lytes / volume OK- HD today per usual routine     2) CAD with preserved EF- s/p PCI / stents LAD + OM 3/24 (Dr. Donald)      3) h/o hep C with cirrhosis / varices- recent US / doppler unremarkable     4) DM 2 / HTN     5) Anemia of CKD / chronic disease- on EPO with HD     6) GI blood loss- no melena x 18 months off plavix, but required multiple transfusions while on plavix 2 yrs ago. Tx 1 unit PRBC with HD today     D/W Dr. Stover. Hgb relatively stable; no BM yest and 1 BM this AM \"turning brown\"; consider holding off on further intervention. Also- consider DC abx. CXR relatively unimpressive, no WBC / cough / SOB, afebrile, etc.         Subjective:  Awake alert in pretty good spirits     Physical Exam:   BP  125/58 (BP Location: Right arm)   Pulse 71   Temp 97.9 °F (36.6 °C) (Oral)   Resp 14   Ht 5' 11\" (1.803 m)   Wt 223 lb 8.7 oz (101.4 kg)   SpO2 92%   BMI 31.18 kg/m²   Temp (24hrs), Av.3 °F (36.8 °C), Min:97.9 °F (36.6 °C), Max:98.6 °F (37 °C)        Intake/Output Summary (Last 24 hours) at 2024 0921  Last data filed at 2024 0826      Gross per 24 hour   Intake 1430 ml   Output 0 ml   Net 1430 ml          Wt Readings from Last 3 Encounters:   24 223 lb 8.7 oz (101.4 kg)   24 223 lb 1.6 oz (101.2 kg)   23 204 lb 6.4 oz (92.7 kg)      General: awake alert  HEENT: No scleral icterus, MMM  Neck: Supple, no JADE or thyromegaly  Cardiac: Regular rate and rhythm, S1, S2 normal, no murmur or tub  Lungs: Decreased BS at bases bilaterally   Abdomen: Soft, non-tender. + bowel sounds, no palpable organomegaly  Extremities: Without clubbing, cyanosis; no edema  Neurologic: Cranial nerves grossly intact, moving all extremities  Skin: Warm and dry, no rashes        Labs:         Lab Results   Component Value Date     WBC 8.0 2024     HGB 7.7 2024     HCT 22.9 2024     PLT 90.0 2024     CREATSERUM 8.97 2024     BUN 79 2024      2024     K 4.3 2024     CL 94 2024     CO2 26.0 2024      2024     CA 8.7 2024     PGLU 164 2024         Imaging:  All imaging studies reviewed.     Meds:               Questions/concerns were discussed with patient and/or family by bedside.              Durga Fenrando MD  2024  921 AM

## 2024-04-16 ENCOUNTER — APPOINTMENT (OUTPATIENT)
Dept: GENERAL RADIOLOGY | Facility: HOSPITAL | Age: 79
End: 2024-04-16
Attending: EMERGENCY MEDICINE
Payer: MEDICARE

## 2024-04-16 ENCOUNTER — HOSPITAL ENCOUNTER (INPATIENT)
Facility: HOSPITAL | Age: 79
LOS: 1 days | Discharge: HOME | End: 2024-04-17
Attending: EMERGENCY MEDICINE | Admitting: INTERNAL MEDICINE
Payer: MEDICARE

## 2024-04-16 DIAGNOSIS — N18.6 ESRD (END STAGE RENAL DISEASE) ON DIALYSIS (HCC): ICD-10-CM

## 2024-04-16 DIAGNOSIS — Z99.2 ESRD (END STAGE RENAL DISEASE) ON DIALYSIS (HCC): ICD-10-CM

## 2024-04-16 DIAGNOSIS — R07.9 CHEST PAIN OF UNCERTAIN ETIOLOGY: Primary | ICD-10-CM

## 2024-04-16 LAB
ALBUMIN SERPL-MCNC: 3.2 G/DL (ref 3.4–5)
ALBUMIN/GLOB SERPL: 0.7 {RATIO} (ref 1–2)
ALP LIVER SERPL-CCNC: 218 U/L
ALT SERPL-CCNC: 28 U/L
ANION GAP SERPL CALC-SCNC: 9 MMOL/L (ref 0–18)
APTT PPP: 32.4 SECONDS (ref 23.3–35.6)
AST SERPL-CCNC: 26 U/L (ref 15–37)
BASOPHILS # BLD AUTO: 0.04 X10(3) UL (ref 0–0.2)
BASOPHILS NFR BLD AUTO: 0.8 %
BILIRUB SERPL-MCNC: 0.7 MG/DL (ref 0.1–2)
BUN BLD-MCNC: 38 MG/DL (ref 9–23)
CALCIUM BLD-MCNC: 9 MG/DL (ref 8.5–10.1)
CHLORIDE SERPL-SCNC: 97 MMOL/L (ref 98–112)
CO2 SERPL-SCNC: 31 MMOL/L (ref 21–32)
CREAT BLD-MCNC: 4.37 MG/DL
EGFRCR SERPLBLD CKD-EPI 2021: 13 ML/MIN/1.73M2 (ref 60–?)
EOSINOPHIL # BLD AUTO: 0.21 X10(3) UL (ref 0–0.7)
EOSINOPHIL NFR BLD AUTO: 4.1 %
ERYTHROCYTE [DISTWIDTH] IN BLOOD BY AUTOMATED COUNT: 16.4 %
FLUAV + FLUBV RNA SPEC NAA+PROBE: NEGATIVE
FLUAV + FLUBV RNA SPEC NAA+PROBE: NEGATIVE
GLOBULIN PLAS-MCNC: 4.3 G/DL (ref 2.8–4.4)
GLUCOSE BLD-MCNC: 197 MG/DL (ref 70–99)
GLUCOSE BLD-MCNC: 317 MG/DL (ref 70–99)
HCT VFR BLD AUTO: 31 %
HGB BLD-MCNC: 10 G/DL
IMM GRANULOCYTES # BLD AUTO: 0.02 X10(3) UL (ref 0–1)
IMM GRANULOCYTES NFR BLD: 0.4 %
INR BLD: 1.1 (ref 0.8–1.2)
LYMPHOCYTES # BLD AUTO: 0.78 X10(3) UL (ref 1–4)
LYMPHOCYTES NFR BLD AUTO: 15.2 %
MCH RBC QN AUTO: 30.8 PG (ref 26–34)
MCHC RBC AUTO-ENTMCNC: 32.3 G/DL (ref 31–37)
MCV RBC AUTO: 95.4 FL
MONOCYTES # BLD AUTO: 0.51 X10(3) UL (ref 0.1–1)
MONOCYTES NFR BLD AUTO: 9.9 %
NEUTROPHILS # BLD AUTO: 3.58 X10 (3) UL (ref 1.5–7.7)
NEUTROPHILS # BLD AUTO: 3.58 X10(3) UL (ref 1.5–7.7)
NEUTROPHILS NFR BLD AUTO: 69.6 %
OSMOLALITY SERPL CALC.SUM OF ELEC: 299 MOSM/KG (ref 275–295)
PLATELET # BLD AUTO: 101 10(3)UL (ref 150–450)
POTASSIUM SERPL-SCNC: 3.5 MMOL/L (ref 3.5–5.1)
PROT SERPL-MCNC: 7.5 G/DL (ref 6.4–8.2)
PROTHROMBIN TIME: 14.2 SECONDS (ref 11.6–14.8)
RBC # BLD AUTO: 3.25 X10(6)UL
RSV RNA SPEC NAA+PROBE: NEGATIVE
SARS-COV-2 RNA RESP QL NAA+PROBE: NOT DETECTED
SODIUM SERPL-SCNC: 137 MMOL/L (ref 136–145)
TROPONIN I SERPL HS-MCNC: 30 NG/L
TROPONIN I SERPL HS-MCNC: 34 NG/L
WBC # BLD AUTO: 5.1 X10(3) UL (ref 4–11)

## 2024-04-16 PROCEDURE — 71045 X-RAY EXAM CHEST 1 VIEW: CPT | Performed by: EMERGENCY MEDICINE

## 2024-04-16 RX ORDER — METOPROLOL SUCCINATE 50 MG/1
50 TABLET, EXTENDED RELEASE ORAL
Status: DISCONTINUED | OUTPATIENT
Start: 2024-04-17 | End: 2024-04-17

## 2024-04-16 RX ORDER — SEVELAMER CARBONATE 800 MG/1
800 TABLET, FILM COATED ORAL
Status: DISCONTINUED | OUTPATIENT
Start: 2024-04-17 | End: 2024-04-17

## 2024-04-16 RX ORDER — LOSARTAN POTASSIUM 50 MG/1
50 TABLET ORAL DAILY
Status: DISCONTINUED | OUTPATIENT
Start: 2024-04-16 | End: 2024-04-17

## 2024-04-16 RX ORDER — PANTOPRAZOLE SODIUM 40 MG/1
40 TABLET, DELAYED RELEASE ORAL
Status: DISCONTINUED | OUTPATIENT
Start: 2024-04-17 | End: 2024-04-17

## 2024-04-16 RX ORDER — NICOTINE POLACRILEX 4 MG
15 LOZENGE BUCCAL
Status: DISCONTINUED | OUTPATIENT
Start: 2024-04-16 | End: 2024-04-17

## 2024-04-16 RX ORDER — AMIODARONE HYDROCHLORIDE 200 MG/1
200 TABLET ORAL DAILY
Status: DISCONTINUED | OUTPATIENT
Start: 2024-04-16 | End: 2024-04-17

## 2024-04-16 RX ORDER — ACETAMINOPHEN 325 MG/1
650 TABLET ORAL EVERY 4 HOURS PRN
Status: DISCONTINUED | OUTPATIENT
Start: 2024-04-16 | End: 2024-04-17

## 2024-04-16 RX ORDER — CLOPIDOGREL BISULFATE 75 MG/1
75 TABLET ORAL DAILY
Status: DISCONTINUED | OUTPATIENT
Start: 2024-04-17 | End: 2024-04-17

## 2024-04-16 RX ORDER — HYDROCODONE BITARTRATE AND ACETAMINOPHEN 5; 325 MG/1; MG/1
2 TABLET ORAL EVERY 4 HOURS PRN
Status: DISCONTINUED | OUTPATIENT
Start: 2024-04-16 | End: 2024-04-17

## 2024-04-16 RX ORDER — BISACODYL 10 MG
10 SUPPOSITORY, RECTAL RECTAL
Status: DISCONTINUED | OUTPATIENT
Start: 2024-04-16 | End: 2024-04-17

## 2024-04-16 RX ORDER — NITROGLYCERIN 0.4 MG/1
0.4 TABLET SUBLINGUAL EVERY 5 MIN PRN
Status: DISCONTINUED | OUTPATIENT
Start: 2024-04-16 | End: 2024-04-17

## 2024-04-16 RX ORDER — ASPIRIN 81 MG/1
81 TABLET ORAL EVERY OTHER DAY
Status: DISCONTINUED | OUTPATIENT
Start: 2024-04-17 | End: 2024-04-17

## 2024-04-16 RX ORDER — ROSUVASTATIN CALCIUM 10 MG/1
10 TABLET, COATED ORAL NIGHTLY
Status: DISCONTINUED | OUTPATIENT
Start: 2024-04-16 | End: 2024-04-17

## 2024-04-16 RX ORDER — POLYETHYLENE GLYCOL 3350 17 G/17G
17 POWDER, FOR SOLUTION ORAL DAILY PRN
Status: DISCONTINUED | OUTPATIENT
Start: 2024-04-16 | End: 2024-04-17

## 2024-04-16 RX ORDER — ENOXAPARIN SODIUM 100 MG/ML
40 INJECTION SUBCUTANEOUS DAILY
Status: DISCONTINUED | OUTPATIENT
Start: 2024-04-17 | End: 2024-04-17 | Stop reason: DRUGHIGH

## 2024-04-16 RX ORDER — DEXTROSE MONOHYDRATE 25 G/50ML
50 INJECTION, SOLUTION INTRAVENOUS
Status: DISCONTINUED | OUTPATIENT
Start: 2024-04-16 | End: 2024-04-17

## 2024-04-16 RX ORDER — ACETAMINOPHEN 500 MG
500 TABLET ORAL EVERY 4 HOURS PRN
Status: DISCONTINUED | OUTPATIENT
Start: 2024-04-16 | End: 2024-04-17

## 2024-04-16 RX ORDER — INSULIN DEGLUDEC 100 U/ML
10 INJECTION, SOLUTION SUBCUTANEOUS NIGHTLY
Status: DISCONTINUED | OUTPATIENT
Start: 2024-04-16 | End: 2024-04-17

## 2024-04-16 RX ORDER — LEVOTHYROXINE SODIUM 0.15 MG/1
150 TABLET ORAL
Status: DISCONTINUED | OUTPATIENT
Start: 2024-04-17 | End: 2024-04-17

## 2024-04-16 RX ORDER — METOCLOPRAMIDE HYDROCHLORIDE 5 MG/ML
5 INJECTION INTRAMUSCULAR; INTRAVENOUS EVERY 8 HOURS PRN
Status: DISCONTINUED | OUTPATIENT
Start: 2024-04-16 | End: 2024-04-17

## 2024-04-16 RX ORDER — HYDROCODONE BITARTRATE AND ACETAMINOPHEN 5; 325 MG/1; MG/1
1 TABLET ORAL EVERY 4 HOURS PRN
Status: DISCONTINUED | OUTPATIENT
Start: 2024-04-16 | End: 2024-04-17

## 2024-04-16 RX ORDER — NICOTINE POLACRILEX 4 MG
30 LOZENGE BUCCAL
Status: DISCONTINUED | OUTPATIENT
Start: 2024-04-16 | End: 2024-04-17

## 2024-04-16 RX ORDER — ZOLPIDEM TARTRATE 5 MG/1
10 TABLET ORAL NIGHTLY PRN
Status: DISCONTINUED | OUTPATIENT
Start: 2024-04-16 | End: 2024-04-17

## 2024-04-16 RX ORDER — SENNOSIDES 8.6 MG
17.2 TABLET ORAL NIGHTLY PRN
Status: DISCONTINUED | OUTPATIENT
Start: 2024-04-16 | End: 2024-04-17

## 2024-04-16 RX ORDER — ONDANSETRON 2 MG/ML
4 INJECTION INTRAMUSCULAR; INTRAVENOUS EVERY 6 HOURS PRN
Status: DISCONTINUED | OUTPATIENT
Start: 2024-04-16 | End: 2024-04-17

## 2024-04-16 NOTE — ED PROVIDER NOTES
Patient Seen in: [facility] Emergency Department      History     Chief Complaint   Patient presents with    Chest Pain Angina     Stated Complaint: cp recent stent plcement    Subjective:   HPI    79-year-old male with a past medical history as below including hypertension, CAD status post recent stents, ESRD on HD presents with left-sided chest pain for the past 2 days.  Patient states the pain is dull/achy, constant and worse when he lays on that side and with coughing.  Son states he had pneumonia last month treated with antibiotics and the cough had resolved but returned 2 days ago.  He denies any other cold symptoms.  He denies feeling short of breath.    Objective:   Past Medical History:    Calculus of kidney    Coronary atherosclerosis    bare metal stents at [facility] Jan 2021    Diabetes (HCC)    Disorder of thyroid    Esophageal varices without bleeding, unspecified esophageal varices type (HCC)    Hepatitis, unspecified    hepatitis C-just completed taking Harvoni in November 2016    High blood pressure    Malignant neoplasm of trigone of urinary bladder (HCC)    Malignant neoplasm of urinary bladder, unspecified site (HCC)    Malignant neoplasm of urinary bladder, unspecified site (HCC)    Renal disorder    Visual impairment    reading glasses              Past Surgical History:   Procedure Laterality Date    Angiogram  08/15/2017    small caliber RCA with 80% mid lesion.  LAD and left circumflex with 50% lesions.  LV shows inferobasilar aneurysm with scar.  Overall LV function preserved at the exterior 65%.    Cath bare metal stent (bms)      Other surgical history      Upper GI surgery    Other surgical history  12/11/2017    Caroline Lees    Other surgical history  07/18/2019    BTS Cysto Dr. Lees     Other surgical history  02/06/2020    BTS Cysto (Dr. Lees)                Social History     Socioeconomic History    Marital status:    Tobacco Use    Smoking status: Former      Current packs/day: 0.00     Average packs/day: 0.5 packs/day for 15.0 years (7.5 ttl pk-yrs)     Types: Cigarettes     Start date: 1965     Quit date: 1980     Years since quittin.2    Smokeless tobacco: Never    Tobacco comments:     quit at age 34   Vaping Use    Vaping status: Never Used   Substance and Sexual Activity    Alcohol use: No    Drug use: No     Social Determinants of Health     Food Insecurity: No Food Insecurity (3/31/2024)    Food Insecurity     Food Insecurity: Never true   Transportation Needs: No Transportation Needs (3/31/2024)    Transportation Needs     Lack of Transportation: No   Housing Stability: Low Risk  (3/31/2024)    Housing Stability     Housing Instability: No              Review of Systems    Positive for stated complaint: cp recent stent plcement  Other systems are as noted in HPI.  Constitutional and vital signs reviewed.      All other systems reviewed and negative except as noted above.    Physical Exam     ED Triage Vitals [24 1651]   /72   Pulse 75   Resp 18   Temp 98.7 °F (37.1 °C)   Temp src    SpO2 96 %   O2 Device None (Room air)       Current:/75   Pulse 71   Temp 98.7 °F (37.1 °C)   Resp 11   SpO2 99%         Physical Exam  Vitals and nursing note reviewed.   Constitutional:       General: He is not in acute distress.     Appearance: He is well-developed. He is not ill-appearing.   HENT:      Head: Normocephalic and atraumatic.      Mouth/Throat:      Mouth: Mucous membranes are moist.   Eyes:      General: No scleral icterus.     Extraocular Movements: Extraocular movements intact.   Cardiovascular:      Rate and Rhythm: Normal rate and regular rhythm.   Pulmonary:      Effort: Pulmonary effort is normal.      Breath sounds: Normal breath sounds.   Chest:      Chest wall: Tenderness present.   Abdominal:      Palpations: Abdomen is soft.      Tenderness: There is no abdominal tenderness.   Musculoskeletal:      Cervical back: Neck  supple.      Right lower leg: No edema.      Left lower leg: No edema.   Skin:     General: Skin is warm and dry.      Capillary Refill: Capillary refill takes less than 2 seconds.   Neurological:      Mental Status: He is alert and oriented to person, place, and time.      GCS: GCS eye subscore is 4. GCS verbal subscore is 5. GCS motor subscore is 6.   Psychiatric:         Mood and Affect: Mood normal.         Behavior: Behavior normal.               ED Course     Labs Reviewed   COMP METABOLIC PANEL (14) - Abnormal; Notable for the following components:       Result Value    Glucose 197 (*)     Chloride 97 (*)     BUN 38 (*)     Creatinine 4.37 (*)     Calculated Osmolality 299 (*)     eGFR-Cr 13 (*)     Alkaline Phosphatase 218 (*)     Albumin 3.2 (*)     A/G Ratio 0.7 (*)     All other components within normal limits   CBC W/ DIFFERENTIAL - Abnormal; Notable for the following components:    RBC 3.25 (*)     HGB 10.0 (*)     HCT 31.0 (*)     .0 (*)     Lymphocyte Absolute 0.78 (*)     All other components within normal limits   TROPONIN I HIGH SENSITIVITY - Normal   PROTHROMBIN TIME (PT) - Normal   PTT, ACTIVATED - Normal   SARS-COV-2/FLU A AND B/RSV BY PCR (GENEXPERT) - Normal    Narrative:     This test is intended for the qualitative detection and differentiation of SARS-CoV-2, influenza A, influenza B, and respiratory syncytial virus (RSV) viral RNA in nasopharyngeal or nares swabs from individuals suspected of respiratory viral infection consistent with COVID-19 by their healthcare provider. Signs and symptoms of respiratory viral infection due to SARS-CoV-2, influenza, and RSV can be similar.    Test performed using the Xpert Xpress SARS-CoV-2/FLU/RSV (real time RT-PCR)  assay on the [employer] instrument, [employer], [place] [address].   This test is being used under the Food and Drug Administration's Emergency Use Authorization.    The authorized Fact Sheet for Healthcare Providers for this assay is  available upon request from the laboratory.   CBC WITH DIFFERENTIAL WITH PLATELET    Narrative:     The following orders were created for panel order CBC With Differential With Platelet.  Procedure                               Abnormality         Status                     ---------                               -----------         ------                     CBC W/ DIFFERENTIAL[[id number]]          Abnormal            Final result                 Please view results for these tests on the individual orders.   RAINBOW DRAW BLUE     EKG    Rate, intervals and axes as noted on EKG Report.  Rate: 77  Rhythm: Sinus Rhythm  Reading: Normal sinus rhythm, nonspecific intraventricular conduction delay, nonspecific ST/T wave changes                 XR CHEST AP PORTABLE  (CPT=71045)    Result Date: 4/16/2024  CONCLUSION:  There is no evidence of active cardiopulmonary disease on this single portable chest radiograph.   LOCATION:  [facility]      Dictated by (CST): Humberto Altman MD on 4/16/2024 at 5:42 PM     Finalized by (CST): Humberto Altman MD on 4/16/2024 at 5:43 PM               MDM   79-year-old male with a past medical history as below including hypertension, CAD status post recent stents, ESRD on HD presents with left-sided chest pain for the past 2 days that is worse with cough and laying on the left side.    Differential includes but is not limited to pneumonia, viral respiratory infection, pneumothorax, ACS    Discussed with cardiology Dr. Rivera reviewed EKG.  There is no criteria for STEMI.  Labs are pending, recommends admission due to recent stents.      Labs show ESRD with normal electrolytes.  Troponin is normal.  Patient has chronic anemia with hemoglobin improved from previous.  COVID/flu/RSV negative.    Independent trepidation of chest x-ray shows no evidence of pneumonia or pneumothorax.  Radiology report reviewed as above.    Will admit for further management.  Discussed with cardiology Dr. Vale and  hospitalist Dr. Perry.      Medical Decision Making  Amount and/or Complexity of Data Reviewed  Labs: ordered. Decision-making details documented in ED Course.  Radiology: ordered and independent interpretation performed. Decision-making details documented in ED Course.  ECG/medicine tests: ordered and independent interpretation performed. Decision-making details documented in ED Course.  Discussion of management or test interpretation with external provider(s): Cardiology, hospitalist    Risk  Decision regarding hospitalization.        Disposition and Plan     Clinical Impression:  1. Chest pain of uncertain etiology    2. ESRD (end stage renal disease) on dialysis ([facility])         Disposition:  Admit  4/16/2024  7:38 pm    Follow-up:  No follow-up provider specified.        Medications Prescribed:  Current Discharge Medication List                            Hospital Problems       Present on Admission  Date Reviewed: 3/19/2024            ICD-10-CM Noted POA    * (Principal) Chest pain of uncertain etiology R07.9 4/5/2023 Unknown

## 2024-04-16 NOTE — ED INITIAL ASSESSMENT (HPI)
A&Ox3 patient p/w L sided chest pain since last night    Patient had 3 cardiac stents placed 3 weeks ago    Reports noticing aching pain last night while laying on L side, no relief of pain since  +dry cough x2 weeks    Receives HD T/Th/Sa    Denies any sob/n/v/d/c/f/LH/vision changes/urinary sx at this time    RR even/NL, speaking in full clear sentences

## 2024-04-17 ENCOUNTER — APPOINTMENT (OUTPATIENT)
Dept: CV DIAGNOSTICS | Facility: HOSPITAL | Age: 79
End: 2024-04-17
Attending: INTERNAL MEDICINE
Payer: MEDICARE

## 2024-04-17 VITALS
DIASTOLIC BLOOD PRESSURE: 67 MMHG | HEART RATE: 74 BPM | WEIGHT: 210.56 LBS | OXYGEN SATURATION: 95 % | BODY MASS INDEX: 29 KG/M2 | RESPIRATION RATE: 19 BRPM | TEMPERATURE: 98 F | SYSTOLIC BLOOD PRESSURE: 146 MMHG

## 2024-04-17 PROBLEM — R05.9 COUGH: Status: ACTIVE | Noted: 2024-04-17

## 2024-04-17 LAB
ANION GAP SERPL CALC-SCNC: 9 MMOL/L (ref 0–18)
ATRIAL RATE: 144 BPM
ATRIAL RATE: 77 BPM
BUN BLD-MCNC: 48 MG/DL (ref 9–23)
CALCIUM BLD-MCNC: 8.7 MG/DL (ref 8.5–10.1)
CHLORIDE SERPL-SCNC: 100 MMOL/L (ref 98–112)
CO2 SERPL-SCNC: 29 MMOL/L (ref 21–32)
CREAT BLD-MCNC: 5.26 MG/DL
EGFRCR SERPLBLD CKD-EPI 2021: 11 ML/MIN/1.73M2 (ref 60–?)
ERYTHROCYTE [DISTWIDTH] IN BLOOD BY AUTOMATED COUNT: 16.8 %
GLUCOSE BLD-MCNC: 155 MG/DL (ref 70–99)
GLUCOSE BLD-MCNC: 162 MG/DL (ref 70–99)
GLUCOSE BLD-MCNC: 163 MG/DL (ref 70–99)
GLUCOSE BLD-MCNC: 169 MG/DL (ref 70–99)
HCT VFR BLD AUTO: 28.8 %
HGB BLD-MCNC: 9.6 G/DL
MAGNESIUM SERPL-MCNC: 1.6 MG/DL (ref 1.6–2.6)
MAGNESIUM SERPL-MCNC: 2 MG/DL (ref 1.6–2.6)
MCH RBC QN AUTO: 31.7 PG (ref 26–34)
MCHC RBC AUTO-ENTMCNC: 33.3 G/DL (ref 31–37)
MCV RBC AUTO: 95 FL
OSMOLALITY SERPL CALC.SUM OF ELEC: 302 MOSM/KG (ref 275–295)
P AXIS: 41 DEGREES
P-R INTERVAL: 180 MS
PLATELET # BLD AUTO: 99 10(3)UL (ref 150–450)
POTASSIUM SERPL-SCNC: 3.4 MMOL/L (ref 3.5–5.1)
POTASSIUM SERPL-SCNC: 3.4 MMOL/L (ref 3.5–5.1)
POTASSIUM SERPL-SCNC: 3.5 MMOL/L (ref 3.5–5.1)
Q-T INTERVAL: 366 MS
Q-T INTERVAL: 396 MS
QRS DURATION: 118 MS
QRS DURATION: 124 MS
QTC CALCULATION (BEZET): 448 MS
QTC CALCULATION (BEZET): 534 MS
R AXIS: -29 DEGREES
R AXIS: -4 DEGREES
RBC # BLD AUTO: 3.03 X10(6)UL
SODIUM SERPL-SCNC: 138 MMOL/L (ref 136–145)
T AXIS: 129 DEGREES
T AXIS: 141 DEGREES
VENTRICULAR RATE: 128 BPM
VENTRICULAR RATE: 77 BPM
WBC # BLD AUTO: 3.8 X10(3) UL (ref 4–11)

## 2024-04-17 PROCEDURE — 93306 TTE W/DOPPLER COMPLETE: CPT | Performed by: INTERNAL MEDICINE

## 2024-04-17 PROCEDURE — 99222 1ST HOSP IP/OBS MODERATE 55: CPT | Performed by: INTERNAL MEDICINE

## 2024-04-17 RX ORDER — HEPARIN SODIUM 5000 [USP'U]/ML
5000 INJECTION, SOLUTION INTRAVENOUS; SUBCUTANEOUS EVERY 8 HOURS SCHEDULED
Status: DISCONTINUED | OUTPATIENT
Start: 2024-04-17 | End: 2024-04-17

## 2024-04-17 NOTE — ED QUICK NOTES
Assumed care, alert, sitting up on cart with family at bedside., resps easy, denies chest pain at this time.

## 2024-04-17 NOTE — PLAN OF CARE
Received patient at change of shift, around 0730.    Patient A&Ox 4. Maintaining adequate oxygen saturations >90% on room air. Normal sinus rhythm on tele monitor. VSS. Patient denies any pain at this time. Nephrology consulted. Patient states he experiences a cough with Losartan, and refused morning dose. MD aware.     Updated patient with plan of care, and verbalized understanding. Bed locked in lowest position. Call light and personal belongings within reach. All needs met at this time.     Problem: CARDIOVASCULAR - ADULT  Goal: Maintains optimal cardiac output and hemodynamic stability  Description: INTERVENTIONS:  - Monitor vital signs, rhythm, and trends  - Monitor for bleeding, hypotension and signs of decreased cardiac output  - Evaluate effectiveness of vasoactive medications to optimize hemodynamic stability  - Monitor arterial and/or venous puncture sites for bleeding and/or hematoma  - Assess quality of pulses, skin color and temperature  - Assess for signs of decreased coronary artery perfusion - ex. Angina  - Evaluate fluid balance, assess for edema, trend weights  Outcome: Progressing  Goal: Absence of cardiac arrhythmias or at baseline  Description: INTERVENTIONS:  - Continuous cardiac monitoring, monitor vital signs, obtain 12 lead EKG if indicated  - Evaluate effectiveness of antiarrhythmic and heart rate control medications as ordered  - Initiate emergency measures for life threatening arrhythmias  - Monitor electrolytes and administer replacement therapy as ordered  Outcome: Progressing     Problem: PAIN - ADULT  Goal: Verbalizes/displays adequate comfort level or patient's stated pain goal  Description: INTERVENTIONS:  - Encourage pt to monitor pain and request assistance  - Assess pain using appropriate pain scale  - Administer analgesics based on type and severity of pain and evaluate response  - Implement non-pharmacological measures as appropriate and evaluate response  - Consider cultural  and social influences on pain and pain management  - Manage/alleviate anxiety  - Utilize distraction and/or relaxation techniques  - Monitor for opioid side effects  - Notify MD/LIP if interventions unsuccessful or patient reports new pain  - Anticipate increased pain with activity and pre-medicate as appropriate  Outcome: Progressing     Problem: SAFETY ADULT - FALL  Goal: Free from fall injury  Description: INTERVENTIONS:  - Assess pt frequently for physical needs  - Identify cognitive and physical deficits and behaviors that affect risk of falls.  - [place] fall precautions as indicated by assessment.  - Educate pt/family on patient safety including physical limitations  - Instruct pt to call for assistance with activity based on assessment  - Modify environment to reduce risk of injury  - Provide assistive devices as appropriate  - Consider OT/PT consult to assist with strengthening/mobility  - Encourage toileting schedule  Outcome: Progressing

## 2024-04-17 NOTE — CONSULTS
[facility]  Report of Consultation    Jonny Haque Patient Status:  Inpatient    4/15/1945 MRN [id number]   Location [facility] 8NE-A Attending Louie Perry, DO   Hosp Day # 1 PCP Gee Jimenez MD       Assessment / Plan:    1) ESRD- due to longstanding DM 2; on HD since . Lytes / volume OK. Next HD Thurs per usual routine     2) CAD with preserved EF- s/p PCI / stents LAD + OM 3/24 (Dr. Donald)      3) h/o hep C with cirrhosis / varices- recent US / doppler unremarkable     4) DM 2 / HTN     5) Anemia of CKD / chronic disease- on EPO with HD     6) GI blood loss- no melena x 18 months off plavix, but required multiple transfusions while on plavix 2 yrs ago. Hgb stable since last admit     7) Dry cough / L chest discomfort- etiology unclear; ? Related to pericardial effusion ?; CXR clear; does not appear volume up; meds OK. Cards to see     D/W son at bedside      Reason for Consultation:  ESRD    History of Present Illness:  Jonny Haque is a a(n) 79 year old male.  Very pleasant 79-year-old male presents for evaluation of left-sided chest discomfort and associated cough.  This has been an on-and-off issue over the last couple weeks since his last admit where he was thought to have possible pneumonia.  Seemingly improved after dialysis yesterday although he did not have any fluid removal.  Meds otherwise unchanged; he feels this may be related to losartan.  Last dialysis was yesterday.    History:  Past Medical History:    Calculus of kidney    Coronary atherosclerosis    bare metal stents at [facility] 2021    Diabetes (HCC)    Disorder of thyroid    Esophageal varices without bleeding, unspecified esophageal varices type (HCC)    Hepatitis, unspecified    hepatitis C-just completed taking Harvoni in 2016    High blood pressure    Malignant neoplasm of trigone of urinary bladder (HCC)    Malignant neoplasm of urinary bladder, unspecified site (HCC)    Malignant neoplasm of  urinary bladder, unspecified site (HCC)    Renal disorder    Visual impairment    reading glasses     Past Surgical History:   Procedure Laterality Date    Angiogram  08/15/2017    small caliber RCA with 80% mid lesion.  LAD and left circumflex with 50% lesions.  LV shows inferobasilar aneurysm with scar.  Overall LV function preserved at the exterior 65%.    Cath bare metal stent (bms)      Other surgical history      Upper GI surgery    Other surgical history  12/11/2017    Cysto Dr. Lees    Other surgical history  07/18/2019    BTS Cysto Dr. Lees     Other surgical history  02/06/2020    BTS Cysto (Dr. Lees)     Family History   Problem Relation Age of Onset    Cancer Father     Hypertension Mother      Denies family history of kidney disease.    reports that he quit smoking about 44 years ago. He started smoking about 59 years ago. He has a 7.5 pack-year smoking history. He has never used smokeless tobacco. He reports that he does not drink alcohol and does not use drugs.    Allergies:  No Known Allergies    Medications:    Current Facility-Administered Medications:     insulin aspart (NovoLOG) 100 Units/mL FlexPen 1-5 Units, 1-5 Units, Subcutaneous, TID CC and HS    heparin (Porcine) 5000 UNIT/ML injection 5,000 Units, 5,000 Units, Subcutaneous, Q8H ANNEL    amiodarone (Pacerone) tab 200 mg, 200 mg, Oral, Daily    aspirin DR tab 81 mg, 81 mg, Oral, QOD    clopidogrel (Plavix) tab 75 mg, 75 mg, Oral, Daily    insulin degludec 100 units/mL flextouch 10 Units, 10 Units, Subcutaneous, Nightly    insulin aspart (NovoLOG) 100 Units/mL FlexPen 10 Units, 10 Units, Subcutaneous, TID AC    levothyroxine (Synthroid) tab 150 mcg, 150 mcg, Oral, QAM AC    [Held by provider] losartan (Cozaar) tab 50 mg, 50 mg, Oral, Daily    metoprolol succinate ER (Toprol XL) 24 hr tab 50 mg, 50 mg, Oral, Daily Beta Blocker    nitroglycerin (Nitrostat) SL tab 0.4 mg, 0.4 mg, Sublingual, Q5 Min PRN    pantoprazole (Protonix) DR tab 40 mg, 40  mg, Oral, BID AC    rosuvastatin (Crestor) tab 10 mg, 10 mg, Oral, Nightly    sevelamer carbonate (Renvela) tab 800 mg, 800 mg, Oral, TID CC    zolpidem (Ambien) tab 10 mg, 10 mg, Oral, Nightly PRN    glucose (Dex4) 15 GM/59ML oral liquid 15 g, 15 g, Oral, Q15 Min PRN **OR** glucose (Glutose) 40% oral gel 15 g, 15 g, Oral, Q15 Min PRN **OR** glucose-vitamin C (Dex-4) chewable tab 4 tablet, 4 tablet, Oral, Q15 Min PRN **OR** dextrose 50% injection 50 mL, 50 mL, Intravenous, Q15 Min PRN **OR** glucose (Dex4) 15 GM/59ML oral liquid 30 g, 30 g, Oral, Q15 Min PRN **OR** glucose (Glutose) 40% oral gel 30 g, 30 g, Oral, Q15 Min PRN **OR** glucose-vitamin C (Dex-4) chewable tab 8 tablet, 8 tablet, Oral, Q15 Min PRN    acetaminophen (Tylenol Extra Strength) tab 500 mg, 500 mg, Oral, Q4H PRN    acetaminophen (Tylenol) tab 650 mg, 650 mg, Oral, Q4H PRN **OR** HYDROcodone-acetaminophen (Norco) 5-325 MG per tab 1 tablet, 1 tablet, Oral, Q4H PRN **OR** HYDROcodone-acetaminophen (Norco) 5-325 MG per tab 2 tablet, 2 tablet, Oral, Q4H PRN    polyethylene glycol (PEG 3350) (Miralax) 17 g oral packet 17 g, 17 g, Oral, Daily PRN    sennosides (Senokot) tab 17.2 mg, 17.2 mg, Oral, Nightly PRN    bisacodyl (Dulcolax) 10 MG rectal suppository 10 mg, 10 mg, Rectal, Daily PRN    ondansetron (Zofran) 4 MG/2ML injection 4 mg, 4 mg, Intravenous, Q6H PRN    metoclopramide (Reglan) 5 mg/mL injection 5 mg, 5 mg, Intravenous, Q8H PRN    Facility-Administered Medications Ordered in Other Encounters:     BCG Live 50 mg in sodium chloride 0.9 % 50 mL Intravesicle, 50 mg, Intravesical, Once  No current outpatient medications on file.       Review of Systems:  Please see HPI for pertinent positives. 10 point review of systems otherwise reviewed and negative.     Physical Exam:  /78 (BP Location: Right arm)   Pulse 72   Temp 98.5 °F (36.9 °C) (Oral)   Resp 19   Wt 210 lb 8.6 oz (95.5 kg)   SpO2 94%   BMI 29.36 kg/m²   Temp (24hrs), Av.7  °F (37.1 °C), Min:98.4 °F (36.9 °C), Max:99.3 °F (37.4 °C)       Intake/Output Summary (Last 24 hours) at 4/17/2024 1235  Last data filed at 4/17/2024 1000  Gross per 24 hour   Intake 30 ml   Output 150 ml   Net -120 ml     Wt Readings from Last 3 Encounters:   04/17/24 210 lb 8.6 oz (95.5 kg)   04/04/24 221 lb 5.5 oz (100.4 kg)   03/24/24 223 lb 1.6 oz (101.2 kg)     General: awake alert  HEENT: No scleral icterus, MMM  Neck: Supple, no JADE or thyromegaly  Cardiac: Regular rate and rhythm, S1, S2 normal, no murmur, rub, or gallop  Lungs: Decreased breath sounds at the bases bilaterally.   Abdomen: Soft, non-tender. + bowel sounds, no palpable organomegaly  Extremities: Without clubbing, cyanosis; no edema  Neurologic: Cranial nerves grossly intact, moving all extremities  Skin: Warm and dry, no rashes      Laboratory Data:  Lab Results   Component Value Date    WBC 3.8 04/17/2024    HGB 9.6 04/17/2024    HCT 28.8 04/17/2024    PLT 99.0 04/17/2024    CREATSERUM 5.26 04/17/2024    BUN 48 04/17/2024     04/17/2024    K 3.4 04/17/2024    K 3.4 04/17/2024     04/17/2024    CO2 29.0 04/17/2024     04/17/2024    CA 8.7 04/17/2024    ALB 3.2 04/16/2024    ALKPHO 218 04/16/2024    BILT 0.7 04/16/2024    TP 7.5 04/16/2024    AST 26 04/16/2024    ALT 28 04/16/2024    PTT 32.4 04/16/2024    INR 1.10 04/16/2024    PTP 14.2 04/16/2024    MG 2.0 04/17/2024    PGLU 162 04/17/2024       Imaging:  All imaging studies reviewed.      Thank you for allowing me to participate in the care of your patient.    Durga Fernando MD  4/17/2024  12:35 PM

## 2024-04-17 NOTE — ED QUICK NOTES
Orders for admission, patient is aware of plan and ready to go upstairs. Any questions, please call ED RN Penny at extension [phone].     Patient Covid vaccination status: Fully vaccinated     COVID Test Ordered in ED: SARS-CoV-2/Flu A and B/RSV by PCR (GeneXpert)    COVID Suspicion at Admission: N/A    Running Infusions:  None    Mental Status/LOC at time of transport: a/ox3    Other pertinent information:   CIWA score: N/A   NIH score:  N/A

## 2024-04-17 NOTE — PLAN OF CARE
PT A&OX4 RA, Afib RVR when arrived to unit (asymptomatic) then converted to NSR. Bilateral breath sound clear/diminished. Dry cough reported  Denied CP. Denied SOB. Pt reported BM before admission, active bowel sound, soft, rounded  Updated POC to pt, all needs meet at this time.   Call light within reach, safety precaution in place.  Stand by   Skin clean, dry, R arm tear/laceration. Skin checked with RN Susie    1152: notified Cardiologist when pt convert to Afib RVR/ VTACH 15 beats. EKG obtained, see in chart.  0021: clarified Hospitalist of pt home insuline doses, per pt he take 24 units Glargine at night. Hosp denied change dose at this time, will see what pt sugar are and adjust dose during day.      POC:   NPO  Labs  Card consult     Problem: CARDIOVASCULAR - ADULT  Goal: Maintains optimal cardiac output and hemodynamic stability  Description: INTERVENTIONS:  - Monitor vital signs, rhythm, and trends  - Monitor for bleeding, hypotension and signs of decreased cardiac output  - Evaluate effectiveness of vasoactive medications to optimize hemodynamic stability  - Monitor arterial and/or venous puncture sites for bleeding and/or hematoma  - Assess quality of pulses, skin color and temperature  - Assess for signs of decreased coronary artery perfusion - ex. Angina  - Evaluate fluid balance, assess for edema, trend weights  Outcome: Progressing  Goal: Absence of cardiac arrhythmias or at baseline  Description: INTERVENTIONS:  - Continuous cardiac monitoring, monitor vital signs, obtain 12 lead EKG if indicated  - Evaluate effectiveness of antiarrhythmic and heart rate control medications as ordered  - Initiate emergency measures for life threatening arrhythmias  - Monitor electrolytes and administer replacement therapy as ordered  Outcome: Progressing     Problem: PAIN - ADULT  Goal: Verbalizes/displays adequate comfort level or patient's stated pain goal  Description: INTERVENTIONS:  - Encourage pt to monitor  pain and request assistance  - Assess pain using appropriate pain scale  - Administer analgesics based on type and severity of pain and evaluate response  - Implement non-pharmacological measures as appropriate and evaluate response  - Consider cultural and social influences on pain and pain management  - Manage/alleviate anxiety  - Utilize distraction and/or relaxation techniques  - Monitor for opioid side effects  - Notify MD/LIP if interventions unsuccessful or patient reports new pain  - Anticipate increased pain with activity and pre-medicate as appropriate  Outcome: Progressing     Problem: SAFETY ADULT - FALL  Goal: Free from fall injury  Description: INTERVENTIONS:  - Assess pt frequently for physical needs  - Identify cognitive and physical deficits and behaviors that affect risk of falls.  - [place] fall precautions as indicated by assessment.  - Educate pt/family on patient safety including physical limitations  - Instruct pt to call for assistance with activity based on assessment  - Modify environment to reduce risk of injury  - Provide assistive devices as appropriate  - Consider OT/PT consult to assist with strengthening/mobility  - Encourage toileting schedule  Outcome: Progressing

## 2024-04-17 NOTE — H&P
[facility]  History and Physical     Jonny Haque Patient Status:  Inpatient    4/15/1945 MRN [id number]   Location [facility] 8NE-A Attending Louie Perry DO   Hosp Day # 1 PCP Gee Jimenez MD     Chief Complaint:   Chest pain    History of Present Illness: Jonny Haque is a 79 year old male with PMH sig for HTN, DL, DM2, Hypothyroidism, ESRD on HD, hx of cirrhosis, hx of GI bleed, ischemic CM, CAD w/ hx of PCI 3 wks ago presents to the ED with c/o chest pain.  Describes the pain as dull and achy. Located to left side of chest. In ED trop and EKG neg for acute changes.  Patient states this pain is similar to pain that he has had in the past.  Denies shortness of breath, episodes of diaphoresis.  No nausea or vomiting.  Cardiology was consulted and patient was admitted for further care and management.      Past Medical History:  Past Medical History:    Calculus of kidney    Coronary atherosclerosis    bare metal stents at [facility] 2021    Diabetes (HCC)    Disorder of thyroid    Esophageal varices without bleeding, unspecified esophageal varices type (HCC)    Hepatitis, unspecified    hepatitis C-just completed taking Harvoni in 2016    High blood pressure    Malignant neoplasm of trigone of urinary bladder (HCC)    Malignant neoplasm of urinary bladder, unspecified site (HCC)    Malignant neoplasm of urinary bladder, unspecified site (HCC)    Renal disorder    Visual impairment    reading glasses        Past Surgical History:   Past Surgical History:   Procedure Laterality Date    Angiogram  08/15/2017    small caliber RCA with 80% mid lesion.  LAD and left circumflex with 50% lesions.  LV shows inferobasilar aneurysm with scar.  Overall LV function preserved at the exterior 65%.    Cath bare metal stent (bms)      Other surgical history      Upper GI surgery    Other surgical history  2017    Cysto Dr. Lees    Other surgical history  2019    BTS Cysto Dr. Lees      Other surgical history  02/06/2020    BTS Cysto (Dr. Lees)       Social History:  reports that he quit smoking about 44 years ago. He started smoking about 59 years ago. He has a 7.5 pack-year smoking history. He has never used smokeless tobacco. He reports that he does not drink alcohol and does not use drugs.    Family History:   Family History   Problem Relation Age of Onset    Cancer Father     Hypertension Mother         Allergies: No Known Allergies    Medications:    Current Facility-Administered Medications on File Prior to Encounter   Medication Dose Route Frequency Provider Last Rate Last Admin    [COMPLETED] epoetin eunice (Epogen, Procrit) 97248 UNIT/ML injection 10,000 Units  10,000 Units Intravenous Once Durga Fernando MD   10,000 Units at 04/04/24 1052    [COMPLETED] epoetin eunice (Epogen, Procrit) 39517 UNIT/ML injection 10,000 Units  10,000 Units Intravenous Once Durga Fernando MD   10,000 Units at 04/02/24 1043    [COMPLETED] heparin (Porcine) 1000 UNIT/ML injection 3,000 Units  3,000 Units Intracatheter Once Durga Fernando MD   3,000 Units at 04/02/24 1130    [COMPLETED] cefTRIAXone (Rocephin) 2 g in D5W 100 mL IVPB-ADD  2 g Intravenous Once William Muñoz MD   Stopped at 03/31/24 1550    [COMPLETED] epoetin eunice (Epogen, Procrit) 24732 UNIT/ML injection 10,000 Units  10,000 Units Intravenous Once Durga Fernando MD   10,000 Units at 03/23/24 1407    [COMPLETED] iodixanol (VISIPAQUE) 320 MG/ML injection 100 mL  100 mL Injection ONCE PRN Satya Donald MD   240 mL at 03/22/24 1829    [COMPLETED] lidocaine PF (Xylocaine-MPF) 1 % injection             [COMPLETED] heparin (Porcine) 5000 UNIT/ML injection             [COMPLETED] fentaNYL (Sublimaze) 50 mcg/mL injection             [COMPLETED] midazolam (Versed) 2 MG/2ML injection             [COMPLETED] heparin (Porcine) 5000 UNIT/ML injection             [COMPLETED] heparin (Porcine) 1000 UNIT/ML injection             [COMPLETED] protamine 10 mg/mL  injection             [] sodium chloride 0.9% infusion   Intravenous Continuous Satya Donald MD 50 mL/hr at 24 1901 New Bag at 24 190    [COMPLETED] amiodarone (Cordarone) 150 mg in dextrose 5% 100 mL IV bolus  150 mg Intravenous Once Satya Donald  mL/hr at 24 1154 150 mg at 24 1154    [COMPLETED] amiodarone (Cordarone) 150 mg in dextrose 5% 100 mL IV bolus  150 mg Intravenous Once Satya Donald  mL/hr at 24 1505 150 mg at 24 1505    [COMPLETED] Sodium Zirconium Cyclosilicate (Lokelma) oral packet 5 g  5 g Oral Q8H Durga Fernando MD   5 g at 24 0851    [COMPLETED] epoetin eunice (Epogen, Procrit) 63700 UNIT/ML injection 10,000 Units  10,000 Units Intravenous Once Durga Fernando MD   10,000 Units at 24 1035    [COMPLETED] epoetin eunice (Epogen, Procrit) 70357 UNIT/ML injection 10,000 Units  10,000 Units Intravenous Once Durga Fernando MD   10,000 Units at 24 0955    [COMPLETED] lidocaine PF (Xylocaine-MPF) 1 % injection             [COMPLETED] heparin (Porcine) 5000 UNIT/ML injection             [COMPLETED] fentaNYL (Sublimaze) 50 mcg/mL injection             [COMPLETED] midazolam (Versed) 2 MG/2ML injection             [COMPLETED] midazolam (Versed) 2 MG/2ML injection             [COMPLETED] heparin (Porcine) 5000 UNIT/ML injection             [COMPLETED] iodixanol (VISIPAQUE) 320 MG/ML injection 100 mL  100 mL Injection ONCE PRN Satya Donald MD   260 mL at 24 1356    [COMPLETED] DOPamine in dextrose 5% (Intropin) 800 mg/250mL infusion premix             [COMPLETED] norepinephrine (Levophed) 1 mg/mL injection             [COMPLETED] heparin (Porcine) 5000 UNIT/ML injection             [COMPLETED] protamine 10 mg/mL injection             [COMPLETED] clopidogrel (Plavix) 75 MG tab             [] sodium chloride 0.9% infusion   Intravenous Continuous Satya Donald MD 50 mL/hr at 24 1514 New Bag at 24 1515     [COMPLETED] protamine 10 mg/mL injection 20 mg  20 mg Intravenous Once Satya Donald MD   20 mg at 03/19/24 1735    [COMPLETED] fentaNYL (Sublimaze) 50 mcg/mL injection             [COMPLETED] midazolam (Versed) 2 MG/2ML injection             [COMPLETED] aspirin chewable tab 324 mg  324 mg Oral Once Bharat Frias DO   324 mg at 03/17/24 2327    [COMPLETED] nitroglycerin (Nitrostat) SL tab 0.4 mg  0.4 mg Sublingual Once Bharat Frias DO   0.4 mg at 03/17/24 2327    [COMPLETED] iodixanol (VISIPAQUE) 320 MG/ML injection 100 mL  100 mL Injection ONCE PRN Satya Donald MD   90 mL at 03/18/24 0102    [COMPLETED] lidocaine PF (Xylocaine-MPF) 1 % injection             [COMPLETED] heparin (Porcine) 5000 UNIT/ML injection             BCG Live 50 mg in sodium chloride 0.9 % 50 mL Intravesicle  50 mg Intravesical Once Siva Lees MD         Current Outpatient Medications on File Prior to Encounter   Medication Sig Dispense Refill    amiodarone 200 MG Oral Tab Take 1 tablet (200 mg total) by mouth daily. 30 tablet 1    clopidogrel 75 MG Oral Tab Take 1 tablet (75 mg total) by mouth daily. 90 tablet 3    losartan 50 MG Oral Tab Take 1 tablet (50 mg total) by mouth daily. 90 tablet 3    metoprolol succinate ER 50 MG Oral Tablet 24 Hr Take 1 tablet (50 mg total) by mouth Daily Beta Blocker. 90 tablet 3    aspirin 81 MG Oral Tab EC Take 1 tablet (81 mg total) by mouth every other day.  0    levothyroxine 150 MCG Oral Tab Take 1 tablet (150 mcg total) by mouth every other day. Take one tablet every morning before breakfast      Ferric Citrate (AURYXIA) 1  MG(Fe) Oral Tab       pantoprazole 40 MG Oral Tab EC Take 1 tablet (40 mg total) by mouth 2 (two) times daily before meals.      Sevelamer 800 MG Oral Tab Take 1 tablet (800 mg total) by mouth 3 (three) times daily with meals.      nitroGLYCERIN 0.3 MG Sublingual SL Tab Place 1 tablet (0.3 mg total) under the tongue every 5 (five) minutes as needed for Chest pain (as needed  for chest pain).      zolpidem 10 MG Oral Tab 1 tablet (10 mg total) nightly as needed.      Insulin Glargine, 2 Unit Dial, (TOUJEO MAX SOLOSTAR) 300 UNIT/ML Subcutaneous Solution Pen-injector Inject 20 Units into the skin nightly. 6 mL 2    rosuvastatin 10 MG Oral Tab Take 1 tablet (10 mg total) by mouth nightly. (Patient taking differently: Take 1 tablet (10 mg total) by mouth nightly.) 90 tablet 0    Insulin Lispro, 1 Unit Dial, (HUMALOG KWIKPEN) 100 UNIT/ML Subcutaneous Solution Pen-injector 12 units before meals with sliding scale. Max daily dose: 50 units. (Patient taking differently: 10 Units. Three times a day. Before meals) 60 mL 3    [] cefadroxil 500 MG Oral Cap Take 1 capsule (500 mg total) by mouth 2 (two) times daily for 2 days. 4 capsule 0    [] doxycycline 100 MG Oral Cap Take 1 capsule (100 mg total) by mouth 2 (two) times daily for 3 days. 5 capsule 0    Insulin Pen Needle (TRUEPLUS PEN NEEDLES) 31G X 5 MM Does not apply Misc Use 5 per day 500 each 2    Glucose Blood (ONETOUCH ULTRA) In Vitro Strip 6 times a day 400 each 3    CONTOUR NEXT TEST In Vitro Strip TEST BLOOD SUGAR FOUR TIMES DAILY 400 strip 3    Blood Glucose Monitoring Suppl ([employer] CONTOUR NEXT MONITOR) W/DEVICE Does not apply Kit 1 Device by Does not apply route 4 (four) times daily. 1 kit 0       Review of Systems:   A comprehensive 14 point review of systems was completed.    Pertinent positives and negatives noted in the HPI.    Physical Exam:    /68 (BP Location: Left arm)   Pulse 77   Temp 98.4 °F (36.9 °C) (Oral)   Resp 18   Wt 210 lb 8.6 oz (95.5 kg)   SpO2 94%   BMI 29.36 kg/m²   General: No acute distress. Alert and oriented x 3.  HEENT: Normocephalic atraumatic. Moist mucous membranes. EOM-I. PERRLA. Anicteric.  Neck: No lymphadenopathy. No JVD. No carotid bruits.  Respiratory: Clear to auscultation bilaterally. No wheezes. No rhonchi.  Cardiovascular: S1, S2. Regular rate and rhythm. No murmurs,  rubs or gallops. Equal pulses.   Chest and Back: No tenderness or deformity.  Abdomen: Soft, nontender, nondistended.  Positive bowel sounds. No rebound, guarding or organomegaly.  Neurologic: No focal neurological deficits. CNII-XII grossly intact.  Musculoskeletal: Moves all extremities.  Extremities: No edema or cyanosis.  Integument: No rashes or lesions.   Psychiatric: Appropriate mood and affect.      Diagnostic Data:      Labs:  Recent Labs   Lab 04/16/24 1730 04/17/24  0436   WBC 5.1 3.8*   HGB 10.0* 9.6*   MCV 95.4 95.0   .0*  --    INR 1.10  --        Recent Labs   Lab 04/16/24 1730 04/16/24  2323 04/17/24  0436   *  --  155*   BUN 38*  --  48*   CREATSERUM 4.37*  --  5.26*   CA 9.0  --  8.7   ALB 3.2*  --   --      --  138   K 3.5 3.5 3.4*  3.4*   CL 97*  --  100   CO2 31.0  --  29.0   ALKPHO 218*  --   --    AST 26  --   --    ALT 28  --   --    BILT 0.7  --   --    TP 7.5  --   --        Estimated Creatinine Clearance: 12.5 mL/min (A) (based on SCr of 5.26 mg/dL (H)).    Recent Labs   Lab 04/16/24 1730   PTP 14.2   INR 1.10       COVID-19 Lab Results    COVID-19  Lab Results   Component Value Date    COVID19 Not Detected 04/16/2024    COVID19 Not Detected 03/17/2024    COVID19 Not Detected 04/05/2023       Pro-Calcitonin  No results for input(s): \"PCT\" in the last 168 hours.    Cardiac  No results for input(s): \"TROP\", \"PBNP\" in the last 168 hours.    Creatinine Kinase  No results for input(s): \"CK\" in the last 168 hours.    Inflammatory Markers  No results for input(s): \"CRP\", \"NEELAM\", \"LDH\", \"DDIMER\" in the last 168 hours.    Recent Labs   Lab 04/16/24  1730 04/16/24  [address] [place] [address] 30 34       Imaging: Imaging data reviewed in Epic.      ASSESSMENT / PLAN:   Jonny Haque is a 79 year old male with PMH sig for HTN, DL, DM2, Hypothyroidism, ESRD on HD, hx of cirrhosis, hx of GI bleed, ischemic CM, CAD w/ hx of PCI 3 wks ago presents to the ED with c/o chest pain.    Chest pain r/o  ACS  CAD sp PCI  Ischemic CM  -admit to tele  -supportive care  -trend trops  -recheck EKG  -cardiology consulted >> apprec recs  -NPO for stress    Anemia of chronic disease  -hgb 9.6    DL  -statin    HTN  -resume home meds    DM2  -hold home meds  -ISS+accuchecks    ESRD on HD  -nephro for HD    Hx of Cirrhosis  Hx of GI bleed  -stable, chronic    Hypothyroidism  -synthroid      Quality:  DVT Prophylaxis: lovenox, scds  CODE status: full code  Vásquez: no  If COVID testing is negative, may discontinue isolation: yes     Plan of care discussed with patient and all questions answered.    ADDENDUM:  6PM D/W Dr Donald - he reviewed ECHO - he states pt OK for dc home    VANESSA Coordinator contacted to facilitate appointments with PCP and consultant services.      Fela Mahajan MD  [facility]  Pager [phone]  Answering Service number: [phone]          **Certification      PHYSICIAN Certification of Need for Inpatient Hospitalization - Initial Certification    Patient will require inpatient services that will reasonably be expected to span two midnight's based on the clinical documentation in H+P.   Based on patients current state of illness, I anticipate that, after discharge, patient will require TBD.

## 2024-04-18 NOTE — PAYOR COMM NOTE
--------------  ADMISSION REVIEW     Payor: [employer] MED[employer] ADV PPO  Subscriber #:  [id number]  Authorization Number: [id number]    Admit date: 4/16/24  Admit time: 10:41 PM       REVIEW DOCUMENTATION:      Patient Seen in: [facility]      History     Chief Complaint   Patient presents with    Chest Pain Angina     Stated Complaint: cp recent stent plcement    Subjective:   HPI    79-year-old male with a past medical history as below including hypertension, CAD status post recent stents, ESRD on HD presents with left-sided chest pain for the past 2 days.  Patient states the pain is dull/achy, constant and worse when he lays on that side and with coughing.  Son states he had pneumonia last month treated with antibiotics and the cough had resolved but returned 2 days ago.  He denies any other cold symptoms.  He denies feeling short of breath.    Social Determinants of Health     Food Insecurity: No Food Insecurity (3/31/2024)    Food Insecurity     Food Insecurity: Never true   Transportation Needs: No Transportation Needs (3/31/2024)    Transportation Needs     Lack of Transportation: No   Housing Stability: Low Risk  (3/31/2024)    Housing Stability     Housing Instability: No     Review of Systems    Positive for stated complaint: cp recent stent plcement  Physical Exam     ED Triage Vitals [04/16/24 1651]   /72   Pulse 75   Resp 18   Temp 98.7 °F (37.1 °C)   Temp src    SpO2 96 %   O2 Device None (Room air)       Current:/75   Pulse 71   Temp 98.7 °F (37.1 °C)   Resp 11   SpO2 99%         Physical Exam  Vitals and nursing note reviewed.   Constitutional:       General: He is not in acute distress.     Appearance: He is well-developed. He is not ill-appearing.   HENT:      Head: Normocephalic and atraumatic.      Mouth/Throat:      Mouth: Mucous membranes are moist.   Eyes:      General: No scleral icterus.     Extraocular Movements: Extraocular movements intact.    Cardiovascular:      Rate and Rhythm: Normal rate and regular rhythm.   Pulmonary:      Effort: Pulmonary effort is normal.      Breath sounds: Normal breath sounds.   Chest:      Chest wall: Tenderness present.   Abdominal:      Palpations: Abdomen is soft.      Tenderness: There is no abdominal tenderness.   Musculoskeletal:      Cervical back: Neck supple.      Right lower leg: No edema.      Left lower leg: No edema.   Skin:     General: Skin is warm and dry.      Capillary Refill: Capillary refill takes less than 2 seconds.   Neurological:      Mental Status: He is alert and oriented to person, place, and time.      GCS: GCS eye subscore is 4. GCS verbal subscore is 5. GCS motor subscore is 6.   Psychiatric:         Mood and Affect: Mood normal.         Behavior: Behavior normal.               ED Course     Labs Reviewed   COMP METABOLIC PANEL (14) - Abnormal; Notable for the following components:       Result Value    Glucose 197 (*)     Chloride 97 (*)     BUN 38 (*)     Creatinine 4.37 (*)     Calculated Osmolality 299 (*)     eGFR-Cr 13 (*)     Alkaline Phosphatase 218 (*)     Albumin 3.2 (*)     A/G Ratio 0.7 (*)     All other components within normal limits   CBC W/ DIFFERENTIAL - Abnormal; Notable for the following components:    RBC 3.25 (*)     HGB 10.0 (*)     HCT 31.0 (*)     .0 (*)     Lymphocyte Absolute 0.78 (*)    EKG    Rate, intervals and axes as noted on EKG Report.  Rate: 77  Rhythm: Sinus Rhythm  Reading: Normal sinus rhythm, nonspecific intraventricular conduction delay, nonspecific ST/T wave changes        Disposition and Plan     Clinical Impression:  1. Chest pain of uncertain etiology    2. ESRD (end stage renal disease) on dialysis ([facility])         Disposition:  Admit  4/16/2024  7:38 pm       4/17 Cardiology     Reason for Consultation:   CP     History of Present Illness:   Jonny Haque is a(n) 79 year old male with ESRD, moderate aortic stenosis, GI bleeding and complex CAD  with recent left main PCI and CX/OM PTCA by myself.  He was last discharged from [facility] approximately 2  weeks ago after presenting post PCI with CP and a new, moderate sized pericardial effusion.     Patient notes he had \"side pain\" prior to admission. It was on the left side of his chest.  He had a non-productive cough as well which has been present for approximatley 1 month.  He notes the pain was worst when lying on his left side in bed.  The pain was \"dull\" and did not radiate.  It was present at rest and was non-exertional.  It was 7/10 severity.  It improved when he wasn't coughing.  He had no associated symptoms.  This was not the pain he had a few weeks ago which prompted PCI.  The cough continued last night and his family brought him to the ER.       He is presently comfortable in bed.  The cough and chest pain have improved.  He is currently CP free.  His breathing is comfortable.  No complaints.      Laboratories and Data:   Labs:          Recent Labs   Lab 04/16/24 1730 04/16/24 2323 04/17/24  0436   *  --  155*   BUN 38*  --  48*   CREATSERUM 4.37*  --  5.26*   CA 9.0  --  8.7   ALB 3.2*  --   --      --  138   K 3.5 3.5 3.4*  3.4*   CL 97*  --  100   CO2 31.0  --  29.0   ALKPHO 218*  --   --    AST 26  --   --    ALT 28  --   --    BILT 0.7  --   --    TP 7.5  --   --               Recent Labs   Lab 04/16/24 1730 04/17/24  0436   RBC 3.25* 3.03*   HGB 10.0* 9.6*   HCT 31.0* 28.8*   MCV 95.4 95.0   MCH 30.8 31.7   MCHC 32.3 33.3   RDW 16.4 16.8   NEPRELIM 3.58  --    WBC 5.1 3.8*   .0* 99.0*             Recent Labs   Lab 04/16/24  1730   PTP 14.2   INR 1.10         No results for input(s): \"TROP\", \"CK\" in the last 168 hours.     Diagnostics:   Tele: SR.  EKG: Sinus/AF IVCD (ALBBB). No major changes from prior tracings.  Echo:   4/2/24  Conclusions:     1. Left ventricle: The cavity size was at the upper limits of normal. Wall      thickness was normal. Systolic function  was at the lower limits of      normal. The estimated ejection fraction was 50%, by visual assessment.      Doppler parameters are consistent with abnormal left ventricular      relaxation - grade 1 diastolic dysfunction.   2. Right ventricle: The RV free wall longitudinal strain was -28.20%.   3. Left atrium: The left atrial volume was mildly increased.   4. Aortic valve: Transvalvular velocity was increased. The findings were      consistent with moderate stenosis. The peak systolic velocity was      3.83m/sec. The mean systolic gradient was 37mm Hg. The valve area (VTI)      was 1.34cm^2. The valve area (VTI) index was 0.61cm^2/m^2.   5. Pulmonary arteries: Systolic pressure was at the upper limits of normal,      estimated to be 34mm Hg. The peak systolic pressure is 34mm Hg.   6. Pericardium, extracardiac: A moderate pericardial effusion was      identified. There was evidence of hemodynamic compromise. There is RV      collapse. There was evidence for increased RV-LV interaction demonstrated      by respirophasic changes in transmitral velocities. There were bilateral      pleural effusions present.   Cath: Reviewed.  PCI to UL and PTCA to OM.           Assessment:  1. CP  2. CAD, recent complex PCI  3. ESRD, HD dependent  4. GIB, with hx cirrhosis  5. AF, off AC given GIB  6. HTN  7. Aortic stenosis, moderate to severe  8. Pericardial effusioin, moderate 4/2024        Plan:  1. CP: Atypical and not c/w myocardial ischemia.  Troponin negative.  Comfortable that he is not infarcting.  Would continue with current medical management for now.  His effusion is significantly decreased in size and there is no hemodynamic effect per Dr. Orosco.  From CV standpoint he can DC home on baseline meds.  FU with me in 1-2 weeks in clinic.  2. PAF: No AC for now given GIB. Will need to consider watchman.  DAPT for now.   3. Effusion: Improved.  Likely had post MI (initial presentation) pericarditis which is now resolving.   Some of his pain has been pleuritic and may be related to pericardial inflammation.  Echo reassuring.  Will follow clinically for now.  4. HTN: Can switch off Losartan given dry cough and temporal coincidencce with initiation of ARB.  Defer to his HD/nephrology MDs.       Nephrology    ssessment / Plan:     1) ESRD- due to longstanding DM 2; on HD since 7/21. Lytes / volume OK. Next HD Thurs per usual routine     2) CAD with preserved EF- s/p PCI / stents LAD + OM 3/24 (Dr. Donald)      3) h/o hep C with cirrhosis / varices- recent US / doppler unremarkable     4) DM 2 / HTN     5) Anemia of CKD / chronic disease- on EPO with HD     6) GI blood loss- no melena x 18 months off plavix, but required multiple transfusions while on plavix 2 yrs ago. Hgb stable since last admit     7) Dry cough / L chest discomfort- etiology unclear; ? Related to pericardial effusion ?; CXR clear; does not appear volume up; meds OK. Cards to see     D/W son at bedside        Reason for Consultation:  ESRD     History of Present Illness:  Jonny Haque is a a(n) 79 year old male.  Very pleasant 79-year-old male presents for evaluation of left-sided chest discomfort and associated cough.  This has been an on-and-off issue over the last couple weeks since his last admit where he was thought to have possible pneumonia.  Seemingly improved after dialysis yesterday although he did not have any fluid removal.  Meds otherwise unchanged; he feels this may be related to losartan.  Last dialysis was yesterday.      Discharged: 4/17/2024 1916       MEDICATIONS ADMINISTERED IN LAST 1 DAY:  amiodarone (Pacerone) tab 200 mg       Date Action Dose Route User    Discharged on 4/17/2024 4/17/2024 0907 Given 200 mg Oral Iman Hilton, FRANCIA          aspirin DR tab 81 mg       Date Action Dose Route User    Discharged on 4/17/2024 4/17/2024 0907 Given 81 mg Oral Iman Hilton, RN          clopidogrel (Plavix) tab 75 mg       Date Action Dose Route User     Discharged on 4/17/2024 4/17/2024 0907 Given 75 mg Oral Iman Hilton RN          heparin (Porcine) 5000 UNIT/ML injection 5,000 Units       Date Action Dose Route User    Discharged on 4/17/2024 4/17/2024 1318 Given 5,000 Units Subcutaneous (Left Lower Abdomen) Iman Hilton RN          insulin aspart (NovoLOG) 100 Units/mL FlexPen 10 Units       Date Action Dose Route User    Discharged on 4/17/2024 4/17/2024 1754 Given 10 Units Subcutaneous (Left Lower Abdomen) Tash Chavarria RN    4/17/2024 1317 Given 10 Units Subcutaneous (Left Upper Arm) Iman Hilton RN          insulin aspart (NovoLOG) 100 Units/mL FlexPen 1-5 Units       Date Action Dose Route User    Discharged on 4/17/2024 4/17/2024 1755 Given 1 Units Subcutaneous (Left Upper Arm) Tash Chavarria RN    4/17/2024 1317 Given 1 Units Subcutaneous (Left Upper Arm) Iman Hilton RN          pantoprazole (Protonix) DR tab 40 mg       Date Action Dose Route User    Discharged on 4/17/2024 4/17/2024 1753 Given 40 mg Oral Tash Chavarria RN          sevelamer carbonate (Renvela) tab 800 mg       Date Action Dose Route User    Discharged on 4/17/2024 4/17/2024 1753 Given 800 mg Oral Tash Chavarria RN    4/17/2024 1318 Given 800 mg Oral Iman Hilton RN            Vitals (last day) before discharge       Date/Time Temp Pulse Resp BP SpO2 Weight O2 Device O2 Flow Rate (L/min) [facility]    04/17/24 1645 98.3 °F (36.8 °C) 74 19 146/67 95 % -- None (Room air) -- RY    04/17/24 1210 -- 72 -- -- -- -- -- -- RY    04/17/24 1205 -- 80 -- -- -- -- -- -- RY    04/17/24 1200 -- 77 -- -- -- -- -- -- RY    04/17/24 1155 -- 75 -- -- -- -- -- -- RY    04/17/24 1150 -- 71 -- -- 94 % -- -- -- RY 04/17/24 1145 98.5 °F (36.9 °C) 74 19 150/78 94 % -- None (Room air) -- RY 04/17/24 1140 -- 72 -- -- -- -- -- -- RY 04/17/24 1135 -- 71 -- -- -- -- -- -- RY 04/17/24 1130 -- 70 -- -- -- -- -- -- RY 04/17/24 1125 -- 71 -- -- -- -- -- -- RY 04/17/24 1120  -- 72 -- -- -- -- -- -- RY    04/17/24 1115 -- 75 -- -- -- -- -- -- RY    04/17/24 1110 -- 75 -- -- -- -- -- -- RY    04/17/24 1105 -- 72 -- -- -- -- -- -- RY    04/17/24 1100 -- 74 -- -- -- -- -- -- RY    04/17/24 1055 -- 73 -- -- -- -- -- -- RY    04/17/24 1050 -- 76 -- -- -- -- -- -- RY    04/17/24 1045 -- 72 -- -- -- -- -- -- RY    04/17/24 1040 -- 67 -- -- -- -- -- -- RY    04/17/24 1035 -- 69 -- -- -- -- -- -- RY    04/17/24 1030 -- 74 -- -- -- -- -- -- RY    04/17/24 1025 -- 72 -- -- -- -- -- -- RY    04/17/24 1020 -- 77 -- -- -- -- -- -- RY    04/17/24 1015 -- 70 -- -- -- -- -- -- RY    04/17/24 1010 -- 74 -- -- -- -- -- -- RY    04/17/24 1005 -- 75 -- -- -- -- -- -- RY    04/17/24 1000 -- 83 -- -- -- -- -- -- RY    04/17/24 0955 -- 74 -- -- -- -- -- -- RY    04/17/24 0950 -- 79 -- -- -- -- -- -- RY    04/17/24 0945 -- 76 -- -- -- -- -- -- RY    04/17/24 0940 -- 77 -- -- -- -- -- -- RY    04/17/24 0935 -- 76 -- -- -- -- -- -- RY    04/17/24 0930 -- 77 -- -- -- -- -- -- RY    04/17/24 0925 -- 74 -- -- -- -- -- -- RY    04/17/24 0920 -- 79 -- -- -- -- -- -- RY    04/17/24 0915 -- 78 -- -- -- -- -- -- RY    04/17/24 0910 -- 77 -- -- -- -- -- -- RY    04/17/24 0905 -- 76 -- -- -- -- -- -- RY    04/17/24 0900 -- 76 -- -- -- -- -- -- RY    04/17/24 0855 -- 77 -- -- -- -- -- -- RY    04/17/24 0850 -- 81 -- -- -- -- -- -- RY    04/17/24 0845 -- 79 -- -- -- -- -- -- RY    04/17/24 0840 -- 82 -- -- -- -- -- -- RY    04/17/24 0835 -- 83 -- -- -- -- -- -- RY    04/17/24 0830 -- 83 -- -- -- -- -- -- RY    04/17/24 0825 -- 76 -- -- -- -- -- -- RY    04/17/24 0820 -- 77 -- -- -- -- -- -- RY    04/17/24 0815 -- 76 -- -- -- -- -- -- RY    04/17/24 0810 -- 75 -- -- -- -- -- -- RY    04/17/24 0805 -- 77 -- -- -- -- -- -- RY    04/17/24 0800 99.3 °F (37.4 °C) 79 19 145/86 95 % -- None (Room air) -- RY    04/17/24 0507 98.4 °F (36.9 °C) 77 18 130/68 94 % -- None (Room air) 0 L/min BR    04/17/24 0301 -- -- -- -- -- 210 lb 8.6 oz  -- -- TN    04/17/24 0000 -- -- -- -- -- 221 lb 5.5 oz -- -- KD    04/16/24 2330 -- 140 -- -- -- -- -- -- EK    04/16/24 2330 -- -- -- -- -- 210 lb 8.6 oz -- -- BR    04/16/24 2325 -- 127 -- -- -- -- -- -- EK    04/16/24 2320 -- 132 -- -- -- -- -- -- EK    04/16/24 2315 -- 138 -- -- -- -- -- -- EK    04/16/24 2310 -- 144 -- -- -- -- -- --     04/16/24 2305 -- 133 -- -- -- -- -- --     04/16/24 2300 -- 135 18 143/77 97 % -- None (Room air) --     04/16/24 2255 -- 130 17 145/116 97 % -- None (Room air) --     04/16/24 2250 98.5 °F (36.9 °C) 138 17 124/92 90 % -- None (Room air) --     04/16/24 2225 -- 76 -- -- 97 % -- -- -- EK 04/16/24 2220 -- 74 -- -- 99 % -- -- -- EK 04/16/24 2215 -- 75 -- -- 99 % -- -- -- EK 04/16/24 2210 -- 136 -- -- 99 % -- -- -- EK 04/16/24 2205 -- 64 -- -- 97 % -- -- -- EK 04/16/24 2200 -- 73 18 136/74 98 % -- None (Room air) --     04/16/24 2155 -- 73 -- -- 96 % -- -- --     04/16/24 2150 -- 129 -- -- 98 % -- -- --     04/16/24 2145 -- 77 -- -- 97 % -- -- --     04/16/24 2140 -- 75 -- -- 98 % -- -- -- EK    04/16/24 2135 -- 75 -- -- 96 % -- -- -- EK    04/16/24 2130 -- 79 21 -- 99 % -- -- -- EK 04/16/24 2125 -- 75 17 -- 99 % -- -- --     04/16/24 2120 -- 76 21 -- 98 % -- -- -- EK    04/16/24 2115 -- 72 13 141/77 96 % -- None (Room air) -- EC    04/16/24 2110 -- 72 16 142/62 96 % -- None (Room air) -- RF    04/16/24 2105 -- 72 20 -- 96 % -- -- --     04/16/24 2100 -- 70 19 -- 97 % -- -- --     04/16/24 2055 -- 72 16 -- 94 % -- -- --     04/16/24 2050 -- 76 19 -- 95 % -- -- --     04/16/24 2045 -- 73 12 -- 98 % -- -- -- EK 04/16/24 2040 -- 73 12 -- 98 % -- -- -- EK 04/16/24 2035 -- 74 13 -- 99 % -- -- -- EK 04/16/24 2030 -- 76 18 -- 99 % -- -- --     04/16/24 2025 -- 77 19 -- 100 % -- -- -- EK 04/16/24 2020 -- 60 16 -- 98 % -- None (Room air) --     04/16/24 2015 -- 75 17 -- 98 % -- -- -- EK    04/16/24 2010 -- 72 19 -- 97 % -- -- --  EK    04/16/24 2005 -- 73 18 -- 98 % -- -- -- EK    04/16/24 2000 -- 79 22 -- 99 % -- -- -- EK 04/16/24 1955 -- 72 20 -- 96 % -- -- -- EK 04/16/24 1950 -- 73 17 -- 98 % -- -- -- EK 04/16/24 1945 -- 75 12 -- 99 % -- -- -- EK 04/16/24 1940 -- 73 17 -- 100 % -- -- -- EK 04/16/24 1935 -- 74 22 -- 98 % -- -- -- EK    04/16/24 1930 -- 72 13 -- 100 % -- -- -- EK 04/16/24 1925 -- 73 12 -- 98 % -- -- -- EK 04/16/24 1920 -- 72 16 -- 100 % -- -- -- EK 04/16/24 1915 -- 72 12 -- 97 % -- -- -- EK 04/16/24 1910 -- 72 10 -- 99 % -- -- -- EK 04/16/24 1905 -- 71 16 -- 99 % -- -- -- EK 04/16/24 1900 -- 71 11 143/75 99 % -- None (Room air) -- MG    04/16/24 1855 -- 70 11 -- 98 % -- -- -- EK 04/16/24 1850 -- 73 10 -- 97 % -- -- -- EK 04/16/24 1845 -- 73 14 -- 98 % -- -- -- EK 04/16/24 1840 -- 73 22 -- 100 % -- -- -- EK 04/16/24 1835 -- 73 21 -- 96 % -- -- -- EK 04/16/24 1830 -- 74 11 -- 97 % -- -- -- EK 04/16/24 1825 -- 73 17 -- 96 % -- -- -- EK    04/16/24 1820 -- 73 18 -- 97 % -- -- -- EK 04/16/24 1815 -- 77 20 -- 97 % -- -- -- EK 04/16/24 1810 -- 71 20 -- 94 % -- -- -- EK    04/16/24 1805 -- 71 16 -- 100 % -- -- -- EK    04/16/24 1800 -- 72 19 -- 94 % -- -- -- EK    04/16/24 1755 -- 76 13 -- 97 % -- -- -- EK    04/16/24 1750 -- 75 13 -- 98 % -- -- -- EK    04/16/24 1745 -- 73 22 131/70 98 % -- None (Room air) -- MG    04/16/24 1740 -- 76 13 -- 99 % -- -- -- EK    04/16/24 1735 -- 74 14 -- 96 % -- -- -- EK    04/16/24 1733 -- -- -- -- -- -- None (Room air) -- MG    04/16/24 1730 -- 74 15 -- 97 % -- -- -- EK    04/16/24 1725 -- 76 10 -- -- -- -- -- EK    04/16/24 1651 98.7 °F (37.1 °C) 75 18 140/72 96 % -- None (Room air) -- RB          CIWA Scores (last 2 days) before discharge       None

## 2024-04-18 NOTE — PROGRESS NOTES
Pt discharged home. IV removed with catheter intact, tele d/c and returned to monitor tech. Follow up instructions provided and discussed. Pt and family verbalized understanding. Prescriptions given. Discussed adverse reaction of all new medication and provided handout. Pt verbalized understanding. Pt wheeled down by staff with all belongings. Pt left with out complains of malaise and cardiac symptoms. All needs met by staff.

## 2024-05-08 ENCOUNTER — HOSPITAL ENCOUNTER (INPATIENT)
Facility: HOSPITAL | Age: 79
LOS: 4 days | Discharge: HOME | DRG: 085 | End: 2024-05-12
Attending: EMERGENCY MEDICINE | Admitting: HOSPITALIST

## 2024-05-08 ENCOUNTER — HOSPITAL ENCOUNTER (INPATIENT)
Facility: HOSPITAL | Age: 79
LOS: 4 days | Discharge: HOME HEALTH | DRG: 085 | End: 2024-05-12
Attending: EMERGENCY MEDICINE | Admitting: HOSPITALIST

## 2024-05-08 DIAGNOSIS — I62.9 INTRACRANIAL HEMORRHAGE (HCC): Primary | ICD-10-CM

## 2024-05-08 PROBLEM — I48.0 PAF (PAROXYSMAL ATRIAL FIBRILLATION) (HCC): Status: ACTIVE | Noted: 2022-01-20

## 2024-05-08 PROBLEM — I25.118 CORONARY ARTERY DISEASE OF NATIVE ARTERY OF NATIVE HEART WITH STABLE ANGINA PECTORIS (HCC): Status: ACTIVE | Noted: 2022-01-20

## 2024-05-08 PROBLEM — I25.118 CORONARY ARTERY DISEASE OF NATIVE ARTERY OF NATIVE HEART WITH STABLE ANGINA PECTORIS: Status: ACTIVE | Noted: 2022-01-20

## 2024-05-08 LAB
ALBUMIN SERPL-MCNC: 3.4 G/DL (ref 3.4–5)
ALBUMIN/GLOB SERPL: 0.8 {RATIO} (ref 1–2)
ALP LIVER SERPL-CCNC: 162 U/L
ALT SERPL-CCNC: 24 U/L
ANION GAP SERPL CALC-SCNC: 13 MMOL/L (ref 0–18)
ANTIBODY SCREEN: NEGATIVE
APTT PPP: 31.9 SECONDS (ref 23–36)
AST SERPL-CCNC: 24 U/L (ref 15–37)
BASOPHILS # BLD AUTO: 0.05 X10(3) UL (ref 0–0.2)
BASOPHILS NFR BLD AUTO: 0.9 %
BILIRUB SERPL-MCNC: 0.5 MG/DL (ref 0.1–2)
BUN BLD-MCNC: 63 MG/DL (ref 9–23)
CALCIUM BLD-MCNC: 9.2 MG/DL (ref 8.5–10.1)
CHLORIDE SERPL-SCNC: 99 MMOL/L (ref 98–112)
CO2 SERPL-SCNC: 21 MMOL/L (ref 21–32)
CREAT BLD-MCNC: 6.53 MG/DL
EGFRCR SERPLBLD CKD-EPI 2021: 8 ML/MIN/1.73M2 (ref 60–?)
EOSINOPHIL # BLD AUTO: 0.27 X10(3) UL (ref 0–0.7)
EOSINOPHIL NFR BLD AUTO: 5 %
ERYTHROCYTE [DISTWIDTH] IN BLOOD BY AUTOMATED COUNT: 17.3 %
GLOBULIN PLAS-MCNC: 4.5 G/DL (ref 2.8–4.4)
GLUCOSE BLD-MCNC: 157 MG/DL (ref 70–99)
GLUCOSE BLD-MCNC: 173 MG/DL (ref 70–99)
GLUCOSE BLD-MCNC: 230 MG/DL (ref 70–99)
HCT VFR BLD AUTO: 35.3 %
HGB BLD-MCNC: 11.6 G/DL
IMM GRANULOCYTES # BLD AUTO: 0.01 X10(3) UL (ref 0–1)
IMM GRANULOCYTES NFR BLD: 0.2 %
INR BLD: 1.1 (ref 0.8–1.2)
LYMPHOCYTES # BLD AUTO: 1.54 X10(3) UL (ref 1–4)
LYMPHOCYTES NFR BLD AUTO: 28.3 %
MCH RBC QN AUTO: 31.6 PG (ref 26–34)
MCHC RBC AUTO-ENTMCNC: 32.9 G/DL (ref 31–37)
MCV RBC AUTO: 96.2 FL
MONOCYTES # BLD AUTO: 0.42 X10(3) UL (ref 0.1–1)
MONOCYTES NFR BLD AUTO: 7.7 %
NEUTROPHILS # BLD AUTO: 3.15 X10 (3) UL (ref 1.5–7.7)
NEUTROPHILS # BLD AUTO: 3.15 X10(3) UL (ref 1.5–7.7)
NEUTROPHILS NFR BLD AUTO: 57.9 %
OSMOLALITY SERPL CALC.SUM OF ELEC: 298 MOSM/KG (ref 275–295)
PLATELET # BLD AUTO: 75 10(3)UL (ref 150–450)
POTASSIUM SERPL-SCNC: 3.7 MMOL/L (ref 3.5–5.1)
PROT SERPL-MCNC: 7.9 G/DL (ref 6.4–8.2)
PROTHROMBIN TIME: 14.2 SECONDS (ref 11.6–14.8)
RBC # BLD AUTO: 3.67 X10(6)UL
RH BLOOD TYPE: POSITIVE
SODIUM SERPL-SCNC: 133 MMOL/L (ref 136–145)
TROPONIN I SERPL HS-MCNC: 33 NG/L
WBC # BLD AUTO: 5.4 X10(3) UL (ref 4–11)

## 2024-05-08 PROCEDURE — 99291 CRITICAL CARE FIRST HOUR: CPT | Performed by: NURSE PRACTITIONER

## 2024-05-08 PROCEDURE — 30233R1 TRANSFUSION OF NONAUTOLOGOUS PLATELETS INTO PERIPHERAL VEIN, PERCUTANEOUS APPROACH: ICD-10-PCS | Performed by: HOSPITALIST

## 2024-05-08 RX ORDER — NICOTINE POLACRILEX 4 MG
15 LOZENGE BUCCAL
Status: DISCONTINUED | OUTPATIENT
Start: 2024-05-08 | End: 2024-05-12

## 2024-05-08 RX ORDER — ACETAMINOPHEN 500 MG
500 TABLET ORAL EVERY 4 HOURS PRN
Status: DISCONTINUED | OUTPATIENT
Start: 2024-05-08 | End: 2024-05-08

## 2024-05-08 RX ORDER — HYDRALAZINE HYDROCHLORIDE 20 MG/ML
10 INJECTION INTRAMUSCULAR; INTRAVENOUS EVERY 2 HOUR PRN
Status: DISCONTINUED | OUTPATIENT
Start: 2024-05-08 | End: 2024-05-12

## 2024-05-08 RX ORDER — ROSUVASTATIN CALCIUM 10 MG/1
10 TABLET, COATED ORAL NIGHTLY
Status: DISCONTINUED | OUTPATIENT
Start: 2024-05-08 | End: 2024-05-12

## 2024-05-08 RX ORDER — NICOTINE POLACRILEX 4 MG
30 LOZENGE BUCCAL
Status: DISCONTINUED | OUTPATIENT
Start: 2024-05-08 | End: 2024-05-12

## 2024-05-08 RX ORDER — METOCLOPRAMIDE HYDROCHLORIDE 5 MG/ML
5 INJECTION INTRAMUSCULAR; INTRAVENOUS EVERY 8 HOURS PRN
Status: DISCONTINUED | OUTPATIENT
Start: 2024-05-08 | End: 2024-05-08

## 2024-05-08 RX ORDER — ONDANSETRON 2 MG/ML
4 INJECTION INTRAMUSCULAR; INTRAVENOUS EVERY 6 HOURS PRN
Status: DISCONTINUED | OUTPATIENT
Start: 2024-05-08 | End: 2024-05-08

## 2024-05-08 RX ORDER — SENNOSIDES 8.6 MG
17.2 TABLET ORAL NIGHTLY PRN
Status: DISCONTINUED | OUTPATIENT
Start: 2024-05-08 | End: 2024-05-12

## 2024-05-08 RX ORDER — ACETAMINOPHEN 325 MG/1
650 TABLET ORAL EVERY 4 HOURS PRN
Status: DISCONTINUED | OUTPATIENT
Start: 2024-05-08 | End: 2024-05-12

## 2024-05-08 RX ORDER — LEVETIRACETAM 500 MG/5ML
1000 INJECTION, SOLUTION, CONCENTRATE INTRAVENOUS ONCE
Status: COMPLETED | OUTPATIENT
Start: 2024-05-08 | End: 2024-05-08

## 2024-05-08 RX ORDER — ACETAMINOPHEN 650 MG/1
650 SUPPOSITORY RECTAL EVERY 4 HOURS PRN
Status: DISCONTINUED | OUTPATIENT
Start: 2024-05-08 | End: 2024-05-12

## 2024-05-08 RX ORDER — DEXTROSE MONOHYDRATE 25 G/50ML
50 INJECTION, SOLUTION INTRAVENOUS
Status: DISCONTINUED | OUTPATIENT
Start: 2024-05-08 | End: 2024-05-12

## 2024-05-08 RX ORDER — METOCLOPRAMIDE HYDROCHLORIDE 5 MG/ML
5 INJECTION INTRAMUSCULAR; INTRAVENOUS EVERY 8 HOURS PRN
Status: DISCONTINUED | OUTPATIENT
Start: 2024-05-08 | End: 2024-05-12

## 2024-05-08 RX ORDER — ONDANSETRON 2 MG/ML
4 INJECTION INTRAMUSCULAR; INTRAVENOUS EVERY 6 HOURS PRN
Status: DISCONTINUED | OUTPATIENT
Start: 2024-05-08 | End: 2024-05-10

## 2024-05-08 RX ORDER — LEVOTHYROXINE SODIUM 0.15 MG/1
150 TABLET ORAL
Status: DISCONTINUED | OUTPATIENT
Start: 2024-05-09 | End: 2024-05-12

## 2024-05-08 RX ORDER — BISACODYL 10 MG
10 SUPPOSITORY, RECTAL RECTAL
Status: DISCONTINUED | OUTPATIENT
Start: 2024-05-08 | End: 2024-05-12

## 2024-05-08 RX ORDER — INSULIN DEGLUDEC 100 U/ML
20 INJECTION, SOLUTION SUBCUTANEOUS NIGHTLY
Status: DISCONTINUED | OUTPATIENT
Start: 2024-05-08 | End: 2024-05-12

## 2024-05-08 RX ORDER — LABETALOL HYDROCHLORIDE 5 MG/ML
10 INJECTION, SOLUTION INTRAVENOUS EVERY 10 MIN PRN
Status: DISCONTINUED | OUTPATIENT
Start: 2024-05-08 | End: 2024-05-12

## 2024-05-08 RX ORDER — LEVETIRACETAM 500 MG/5ML
500 INJECTION, SOLUTION, CONCENTRATE INTRAVENOUS EVERY 12 HOURS
Status: DISCONTINUED | OUTPATIENT
Start: 2024-05-09 | End: 2024-05-11

## 2024-05-08 RX ORDER — PANTOPRAZOLE SODIUM 40 MG/1
40 TABLET, DELAYED RELEASE ORAL
Status: DISCONTINUED | OUTPATIENT
Start: 2024-05-08 | End: 2024-05-12

## 2024-05-08 RX ORDER — POLYETHYLENE GLYCOL 3350 17 G/17G
17 POWDER, FOR SOLUTION ORAL DAILY PRN
Status: DISCONTINUED | OUTPATIENT
Start: 2024-05-08 | End: 2024-05-12

## 2024-05-09 ENCOUNTER — APPOINTMENT (OUTPATIENT)
Dept: CT IMAGING | Facility: HOSPITAL | Age: 79
DRG: 085 | End: 2024-05-09
Attending: NURSE PRACTITIONER

## 2024-05-09 LAB
ANION GAP SERPL CALC-SCNC: 12 MMOL/L (ref 0–18)
ATRIAL RATE: 131 BPM
ATRIAL RATE: 62 BPM
ATRIAL RATE: 85 BPM
BASOPHILS # BLD AUTO: 0.03 X10(3) UL (ref 0–0.2)
BASOPHILS NFR BLD AUTO: 0.6 %
BUN BLD-MCNC: 72 MG/DL (ref 9–23)
CALCIUM BLD-MCNC: 9.2 MG/DL (ref 8.5–10.1)
CHLORIDE SERPL-SCNC: 100 MMOL/L (ref 98–112)
CO2 SERPL-SCNC: 26 MMOL/L (ref 21–32)
CREAT BLD-MCNC: 6.72 MG/DL
EGFRCR SERPLBLD CKD-EPI 2021: 8 ML/MIN/1.73M2 (ref 60–?)
EOSINOPHIL # BLD AUTO: 0.26 X10(3) UL (ref 0–0.7)
EOSINOPHIL NFR BLD AUTO: 4.9 %
ERYTHROCYTE [DISTWIDTH] IN BLOOD BY AUTOMATED COUNT: 17.3 %
GLUCOSE BLD-MCNC: 154 MG/DL (ref 70–99)
GLUCOSE BLD-MCNC: 155 MG/DL (ref 70–99)
GLUCOSE BLD-MCNC: 158 MG/DL (ref 70–99)
GLUCOSE BLD-MCNC: 208 MG/DL (ref 70–99)
GLUCOSE BLD-MCNC: 221 MG/DL (ref 70–99)
GLUCOSE BLD-MCNC: 285 MG/DL (ref 70–99)
HBV SURFACE AG SER-ACNC: <0.1 [IU]/L
HBV SURFACE AG SERPL QL IA: NONREACTIVE
HCT VFR BLD AUTO: 34.7 %
HGB BLD-MCNC: 10.8 G/DL
IMM GRANULOCYTES # BLD AUTO: 0.02 X10(3) UL (ref 0–1)
IMM GRANULOCYTES NFR BLD: 0.4 %
LYMPHOCYTES # BLD AUTO: 1.21 X10(3) UL (ref 1–4)
LYMPHOCYTES NFR BLD AUTO: 22.9 %
MAGNESIUM SERPL-MCNC: 1.9 MG/DL (ref 1.6–2.6)
MAGNESIUM SERPL-MCNC: 2.3 MG/DL (ref 1.6–2.6)
MCH RBC QN AUTO: 30.8 PG (ref 26–34)
MCHC RBC AUTO-ENTMCNC: 31.1 G/DL (ref 31–37)
MCV RBC AUTO: 98.9 FL
MONOCYTES # BLD AUTO: 0.46 X10(3) UL (ref 0.1–1)
MONOCYTES NFR BLD AUTO: 8.7 %
MRSA DNA SPEC QL NAA+PROBE: NEGATIVE
NEUTROPHILS # BLD AUTO: 3.3 X10 (3) UL (ref 1.5–7.7)
NEUTROPHILS # BLD AUTO: 3.3 X10(3) UL (ref 1.5–7.7)
NEUTROPHILS NFR BLD AUTO: 62.5 %
OSMOLALITY SERPL CALC.SUM OF ELEC: 310 MOSM/KG (ref 275–295)
P AXIS: 34 DEGREES
P AXIS: 71 DEGREES
P-R INTERVAL: 174 MS
P-R INTERVAL: 176 MS
PHOSPHATE SERPL-MCNC: 6.5 MG/DL (ref 2.5–4.9)
PLATELET # BLD AUTO: 78 10(3)UL (ref 150–450)
POTASSIUM SERPL-SCNC: 3.4 MMOL/L (ref 3.5–5.1)
POTASSIUM SERPL-SCNC: 4.2 MMOL/L (ref 3.5–5.1)
Q-T INTERVAL: 344 MS
Q-T INTERVAL: 448 MS
Q-T INTERVAL: 518 MS
QRS DURATION: 112 MS
QRS DURATION: 118 MS
QRS DURATION: 118 MS
QTC CALCULATION (BEZET): 517 MS
QTC CALCULATION (BEZET): 525 MS
QTC CALCULATION (BEZET): 533 MS
R AXIS: -31 DEGREES
R AXIS: -34 DEGREES
R AXIS: -9 DEGREES
RBC # BLD AUTO: 3.51 X10(6)UL
SODIUM SERPL-SCNC: 138 MMOL/L (ref 136–145)
T AXIS: 114 DEGREES
T AXIS: 118 DEGREES
T AXIS: 148 DEGREES
VENTRICULAR RATE: 136 BPM
VENTRICULAR RATE: 62 BPM
VENTRICULAR RATE: 85 BPM
WBC # BLD AUTO: 5.3 X10(3) UL (ref 4–11)

## 2024-05-09 PROCEDURE — 99222 1ST HOSP IP/OBS MODERATE 55: CPT | Performed by: INTERNAL MEDICINE

## 2024-05-09 PROCEDURE — 70450 CT HEAD/BRAIN W/O DYE: CPT | Performed by: NURSE PRACTITIONER

## 2024-05-09 PROCEDURE — 99221 1ST HOSP IP/OBS SF/LOW 40: CPT | Performed by: NEUROLOGICAL SURGERY

## 2024-05-09 PROCEDURE — 99291 CRITICAL CARE FIRST HOUR: CPT | Performed by: INTERNAL MEDICINE

## 2024-05-09 RX ORDER — AMIODARONE HYDROCHLORIDE 200 MG/1
200 TABLET ORAL 2 TIMES DAILY WITH MEALS
Status: DISCONTINUED | OUTPATIENT
Start: 2024-05-09 | End: 2024-05-12

## 2024-05-09 RX ORDER — POTASSIUM CHLORIDE 20 MEQ/1
40 TABLET, EXTENDED RELEASE ORAL ONCE
Status: COMPLETED | OUTPATIENT
Start: 2024-05-09 | End: 2024-05-09

## 2024-05-09 RX ORDER — MORPHINE SULFATE 2 MG/ML
1 INJECTION, SOLUTION INTRAMUSCULAR; INTRAVENOUS EVERY 2 HOUR PRN
Status: DISCONTINUED | OUTPATIENT
Start: 2024-05-09 | End: 2024-05-12

## 2024-05-09 RX ORDER — MORPHINE SULFATE 2 MG/ML
2 INJECTION, SOLUTION INTRAMUSCULAR; INTRAVENOUS EVERY 2 HOUR PRN
Status: DISCONTINUED | OUTPATIENT
Start: 2024-05-09 | End: 2024-05-12

## 2024-05-09 RX ORDER — HEPARIN SODIUM 1000 [USP'U]/ML
1.5 INJECTION, SOLUTION INTRAVENOUS; SUBCUTANEOUS
Status: COMPLETED | OUTPATIENT
Start: 2024-05-09 | End: 2024-05-09

## 2024-05-09 RX ORDER — MORPHINE SULFATE 2 MG/ML
0.5 INJECTION, SOLUTION INTRAMUSCULAR; INTRAVENOUS EVERY 2 HOUR PRN
Status: DISCONTINUED | OUTPATIENT
Start: 2024-05-09 | End: 2024-05-12

## 2024-05-09 RX ORDER — ALBUMIN (HUMAN) 12.5 G/50ML
25 SOLUTION INTRAVENOUS
Status: DISCONTINUED | OUTPATIENT
Start: 2024-05-09 | End: 2024-05-10

## 2024-05-09 RX ORDER — HYDROCODONE BITARTRATE AND ACETAMINOPHEN 5; 325 MG/1; MG/1
1 TABLET ORAL EVERY 4 HOURS PRN
Status: DISCONTINUED | OUTPATIENT
Start: 2024-05-09 | End: 2024-05-12

## 2024-05-09 RX ORDER — HYDROCODONE BITARTRATE AND ACETAMINOPHEN 5; 325 MG/1; MG/1
2 TABLET ORAL EVERY 4 HOURS PRN
Status: DISCONTINUED | OUTPATIENT
Start: 2024-05-09 | End: 2024-05-12

## 2024-05-09 RX ORDER — SEVELAMER CARBONATE 800 MG/1
800 TABLET, FILM COATED ORAL
Status: DISCONTINUED | OUTPATIENT
Start: 2024-05-09 | End: 2024-05-12

## 2024-05-09 NOTE — PLAN OF CARE
8349- Admitted from ED dx with SDH (on ASA & Plavix). On Arrival to the unit 1 unit of  Platelet given for ASA reversal. Cardene started to keep SBP <150. He is alert/oriented x 4. PERRLA, brisk. Denies double vision but L vision is slightly blurred; also with mild frontal headache. No focal weakness w/ 5/5 motor strength x 4 extremities.  NSR. Cardene to titrate to SBP <150. HOB at 30 degrees. Dysphagia screen done; passed. Oriented to the new room & discussed plan/ schedule of care.   5050- Waiting for CT brain result this morning. No acute change in LOC & neuro status.

## 2024-05-09 NOTE — SLP NOTE
ADULT SWALLOWING EVALUATION    ASSESSMENT    ASSESSMENT/OVERALL IMPRESSION:  Patient seen for swallowing evaluation per protocol. Patient presented to ED after sustaining fall. Imaging revealed R SDH with right to left midline shift of 2mm. Patient alert and up in bed, son present and supportive. Patient lives with son at home though son works 5 days per week. Patient known to this department from recent hospitalization during which he participated in VFSS with resulting recommendation for regular consistency solids and thin liquids, no straws due to observed deep laryngeal penetration to level of true vocal folds with no cough response. He reported swallowing well at home post discharge. 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 He is concerned for worsening given recent head trauma though no significant change observed allowing for lack of sleep due to required frequent neuro checks overnight.     Oral mechanism exam unremarkable.  Patient with intact oral retrieval and containment with solid and thin liquids from breakfast tray. Oral prep and transit WFL. Mastication WFL with complete oral clearance. No overt signs of aspiration observed with any po trials.      Recommend patient continue current diet observing aspiration precautions. SLP will follow up with ongoing assessment of po tolerance and completion of communication evaluation per protocol.  Given patient's son's concerns with cognition prior to this event, recommend  SLP to follow up with patient post discharge for further assessment and treatment of cognitive communication. Discussed above with patient, son and RN.     [place] Assessment of Swallow Function Score: No abnormality detected (200)    RECOMMENDATIONS   Diet Recommendations - Solids: Regular  Diet Recommendations - Liquids: Thin Liquids                           Aspiration Precautions: Upright position;Slow rate;Small bites  and sips  Medication Administration Recommendations:  (as tolerated)  Treatment Plan/Recommendations: Aspiration precautions;Communication evaluation    HISTORY   MEDICAL HISTORY  Reason for Referral: Stroke protocol    Problem List  Principal Problem:    Intracranial hemorrhage (HCC)  Active Problems:    Coronary artery disease of native artery of native heart with stable angina pectoris (HCC)    PAF (paroxysmal atrial fibrillation) (HCC)      Past Medical History  Past Medical History:    Aortic stenosis    Calculus of kidney    Coronary atherosclerosis    bare metal stents at [facility] Jan 2021    Diabetes (HCC)    Disorder of thyroid    Esophageal varices without bleeding, unspecified esophageal varices type (HCC)    GIB (gastrointestinal bleeding)    Hepatitis, unspecified    hepatitis C-just completed taking Harvoni in November 2016    High blood pressure    High cholesterol    Malignant neoplasm of trigone of urinary bladder (HCC)    Malignant neoplasm of urinary bladder, unspecified site (HCC)    Malignant neoplasm of urinary bladder, unspecified site (HCC)    Renal disorder    Visual impairment    reading glasses       Prior Living Situation: Home with support (lives with son)  Diet Prior to Admission: Regular;Thin liquids  Precautions: Aspiration;Seizure    Patient/Family Goals: to get better and go home    SWALLOWING HISTORY  Current Diet Consistency: Regular;Thin liquids  Dysphagia History: as above  Imaging Results:   Brain CT from 5/9/24 revealed:  CONCLUSION:    1. Stable right-sided extra-axial hematoma which is holocephalic with a bulbous central portion at the level of the right superior temporal lobe and frontal lobe maximum thickness at this level 2.2 cm with local mass effect with only 2 mm of right to   left midline shift.  The basal cisterns are patent.   2. New trace amount of blood in the posterior left occipital horn.  No hydrocephalus.   3. Trace amount of blood within the subdural  space of the interhemispheric fissure and along the right tentorium of the cerebellum.             LOCATION:  [id number]         Dictated by (CST): Zeyad Ponce MD on 5/09/2024 at 6:52 AM       Finalized by (CST): Zeyad Ponce MD on 5/09/2024 at 7:03 AM       SUBJECTIVE       OBJECTIVE   ORAL MOTOR EXAMINATION  Dentition: Natural;Functional  Symmetry: Within Functional Limits  Strength: Within Functional Limits  Tone: Within Functional Limits  Range of Motion: Within Functional Limits  Rate of Motion: Within Functional Limits    Voice Quality: Clear  Respiratory Status: Unlabored  Consistencies Trialed: Thin liquids;Hard solid  Method of Presentation: Self presentation;Cup;Single sips  Patient Positioning: Upright;Midline (in bed)    Oral Phase of Swallow: Within Functional Limits                      Pharyngeal Phase of Swallow: Within Functional Limits           (Please note: Silent aspiration cannot be evaluated clinically. Videofluoroscopic Swallow Study is required to rule-out silent aspiration.)    Esophageal Phase of Swallow: No complaints consistent with possible esophageal involvement              GOALS  Goal #1 The patient will tolerate regular consistency and thin liquids without overt signs or symptoms of aspiration with 100 % accuracy over 1-2 session(s).  In Progress   Goal #2 The patient/family/caregiver will demonstrate understanding and implementation of aspiration precautions and swallow strategies independently over 1-2 session(s).    In Progress   Goal #3 Patient will participate in cognitive communication evaluation  In Progress     FOLLOW UP  Treatment Plan/Recommendations: Aspiration precautions;Communication evaluation  Number of Visits to Meet Established Goals: 2  Follow Up Needed (Documentation Required): Yes  SLP Follow-up Date: 05/10/24    Thank you for your referral.   If you have any questions, please contact Anoop Marin MS CCC-SLP  Pager [phone]

## 2024-05-09 NOTE — CONSULTS
[facility]  Neurocritical Care Consult Note    Jonny Haque Patient Status:  Inpatient    4/15/1945 MRN [id number]   Location [facility] 6NE-A Attending Sindi Bruner, *   Hosp Day # 1 PCP Gee Jimenez MD       Reason for Consultation:   sdh    HPI:   Patient is a 79 year old male with a h/o htn, hl, dm2, cad, esrd on hd, hypothyroidism, and bladder ca p/w R sdh after mechanical fall . Pt reports he fell in bathroom  with head trauma, denies LOC, and had outpt ct  which revealed 2.2cm R holohemispheric sdh with assoc mass effect and 6mm midline shift, thus pt was transferred to [facility] for further monitoring.   Afib w/rvr this AM, started on dilt gtt.    Past Medical History:    Aortic stenosis    Calculus of kidney    Coronary atherosclerosis    bare metal stents at [facility] 2021    Diabetes (HCC)    Disorder of thyroid    Esophageal varices without bleeding, unspecified esophageal varices type (HCC)    GIB (gastrointestinal bleeding)    Hepatitis, unspecified    hepatitis C-just completed taking Harvoni in 2016    High blood pressure    High cholesterol    Malignant neoplasm of trigone of urinary bladder (HCC)    Malignant neoplasm of urinary bladder, unspecified site (HCC)    Malignant neoplasm of urinary bladder, unspecified site (HCC)    Renal disorder    Visual impairment    reading glasses       Past Surgical History:   Procedure Laterality Date    Angiogram  08/15/2017    small caliber RCA with 80% mid lesion.  LAD and left circumflex with 50% lesions.  LV shows inferobasilar aneurysm with scar.  Overall LV function preserved at the exterior 65%.    Cath bare metal stent (bms)      Other surgical history      Upper GI surgery    Other surgical history  2017    Cysto Dr. Lees    Other surgical history  2019    BTS Cysto Dr. Lees     Other surgical history  2020    BTS Cysto (Dr. Lees)       Medications Prior to Admission    Medication Sig Dispense Refill Last Dose    amiodarone 200 MG Oral Tab Take 1 tablet (200 mg total) by mouth daily. 30 tablet 1 5/8/2024 at 0800    clopidogrel 75 MG Oral Tab Take 1 tablet (75 mg total) by mouth daily. 90 tablet 3 5/8/2024 at 0800    losartan 50 MG Oral Tab Take 1 tablet (50 mg total) by mouth daily. (Patient taking differently: Take 0.5 tablets (25 mg total) by mouth daily. Dose decreased because of cough.) 90 tablet 3 5/8/2024 at 0800    metoprolol succinate ER 50 MG Oral Tablet 24 Hr Take 1 tablet (50 mg total) by mouth Daily Beta Blocker. 90 tablet 3 5/8/2024 at 0800    aspirin 81 MG Oral Tab EC Take 1 tablet (81 mg total) by mouth every other day.  0 5/7/2024 at 0800    levothyroxine 150 MCG Oral Tab Take 1 tablet (150 mcg total) by mouth every other day. Take one tablet every morning before breakfast   5/8/2024 at 0800    Ferric Citrate (AURYXIA) 1  MG(Fe) Oral Tab    5/7/2024    pantoprazole 40 MG Oral Tab EC Take 1 tablet (40 mg total) by mouth 2 (two) times daily before meals.   5/8/2024 at 0800    Sevelamer 800 MG Oral Tab Take 1 tablet (800 mg total) by mouth 3 (three) times daily with meals.   5/8/2024 at 1800    zolpidem 10 MG Oral Tab 1 tablet (10 mg total) nightly as needed.   5/6/2024 at 2200    Insulin Glargine, 2 Unit Dial, (TOUJEO MAX SOLOSTAR) 300 UNIT/ML Subcutaneous Solution Pen-injector Inject 20 Units into the skin nightly. 6 mL 2 5/7/2024 at 2200    rosuvastatin 10 MG Oral Tab Take 1 tablet (10 mg total) by mouth nightly. (Patient taking differently: Take 1 tablet (10 mg total) by mouth nightly.) 90 tablet 0 5/7/2024 at 2200    Insulin Lispro, 1 Unit Dial, (HUMALOG KWIKPEN) 100 UNIT/ML Subcutaneous Solution Pen-injector 12 units before meals with sliding scale. Max daily dose: 50 units. (Patient taking differently: 10 Units. Three times a day. Before meals) 60 mL 3 5/8/2024 at 1800    nitroGLYCERIN 0.3 MG Sublingual SL Tab Place 1 tablet (0.3 mg total) under the  tongue every 5 (five) minutes as needed for Chest pain (as needed for chest pain).       Insulin Pen Needle (TRUEPLUS PEN NEEDLES) 31G X 5 MM Does not apply Misc Use 5 per day 500 each 2     Glucose Blood (ONETOUCH ULTRA) In Vitro Strip 6 times a day 400 each 3     CONTOUR NEXT TEST In Vitro Strip TEST BLOOD SUGAR FOUR TIMES DAILY 400 strip 3     Blood Glucose Monitoring Suppl (REX CONTOUR NEXT MONITOR) W/DEVICE Does not apply Kit 1 Device by Does not apply route 4 (four) times daily. 1 kit 0      No Known Allergies  Social History     Socioeconomic History    Marital status:    Tobacco Use    Smoking status: Former     Current packs/day: 0.00     Average packs/day: 0.5 packs/day for 15.0 years (7.5 ttl pk-yrs)     Types: Cigarettes     Start date: 1965     Quit date: 1980     Years since quittin.2    Smokeless tobacco: Never    Tobacco comments:     quit at age 34   Vaping Use    Vaping status: Never Used   Substance and Sexual Activity    Alcohol use: No    Drug use: No     Family History   Problem Relation Age of Onset    Cancer Father     Hypertension Mother        Current Meds:  Current Facility-Administered Medications   Medication Dose Route Frequency    morphINE PF 2 MG/ML injection 0.5 mg  0.5 mg Intravenous Q2H PRN    Or    morphINE PF 2 MG/ML injection 1 mg  1 mg Intravenous Q2H PRN    Or    morphINE PF 2 MG/ML injection 2 mg  2 mg Intravenous Q2H PRN    HYDROcodone-acetaminophen (Norco) 5-325 MG per tab 1 tablet  1 tablet Oral Q4H PRN    Or    HYDROcodone-acetaminophen (Norco) 5-325 MG per tab 2 tablet  2 tablet Oral Q4H PRN    epoetin eunice (Epogen, Procrit) 81005 UNIT/ML injection 10,000 Units  10,000 Units Intravenous Once in dialysis    sodium chloride 0.9 % IV bolus 100 mL  100 mL Intravenous Q30 Min PRN    And    albumin human (Albumin) 25% injection 25 g  25 g Intravenous PRN Dialysis    heparin (Porcine) 1000 UNIT/ML injection 1,500 Units  1.5 mL Intracatheter Once     dilTIAZem 10 mg BOLUS FROM BAG infusion  10 mg Intravenous Once    And    dilTIAZem (cardIZEM) 100 mg in sodium chloride 0.9% 100 mL IVPB-ADDV  2.5-20 mg/hr Intravenous Continuous    polyethylene glycol (PEG 3350) (Miralax) 17 g oral packet 17 g  17 g Oral Daily PRN    sennosides (Senokot) tab 17.2 mg  17.2 mg Oral Nightly PRN    bisacodyl (Dulcolax) 10 MG rectal suppository 10 mg  10 mg Rectal Daily PRN    glucose (Dex4) 15 GM/59ML oral liquid 15 g  15 g Oral Q15 Min PRN    Or    glucose (Glutose) 40% oral gel 15 g  15 g Oral Q15 Min PRN    Or    glucose-vitamin C (Dex-4) chewable tab 4 tablet  4 tablet Oral Q15 Min PRN    Or    dextrose 50% injection 50 mL  50 mL Intravenous Q15 Min PRN    Or    glucose (Dex4) 15 GM/59ML oral liquid 30 g  30 g Oral Q15 Min PRN    Or    glucose (Glutose) 40% oral gel 30 g  30 g Oral Q15 Min PRN    Or    glucose-vitamin C (Dex-4) chewable tab 8 tablet  8 tablet Oral Q15 Min PRN    insulin aspart (NovoLOG) 100 Units/mL FlexPen 4-20 Units  4-20 Units Subcutaneous TID AC and HS    insulin degludec 100 units/mL flextouch 20 Units  20 Units Subcutaneous Nightly    levothyroxine (Synthroid) tab 150 mcg  150 mcg Oral Daily @ 0700    pantoprazole (Protonix) DR tab 40 mg  40 mg Oral BID AC    rosuvastatin (Crestor) tab 10 mg  10 mg Oral Nightly    acetaminophen (Tylenol) tab 650 mg  650 mg Oral Q4H PRN    Or    acetaminophen (Tylenol) rectal suppository 650 mg  650 mg Rectal Q4H PRN    labetalol (Trandate) 5 mg/mL injection 10 mg  10 mg Intravenous Q10 Min PRN    hydrALAzine (Apresoline) 20 mg/mL injection 10 mg  10 mg Intravenous Q2H PRN    ondansetron (Zofran) 4 MG/2ML injection 4 mg  4 mg Intravenous Q6H PRN    metoclopramide (Reglan) 5 mg/mL injection 5 mg  5 mg Intravenous Q8H PRN    levETIRAcetam (Keppra) 500 mg/5mL injection 500 mg  500 mg Intravenous Q12H    niCARdipine in sodium chloride 0.86% (carDENE) 20 mg/200mL infusion premix  5-15 mg/hr Intravenous Continuous PRN      Facility-Administered Medications Ordered in Other Encounters   Medication Dose Route Frequency    BCG Live 50 mg in sodium chloride 0.9 % 50 mL Intravesicle  50 mg Intravesical Once       Review of Systems:     Constitutional:    Denies unusual weight loss or weight gain, fever/chills or night sweats.  HEENT:                Denies changes in vision or difficulty swallowing.  Pulm:                    Denies dyspnea, cough, or sputum production  Cardiac:               Denies chest pain, palpitations or lower extremity edema.  GI:                         Denies constipation, heartburn or melena.  :                       Denies dysuria or hematuria.  Skin:                     Denies rashes or open areas.  Neuro:                  As per HPI    All other systems were reviewed and are negative.    Vitals:   Temp:  [97 °F (36.1 °C)-98.3 °F (36.8 °C)] 98.2 °F (36.8 °C)  Pulse:  [52-76] 75  Resp:  [11-22] 18  BP: (126-184)/(64-86) 143/64  SpO2:  [92 %-100 %] 95 %  Body mass index is 26.91 kg/m².    Intake/Output:    Intake/Output Summary (Last 24 hours) at 5/9/2024 0916  Last data filed at 5/9/2024 0400  Gross per 24 hour   Intake 303 ml   Output 300 ml   Net 3 ml       Physical Examination:   General- No acute distress  CV- irreg irreg  Resp- CTA Bilat  Neuro-  Mental status- awake and alert, regards and follows commands, oriented x3, speech fluent  Cranial nerves- pupils equally round and reactive to light, extraocular muscles intact, face symmetric, visual fields full  Motor- 5/5 throughout  Sens-  Intact to light touch    Diagnostics:   CT BRAIN OR HEAD (81068)    Result Date: 5/9/2024  CONCLUSION:  1. Stable right-sided extra-axial hematoma which is holocephalic with a bulbous central portion at the level of the right superior temporal lobe and frontal lobe maximum thickness at this level 2.2 cm with local mass effect with only 2 mm of right to left midline shift.  The basal cisterns are patent. 2. New trace  amount of blood in the posterior left occipital horn.  No hydrocephalus. 3. Trace amount of blood within the subdural space of the interhemispheric fissure and along the right tentorium of the cerebellum.     LOCATION:  [id number]   Dictated by (CST): Zeyad Ponce MD on 5/09/2024 at 6:52 AM     Finalized by (CST): Zeyad Ponce MD on 5/09/2024 at 7:03 AM        Lab Review     Recent Labs   Lab 05/08/24 1931 05/09/24  0413   * 138   K 3.7 3.4*   CL 99 100   CO2 21.0 26.0   * 158*   BUN 63* 72*   CREATSERUM 6.53* 6.72*     Recent Labs   Lab 05/08/24 1931 05/09/24  0413   WBC 5.4 5.3   HGB 11.6* 10.8*   PLT 75.0* 78.0*       Assesment/Plan:     Neuro:  R sdh- traumatic 2/2 mechanical fall.   Rpt ct and exam remain stable.   Management per Neurosurg.  Cont keppra for seizure prophylaxis per NS.  Cont neurochecks/pt/ot/st.  No further neurocritical care needs at this time, further management and outpt follow-up per Neurosurg, will follow peripherally please call with any questions.   Cardiac:  Htn/hl/cad/afib- sbp goal 100-150, cont statin. Management and dilt gtt per cards  Pulmonary:  Stable on RA  Renal:  ESRD per renal  GI:  PO as tolerated  Heme/ID:  Afebrile, no leukocytosis  Endocrine/Rheum:  Monitor glucose, sliding scale insulin prn  Hypothyroidism- Cont home synthroid  DVT Prophylaxis:  SCD’s    Goals of the Day: neurochecks   A total of 45 minutes of critical care time (exclusive of billable procedures) was administered to manage and/or prevent neurologic instability. This involved direct patient intervention, complex decision making, and/or extensive discussions with the patient, family, and clinical staff.    Thank you for allowing me to participate in the care of this patient.     Vanessa Bacon MD, [facility]  Medical Director of System Neurosciences  Chief, Section of Neurocritical Care  [facility]

## 2024-05-09 NOTE — PAYOR COMM NOTE
--------------  ADMISSION REVIEW   5/8-5/9  Payor: [employer]  Subscriber #:  [id number]  Authorization Number: [id number]    Admit date: 5/8/24  Admit time:  9:08 PM       REVIEW DOCUMENTATION:  ED Provider Notes signed by Ignacia De La Garza MD at 5/8/2024  8:40 PM    Patient Seen in: [facility] Emergency Department  History     Chief Complaint   Patient presents with    Fall    Head Neck Injury     Stated Complaint: Fall last noc, brusing to head and eyes. Told to go to the ED for eval by his c*    Subjective:   79-year-old  male with a past medical history as below including hypertension, diabetes, CAD presents after he fell and hit his head yesterday morning and had CT head done this afternoon showing intracranial hemorrhage.  Patient states that he was getting ready for dialysis yesterday morning when he felt a little lightheaded and felt like his left leg gave out causing him to fall to the ground and hit his head.  He denies LOC.  He states he had a bruise to his forehead afterwards but he was able to drive himself to dialysis.  Son states that bruising sta started coming down around his e this morning.  He saw his cardiologist today who ordered a CT as an outpatient.  He denies any nausea, visual changes, numbness or weakness of his extremities.  Denies chest pain, shortness of breath or palpitations prior to or after the fall.  Objective:   Past Medical History:    Calculus of kidney    Coronary atherosclerosis    bare metal stents at [facility] Jan 2021    Diabetes (HCC)    Disorder of thyroid    Esophageal varices without bleeding, unspecified esophageal varices type (HCC)    Hepatitis, unspecified    hepatitis C-just completed taking Harvoni in November 2016    High blood pressure    Malignant neoplasm of trigone of urinary bladder (HCC)    Malignant neoplasm of urinary bladder, unspecified site (HCC)    Malignant neoplasm of urinary bladder, unspecified site (HCC)    Renal disorder     Visual impairment    reading glasses   Positive for stated complaint: Fall last noc, brusing to head and eyes. Told to go to the ED for eval by his c*  Other systems are as noted in HPI.  Constitutional and vital signs reviewed.      All other systems reviewed and negative except as noted above.    Physical Exam     ED Triage Vitals [05/08/24 1920]   BP (!) 168/78   Pulse 65   Resp 16   Temp 97 °F (36.1 °C)   Temp src    SpO2 96 %   O2 Device None (Room air)     Current Vitals:   Vital Signs  BP: (!) 172/86  Pulse: 52  Resp: 18  Temp: 97 °F (36.1 °C)  MAP (mmHg): (!) 111    Oxygen Therapy  SpO2: 98 %  O2 Device: None (Room air)    Physical Exam  Vitals and nursing note reviewed.   Constitutional:       General: He is not in acute distress.     Appearance: He is well-developed. He is not ill-appearing.   HENT:      Head: Normocephalic.      Comments: Ecchymosis over forehead extending to periorbital areas     Mouth/Throat:      Mouth: Mucous membranes are moist.   Eyes:      General: No scleral icterus.     Extraocular Movements: Extraocular movements intact.   Musculoskeletal:      Cervical back: Neck supple. No rigidity or tenderness.   Skin:     General: Skin is warm and dry.      Capillary Refill: Capillary refill takes less than 2 seconds.   Neurological:      General: No focal deficit present.      Mental Status: He is alert and oriented to person, place, and time.      GCS: GCS eye subscore is 4. GCS verbal subscore is 5. GCS motor subscore is 6.   Psychiatric:         Mood and Affect: Mood normal.         Behavior: Behavior normal.      ED Course     Labs Reviewed   COMP METABOLIC PANEL (14) - Abnormal; Notable for the following components:       Result Value    Glucose 173 (*)     Sodium 133 (*)     BUN 63 (*)     Creatinine 6.53 (*)     Calculated Osmolality 298 (*)     eGFR-Cr 8 (*)     Alkaline Phosphatase 162 (*)     Globulin  4.5 (*)     A/G Ratio 0.8 (*)     All other components within normal limits    CBC W/ DIFFERENTIAL - Abnormal; Notable for the following components:    RBC 3.67 (*)     HGB 11.6 (*)     HCT 35.3 (*)     PLT 75.0 (*)     All other components within normal limits   CBC WITH DIFFERENTIAL WITH PLATELET    Narrative:     The following orders were created for panel order CBC With Differential With Platelet.  Procedure                               Abnormality         Status                     ---------                               -----------         ------                     CBC W/ DIFFERENTIAL[[id number]]          Abnormal            Final result                 Please view results for these tests on the individual orders.   PROTHROMBIN TIME (PT)   PTT, ACTIVATED   TROPONIN I HIGH SENSITIVITY   TYPE AND SCREEN    Narrative:     The following orders were created for panel order Type and screen.  Procedure                               Abnormality         Status                     ---------                               -----------         ------                     ABORH (Blood Type)[[id number]]                               In process                 Antibody Screen[[id number]]                                  In process                   Please view results for these tests on the individual orders.   ABORH (BLOOD TYPE)   ANTIBODY SCREEN   PREPARE PLATELETS   RAINBOW DRAW LAVENDER   RAINBOW DRAW LIGHT GREEN   RAINBOW DRAW BLUE   EKG    Rate, intervals and axes as noted on EKG Report.  Rate: 62  Rhythm: Sinus Rhythm  Reading: Normal sinus rhythm, LAD, nonspecific interventricular delay, nonspecific ST/T wave abnormality  A total of 52 minutes of critical care time (exclusive of billable procedures) was administered to manage the patient's critical imaging findings and neurologic instability due to his intracranial hemorrhage.  This involved direct patient intervention, complex decision making, and/or extensive discussions with the patient, family, and clinical staff.    MDM   79-year-old  male  with a past medical history as below including hypertension, diabetes, CAD presents after he fell and hit his head yesterday morning and had CT head done this afternoon showing intracranial hemorrhage.      Differential includes but is not limited to subdural hematoma, epidural hematoma    Son has report from CT done at 3:49 PM which reports a large holohemispheric.  Subdural hematoma along with concern for additional superimposed epidural hematomas measuring up to 2.2 cm in thickness causing mass effect and 6 mm midline shift.  There is also subdural blood along the falx anteriorly and thin subdural layering in the right tentorium.    Labs show ES ESRD with normal potassium along with chronic anemia.  Platelets and coags are normal.    Discussed with neurosurgery Dr. Perez who recommends treating with platelet transfusion and Keppra 1 g IV.  Discussed with neurocritical care Dr. Bacon.  Discussed with hospitalist Dr. Bruner.    Blood pressure has been elevated in the 170s, will start on nicardipine drip with goal less than 160.    Admission disposition: 5/8/2024  7:58 PM    Medical Decision Making  Amount and/or Complexity of Data Reviewed  Independent Historian: caregiver     Details: Son, see HPI  External Data Reviewed: radiology.     Details: See MDM  Labs: ordered. Decision-making details documented in ED Course.  ECG/medicine tests: ordered and independent interpretation performed. Decision-making details documented in ED Course.  Discussion of management or test interpretation with external provider(s): Neurosurgery, neurocritical care, hospitalist    Risk  Decision regarding hospitalization.    Disposition and Plan     Clinical Impression:  1. Intracranial hemorrhage (HCC)     Disposition:  Admit  5/8/2024  7:58 pm  Hospital Problems       Present on Admission  Date Reviewed: 3/19/2024            ICD-10-CM Noted POA    * (Principal) Intracranial hemorrhage (HCC) I62.9 5/8/2024 Unknown               5/8  Critical Care  Jonny Haque is a 79 year old male with PMHx significant for ESRD on HD, DM2, CAD s/p stent in 2017, 2021 and recently in March 2024, ischemic cardiomyopathy, HTN, HL, hypothyroidism who presented with CT scan showing SDH.  Pt fell yesterday in the bathroom.  He says his left leg gave out on him.  He fell hitting his face but did catch himself on his hands as well.  He denies LOC.  He was able to get up on his own and drive himself to HD.  Today he had an appointment at his cardiologist who ordered a CT head due to his eye bruising.  CT showed a large holohemispheric subdural hematoma with possible epidural hematoma measuring up to 2.2 cm and right to left midline shift of 6 mm.  One unit Platelet transfusion was given to reverse ASA.  He admits to CNICU for frequent neuro checks and close monitoring.  He is neurologically intact but does note new left eye mild blurring of vision.       Assessment/Plan:  SDH traumatic  -Neuro checks Q1h  -Maintain SBP between 100-150 mmHg  -Labetalol 10mg IV PRN, Hydralazine 10 mg IV PRN, Nicardipine gtt, Neosynephrine gtt to maintain SBP   -Hold all anticoagulants and antiplatelets   -One unit Platelets given for reversal of ASA  -Keppra 500 mg BID  -Seizure precautions  -CT scan in am, sooner if patient deteriorates   -PT, OT, and ST  -Neurosurgery consulted     ESRD on HD  -Renal consulted  -Receives HD T,TH,Sat     PAF  HTN, HL, ischemic cardiomyopathy, Aortic valve stenosis  CAD s/p stents 2017, 2021 and March 2024  -Last EF 45%  -Continue Crestor  -Hold home losartan, metoprolol and amiodarone tonight  -Hold ASA and Plavix (usually takes ASA every other day and Plavix daily, last dose ASA was Tuesday 5/7)     DM2  -Accu-check with ISS     F/E/N:  -IV fluids  -Follow lytes and replete prn  -AAT     ABCDEF Bundle  A- Assess, prevent and manage pain--prn pain medications  B- Room air  C- no Sedation  D- Alert and oriented  E- activity as tolerated  F- Family  engagement--Son updated at bedside  G- No central line or mccloud     Proph:  -No a/c at this time d/t evidence of bleeding  -SCD     Dispo:     Code Status: Full Code  -Neuro ICU monitoring          5/9 Nephrology  Reason for Consultation:  ESRD on HD/SDH     History of Present Illness:  Jonny Haque is a a(n) 79 year old man known to our service with mult med probs incl ESRD due to DM on HD TTHS at [facility] who notes that he fell in the bathroom 2 d ago while getting ready for HD and struck the L side of his forehead.  He was found to have B orbital bruising and had a cardiology appt yesterday which prompted an order for CT.  This revealed SDH.     Impression/Plan:     #1.  ESRD- due to DM.  HD today per usual routine     #2.  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  JIE for goal hgb 10-11 gms     #3.  SDH- imaging noted.  Neurosurgery following     #4.  HTN- parameters per neuro        5/9 Neurosurgery  REASON FOR CONSULTATION:  Intracranial hemorrhage (HCC)     HISTORY OF PRESENT ILLNESS      Jonny Haque is a pleasant 79 year old male with PMH of HTN, HLD, DM type II, CAD, ESRD on HD, hypothyroidism, and bladder cancer who presented to ED with abnormal head CT. Patient slipped in his bathroom yesterday and fell forward onto his hands, striking his face. Denied LOC. He drove himself to dialysis then later saw his cardiologist, who ordered an outside CT brain in setting of recent fall with significant cathryn-orbital ecchymosis/edema. CT revealed a large right holohemispheric SDH with mass effect and 6 mm midline shift, for which Neurosurgery is consulted. Pt was directed to the ED for evaluation and has been admitted to CNICU for close monitoring. Repeat CT demonstrates stability of bleed with decreased midline shift, now 2 mm. Currently, pt states he feels well. Reports a mild posterior headache and posterior neck pain. Denies dizziness, visual disturbance, changes in speech, numbness, paresthesias, or focal weakness. He is  ambulatory. Per nursing, had a run of a-fib with RVR this AM for which cardiology has been consulted. Pt takes Aspirin every other day and Plavix daily for cardiac stents placed in March 2024.     ASSESSMENT:  Acute right holohemispheric SDH      PLAN:  No acute surgical intervention is indicated  Discussed anticoagulation at length with patient, son, and cardiology  Hold Aspirin and Plavix for 1 week  Follow-up in clinic in 1 week with repeat CT brain to determine if anticoagulant/antiplatelet therapy can be resumed  OOB, may work with PT/OT  Medical management per hospitalist          5/9 Neurology  Reason for Consultation:   sdh     HPI:   Patient is a 79 year old male with a h/o htn, hl, dm2, cad, esrd on hd, hypothyroidism, and bladder ca p/w R sdh after mechanical fall 5/8. Pt reports he fell in bathroom 5/7 with head trauma, denies LOC, and had outpt ct 5/8 which revealed 2.2cm R holohemispheric sdh with assoc mass effect and 6mm midline shift, thus pt was transferred to cnicu for further monitoring.   Afib w/rvr this AM, started on dilt gtt.    Assesment/Plan:      Neuro:  R sdh- traumatic 2/2 mechanical fall.   Rpt ct and exam remain stable.   Management per Neurosurg.  Cont keppra for seizure prophylaxis per NS.  Cont neurochecks/pt/ot/st.  No further neurocritical care needs at this time, further management and outpt follow-up per Neurosurg, will follow peripherally please call with any questions.   Cardiac:  Htn/hl/cad/afib- sbp goal 100-150, cont statin. Management and dilt gtt per cards  Pulmonary:  Stable on RA  Renal:  ESRD per renal  GI:  PO as tolerated  Heme/ID:  Afebrile, no leukocytosis  Endocrine/Rheum:  Monitor glucose, sliding scale insulin prn  Hypothyroidism- Cont home synthroid  DVT Prophylaxis:  SCD’s        5/9 Cardiology  Reason for Consultation:  Atrial Fibrillation, CAD        History of Present Illness:  Jonny Haque is a a(n) 79 year old male. He has CAD, s/p PCI procedures in 2017,  2021, and most recently 3/19/24 (TRACY LM/LAD) and 3/22/24 (TRACY OM) for chest pain.  He has been using DAPT since then.  He has a hx of GI bleeds, cirrhosis, moderate AS, Paroxysmal Atrial Fibrillation on amiodarone, ESRD, on HD, Diabetes, Hypertension . On Tuesday he lost his balance and fell in his bathroom, hitting his forehead on the floor.  A head CT was done yesterday as op and showed a SDH.  This was confirmed on CT scan here this am.  No LOC or MS changes. Patient denies chest pain and dyspnea..  he had some Atrial Fibrillation RVR this am.  It converted to SR with IV cardizem.  He had missed some amiodarone doses.     Medication Infusions    dilTIAZem 10 mg/hr (05/09/24 0918)    niCARdipine Stopped (05/08/24 2335)        Lab 05/08/24 1931 05/09/24  0413   WBC 5.4 5.3   HGB 11.6* 10.8*   HCT 35.3* 34.7*   PLT 75.0* 78.0*         Chem:       Recent Labs   Lab 05/08/24 1931 05/09/24  0413   * 138   K 3.7 3.4*   CL 99 100   CO2 21.0 26.0   BUN 63* 72*   CREATSERUM 6.53* 6.72*   MG  --  2.3   ALT 24  --    AST 24  --    ALB 3.4  --        Impression:     1.  Paroxysmal Atrial Fibrillation.  Usually maintains SR with amiodarone, but some Atrial Fibrillation RVR occurred today briefly.  Not on ac due to frequent GI bleeds in past     2. CAD, s/p numerous PCI procedures,  most recently 3/19/24 (TRACY LM/LAD) and 3/22/24 (TRACY OM) for chest pain.   asymptomatic.     3. Fall with SDH.  He received plt transfusion     4. Cirrhosis     5. Hx of freq GIB     6. Diabetes     7. ESRD, HD     8. Hypertension        9. AS, moderate        Recommend:     1.  Telemetry  2. Resume amiodarone.  Will employ mini-load of 200 mg bid given today's Atrial Fibrillation episode and acute stress 2/2 SDH  3. Holding DAPT for now due to SDH, 5/7/24.  Discussed with neurosurgery.  Planning to repeat head CT in 1 week and consider resuming DAPT depending on stability of SDH.  He is at potentially high cardiac risk for MI and death as  stenting involved LM into LAD and only 3 weeks has elapsed since the left main stent was placed.  I discussed all of this with pt and his son          5/9 Neurosurgery  REASON FOR CONSULT:    R SDH     HISTORY OF PRESENT ILLNESS:  Jonny Haque is a(n) 79 year old male who fell in the bathroom 2 days ago.  He was seen at his cardiologists office for F/U after a recent stent procedure and obtained an outpatient CT showing an acute SDH with mass effect and reported 6 mm MLS.     He was transferred here and has no focal deficits.  A repeat CT head done shows a stable SDH with only 2 mm MLS.       ASSESSMENT AND PLAN:  [facility]  Discussed case with cardiology and in light of patient's recent LM stent the desire is to resume antiplatelet agents sooner.  Will plan for F/U and CT in 1 week with Dr. Krishna and possible [facility] embo prior resumption of antiplatelet agents pending results of CT  Ok to discharge  Call with questions      MEDICATIONS ADMINISTERED IN LAST 1 DAY:  Transfuse platelets       Date Action Dose Route User    5/8/2024 2246 Rate/Dose Change (none) Intravenous Viktoria King RN    5/8/2024 2145 Rate/Dose Change (none) Intravenous Viktoria King RN    5/8/2024 2140 New Bag (none) Intravenous Viktoria King RN          acetaminophen (Tylenol) tab 650 mg       Date Action Dose Route User    5/9/2024 0522 Given 650 mg Oral Viktoria King RN          dilTIAZem (cardIZEM) 100 mg in sodium chloride 0.9% 100 mL IVPB-ADDV       Date Action Dose Route User    5/9/2024 0918 New Bag 10 mg/hr Intravenous Zeyad Adamson RN          dilTIAZem 10 mg BOLUS FROM BAG infusion       Date Action Dose Route User    5/9/2024 0918 Bolus from Bag 10 mg Intravenous Zeyad Adamson RN          hydrALAzine (Apresoline) 20 mg/mL injection 10 mg       Date Action Dose Route User    5/8/2024 2239 Given 10 mg Intravenous Viktoria King RN          HYDROcodone-acetaminophen (Norco) 5-325 MG per tab 2  tablet       Date Action Dose Route User    5/9/2024 0818 Given 2 tablet Oral Zeyad Adamson RN          insulin aspart (NovoLOG) 100 Units/mL FlexPen 4-20 Units       Date Action Dose Route User    5/9/2024 1104 Given 16 Units Subcutaneous (Left Upper Arm) Zeyad Adamson RN    5/9/2024 0706 Given 4 Units Subcutaneous (Right Anterior Thigh) Viktoria King RN    5/8/2024 2159 Given 4 Units Subcutaneous (Right Upper Arm) Viktoria King, RN          insulin degludec 100 units/mL flextouch 20 Units       Date Action Dose Route User    5/8/2024 2319 Given 20 Units Subcutaneous (Left Upper Arm) Viktoria King RN          levETIRAcetam (Keppra) 500 mg/5mL injection 1,000 mg       Date Action Dose Route User    5/8/2024 2047 Given 1,000 mg Intravenous Zeyad Marquez RN          levETIRAcetam (Keppra) 500 mg/5mL injection 500 mg       Date Action Dose Route User    5/9/2024 0812 Given 500 mg Intravenous Zeyad Adamson RN          levothyroxine (Synthroid) tab 150 mcg       Date Action Dose Route User    5/9/2024 0700 Given 150 mcg Oral Viktoria King RN          morphINE PF 2 MG/ML injection 1 mg       Date Action Dose Route User    5/9/2024 0659 Given 1 mg Intravenous Nallely-Viktoria Bae, RN          niCARdipine in sodium chloride 0.86% (carDENE) 20 mg/200mL infusion premix       Date Action Dose Route User    5/8/2024 2130 Rate/Dose Change 2.5 mg/hr Intravenous Paat-Jose JudanMelissaa P, RN    5/8/2024 2122 New Bag 5 mg/hr Intravenous Paat-Jose JudanViktoria P, RN          niCARdipine in sodium chloride 0.86% (carDENE) 20 mg/200mL infusion premix       Date Action Dose Route User    5/8/2024 2324 Rate/Dose Change 2.5 mg/hr Intravenous Paat-AlinanViktoria, RN    5/8/2024 2305 Restarted 5 mg/hr Intravenous Paat-Tibudan, Viktoria P, RN          pantoprazole (Protonix) DR tab 40 mg       Date Action Dose Route User    5/9/2024 0700 Given 40 mg Oral Viktoria King RN    5/8/2024 3285  Given 40 mg Oral Viktoria King RN          potassium chloride (Klor-Con M20) tab 40 mEq       Date Action Dose Route User    5/9/2024 0952 Given 40 mEq Oral Zeyad Adamson RN          rosuvastatin (Crestor) tab 10 mg       Date Action Dose Route User    5/8/2024 2239 Given 10 mg Oral Viktoria King RN            Vitals (last day)       Date/Time Temp Pulse Resp BP SpO2 Weight O2 Device O2 Flow Rate (L/min) [facility]    05/09/24 1200 97.8 °F (36.6 °C) 74 14 107/66 93 % -- -- -- MP    05/09/24 1100 -- 68 15 131/63 95 % -- -- -- MP    05/09/24 1000 -- 83 17 120/67 93 % -- -- -- MP    05/09/24 0930 -- 87 13 131/63 96 % -- -- -- MP    05/09/24 0910 -- 139 18 132/87 91 % -- -- -- MP    05/09/24 0900 -- 75 18 143/64 95 % -- -- -- MP    05/09/24 0800 98.2 °F (36.8 °C) 67 12 142/78 95 % -- None (Room air) -- MP    05/09/24 0700 -- 68 19 144/79 96 % -- -- -- RP    05/09/24 0600 -- 69 15 133/70 97 % -- -- -- RP    05/09/24 0500 -- 76 20 149/74 94 % -- -- -- RP    05/09/24 0430 -- 73 19 144/70 95 % -- -- -- RP    05/09/24 0400 98.3 °F (36.8 °C) 72 22 153/67 98 % -- -- -- RP    05/09/24 0300 -- 66 16 134/80 95 % -- -- -- RP    05/09/24 0200 -- 64 13 134/74 92 % -- -- -- RP    05/09/24 0100 -- 66 12 142/79 96 % -- -- -- RP    05/09/24 0000 98.3 °F (36.8 °C) 73 18 142/75 95 % -- None (Room air) -- RP    05/08/24 2331 -- 72 17 139/68 96 % -- -- -- RP    05/08/24 2325 -- 72 17 126/68 97 % -- -- -- RP    05/08/24 2315 -- 70 19 137/71 98 % -- -- -- RP    05/08/24 2300 -- 69 17 166/77 99 % -- -- -- RP    05/08/24 2247 -- 62 21 -- 99 % 198 lb 6.6 oz -- -- RP    05/08/24 2246 -- 62 22 156/69 97 % -- -- -- RP    05/08/24 2245 98.1 °F (36.7 °C) 61 17 -- 98 % -- -- -- RP    05/08/24 2230 98.1 °F (36.7 °C) 60 15 155/70 96 % -- -- -- RP    05/08/24 2215 98.1 °F (36.7 °C) 62 16 152/68 95 % -- -- -- RP    05/08/24 2200 98.1 °F (36.7 °C) 64 19 146/75 95 % -- -- -- RP    05/08/24 2145 98.1 °F (36.7 °C) 63 20 151/64 96 % -- -- -- RP     05/08/24 2139 -- 61 15 151/64 96 % -- -- -- RP    05/08/24 2130 -- 62 14 153/74 94 % -- -- -- RP    05/08/24 2116 -- 58 11 176/73 99 % -- -- -- RP    05/08/24 2115 97.5 °F (36.4 °C) 65 19 184/77 100 % -- -- -- RP    05/08/24 2100 -- 57 19 162/68 95 % -- -- -- RP    05/08/24 2047 -- 58 18 146/73 98 % -- None (Room air) -- MM    05/08/24 2030 -- 55 17 139/76 96 % -- -- -- MM    05/08/24 20:03:31 -- -- -- -- -- -- None (Room air) -- MM    05/08/24 1946 -- 52 18 172/86 98 % -- None (Room air) -- MM    05/08/24 1920 97 °F (36.1 °C) 65 16 168/78 96 % 206 lb None (Room air) -- KS               Blood Transfusion Record       Product Unit Status Volume Start End            Transfuse platelets       24  [id number]  [id number] Stopped 183 mL 05/08/24 2140 05/08/24 2325

## 2024-05-09 NOTE — DISCHARGE INSTRUCTIONS
Hold Aspirin and Plavix for one week, until follow-up with Neurosurgery.    Neurosurgery Imaging Reminder  A CT BRAIN has been ordered for you at discharge.   Please call [phone] to schedule your imaging.   Your imaging should be completed in 1 week, prior to your follow-up appointment with Neurosurgery.      Sometimes managing your health at home requires assistance.  The [facility] team has recognized your preference to use [facility].  They can be reached by phone at [phone].  The fax number for your reference is [phone].  A representative from the home health agency will contact you or your family to schedule your first visit.     [facility] @ discharge  [phone]

## 2024-05-09 NOTE — H&P
[facility] Hospitalist H&P       CC:   Chief Complaint   Patient presents with    Fall    Head Neck Injury        PCP: Gee Jimenez MD    History of Present Illness:    Patient is a 79-year-old male with significant past medical history of CAD status post PCI in 2017, 2021 and most recently on 3/19/2024 and 3/22/2024 with 3 drug-eluting stents placed to the left main, LAD and OM on dual antiplatelet therapy, ischemic cardiomyopathy, hypothyroidism history of HCV cirrhosis cirrhosis, paroxysmal A-fib on amiodarone, moderate aortic stenosis, ESRD on hemodialysis, type 2 diabetes, hypertension, prior history of GI bleed presented with a fall after dizziness when he got up to go to dialysis.  He hit his head and the back.  He had some dizziness prior to the fall however states that his leg gave out -he was able to complete HD, he then went to follow-up with his cardiologist who ordered a CT head which showed a large h subdural hematoma with possible epidural hematoma with a right to left midline shift.  Given the fact the patient was on dual antiplatelet therapy was given platelet transfusion,  He has some headaches however no nausea no vomiting no focal neurological deficits, is not altered no lethargy no chest pain shortness of breath no lightheadedness currently has been able to ambulate    In the emergency room vital signs have been stable, blood pressure slightly elevated 162/68, nicardipine drip was started      Hemoglobin is stable at 10.8, glucose was slightly elevated he had a CT head that showed stable right-sided extra-axial hematoma with new trace amount of blood in the posterior left occipital horn with trace amount of blood within the subdural space of the interhemispheric fissure along the right tentorium of the cerebellum    Patient is admitted to the ICU, neurosurgery, neurology as well as cardiology were consulted as this patient went into A-fib with R      [facility]  Past Medical History:    Aortic  stenosis    Calculus of kidney    Coronary atherosclerosis    bare metal stents at [facility] Jan 2021    Diabetes (HCC)    Disorder of thyroid    Esophageal varices without bleeding, unspecified esophageal varices type (HCC)    GIB (gastrointestinal bleeding)    Hepatitis, unspecified    hepatitis C-just completed taking Harvoni in November 2016    High blood pressure    High cholesterol    Malignant neoplasm of trigone of urinary bladder (HCC)    Malignant neoplasm of urinary bladder, unspecified site (HCC)    Malignant neoplasm of urinary bladder, unspecified site (HCC)    Renal disorder    Visual impairment    reading glasses        PSH  Past Surgical History:   Procedure Laterality Date    Angiogram  08/15/2017    small caliber RCA with 80% mid lesion.  LAD and left circumflex with 50% lesions.  LV shows inferobasilar aneurysm with scar.  Overall LV function preserved at the exterior 65%.    Cath bare metal stent (bms)      Other surgical history      Upper GI surgery    Other surgical history  12/11/2017    Cysto Dr. Lees    Other surgical history  07/18/2019    BTS Cysto Dr. Lees     Other surgical history  02/06/2020    BTS Cysto (Dr. Lees)        ALL:  No Known Allergies     Home Medications:  Outpatient Medications Marked as Taking for the 5/8/24 encounter (Hospital Encounter)   Medication Sig Dispense Refill    amiodarone 200 MG Oral Tab Take 1 tablet (200 mg total) by mouth daily. 30 tablet 1    clopidogrel 75 MG Oral Tab Take 1 tablet (75 mg total) by mouth daily. 90 tablet 3    losartan 50 MG Oral Tab Take 1 tablet (50 mg total) by mouth daily. (Patient taking differently: Take 0.5 tablets (25 mg total) by mouth daily. Dose decreased because of cough.) 90 tablet 3    metoprolol succinate ER 50 MG Oral Tablet 24 Hr Take 1 tablet (50 mg total) by mouth Daily Beta Blocker. 90 tablet 3    aspirin 81 MG Oral Tab EC Take 1 tablet (81 mg total) by mouth every other day.  0    levothyroxine 150 MCG Oral  Tab Take 1 tablet (150 mcg total) by mouth every other day. Take one tablet every morning before breakfast      Ferric Citrate (AURYXIA) 1  MG(Fe) Oral Tab       pantoprazole 40 MG Oral Tab EC Take 1 tablet (40 mg total) by mouth 2 (two) times daily before meals.      Sevelamer 800 MG Oral Tab Take 1 tablet (800 mg total) by mouth 3 (three) times daily with meals.      zolpidem 10 MG Oral Tab 1 tablet (10 mg total) nightly as needed.      Insulin Glargine, 2 Unit Dial, (TOUJEO MAX SOLOSTAR) 300 UNIT/ML Subcutaneous Solution Pen-injector Inject 20 Units into the skin nightly. 6 mL 2    rosuvastatin 10 MG Oral Tab Take 1 tablet (10 mg total) by mouth nightly. (Patient taking differently: Take 1 tablet (10 mg total) by mouth nightly.) 90 tablet 0    Insulin Lispro, 1 Unit Dial, (HUMALOG KWIKPEN) 100 UNIT/ML Subcutaneous Solution Pen-injector 12 units before meals with sliding scale. Max daily dose: 50 units. (Patient taking differently: 10 Units. Three times a day. Before meals) 60 mL 3         Soc Hx  Social History     Tobacco Use    Smoking status: Former     Current packs/day: 0.00     Average packs/day: 0.5 packs/day for 15.0 years (7.5 ttl pk-yrs)     Types: Cigarettes     Start date: 1965     Quit date: 1980     Years since quittin.2    Smokeless tobacco: Never    Tobacco comments:     quit at age 34   Substance Use Topics    Alcohol use: No        Fam Hx  Family History   Problem Relation Age of Onset    Cancer Father     Hypertension Mother        Review of Systems  General: Denies unintentional weight loss, fevers, or chills-negative except for above  HEENT: Denies vision loss or double vision, denies hearing loss  Cardiovascular: Denies chest pain, palpitations, peripheral edema  Pulmonary: Denies cough, shortness of breath, or wheezing  Gastrointestinal: Denies abdominal pain, melena, or hematochezia  Genitourinary: Denies urinary frequency, urgency, and dysuria  Neurologic: Denies  numbness, headaches, focal weakness  Skin: Denies rashes, sores  Endocrine: Denies heat or cold intolerance, denies polydipsia  Hematologic: Denies abnormal bleeding or bruising     OBJECTIVE:  /65   Pulse 63   Temp 97.8 °F (36.6 °C) (Temporal)   Resp 16   Ht 6' (1.829 m)   Wt 198 lb 6.6 oz (90 kg)   SpO2 97%   BMI 26.91 kg/m²     BP Readings from Last 3 Encounters:   05/09/24 122/65   04/17/24 146/67   04/04/24 129/81     Wt Readings from Last 3 Encounters:   05/08/24 198 lb 6.6 oz (90 kg)   04/17/24 210 lb 8.6 oz (95.5 kg)   04/04/24 221 lb 5.5 oz (100.4 kg)       Wt Readings from Last 6 Encounters:   05/08/24 198 lb 6.6 oz (90 kg)   04/17/24 210 lb 8.6 oz (95.5 kg)   04/04/24 221 lb 5.5 oz (100.4 kg)   03/24/24 223 lb 1.6 oz (101.2 kg)   04/06/23 204 lb 6.4 oz (92.7 kg)   10/25/22 200 lb (90.7 kg)     Gen: No acute distress, alert and oriented x 3Pulm: Lungs clear bilaterally, good inspiratory effort   CV:  nL S1/S2  Abd: soft, NT/ND, no hepatomegaly, +BS  MSK: moving all extremities, no edema  Neuro: no focal deficits  Skin: no rashes/lesions  Psych: normal mood/affect          Diagnostic Data:    CBC/Chem  Recent Labs   Lab 05/08/24 1931 05/09/24 0413   WBC 5.4 5.3   HGB 11.6* 10.8*   MCV 96.2 98.9   PLT 75.0* 78.0*   INR 1.10  --        Recent Labs   Lab 05/08/24 1931 05/09/24 0413   * 138   K 3.7 3.4*   CL 99 100   CO2 21.0 26.0   BUN 63* 72*   CREATSERUM 6.53* 6.72*   * 158*   CA 9.2 9.2   MG  --  2.3   PHOS  --  6.5*       Recent Labs   Lab 05/08/24 1931   ALT 24   AST 24   ALB 3.4       No results for input(s): \"TROP\" in the last 168 hours.        Radiology: CT BRAIN OR HEAD (09957)    Result Date: 5/9/2024  PROCEDURE:  CT BRAIN OR HEAD (27729)  COMPARISON:  External Exams, CT, CT BRAIN OR HEAD (28068), 5/08/2024, 3:49 PM.  INDICATIONS:  SDH  TECHNIQUE:  Noncontrast CT scanning is performed through the brain. Dose reduction techniques were used. Dose information is  transmitted to the ACR (American College of Radiology) NRDR (National Radiology Data Registry) which includes the Dose Index Registry.  PATIENT STATED HISTORY: (As transcribed by Technologist)     FINDINGS:  VENTRICLES/SULCI:  No hydrocephalus.  There is a trace amount of new blood in the posterior aspect of the left occipital horn. INTRACRANIAL:  Again noted is a holocephalic right-sided subdural hematoma with a rounded/bulbous central portion involving the mid to high convexity of the right temporal frontal region.  This bulbous component is 2.2 cm thickened approximately 3.4 cm in AP dimension and is not significantly changed.  The subdural hematoma component posteriorly is slightly thicker measuring 3 mm consistent with redistribution.  Trace amount of blood is noted the intrahemispheric fissure maximum thickness 2.5 mm.  There is some blood consistent with subdural hematoma layering along the right tentorium of the cerebellum.  There is 2 mm of right to left midline shift.  There is underlying chronic microvascular disease in the cerebrum which is mild.  No abnormality noted within the brainstem or cerebellum.  The basal cisterns are patent.  The cerebellar tonsils are not low lying. SINUSES:           Stable moderate bilateral chronic sinus disease within the maxillary and to a lesser extent ethmoid sinuses.  No sign of acute sinusitis.  MASTOIDS:          No sign of acute inflammation. SKULL:             No evidence for fracture or osseous abnormality. OTHER:             Small frontal scalp hematoma again noted.            CONCLUSION:  1. Stable right-sided extra-axial hematoma which is holocephalic with a bulbous central portion at the level of the right superior temporal lobe and frontal lobe maximum thickness at this level 2.2 cm with local mass effect with only 2 mm of right to left midline shift.  The basal cisterns are patent. 2. New trace amount of blood in the posterior left occipital horn.  No  hydrocephalus. 3. Trace amount of blood within the subdural space of the interhemispheric fissure and along the right tentorium of the cerebellum.     LOCATION:  [place] [id number]   Dictated by (CST): Zeyad Ponce MD on 2024 at 6:52 AM     Finalized by (CST): Zeyad Ponce MD on 2024 at 7:03 AM       CARD ECHO 2D DOPPLER (CPT=93306)    Result Date: 2024  Transthoracic Echocardiogram Name:Jonny Haque Date: 2024 :  04/15/1945 Ht:  (71in)  BP: 150 / 78 MRN:  [id number]    Age:  79years    Wt:  (210lb) HR: 73bpm Loc:  [facility]       Gndr: M          BSA: 2.15m^2 Sonographer: Joshua FRANCO AE, PE Ordering:    Satya Donald MD Consulting:  Durga Fernando ---------------------------------------------------------------------------- History/Indications:   Coronary artery disease.  Pericardial effusion. ---------------------------------------------------------------------------- Procedure information:  A transthoracic complete 2D study was performed. Additional evaluation included M-mode, complete spectral Doppler, and color Doppler.  Patient status:  Inpatient.  Location:  Bedside.    Comparison was made to the study of 2024.    This was a routine study. Transthoracic echocardiography for ventricular function evaluation and assessment of pericardial effusion and hemodynamic status. Image quality was adequate. ECG rhythm:   Normal sinus  Study completion:  There were no complications. ---------------------------------------------------------------------------- Conclusions: 1. Left ventricle: The cavity size was normal. Wall thickness was mildly    increased. Systolic function was mildly reduced. The estimated ejection    fraction was 40-45%, by visual assessment. Doppler parameters are    consistent with abnormal left ventricular relaxation - grade 1 diastolic    dysfunction. 2. Left ventricle: There is mild hypokinesis of the apical lateral wall. 3. Left atrium: The atrium was mildly dilated. 4. Aortic  valve: The valve was trileaflet. The leaflets were moderately    calcified. Cusp separation was reduced. Transvalvular velocity was    increased, due to stenosis. The findings were consistent with moderate to    severe stenosis. The peak systolic velocity was 3.8m/sec. The mean    systolic gradient was 33mm Hg. The valve area (VTI) was 1.27cm^2. The    valve area (VTI) index was 0.59cm^2/m^2. The ratio of LVOT to aortic    valve peak velocity was 0.26. 5. Mitral valve: The annulus was mildly to moderately calcified. There was    mild regurgitation. 6. Pulmonary arteries: Systolic pressure was mildly increased, estimated to    be 32mm Hg. The peak systolic pressure is 32mm Hg. The end-diastolic    pressure is 18mm Hg. 7. Pericardium, extracardiac: A trivial pericardial effusion was identified.    There was no evidence of hemodynamic compromise. Impressions:  This study is compared with previous dated 4-02-24: * ---------------------------------------------------------------------------- * Findings: Left ventricle:  The cavity size was normal. Wall thickness was mildly increased. Systolic function was mildly reduced. The estimated ejection fraction was 40-45%, by visual assessment. No diagnostic evidence for diffuse regional wall motion abnormalities. Regional wall motion:   There is mild hypokinesis of the apical lateral wall.   Doppler parameters are consistent with abnormal left ventricular relaxation - grade 1 diastolic dysfunction. Left atrium:  The atrium was mildly dilated. Right ventricle:  The cavity size was normal. Systolic function was normal. Systolic pressure was at the upper limits of normal. Right atrium:  The atrium was normal in size. Mitral valve:  The annulus was mildly to moderately calcified. Leaflet separation was normal.  Doppler:  Transvalvular velocity was within the normal range. There was no evidence for stenosis. There was mild regurgitation. Aortic valve:   The valve was trileaflet. The  leaflets were moderately calcified. Cusp separation was reduced.  Doppler:  Transvalvular velocity was increased, due to stenosis. The findings were consistent with moderate to severe stenosis. There was no significant regurgitation.    The valve area (VTI) was 1.27cm^2. The valve area (VTI) index was 0.59cm^2/m^2.    The mean systolic gradient was 33mm Hg. The peak systolic gradient was 58mm Hg. Tricuspid valve:  The valve is structurally normal. Leaflet separation was normal.  Doppler:  Transvalvular velocity was within the normal range. There was no evidence for stenosis. There was mild regurgitation. Pulmonic valve:    There was no significant valve disease.    Doppler: Transvalvular velocity was within the normal range. There was no evidence for stenosis. There was trivial regurgitation. Pericardium:  A trivial pericardial effusion was identified. There was no evidence of hemodynamic compromise. Aorta: Aortic root: The aortic root was normal-sized. Ascending aorta: The ascending aorta was normal. Pulmonary arteries: Systolic pressure was mildly increased, estimated to be 32mm Hg. Systemic veins: Inferior vena cava: The IVC was mildly dilated.  Respirophasic diameter changes are in the normal range (> 50%). ---------------------------------------------------------------------------- Measurements  Left              Value             Ref       04/04/2024  ventricle  IVS           (H) 1.1   cm          0.6 - 1.0 1.0  thickness,  ED, PLAX  LV ID, ED,        5.3   cm          4.2 - 5.8 5.6  PLAX  LV ID, ES,        3.8   cm          2.5 - 4.0 4.1  PLAX  LV PW         (H) 1.1   cm          0.6 - 1.0 1.0  thickness,  ED, PLAX  IVS/LV PW         1.00              --------- 1.04  ratio, ED,  PLAX  LV PW/LV ID       0.21              --------- 0.18  ratio, ED,  PLAX  LV ejection   (L) 50    %           52 - 72   51  fraction  Stroke            41    ml/m^2      --------- ----------  volume/bsa,  2D  LV e',            6.4    cm/sec      --------- ----------  average  LV E/e',          11                <=14      ----------  average  LVOT              Value             Ref       04/04/2024  LVOT ID           2.3   cm          --------- ----------  LVOT peak         0.98  m/sec       --------- ----------  velocity, S  LVOT VTI, S       21.4  cm          --------- ----------  LVOT mean         2     mm Hg       --------- ----------  gradient, S  Stroke volume     89    ml          --------- ----------  (SV), LVOT DP  Cardiac           6.1   L/min       --------- ----------  output (Qs),  LVOT DP  Cardiac index     2.8   L/(min-m^2) --------- ----------  (Qs/bsa),  LVOT DP  Stroke index      41    ml/m^2      --------- ----------  (SV/bsa),  LVOT DP  Aortic valve      Value             Ref       04/04/2024  Aortic valve      3.8   m/sec       --------- ----------  peak  velocity, S  Aortic valve      70.0  cm          --------- ----------  VTI, S  Aortic mean       33    mm Hg       --------- ----------  gradient, S  Aortic peak       58    mm Hg       --------- ----------  gradient, S  Aortic valve      1.27  cm^2        --------- ----------  area, VTI  Aortic valve      0.59  cm^2/m^2    --------- ----------  area/bsa, VTI  Velocity          0.26              --------- ----------  ratio, peak,  LVOT/AV  Aortic root       Value             Ref       04/04/2024  Aortic root       3.8   cm          2.8 - 4.2 3.7  ID, ED  Ascending         Value             Ref       04/04/2024  aorta  Ascending         3.2   cm          2.2 - 3.8 3.2  aorta ID,  A-P, ED  Left atrium       Value             Ref       04/04/2024  LA ID, A-P,   (H) 4.7   cm          3.0 - 4.0 4.3  ES  LA volume,    (H) 93    ml          18 - 58   83  ES, 1-p A4C  LA/aortic         1.24              --------- 1.16  root ratio  LA ID/bsa,        2.3   cm/m^2      1.5 - 2.3 ----------  A-P, ES, MM  Mitral valve      Value             Ref       04/04/2024  Mitral E-wave     0.73   m/sec       --------- ----------  peak velocity  Mitral A-wave     1.09  m/sec       --------- ----------  peak velocity  Mitral E/A        0.7               --------- ----------  ratio, peak  Pulmonary         Value             Ref       04/04/2024  artery  PA pressure,      32    mm Hg       --------- ----------  S, DP  PA pressure,      18    mm Hg       --------- ----------  ED, DP  Tricuspid         Value             Ref       04/04/2024  valve  Tricuspid         2.34  m/sec       <=2.8     ----------  regurg peak  velocity  Tricuspid         22    mm Hg       --------- ----------  peak RV-RA  gradient  Systemic          Value             Ref       04/04/2024  veins  Estimated CVP     10    mm Hg       --------- ----------  Right             Value             Ref       04/04/2024  ventricle  RV pressure,      32    mm Hg       --------- ----------  S, DP  Pulmonic          Value             Ref       04/04/2024  valve  Pulmonic          1.4   m/sec       --------- ----------  regurg  velocity, ED  Pulmonic          8     mm Hg       --------- ----------  regurg  gradient, ED Legend: (L)  and  (H)  gali values outside specified reference range. ---------------------------------------------------------------------------- Prepared and electronically signed by Francisco Espinoza MD 04/18/2024 08:06     XR CHEST AP PORTABLE  (CPT=71045)    Result Date: 4/16/2024  PROCEDURE:  XR CHEST AP PORTABLE  (CPT=71045)  TECHNIQUE:  AP chest radiograph was obtained.  COMPARISON:  [facility] , XR, XR CHEST AP PORTABLE  (CPT=71045), 3/31/2024, 12:49 PM.  INDICATIONS:  cp recent stent plcement  PATIENT STATED HISTORY: (As transcribed by Technologist)  Patient offered no additional history at this time    FINDINGS:  Tunneled right internal jugular dialysis catheter is noted in stable position.  Lung for portable technique the lungs are clear.  Heart size is within normal limits.  Aorta is atherosclerotic.  Chest wall structures are  unremarkable.            CONCLUSION:  There is no evidence of active cardiopulmonary disease on this single portable chest radiograph.   LOCATION:  [id number]      Dictated by (CST): Humberto Altman MD on 4/16/2024 at 5:42 PM     Finalized by (CST): Humberto Altman MD on 4/16/2024 at 5:43 PM              ASSESSMENT / PLAN:         Patient is a 79-year-old male with significant past medical history of CAD status post PCI in 2017, 2021 and most recently on 3/19/2024 and 3/22/2024 with 3 drug-eluting stents placed to the left main, LAD and OM on dual antiplatelet therapy, ischemic cardiomyopathy, hypothyroidism history of HCV cirrhosis cirrhosis, paroxysmal A-fib on amiodarone, moderate aortic stenosis, ESRD on hemodialysis, type 2 diabetes, hypertension, prior history of GI bleed presented with a fall after dizziness when he got up to go to dialysis.  He hit his head and the back.        # Fall, acute subdural hematoma  -Exacerbated by patient being on dual antiplatelet therapy  -CT head reviewed, no focal neurological symptoms  -Nicardipine drip has been switched off, blood pressure management per neurosurgery and neurology  -Keppra IV twice daily      # Paroxysmal A-fib with RVR  -Heart rate went into 130s to 140s, cardiology consulted  -Amiodarone, no antiplatelets or anticoagulant secondary to above  -Diltiazem drip  -Converted back to sinus rhythm    # CAD status post PCI with recent in March 2024  -Given this is a fresh stent, and holding antiplatelets  -Resume when safe per neurosurgery and cardiology  -Continue statin, diltiazem    # ESRD  -HD per nephrology  # Hypothyroidism  # HSV cirrhosis  # Moderate aortic stenosis  # Ischemic cardiomyopathy    # Type 2 diabetes  -Continue 20 units of insulin, sliding scale    Prophy:  DVT: SCDs  Deconditioning prevention: PT OT     Dispo: admit to ICU 95    Outpatient records reviewed confirming patient's medical history and medications.     Further recommendations pending  patient's clinical course.  DM hospitalist to continue to follow patient while in house    Time spent: greater than 95 minutes spent in d/w pt/family, coordination of care, and d/w staff.     Lance vasquez MD  Internal Medicine  DM Hospitalist  Pager: [phone]      **Certification      PHYSICIAN Certification of Need for Inpatient Hospitalization - Initial Certification    Patient will require inpatient services that will reasonably be expected to span two midnight's based on the clinical documentation in H+P.   Based on patients current state of illness, I anticipate that, after discharge, patient will require TBD.

## 2024-05-09 NOTE — CONSULTS
[facility] Consultation    Jonny Haque Patient Status:  Inpatient    4/15/1945 MRN [id number]   [facility] 6NE-A Attending Sindi Bruner, *   Hosp Day # 1 PCP Gee Jimenez MD     Reason for Consultation:  Atrial Fibrillation, CAD      History of Present Illness:  Jonny Haque is a a(n) 79 year old male. He has CAD, s/p PCI procedures in , , and most recently 3/19/24 (TRACY LM/LAD) and 3/22/24 (TRACY OM) for chest pain.  He has been using DAPT since then.  He has a hx of GI bleeds, cirrhosis, moderate AS, Paroxysmal Atrial Fibrillation on amiodarone, ESRD, on HD, Diabetes, Hypertension . On Tuesday he lost his balance and fell in his bathroom, hitting his forehead on the floor.  A head CT was done yesterday as op and showed a SDH.  This was confirmed on CT scan here this am.  No LOC or MS changes. Patient denies chest pain and dyspnea..  he had some Atrial Fibrillation RVR this am.  It converted to SR with IV cardizem.  He had missed some amiodarone doses.     History:  Past Medical History:    Aortic stenosis    Calculus of kidney    Coronary atherosclerosis    bare metal stents at [facility] 2021    Diabetes (HCC)    Disorder of thyroid    Esophageal varices without bleeding, unspecified esophageal varices type (HCC)    GIB (gastrointestinal bleeding)    Hepatitis, unspecified    hepatitis C-just completed taking Harvoni in 2016    High blood pressure    High cholesterol    Malignant neoplasm of trigone of urinary bladder (HCC)    Malignant neoplasm of urinary bladder, unspecified site (HCC)    Malignant neoplasm of urinary bladder, unspecified site (HCC)    Renal disorder    Visual impairment    reading glasses     Past Surgical History:   Procedure Laterality Date    Angiogram  08/15/2017    small caliber RCA with 80% mid lesion.  LAD and left circumflex with 50% lesions.  LV shows inferobasilar aneurysm with scar.  Overall LV function preserved at the  exterior 65%.    Cath bare metal stent (bms)      Other surgical history      Upper GI surgery    Other surgical history  12/11/2017    Caroline Lees    Other surgical history  07/18/2019    BTS Caroline Lees     Other surgical history  02/06/2020    [facility]TS Cysto (Dr. Lees)     Family History   Problem Relation Age of Onset    Cancer Father     Hypertension Mother          Allergies:  No Known Allergies    Medications:   epoetin eunice  10,000 Units Intravenous Once in dialysis    heparin  1.5 mL Intracatheter Once    insulin aspart  4-20 Units Subcutaneous TID AC and HS    insulin degludec  20 Units Subcutaneous Nightly    levothyroxine  150 mcg Oral Daily @ 0700    pantoprazole  40 mg Oral BID AC    rosuvastatin  10 mg Oral Nightly    levETIRAcetam  500 mg Intravenous Q12H       Continuous Infusions:   dilTIAZem 10 mg/hr (05/09/24 0918)    niCARdipine Stopped (05/08/24 2335)       Social History:   reports that he quit smoking about 44 years ago. His smoking use included cigarettes. He started smoking about 59 years ago. He has a 7.5 pack-year smoking history. He has never used smokeless tobacco. He reports that he does not drink alcohol and does not use drugs.    Review of Systems:  All systems were reviewed and are negative except as described above in HPI.    Physical Exam:      Wt Readings from Last 3 Encounters:   05/08/24 198 lb 6.6 oz (90 kg)   04/17/24 210 lb 8.6 oz (95.5 kg)   04/04/24 221 lb 5.5 oz (100.4 kg)       Vitals:    05/09/24 0600 05/09/24 0700 05/09/24 0800 05/09/24 0900   BP: 133/70 144/79 142/78 143/64   BP Location:   Left arm    Pulse: 69 68 67 75   Resp: 15 19 12 18   Temp:   98.2 °F (36.8 °C)    TempSrc:   Temporal    SpO2: 97% 96% 95% 95%   Weight:       Height:           Temp:  [97 °F (36.1 °C)-98.3 °F (36.8 °C)] 98.2 °F (36.8 °C)  Pulse:  [52-76] 75  Resp:  [11-22] 18  BP: (126-184)/(64-86) 143/64  SpO2:  [92 %-100 %] 95 %    Temp: 98.2 °F (36.8 °C)  Pulse: 75  Resp: 18  BP:  143/64      General:  Appears comfortable  HEENT: No focal deficits .  Facial ecchymoses.  Neck: No JVD, carotids 2+ no bruits.  Cardiac: Regular S1S2.  No S3, S4, rub, click.  No murmur.  Lungs: Clear to auscultation and percussion.  Abdomen: Soft, non-tender.   Extremities: No LE edema.  No clubbing or cyanosis  Neurologic: Alert and oriented, normal affect.  Skin: Warm and dry.     Labs:      HEM:  Recent Labs   Lab 05/08/24 1931 05/09/24 0413   WBC 5.4 5.3   HGB 11.6* 10.8*   HCT 35.3* 34.7*   PLT 75.0* 78.0*       Chem:  Recent Labs   Lab 05/08/24 1931 05/09/24 0413   * 138   K 3.7 3.4*   CL 99 100   CO2 21.0 26.0   BUN 63* 72*   CREATSERUM 6.53* 6.72*   MG  --  2.3   ALT 24  --    AST 24  --    ALB 3.4  --        Recent Labs   Lab 05/08/24 1931   INR 1.10                     No results found for: \"TROP\", \"CKMB\"      Invalid input(s): \"PBNPML\"                 Telemetry: SR    Laboratories and Data:  Diagnostics:    EKG, 5/9/2024:  Atrial Fibrillation, RVR, IVCD---> NSR, IVCD    CXR, 5/9/2024:            Impression:    1.  Paroxysmal Atrial Fibrillation.  Usually maintains SR with amiodarone, but some Atrial Fibrillation RVR occurred today briefly.  Not on ac due to frequent GI bleeds in past    2. CAD, s/p numerous PCI procedures,  most recently 3/19/24 (TRACY LM/LAD) and 3/22/24 (TRACY OM) for chest pain.   asymptomatic.    3. Fall with SDH.  He received plt transfusion    4. Cirrhosis    5. Hx of freq GIB    6. Diabetes    7. ESRD, HD    8. Hypertension       9. AS, moderate      Recommend:    1.  Telemetry  2. Resume amiodarone.  Will employ mini-load of 200 mg bid given today's Atrial Fibrillation episode and acute stress 2/2 SDH  3. Holding DAPT for now due to SDH, 5/7/24.  Discussed with neurosurgery.  Planning to repeat head CT in 1 week and consider resuming DAPT depending on stability of SDH.  He is at potentially high cardiac risk for MI and death as stenting involved LM into LAD and only 3  weeks has elapsed since the left main stent was placed.  I discussed all of this with pt and his son          Yogi Espinoza MD  5/9/2024  10:08 AM

## 2024-05-09 NOTE — CONSULTS
[facility]  Report of Consultation    Jonny Haque Patient Status:  Inpatient    4/15/1945 MRN [id number]   Location [facility] 6NE-A Attending Sindi Bruner, *   Hosp Day # 1 PCP Gee Jimenez MD     Reason for Consultation:  ESRD on HD/SDH    History of Present Illness:  Jonny Haque is a a(n) 79 year old man known to our service with mult med probs incl ESRD due to DM on HD TTHS at [facility] who notes that he fell in the bathroom 2 d ago while getting ready for HD and struck the L side of his forehead.  He was found to have B orbital bruising and had a cardiology appt yesterday which prompted an order for CT.  This revealed SDH.    History:  Past Medical History:    Aortic stenosis    Calculus of kidney    Coronary atherosclerosis    bare metal stents at [facility] 2021    Diabetes (HCC)    Disorder of thyroid    Esophageal varices without bleeding, unspecified esophageal varices type (HCC)    GIB (gastrointestinal bleeding)    Hepatitis, unspecified    hepatitis C-just completed taking Harvoni in 2016    High blood pressure    High cholesterol    Malignant neoplasm of trigone of urinary bladder (HCC)    Malignant neoplasm of urinary bladder, unspecified site (HCC)    Malignant neoplasm of urinary bladder, unspecified site (HCC)    Renal disorder    Visual impairment    reading glasses     Past Surgical History:   Procedure Laterality Date    Angiogram  08/15/2017    small caliber RCA with 80% mid lesion.  LAD and left circumflex with 50% lesions.  LV shows inferobasilar aneurysm with scar.  Overall LV function preserved at the exterior 65%.    Cath bare metal stent (bms)      Other surgical history      Upper GI surgery    Other surgical history  2017    Cysto Dr. Lees    Other surgical history  2019    BTS Cysto Dr. Lees     Other surgical history  2020    BTS Cysto (Dr. Lees)     Family History   Problem Relation Age of Onset    Cancer Father      Hypertension Mother       reports that he quit smoking about 44 years ago. His smoking use included cigarettes. He started smoking about 59 years ago. He has a 7.5 pack-year smoking history. He has never used smokeless tobacco. He reports that he does not drink alcohol and does not use drugs.    Allergies:  No Known Allergies    Medications:    Current Facility-Administered Medications:     morphINE PF 2 MG/ML injection 0.5 mg, 0.5 mg, Intravenous, Q2H PRN **OR** morphINE PF 2 MG/ML injection 1 mg, 1 mg, Intravenous, Q2H PRN **OR** morphINE PF 2 MG/ML injection 2 mg, 2 mg, Intravenous, Q2H PRN    HYDROcodone-acetaminophen (Norco) 5-325 MG per tab 1 tablet, 1 tablet, Oral, Q4H PRN **OR** HYDROcodone-acetaminophen (Norco) 5-325 MG per tab 2 tablet, 2 tablet, Oral, Q4H PRN    polyethylene glycol (PEG 3350) (Miralax) 17 g oral packet 17 g, 17 g, Oral, Daily PRN    sennosides (Senokot) tab 17.2 mg, 17.2 mg, Oral, Nightly PRN    bisacodyl (Dulcolax) 10 MG rectal suppository 10 mg, 10 mg, Rectal, Daily PRN    glucose (Dex4) 15 GM/59ML oral liquid 15 g, 15 g, Oral, Q15 Min PRN **OR** glucose (Glutose) 40% oral gel 15 g, 15 g, Oral, Q15 Min PRN **OR** glucose-vitamin C (Dex-4) chewable tab 4 tablet, 4 tablet, Oral, Q15 Min PRN **OR** dextrose 50% injection 50 mL, 50 mL, Intravenous, Q15 Min PRN **OR** glucose (Dex4) 15 GM/59ML oral liquid 30 g, 30 g, Oral, Q15 Min PRN **OR** glucose (Glutose) 40% oral gel 30 g, 30 g, Oral, Q15 Min PRN **OR** glucose-vitamin C (Dex-4) chewable tab 8 tablet, 8 tablet, Oral, Q15 Min PRN    insulin aspart (NovoLOG) 100 Units/mL FlexPen 4-20 Units, 4-20 Units, Subcutaneous, TID AC and HS    insulin degludec 100 units/mL flextouch 20 Units, 20 Units, Subcutaneous, Nightly    levothyroxine (Synthroid) tab 150 mcg, 150 mcg, Oral, Daily @ 0700    pantoprazole (Protonix) DR tab 40 mg, 40 mg, Oral, BID AC    rosuvastatin (Crestor) tab 10 mg, 10 mg, Oral, Nightly    acetaminophen (Tylenol) tab 650  mg, 650 mg, Oral, Q4H PRN **OR** acetaminophen (Tylenol) rectal suppository 650 mg, 650 mg, Rectal, Q4H PRN    labetalol (Trandate) 5 mg/mL injection 10 mg, 10 mg, Intravenous, Q10 Min PRN    hydrALAzine (Apresoline) 20 mg/mL injection 10 mg, 10 mg, Intravenous, Q2H PRN    ondansetron (Zofran) 4 MG/2ML injection 4 mg, 4 mg, Intravenous, Q6H PRN    metoclopramide (Reglan) 5 mg/mL injection 5 mg, 5 mg, Intravenous, Q8H PRN    levETIRAcetam (Keppra) 500 mg/5mL injection 500 mg, 500 mg, Intravenous, Q12H    niCARdipine in sodium chloride 0.86% (carDENE) 20 mg/200mL infusion premix, 5-15 mg/hr, Intravenous, Continuous PRN    Facility-Administered Medications Ordered in Other Encounters:     BCG Live 50 mg in sodium chloride 0.9 % 50 mL Intravesicle, 50 mg, Intravesical, Once  No current outpatient medications on file.       Review of Systems:  Denies fever/chills  Denies wt loss/gain  Denies HA   Denies CP or palpitations  Denies SOB/cough/hemoptysis  Denies abd or flank pain  Denies N/V/D  Denies change in urinary habits or gross hematuria  Denies LE edema  Denies skin rashes/myalgias/arthralgias      Physical Exam:   /78 (BP Location: Left arm)   Pulse 67   Temp 98.2 °F (36.8 °C) (Temporal)   Resp 12   Ht 6' (1.829 m)   Wt 198 lb 6.6 oz (90 kg)   SpO2 95%   BMI 26.91 kg/m²   Temp (24hrs), Av °F (36.7 °C), Min:97 °F (36.1 °C), Max:98.3 °F (36.8 °C)       Intake/Output Summary (Last 24 hours) at 2024 0826  Last data filed at 2024 0400  Gross per 24 hour   Intake 303 ml   Output 300 ml   Net 3 ml     Last 3 Weights   24 2247 198 lb 6.6 oz (90 kg)   24 1920 206 lb (93.4 kg)   24 0301 210 lb 8.6 oz (95.5 kg)   24 0000 221 lb 5.5 oz (100.4 kg)   24 2330 210 lb 8.6 oz (95.5 kg)   24 0515 221 lb 5.5 oz (100.4 kg)   24 0559 219 lb 9.3 oz (99.6 kg)   24 0654 223 lb 8.7 oz (101.4 kg)   24 0530 222 lb 4.8 oz (100.8 kg)   24 1559 220 lb 10.9 oz  (100.1 kg)   03/31/24 1225 223 lb (101.2 kg)   03/24/24 0500 223 lb 1.6 oz (101.2 kg)   03/21/24 0638 225 lb 8 oz (102.3 kg)   03/20/24 0600 221 lb 1.9 oz (100.3 kg)   03/19/24 0400 223 lb 3.2 oz (101.2 kg)   03/18/24 0122 221 lb 9 oz (100.5 kg)   04/06/23 0600 204 lb 6.4 oz (92.7 kg)   04/05/23 0212 207 lb 3.7 oz (94 kg)     General: Alert and oriented in no apparent distress.  HEENT: No scleral icterus, MMM, + ecchymosis B orbits, L forehead  Neck: Supple, no JADE or thyromegaly  Cardiac: Regular rate and rhythm, S1, S2 normal, no murmur or rub  Lungs: Clear without wheezes, rales, rhonchi.    Abdomen: Soft, non-tender. + bowel sounds, no palpable organomegaly  Extremities: Without clubbing, cyanosis or edema.  Neurologic: Alert and oriented, cranial nerves grossly intact, moving all extremities  Skin: Warm and dry, no rashes      Laboratory Data:  Lab Results   Component Value Date    WBC 5.3 05/09/2024    HGB 10.8 05/09/2024    HCT 34.7 05/09/2024    PLT 78.0 05/09/2024    CREATSERUM 6.72 05/09/2024    BUN 72 05/09/2024     05/09/2024    K 3.4 05/09/2024     05/09/2024    CO2 26.0 05/09/2024     05/09/2024    CA 9.2 05/09/2024    ALB 3.4 05/08/2024    ALKPHO 162 05/08/2024    BILT 0.5 05/08/2024    TP 7.9 05/08/2024    AST 24 05/08/2024    ALT 24 05/08/2024    PTT 31.9 05/08/2024    INR 1.10 05/08/2024    PTP 14.2 05/08/2024    MG 2.3 05/09/2024    PGLU 155 05/09/2024       Glucose   Date Value   02/09/2022 129 MG/DL (H)   06/07/2016 128 mg/dL (H)   03/07/2013 359 milligrams per deciliter (H)     BUN (mg/dL)   Date Value   05/09/2024 72 (H)   05/08/2024 63 (H)   04/17/2024 48 (H)     Blood Urea Nitrogen   Date Value   03/17/2022 68.0 mg/dL (H)   02/09/2022 35 MG/DL (H)   07/02/2021 82.0 mg/dL (H)   07/15/2020 109.0 mg/dL (H)   06/07/2016 33 mg/dL (H)   03/07/2013 26 milligrams per deciliter     Creatinine, Serum   Date Value   08/25/2016 1.48 mg/dL (H)   06/07/2016 1.21 mg/dL   03/07/2013 1.04  milligrams per deciliter     Creatinine (mg/dL)   Date Value   05/09/2024 6.72 (H)   05/08/2024 6.53 (H)   04/17/2024 5.26 (H)   03/17/2022 5.23 (H)   07/02/2021 3.87 (H)   07/15/2020 3.64 (H)       Malb/Cre Calc Ratio   Date Value Ref Range Status   09/04/2019 536.8 (H) 0.0 - 29.0 ug/mg Final     Malb/Cre Calc   Date Value Ref Range Status   02/28/2017 2,504.2 (H) <=30.0 ug/mg Final   08/19/2016 2,092.0 (H) <=30.0 ug/mg Final       Recent Labs   Lab 05/08/24 1931 05/09/24  0413   WBC 5.4 5.3   HGB 11.6* 10.8*   MCV 96.2 98.9   PLT 75.0* 78.0*   INR 1.10  --        Recent Labs   Lab 05/08/24 1931 05/09/24  0413   * 138   K 3.7 3.4*   CL 99 100   CO2 21.0 26.0   BUN 63* 72*   CREATSERUM 6.53* 6.72*   CA 9.2 9.2   MG  --  2.3   * 158*       Recent Labs   Lab 05/08/24 1931   ALT 24   AST 24   ALB 3.4       Recent Labs   Lab 05/08/24  2153 05/08/24  2310 05/09/24  0337 05/09/24  0651   PGLU 157* 230* 154* 155*           Imaging:  CT brain noted    Impression/Plan:    #1.  ESRD- due to DM.  HD today per usual routine    #2.  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  JIE for goal hgb 10-11 gms    #3.  SDH- imaging noted.  Neurosurgery following    #4.  HTN- parameters per neuro    Thank you for allowing me to participate in the care of your patient. Please do not hesitate to call with any questions or concerns.       Johnson Vera MD  5/9/2024  8:26 AM

## 2024-05-09 NOTE — ED PROVIDER NOTES
Patient Seen in: [facility] Emergency Department      History     Chief Complaint   Patient presents with    Fall    Head Neck Injury     Stated Complaint: Fall last noc, brusing to head and eyes. Told to go to the ED for eval by his c*    Subjective:   HPI    79-year-old  male with a past medical history as below including hypertension, diabetes, CAD presents after he fell and hit his head yesterday morning and had CT head done this afternoon showing intracranial hemorrhage.  Patient states that he was getting ready for dialysis yesterday morning when he felt a little lightheaded and felt like his left leg gave out causing him to fall to the ground and hit his head.  He denies LOC.  He states he had a bruise to his forehead afterwards but he was able to drive himself to dialysis.  Son states that bruising sta started coming down around his e this morning.  He saw his cardiologist today who ordered a CT as an outpatient.  He denies any nausea, visual changes, numbness or weakness of his extremities.  Denies chest pain, shortness of breath or palpitations prior to or after the fall.      Objective:   Past Medical History:    Calculus of kidney    Coronary atherosclerosis    bare metal stents at [facility] Jan 2021    Diabetes (HCC)    Disorder of thyroid    Esophageal varices without bleeding, unspecified esophageal varices type (HCC)    Hepatitis, unspecified    hepatitis C-just completed taking Harvoni in November 2016    High blood pressure    Malignant neoplasm of trigone of urinary bladder (HCC)    Malignant neoplasm of urinary bladder, unspecified site (HCC)    Malignant neoplasm of urinary bladder, unspecified site (HCC)    Renal disorder    Visual impairment    reading glasses              Past Surgical History:   Procedure Laterality Date    Angiogram  08/15/2017    small caliber RCA with 80% mid lesion.  LAD and left circumflex with 50% lesions.  LV shows inferobasilar aneurysm with scar.   Overall LV function preserved at the exterior 65%.    Cath bare metal stent (bms)      Other surgical history      Upper GI surgery    Other surgical history  2017    [facility] Lees    Other surgical history  2019    [facility] Lees     Other surgical history  2020    [facility] Cysto (Dr. Lees)                Social History     Socioeconomic History    Marital status:    Tobacco Use    Smoking status: Former     Current packs/day: 0.00     Average packs/day: 0.5 packs/day for 15.0 years (7.5 ttl pk-yrs)     Types: Cigarettes     Start date: 1965     Quit date: 1980     Years since quittin.2    Smokeless tobacco: Never    Tobacco comments:     quit at age 34   Vaping Use    Vaping status: Never Used   Substance and Sexual Activity    Alcohol use: No    Drug use: No     Social Determinants of Health     Food Insecurity: No Food Insecurity (2024)    Food Insecurity     Food Insecurity: Never true   Transportation Needs: No Transportation Needs (2024)    Transportation Needs     Lack of Transportation: No   Housing Stability: Low Risk  (2024)    Housing Stability     Housing Instability: No              Review of Systems    Positive for stated complaint: Fall last noc, brusing to head and eyes. Told to go to the ED for eval by his c*  Other systems are as noted in HPI.  Constitutional and vital signs reviewed.      All other systems reviewed and negative except as noted above.    Physical Exam     ED Triage Vitals [240]   BP (!) 168/78   Pulse 65   Resp 16   Temp 97 °F (36.1 °C)   Temp src    SpO2 96 %   O2 Device None (Room air)       Current Vitals:   Vital Signs  BP: (!) 172/86  Pulse: 52  Resp: 18  Temp: 97 °F (36.1 °C)  MAP (mmHg): (!) 111    Oxygen Therapy  SpO2: 98 %  O2 Device: None (Room air)            Physical Exam  Vitals and nursing note reviewed.   Constitutional:       General: He is not in acute distress.     Appearance: He is well-developed.  He is not ill-appearing.   HENT:      Head: Normocephalic.      Comments: Ecchymosis over forehead extending to periorbital areas     Mouth/Throat:      Mouth: Mucous membranes are moist.   Eyes:      General: No scleral icterus.     Extraocular Movements: Extraocular movements intact.   Musculoskeletal:      Cervical back: Neck supple. No rigidity or tenderness.   Skin:     General: Skin is warm and dry.      Capillary Refill: Capillary refill takes less than 2 seconds.   Neurological:      General: No focal deficit present.      Mental Status: He is alert and oriented to person, place, and time.      GCS: GCS eye subscore is 4. GCS verbal subscore is 5. GCS motor subscore is 6.   Psychiatric:         Mood and Affect: Mood normal.         Behavior: Behavior normal.               ED Course     Labs Reviewed   COMP METABOLIC PANEL (14) - Abnormal; Notable for the following components:       Result Value    Glucose 173 (*)     Sodium 133 (*)     BUN 63 (*)     Creatinine 6.53 (*)     Calculated Osmolality 298 (*)     eGFR-Cr 8 (*)     Alkaline Phosphatase 162 (*)     Globulin  4.5 (*)     A/G Ratio 0.8 (*)     All other components within normal limits   CBC W/ DIFFERENTIAL - Abnormal; Notable for the following components:    RBC 3.67 (*)     HGB 11.6 (*)     HCT 35.3 (*)     PLT 75.0 (*)     All other components within normal limits   CBC WITH DIFFERENTIAL WITH PLATELET    Narrative:     The following orders were created for panel order CBC With Differential With Platelet.  Procedure                               Abnormality         Status                     ---------                               -----------         ------                     CBC W/ DIFFERENTIAL[[id number]]          Abnormal            Final result                 Please view results for these tests on the individual orders.   PROTHROMBIN TIME (PT)   PTT, ACTIVATED   TROPONIN I HIGH SENSITIVITY   TYPE AND SCREEN    Narrative:     The following orders  were created for panel order Type and screen.  Procedure                               Abnormality         Status                     ---------                               -----------         ------                     ABORH (Blood Type)[[id number]]                               In process                 Antibody Screen[[id number]]                                  In process                   Please view results for these tests on the individual orders.   ABORH (BLOOD TYPE)   ANTIBODY SCREEN   PREPARE PLATELETS   RAINBOW DRAW LAVENDER   RAINBOW DRAW LIGHT GREEN   RAINBOW DRAW BLUE     EKG    Rate, intervals and axes as noted on EKG Report.  Rate: 62  Rhythm: Sinus Rhythm  Reading: Normal sinus rhythm, LAD, nonspecific interventricular delay, nonspecific ST/T wave abnormality                            A total of 52 minutes of critical care time (exclusive of billable procedures) was administered to manage the patient's critical imaging findings and neurologic instability due to his intracranial hemorrhage.  This involved direct patient intervention, complex decision making, and/or extensive discussions with the patient, family, and clinical staff.    MDM   79-year-old  male with a past medical history as below including hypertension, diabetes, CAD presents after he fell and hit his head yesterday morning and had CT head done this afternoon showing intracranial hemorrhage.      Differential includes but is not limited to subdural hematoma, epidural hematoma    Son has report from CT done at 3:49 PM which reports a large holohemispheric.  Subdural hematoma along with concern for additional superimposed epidural hematomas measuring up to 2.2 cm in thickness causing mass effect and 6 mm midline shift.  There is also subdural blood along the falx anteriorly and thin subdural layering in the right tentorium.    Labs show ES ESRD with normal potassium along with chronic anemia.  Platelets and coags are  normal.    Discussed with neurosurgery Dr. Perez who recommends treating with platelet transfusion and Keppra 1 g IV.  Discussed with neurocritical care Dr. Bacon.  Discussed with hospitalist Dr. Bruner.    Blood pressure has been elevated in the 170s, will start on nicardipine drip with goal less than 160.    Admission disposition: 5/8/2024  7:58 PM                                        Medical Decision Making  Amount and/or Complexity of Data Reviewed  Independent Historian: caregiver     Details: Son, see HPI  External Data Reviewed: radiology.     Details: See [facility]  Labs: ordered. Decision-making details documented in ED Course.  ECG/medicine tests: ordered and independent interpretation performed. Decision-making details documented in ED Course.  Discussion of management or test interpretation with external provider(s): Neurosurgery, neurocritical care, hospitalist    Risk  Decision regarding hospitalization.        Disposition and Plan     Clinical Impression:  1. Intracranial hemorrhage (HCC)         Disposition:  Admit  5/8/2024  7:58 pm    Follow-up:  No follow-up provider specified.        Medications Prescribed:  Current Discharge Medication List                            Hospital Problems       Present on Admission  Date Reviewed: 3/19/2024            ICD-10-CM Noted POA    * (Principal) Intracranial hemorrhage (HCC) I62.9 5/8/2024 Unknown

## 2024-05-09 NOTE — ED INITIAL ASSESSMENT (HPI)
Patient fell yesterday morning when getting ready for dialysis, felt dizzy and then fell on the bathroom floor. Hit head on floor. Patient is on blood thinners. Confirmed subdural hematoma from [facility]. Son states family is acting baseline. Patient Aox4 upon arrival.

## 2024-05-09 NOTE — OCCUPATIONAL THERAPY NOTE
OCCUPATIONAL THERAPY EVALUATION - INPATIENT     Room Number: 6605/6605-A  Evaluation Date: 5/9/2024  Type of Evaluation: Initial  Presenting Problem: fall, R SDH, A-fib    Physician Order: IP Consult to Occupational Therapy  Reason for Therapy: ADL/IADL Dysfunction and Discharge Planning    OCCUPATIONAL THERAPY ASSESSMENT   Patient is currently functioning near baseline with toileting, lower body dressing, transfers, and dynamic standing balance. Prior to admission, patient's baseline is independent.  Patient is requiring stand-by assist and contact guard assist as a result of the following impairments: decreased endurance and impaired standing balance. Occupational Therapy will continue to follow for duration of hospitalization.    Patient will benefit from continued skilled OT Services at discharge to promote functional independence and safety with additional support and return home with home health OT      History Related to Current Admission: Patient is a 79 year old male admitted on 5/8/2024 with fall. Pt was in the bathroom to get ready to leave for dialysis, his knee gave out and fell onto R side, per pt.  Admitted for R SDH and A-fib.      Recent admission:  3/31 to 4/4 for anemia, pericardial effusion -> home      Co-Morbidities : ESRD on HD, DM2, CAD s/p stent in 2017, 2021 and recently in March 2024, ischemic cardiomyopathy, HTN, HL, hypothyroidism    WEIGHT BEARING RESTRICTION  Weight Bearing Restriction: None                Recommendations for nursing staff:   Transfers: sba  Toileting location: toilet    EVALUATION SESSION:  Patient Start of Session: supine  FUNCTIONAL TRANSFER ASSESSMENT  Sit to Stand: Edge of Bed  Edge of Bed: Stand-by Assist    BED MOBILITY  Supine to Sit : Supervision  Sit to Supine (OT): Supervision    O2 SATURATIONS  Oxygen Therapy  SPO2% on Room Air at Rest: 96    COGNITION  Overall Cognitive Status:  WFL - within functional limits    Upper Extremity   ROM: within functional  limits   Strength: within functional limits   Coordination  Gross motor:   Fine motor: intact  Sensation: Light touch:  intact    EDUCATION PROVIDED  Patient: Role of Occupational Therapy; Plan of Care; Functional Transfer Techniques; Posture/Positioning  Patient's Response to Education: Verbalized Understanding    Equipment used: rw     Therapist comments: Son present during the session.   Dialysis tech setting up the room.   Supervision supine to sit.  SBP within the parameter, no dizziness. Fine motor and visual perception intact.   Sba with RW to stand and to ambulate around the bed. Supervision sit to supine. Educated the pt and his son about plan of care and role of OT.  Verbalized understanding. Son mentioned that RW for home would be a good idea.    Patient End of Session: In bed;With  staff;Needs met;Call light within reach;RN aware of session/findings;All patient questions and concerns addressed;Family present    OCCUPATIONAL PROFILE    HOME SITUATION  Type of Home: House  Home Layout: Multi-level  Lives With: Son (works during the day)    Toilet and Equipment: Standard height toilet  Shower/Tub and Equipment: Walk-in shower             Drives: Yes       Prior Level of Function: pt lives with his son who works during the day.  Independent with ADL.  No history of fall.      SUBJECTIVE   \"Yeah, I think it was this knee that gave out.\" Pt was pointing to R knee.    PAIN ASSESSMENT  Rating: Unable to rate  Location: both knees, but L worse than R knee  Management Techniques: Repositioning    OBJECTIVE  Precautions: Bed/chair alarm;Seizure ( to 150)  Fall Risk: Standard fall risk      ASSESSMENTS    AM-PAC ‘6-Clicks’ Inpatient Daily Activity Short Form  -   Putting on and taking off regular lower body clothing?: A Little  -   Bathing (including washing, rinsing, drying)?: A Little  -   Toileting, which includes using toilet, bedpan or urinal? : A Little  -   Putting on and taking off regular upper  body clothing?: A Little  -   Taking care of personal grooming such as brushing teeth?: None  -   Eating meals?: None    AM-PAC Score:  Score: 20  Approx Degree of Impairment: 38.32%  Standardized Score (AM-PAC Scale): 42.03    ADDITIONAL TESTS     NEUROLOGICAL FINDINGS  Coordination - Finger to Nose: Symmetrical  Coordination - Rapid Alternating Movement: Symmetrical  Coordination - Finger Opposition: Symmetrical     COGNITION ASSESSMENTS       PLAN  OT Treatment Plan: Balance activities;ADL training;Energy conservation/work simplification techniques;Functional transfer training;UE strengthening/ROM;Patient/Family education;Patient/Family training;Equipment eval/education;Compensatory technique education  Rehab Potential : Good  Frequency: 3-5x/week  Number of Visits to Meet Established Goals: 5    ADL Goals   Patient will perform grooming: with supervision and while standing at sink  Patient will perform lower body dressing:  with supervision  Patient will perform toileting: with supervision    Functional Transfer Goals  Patient will transfer to toilet:  with supervision    UE Exercise Program Goal  Patient will be independent with bilateral AROM HEP (home exercise program).      Therapist Goals  PHQ 9    Patient Evaluation Complexity Level:   Occupational Profile/Medical History LOW - Brief history including review of medical or therapy records    Specific performance deficits impacting engagement in ADL/IADL LOW  1 - 3 performance deficits    Client Assessment/Performance Deficits MODERATE - Comorbidities and min to mod modifications of tasks    Clinical Decision Making LOW - Analysis of occupational profile, problem-focused assessments, limited treatment options    Overall Complexity LOW     OT Session Time: 12 minutes  Self-Care Home Management:  minutes  Therapeutic Activity: 4 minutes

## 2024-05-09 NOTE — PROGRESS NOTES
[facility]  Neurocritical Care APRN Progress Note    NAME: Jonny Haque - ROOM: 6605/6605-A - MRN: [id number] - Age: 79 year old - :4/15/1945    History Of Present Illness:  Jonny Haque is a 79 year old male with PMHx significant for ESRD on HD, DM2, CAD s/p stent in ,  and recently in 2024, ischemic cardiomyopathy, HTN, HL, hypothyroidism who presented with CT scan showing SDH.  Pt fell yesterday in the bathroom.  He says his left leg gave out on him.  He fell hitting his face but did catch himself on his hands as well.  He denies LOC.  He was able to get up on his own and drive himself to HD.  Today he had an appointment at his cardiologist who ordered a CT head due to his eye bruising.  CT showed a large holohemispheric subdural hematoma with possible epidural hematoma measuring up to 2.2 cm and right to left midline shift of 6 mm.  One unit Platelet transfusion was given to reverse ASA.  He admits to CNICU for frequent neuro checks and close monitoring.  He is neurologically intact but does note new left eye mild blurring of vision.       Past Medical History:  Past Medical History:    Calculus of kidney    Coronary atherosclerosis    bare metal stents at [facility] 2021    Diabetes (HCC)    Disorder of thyroid    Esophageal varices without bleeding, unspecified esophageal varices type (HCC)    Hepatitis, unspecified    hepatitis C-just completed taking Harvoni in 2016    High blood pressure    Malignant neoplasm of trigone of urinary bladder (HCC)    Malignant neoplasm of urinary bladder, unspecified site (HCC)    Malignant neoplasm of urinary bladder, unspecified site (HCC)    Renal disorder    Visual impairment    reading glasses     Past Surgical History:   Past Surgical History:   Procedure Laterality Date    Angiogram  08/15/2017    small caliber RCA with 80% mid lesion.  LAD and left circumflex with 50% lesions.  LV shows inferobasilar aneurysm with  scar.  Overall LV function preserved at the exterior 65%.    Cath bare metal stent (bms)      Other surgical history      Upper GI surgery    Other surgical history  12/11/2017    Cysto Dr. Lees    Other surgical history  07/18/2019    BTS Cysto Dr. Lees     Other surgical history  02/06/2020    BTS Cysto (Dr. Lees)      Family History:   Family History   Problem Relation Age of Onset    Cancer Father     Hypertension Mother      Social History:    reports that he quit smoking about 44 years ago. He started smoking about 59 years ago. He has a 7.5 pack-year smoking history. He has never used smokeless tobacco. He reports that he does not drink alcohol and does not use drugs.     Review of Systems:   A comprehensive 10 point review of systems was completed.  Pertinent positives and negatives noted in the HPI.    Current Facility-Administered Medications   Medication Dose Route Frequency    niCARdipine in sodium chloride 0.86% (carDENE) 20 mg/200mL infusion premix  1-15 mg/hr Intravenous Continuous    acetaminophen (Tylenol Extra Strength) tab 500 mg  500 mg Oral Q4H PRN    polyethylene glycol (PEG 3350) (Miralax) 17 g oral packet 17 g  17 g Oral Daily PRN    sennosides (Senokot) tab 17.2 mg  17.2 mg Oral Nightly PRN    bisacodyl (Dulcolax) 10 MG rectal suppository 10 mg  10 mg Rectal Daily PRN    ondansetron (Zofran) 4 MG/2ML injection 4 mg  4 mg Intravenous Q6H PRN    metoclopramide (Reglan) 5 mg/mL injection 5 mg  5 mg Intravenous Q8H PRN    glucose (Dex4) 15 GM/59ML oral liquid 15 g  15 g Oral Q15 Min PRN    Or    glucose (Glutose) 40% oral gel 15 g  15 g Oral Q15 Min PRN    Or    glucose-vitamin C (Dex-4) chewable tab 4 tablet  4 tablet Oral Q15 Min PRN    Or    dextrose 50% injection 50 mL  50 mL Intravenous Q15 Min PRN    Or    glucose (Dex4) 15 GM/59ML oral liquid 30 g  30 g Oral Q15 Min PRN    Or    glucose (Glutose) 40% oral gel 30 g  30 g Oral Q15 Min PRN    Or    glucose-vitamin C (Dex-4) chewable tab 8  tablet  8 tablet Oral Q15 Min PRN    insulin aspart (NovoLOG) 100 Units/mL FlexPen 4-20 Units  4-20 Units Subcutaneous TID AC and HS    insulin degludec 100 units/mL flextouch 20 Units  20 Units Subcutaneous Nightly    [START ON 5/9/2024] levothyroxine (Synthroid) tab 150 mcg  150 mcg Oral Daily @ 0700    [START ON 5/9/2024] pantoprazole (Protonix) DR tab 40 mg  40 mg Oral BID AC    rosuvastatin (Crestor) tab 10 mg  10 mg Oral Nightly     Facility-Administered Medications Ordered in Other Encounters   Medication Dose Route Frequency    BCG Live 50 mg in sodium chloride 0.9 % 50 mL Intravesicle  50 mg Intravesical Once     OBJECTIVE  Vitals:  /75   Pulse 64   Temp 98.1 °F (36.7 °C)   Resp 19   Ht 182.9 cm (6')   Wt 206 lb (93.4 kg)   SpO2 95%   BMI 27.94 kg/m²           Physical Exam:    General Appearance: Alert, cooperative, no distress, appears stated age  Neck: Supple, symmetrical, trachea midline, no carotid bruits, adenopathy, or JVD  Lungs: Clear to auscultation bilaterally, respirations unlabored  Heart: Regular rate and rhythm, S1 and S2 normal, systolic  murmur, no rub or gallop  Abdomen: Soft, non-tender, bowel sounds active all four quadrants, no masses, no organomegaly  Extremities: Extremities normal, atraumatic, no cyanosis or edema, capillary refill <3 sec.    Pulses: 2+ and symmetric all extremities  Skin: Skin color, texture, turgor normal for ethnicity, no rashes or lesions, warm and dry, purple bruising under both eyes, and surrounding left eye, swelling above eyebrow of left eye and light bruising  Neurologic: This patient is alert and orientated x 3.  Speech fluent. Pupils equally round and reactive to light.  3+ brisk bilaterally.  EOMs intact.  Visual fields are full, blurring of vision in left eye.  Tongue is midline. Face is symmetrical. Uvula and palate elevate symmetrically.  Shrug shoulders normal bilaterally.  The rest of the cranial nerves are grossly intact.  Sensation to  light touch is intact bilaterally.  Motor: No Drift. No arm or leg weakness noted. 5/5 bilaterally.  Finger-to-nose coordination is intact.  Gait deferred.    Data this admission:  XR CHEST AP PORTABLE  (CPT=71045)    Result Date: 4/16/2024  CONCLUSION:  There is no evidence of active cardiopulmonary disease on this single portable chest radiograph.   LOCATION:  [facility]      Dictated by (CST): Humberto Altman MD on 4/16/2024 at 5:42 PM     Finalized by (CST): Humberto Altman MD on 4/16/2024 at 5:43 PM       Labs:  Lab Results   Component Value Date    WBC 5.4 05/08/2024    HGB 11.6 05/08/2024    HCT 35.3 05/08/2024    PLT 75.0 05/08/2024    CREATSERUM 6.53 05/08/2024    BUN 63 05/08/2024     05/08/2024    K 3.7 05/08/2024    CL 99 05/08/2024    CO2 21.0 05/08/2024     05/08/2024    CA 9.2 05/08/2024    ALB 3.4 05/08/2024    ALKPHO 162 05/08/2024    BILT 0.5 05/08/2024    TP 7.9 05/08/2024    AST 24 05/08/2024    ALT 24 05/08/2024    PTT 31.9 05/08/2024    INR 1.10 05/08/2024     Assessment/Plan:  SDH traumatic  -Neuro checks Q1h  -Maintain SBP between 100-150 mmHg  -Labetalol 10mg IV PRN, Hydralazine 10 mg IV PRN, Nicardipine gtt, Neosynephrine gtt to maintain SBP   -Hold all anticoagulants and antiplatelets   -One unit Platelets given for reversal of ASA  -Keppra 500 mg BID  -Seizure precautions  -CT scan in am, sooner if patient deteriorates   -PT, OT, and ST  -Neurosurgery consulted    ESRD on HD  -Renal consulted  -Receives HD T,TH,Sat    PAF  HTN, HL, ischemic cardiomyopathy, Aortic valve stenosis  CAD s/p stents 2017, 2021 and March 2024  -Last EF 45%  -Continue Crestor  -Hold home losartan, metoprolol and amiodarone tonight  -Hold ASA and Plavix (usually takes ASA every other day and Plavix daily, last dose ASA was Tuesday 5/7)    DM2  -Accu-check with ISS    F/E/N:  -IV fluids  -Follow lytes and replete prn  -AAT    ABCDEF Bundle  A- Assess, prevent and manage pain--prn pain medications  B- Room  air  C- no Sedation  D- Alert and oriented  E- activity as tolerated  F- Family engagement--Son updated at bedside  G- No central line or mccloud    Proph:  -No a/c at this time d/t evidence of bleeding  -SCD    Dispo:     Code Status: Full Code  -Neuro ICU monitoring    Plan of care discussed with Neuro-Intensivist On-Call, Dr. Bacon.      Roro ESTEVEZ  Critical Care Nurse Practitioner  Spec [id number]      A total of 35 minutes of critical care time (exclusive of billable procedures) was administered. This involved direct patient intervention, complex decision making, and/or extensive discussions with the patient, family, and clinical staff.    The 21st Century Cures Act makes medical notes like these available to patients in the interest of transparency. Please be advised this is a medical document. Medical documents are intended to carry relevant information, facts as evident, and the clinical opinion of the practitioner. The medical note is intended as peer to peer communication and may appear blunt or direct. It is written in medical language and may contain abbreviations or verbiage that are unfamiliar.

## 2024-05-09 NOTE — ED QUICK NOTES
Orders for admission, patient is aware of plan and ready to go upstairs. Any questions, please call ED RN Zeyad Marquez at extension [phone].     Patient Covid vaccination status: Fully vaccinated     COVID Test Ordered in ED: None    COVID Suspicion at Admission: N/A    Running Infusions:    niCARdipine      None    Mental Status/LOC at time of transport: Alert    Other pertinent information:   CIWA score: N/A   NIH score:  N/A

## 2024-05-09 NOTE — CONSULTS
[facility]   part of [facility]    Neurosurgery Consult  2024    Jonny Haque Patient Status:  Inpatient    4/15/1945 MRN [id number]   Location [facility] 6NE-A Attending Lance Aguayo MD   Hosp Day # 1 PCP Gee Jimenez MD     REASON FOR CONSULT:    R SDH    HISTORY OF PRESENT ILLNESS:  Jonny Haque is a(n) 79 year old male who fell in the bathroom 2 days ago.  He was seen at his cardiologists office for F/U after a recent stent procedure and obtained an outpatient CT showing an acute SDH with mass effect and reported 6 mm MLS.    He was transferred here and has no focal deficits.  A repeat CT head done shows a stable SDH with only 2 mm MLS.      REVIEW OF SYSTEMS:  The 12 point checklist was reviewed and was negative except:none    ALLERGIES:  No Known Allergies    MEDICATIONS:  Medications Prior to Admission   Medication Sig Dispense Refill Last Dose    amiodarone 200 MG Oral Tab Take 1 tablet (200 mg total) by mouth daily. 30 tablet 1 2024 at 0800    clopidogrel 75 MG Oral Tab Take 1 tablet (75 mg total) by mouth daily. 90 tablet 3 2024 at 0800    losartan 50 MG Oral Tab Take 1 tablet (50 mg total) by mouth daily. (Patient taking differently: Take 0.5 tablets (25 mg total) by mouth daily. Dose decreased because of cough.) 90 tablet 3 2024 at 0800    metoprolol succinate ER 50 MG Oral Tablet 24 Hr Take 1 tablet (50 mg total) by mouth Daily Beta Blocker. 90 tablet 3 2024 at 0800    aspirin 81 MG Oral Tab EC Take 1 tablet (81 mg total) by mouth every other day.  0 2024 at 0800    levothyroxine 150 MCG Oral Tab Take 1 tablet (150 mcg total) by mouth every other day. Take one tablet every morning before breakfast   2024 at 0800    Ferric Citrate (AURYXIA) 1  MG(Fe) Oral Tab    2024    pantoprazole 40 MG Oral Tab EC Take 1 tablet (40 mg total) by mouth 2 (two) times daily before meals.   2024 at 0800    Sevelamer 800 MG Oral Tab Take 1 tablet (800 mg  total) by mouth 3 (three) times daily with meals.   5/8/2024 at 1800    zolpidem 10 MG Oral Tab 1 tablet (10 mg total) nightly as needed.   5/6/2024 at 2200    Insulin Glargine, 2 Unit Dial, (TOUJEO MAX SOLOSTAR) 300 UNIT/ML Subcutaneous Solution Pen-injector Inject 20 Units into the skin nightly. 6 mL 2 5/7/2024 at 2200    rosuvastatin 10 MG Oral Tab Take 1 tablet (10 mg total) by mouth nightly. (Patient taking differently: Take 1 tablet (10 mg total) by mouth nightly.) 90 tablet 0 5/7/2024 at 2200    Insulin Lispro, 1 Unit Dial, (HUMALOG KWIKPEN) 100 UNIT/ML Subcutaneous Solution Pen-injector 12 units before meals with sliding scale. Max daily dose: 50 units. (Patient taking differently: 10 Units. Three times a day. Before meals) 60 mL 3 5/8/2024 at 1800    nitroGLYCERIN 0.3 MG Sublingual SL Tab Place 1 tablet (0.3 mg total) under the tongue every 5 (five) minutes as needed for Chest pain (as needed for chest pain).       Insulin Pen Needle (TRUEPLUS PEN NEEDLES) 31G X 5 MM Does not apply Misc Use 5 per day 500 each 2     Glucose Blood (ONETOUCH ULTRA) In Vitro Strip 6 times a day 400 each 3     CONTOUR NEXT TEST In Vitro Strip TEST BLOOD SUGAR FOUR TIMES DAILY 400 strip 3     Blood Glucose Monitoring Suppl (REX CONTOUR NEXT MONITOR) W/DEVICE Does not apply Kit 1 Device by Does not apply route 4 (four) times daily. 1 kit 0        HISTORY:  Past Medical History:    Aortic stenosis    Calculus of kidney    Coronary atherosclerosis    bare metal stents at [facility] Jan 2021    Diabetes (HCC)    Disorder of thyroid    Esophageal varices without bleeding, unspecified esophageal varices type (HCC)    GIB (gastrointestinal bleeding)    Hepatitis, unspecified    hepatitis C-just completed taking Harvoni in November 2016    High blood pressure    High cholesterol    Malignant neoplasm of trigone of urinary bladder (HCC)    Malignant neoplasm of urinary bladder, unspecified site (HCC)    Malignant neoplasm of  urinary bladder, unspecified site (HCC)    Renal disorder    Visual impairment    reading glasses     Past Surgical History:   Procedure Laterality Date    Angiogram  08/15/2017    small caliber RCA with 80% mid lesion.  LAD and left circumflex with 50% lesions.  LV shows inferobasilar aneurysm with scar.  Overall LV function preserved at the exterior 65%.    Cath bare metal stent (bms)      Other surgical history      Upper GI surgery    Other surgical history  12/11/2017    Cysto Dr. Lees    Other surgical history  07/18/2019    BTS Cysto Dr. Lees     Other surgical history  02/06/2020    BTS Cysto (Dr. Lees)     Family History   Problem Relation Age of Onset    Cancer Father     Hypertension Mother      Jonny  reports that he quit smoking about 44 years ago. His smoking use included cigarettes. He started smoking about 59 years ago. He has a 7.5 pack-year smoking history. He has never used smokeless tobacco. He reports that he does not drink alcohol and does not use drugs.    PHYSICAL EXAMINATION:  Vital Signs:  /67   Pulse 83   Temp 98.2 °F (36.8 °C) (Temporal)   Resp 17   Ht 72\"   Wt 198 lb 6.6 oz (90 kg)   SpO2 93%   BMI 26.91 kg/m²     Neuro:    MS: awake and alert    CN; pupils 3/2 B/L, EOMI   Motor; JEONG, no drift    REVIEW OF STUDIES:  CT head: as above      ASSESSMENT AND PLAN:  [facility]  Discussed case with cardiology and in light of patient's recent LM stent the desire is to resume antiplatelet agents sooner.  Will plan for F/U and CT in 1 week with Dr. Krishna and possible MMA embo prior resumption of antiplatelet agents pending results of CT  Ok to discharge  Call with questions    Leesa Perez DO    5/9/2024  10:48 AM

## 2024-05-09 NOTE — CONSULTS
[facility]  RAJNI Neurosurgery Consult    Jonny Haque Patient Status:  Inpatient    4/15/1945 MRN [id number]   Location [facility] 6NE-A Attending Sindi Bruner, *   Hosp Day # 1 PCP Gee Jimenez MD     REASON FOR CONSULTATION:  Intracranial hemorrhage (HCC)    HISTORY OF PRESENT ILLNESS     Jonny Haque is a pleasant 79 year old male with PMH of HTN, HLD, DM type II, CAD, ESRD on HD, hypothyroidism, and bladder cancer who presented to ED with abnormal head CT. Patient slipped in his bathroom yesterday and fell forward onto his hands, striking his face. Denied LOC. He drove himself to dialysis then later saw his cardiologist, who ordered an outside CT brain in setting of recent fall with significant cathryn-orbital ecchymosis/edema. CT revealed a large right holohemispheric SDH with mass effect and 6 mm midline shift, for which Neurosurgery is consulted. Pt was directed to the ED for evaluation and has been admitted to CNICU for close monitoring. Repeat CT demonstrates stability of bleed with decreased midline shift, now 2 mm. Currently, pt states he feels well. Reports a mild posterior headache and posterior neck pain. Denies dizziness, visual disturbance, changes in speech, numbness, paresthesias, or focal weakness. He is ambulatory. Per nursing, had a run of a-fib with RVR this AM for which cardiology has been consulted. Pt takes Aspirin every other day and Plavix daily for cardiac stents placed in 2024.     PAST MEDICAL HISTORY     Past Medical History:    Aortic stenosis    Calculus of kidney    Coronary atherosclerosis    bare metal stents at [facility] 2021    Diabetes (HCC)    Disorder of thyroid    Esophageal varices without bleeding, unspecified esophageal varices type (HCC)    GIB (gastrointestinal bleeding)    Hepatitis, unspecified    hepatitis C-just completed taking Harvoni in 2016    High blood pressure    High cholesterol    Malignant neoplasm of trigone  of urinary bladder (HCC)    Malignant neoplasm of urinary bladder, unspecified site (HCC)    Malignant neoplasm of urinary bladder, unspecified site (HCC)    Renal disorder    Visual impairment    reading glasses     PAST SURGICAL HISTORY:  Past Surgical History:   Procedure Laterality Date    Angiogram  08/15/2017    small caliber RCA with 80% mid lesion.  LAD and left circumflex with 50% lesions.  LV shows inferobasilar aneurysm with scar.  Overall LV function preserved at the exterior 65%.    Cath bare metal stent (bms)      Other surgical history      Upper GI surgery    Other surgical history  12/11/2017    Cysto Dr. Lees    Other surgical history  07/18/2019    BTS Cysto Dr. Lees     Other surgical history  02/06/2020    BTS Cysto (Dr. Lees)     FAMILY HISTORY:  family history includes Cancer in his father; Hypertension in his mother.    SOCIAL HISTORY:   reports that he quit smoking about 44 years ago. His smoking use included cigarettes. He started smoking about 59 years ago. He has a 7.5 pack-year smoking history. He has never used smokeless tobacco. He reports that he does not drink alcohol and does not use drugs.    ALLERGIES     No Known Allergies    MEDICATIONS     Medications Prior to Admission   Medication Sig Dispense Refill Last Dose    amiodarone 200 MG Oral Tab Take 1 tablet (200 mg total) by mouth daily. 30 tablet 1 5/8/2024 at 0800    clopidogrel 75 MG Oral Tab Take 1 tablet (75 mg total) by mouth daily. 90 tablet 3 5/8/2024 at 0800    losartan 50 MG Oral Tab Take 1 tablet (50 mg total) by mouth daily. (Patient taking differently: Take 0.5 tablets (25 mg total) by mouth daily. Dose decreased because of cough.) 90 tablet 3 5/8/2024 at 0800    metoprolol succinate ER 50 MG Oral Tablet 24 Hr Take 1 tablet (50 mg total) by mouth Daily Beta Blocker. 90 tablet 3 5/8/2024 at 0800    aspirin 81 MG Oral Tab EC Take 1 tablet (81 mg total) by mouth every other day.  0 5/7/2024 at 0800    levothyroxine 150  MCG Oral Tab Take 1 tablet (150 mcg total) by mouth every other day. Take one tablet every morning before breakfast   5/8/2024 at 0800    Ferric Citrate (AURYXIA) 1  MG(Fe) Oral Tab    5/7/2024    pantoprazole 40 MG Oral Tab EC Take 1 tablet (40 mg total) by mouth 2 (two) times daily before meals.   5/8/2024 at 0800    Sevelamer 800 MG Oral Tab Take 1 tablet (800 mg total) by mouth 3 (three) times daily with meals.   5/8/2024 at 1800    zolpidem 10 MG Oral Tab 1 tablet (10 mg total) nightly as needed.   5/6/2024 at 2200    Insulin Glargine, 2 Unit Dial, (TOUJEO MAX SOLOSTAR) 300 UNIT/ML Subcutaneous Solution Pen-injector Inject 20 Units into the skin nightly. 6 mL 2 5/7/2024 at 2200    rosuvastatin 10 MG Oral Tab Take 1 tablet (10 mg total) by mouth nightly. (Patient taking differently: Take 1 tablet (10 mg total) by mouth nightly.) 90 tablet 0 5/7/2024 at 2200    Insulin Lispro, 1 Unit Dial, (HUMALOG KWIKPEN) 100 UNIT/ML Subcutaneous Solution Pen-injector 12 units before meals with sliding scale. Max daily dose: 50 units. (Patient taking differently: 10 Units. Three times a day. Before meals) 60 mL 3 5/8/2024 at 1800    nitroGLYCERIN 0.3 MG Sublingual SL Tab Place 1 tablet (0.3 mg total) under the tongue every 5 (five) minutes as needed for Chest pain (as needed for chest pain).       Insulin Pen Needle (TRUEPLUS PEN NEEDLES) 31G X 5 MM Does not apply Misc Use 5 per day 500 each 2     Glucose Blood (ONETOUCH ULTRA) In Vitro Strip 6 times a day 400 each 3     CONTOUR NEXT TEST In Vitro Strip TEST BLOOD SUGAR FOUR TIMES DAILY 400 strip 3     Blood Glucose Monitoring Suppl (RoomActually CONTOUR NEXT MONITOR) W/DEVICE Does not apply Kit 1 Device by Does not apply route 4 (four) times daily. 1 kit 0      Current Facility-Administered Medications   Medication Dose Route Frequency    morphINE PF 2 MG/ML injection 0.5 mg  0.5 mg Intravenous Q2H PRN    Or    morphINE PF 2 MG/ML injection 1 mg  1 mg Intravenous Q2H PRN    Or     morphINE PF 2 MG/ML injection 2 mg  2 mg Intravenous Q2H PRN    HYDROcodone-acetaminophen (Norco) 5-325 MG per tab 1 tablet  1 tablet Oral Q4H PRN    Or    HYDROcodone-acetaminophen (Norco) 5-325 MG per tab 2 tablet  2 tablet Oral Q4H PRN    epoetin eunice (Epogen, Procrit) 78688 UNIT/ML injection 10,000 Units  10,000 Units Intravenous Once in dialysis    sodium chloride 0.9 % IV bolus 100 mL  100 mL Intravenous Q30 Min PRN    And    albumin human (Albumin) 25% injection 25 g  25 g Intravenous PRN Dialysis    heparin (Porcine) 1000 UNIT/ML injection 1,500 Units  1.5 mL Intracatheter Once    polyethylene glycol (PEG 3350) (Miralax) 17 g oral packet 17 g  17 g Oral Daily PRN    sennosides (Senokot) tab 17.2 mg  17.2 mg Oral Nightly PRN    bisacodyl (Dulcolax) 10 MG rectal suppository 10 mg  10 mg Rectal Daily PRN    glucose (Dex4) 15 GM/59ML oral liquid 15 g  15 g Oral Q15 Min PRN    Or    glucose (Glutose) 40% oral gel 15 g  15 g Oral Q15 Min PRN    Or    glucose-vitamin C (Dex-4) chewable tab 4 tablet  4 tablet Oral Q15 Min PRN    Or    dextrose 50% injection 50 mL  50 mL Intravenous Q15 Min PRN    Or    glucose (Dex4) 15 GM/59ML oral liquid 30 g  30 g Oral Q15 Min PRN    Or    glucose (Glutose) 40% oral gel 30 g  30 g Oral Q15 Min PRN    Or    glucose-vitamin C (Dex-4) chewable tab 8 tablet  8 tablet Oral Q15 Min PRN    insulin aspart (NovoLOG) 100 Units/mL FlexPen 4-20 Units  4-20 Units Subcutaneous TID AC and HS    insulin degludec 100 units/mL flextouch 20 Units  20 Units Subcutaneous Nightly    levothyroxine (Synthroid) tab 150 mcg  150 mcg Oral Daily @ 0700    pantoprazole (Protonix) DR tab 40 mg  40 mg Oral BID AC    rosuvastatin (Crestor) tab 10 mg  10 mg Oral Nightly    acetaminophen (Tylenol) tab 650 mg  650 mg Oral Q4H PRN    Or    acetaminophen (Tylenol) rectal suppository 650 mg  650 mg Rectal Q4H PRN    labetalol (Trandate) 5 mg/mL injection 10 mg  10 mg Intravenous Q10 Min PRN    hydrALAzine  (Apresoline) 20 mg/mL injection 10 mg  10 mg Intravenous Q2H PRN    ondansetron (Zofran) 4 MG/2ML injection 4 mg  4 mg Intravenous Q6H PRN    metoclopramide (Reglan) 5 mg/mL injection 5 mg  5 mg Intravenous Q8H PRN    levETIRAcetam (Keppra) 500 mg/5mL injection 500 mg  500 mg Intravenous Q12H    niCARdipine in sodium chloride 0.86% (carDENE) 20 mg/200mL infusion premix  5-15 mg/hr Intravenous Continuous PRN     Facility-Administered Medications Ordered in Other Encounters   Medication Dose Route Frequency    BCG Live 50 mg in sodium chloride 0.9 % 50 mL Intravesicle  50 mg Intravesical Once       REVIEW OF SYSTEMS     Comprehensive Review of Systems obtained, and is negative other than that mentioned in the History of Present Illness.      PHYSICAL EXAMINATION     VITALS: /78 (BP Location: Left arm)   Pulse 67   Temp 98.2 °F (36.8 °C) (Temporal)   Resp 12   Ht 72\"   Wt 198 lb 6.6 oz (90 kg)   SpO2 95%   BMI 26.91 kg/m²     GENERAL:  No acute distress, non-toxic appearing, speech fluent, mood appropriate    HEENT:  Normocephalic. Hematoma to left forehead. Left scleral hemorrhage. Betsy-orbital edema and bruising bilaterally.    RESP: Non-labored, easy, even    CV: NSR on tele    NEUROLOGICAL:  Alert and oriented x3.  Speech fluent. Comprehension intact.  PERRLA +3 brisk,  EOMI.  VFF.  Face is symmetrical. Tongue is midline.  Uvula and palate elevate symmetrically. Shoulder shrug normal bilaterally. Remaining CN's GI.  Sensation to light touch is intact bilaterally.  Motor: No drift. Finger-to-nose coordination is intact.  No arm or leg weakness noted, strength 5/5 bilaterally.  Gait deferred.       DIAGNOSTIC DATA     Lab Results   Component Value Date    WBC 5.3 05/09/2024    HGB 10.8 05/09/2024    HCT 34.7 05/09/2024    PLT 78.0 05/09/2024    CREATSERUM 6.72 05/09/2024    BUN 72 05/09/2024     05/09/2024    K 3.4 05/09/2024     05/09/2024    CO2 26.0 05/09/2024     05/09/2024    CA  9.2 05/09/2024    ALB 3.4 05/08/2024    ALKPHO 162 05/08/2024    BILT 0.5 05/08/2024    TP 7.9 05/08/2024    AST 24 05/08/2024    ALT 24 05/08/2024    PTT 31.9 05/08/2024    INR 1.10 05/08/2024    PTP 14.2 05/08/2024    MG 2.3 05/09/2024    PGLU 155 05/09/2024       IMAGING     CT BRAIN OR HEAD (34314)    Result Date: 5/9/2024  CONCLUSION:  1. Stable right-sided extra-axial hematoma which is holocephalic with a bulbous central portion at the level of the right superior temporal lobe and frontal lobe maximum thickness at this level 2.2 cm with local mass effect with only 2 mm of right to left midline shift.  The basal cisterns are patent. 2. New trace amount of blood in the posterior left occipital horn.  No hydrocephalus. 3. Trace amount of blood within the subdural space of the interhemispheric fissure and along the right tentorium of the cerebellum.     LOCATION:  [id number]   Dictated by (CST): Zeyad Ponce MD on 5/09/2024 at 6:52 AM     Finalized by (CST): Zeyad Ponce MD on 5/09/2024 at 7:03 AM         ASSESSMENT & PLAN     ASSESSMENT:  Acute right holohemispheric SDH     PLAN:  No acute surgical intervention is indicated  Discussed anticoagulation at length with patient, son, and cardiology  Hold Aspirin and Plavix for 1 week  Follow-up in clinic in 1 week with repeat CT brain to determine if anticoagulant/antiplatelet therapy can be resumed  OOB, may work with PT/OT  Medical management per hospitalist  Okay to transfer to floor from a Neurosurgical standpoint    Thank you very much for the consult.    Renay Amado, APRN-NP  [facility]  5/9/2024 8:55 AM     Total visit time: 20 minutes; More than 50% spent coordinating care, counseling, reviewing imaging and discussing medication therapy.   Is this a shared or split note between Advanced Practice Provider and Physician? Yes

## 2024-05-09 NOTE — PHYSICAL THERAPY NOTE
PHYSICAL THERAPY EVALUATION - INPATIENT     Room Number: 6605/6605-A  Evaluation Date: 5/9/2024  Type of Evaluation: Initial  Physician Order: PT Eval and Treat    Presenting Problem: ICH  Co-Morbidities : ESRD on HD, DM2, CAD s/p stent in 2017, 2021 and recently in March 2024, ischemic cardiomyopathy, HTN, HL, hypothyroidism  Reason for Therapy: Mobility Dysfunction and Discharge Planning    PHYSICAL THERAPY ASSESSMENT   Patient is currently functioning below baseline with bed mobility, transfers, and gait.  Prior to admission, patient's baseline is indep.  Patient is requiring contact guard assist and recommendation for use of RW as a result of the following impairments: impaired dynamic balance. Gait assessment limited this session 2/2 elevated HR with minimal activity up to 130s, RN notified.  Physical Therapy will continue to follow for duration of hospitalization.    Patient will benefit from continued skilled PT Services at discharge to promote functional independence in home.  Anticipate patient will return home with home health PT.    PLAN  PT Treatment Plan: Bed mobility;Body mechanics;Coordination;Endurance;Energy conservation;Patient education;Family education;Gait training;Neuromuscular re-educate;Range of motion;Strengthening;Stoop training;Stair training;Transfer training;Balance training  Rehab Potential : Good  Frequency (Obs): 3-5x/week  Number of Visits to Meet Established Goals: 5      CURRENT GOALS    Goal #1 Patient is able to demonstrate supine - sit EOB @ level: supervision     Goal #2 Patient is able to demonstrate transfers EOB to/from Chair/Wheelchair at assistance level: supervision     Goal #3 Patient is able to ambulate 150 feet with assist device:  LRAD  at assistance level: supervision     Goal #4 Patient will navigate stairs with rail and SBA   Goal #5 Colon   Goal #6    Goal Comments: Goals established on 5/9/2024      PHYSICAL THERAPY MEDICAL/SOCIAL HISTORY  History related to  current admission: Patient is a 79 year old male admitted on 2024: Presented with CT scan showing \"CT showed a large holohemispheric subdural hematoma with possible epidural hematoma measuring up to 2.2 cm and right to left midline shift of 6 mm\"  -no acute intervention per neuro surg - after a fall yesterday in the bathroom    Recent Admit  -24: chest pain of uncertain etiology > home  3/31-24: GIB, not seen by therapy  3/17-3/24/24: acute chest pain, not seen by therapy    HOME SITUATION  Type of Home: House   Home Layout: Multi-level                Lives With: Son (works during the day)  Drives: Yes  Patient Owned Equipment: None       Prior Level of [place]: indep, denies any additional hx of falls    SUBJECTIVE  \" I just went on vacation and don't have any more PTO\" - pts son       OBJECTIVE  Precautions: Bed/chair alarm;Seizure ( to 150)  Fall Risk: Standard fall risk    WEIGHT BEARING RESTRICTION  Weight Bearing Restriction: None                PAIN ASSESSMENT  Ratin          COGNITION  Overall Cognitive Status:  WFL - within functional limits    RANGE OF MOTION AND STRENGTH ASSESSMENT  Upper extremity ROM and strength are within functional limits     Lower extremity ROM is within functional limits     Lower extremity strength is within functional limits       BALANCE  Static Sitting: Good  Dynamic Sitting: Good  Static Standing: Fair -  Dynamic Standing: Fair -    ADDITIONAL TESTS                                    ACTIVITY TOLERANCE  Pulse:  (Afib 130s with minimal activity, RN notified)  Heart Rate Source: Monitor                   O2 WALK       NEUROLOGICAL FINDINGS                        AM-PAC '6-Clicks' INPATIENT SHORT FORM - BASIC MOBILITY  How much difficulty does the patient currently have...  Patient Difficulty: Turning over in bed (including adjusting bedclothes, sheets and blankets)?: None   Patient Difficulty: Sitting down on and standing up from a chair with  arms (e.g., wheelchair, bedside commode, etc.): A Little   Patient Difficulty: Moving from lying on back to sitting on the side of the bed?: None   How much help from another person does the patient currently need...   Help from Another: Moving to and from a bed to a chair (including a wheelchair)?: A Little   Help from Another: Need to walk in hospital room?: A Little   Help from Another: Climbing 3-5 steps with a railing?: A Little       AM-PAC Score:  Raw Score: 20   Approx Degree of Impairment: 35.83%   Standardized Score (AM-PAC Scale): 47.67   CMS Modifier (G-Code): CJ    FUNCTIONAL ABILITY STATUS  Gait Assessment   Functional Mobility/Gait Assessment  Gait Assistance: Contact guard assist  Distance (ft): 25  Assistive Device: Rolling walker  Pattern: Shuffle    Skilled Therapy Provided     Bed Mobility:  Rolling: mod I   Supine to sit: mod I    Sit to supine: mod I      Transfer Mobility:  Sit to stand: CGA   Stand to sit: CGA  Gait = CGA    Therapist's Comments: pt educated on gait training with RW for additional support as mild balance dynamic balance deficits noted, cued for increased time for transitional mobility /92 sitting EOB, encouraged OOBTC and ambulation with staff as tolerated    Exercise/Education Provided:  Bed mobility  Body mechanics  Functional activity tolerated  Gait training    Patient End of Session: Call light within reach;Needs met;In bed;With  staff;RN aware of session/findings;All patient questions and concerns addressed;SCDs in place;Alarm set;Family present      Patient Evaluation Complexity Level:  History Moderate - 1 or 2 personal factors and/or co-morbidities   Examination of body systems Moderate - addressing a total of 3 or more elements   Clinical Presentation Moderate - Evolving   Clinical Decision Making Moderate - Evolving       PT Session Time: 20 minutes  Gait Training: 10 minutes  Therapeutic Activity:  minutes  Neuromuscular Re-education:  minutes  Therapeutic  Exercise:  minutes

## 2024-05-09 NOTE — PLAN OF CARE
Received patient at 0730. Pt neuro intact. Pt c/o HA. Given pain medication PRN. Pt went into AF with RVR and VT beats ~0910. Service notified and cardiology consulted. Pt given Cardizem bolus and gtt infused. PO amio given. Cardizem gtt turned off @1450. Up to chair with little ectopy. Cleared by all services to tx to telemetry floor. Discussed plan of care with patient and pt's son. See doc flow sheet for vital signs and assessments.

## 2024-05-10 ENCOUNTER — APPOINTMENT (OUTPATIENT)
Dept: CT IMAGING | Facility: HOSPITAL | Age: 79
DRG: 085 | End: 2024-05-10
Attending: NURSE PRACTITIONER

## 2024-05-10 LAB
ANION GAP SERPL CALC-SCNC: 6 MMOL/L (ref 0–18)
BLOOD TYPE BARCODE: 6200
BUN BLD-MCNC: 48 MG/DL (ref 9–23)
CALCIUM BLD-MCNC: 9.3 MG/DL (ref 8.5–10.1)
CHLORIDE SERPL-SCNC: 101 MMOL/L (ref 98–112)
CO2 SERPL-SCNC: 27 MMOL/L (ref 21–32)
CREAT BLD-MCNC: 5.31 MG/DL
EGFRCR SERPLBLD CKD-EPI 2021: 10 ML/MIN/1.73M2 (ref 60–?)
ERYTHROCYTE [DISTWIDTH] IN BLOOD BY AUTOMATED COUNT: 17.2 %
GLUCOSE BLD-MCNC: 137 MG/DL (ref 70–99)
GLUCOSE BLD-MCNC: 158 MG/DL (ref 70–99)
GLUCOSE BLD-MCNC: 167 MG/DL (ref 70–99)
GLUCOSE BLD-MCNC: 169 MG/DL (ref 70–99)
GLUCOSE BLD-MCNC: 178 MG/DL (ref 70–99)
GLUCOSE BLD-MCNC: 291 MG/DL (ref 70–99)
HCT VFR BLD AUTO: 37.3 %
HGB BLD-MCNC: 12.1 G/DL
MCH RBC QN AUTO: 31.6 PG (ref 26–34)
MCHC RBC AUTO-ENTMCNC: 32.4 G/DL (ref 31–37)
MCV RBC AUTO: 97.4 FL
OSMOLALITY SERPL CALC.SUM OF ELEC: 293 MOSM/KG (ref 275–295)
PLATELET # BLD AUTO: 65 10(3)UL (ref 150–450)
POTASSIUM SERPL-SCNC: 4.8 MMOL/L (ref 3.5–5.1)
RBC # BLD AUTO: 3.83 X10(6)UL
SODIUM SERPL-SCNC: 134 MMOL/L (ref 136–145)
UNIT VOLUME: 201 ML
WBC # BLD AUTO: 5.9 X10(3) UL (ref 4–11)

## 2024-05-10 PROCEDURE — 70450 CT HEAD/BRAIN W/O DYE: CPT | Performed by: NURSE PRACTITIONER

## 2024-05-10 PROCEDURE — 99233 SBSQ HOSP IP/OBS HIGH 50: CPT | Performed by: INTERNAL MEDICINE

## 2024-05-10 RX ORDER — PROCHLORPERAZINE EDISYLATE 5 MG/ML
10 INJECTION INTRAMUSCULAR; INTRAVENOUS EVERY 6 HOURS PRN
Status: DISCONTINUED | OUTPATIENT
Start: 2024-05-10 | End: 2024-05-12

## 2024-05-10 RX ORDER — METOPROLOL TARTRATE 1 MG/ML
INJECTION, SOLUTION INTRAVENOUS
Status: DISPENSED
Start: 2024-05-10 | End: 2024-05-10

## 2024-05-10 RX ORDER — METOPROLOL TARTRATE 1 MG/ML
5 INJECTION, SOLUTION INTRAVENOUS ONCE
Status: COMPLETED | OUTPATIENT
Start: 2024-05-10 | End: 2024-05-10

## 2024-05-10 RX ORDER — HEPARIN SODIUM 1000 [USP'U]/ML
1.5 INJECTION, SOLUTION INTRAVENOUS; SUBCUTANEOUS
Status: COMPLETED | OUTPATIENT
Start: 2024-05-10 | End: 2024-05-11

## 2024-05-10 RX ORDER — ALBUMIN (HUMAN) 12.5 G/50ML
25 SOLUTION INTRAVENOUS
Status: ACTIVE | OUTPATIENT
Start: 2024-05-10 | End: 2024-05-12

## 2024-05-10 NOTE — PLAN OF CARE
Assumed care of the pt at approx. 1930 pm. Neuro checks intact, denies any pain. Noted more frequent episodes of A-fib w/RVR with some beats of PVCs/V-tach. BP stable. Electrolytes rechecked & stable. Restarted Cardizem gtt with improvement. Close to midnight went to check on pt and he noted to be drooling from his mouth and slurring his speech. L-facial droop and slight L-tongue deviation also noted. The rest of his neuro exam remained intact. Neuro APN Roro Hayden notified and STAT CT head was ordered. In the meantime, pt became nauseous and went into V-tach. Zofran was given with improved and cardiology service was contacted to help manage pt's arrhythmia. Cardizem gtt remains at 5 mg/hr as HR has been labile fluctuating between 50s - 120s. Per discussion with Dr. Koenig, a dose of metoprolol IV was given with improvement. No Amio gtt at this time d/t prolonged qTc. Due to the above, CT head was delayed but safely completed when the HR was more controlled. CT head results back with slight increase in the right tentorium subdural hematoma & development of a small acute left temporal subarachnoid hemorrhage. The results discussed with Roro Hayden neuro APN. No further orders received. Neuro checks remained unchanged. Pt made NPO, pt verbalized understanding.    Weaned off Cardizem gtt later at night when the pt was in SB w/HR in mid 50s. However, he converted back to A-fib w/RVR shortly after so the Cardizem gtt was restarted. Some runs of V-tach still noted but not-sustained. No chest pain. VSS. Updated on POC.

## 2024-05-10 NOTE — PROGRESS NOTES
[facility] Cardiology Progress Note        Jonny Haque Patient Status:  Inpatient    4/15/1945 MRN [id number]   Location [facility] 6NE-A Attending Lance Aguayo MD   Hosp Day # 2 PCP Gee Jimenez MD     Subjective:  The patient denies  chest pain and shortness of breath.  He had some slurred speech last night---> CT brain :right subdural hemorrhage appears minimally increased in size from the prior exam.  Speech seems better this am.         Medications:   metoprolol        metoprolol tartrate  25 mg Oral 2x Daily(Beta Blocker)    sevelamer carbonate  800 mg Oral TID CC    amiodarone  200 mg Oral BID with meals    insulin aspart  4-20 Units Subcutaneous TID AC and HS    insulin degludec  20 Units Subcutaneous Nightly    levothyroxine  150 mcg Oral Daily @ 0700    pantoprazole  40 mg Oral BID AC    rosuvastatin  10 mg Oral Nightly    levETIRAcetam  500 mg Intravenous Q12H       Continuous Infusions:   dilTIAZem 5 mg/hr (05/10/24 0317)    niCARdipine Stopped (24 2335)         Allergies:  No Known Allergies      Objective:        Intake/Output:      Intake/Output Summary (Last 24 hours) at 5/10/2024 0921  Last data filed at 5/10/2024 0652  Gross per 24 hour   Intake 694 ml   Output 1500 ml   Net -806 ml     Wt Readings from Last 3 Encounters:   05/10/24 191 lb 12.8 oz (87 kg)   24 210 lb 8.6 oz (95.5 kg)   24 221 lb 5.5 oz (100.4 kg)       Physical Exam:        Vitals:    05/10/24 0630 05/10/24 0700 05/10/24 0800 05/10/24 0900   BP:  127/61 124/63 122/67   BP Location:   Left arm    Pulse: 69 66 65 66   Resp: 15 14 14 14   Temp:   98.1 °F (36.7 °C)    TempSrc:   Temporal    SpO2: 97% 90% 94% 95%   Weight:       Height:           Temp:  [97.8 °F (36.6 °C)-98.2 °F (36.8 °C)] 98.1 °F (36.7 °C)  Pulse:  [] 66  Resp:  [11-22] 14  BP: ()/(49-88) 122/67  SpO2:  [90 %-100 %] 95 %      Temp: 98.1 °F (36.7 °C)  Pulse: 66  Resp: 14  BP: 122/67  General:   Appears comfortable  HEENT: No focal deficits.  Neck: No JVD, carotids 2+ no bruits.  Cardiac: Regular S1S2.  No S3, S4, rub, click.  No murmur.  Lungs: Clear to auscultation and percussion.  Abdomen: Soft, non-tender.   Extremities: No LE edema.  No clubbing or cyanosis.    Neurologic: Alert and oriented, normal affect.  Skin: Warm and dry.           LABS:      HEM:  Recent Labs   Lab 05/08/24  1931 05/09/24  0413 05/10/24  0449   WBC 5.4 5.3 5.9   HGB 11.6* 10.8* 12.1*   HCT 35.3* 34.7* 37.3*   PLT 75.0* 78.0* 65.0*       Chem:  Recent Labs   Lab 05/08/24  1931 05/09/24  0413 05/09/24  2127 05/10/24  0449   * 138  --  134*   K 3.7 3.4* 4.2 4.8   CL 99 100  --  101   CO2 21.0 26.0  --  27.0   BUN 63* 72*  --  48*   CREATSERUM 6.53* 6.72*  --  5.31*   CA 9.2 9.2  --  9.3   MG  --  2.3 1.9  --    PHOS  --  6.5*  --   --    * 158*  --  137*       Recent Labs   Lab 05/08/24 1931   ALT 24   AST 24   ALB 3.4       Recent Labs   Lab 05/08/24 1931   PTT 31.9   INR 1.10           No results found for: \"TROP\", \"CKMB\"      Invalid input(s): \"PBNPML\"                       Diagnostics:       Telemetry: SR<---> Atrial Fibrillation/RVR, wide QRS (aberration?)     Laboratories and Data:  Diagnostics:     EKG, 5/9/2024:  Atrial Fibrillation, RVR, IVCD---> NSR, IVCD     CXR, 5/9/2024:                Impression:     1.  Paroxysmal Atrial Fibrillation.  Usually maintains SR with amiodarone, but some Atrial Fibrillation RVR has been occurring this admission.   Not on ac pre-admission due to frequent GI bleeds in past     2. CAD, s/p numerous PCI procedures,  most recently 3/19/24 (TRACY LM/LAD) and 3/22/24 (TRACY OM) for chest pain.   asymptomatic.     3. Fall with SDH.  He received plt transfusion     4. Cirrhosis     5. Hx of freq GIB     6. Diabetes     7. ESRD, HD     8. Hypertension        9. AS, moderate        Recommend:     1.  Telemetry  2. Resumed amiodarone with  mini-load of 200 mg bid given today's Atrial  Fibrillation episode and acute stress 2/2 SDH  3. Holding DAPT for now due to SDH, 5/7/24.  Discussed with neurosurgery.  Planning to repeat head CT in 1 week and consider resuming DAPT depending on stability of SDH.  He is at potentially high cardiac risk for MI and death as stenting involved LM into LAD and only 3 weeks has elapsed since the left main stent was placed.  I discussed all of this with pt and his son   4. Will employ po cardizem to mitigate RVR    Yogi Espinoza MD  5/10/2024  9:21 AM

## 2024-05-10 NOTE — PROGRESS NOTES
[facility]   part of [facility]     Hospitalist Progress Note     Jonny Haque Patient Status:  Inpatient    4/15/1945 MRN [id number]   Location [facility] 6NE-A Attending Lance Aguayo MD   Hosp Day # 2 PCP Gee Jimenez MD     Chief Complaint: Intracranial brain bleed    Subjective:     Patient is doing well, walking with walker, did have an episode where he had a little bit of the left facial droop as well as some slurred speech, had a repeat CT done that showed mild increase in the right subdural hemorrhage, otherwise no new weakness numbness tingling no issues with swallowing, also wanted to A-fib overnight however currently converted back to sinus rhythm        Objective:    Review of Systems:   A comprehensive review of systems was completed; pertinent positive and negatives stated in subjective.    Vital signs:  Temp:  [97.8 °F (36.6 °C)-98.2 °F (36.8 °C)] 97.8 °F (36.6 °C)  Pulse:  [] 56  Resp:  [11-22] 17  BP: ()/(44-88) 119/60  SpO2:  [90 %-100 %] 92 %    Physical Exam:      en: No acute distress, alert and oriented x 3Pulm: Lungs clear bilaterally, good inspiratory effort extensive periorbital swelling  CV:  nL S1/S2  Abd: soft, NT/ND, no hepatomegaly, +BS  MSK: moving all extremities, no edema  Neuro: no focal deficits  Skin: no rashes/lesions  Psych: normal mood/affect    Diagnostic Data:    Labs:  Recent Labs   Lab 05/08/24  1931 05/09/24  0413 05/10/24  0449   WBC 5.4 5.3 5.9   HGB 11.6* 10.8* 12.1*   MCV 96.2 98.9 97.4   PLT 75.0* 78.0* 65.0*   INR 1.10  --   --        Recent Labs   Lab 05/08/24  1931 05/09/24  0413 05/09/24  2127 05/10/24  0449   * 158*  --  137*   BUN 63* 72*  --  48*   CREATSERUM 6.53* 6.72*  --  5.31*   CA 9.2 9.2  --  9.3   ALB 3.4  --   --   --    * 138  --  134*   K 3.7 3.4* 4.2 4.8   CL 99 100  --  101   CO2 21.0 26.0  --  27.0   ALKPHO 162*  --   --   --    AST 24  --   --   --    ALT 24  --   --   --    BILT 0.5  --   --    --    TP 7.9  --   --   --        Estimated Creatinine Clearance: 12.8 mL/min (A) (based on SCr of 5.31 mg/dL (H)).    Recent Labs   Lab 05/08/24 1931   TROPHS 33       Recent Labs   Lab 05/08/24 1931   PTP 14.2   INR 1.10                  Microbiology    No results found for this visit on 05/08/24.      Imaging: Reviewed in Epic.    Medications:    metoprolol tartrate  25 mg Oral 2x Daily(Beta Blocker)    dilTIAZem  60 mg Oral 4 times per day    heparin  1.5 mL Intracatheter Once    sevelamer carbonate  800 mg Oral TID CC    amiodarone  200 mg Oral BID with meals    insulin aspart  4-20 Units Subcutaneous TID AC and HS    insulin degludec  20 Units Subcutaneous Nightly    levothyroxine  150 mcg Oral Daily @ 0700    pantoprazole  40 mg Oral BID AC    rosuvastatin  10 mg Oral Nightly    levETIRAcetam  500 mg Intravenous Q12H       Assessment & Plan:        # Fall, acute subdural hematoma  -Exacerbated by patient being on dual antiplatelet therapy  -CT head reviewed, no focal neurological symptoms  -Nicardipine drip has been switched off, blood pressure management per neurosurgery and neurology  -Keppra IV twice daily  -Repeated CT head secondary to increased left-sided facial droop and slurred speech, minimally increased subdural hematoma  -Continue neurochecks every 2 hours,  -No surgical intervention planned at this time, continue neurochecks and BP control        # Paroxysmal A-fib with RVR  # V. Tach  -Patient with A-fib with RVR as well as V. tach overnight  -Heart rate went into 130s to 140s, cardiology consulted  -Resumed amiodarone with a mini load, continue to hold anticoagulation antiplatelets secondary to above  -Converted back to sinus rhythm     # CAD status post PCI with recent in March 2024  -Given this is a fresh stent, and holding antiplatelets  -Resume when safe per neurosurgery and cardiology  -Continue statin, diltiazem  -High risk for stent closure however also with significant risk of  expanding subdural hematoma  -After discussion with neurosurgery plan to repeat CT head in 1 week and consider resuming DAPT depending upon the stability of the SDH     # ESRD  -HD per nephrology  # Hypothyroidism  # HSV cirrhosis  # Moderate aortic stenosis  # Ischemic cardiomyopathy     # Type 2 diabetes  -Continue 20 units of insulin, sliding scale      Lance Aguayo MD    Supplementary Documentation:     Quality:  DVT Mechanical Prophylaxis:   SCDs,    DVT Pharmacologic Prophylaxis   Medication    heparin (Porcine) 1000 UNIT/ML injection 1,500 Units                Code Status: Full Code  Vásquez: No urinary catheter in place  Vásquez Duration (in days):   Central line:    MOUNA:     The 21st Century Cures Act makes medical notes like these available to patients in the interest of transparency. Please be advised this is a medical document. Medical documents are intended to carry relevant information, facts as evident, and the clinical opinion of the practitioner. The medical note is intended as peer to peer communication and may appear blunt or direct. It is written in medical language and may contain abbreviations or verbiage that are unfamiliar.

## 2024-05-10 NOTE — CM/SW NOTE
Therapies recommending home with PT and OT.  Order obtained as well as face to face--referrals sent in [facility]--pending

## 2024-05-10 NOTE — PHYSICAL THERAPY NOTE
PHYSICAL THERAPY TREATMENT NOTE - INPATIENT    Room Number: 6605/6605-A     Session: 1     Number of Visits to Meet Established Goals: 5  History related to current admission: Patient is a 79 year old male admitted on 5/8/2024: Presented with CT scan showing \"CT showed a large holohemispheric subdural hematoma with possible epidural hematoma measuring up to 2.2 cm and right to left midline shift of 6 mm\"  -no acute intervention per neuro surg - after a fall yesterday in the bathroom     Recent Admit  4/16-4/17/24: chest pain of uncertain etiology > home  3/31-4/4/24: GIB, not seen by therapy  3/17-3/24/24: acute chest pain, not seen by therapy  Presenting Problem: ICH  Co-Morbidities : ESRD on HD, DM2, CAD s/p stent in 2017, 2021 and recently in March 2024, ischemic cardiomyopathy, HTN, HL, hypothyroidism    ASSESSMENT   Patient demonstrates good  progress this session, goals  updated to reflect patient performance. Pt noted to be drooling throughout session, after brushing his teeth therapist noted significant amount of food material in basin with improved speech after clearance - discussed with SLP.     Patient continues to function below baseline with transfers, gait, and stair negotiation.  Contributing factors to remaining limitations include decreased functional strength and impaired dynamic balance.  Next session anticipate patient to progress gait and stair negotiation.  Physical Therapy will continue to follow patient for duration of hospitalization.    Patient continues to benefit from continued skilled PT services: at discharge to promote functional independence in home.  Anticipate patient will return home with home health PT.    PLAN  PT Treatment Plan: Bed mobility;Body mechanics;Coordination;Endurance;Energy conservation;Patient education;Family education;Gait training;Neuromuscular re-educate;Range of motion;Strengthening;Stoop training;Stair training;Transfer training;Balance training  Rehab  Potential : Good  Frequency (Obs): 3-5x/week    CURRENT GOALS     Goal #1 Patient is able to demonstrate supine - sit EOB @ level: supervision      Goal #2 Patient is able to demonstrate transfers EOB to/from Chair/Wheelchair at assistance level: supervision      Goal #3 Patient is able to ambulate 150 feet with assist device:  LRAD  at assistance level: supervision      Goal #4 Patient will navigate stairs with rail and SBA   Goal #5 Colon   Goal #6     Goal Comments: Goals established on 2024     5/10/2024 all goals ongoing    SUBJECTIVE  \"He's drooling more today\" - pts son     OBJECTIVE  Precautions: Bed/chair alarm;Seizure ( to 150)    WEIGHT BEARING RESTRICTION  Weight Bearing Restriction: None                PAIN ASSESSMENT   Ratin          BALANCE                                                                                                                       Static Sitting: Good  Dynamic Sitting: Good           Static Standing: Fair -  Dynamic Standing: Fair -    ACTIVITY TOLERANCE  Pulse:  (64 with ambulation)  Heart Rate Source: Monitor     BP: 122/44  BP Location: Left arm  BP Method: Automatic  Patient Position: Sitting    O2 WALK         AM-PAC '6-Clicks' INPATIENT SHORT FORM - BASIC MOBILITY  How much difficulty does the patient currently have...  Patient Difficulty: Turning over in bed (including adjusting bedclothes, sheets and blankets)?: None   Patient Difficulty: Sitting down on and standing up from a chair with arms (e.g., wheelchair, bedside commode, etc.): A Little   Patient Difficulty: Moving from lying on back to sitting on the side of the bed?: None   How much help from another person does the patient currently need...   Help from Another: Moving to and from a bed to a chair (including a wheelchair)?: A Little   Help from Another: Need to walk in hospital room?: A Little   Help from Another: Climbing 3-5 steps with a railing?: A Little       AM-PAC Score:  Raw Score: 20    Approx Degree of Impairment: 35.83%   Standardized Score (AM-PAC Scale): 47.67   CMS Modifier (G-Code): CJ    FUNCTIONAL ABILITY STATUS  Gait Assessment   Functional Mobility/Gait Assessment  Gait Assistance: Contact guard assist  Distance (ft): 15,50,150  Assistive Device: Rolling walker  Pattern: Within Functional Limits    Skilled Therapy Provided: communicated with RN prior to session who reported HR improved today compared to yesterday and ok to progress with therapy     Transfer Mobility:  Sit<>Stand: SBA   Stand<>Sit: SBA   Gait: CGA - continue to recommend use of RW, he was able to participate in gait training with cuing for RW placement especially while navigating small space in the bathroom, during first gait trial noted bleeding from IV site, returned to room - RN present and removed IV. Able to progress second gait trial further distance.     Therapist's Comments: HR in 60s with activity    Patient End of Session: Up in chair;Needs met;Call light within reach;RN aware of session/findings;All patient questions and concerns addressed;Family present    PT Session Time: 39 minutes  Gait Trainin minutes  Therapeutic Activity: 9 minutes  Therapeutic Exercise:  minutes   Neuromuscular Re-education:  minutes

## 2024-05-10 NOTE — SLP NOTE
ADULT SWALLOWING EVALUATION    ASSESSMENT    ASSESSMENT/OVERALL IMPRESSION:  Patient seen to reassess swallow function as he exhibited changes in neurological status overnight with noted lingual deviation, slurred speech and facial droop. Imaging revealed slight increased in SDH and development of small acute left temporal SAH. He is alert, appropriate, recalling my name and events of overnight. His speech is dysarthric this morning which is new from yesterday.     Oral mechanism exam remarkable for reduced left facial tone and strength but fairly symmetrical smile (ROM). SLP presented ice chips, thin liquids by cup, puree and solid trials. Oral prep and transit a bit prolonged with puree and solid. Mastication prolonged and with reduced bolus formation leading to oral residue though this cleared with liquid assist. He did not appear to sense oral residue when asked. He did report facial sensation was intact bilaterally. Laryngeal excursion judged to be of adequate strength and timeliness to palpation. There were no overt signs of aspiration across trials.      Recommend initiate soft/easy to chew solids and thin liquids observing aspiration precautions including no straw, upright for all po, slow rate and small bites and sips, alternate consistencies. Recommend monitoring for potential left sided oral retention and clearing as needed. SLP will continue to follow.     Rutledge Assessment of Swallow Function Score: No abnormality detected (190)    RECOMMENDATIONS   Diet Recommendations - Solids: Soft/ Easy to chew  Diet Recommendations - Liquids: Thin Liquids (no straw)                        Compensatory Strategies Recommended: No straws;Slow rate;Small bites and sips;Alternate consistencies  Aspiration Precautions: Upright position;Slow rate;Small bites and sips;No straw  Medication Administration Recommendations: Crushed in puree  Treatment Plan/Recommendations: Dysphagia therapy;Aspiration precautions;Communication  evaluation    HISTORY   MEDICAL HISTORY  Reason for Referral: Stroke protocol    Problem List  Principal Problem:    Intracranial hemorrhage (HCC)  Active Problems:    Coronary artery disease of native artery of native heart with stable angina pectoris (HCC)    PAF (paroxysmal atrial fibrillation) (HCC)      Past Medical History  Past Medical History:    Aortic stenosis    Calculus of kidney    Coronary atherosclerosis    bare metal stents at [facility] Jan 2021    Diabetes (HCC)    Disorder of thyroid    Esophageal varices without bleeding, unspecified esophageal varices type (HCC)    GIB (gastrointestinal bleeding)    Hepatitis, unspecified    hepatitis C-just completed taking Harvoni in November 2016    High blood pressure    High cholesterol    Malignant neoplasm of trigone of urinary bladder (HCC)    Malignant neoplasm of urinary bladder, unspecified site (HCC)    Malignant neoplasm of urinary bladder, unspecified site (HCC)    Renal disorder    Visual impairment    reading glasses       Prior Living Situation: Home with support (lives with son)  Diet Prior to Admission: Regular;Thin liquids  Precautions: Aspiration;Seizure    Patient/Family Goals: to get better    SWALLOWING HISTORY  Current Diet Consistency: Regular;Thin liquids  Dysphagia History: previous VFSS with rec for regular/thin with no straw use  Imaging Results:   Brain CT from 5/10/24 revealed:  CONCLUSION:  A right subdural hemorrhage appears minimally increased in size from the prior exam.     Possible trace left subarachnoid hemorrhage versus artifact present.  Continued follow-up is suggested.     A preliminary report was provided by [facility].  Critical results were discussed with Sonya by Dr. Sagastume at approximately 0154 ET on 5/10/24.  Read back was performed.                 LOCATION:  [place]        Dictated by (CST): Jason Stoddard MD on 5/10/2024 at 6:19 AM      Finalized by (CST): Jason Stoddard MD on 5/10/2024 at 6:26 AM       SUBJECTIVE       OBJECTIVE   ORAL MOTOR EXAMINATION  Dentition: Natural;Functional  Symmetry: Reduced left facial  Strength: Reduced left facial  Tone: Reduced left facial  Range of Motion: Within Functional Limits  Rate of Motion: Reduced    Voice Quality: Clear  Respiratory Status: Unlabored  Consistencies Trialed: Thin liquids;Puree;Hard solid  Method of Presentation: Self presentation;Cup;Single sips;Consecutive swallows  Patient Positioning: Upright;Midline (in bed)    Oral Phase of Swallow: Impaired  Bolus Retrieval: Intact  Bilabial Seal: Impaired  Bolus Formation: Impaired  Bolus Propulsion: Intact  Mastication: Intact  Retention: Impaired    Pharyngeal Phase of Swallow: Within Functional Limits           (Please note: Silent aspiration cannot be evaluated clinically. Videofluoroscopic Swallow Study is required to rule-out silent aspiration.)    Esophageal Phase of Swallow: No complaints consistent with possible esophageal involvement              GOALS  Goal #1 The patient will tolerate soft/easy to chew consistency and thin liquids without overt signs or symptoms of aspiration with 100 % accuracy over 2-3 session(s).  In Progress   Goal #2 The patient/family/caregiver will demonstrate understanding and implementation of aspiration precautions and swallow strategies independently over 2-3 session(s).    In Progress   Goal #3 The patient will tolerate trial upgrade of regular consistency and NA liquids without overt signs or symptoms of aspiration with 100 % accuracy over 1-2 session(s).  In Progress   Goal #4 Patient will participate in communication evaluation     In Progress     FOLLOW UP  Treatment Plan/Recommendations: Dysphagia therapy;Aspiration precautions;Communication evaluation  Number of Visits to Meet Established Goals: 3  Follow Up Needed (Documentation Required): Yes  SLP Follow-up Date: 05/13/24    Thank you for your referral.   If you have any questions, please contact Anoop  Tomas Marin MS CCC-SLP  Pager [phone]

## 2024-05-10 NOTE — CM/SW NOTE
Confirmed patient receives his dialysis @ the [facility] on Tuesdays--Thursdays--and Saturdays--chair time 11 am--center phone  [phone].  In regards to assistance with transportation to the center for his treatment--directed to have patient follow u with center's  who will be in after may 16th    Updated patient to follow up with  if transportation needed for HD after 5/16/24

## 2024-05-10 NOTE — PLAN OF CARE
- Neuros Q2  - See MAR for medications  - ambulating with walker    Problem: Patient/Family Goals  Goal: Patient/Family Long Term Goal  Description: Patient's Long Term Goal: disease management    Interventions:  - Follow up with [facility]  - PT/OT/ST as recommended  - Medications as ordered  - See additional Care Plan goals for specific interventions  Outcome: Progressing  Goal: Patient/Family Short Term Goal  Description: Patient's Short Term Goal: Hospital discharge    Interventions:   - PT/OT/ST eval and rec  - Meds as ordered  - Neuro checks ordered  - See additional Care Plan goals for specific interventions  Outcome: Progressing     Problem: NEUROLOGICAL - ADULT  Goal: Achieves stable or improved neurological status  Description: INTERVENTIONS  - Assess for and report changes in neurological status  - Initiate measures to prevent increased intracranial pressure  - Maintain blood pressure and fluid volume within ordered parameters to optimize cerebral perfusion and minimize risk of hemorrhage  - Monitor temperature, glucose, and sodium. Initiate appropriate interventions as ordered  Outcome: Progressing

## 2024-05-10 NOTE — HOME CARE LIAISON
[facility] Spoke w/ patients son who is agreeable with [facility]. Contact information placed on AVS.

## 2024-05-10 NOTE — PROGRESS NOTES
Called by RN with neuro changes.  Pt having slurred speech, left facial droop and tongue deviation.  SBP stable  and blood glucose 169.  Ordered stat CT head.      Scan delayed by Vtach nausea and emesis.  Cardiology managing and ordered Metoprolol IV.  Zofran given for nausea.    Upon exam pt is alert and oriented x3 with thick speech that is different than previous but clearly understandable.  He has a slightly left lower facial droop and tongue deviation.  He denies current N/V dizziness or increased headache.  His vision is intact and he has equal strong movements in all ext.  Sensation to light touch is intact.      Plan for CT head scan once HR stable.    Roro ESTEVEZ  Critical Care Nurse Practitioner  Spec [id number]    CT head with small increase in right SDH from 2 mm to 4 mm and new small left temporal SAH.      Transfer orders cancelled due to neuro changes and Vtach/afib.  Dr. Bacon notified of Ct findings.  Neuro checks changed to Q2h.

## 2024-05-10 NOTE — PROGRESS NOTES
[facility]   part of [facility]     Nephrology Progress Note    Jonny Haque Patient Status:  Inpatient    4/15/1945 MRN [id number]   Location [facility] 6NE-A Attending Lance Aguayo MD   Hosp Day # 2 PCP Gee Jimenez MD       SUBJECTIVE:  Up walking with therapy        Physical Exam:   /44 (BP Location: Left arm)   Pulse 61   Temp 98.1 °F (36.7 °C) (Temporal)   Resp 16   Ht 6' (1.829 m)   Wt 191 lb 12.8 oz (87 kg)   SpO2 91%   BMI 26.01 kg/m²   Temp (24hrs), Av °F (36.7 °C), Min:97.8 °F (36.6 °C), Max:98.2 °F (36.8 °C)       Intake/Output Summary (Last 24 hours) at 5/10/2024 1210  Last data filed at 5/10/2024 1000  Gross per 24 hour   Intake 934 ml   Output 1500 ml   Net -566 ml     Last 3 Weights   05/10/24 0600 191 lb 12.8 oz (87 kg)   24 2247 198 lb 6.6 oz (90 kg)   24 1920 206 lb (93.4 kg)   24 0301 210 lb 8.6 oz (95.5 kg)   24 0000 221 lb 5.5 oz (100.4 kg)   24 2330 210 lb 8.6 oz (95.5 kg)   24 0515 221 lb 5.5 oz (100.4 kg)   24 0559 219 lb 9.3 oz (99.6 kg)   24 0654 223 lb 8.7 oz (101.4 kg)   24 0530 222 lb 4.8 oz (100.8 kg)   24 1559 220 lb 10.9 oz (100.1 kg)   24 1225 223 lb (101.2 kg)   24 0500 223 lb 1.6 oz (101.2 kg)   24 0638 225 lb 8 oz (102.3 kg)   24 0600 221 lb 1.9 oz (100.3 kg)   24 0400 223 lb 3.2 oz (101.2 kg)   24 0122 221 lb 9 oz (100.5 kg)   23 0600 204 lb 6.4 oz (92.7 kg)   23 0212 207 lb 3.7 oz (94 kg)     General: Alert and oriented in no apparent distress.  HEENT: No scleral icterus, MMM, + ecchymosis B orbits/L forehead  Neck: Supple, no JADE or thyromegaly  Cardiac: Regular rate and rhythm, S1, S2 normal, no murmur or rub  Lungs: Clear without wheezes, rales, rhonchi.    Abdomen: Soft, non-tender. + bowel sounds, no palpable organomegaly  Extremities: Without clubbing, cyanosis or edema.  Neurologic:moving all extremities  Skin: Warm and  dry, no rash        Labs:     Recent Labs   Lab 05/08/24  1931 05/09/24  0413 05/10/24  0449   WBC 5.4 5.3 5.9   HGB 11.6* 10.8* 12.1*   MCV 96.2 98.9 97.4   PLT 75.0* 78.0* 65.0*   INR 1.10  --   --        Recent Labs   Lab 05/08/24  1931 05/09/24  0413 05/09/24  2127 05/10/24  0449   * 138  --  134*   K 3.7 3.4* 4.2 4.8   CL 99 100  --  101   CO2 21.0 26.0  --  27.0   BUN 63* 72*  --  48*   CREATSERUM 6.53* 6.72*  --  5.31*   CA 9.2 9.2  --  9.3   MG  --  2.3 1.9  --    PHOS  --  6.5*  --   --    * 158*  --  137*       Recent Labs   Lab 05/08/24 1931   ALT 24   AST 24   ALB 3.4       Recent Labs   Lab 05/09/24  1055 05/09/24  1619 05/09/24  2045 05/09/24  2327 05/10/24  0620   PGLU 285* 208* 221* 169* 158*       Meds:   Current Facility-Administered Medications   Medication Dose Route Frequency    metoprolol tartrate (Lopressor) tab 25 mg  25 mg Oral 2x Daily(Beta Blocker)    metoprolol (Lopressor) 5 mg/5mL injection        prochlorperazine (Compazine) 10 MG/2ML injection 10 mg  10 mg Intravenous Q6H PRN    dilTIAZem (cardIZEM) tab 60 mg  60 mg Oral 4 times per day    morphINE PF 2 MG/ML injection 0.5 mg  0.5 mg Intravenous Q2H PRN    Or    morphINE PF 2 MG/ML injection 1 mg  1 mg Intravenous Q2H PRN    Or    morphINE PF 2 MG/ML injection 2 mg  2 mg Intravenous Q2H PRN    HYDROcodone-acetaminophen (Norco) 5-325 MG per tab 1 tablet  1 tablet Oral Q4H PRN    Or    HYDROcodone-acetaminophen (Norco) 5-325 MG per tab 2 tablet  2 tablet Oral Q4H PRN    sodium chloride 0.9 % IV bolus 100 mL  100 mL Intravenous Q30 Min PRN    And    albumin human (Albumin) 25% injection 25 g  25 g Intravenous PRN Dialysis    sevelamer carbonate (Renvela) tab 800 mg  800 mg Oral TID CC    amiodarone (Pacerone) tab 200 mg  200 mg Oral BID with meals    polyethylene glycol (PEG 3350) (Miralax) 17 g oral packet 17 g  17 g Oral Daily PRN    sennosides (Senokot) tab 17.2 mg  17.2 mg Oral Nightly PRN    bisacodyl (Dulcolax) 10 MG  rectal suppository 10 mg  10 mg Rectal Daily PRN    glucose (Dex4) 15 GM/59ML oral liquid 15 g  15 g Oral Q15 Min PRN    Or    glucose (Glutose) 40% oral gel 15 g  15 g Oral Q15 Min PRN    Or    glucose-vitamin C (Dex-4) chewable tab 4 tablet  4 tablet Oral Q15 Min PRN    Or    dextrose 50% injection 50 mL  50 mL Intravenous Q15 Min PRN    Or    glucose (Dex4) 15 GM/59ML oral liquid 30 g  30 g Oral Q15 Min PRN    Or    glucose (Glutose) 40% oral gel 30 g  30 g Oral Q15 Min PRN    Or    glucose-vitamin C (Dex-4) chewable tab 8 tablet  8 tablet Oral Q15 Min PRN    insulin aspart (NovoLOG) 100 Units/mL FlexPen 4-20 Units  4-20 Units Subcutaneous TID AC and HS    insulin degludec 100 units/mL flextouch 20 Units  20 Units Subcutaneous Nightly    levothyroxine (Synthroid) tab 150 mcg  150 mcg Oral Daily @ 0700    pantoprazole (Protonix) DR tab 40 mg  40 mg Oral BID AC    rosuvastatin (Crestor) tab 10 mg  10 mg Oral Nightly    acetaminophen (Tylenol) tab 650 mg  650 mg Oral Q4H PRN    Or    acetaminophen (Tylenol) rectal suppository 650 mg  650 mg Rectal Q4H PRN    labetalol (Trandate) 5 mg/mL injection 10 mg  10 mg Intravenous Q10 Min PRN    hydrALAzine (Apresoline) 20 mg/mL injection 10 mg  10 mg Intravenous Q2H PRN    metoclopramide (Reglan) 5 mg/mL injection 5 mg  5 mg Intravenous Q8H PRN    levETIRAcetam (Keppra) 500 mg/5mL injection 500 mg  500 mg Intravenous Q12H    niCARdipine in sodium chloride 0.86% (carDENE) 20 mg/200mL infusion premix  5-15 mg/hr Intravenous Continuous PRN         Impression/Plan:        #1.  ESRD- due to DM.  HD to cont per usual TTHS routine     #2.  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  JIE for goal hgb 10-11 gms     #3.  SDH- imaging noted with incr bleed on repeat imaging.  Has had slurred speech.  Neuro services following     #4.  HTN- parameters per neuro    #5.  PAF- per cards        Questions/concerns were discussed with patient and/or family by bedside.          Johnson Vera MD  5/10/2024  12:10  PM

## 2024-05-10 NOTE — PROGRESS NOTES
[facility] Pharmacy Note:    Epoetin Hold Per Policy    Jonny Haque is a 79 year old year old patient who is being prescribed epoetin for anemia due to renal failure.      Lab Results   Component Value Date/Time    HGB 12.1 (L) 05/10/2024 04:49 AM       Hold Epoetin due to hemoglobin >/= 12 gm/dL per approved P&T protocol.  Pharmacy will continue to hold the epoetin until the hemoglobin is below this threshold.    Gloria Weinberg PharmD  5/10/2024  7:42 AM  [facility]: [phone]

## 2024-05-11 LAB
ANION GAP SERPL CALC-SCNC: 11 MMOL/L (ref 0–18)
BASOPHILS # BLD AUTO: 0.03 X10(3) UL (ref 0–0.2)
BASOPHILS NFR BLD AUTO: 0.6 %
BUN BLD-MCNC: 65 MG/DL (ref 9–23)
CALCIUM BLD-MCNC: 8.9 MG/DL (ref 8.5–10.1)
CHLORIDE SERPL-SCNC: 98 MMOL/L (ref 98–112)
CO2 SERPL-SCNC: 24 MMOL/L (ref 21–32)
CREAT BLD-MCNC: 6.4 MG/DL
EGFRCR SERPLBLD CKD-EPI 2021: 8 ML/MIN/1.73M2 (ref 60–?)
EOSINOPHIL # BLD AUTO: 0.29 X10(3) UL (ref 0–0.7)
EOSINOPHIL NFR BLD AUTO: 5.4 %
ERYTHROCYTE [DISTWIDTH] IN BLOOD BY AUTOMATED COUNT: 17.2 %
GLUCOSE BLD-MCNC: 153 MG/DL (ref 70–99)
GLUCOSE BLD-MCNC: 216 MG/DL (ref 70–99)
GLUCOSE BLD-MCNC: 228 MG/DL (ref 70–99)
GLUCOSE BLD-MCNC: 229 MG/DL (ref 70–99)
GLUCOSE BLD-MCNC: 285 MG/DL (ref 70–99)
HCT VFR BLD AUTO: 32.3 %
HGB BLD-MCNC: 10.6 G/DL
IMM GRANULOCYTES # BLD AUTO: 0.01 X10(3) UL (ref 0–1)
IMM GRANULOCYTES NFR BLD: 0.2 %
LYMPHOCYTES # BLD AUTO: 1.34 X10(3) UL (ref 1–4)
LYMPHOCYTES NFR BLD AUTO: 25 %
MCH RBC QN AUTO: 31.7 PG (ref 26–34)
MCHC RBC AUTO-ENTMCNC: 32.8 G/DL (ref 31–37)
MCV RBC AUTO: 96.7 FL
MONOCYTES # BLD AUTO: 0.48 X10(3) UL (ref 0.1–1)
MONOCYTES NFR BLD AUTO: 9 %
NEUTROPHILS # BLD AUTO: 3.21 X10 (3) UL (ref 1.5–7.7)
NEUTROPHILS # BLD AUTO: 3.21 X10(3) UL (ref 1.5–7.7)
NEUTROPHILS NFR BLD AUTO: 59.8 %
OSMOLALITY SERPL CALC.SUM OF ELEC: 302 MOSM/KG (ref 275–295)
PLATELET # BLD AUTO: 73 10(3)UL (ref 150–450)
POTASSIUM SERPL-SCNC: 4.6 MMOL/L (ref 3.5–5.1)
RBC # BLD AUTO: 3.34 X10(6)UL
SODIUM SERPL-SCNC: 133 MMOL/L (ref 136–145)
WBC # BLD AUTO: 5.4 X10(3) UL (ref 4–11)

## 2024-05-11 PROCEDURE — 99233 SBSQ HOSP IP/OBS HIGH 50: CPT | Performed by: INTERNAL MEDICINE

## 2024-05-11 RX ORDER — LEVETIRACETAM 500 MG/1
500 TABLET ORAL 2 TIMES DAILY
Status: DISCONTINUED | OUTPATIENT
Start: 2024-05-11 | End: 2024-05-12

## 2024-05-11 RX ORDER — DILTIAZEM HYDROCHLORIDE 240 MG/1
240 CAPSULE, COATED, EXTENDED RELEASE ORAL DAILY
Status: DISCONTINUED | OUTPATIENT
Start: 2024-05-11 | End: 2024-05-12

## 2024-05-11 NOTE — PROGRESS NOTES
[facility]   part of [facility]     Hospitalist Progress Note     Jonny Haque Patient Status:  Inpatient    4/15/1945 MRN [id number]   Location [facility] 6NE-A Attending Lance Aguayo MD   Hosp Day # 3 PCP Gee Jimenez MD     Chief Complaint: Intracranial brain bleed    Subjective:     Patient is doing well and eager to go home, however this am flipped into afib with some pauses, asymptomatic, receiving another treamtent of HD today  No new focal deficits at this time        Objective:    Review of Systems:   A comprehensive review of systems was completed; pertinent positive and negatives stated in subjective.    Vital signs:  Temp:  [97.1 °F (36.2 °C)-98.5 °F (36.9 °C)] 98.1 °F (36.7 °C)  Pulse:  [] 64  Resp:  [13-25] 21  BP: ()/() 99/53  SpO2:  [91 %-98 %] 95 %    Physical Exam:      en: No acute distress, alert and oriented x 3Pulm: Lungs clear bilaterally, good inspiratory effort extensive periorbital swelling  CV:  nL S1/S2  Abd: soft, NT/ND, no hepatomegaly, +BS  MSK: moving all extremities, no edema  Neuro: no focal deficits  Skin: no rashes/lesions  Psych: normal mood/affect    Diagnostic Data:    Labs:  Recent Labs   Lab 05/08/24  1931 05/09/24  0413 05/10/24  0449 05/11/24  0451   WBC 5.4 5.3 5.9 5.4   HGB 11.6* 10.8* 12.1* 10.6*   MCV 96.2 98.9 97.4 96.7   PLT 75.0* 78.0* 65.0* 73.0*   INR 1.10  --   --   --        Recent Labs   Lab 05/08/24  1931 05/09/24  0413 05/09/24  2127 05/10/24  0449 05/11/24  0451   * 158*  --  137* 228*   BUN 63* 72*  --  48* 65*   CREATSERUM 6.53* 6.72*  --  5.31* 6.40*   CA 9.2 9.2  --  9.3 8.9   ALB 3.4  --   --   --   --    * 138  --  134* 133*   K 3.7 3.4* 4.2 4.8 4.6   CL 99 100  --  101 98   CO2 21.0 26.0  --  27.0 24.0   ALKPHO 162*  --   --   --   --    AST 24  --   --   --   --    ALT 24  --   --   --   --    BILT 0.5  --   --   --   --    TP 7.9  --   --   --   --        Estimated Creatinine Clearance: 10.6  mL/min (A) (based on SCr of 6.4 mg/dL (H)).    Recent Labs   Lab 05/08/24 1931   TROPHS 33       Recent Labs   Lab 05/08/24 1931   PTP 14.2   INR 1.10                  Microbiology    No results found for this visit on 05/08/24.      Imaging: Reviewed in Epic.    Medications:    dilTIAZem ER  240 mg Oral Daily    metoprolol tartrate  25 mg Oral 2x Daily(Beta Blocker)    heparin  1.5 mL Intracatheter Once    sevelamer carbonate  800 mg Oral TID CC    amiodarone  200 mg Oral BID with meals    insulin aspart  4-20 Units Subcutaneous TID AC and HS    insulin degludec  20 Units Subcutaneous Nightly    levothyroxine  150 mcg Oral Daily @ 0700    pantoprazole  40 mg Oral BID AC    rosuvastatin  10 mg Oral Nightly    levETIRAcetam  500 mg Intravenous Q12H       Assessment & Plan:        # Fall, acute subdural hematoma  -Exacerbated by patient being on dual antiplatelet therapy  -CT head reviewed, no focal neurological symptoms  -Nicardipine drip has been switched off, blood pressure management per neurosurgery and neurology  -Keppra IV twice daily, dc on po antiepileptics per NSGY and neurology  -Repeated CT head secondary to increased left-sided facial droop and slurred speech, minimally increased subdural hematoma  -No surgical intervention planned at this time, continue neurochecks and BP control  -ok to trasnfer out of the ICU   -NSGY and neurology signed off         # Paroxysmal A-fib with RVR  # V. Tach  -Patient in NSR overnight but in afib this am , back in NSR  -Heart rate went into 130s to 140s, cardiology consulted  -Resumed amiodarone with a mini load, continue to hold anticoagulation antiplatelets secondary to above  -Converted back to sinus rhythm  -dc AV phuc blockers per cards  -did have some pauses today,m will let cards know     # CAD status post PCI with recent in March 2024  -Given this is a fresh stent, and holding antiplatelets  -Resume when safe per neurosurgery and cardiology  -Continue statin,  diltiazem  -High risk for stent closure however also with significant risk of expanding subdural hematoma  -After discussion with neurosurgery plan to repeat CT head in 1 week and consider resuming DAPT depending upon the stability of the SDH     # ESRD  -HD per nephrology  # Hypothyroidism  # HSV cirrhosis  # Moderate aortic stenosis  # Ischemic cardiomyopathy     # Type 2 diabetes  -Continue 20 units of insulin, sliding scale      Lance Aguayo MD    Supplementary Documentation:     Quality:  DVT Mechanical Prophylaxis:   SCDs,    DVT Pharmacologic Prophylaxis   Medication    heparin (Porcine) 1000 UNIT/ML injection 1,500 Units                Code Status: Full Code  Vásquez: No urinary catheter in place  Vásquez Duration (in days):   Central line:    MOUNA:     The 21st Century Cures Act makes medical notes like these available to patients in the interest of transparency. Please be advised this is a medical document. Medical documents are intended to carry relevant information, facts as evident, and the clinical opinion of the practitioner. The medical note is intended as peer to peer communication and may appear blunt or direct. It is written in medical language and may contain abbreviations or verbiage that are unfamiliar.

## 2024-05-11 NOTE — PHYSICAL THERAPY NOTE
PHYSICAL THERAPY TREATMENT NOTE - INPATIENT    Room Number: 6605/6605-A     Session: 2     Number of Visits to Meet Established Goals: 5  History related to current admission: Patient is a 79 year old male admitted on 5/8/2024: Presented with CT scan showing \"CT showed a large holohemispheric subdural hematoma with possible epidural hematoma measuring up to 2.2 cm and right to left midline shift of 6 mm\"  -no acute intervention per neuro surg - after a fall yesterday in the bathroom     Recent Admit  4/16-4/17/24: chest pain of uncertain etiology > home  3/31-4/4/24: GIB, not seen by therapy  3/17-3/24/24: acute chest pain, not seen by therapy  Presenting Problem: ICH  Co-Morbidities : ESRD on HD, DM2, CAD s/p stent in 2017, 2021 and recently in March 2024, ischemic cardiomyopathy, HTN, HL, hypothyroidism    ASSESSMENT   Patient demonstrates good  progress this session, goals  updated to reflect patient performance.     Pt able to complete stairs - good safety awareness, and prompted to complete step-to pattern for cardiopulmonary endurance      Patient continues to function near baseline with transfers, gait, and stair negotiation.  Contributing factors to remaining limitations include decreased functional strength and impaired dynamic balance.  Next session anticipate patient to progress gait and standing prolonged periods.  Physical Therapy will continue to follow patient for duration of hospitalization.    Patient continues to benefit from continued skilled PT services: at discharge to promote functional independence in home.  Anticipate patient will return home with home health PT.    PLAN  PT Treatment Plan: Bed mobility;Body mechanics;Coordination;Endurance;Energy conservation;Patient education;Family education;Gait training;Neuromuscular re-educate;Range of motion;Strengthening;Stoop training;Stair training;Transfer training;Balance training  Rehab Potential : Good  Frequency (Obs): 3-5x/week    CURRENT  GOALS     Goal #1 Patient is able to demonstrate supine - sit EOB @ level: supervision      Goal #2 Patient is able to demonstrate transfers EOB to/from Chair/Wheelchair at assistance level: supervision      Goal #3 Patient is able to ambulate 150 feet with assist device:  LRAD  at assistance level: supervision      Goal #4 Patient will navigate stairs with rail and SBA   Goal #5 Colon   Goal #6     Goal Comments: Goals established on 2024 all goals ongoing    SUBJECTIVE  \"Yeah hopefully I can go home after HD\"    OBJECTIVE  Precautions: Bed/chair alarm;Seizure ( to 150)    WEIGHT BEARING RESTRICTION  Weight Bearing Restriction: None                PAIN ASSESSMENT   Ratin          BALANCE                                                                                                                       Static Sitting: Good  Dynamic Sitting: Good           Static Standing: Fair -  Dynamic Standing: Fair -    ACTIVITY TOLERANCE                         O2 WALK         AM-PAC '6-Clicks' INPATIENT SHORT FORM - BASIC MOBILITY  How much difficulty does the patient currently have...  Patient Difficulty: Turning over in bed (including adjusting bedclothes, sheets and blankets)?: None   Patient Difficulty: Sitting down on and standing up from a chair with arms (e.g., wheelchair, bedside commode, etc.): A Little   Patient Difficulty: Moving from lying on back to sitting on the side of the bed?: None   How much help from another person does the patient currently need...   Help from Another: Moving to and from a bed to a chair (including a wheelchair)?: A Little   Help from Another: Need to walk in hospital room?: A Little   Help from Another: Climbing 3-5 steps with a railing?: A Little       AM-PAC Score:  Raw Score: 20   Approx Degree of Impairment: 35.83%   Standardized Score (AM-PAC Scale): 47.67   CMS Modifier (G-Code): CJ    FUNCTIONAL ABILITY STATUS  Gait Assessment   Functional  Mobility/Gait Assessment  Gait Assistance: Supervision  Distance (ft): 175  Assistive Device: Rolling walker  Pattern: Within Functional Limits  Stairs: Stairs  How Many Stairs: 12  Device: 1 Rail  Assist: Contact guard assist  Pattern: Ascend and Descend  Ascend and Descend : Step to    Skilled Therapy Provided: communicated with RN prior to session who reported HR improved today compared to yesterday and ok to progress with therapy     Transfer Mobility:  Sit<>Stand: SBA   Stand<>Sit: SBA   Gait:  see above    Therapist's Comments: RN aware of session    Patient End of Session: Up in chair;Needs met;Call light within reach;RN aware of session/findings;All patient questions and concerns addressed    PT Session Time: 15 minutes  Gait Training: 15 minutes  Therapeutic Activity: 0 minutes  Therapeutic Exercise:  minutes   Neuromuscular Re-education:  minutes

## 2024-05-11 NOTE — PROGRESS NOTES
[facility]   part of [facility]     Nephrology Progress Note    Jonny Haque Patient Status:  Inpatient    4/15/1945 MRN [id number]   [facility] 6NE-A Attending Lance Aguayo MD   Hosp Day # 3 PCP Gee Jimenez MD       SUBJECTIVE:  No acute events  Feels OK  HD today       Current Facility-Administered Medications:     dilTIAZem ER (Dilacor XR) 24 hr cap 240 mg, 240 mg, Oral, Daily    metoprolol tartrate (Lopressor) tab 25 mg, 25 mg, Oral, 2x Daily(Beta Blocker)    prochlorperazine (Compazine) 10 MG/2ML injection 10 mg, 10 mg, Intravenous, Q6H PRN    sodium chloride 0.9 % IV bolus 100 mL, 100 mL, Intravenous, Q30 Min PRN **AND** albumin human (Albumin) 25% injection 25 g, 25 g, Intravenous, PRN Dialysis    heparin (Porcine) 1000 UNIT/ML injection 1,500 Units, 1.5 mL, Intracatheter, Once    morphINE PF 2 MG/ML injection 0.5 mg, 0.5 mg, Intravenous, Q2H PRN **OR** morphINE PF 2 MG/ML injection 1 mg, 1 mg, Intravenous, Q2H PRN **OR** morphINE PF 2 MG/ML injection 2 mg, 2 mg, Intravenous, Q2H PRN    HYDROcodone-acetaminophen (Norco) 5-325 MG per tab 1 tablet, 1 tablet, Oral, Q4H PRN **OR** HYDROcodone-acetaminophen (Norco) 5-325 MG per tab 2 tablet, 2 tablet, Oral, Q4H PRN    sevelamer carbonate (Renvela) tab 800 mg, 800 mg, Oral, TID CC    amiodarone (Pacerone) tab 200 mg, 200 mg, Oral, BID with meals    polyethylene glycol (PEG 3350) (Miralax) 17 g oral packet 17 g, 17 g, Oral, Daily PRN    sennosides (Senokot) tab 17.2 mg, 17.2 mg, Oral, Nightly PRN    bisacodyl (Dulcolax) 10 MG rectal suppository 10 mg, 10 mg, Rectal, Daily PRN    glucose (Dex4) 15 GM/59ML oral liquid 15 g, 15 g, Oral, Q15 Min PRN **OR** glucose (Glutose) 40% oral gel 15 g, 15 g, Oral, Q15 Min PRN **OR** glucose-vitamin C (Dex-4) chewable tab 4 tablet, 4 tablet, Oral, Q15 Min PRN **OR** dextrose 50% injection 50 mL, 50 mL, Intravenous, Q15 Min PRN **OR** glucose (Dex4) 15 GM/59ML oral liquid 30 g, 30 g, Oral, Q15  Min PRN **OR** glucose (Glutose) 40% oral gel 30 g, 30 g, Oral, Q15 Min PRN **OR** glucose-vitamin C (Dex-4) chewable tab 8 tablet, 8 tablet, Oral, Q15 Min PRN    insulin aspart (NovoLOG) 100 Units/mL FlexPen 4-20 Units, 4-20 Units, Subcutaneous, TID AC and HS    insulin degludec 100 units/mL flextouch 20 Units, 20 Units, Subcutaneous, Nightly    levothyroxine (Synthroid) tab 150 mcg, 150 mcg, Oral, Daily @ 0700    pantoprazole (Protonix) DR tab 40 mg, 40 mg, Oral, BID AC    rosuvastatin (Crestor) tab 10 mg, 10 mg, Oral, Nightly    acetaminophen (Tylenol) tab 650 mg, 650 mg, Oral, Q4H PRN **OR** acetaminophen (Tylenol) rectal suppository 650 mg, 650 mg, Rectal, Q4H PRN    labetalol (Trandate) 5 mg/mL injection 10 mg, 10 mg, Intravenous, Q10 Min PRN    hydrALAzine (Apresoline) 20 mg/mL injection 10 mg, 10 mg, Intravenous, Q2H PRN    metoclopramide (Reglan) 5 mg/mL injection 5 mg, 5 mg, Intravenous, Q8H PRN    levETIRAcetam (Keppra) 500 mg/5mL injection 500 mg, 500 mg, Intravenous, Q12H    niCARdipine in sodium chloride 0.86% (carDENE) 20 mg/200mL infusion premix, 5-15 mg/hr, Intravenous, Continuous PRN        Physical Exam:   /65   Pulse 61   Temp 98.5 °F (36.9 °C) (Temporal)   Resp 14   Ht 6' (1.829 m)   Wt 208 lb (94.3 kg)   SpO2 96%   BMI 28.21 kg/m²   Temp (24hrs), Av.8 °F (36.6 °C), Min:97.1 °F (36.2 °C), Max:98.5 °F (36.9 °C)       Intake/Output Summary (Last 24 hours) at 2024 1234  Last data filed at 2024 0800  Gross per 24 hour   Intake 640 ml   Output 300 ml   Net 340 ml     Last 3 Weights   05/11/24 0559 208 lb (94.3 kg)   05/10/24 0600 191 lb 12.8 oz (87 kg)   24 2247 198 lb 6.6 oz (90 kg)   24 1920 206 lb (93.4 kg)   24 0301 210 lb 8.6 oz (95.5 kg)   24 0000 221 lb 5.5 oz (100.4 kg)   24 2330 210 lb 8.6 oz (95.5 kg)   24 0515 221 lb 5.5 oz (100.4 kg)   24 0559 219 lb 9.3 oz (99.6 kg)   24 0654 223 lb 8.7 oz (101.4 kg)   24  0530 222 lb 4.8 oz (100.8 kg)   03/31/24 1559 220 lb 10.9 oz (100.1 kg)   03/31/24 1225 223 lb (101.2 kg)   03/24/24 0500 223 lb 1.6 oz (101.2 kg)   03/21/24 0638 225 lb 8 oz (102.3 kg)   03/20/24 0600 221 lb 1.9 oz (100.3 kg)   03/19/24 0400 223 lb 3.2 oz (101.2 kg)   03/18/24 0122 221 lb 9 oz (100.5 kg)   04/06/23 0600 204 lb 6.4 oz (92.7 kg)   04/05/23 0212 207 lb 3.7 oz (94 kg)     General: Alert and oriented in no apparent distress.  HEENT: No scleral icterus, MMM, + ecchymosis B orbits/L forehead  Neck: Supple, no JADE or thyromegaly  Cardiac: Regular rate and rhythm, S1, S2 normal, no murmur or rub  Lungs: Clear without wheezes, rales, rhonchi.    Abdomen: Soft, non-tender. + bowel sounds, no palpable organomegaly  Extremities: Without clubbing, cyanosis or edema.  Neurologic:moving all extremities  Skin: Warm and dry, no rash  Recent Labs     05/08/24  1931 05/09/24  0413 05/10/24  0449 05/11/24  0451   WBC 5.4 5.3 5.9 5.4   HGB 11.6* 10.8* 12.1* 10.6*   MCV 96.2 98.9 97.4 96.7   PLT 75.0* 78.0* 65.0* 73.0*   INR 1.10  --   --   --        Recent Labs     05/08/24  1931 05/09/24  0413 05/09/24  2127 05/10/24  0449 05/11/24  0451   * 138  --  134* 133*   K 3.7 3.4* 4.2 4.8 4.6   CL 99 100  --  101 98   CO2 21.0 26.0  --  27.0 24.0   BUN 63* 72*  --  48* 65*   CREATSERUM 6.53* 6.72*  --  5.31* 6.40*   CA 9.2 9.2  --  9.3 8.9   MG  --  2.3 1.9  --   --    PHOS  --  6.5*  --   --   --        Recent Labs     05/08/24  1931   ALT 24   AST 24   ALB 3.4         Impression/Plan:        1.  ESRD- due to DM.  HD to cont per usual TTHS routine- tx today      2.  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  JIE for goal hgb 10-11 gms     3.  SDH- imaging noted with incr bleed on repeat imaging.   -  Neuro services following     4.  HTN- parameters per neuro    5.  PAF- per cards; holding DAPT        Questions/concerns were discussed with patient and/or family by bedside.    Ashwin Lunsford  5/11/2024

## 2024-05-11 NOTE — PROGRESS NOTES
[facility] Cardiology Progress Note        Jonny Haque Patient Status:  Inpatient    4/15/1945 MRN [id number]   [facility] 6NE-A Attending Lance Aguayo MD   Hosp Day # 3 PCP Gee Jimenez MD     Subjective:  The patient denies  chest pain and shortness of breath.  He had some slurred speech last night---> CT brain :right subdural hemorrhage appears minimally increased in size from the prior exam.  Speech seems better this am.         Medications:   metoprolol tartrate  25 mg Oral 2x Daily(Beta Blocker)    dilTIAZem  60 mg Oral 4 times per day    heparin  1.5 mL Intracatheter Once    sevelamer carbonate  800 mg Oral TID CC    amiodarone  200 mg Oral BID with meals    insulin aspart  4-20 Units Subcutaneous TID AC and HS    insulin degludec  20 Units Subcutaneous Nightly    levothyroxine  150 mcg Oral Daily @ 0700    pantoprazole  40 mg Oral BID AC    rosuvastatin  10 mg Oral Nightly    levETIRAcetam  500 mg Intravenous Q12H       Continuous Infusions:   niCARdipine Stopped (24 2335)         Allergies:  No Known Allergies      Objective:        Intake/Output:      Intake/Output Summary (Last 24 hours) at 2024 0621  Last data filed at 2024 0552  Gross per 24 hour   Intake 554.67 ml   Output 300 ml   Net 254.67 ml     Wt Readings from Last 3 Encounters:   24 208 lb (94.3 kg)   24 210 lb 8.6 oz (95.5 kg)   24 221 lb 5.5 oz (100.4 kg)       Physical Exam:        Vitals:    24 0400 24 0500 24 0559 24 0600   BP: 102/48 131/76  137/62   BP Location: Left arm      Pulse: 65 66  67   Resp: 15 14  13   Temp: 97.8 °F (36.6 °C)      TempSrc: Temporal      SpO2: 93% 92%  96%   Weight:   208 lb (94.3 kg)    Height:           Temp:  [97.1 °F (36.2 °C)-98.1 °F (36.7 °C)] 97.8 °F (36.6 °C)  Pulse:  [56-77] 67  Resp:  [13-24] 13  BP: (102-150)/() 137/62  SpO2:  [90 %-98 %] 96 %      Temp: 97.8 °F (36.6 °C)  Pulse: 67  Resp:  13  BP: 137/62  General:  Appears comfortable  HEENT: No focal deficits.  Neck: No JVD, carotids 2+ no bruits.  Cardiac: Regular S1S2.  No S3, S4, rub, click.  No murmur.  Lungs: Clear to auscultation and percussion.  Abdomen: Soft, non-tender.   Extremities: No LE edema.  No clubbing or cyanosis.    Neurologic: Alert and oriented, normal affect.  Skin: Warm and dry.           LABS:      HEM:  Recent Labs   Lab 05/08/24  1931 05/09/24  0413 05/10/24  0449 05/11/24  0451   WBC 5.4 5.3 5.9 5.4   HGB 11.6* 10.8* 12.1* 10.6*   HCT 35.3* 34.7* 37.3* 32.3*   PLT 75.0* 78.0* 65.0* 73.0*       Chem:  Recent Labs   Lab 05/08/24  1931 05/09/24  0413 05/09/24  2127 05/10/24  0449 05/11/24  0451   * 138  --  134* 133*   K 3.7 3.4* 4.2 4.8 4.6   CL 99 100  --  101 98   CO2 21.0 26.0  --  27.0 24.0   BUN 63* 72*  --  48* 65*   CREATSERUM 6.53* 6.72*  --  5.31* 6.40*   CA 9.2 9.2  --  9.3 8.9   MG  --  2.3 1.9  --   --    PHOS  --  6.5*  --   --   --    * 158*  --  137* 228*       Recent Labs   Lab 05/08/24 1931   ALT 24   AST 24   ALB 3.4       Recent Labs   Lab 05/08/24 1931   PTT 31.9   INR 1.10           No results found for: \"TROP\", \"CKMB\"      Invalid input(s): \"PBNPML\"                       Diagnostics:       Telemetry: SR     Laboratories and Data:  Diagnostics:     EKG, 5/9/2024:  Atrial Fibrillation, RVR, IVCD---> NSR, IVCD     CXR, 5/9/2024:                Impression:     1.  Paroxysmal Atrial Fibrillation.  Usually maintains SR with amiodarone, but some Atrial Fibrillation RVR has been occurring this admission.   Not on ac pre-admission due to frequent GI bleeds in past     2. CAD, s/p numerous PCI procedures,  most recently 3/19/24 (TRACY LM/LAD) and 3/22/24 (TRACY OM) for chest pain.   asymptomatic.     3. Fall with SDH.  He received plt transfusion     4. Cirrhosis     5. Hx of freq GIB     6. Diabetes     7. ESRD, HD     8. Hypertension        9. AS, moderate        Recommend:     1.  Telemetry  2.  Resumed amiodarone with  mini-load of 200 mg bid given  Atrial Fibrillation episode and acute stress 2/2 SDH  3. Holding DAPT for now due to SDH, 5/7/24.  Discussed with neurosurgery.  Planning to repeat head CT in 1 week and consider resuming DAPT depending on stability of SDH.  He is at potentially high cardiac risk for MI and death as stenting involved LM into LAD and only 3 weeks has elapsed since the left main stent was placed.  I discussed all of this with pt and his son   4. Will employ po cardizem to mitigate RVR. Transition to long acting version    Yogi Espinoza MD

## 2024-05-11 NOTE — PLAN OF CARE
- tele orders  - See MAR for meds    Problem: Patient/Family Goals  Goal: Patient/Family Short Term Goal  Description: Patient's Short Term Goal: Hospital discharge    Interventions:   - PT/OT/ST eval and rec  - Meds as ordered  - Neuro checks ordered  - See additional Care Plan goals for specific interventions  Outcome: Progressing     Problem: NEUROLOGICAL - ADULT  Goal: Achieves stable or improved neurological status  Description: INTERVENTIONS  - Assess for and report changes in neurological status  - Initiate measures to prevent increased intracranial pressure  - Maintain blood pressure and fluid volume within ordered parameters to optimize cerebral perfusion and minimize risk of hemorrhage  - Monitor temperature, glucose, and sodium. Initiate appropriate interventions as ordered  Outcome: Progressing  Goal: Achieves maximal functionality and self care  Description: INTERVENTIONS  - Monitor swallowing and airway patency with patient fatigue and changes in neurological status  - Encourage and assist patient to increase activity and self care with guidance from PT/OT  - Encourage visually impaired, hearing impaired and aphasic patients to use assistive/communication devices  Outcome: Progressing     Problem: PAIN - ADULT  Goal: Verbalizes/displays adequate comfort level or patient's stated pain goal  Description: INTERVENTIONS:  - Encourage pt to monitor pain and request assistance  - Assess pain using appropriate pain scale  - Administer analgesics based on type and severity of pain and evaluate response  - Implement non-pharmacological measures as appropriate and evaluate response  - Consider cultural and social influences on pain and pain management  - Manage/alleviate anxiety  - Utilize distraction and/or relaxation techniques  - Monitor for opioid side effects  - Notify MD/LIP if interventions unsuccessful or patient reports new pain  - Anticipate increased pain with activity and pre-medicate as  appropriate  Outcome: Progressing

## 2024-05-12 VITALS
BODY MASS INDEX: 27.65 KG/M2 | DIASTOLIC BLOOD PRESSURE: 84 MMHG | HEIGHT: 72 IN | TEMPERATURE: 99 F | OXYGEN SATURATION: 98 % | RESPIRATION RATE: 17 BRPM | SYSTOLIC BLOOD PRESSURE: 159 MMHG | WEIGHT: 204.13 LBS | HEART RATE: 68 BPM

## 2024-05-12 LAB
ANION GAP SERPL CALC-SCNC: 11 MMOL/L (ref 0–18)
BASOPHILS # BLD AUTO: 0.04 X10(3) UL (ref 0–0.2)
BASOPHILS NFR BLD AUTO: 0.6 %
BUN BLD-MCNC: 45 MG/DL (ref 9–23)
CALCIUM BLD-MCNC: 9.1 MG/DL (ref 8.5–10.1)
CHLORIDE SERPL-SCNC: 102 MMOL/L (ref 98–112)
CO2 SERPL-SCNC: 22 MMOL/L (ref 21–32)
CREAT BLD-MCNC: 5.41 MG/DL
EGFRCR SERPLBLD CKD-EPI 2021: 10 ML/MIN/1.73M2 (ref 60–?)
EOSINOPHIL # BLD AUTO: 0.34 X10(3) UL (ref 0–0.7)
EOSINOPHIL NFR BLD AUTO: 4.8 %
ERYTHROCYTE [DISTWIDTH] IN BLOOD BY AUTOMATED COUNT: 17 %
GLUCOSE BLD-MCNC: 119 MG/DL (ref 70–99)
GLUCOSE BLD-MCNC: 123 MG/DL (ref 70–99)
HCT VFR BLD AUTO: 36.1 %
HGB BLD-MCNC: 11.4 G/DL
IMM GRANULOCYTES # BLD AUTO: 0.02 X10(3) UL (ref 0–1)
IMM GRANULOCYTES NFR BLD: 0.3 %
LYMPHOCYTES # BLD AUTO: 1.61 X10(3) UL (ref 1–4)
LYMPHOCYTES NFR BLD AUTO: 22.9 %
MCH RBC QN AUTO: 31.5 PG (ref 26–34)
MCHC RBC AUTO-ENTMCNC: 31.6 G/DL (ref 31–37)
MCV RBC AUTO: 99.7 FL
MONOCYTES # BLD AUTO: 0.72 X10(3) UL (ref 0.1–1)
MONOCYTES NFR BLD AUTO: 10.2 %
NEUTROPHILS # BLD AUTO: 4.3 X10 (3) UL (ref 1.5–7.7)
NEUTROPHILS # BLD AUTO: 4.3 X10(3) UL (ref 1.5–7.7)
NEUTROPHILS NFR BLD AUTO: 61.2 %
OSMOLALITY SERPL CALC.SUM OF ELEC: 293 MOSM/KG (ref 275–295)
PLATELET # BLD AUTO: 83 10(3)UL (ref 150–450)
POTASSIUM SERPL-SCNC: 4.7 MMOL/L (ref 3.5–5.1)
RBC # BLD AUTO: 3.62 X10(6)UL
SODIUM SERPL-SCNC: 135 MMOL/L (ref 136–145)
WBC # BLD AUTO: 7 X10(3) UL (ref 4–11)

## 2024-05-12 RX ORDER — DILTIAZEM HYDROCHLORIDE 240 MG/1
240 CAPSULE, COATED, EXTENDED RELEASE ORAL DAILY
Qty: 30 CAPSULE | Refills: 3 | Status: SHIPPED | OUTPATIENT
Start: 2024-05-13

## 2024-05-12 RX ORDER — AMIODARONE HYDROCHLORIDE 200 MG/1
200 TABLET ORAL 2 TIMES DAILY WITH MEALS
Qty: 60 TABLET | Refills: 3 | Status: SHIPPED | OUTPATIENT
Start: 2024-05-12

## 2024-05-12 RX ORDER — LEVETIRACETAM 500 MG/1
500 TABLET ORAL 2 TIMES DAILY
Qty: 9 TABLET | Refills: 0 | Status: SHIPPED | OUTPATIENT
Start: 2024-05-12 | End: 2024-05-17

## 2024-05-12 NOTE — PROGRESS NOTES
NURSING DISCHARGE NOTE    Discharged Home via Wheelchair.  Accompanied by Family member  Belongings Taken by patient/family.      Patient with son at bedside, AVS discussed with follow up appointments and medications. Instructed to schedule CT scan. Patient wheeled to lobby via wheelchair.

## 2024-05-12 NOTE — PLAN OF CARE
Assumed care of the pt at approx. 1930pm. A&Ox4. Neuro checks q 4 hrs. Slurred speech improving, mild L-facial droop. NSR, had couple short runs of A-fib but no pauses overnight. Couple runs of V-tach when getting up to chair in am. VSS. Tolerating diet, no nausea. Would like to go home today. Transferred to telemetry floor. Raport given to FRANCIA [place].

## 2024-05-12 NOTE — PLAN OF CARE
Problem: Patient/Family Goals  Goal: Patient/Family Long Term Goal  Description: Patient's Long Term Goal: disease management    Interventions:  - Follow up with Mds  - PT/OT/ST as recommended  - Medications as ordered  - See additional Care Plan goals for specific interventions  Outcome: Adequate for Discharge  Goal: Patient/Family Short Term Goal  Description: Patient's Short Term Goal: Hospital discharge    Interventions:   - PT/OT/ST eval and rec  - Meds as ordered  - Neuro checks ordered  - See additional Care Plan goals for specific interventions  Outcome: Adequate for Discharge     Problem: NEUROLOGICAL - ADULT  Goal: Achieves stable or improved neurological status  Description: INTERVENTIONS  - Assess for and report changes in neurological status  - Initiate measures to prevent increased intracranial pressure  - Maintain blood pressure and fluid volume within ordered parameters to optimize cerebral perfusion and minimize risk of hemorrhage  - Monitor temperature, glucose, and sodium. Initiate appropriate interventions as ordered  Outcome: Adequate for Discharge  Goal: Absence of seizures  Description: INTERVENTIONS  - Monitor for seizure activity  - Administer anti-seizure medications as ordered  - Monitor neurological status  Outcome: Adequate for Discharge  Goal: Remains free of injury related to seizure activity  Description: INTERVENTIONS:  - Maintain airway, patient safety  and administer oxygen as ordered  - Monitor patient for seizure activity, document and report duration and description of seizure to MD/LIP  - If seizure occurs, turn patient to side and suction secretions as needed  - Reorient patient post seizure  - Seizure pads on all 4 side rails  - Instruct patient/family to notify RN of any seizure activity  - Instruct patient/family to call for assistance with activity based on assessment  Outcome: Adequate for Discharge  Goal: Achieves maximal functionality and self care  Description:  INTERVENTIONS  - Monitor swallowing and airway patency with patient fatigue and changes in neurological status  - Encourage and assist patient to increase activity and self care with guidance from PT/OT  - Encourage visually impaired, hearing impaired and aphasic patients to use assistive/communication devices  Outcome: Adequate for Discharge     Problem: PAIN - ADULT  Goal: Verbalizes/displays adequate comfort level or patient's stated pain goal  Description: INTERVENTIONS:  - Encourage pt to monitor pain and request assistance  - Assess pain using appropriate pain scale  - Administer analgesics based on type and severity of pain and evaluate response  - Implement non-pharmacological measures as appropriate and evaluate response  - Consider cultural and social influences on pain and pain management  - Manage/alleviate anxiety  - Utilize distraction and/or relaxation techniques  - Monitor for opioid side effects  - Notify MD/LIP if interventions unsuccessful or patient reports new pain  - Anticipate increased pain with activity and pre-medicate as appropriate  Outcome: Adequate for Discharge     Problem: SAFETY ADULT - FALL  Goal: Free from fall injury  Description: INTERVENTIONS:  - Assess pt frequently for physical needs  - Identify cognitive and physical deficits and behaviors that affect risk of falls.  - [place] fall precautions as indicated by assessment.  - Educate pt/family on patient safety including physical limitations  - Instruct pt to call for assistance with activity based on assessment  - Modify environment to reduce risk of injury  - Provide assistive devices as appropriate  - Consider OT/PT consult to assist with strengthening/mobility  - Encourage toileting schedule  Outcome: Adequate for Discharge     Problem: CARDIOVASCULAR - ADULT  Goal: Maintains optimal cardiac output and hemodynamic stability  Description: INTERVENTIONS:  - Monitor vital signs, rhythm, and trends  - Monitor for bleeding,  hypotension and signs of decreased cardiac output  - Evaluate effectiveness of vasoactive medications to optimize hemodynamic stability  - Monitor arterial and/or venous puncture sites for bleeding and/or hematoma  - Assess quality of pulses, skin color and temperature  - Assess for signs of decreased coronary artery perfusion - ex. Angina  - Evaluate fluid balance, assess for edema, trend weights  Outcome: Adequate for Discharge  Goal: Absence of cardiac arrhythmias or at baseline  Description: INTERVENTIONS:  - Continuous cardiac monitoring, monitor vital signs, obtain 12 lead EKG if indicated  - Evaluate effectiveness of antiarrhythmic and heart rate control medications as ordered  - Initiate emergency measures for life threatening arrhythmias  - Monitor electrolytes and administer replacement therapy as ordered  Outcome: Adequate for Discharge

## 2024-05-12 NOTE — PROGRESS NOTES
[facility] Cardiology Progress Note        Jonny Haque Patient Status:  Inpatient    4/15/1945 MRN [id number]   Location [facility] 6NE-A Attending Lance Aguayo MD   Hosp Day # 4 PCP Gee Jimenez MD     Subjective:  The patient denies  chest pain and shortness of breath.           Medications:   dilTIAZem ER  240 mg Oral Daily    levETIRAcetam  500 mg Oral BID    metoprolol tartrate  25 mg Oral 2x Daily(Beta Blocker)    sevelamer carbonate  800 mg Oral TID CC    amiodarone  200 mg Oral BID with meals    insulin aspart  4-20 Units Subcutaneous TID AC and HS    insulin degludec  20 Units Subcutaneous Nightly    levothyroxine  150 mcg Oral Daily @ 0700    pantoprazole  40 mg Oral BID AC    rosuvastatin  10 mg Oral Nightly       Continuous Infusions:          Allergies:  No Known Allergies      Objective:        Intake/Output:      Intake/Output Summary (Last 24 hours) at 2024 0610  Last data filed at 2024 0550  Gross per 24 hour   Intake 880 ml   Output 2200 ml   Net -1320 ml     Wt Readings from Last 3 Encounters:   24 208 lb (94.3 kg)   24 210 lb 8.6 oz (95.5 kg)   24 221 lb 5.5 oz (100.4 kg)       Physical Exam:        Vitals:    24 2000 24 0000 24 0400 24 0557   BP: 124/62 125/68 111/51 132/58   BP Location: Right arm Right arm Right arm    Pulse: 60 63 61 68   Resp: 15 18 13 15   Temp: 97.8 °F (36.6 °C) 98.1 °F (36.7 °C) 98.2 °F (36.8 °C)    TempSrc: Temporal Temporal Temporal    SpO2: 96% 98% 96%    Weight:       Height:           Temp:  [97.3 °F (36.3 °C)-98.5 °F (36.9 °C)] 98.2 °F (36.8 °C)  Pulse:  [] 68  Resp:  [13-25] 15  BP: ()/(40-81) 132/58  SpO2:  [95 %-98 %] 96 %      Temp: 98.2 °F (36.8 °C)  Pulse: 68  Resp: 15  BP: 132/58  General:  Appears comfortable  HEENT: No focal deficits.  Neck: No JVD, carotids 2+ no bruits.  Cardiac: Regular S1S2.  No S3, S4, rub, click.  No murmur.  Lungs: Clear to  auscultation and percussion.  Abdomen: Soft, non-tender.   Extremities: No LE edema.  No clubbing or cyanosis.    Neurologic: Alert and oriented, normal affect.  Skin: Warm and dry.           LABS:      HEM:  Recent Labs   Lab 05/08/24  1931 05/09/24  0413 05/10/24  0449 05/11/24  0451 05/12/24  0414   WBC 5.4 5.3 5.9 5.4 7.0   HGB 11.6* 10.8* 12.1* 10.6* 11.4*   HCT 35.3* 34.7* 37.3* 32.3* 36.1*   PLT 75.0* 78.0* 65.0* 73.0* 83.0*       Chem:  Recent Labs   Lab 05/08/24  1931 05/09/24  0413 05/09/24  2127 05/10/24  0449 05/11/24  0451 05/12/24  0414   * 138  --  134* 133* 135*   K 3.7 3.4* 4.2 4.8 4.6 4.7   CL 99 100  --  101 98 102   CO2 21.0 26.0  --  27.0 24.0 22.0   BUN 63* 72*  --  48* 65* 45*   CREATSERUM 6.53* 6.72*  --  5.31* 6.40* 5.41*   CA 9.2 9.2  --  9.3 8.9 9.1   MG  --  2.3 1.9  --   --   --    PHOS  --  6.5*  --   --   --   --    * 158*  --  137* 228* 119*       Recent Labs   Lab 05/08/24 1931   ALT 24   AST 24   ALB 3.4       Recent Labs   Lab 05/08/24 1931   PTT 31.9   INR 1.10           No results found for: \"TROP\", \"CKMB\"      Invalid input(s): \"PBNPML\"                       Diagnostics:       Telemetry: SR     Laboratories and Data:  Diagnostics:     EKG, 5/9/2024: NSR, PAF last night     CXR, 5/9/2024:                Impression:     1.  Paroxysmal Atrial Fibrillation.  Usually maintains SR with amiodarone, but some Atrial Fibrillation RVR has been occurring this admission.   Not on ac pre-admission due to frequent GI bleeds in past     2. CAD, s/p numerous PCI procedures,  most recently 3/19/24 (TRACY LM/LAD) and 3/22/24 (TRACY OM) for chest pain.   asymptomatic.     3. Fall with SDH.  He received plt transfusion     4. Cirrhosis     5. Hx of freq GIB     6. Diabetes     7. ESRD, HD     8. Hypertension        9. AS, moderate        Recommend:     1.  Telemetry  2. Resumed amiodarone with  mini-load of 200 mg bid given  Atrial Fibrillation episode and acute stress 2/2 SDH  3. Holding  DAPT for now due to SDH, 5/7/24.  Discussed with neurosurgery.  Planning to repeat head CT at the one week point (5/16/24),   and consider resuming DAPT depending on stability of SDH.  He is at potentially high cardiac risk for MI and death as stenting involved LM into LAD and only 3 weeks has elapsed since the left main stent was placed.  I discussed all of this with pt and his son   4. Will employ po cardizem to mitigate RVR. Transition to long acting version    Yogi Espinoza MD

## 2024-05-12 NOTE — DISCHARGE SUMMARY
General Medicine Discharge Summary     Patient ID:  Jonny Haque  79 year old  4/15/1945    Admit date: 5/8/2024    Discharge date and time: 5/12/2024  1:18 PM     Attending Physician: No att. providers found     Primary Care Physician: Gee Jimenez MD     Reason for admission: see HPI    Discharge Diagnoses: Intracranial hemorrhage (HCC) [I62.9]    Discharged Condition: good    Exam: (see progress notes for full details)  No acute distress, alert and oriented    Hospital Course:     Patient is a 79-year-old male with significant past medical history of CAD status post PCI in 2017, 2021 and most recently on 3/19/2024 and 3/22/2024 with 3 drug-eluting stents placed to the left main, LAD and OM on dual antiplatelet therapy, ischemic cardiomyopathy, hypothyroidism history of HCV cirrhosis cirrhosis, paroxysmal A-fib on amiodarone, moderate aortic stenosis, ESRD on hemodialysis, type 2 diabetes, hypertension, prior history of GI bleed presented with a fall after dizziness when he got up to go to dialysis.  He hit his head and the back.  He had some dizziness prior to the fall however states that his leg gave out -he was able to complete HD, he then went to follow-up with his cardiologist who ordered a CT head which showed a large h subdural hematoma with possible epidural hematoma with a right to left midline shift.  Given the fact the patient was on dual antiplatelet therapy was given platelet transfusion,  He has some headaches however no nausea no vomiting no focal neurological deficits, is not altered no lethargy no chest pain shortness of breath no lightheadedness currently has been able to ambulate     In the emergency room vital signs have been stable, blood pressure slightly elevated 162/68, nicardipine drip was started        Hemoglobin is stable at 10.8, glucose was slightly elevated he had a CT head that showed stable right-sided extra-axial hematoma with new trace amount of blood in the posterior left  occipital horn with trace amount of blood within the subdural space of the interhemispheric fissure along the right tentorium of the cerebellum     Patient is admitted to the ICU, neurosurgery, neurology as well as cardiology were consulted as this patient went into A-fib with RVR       # Fall, acute subdural hematoma  -Exacerbated by patient being on dual antiplatelet therapy  -CT head reviewed, no focal neurological symptoms  -Nicardipine drip has been switched off, blood pressure management per neurosurgery and neurology  -Keppra IV twice daily, dc on po antiepileptics per NSGY and neurology  -Repeated CT head secondary to increased left-sided facial droop and slurred speech, minimally increased subdural hematoma  -No surgical intervention planned at this time, continue neurochecks and BP control  -ok to trasnfer out of the ICU   -NSGY and neurology signed off   -Outpatient CT scheduled already, patient to get Keppra and outpatient CT at which timeDecision for resuming dual antiplatelet therapy will be taken     # Paroxysmal A-fib with RVR  # V. Tach  -Patient in NSR overnight but in afib this am , back in NSR  -Heart rate went into 130s to 140s, cardiology consulted  -Resumed amiodarone with a mini load, continue to hold anticoagulation antiplatelets secondary to above  -Converted back to sinus rhythm  -dc AV phuc blockers per cards  -Okay to discharge per cardiology, on p.o. beta-blocker as well as Cardizem, outpatient follow-up with cardiology     # CAD status post PCI with recent in March 2024  -Given this is a fresh stent, and holding antiplatelets  -Resume when safe per neurosurgery and cardiology  -Continue statin, diltiazem  -High risk for stent closure however also with significant risk of expanding subdural hematoma  -After discussion with neurosurgery plan to repeat CT head in 1 week and consider resuming DAPT depending upon the stability of the SDH     # ESRD  -HD per nephrology  # Hypothyroidism  #  HSV cirrhosis  # Moderate aortic stenosis  # Ischemic cardiomyopathy     # Type 2 diabetes  -Continue 20 units of insulin, sliding scale    Consults: IP CONSULT TO HOSPITALIST  IP CONSULT TO NEUROSURGERY  IP CONSULT TO SOCIAL WORK  IP CONSULT TO NEURO INTENSIVIST  IP CONSULT TO NEPHROLOGY  IP CONSULT TO CARDIOLOGY  IP CONSULT TO SOCIAL WORK    Operative Procedures:      Disposition: home    Patient Instructions:   Discharge Medication List as of 5/12/2024 12:47 PM        START taking these medications    Details   dilTIAZem  MG Oral Capsule SR 24 Hr Take 1 capsule (240 mg total) by mouth daily., Normal, Disp-30 capsule, R-3      levETIRAcetam 500 MG Oral Tab Take 1 tablet (500 mg total) by mouth 2 (two) times daily for 9 doses., Normal, Disp-9 tablet, R-0           CONTINUE these medications which have CHANGED    Details   amiodarone 200 MG Oral Tab Take 1 tablet (200 mg total) by mouth 2 (two) times daily with meals., Normal, Disp-60 tablet, R-3           CONTINUE these medications which have NOT CHANGED    Details   metoprolol succinate ER 50 MG Oral Tablet 24 Hr Take 1 tablet (50 mg total) by mouth Daily Beta Blocker., Normal, Disp-90 tablet, R-3      levothyroxine 150 MCG Oral Tab Take 1 tablet (150 mcg total) by mouth every other day. Take one tablet every morning before breakfast, Historical      Ferric Citrate (AURYXIA) 1  MG(Fe) Oral Tab Historical      pantoprazole 40 MG Oral Tab EC Take 1 tablet (40 mg total) by mouth 2 (two) times daily before meals., Historical      Sevelamer 800 MG Oral Tab Take 1 tablet (800 mg total) by mouth 3 (three) times daily with meals., Historical      Insulin Pen Needle (TRUEPLUS PEN NEEDLES) 31G X 5 MM Does not apply Misc Use 5 per day, Normal, Disp-500 each, R-2      Insulin Glargine, 2 Unit Dial, (TOUJEO AVINASH SOLOSTAR) 300 UNIT/ML Subcutaneous Solution Pen-injector Inject 20 Units into the skin nightly., Normal, Disp-6 mL, R-2      rosuvastatin 10 MG Oral Tab  Take 1 tablet (10 mg total) by mouth nightly., Normal, Disp-90 tablet, R-0      !! Glucose Blood (ONETOUCH ULTRA) In Vitro Strip 6 times a day, Normal, Disp-400 each, R-3      Insulin Lispro, 1 Unit Dial, (HUMALOG KWIKPEN) 100 UNIT/ML Subcutaneous Solution Pen-injector 12 units before meals with sliding scale. Max daily dose: 50 units., Normal, Disp-60 mL, R-3      !! CONTOUR NEXT TEST In Vitro Strip TEST BLOOD SUGAR FOUR TIMES DAILY, Normal, Disp-400 strip, R-3      Blood Glucose Monitoring Suppl ([employer] CONTOUR NEXT MONITOR) W/DEVICE Does not apply Kit 1 Device by Does not apply route 4 (four) times daily., Sample, Disp-1 kit, R-0       !! - Potential duplicate medications found. Please discuss with provider.        STOP taking these medications       clopidogrel 75 MG Oral Tab        losartan 50 MG Oral Tab        aspirin 81 MG Oral Tab EC        nitroGLYCERIN 0.3 MG Sublingual SL Tab        zolpidem 10 MG Oral Tab                I reconciled current and discharge medications on day of discharge    Follow-up with PCP, cardiology, neurosurgery    No orders of the defined types were placed in this encounter.      Follow-up with labs: CT head in 1 week    Total Time Coordinating Care: Greater than 30 minutes    Patient had opportunity to ask questions and state understand and agree with therapeutic plan as outlined above.   Lance vasquez MD

## 2024-05-12 NOTE — PROGRESS NOTES
[facility]   part of [facility]     Nephrology Progress Note    Jonny Haque Patient Status:  Inpatient    4/15/1945 MRN [id number]   [facility] 6NE-A Attending Lance Aguayo MD   Hosp Day # 4 PCP Gee Jimenez MD       SUBJECTIVE:  No acute events  Wants to go home       Current Facility-Administered Medications:     dilTIAZem ER (CardIZEM CD) 24 hr cap 240 mg, 240 mg, Oral, Daily    levETIRAcetam (Keppra) tab 500 mg, 500 mg, Oral, BID    metoprolol tartrate (Lopressor) tab 25 mg, 25 mg, Oral, 2x Daily(Beta Blocker)    prochlorperazine (Compazine) 10 MG/2ML injection 10 mg, 10 mg, Intravenous, Q6H PRN    morphINE PF 2 MG/ML injection 0.5 mg, 0.5 mg, Intravenous, Q2H PRN **OR** morphINE PF 2 MG/ML injection 1 mg, 1 mg, Intravenous, Q2H PRN **OR** morphINE PF 2 MG/ML injection 2 mg, 2 mg, Intravenous, Q2H PRN    HYDROcodone-acetaminophen (Norco) 5-325 MG per tab 1 tablet, 1 tablet, Oral, Q4H PRN **OR** HYDROcodone-acetaminophen (Norco) 5-325 MG per tab 2 tablet, 2 tablet, Oral, Q4H PRN    sevelamer carbonate (Renvela) tab 800 mg, 800 mg, Oral, TID CC    amiodarone (Pacerone) tab 200 mg, 200 mg, Oral, BID with meals    polyethylene glycol (PEG 3350) (Miralax) 17 g oral packet 17 g, 17 g, Oral, Daily PRN    sennosides (Senokot) tab 17.2 mg, 17.2 mg, Oral, Nightly PRN    bisacodyl (Dulcolax) 10 MG rectal suppository 10 mg, 10 mg, Rectal, Daily PRN    glucose (Dex4) 15 GM/59ML oral liquid 15 g, 15 g, Oral, Q15 Min PRN **OR** glucose (Glutose) 40% oral gel 15 g, 15 g, Oral, Q15 Min PRN **OR** glucose-vitamin C (Dex-4) chewable tab 4 tablet, 4 tablet, Oral, Q15 Min PRN **OR** dextrose 50% injection 50 mL, 50 mL, Intravenous, Q15 Min PRN **OR** glucose (Dex4) 15 GM/59ML oral liquid 30 g, 30 g, Oral, Q15 Min PRN **OR** glucose (Glutose) 40% oral gel 30 g, 30 g, Oral, Q15 Min PRN **OR** glucose-vitamin C (Dex-4) chewable tab 8 tablet, 8 tablet, Oral, Q15 Min PRN    insulin aspart (NovoLOG)  100 Units/mL FlexPen 4-20 Units, 4-20 Units, Subcutaneous, TID AC and HS    insulin degludec 100 units/mL flextouch 20 Units, 20 Units, Subcutaneous, Nightly    levothyroxine (Synthroid) tab 150 mcg, 150 mcg, Oral, Daily @ 0700    pantoprazole (Protonix) DR tab 40 mg, 40 mg, Oral, BID AC    rosuvastatin (Crestor) tab 10 mg, 10 mg, Oral, Nightly    acetaminophen (Tylenol) tab 650 mg, 650 mg, Oral, Q4H PRN **OR** acetaminophen (Tylenol) rectal suppository 650 mg, 650 mg, Rectal, Q4H PRN    labetalol (Trandate) 5 mg/mL injection 10 mg, 10 mg, Intravenous, Q10 Min PRN    hydrALAzine (Apresoline) 20 mg/mL injection 10 mg, 10 mg, Intravenous, Q2H PRN    metoclopramide (Reglan) 5 mg/mL injection 5 mg, 5 mg, Intravenous, Q8H PRN        Physical Exam:   /84 (BP Location: Left arm)   Pulse 68   Temp 98.6 °F (37 °C) (Oral)   Resp 17   Ht 6' (1.829 m)   Wt 204 lb 1.6 oz (92.6 kg)   SpO2 98%   BMI 27.68 kg/m²   Temp (24hrs), Av °F (36.7 °C), Min:97.3 °F (36.3 °C), Max:98.6 °F (37 °C)       Intake/Output Summary (Last 24 hours) at 2024 1234  Last data filed at 2024 0919  Gross per 24 hour   Intake 760 ml   Output 2200 ml   Net -1440 ml     Last 3 Weights   24 0600 204 lb 1.6 oz (92.6 kg)   24 0559 208 lb (94.3 kg)   05/10/24 0600 191 lb 12.8 oz (87 kg)   24 2247 198 lb 6.6 oz (90 kg)   24 1920 206 lb (93.4 kg)   24 0301 210 lb 8.6 oz (95.5 kg)   24 0000 221 lb 5.5 oz (100.4 kg)   24 2330 210 lb 8.6 oz (95.5 kg)   24 0515 221 lb 5.5 oz (100.4 kg)   24 0559 219 lb 9.3 oz (99.6 kg)   24 0654 223 lb 8.7 oz (101.4 kg)   24 0530 222 lb 4.8 oz (100.8 kg)   24 1559 220 lb 10.9 oz (100.1 kg)   24 1225 223 lb (101.2 kg)   24 0500 223 lb 1.6 oz (101.2 kg)   24 0638 225 lb 8 oz (102.3 kg)   24 0600 221 lb 1.9 oz (100.3 kg)   24 0400 223 lb 3.2 oz (101.2 kg)   24 0122 221 lb 9 oz (100.5 kg)   23 0600 204  lb 6.4 oz (92.7 kg)   04/05/23 0212 207 lb 3.7 oz (94 kg)     General: Alert and oriented in no apparent distress.  HEENT: No scleral icterus, MMM, + ecchymosis B orbits/L forehead  Neck: Supple, no JADE or thyromegaly  Cardiac: Regular rate and rhythm, S1, S2 normal, no murmur or rub  Lungs: Clear without wheezes, rales, rhonchi.    Abdomen: Soft, non-tender. + bowel sounds, no palpable organomegaly  Extremities: Without clubbing, cyanosis or edema.  Neurologic:moving all extremities  Skin: Warm and dry, no rash  Recent Labs     05/10/24  0449 05/11/24  0451 05/12/24  0414   WBC 5.9 5.4 7.0   HGB 12.1* 10.6* 11.4*   MCV 97.4 96.7 99.7   PLT 65.0* 73.0* 83.0*       Recent Labs     05/09/24  2127 05/10/24  0449 05/11/24  0451 05/12/24  0414   NA  --  134* 133* 135*   K 4.2 4.8 4.6 4.7   CL  --  101 98 102   CO2  --  27.0 24.0 22.0   BUN  --  48* 65* 45*   CREATSERUM  --  5.31* 6.40* 5.41*   CA  --  9.3 8.9 9.1   MG 1.9  --   --   --        No results for input(s): \"ALT\", \"AST\", \"ALB\", \"AMYLASE\", \"LIPASE\", \"LDH\" in the last 72 hours.    Invalid input(s): \"ALPHOS\", \"TBIL\", \"DBIL\", \"TPROT\"      Impression/Plan:        1.  ESRD- due to DM.  HD to cont per usual TTHS routine      2.  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  JIE for goal hgb 10-11 gms     3.  SDH- imaging noted with incr bleed on repeat imaging.   -  Neuro services following     4.  HTN- parameters per neuro    5.  PAF- per cards; holding DAPT        Questions/concerns were discussed with patient and/or family by bedside.    Ashwin Lunsford  5/12/2024

## 2024-05-13 ENCOUNTER — TELEPHONE (OUTPATIENT)
Dept: SURGERY | Facility: CLINIC | Age: 79
End: 2024-05-13

## 2024-05-13 DIAGNOSIS — S06.5XAA SDH (SUBDURAL HEMATOMA) (HCC): Primary | ICD-10-CM

## 2024-05-13 NOTE — PAYOR COMM NOTE
--------------  CONTINUED STAY REVIEW  5/11-5/12  Payor: [employer] MED[employer] ADV PPO  Subscriber #:  [id number]  Authorization Number: [id number]    Admit date: 5/8/24  Admit time:  9:08 PM    REVIEW DOCUMENTATION:  5/11  Chief Complaint: Intracranial brain bleed        Subjective:  Patient is doing well and eager to go home, however this am flipped into afib with some pauses, asymptomatic, receiving another treamtent of HD today  No new focal deficits at this time        Vital signs:  Temp:  [97.1 °F (36.2 °C)-98.5 °F (36.9 °C)] 98.1 °F (36.7 °C)  Pulse:  [] 64  Resp:  [13-25] 21  BP: ()/() 99/53  SpO2:  [91 %-98 %] 95 %     Physical Exam:       en: No acute distress, alert and oriented x 3Pulm: Lungs clear bilaterally, good inspiratory effort extensive periorbital swelling  CV:  nL S1/S2  Abd: soft, NT/ND, no hepatomegaly, +BS  MSK: moving all extremities, no edema  Neuro: no focal deficits  Skin: no rashes/lesions  Psych: normal mood/affect            Recent Labs   Lab 05/08/24  1931 05/09/24  0413 05/10/24  0449 05/11/24  0451   WBC 5.4 5.3 5.9 5.4   HGB 11.6* 10.8* 12.1* 10.6*   MCV 96.2 98.9 97.4 96.7   PLT 75.0* 78.0* 65.0* 73.0*   INR 1.10  --   --   --                  Recent Labs   Lab 05/08/24  1931 05/09/24  0413 05/09/24  2127 05/10/24  0449 05/11/24  0451   * 158*  --  137* 228*   BUN 63* 72*  --  48* 65*   CREATSERUM 6.53* 6.72*  --  5.31* 6.40*   CA 9.2 9.2  --  9.3 8.9   ALB 3.4  --   --   --   --    * 138  --  134* 133*   K 3.7 3.4* 4.2 4.8 4.6   CL 99 100  --  101 98   CO2 21.0 26.0  --  27.0 24.0   ALKPHO 162*  --   --   --   --    AST 24  --   --   --   --    ALT 24  --   --   --   --    BILT 0.5  --   --   --   --    TP 7.9  --   --   --   --            Medications:   Scheduled Medications    dilTIAZem ER  240 mg Oral Daily    metoprolol tartrate  25 mg Oral 2x Daily(Beta Blocker)    heparin  1.5 mL Intracatheter Once    sevelamer carbonate  800 mg Oral TID CC     amiodarone  200 mg Oral BID with meals    insulin aspart  4-20 Units Subcutaneous TID AC and HS    insulin degludec  20 Units Subcutaneous Nightly    levothyroxine  150 mcg Oral Daily @ 0700    pantoprazole  40 mg Oral BID AC    rosuvastatin  10 mg Oral Nightly    levETIRAcetam  500 mg Intravenous Q12H                  Assessment & Plan:    # Fall, acute subdural hematoma  -Exacerbated by patient being on dual antiplatelet therapy  -CT head reviewed, no focal neurological symptoms  -Nicardipine drip has been switched off, blood pressure management per neurosurgery and neurology  -Keppra IV twice daily, dc on po antiepileptics per NSGY and neurology  -Repeated CT head secondary to increased left-sided facial droop and slurred speech, minimally increased subdural hematoma  -No surgical intervention planned at this time, continue neurochecks and BP control  -ok to trasnfer out of the ICU   -NSGY and neurology signed off         # Paroxysmal A-fib with RVR  # V. Tach  -Patient in NSR overnight but in afib this am , back in NSR  -Heart rate went into 130s to 140s, cardiology consulted  -Resumed amiodarone with a mini load, continue to hold anticoagulation antiplatelets secondary to above  -Converted back to sinus rhythm  -dc AV phuc blockers per cards  -did have some pauses today,m will let cards know     # CAD status post PCI with recent in March 2024  -Given this is a fresh stent, and holding antiplatelets  -Resume when safe per neurosurgery and cardiology  -Continue statin, diltiazem  -High risk for stent closure however also with significant risk of expanding subdural hematoma  -After discussion with neurosurgery plan to repeat CT head in 1 week and consider resuming DAPT depending upon the stability of the SDH     # ESRD  -HD per nephrology  # Hypothyroidism  # HSV cirrhosis  # Moderate aortic stenosis  # Ischemic cardiomyopathy     # Type 2 diabetes  -Continue 20 units of insulin, sliding scale              5/11  Nephrology  1.  ESRD- due to DM.  HD to cont per usual TTHS routine- tx today      2.  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  JIE for goal hgb 10-11 gms     3.  SDH- imaging noted with incr bleed on repeat imaging.   -  Neuro services following     4.  HTN- parameters per neuro     5.  PAF- per cards; holding DAPT          Vitals (last day) before discharge       Date/Time Temp Pulse Resp BP SpO2 Weight O2 Device O2 Flow Rate (L/min) [facility]    05/12/24 0919 -- 68 17 -- -- -- -- --     05/12/24 0718 98.6 °F (37 °C) 64 16 159/84 98 % -- None (Room air) --     05/12/24 0600 -- -- -- -- -- 204 lb 1.6 oz -- --     05/12/24 0557 -- 68 15 132/58 -- -- -- --     05/12/24 0400 98.2 °F (36.8 °C) 61 13 111/51 96 % -- None (Room air) --     05/12/24 0000 98.1 °F (36.7 °C) 63 18 125/68 98 % -- None (Room air) --     05/11/24 2000 97.8 °F (36.6 °C) 60 15 124/62 96 % -- None (Room air) --     05/11/24 1600 97.3 °F (36.3 °C) 68 16 119/62 96 % -- None (Room air) --     05/11/24 1500 -- 55 17 116/59 -- -- -- --     05/11/24 1400 -- 64 21 99/53 -- -- -- --     05/11/24 1300 -- 99 18 125/81 -- -- -- --     05/11/24 1200 98.1 °F (36.7 °C) 58 17 111/40 95 % -- None (Room air) --     05/11/24 1100 -- 61 14 -- -- -- -- --     05/11/24 1000 -- 102 25 119/65 -- -- -- --     05/11/24 0906 -- 47 16 116/45 -- -- -- --     05/11/24 0807 -- 101 19 -- -- -- -- --     05/11/24 0800 98.5 °F (36.9 °C) 56 15 122/58 96 % -- None (Room air) --     05/11/24 0700 -- 70 17 138/76 95 % -- -- --     05/11/24 0600 -- 67 13 137/62 96 % -- -- --     05/11/24 0559 -- -- -- -- -- 208 lb -- --     05/11/24 0500 -- 66 14 131/76 92 % -- -- --     05/11/24 0400 97.8 °F (36.6 °C) 65 15 102/48 93 % -- None (Room air) --     05/11/24 0300 -- 77 22 128/100 98 % -- -- --     05/11/24 0200 -- 64 17 109/51 91 % -- -- --     05/11/24 0100 -- 63 17 110/45 93 % -- -- --     05/11/24 0000 -- 64 17 103/47 92 % -- -- --        Blood Transfusion  Record       Product Unit Status Volume Start End            Transfuse platelets       24  [id number]  [id number] Completed 05/09/24 0140 183 mL 05/08/24 2140 05/08/24 2325                --------------  DISCHARGE REVIEW    Payor: [employer] MEDIC[employer] ADV PPO  Subscriber #:  [id number]  Authorization Number: [id number]    Admit date: 5/8/24  Admit time:   9:08 PM  Discharge Date: 5/12/2024  1:18 PM     Admitting Physician: Sindi Bruner MD  Attending Physician:  No att. providers found  Primary Care Physician: Gee Jimenez MD          Discharge Summary Notes        Discharge Summary signed by Lance Aguayo MD at 5/12/2024  1:24 PM       Author: Lance Aguayo MD Specialty: HOSPITALIST Author Type: Physician    Filed: 5/12/2024  1:24 PM Date of Service: 5/12/2024  1:22 PM Status: Signed    : Lance Aguayo MD (Physician)           General Medicine Discharge Summary     Patient ID:  Jonny Haque  79 year old  4/15/1945    Admit date: 5/8/2024    Discharge date and time: 5/12/2024  1:18 PM     Attending Physician: No att. providers found     Primary Care Physician: Gee Jimenez MD     Reason for admission: see HPI    Discharge Diagnoses: Intracranial hemorrhage (HCC) [I62.9]    Discharged Condition: good    Exam: (see progress notes for full details)  No acute distress, alert and oriented    Hospital Course:     Patient is a 79-year-old male with significant past medical history of CAD status post PCI in 2017, 2021 and most recently on 3/19/2024 and 3/22/2024 with 3 drug-eluting stents placed to the left main, LAD and OM on dual antiplatelet therapy, ischemic cardiomyopathy, hypothyroidism history of HCV cirrhosis cirrhosis, paroxysmal A-fib on amiodarone, moderate aortic stenosis, ESRD on hemodialysis, type 2 diabetes, hypertension, prior history of GI bleed presented with a fall after dizziness when he got up to go to dialysis.  He hit his head and the back.  He had some dizziness  prior to the fall however states that his leg gave out -he was able to complete HD, he then went to follow-up with his cardiologist who ordered a CT head which showed a large h subdural hematoma with possible epidural hematoma with a right to left midline shift.  Given the fact the patient was on dual antiplatelet therapy was given platelet transfusion,  He has some headaches however no nausea no vomiting no focal neurological deficits, is not altered no lethargy no chest pain shortness of breath no lightheadedness currently has been able to ambulate     In the emergency room vital signs have been stable, blood pressure slightly elevated 162/68, nicardipine drip was started        Hemoglobin is stable at 10.8, glucose was slightly elevated he had a CT head that showed stable right-sided extra-axial hematoma with new trace amount of blood in the posterior left occipital horn with trace amount of blood within the subdural space of the interhemispheric fissure along the right tentorium of the cerebellum     Patient is admitted to the ICU, neurosurgery, neurology as well as cardiology were consulted as this patient went into A-fib with RVR       # Fall, acute subdural hematoma  -Exacerbated by patient being on dual antiplatelet therapy  -CT head reviewed, no focal neurological symptoms  -Nicardipine drip has been switched off, blood pressure management per neurosurgery and neurology  -Keppra IV twice daily, dc on po antiepileptics per NSGY and neurology  -Repeated CT head secondary to increased left-sided facial droop and slurred speech, minimally increased subdural hematoma  -No surgical intervention planned at this time, continue neurochecks and BP control  -ok to trasnfer out of the ICU   -NSGY and neurology signed off   -Outpatient CT scheduled already, patient to get Keppra and outpatient CT at which timeDecision for resuming dual antiplatelet therapy will be taken     # Paroxysmal A-fib with RVR  # V.  Tach  -Patient in NSR overnight but in afib this am , back in NSR  -Heart rate went into 130s to 140s, cardiology consulted  -Resumed amiodarone with a mini load, continue to hold anticoagulation antiplatelets secondary to above  -Converted back to sinus rhythm  -dc AV phuc blockers per cards  -Okay to discharge per cardiology, on p.o. beta-blocker as well as Cardizem, outpatient follow-up with cardiology     # CAD status post PCI with recent in March 2024  -Given this is a fresh stent, and holding antiplatelets  -Resume when safe per neurosurgery and cardiology  -Continue statin, diltiazem  -High risk for stent closure however also with significant risk of expanding subdural hematoma  -After discussion with neurosurgery plan to repeat CT head in 1 week and consider resuming DAPT depending upon the stability of the SDH     # ESRD  -HD per nephrology  # Hypothyroidism  # HSV cirrhosis  # Moderate aortic stenosis  # Ischemic cardiomyopathy     # Type 2 diabetes  -Continue 20 units of insulin, sliding scale    Consults: IP CONSULT TO HOSPITALIST  IP CONSULT TO NEUROSURGERY  IP CONSULT TO SOCIAL WORK  IP CONSULT TO NEURO INTENSIVIST  IP CONSULT TO NEPHROLOGY  IP CONSULT TO CARDIOLOGY  IP CONSULT TO SOCIAL WORK    Operative Procedures:      Disposition: home    Patient Instructions:   Discharge Medication List as of 5/12/2024 12:47 PM        START taking these medications    Details   dilTIAZem  MG Oral Capsule SR 24 Hr Take 1 capsule (240 mg total) by mouth daily., Normal, Disp-30 capsule, R-3      levETIRAcetam 500 MG Oral Tab Take 1 tablet (500 mg total) by mouth 2 (two) times daily for 9 doses., Normal, Disp-9 tablet, R-0           CONTINUE these medications which have CHANGED    Details   amiodarone 200 MG Oral Tab Take 1 tablet (200 mg total) by mouth 2 (two) times daily with meals., Normal, Disp-60 tablet, R-3           CONTINUE these medications which have NOT CHANGED    Details   metoprolol succinate ER  50 MG Oral Tablet 24 Hr Take 1 tablet (50 mg total) by mouth Daily Beta Blocker., Normal, Disp-90 tablet, R-3      levothyroxine 150 MCG Oral Tab Take 1 tablet (150 mcg total) by mouth every other day. Take one tablet every morning before breakfast, Historical      Ferric Citrate (AURYXIA) 1  MG(Fe) Oral Tab Historical      pantoprazole 40 MG Oral Tab EC Take 1 tablet (40 mg total) by mouth 2 (two) times daily before meals., Historical      Sevelamer 800 MG Oral Tab Take 1 tablet (800 mg total) by mouth 3 (three) times daily with meals., Historical      Insulin Pen Needle (TRUEPLUS PEN NEEDLES) 31G X 5 MM Does not apply Misc Use 5 per day, Normal, Disp-500 each, R-2      Insulin Glargine, 2 Unit Dial, (TOUJEO MAX SOLOSTAR) 300 UNIT/ML Subcutaneous Solution Pen-injector Inject 20 Units into the skin nightly., Normal, Disp-6 mL, R-2      rosuvastatin 10 MG Oral Tab Take 1 tablet (10 mg total) by mouth nightly., Normal, Disp-90 tablet, R-0      !! Glucose Blood (ONETOUCH ULTRA) In Vitro Strip 6 times a day, Normal, Disp-400 each, R-3      Insulin Lispro, 1 Unit Dial, (HUMALOG KWIKPEN) 100 UNIT/ML Subcutaneous Solution Pen-injector 12 units before meals with sliding scale. Max daily dose: 50 units., Normal, Disp-60 mL, R-3      !! CONTOUR NEXT TEST In Vitro Strip TEST BLOOD SUGAR FOUR TIMES DAILY, Normal, Disp-400 strip, R-3      Blood Glucose Monitoring Suppl ([employer] CONTOUR NEXT MONITOR) W/DEVICE Does not apply Kit 1 Device by Does not apply route 4 (four) times daily., Sample, Disp-1 kit, R-0       !! - Potential duplicate medications found. Please discuss with provider.        STOP taking these medications       clopidogrel 75 MG Oral Tab        losartan 50 MG Oral Tab        aspirin 81 MG Oral Tab EC        nitroGLYCERIN 0.3 MG Sublingual SL Tab        zolpidem 10 MG Oral Tab                I reconciled current and discharge medications on day of discharge    Follow-up with PCP, cardiology, neurosurgery    No  orders of the defined types were placed in this encounter.      Follow-up with labs: CT head in 1 week    Total Time Coordinating Care: Greater than 30 minutes    Patient had opportunity to ask questions and state understand and agree with therapeutic plan as outlined above.   Lance vasquez MD          Electronically signed by Lance Vasquez MD on 5/12/2024  1:24 PM         REVIEWER COMMENTS

## 2024-05-16 ENCOUNTER — TELEPHONE (OUTPATIENT)
Dept: SURGERY | Facility: CLINIC | Age: 79
End: 2024-05-16

## 2024-05-16 ENCOUNTER — PATIENT OUTREACH (OUTPATIENT)
Dept: CASE MANAGEMENT | Age: 79
End: 2024-05-16

## 2024-05-16 NOTE — TELEPHONE ENCOUNTER
Left generic vm that appt cx for 5/17/2024 and sent [employer] message. Pt needs to complete CT prior to appt per Renay GANNON.Pt has CT scheduled for 5/22/2024.

## 2024-05-16 NOTE — PROGRESS NOTES
Hospital follow up.    Satya Donald MD  Cardiology, Structural Heart  [address]   [address]  [place] [address] [phone]  ASAP    No answer, left a voicemail with callback information.

## 2024-05-17 ENCOUNTER — OFFICE VISIT (OUTPATIENT)
Dept: SURGERY | Facility: CLINIC | Age: 79
End: 2024-05-17

## 2024-05-17 VITALS
WEIGHT: 204 LBS | SYSTOLIC BLOOD PRESSURE: 134 MMHG | HEART RATE: 68 BPM | DIASTOLIC BLOOD PRESSURE: 78 MMHG | HEIGHT: 72 IN | BODY MASS INDEX: 27.63 KG/M2

## 2024-05-17 DIAGNOSIS — S06.5XAA SDH (SUBDURAL HEMATOMA) (HCC): Primary | ICD-10-CM

## 2024-05-17 DIAGNOSIS — G44.309 POST-TRAUMATIC HEADACHE, NOT INTRACTABLE, UNSPECIFIED CHRONICITY PATTERN: ICD-10-CM

## 2024-05-17 PROBLEM — N25.81 SECONDARY HYPERPARATHYROIDISM OF RENAL ORIGIN (HCC): Status: ACTIVE | Noted: 2021-07-19

## 2024-05-17 PROBLEM — N18.4 HYPERTENSIVE HEART DISEASE WITH STAGE 4 CHRONIC KIDNEY DISEASE (HCC): Status: ACTIVE | Noted: 2020-03-16

## 2024-05-17 PROBLEM — T78.2XXA ANAPHYLACTIC SHOCK, UNSPECIFIED, INITIAL ENCOUNTER: Status: ACTIVE | Noted: 2022-01-06

## 2024-05-17 PROBLEM — Z99.2 DEPENDENCE ON RENAL DIALYSIS (HCC): Status: ACTIVE | Noted: 2022-07-04

## 2024-05-17 PROBLEM — K57.13 DIVERTICULITIS OF SMALL INTESTINE WITHOUT PERFORATION OR ABSCESS WITH BLEEDING: Status: ACTIVE | Noted: 2022-10-29

## 2024-05-17 PROBLEM — E63.9 DEFICIENCY OF MACRONUTRIENTS: Status: ACTIVE | Noted: 2022-10-11

## 2024-05-17 PROBLEM — E55.9 VITAMIN D DEFICIENCY, UNSPECIFIED: Status: ACTIVE | Noted: 2024-01-15

## 2024-05-17 PROBLEM — I12.9 HYPERTENSIVE RENAL DISEASE: Status: ACTIVE | Noted: 2020-07-18

## 2024-05-17 PROBLEM — I13.10 HYPERTENSIVE HEART DISEASE WITH STAGE 4 CHRONIC KIDNEY DISEASE (HCC): Status: ACTIVE | Noted: 2020-03-16

## 2024-05-17 PROBLEM — D68.9 COAGULATION DEFECT, UNSPECIFIED (HCC): Status: ACTIVE | Noted: 2022-08-01

## 2024-05-17 PROBLEM — E88.9 DISORDER OF METABOLISM: Status: ACTIVE | Noted: 2019-10-31

## 2024-05-17 PROBLEM — T78.40XA ALLERGY, UNSPECIFIED, INITIAL ENCOUNTER: Status: ACTIVE | Noted: 2022-01-06

## 2024-05-17 PROBLEM — E03.9 HYPOTHYROIDISM: Status: ACTIVE | Noted: 2021-07-19

## 2024-05-17 PROBLEM — Z99.2 DEPENDENCE ON RENAL DIALYSIS: Status: ACTIVE | Noted: 2022-07-04

## 2024-05-17 PROBLEM — E88.09 OTHER DISORDERS OF PLASMA-PROTEIN METABOLISM, NOT ELSEWHERE CLASSIFIED: Status: ACTIVE | Noted: 2021-07-19

## 2024-05-17 PROBLEM — E11.51 TYPE 2 DIABETES MELLITUS WITH DIABETIC PERIPHERAL ANGIOPATHY WITHOUT GANGRENE (HCC): Status: ACTIVE | Noted: 2021-07-19

## 2024-05-17 PROBLEM — N25.89 OTHER DISORDERS RESULTING FROM IMPAIRED RENAL TUBULAR FUNCTION: Status: ACTIVE | Noted: 2021-07-19

## 2024-05-17 PROBLEM — R52 PAIN: Status: ACTIVE | Noted: 2021-07-19

## 2024-05-17 PROBLEM — E87.20 METABOLIC ACIDOSIS: Status: ACTIVE | Noted: 2020-07-18

## 2024-05-17 PROBLEM — I95.3 HYPOTENSION OF HEMODIALYSIS: Status: ACTIVE | Noted: 2021-07-19

## 2024-05-17 PROCEDURE — 1111F DSCHRG MED/CURRENT MED MERGE: CPT | Performed by: NURSE PRACTITIONER

## 2024-05-17 PROCEDURE — 99213 OFFICE O/P EST LOW 20 MIN: CPT | Performed by: NURSE PRACTITIONER

## 2024-05-17 PROCEDURE — 1159F MED LIST DOCD IN RCRD: CPT | Performed by: NURSE PRACTITIONER

## 2024-05-17 PROCEDURE — 3075F SYST BP GE 130 - 139MM HG: CPT | Performed by: NURSE PRACTITIONER

## 2024-05-17 PROCEDURE — 3008F BODY MASS INDEX DOCD: CPT | Performed by: NURSE PRACTITIONER

## 2024-05-17 PROCEDURE — 3078F DIAST BP <80 MM HG: CPT | Performed by: NURSE PRACTITIONER

## 2024-05-17 RX ORDER — ZOLPIDEM TARTRATE 10 MG/1
10 TABLET ORAL NIGHTLY PRN
COMMUNITY

## 2024-05-17 RX ORDER — CLOPIDOGREL BISULFATE 75 MG/1
1 TABLET ORAL
COMMUNITY
Start: 2022-01-24

## 2024-05-17 RX ORDER — TIZANIDINE 2 MG/1
2 TABLET ORAL EVERY 6 HOURS PRN
Qty: 20 TABLET | Refills: 0 | Status: SHIPPED | OUTPATIENT
Start: 2024-05-17

## 2024-05-17 RX ORDER — ASPIRIN 81 MG/1
81 TABLET ORAL DAILY
COMMUNITY

## 2024-05-17 RX ORDER — BUTALBITAL, ACETAMINOPHEN AND CAFFEINE 50; 325; 40 MG/1; MG/1; MG/1
1 TABLET ORAL EVERY 4 HOURS PRN
Qty: 30 TABLET | Refills: 0 | Status: SHIPPED | OUTPATIENT
Start: 2024-05-17

## 2024-05-17 RX ORDER — LOSARTAN POTASSIUM 50 MG/1
TABLET ORAL DAILY
COMMUNITY

## 2024-05-17 RX ORDER — NITROGLYCERIN 0.3 MG/1
0.3 TABLET SUBLINGUAL EVERY 5 MIN PRN
COMMUNITY

## 2024-05-17 NOTE — PROGRESS NOTES
[facility]   Outpatient Neurological Surgery Follow Up    Jonny Haque  : 4/15/1945  2024  PCP: Gee Jimenez MD  Referring Provider: No ref. provider found    REASON FOR VISIT:  Chief Complaint   Patient presents with    Hospital F/U       HISTORY OF PRESENT ILLNESS:  Jonny Haque is a 79 year old male who presents today for a hospital follow up.  Pt states on  he sustained a fall in his bathroom causing him to strike his head on the ground.  Pt denies LOC.  He had soft tissue swelling to his forehead.  Pt is on DAPT due to bare metal stents placed 6 weeks prior.  Pt went to his cardiologist appointment the following day who recommended a CT brain.  Pt was directed to the ED after this showed a R acute SDH.  Pt was evaluated by Dr. Perez while hospitalized and was recommended for short term follow up.  Pt has not had a follow up CT brain at this time.  Pt states he feels improved since his discharge on .  He continues to have headaches and neck pain.  Pt is taking Tylenol without relief.   He denies new weakness or worsening headaches.        PAST MEDICAL HISTORY:  Past Medical History:    Aortic stenosis    Calculus of kidney    Coronary atherosclerosis    bare metal stents at [facility] 2021    Diabetes (HCC)    Disorder of thyroid    Esophageal varices without bleeding, unspecified esophageal varices type (HCC)    GIB (gastrointestinal bleeding)    Hepatitis, unspecified    hepatitis C-just completed taking Harvoni in 2016    High blood pressure    High cholesterol    Malignant neoplasm of trigone of urinary bladder (HCC)    Malignant neoplasm of urinary bladder, unspecified site (HCC)    Malignant neoplasm of urinary bladder, unspecified site (HCC)    Other disorders of plasma-protein metabolism, not elsewhere classified    Renal disorder    Visual impairment    reading glasses       PAST SURGICAL HISTORY:  Past Surgical History:   Procedure Laterality  Date    Angiogram  08/15/2017    small caliber RCA with 80% mid lesion.  LAD and left circumflex with 50% lesions.  LV shows inferobasilar aneurysm with scar.  Overall LV function preserved at the exterior 65%.    Cath bare metal stent (bms)      Other surgical history      Upper GI surgery    Other surgical history  12/11/2017    [facility]    Other surgical history  07/18/2019    [facility]     Other surgical history  02/06/2020    [facility] Cysto (Dr. Lees)       SOCIAL HISTORY:   reports that he quit smoking about 44 years ago. His smoking use included cigarettes. He started smoking about 59 years ago. He has a 7.5 pack-year smoking history. He has never used smokeless tobacco. He reports that he does not drink alcohol and does not use drugs.      ALLERGIES:  No Known Allergies    MEDICATIONS:  Current Outpatient Medications   Medication Sig Dispense Refill    amiodarone 200 MG Oral Tab Take 1 tablet (200 mg total) by mouth 2 (two) times daily with meals. 60 tablet 3    dilTIAZem  MG Oral Capsule SR 24 Hr Take 1 capsule (240 mg total) by mouth daily. 30 capsule 3    Insulin Lispro, 1 Unit Dial, (HUMALOG KWIKPEN) 100 UNIT/ML Subcutaneous Solution Pen-injector 12 units before meals with sliding scale. Max daily dose: 50 units. (Patient taking differently: 10 Units. Three times a day. Before meals) 60 mL 3    levETIRAcetam 500 MG Oral Tab Take 1 tablet (500 mg total) by mouth 2 (two) times daily for 9 doses. (Patient not taking: Reported on 5/17/2024) 9 tablet 0    metoprolol succinate ER 50 MG Oral Tablet 24 Hr Take 1 tablet (50 mg total) by mouth Daily Beta Blocker. 90 tablet 3    levothyroxine 150 MCG Oral Tab Take 1 tablet (150 mcg total) by mouth every other day. Take one tablet every morning before breakfast      Ferric Citrate (AURYXIA) 1  MG(Fe) Oral Tab       pantoprazole 40 MG Oral Tab EC Take 1 tablet (40 mg total) by mouth 2 (two) times daily before meals.      Sevelamer 800 MG Oral  Tab Take 1 tablet (800 mg total) by mouth 3 (three) times daily with meals.      Insulin Pen Needle (TRUEPLUS PEN NEEDLES) 31G X 5 MM Does not apply Misc Use 5 per day 500 each 2    Insulin Glargine, 2 Unit Dial, (TOUJEO AVINASH SOLOSTAR) 300 UNIT/ML Subcutaneous Solution Pen-injector Inject 20 Units into the skin nightly. 6 mL 2    rosuvastatin 10 MG Oral Tab Take 1 tablet (10 mg total) by mouth nightly. (Patient taking differently: Take 1 tablet (10 mg total) by mouth nightly.) 90 tablet 0    Glucose Blood (ONETOUCH ULTRA) In Vitro Strip 6 times a day 400 each 3    CONTOUR NEXT TEST In Vitro Strip TEST BLOOD SUGAR FOUR TIMES DAILY 400 strip 3    Blood Glucose Monitoring Suppl (Quoteroller CONTOUR NEXT MONITOR) W/DEVICE Does not apply Kit 1 Device by Does not apply route 4 (four) times daily. 1 kit 0       REVIEW OF SYSTEMS:  Pertinent positives and negatives are noted in HPI.      PHYSICAL EXAMINATION:  VITAL SIGNS: /78   Pulse 68   Ht 72\"   Wt 204 lb (92.5 kg)   BMI 27.67 kg/m²   GENERAL:  Patient is a 79 year old male in no apparent distress.  HEENT:  Normocephalic, atraumatic.  NEUROLOGICAL:  This patient is alert and orientated x 3.  Speech fluent. Able to follow simple commands.  CN II - XII intact.  Face symmetric.  Finger-to-nose coordination normal.  Sensory: intact.  Motor: 5/5.  No pronator drift.  Pt in wheelchair during exam, gait deferred      IMAGING:  Previous CT brain reviewed, shows R acute SDH without significant midline shift    ASSESSMENT:  Acute SDH  Headache      PLAN:  Pt is scheduled for follow up CT on 5/22.  Hold ASA and Plavix at minimum until results are completed.  2.   Will determine if additional follow up is needed after imaging  3.   Advised pt to call or go the ED if he develops worsening headaches, new left sided weakness, or any new concerning neurologic complaints  4.  Short course of Fioricet and Tizanidine provided        Total time spent:  20 minutes  Greater than 50% of the  time was spent on counseling/coordination of care.  Nature of education / counseling: Pathology, treatment options, and expected outcomes    AVEILNA Trejo  5/17/2024, 11:36 AM

## 2024-05-17 NOTE — PATIENT INSTRUCTIONS
Refill policies:    Allow 2-3 business days for refills; controlled substances may take longer.  Contact your pharmacy at least 5 days prior to running out of medication and have them send an electronic request or submit request through the “request refill” option in your [employer] account.  Refills are not addressed on weekends; covering physicians do not authorize routine medications on weekends.  No narcotics or controlled substances are refilled after noon on Fridays or by on call physicians.  By law, narcotics must be electronically prescribed.  A 30 day supply with no refills is the maximum allowed.  If your prescription is due for a refill, you may be due for a follow up appointment.  To best provide you care, patients receiving routine medications need to be seen at least once a year.  Patients receiving narcotic/controlled substance medications need to be seen at least once every 3 months.  In the event that your preferred pharmacy does not have the requested medication in stock (e.g. Backordered), it is your responsibility to find another pharmacy that has the requested medication available.  We will gladly send a new prescription to that pharmacy at your request.    Scheduling Tests:    If your physician has ordered radiology tests such as MRI or CT scans, please contact Central Scheduling at [phone] right away to schedule the test.  Once scheduled, the [facility] Centralized Referral Team will work with your insurance carrier to obtain pre-certification or prior authorization.  Depending on your insurance carrier, approval may take 3-10 days.  It is highly recommended patients assure they have received an authorization before having a test performed.  If test is done without insurance authorization, patient may be responsible for the entire amount billed.      Precertification and Prior Authorizations:  If your physician has recommended that you have a procedure or additional testing performed the [facility]  Centralized Referral Team will contact your insurance carrier to obtain pre-certification or prior authorization.    You are strongly encouraged to contact your insurance carrier to verify that your procedure/test has been approved and is a COVERED benefit.  Although the [facility] Centralized Referral Team does its due diligence, the insurance carrier gives the disclaimer that \"Although the procedure is authorized, this does not guarantee payment.\"    Ultimately the patient is responsible for payment.   Thank you for your understanding in this matter.  Paperwork Completion:  If you require FMLA or disability paperwork for your recovery, please make sure to either drop it off or have it faxed to our office at [phone]. Be sure the form has your name and date of birth on it.  The form will be faxed to our Forms Department and they will complete it for you.  There is a 25$ fee for all forms that need to be filled out.  Please be aware there is a 10-14 day turnaround time.  You will need to sign a release of information (OANH) form if your paperwork does not come with one.  You may call the Forms Department with any questions at [phone].  Their fax number is [phone].

## 2024-05-17 NOTE — PROGRESS NOTES
[facility] request (discharged 05/12)    Pt has existing apt Mon 06/10 @8:15am  Closing encounter

## 2024-05-17 NOTE — PROGRESS NOTES
Established patient:  Reason for follow up:   Hospital f/u-R SDH    New imaging or testing since your last office visit:    CT brain scheduled on 05/22/24

## 2024-05-22 ENCOUNTER — TELEPHONE (OUTPATIENT)
Dept: ADMINISTRATIVE | Facility: HOSPITAL | Age: 79
End: 2024-05-22

## 2024-05-22 DIAGNOSIS — S06.5XAA ACUTE SUBDURAL HEMATOMA (HCC): Primary | ICD-10-CM

## 2024-05-22 DIAGNOSIS — R51.9 INTRACTABLE HEADACHE, UNSPECIFIED CHRONICITY PATTERN, UNSPECIFIED HEADACHE TYPE: ICD-10-CM

## 2024-05-22 NOTE — TELEPHONE ENCOUNTER
Received request to initiate appeal.  Appeal letter initiated and pended.  Routed to HERB Saha

## 2024-05-22 NOTE — TELEPHONE ENCOUNTER
Can someone send the insurance company his hospital and follow up notes?  This imaging should have been completed before he even came to his follow up appointment so it is urgent that he gets this done

## 2024-05-22 NOTE — TELEPHONE ENCOUNTER
Called to get info to send appeal to. Case#[id number] Tel# [phone]     For expedited appeal within 72hrs we need to fax the following info to [phone]    Expedited appeal  Denial reason  Service denied  Physican name address and phone number  Date of denial  Supporting info for appeal  Member address.

## 2024-05-22 NOTE — TELEPHONE ENCOUNTER
Good Morning, Please be advise that the CT BRAIN OR HEAD (86196)  was denied by the Patient Health Plan.  According to Aetallen Palacio can call an initiate an  Appeal to see if the denial can be overturned. All clinicals has been sent.  Case#[id number] Tel# [phone]      Tawanda  Tiffanie    Denial Reason:    Important: This notice explains your right to appeal our decision. Read this notice carefully. If   you need help, you can call one of the numbers listed on the last page under “Get help & more   information.”  Notice of Denial of Medical Coverage  Date: 5/17/2024 Member Number: [id number]  Name: SETH GREEN Request ID: [id number]  Your request was denied  We’ve denied the medical services/items listed below requested by you or your provider:   Procedure Description Units   Requested  Units   Denied  15953 Computed Tomography (CT), a special picture of   your head or brain without contrast (dye)  1 1  Why did we deny your request?  We denied the medical services/items listed above because:  For 72031 CT HEAD/BRAIN W/O CONTRAST Your doctor told us that you may have had   head trauma. An imaging study was asked for. We cannot approve this request because:  We did not receive a detailed history that shows this study is needed.  Your records do not show documentation of physical exam results that included a detailed   nervous system (a network of nerve cells and fibers that send nerve messages between parts of   your body) exam performed after your symptoms started or changed that support imaging.  Detailed physical exam since onset or change in symptoms is required.   This finding was based on review of Medicare National Coverage Determinations (NCD): 220.1   Computed Tomography and [employer] Head Imaging Guidelines Section(s): Head Trauma (HD   13.1) and 1.0 General Guidelines.   You may access [employer] healthcare coverage policies at www.[employer], select Resources and   then Clinical Guidelines, or call  [phone]    You have the right to appeal our decision  You have the right to ask [employer] to review our decision by asking   us for an appeal.  Plan Appeal: Ask [employer] for an appeal within 60 days of the date   of this notice. We can give you more time if you have a good reason for missing the deadline.   See section titled “How to ask for an appeal with [employer] Plan” for   information on how to ask for a plan level appeal. Tel#[phone]

## 2024-05-22 NOTE — TELEPHONE ENCOUNTER
Letter and accompanying documents faxed to number provided below:    [phone]     Routed to bob Team.

## 2024-05-23 NOTE — TELEPHONE ENCOUNTER
Noted that patient's CT was not completed yesterday.  Status in Referral's tab remains \"denied\".     Routed to [facility] Team for next steps.

## 2024-05-23 NOTE — TELEPHONE ENCOUNTER
Pt's son requesting call ([phone]) once auth and is requesting can be stat as concerned as pt is off plavix.

## 2024-05-23 NOTE — TELEPHONE ENCOUNTER
Pt's son spoke with [place] and was directed to call [place], pt would like to discuss. Please call pt's son.

## 2024-05-23 NOTE — TELEPHONE ENCOUNTER
Pt is overdue for CT brain and medically needs this to clear him to resume his dual antiplatelet therapy for his cardiac stents.  Stat CT brain ordered to evaluate large acute SDH previously found on hospitalization which is overdue for evaluation

## 2024-05-23 NOTE — TELEPHONE ENCOUNTER
Patient off ASA and Plavix till CT is completed. Son is concerned. We submitted for expedited appeal, but this can take 72 hours still. Devora ESTEVEZ has called the son and ordered STAT CT..

## 2024-05-24 ENCOUNTER — TELEPHONE (OUTPATIENT)
Dept: SURGERY | Facility: CLINIC | Age: 79
End: 2024-05-24

## 2024-05-24 ENCOUNTER — HOSPITAL ENCOUNTER (OUTPATIENT)
Dept: CT IMAGING | Age: 79
Discharge: HOME | End: 2024-05-24
Attending: NURSE PRACTITIONER

## 2024-05-24 DIAGNOSIS — S06.5XAA SDH (SUBDURAL HEMATOMA) (HCC): Primary | ICD-10-CM

## 2024-05-24 DIAGNOSIS — R51.9 INTRACTABLE HEADACHE, UNSPECIFIED CHRONICITY PATTERN, UNSPECIFIED HEADACHE TYPE: ICD-10-CM

## 2024-05-24 DIAGNOSIS — S06.5XAA ACUTE SUBDURAL HEMATOMA (HCC): ICD-10-CM

## 2024-05-24 PROCEDURE — 70450 CT HEAD/BRAIN W/O DYE: CPT | Performed by: NURSE PRACTITIONER

## 2024-05-24 NOTE — TELEPHONE ENCOUNTER
Called and spoke with son.  Pt's SDH is the same size although is now acute on subacute.  Discussed that pt is still an elevated risk with restarting antiplatelet therapy, we understand that these medications are important with his bare metal stent.  Discussed with Dr. Krishna who agreed that starting antiplatelets now would be reasonable.  Advised son to start one medication today and the other in 2 days.  Given his DAPT use, we would like to see the patient in the office in about 2 weeks with a repeat CT brain prior to this appointment to evaluate SDH after resuming DAPT.  Advised son if pt develops new or worsening neurologic complaints to bring pt to the ED.  Son verbalized plan of care

## 2024-06-11 ENCOUNTER — HOSPITAL ENCOUNTER (OUTPATIENT)
Dept: CT IMAGING | Facility: HOSPITAL | Age: 79
Discharge: HOME | End: 2024-06-11
Attending: NURSE PRACTITIONER
Payer: MEDICARE

## 2024-06-11 DIAGNOSIS — S06.5XAA SDH (SUBDURAL HEMATOMA) (HCC): ICD-10-CM

## 2024-06-11 PROCEDURE — 70450 CT HEAD/BRAIN W/O DYE: CPT | Performed by: NURSE PRACTITIONER

## 2024-08-03 ENCOUNTER — HOSPITAL ENCOUNTER (INPATIENT)
Facility: HOSPITAL | Age: 79
LOS: 11 days | Discharge: HOME | End: 2024-08-14
Attending: EMERGENCY MEDICINE | Admitting: INTERNAL MEDICINE
Payer: MEDICARE

## 2024-08-03 ENCOUNTER — APPOINTMENT (OUTPATIENT)
Dept: GENERAL RADIOLOGY | Facility: HOSPITAL | Age: 79
End: 2024-08-03
Payer: MEDICARE

## 2024-08-03 DIAGNOSIS — R07.9 EXERTIONAL CHEST PAIN: Primary | ICD-10-CM

## 2024-08-03 DIAGNOSIS — N17.9 ACUTE RENAL FAILURE ON DIALYSIS (HCC): ICD-10-CM

## 2024-08-03 DIAGNOSIS — Z99.2 ACUTE RENAL FAILURE ON DIALYSIS (HCC): ICD-10-CM

## 2024-08-03 PROBLEM — D64.9 ANEMIA: Status: ACTIVE | Noted: 2024-08-03

## 2024-08-03 PROBLEM — E87.1 HYPONATREMIA: Status: ACTIVE | Noted: 2024-08-03

## 2024-08-03 LAB
ALBUMIN SERPL-MCNC: 4.3 G/DL (ref 3.2–4.8)
ALBUMIN/GLOB SERPL: 1.6 {RATIO} (ref 1–2)
ALP LIVER SERPL-CCNC: 183 U/L
ALT SERPL-CCNC: 38 U/L
ANION GAP SERPL CALC-SCNC: 11 MMOL/L (ref 0–18)
AST SERPL-CCNC: 20 U/L (ref ?–34)
BASOPHILS # BLD AUTO: 0.03 X10(3) UL (ref 0–0.2)
BASOPHILS NFR BLD AUTO: 0.4 %
BILIRUB SERPL-MCNC: 0.4 MG/DL (ref 0.2–1.1)
BUN BLD-MCNC: 83 MG/DL (ref 9–23)
CALCIUM BLD-MCNC: 9.3 MG/DL (ref 8.7–10.4)
CHLORIDE SERPL-SCNC: 95 MMOL/L (ref 98–112)
CO2 SERPL-SCNC: 25 MMOL/L (ref 21–32)
CREAT BLD-MCNC: 7.42 MG/DL
EGFRCR SERPLBLD CKD-EPI 2021: 7 ML/MIN/1.73M2 (ref 60–?)
EOSINOPHIL # BLD AUTO: 0.41 X10(3) UL (ref 0–0.7)
EOSINOPHIL NFR BLD AUTO: 5.9 %
ERYTHROCYTE [DISTWIDTH] IN BLOOD BY AUTOMATED COUNT: 13.8 %
EST. AVERAGE GLUCOSE BLD GHB EST-MCNC: 154 MG/DL (ref 68–126)
GLOBULIN PLAS-MCNC: 2.7 G/DL (ref 2–3.5)
GLUCOSE BLD-MCNC: 160 MG/DL (ref 70–99)
GLUCOSE BLD-MCNC: 191 MG/DL (ref 70–99)
GLUCOSE BLD-MCNC: 203 MG/DL (ref 70–99)
GLUCOSE BLD-MCNC: 204 MG/DL (ref 70–99)
HBA1C MFR BLD: 7 % (ref ?–5.7)
HCT VFR BLD AUTO: 28.7 %
HGB BLD-MCNC: 9.7 G/DL
IMM GRANULOCYTES # BLD AUTO: 0.04 X10(3) UL (ref 0–1)
IMM GRANULOCYTES NFR BLD: 0.6 %
LYMPHOCYTES # BLD AUTO: 1.38 X10(3) UL (ref 1–4)
LYMPHOCYTES NFR BLD AUTO: 19.8 %
MCH RBC QN AUTO: 33.6 PG (ref 26–34)
MCHC RBC AUTO-ENTMCNC: 33.8 G/DL (ref 31–37)
MCV RBC AUTO: 99.3 FL
MONOCYTES # BLD AUTO: 0.52 X10(3) UL (ref 0.1–1)
MONOCYTES NFR BLD AUTO: 7.5 %
NEUTROPHILS # BLD AUTO: 4.59 X10 (3) UL (ref 1.5–7.7)
NEUTROPHILS # BLD AUTO: 4.59 X10(3) UL (ref 1.5–7.7)
NEUTROPHILS NFR BLD AUTO: 65.8 %
OSMOLALITY SERPL CALC.SUM OF ELEC: 301 MOSM/KG (ref 275–295)
PLATELET # BLD AUTO: 69 10(3)UL (ref 150–450)
POTASSIUM SERPL-SCNC: 5 MMOL/L (ref 3.5–5.1)
PROT SERPL-MCNC: 7 G/DL (ref 5.7–8.2)
RBC # BLD AUTO: 2.89 X10(6)UL
SODIUM SERPL-SCNC: 131 MMOL/L (ref 136–145)
TROPONIN I SERPL HS-MCNC: 13 NG/L
TROPONIN I SERPL HS-MCNC: 13 NG/L
WBC # BLD AUTO: 7 X10(3) UL (ref 4–11)

## 2024-08-03 PROCEDURE — 85025 COMPLETE CBC W/AUTO DIFF WBC: CPT

## 2024-08-03 PROCEDURE — 82962 GLUCOSE BLOOD TEST: CPT

## 2024-08-03 PROCEDURE — 84484 ASSAY OF TROPONIN QUANT: CPT

## 2024-08-03 PROCEDURE — 36415 COLL VENOUS BLD VENIPUNCTURE: CPT

## 2024-08-03 PROCEDURE — 87340 HEPATITIS B SURFACE AG IA: CPT | Performed by: INTERNAL MEDICINE

## 2024-08-03 PROCEDURE — 71045 X-RAY EXAM CHEST 1 VIEW: CPT | Performed by: EMERGENCY MEDICINE

## 2024-08-03 PROCEDURE — 84484 ASSAY OF TROPONIN QUANT: CPT | Performed by: HOSPITALIST

## 2024-08-03 PROCEDURE — 93010 ELECTROCARDIOGRAM REPORT: CPT

## 2024-08-03 PROCEDURE — 93005 ELECTROCARDIOGRAM TRACING: CPT

## 2024-08-03 PROCEDURE — 80053 COMPREHEN METABOLIC PANEL: CPT | Performed by: EMERGENCY MEDICINE

## 2024-08-03 PROCEDURE — 83036 HEMOGLOBIN GLYCOSYLATED A1C: CPT | Performed by: HOSPITALIST

## 2024-08-03 PROCEDURE — 99285 EMERGENCY DEPT VISIT HI MDM: CPT

## 2024-08-03 PROCEDURE — 85025 COMPLETE CBC W/AUTO DIFF WBC: CPT | Performed by: EMERGENCY MEDICINE

## 2024-08-03 PROCEDURE — 90935 HEMODIALYSIS ONE EVALUATION: CPT | Performed by: INTERNAL MEDICINE

## 2024-08-03 PROCEDURE — 80053 COMPREHEN METABOLIC PANEL: CPT

## 2024-08-03 PROCEDURE — 5A1D70Z PERFORMANCE OF URINARY FILTRATION, INTERMITTENT, LESS THAN 6 HOURS PER DAY: ICD-10-PCS | Performed by: INTERNAL MEDICINE

## 2024-08-03 PROCEDURE — 84484 ASSAY OF TROPONIN QUANT: CPT | Performed by: EMERGENCY MEDICINE

## 2024-08-03 RX ORDER — ROSUVASTATIN CALCIUM 10 MG/1
10 TABLET, COATED ORAL NIGHTLY
Status: DISCONTINUED | OUTPATIENT
Start: 2024-08-03 | End: 2024-08-14

## 2024-08-03 RX ORDER — SENNOSIDES 8.6 MG
17.2 TABLET ORAL NIGHTLY PRN
Status: DISCONTINUED | OUTPATIENT
Start: 2024-08-03 | End: 2024-08-14

## 2024-08-03 RX ORDER — ENOXAPARIN SODIUM 100 MG/ML
40 INJECTION SUBCUTANEOUS DAILY
Status: DISCONTINUED | OUTPATIENT
Start: 2024-08-03 | End: 2024-08-03

## 2024-08-03 RX ORDER — ONDANSETRON 2 MG/ML
4 INJECTION INTRAMUSCULAR; INTRAVENOUS EVERY 6 HOURS PRN
Status: DISCONTINUED | OUTPATIENT
Start: 2024-08-03 | End: 2024-08-14

## 2024-08-03 RX ORDER — LANTHANUM CARBONATE 1000 MG/1
1000 TABLET, CHEWABLE ORAL 2 TIMES DAILY WITH MEALS
Status: DISCONTINUED | OUTPATIENT
Start: 2024-08-03 | End: 2024-08-04

## 2024-08-03 RX ORDER — POLYETHYLENE GLYCOL 3350 17 G/17G
17 POWDER, FOR SOLUTION ORAL DAILY PRN
Status: DISCONTINUED | OUTPATIENT
Start: 2024-08-03 | End: 2024-08-14

## 2024-08-03 RX ORDER — METOCLOPRAMIDE HYDROCHLORIDE 5 MG/ML
5 INJECTION INTRAMUSCULAR; INTRAVENOUS EVERY 8 HOURS PRN
Status: DISCONTINUED | OUTPATIENT
Start: 2024-08-03 | End: 2024-08-14

## 2024-08-03 RX ORDER — NICOTINE POLACRILEX 4 MG
15 LOZENGE BUCCAL
Status: DISCONTINUED | OUTPATIENT
Start: 2024-08-03 | End: 2024-08-14

## 2024-08-03 RX ORDER — ONDANSETRON 2 MG/ML
4 INJECTION INTRAMUSCULAR; INTRAVENOUS EVERY 4 HOURS PRN
Status: ACTIVE | OUTPATIENT
Start: 2024-08-03 | End: 2024-08-03

## 2024-08-03 RX ORDER — LANTHANUM CARBONATE 1000 MG/1
1000 TABLET, CHEWABLE ORAL 2 TIMES DAILY WITH MEALS
COMMUNITY

## 2024-08-03 RX ORDER — SEVELAMER CARBONATE 800 MG/1
800 TABLET, FILM COATED ORAL
Status: DISCONTINUED | OUTPATIENT
Start: 2024-08-03 | End: 2024-08-04

## 2024-08-03 RX ORDER — HEPARIN SODIUM 5000 [USP'U]/ML
5000 INJECTION, SOLUTION INTRAVENOUS; SUBCUTANEOUS EVERY 8 HOURS SCHEDULED
Status: DISCONTINUED | OUTPATIENT
Start: 2024-08-03 | End: 2024-08-07

## 2024-08-03 RX ORDER — LEVOTHYROXINE SODIUM 0.15 MG/1
150 TABLET ORAL
Status: DISCONTINUED | OUTPATIENT
Start: 2024-08-04 | End: 2024-08-08 | Stop reason: DRUGHIGH

## 2024-08-03 RX ORDER — BISACODYL 10 MG
10 SUPPOSITORY, RECTAL RECTAL
Status: DISCONTINUED | OUTPATIENT
Start: 2024-08-03 | End: 2024-08-14

## 2024-08-03 RX ORDER — AMIODARONE HYDROCHLORIDE 200 MG/1
200 TABLET ORAL 2 TIMES DAILY WITH MEALS
Status: DISCONTINUED | OUTPATIENT
Start: 2024-08-03 | End: 2024-08-14

## 2024-08-03 RX ORDER — ASPIRIN 81 MG/1
81 TABLET ORAL EVERY OTHER DAY
Status: DISCONTINUED | OUTPATIENT
Start: 2024-08-04 | End: 2024-08-14

## 2024-08-03 RX ORDER — NICOTINE POLACRILEX 4 MG
30 LOZENGE BUCCAL
Status: DISCONTINUED | OUTPATIENT
Start: 2024-08-03 | End: 2024-08-14

## 2024-08-03 RX ORDER — ALBUMIN (HUMAN) 12.5 G/50ML
25 SOLUTION INTRAVENOUS
Status: ACTIVE | OUTPATIENT
Start: 2024-08-03 | End: 2024-08-05

## 2024-08-03 RX ORDER — DEXTROSE MONOHYDRATE 25 G/50ML
50 INJECTION, SOLUTION INTRAVENOUS
Status: DISCONTINUED | OUTPATIENT
Start: 2024-08-03 | End: 2024-08-14

## 2024-08-03 RX ORDER — INSULIN DEGLUDEC 100 U/ML
24 INJECTION, SOLUTION SUBCUTANEOUS NIGHTLY
Status: DISCONTINUED | OUTPATIENT
Start: 2024-08-03 | End: 2024-08-14

## 2024-08-03 RX ORDER — ACETAMINOPHEN 500 MG
500 TABLET ORAL EVERY 4 HOURS PRN
Status: DISCONTINUED | OUTPATIENT
Start: 2024-08-03 | End: 2024-08-14

## 2024-08-03 RX ORDER — ASPIRIN 81 MG/1
324 TABLET, CHEWABLE ORAL ONCE
Status: COMPLETED | OUTPATIENT
Start: 2024-08-03 | End: 2024-08-03

## 2024-08-03 RX ORDER — PANTOPRAZOLE SODIUM 40 MG/1
40 TABLET, DELAYED RELEASE ORAL
Status: DISCONTINUED | OUTPATIENT
Start: 2024-08-03 | End: 2024-08-14

## 2024-08-03 RX ORDER — HEPARIN SODIUM 1000 [USP'U]/ML
1500 INJECTION, SOLUTION INTRAVENOUS; SUBCUTANEOUS ONCE
Status: COMPLETED | OUTPATIENT
Start: 2024-08-03 | End: 2024-08-04

## 2024-08-03 NOTE — ED QUICK NOTES
Orders for admission, patient is aware of plan and ready to go upstairs. Any questions, please call ED RN sukumar at extension [phone].     Patient Covid vaccination status: Fully vaccinated     COVID Test Ordered in ED: None    COVID Suspicion at Admission: N/A    Running Infusions:  None    Mental Status/LOC at time of transport: a&ox4    Other pertinent information:   CIWA score: N/A   NIH score:  N/A

## 2024-08-03 NOTE — ED PROVIDER NOTES
Patient Seen in: [facility] Emergency Department      History     Chief Complaint   Patient presents with    Chest Pain Angina     Stated Complaint: CP    Subjective:   HPI   this is a 79-year-old male who arrives here with complaints of back pain chest pain intermittently for the last 5 days some of it is very much activity related.  The patient states every time he walks discharge at pain.  He did have some pain today that was not active related.  The patient does have a history of aortic stenosis diabetes he is admitted previous history of coronary disease he had stents placed in February of this year.  The patient states he has had no fevers or chills.  He denies any shortness of breath he just says it was a tightness in his chest anytime he walks he gets nitro and it feels better.  He did have a fall previously and had a small subdural previously.  He does have history of diabetes, renal dialysis patient.  He is on dialysis.  He has had previous coronary disease with LAD lesions,small caliber RCA with 80% mid lesion.  On a previous angiogram.  The patient arrives here presently has no pain his last pain episode was sometime this morning at 5:00.          Objective:   Past Medical History:    Aortic stenosis    Calculus of kidney    Coronary atherosclerosis    bare metal stents at [facility] Jan 2021    Diabetes (HCC)    Disorder of thyroid    Esophageal varices without bleeding, unspecified esophageal varices type (HCC)    GIB (gastrointestinal bleeding)    Hepatitis, unspecified    hepatitis C-just completed taking Harvoni in November 2016    High blood pressure    High cholesterol    Malignant neoplasm of trigone of urinary bladder (HCC)    Malignant neoplasm of urinary bladder, unspecified site (HCC)    Malignant neoplasm of urinary bladder, unspecified site (HCC)    Other disorders of plasma-protein metabolism, not elsewhere classified    Renal disorder    Visual impairment    reading glasses               Past Surgical History:   Procedure Laterality Date    Angiogram  08/15/2017    small caliber RCA with 80% mid lesion.  LAD and left circumflex with 50% lesions.  LV shows inferobasilar aneurysm with scar.  Overall LV function preserved at the exterior 65%.    Cath bare metal stent (bms)      Other surgical history      Upper GI surgery    Other surgical history  2017    Caroline Lees    Other surgical history  2019    BTS Caroline Lees     Other surgical history  2020    BTS Cysto (Dr. Lees)                Social History     Socioeconomic History    Marital status:    Tobacco Use    Smoking status: Former     Current packs/day: 0.00     Average packs/day: 0.5 packs/day for 15.0 years (7.5 ttl pk-yrs)     Types: Cigarettes     Start date: 1965     Quit date: 1980     Years since quittin.5    Smokeless tobacco: Never    Tobacco comments:     quit at age 34   Vaping Use    Vaping status: Never Used   Substance and Sexual Activity    Alcohol use: No    Drug use: No     Social Determinants of Health     Food Insecurity: No Food Insecurity (2024)    Food Insecurity     Food Insecurity: Never true   Transportation Needs: No Transportation Needs (2024)    Transportation Needs     Lack of Transportation: No   Housing Stability: Low Risk  (2024)    Housing Stability     Housing Instability: No              Review of Systems    Positive for stated Chief Complaint: Chest Pain Angina    Other systems are as noted in HPI.  Constitutional and vital signs reviewed.      All other systems reviewed and negative except as noted above.    Physical Exam     ED Triage Vitals [24 1433]   /65   Pulse 56   Resp 14   Temp 98.3 °F (36.8 °C)   Temp src Oral   SpO2 96 %   O2 Device None (Room air)       Current Vitals:   Vital Signs  BP: 150/69  Pulse: 54  Resp: 15  Temp: 98.3 °F (36.8 °C)  Temp src: Oral  MAP (mmHg): 94    Oxygen Therapy  SpO2: 98 %  O2 Device: None  (Room air)            Physical Exam    General: .  The patient is in no respiratory distress.  No signs of trauma on his head or neck.  The patient is in no respiratory distress    HEENT: Atraumatic, conjunctiva are not pale.  There is no icterus.  Oral mucosa Is wet.  No facial trauma.  The neck is supple.    LUNGS: Clear to auscultation, there is no wheezing or retraction.  No crackles.  Dialysis catheter noted on the right side of his chest.  The patient was placed on monitors, I  CV: Cardiovascular is regular without murmurs or rubs.    ABD: The abdomen is soft nondistended nontender.  There is no rebound.  There is no guarding.    EXT: There is good pulses bilaterally.  There is no calf tenderness.  There is no rash noted.  There is no edema    NEURO: Alert and oriented x4.  Muscle strength and sensory exam is grossly normal.  And the patient is neurologically intact with no focal findings.      ED Course     Labs Reviewed   COMP METABOLIC PANEL (14) - Abnormal; Notable for the following components:       Result Value    Glucose 160 (*)     Sodium 131 (*)     Chloride 95 (*)     BUN 83 (*)     Creatinine 7.42 (*)     Calculated Osmolality 301 (*)     eGFR-Cr 7 (*)     Alkaline Phosphatase 183 (*)     All other components within normal limits   CBC W/ DIFFERENTIAL - Abnormal; Notable for the following components:    RBC 2.89 (*)     HGB 9.7 (*)     HCT 28.7 (*)     PLT 69.0 (*)     All other components within normal limits   TROPONIN I HIGH SENSITIVITY - Normal   CBC WITH DIFFERENTIAL WITH PLATELET    Narrative:     The following orders were created for panel order CBC With Differential With Platelet.  Procedure                               Abnormality         Status                     ---------                               -----------         ------                     CBC W/ DIFFERENTIAL[[id number]]          Abnormal            Final result                 Please view results for these tests on the  individual orders.   RAINBOW DRAW LAVENDER   RAINBOW DRAW LIGHT GREEN   RAINBOW DRAW BLUE   RAINBOW DRAW GOLD                    The patient was placed on monitors, IV was started, blood was drawn.  Story is concerning for an exertional chest pain, anginal pain known history of coronary disease.  Did review old records to he is got a history of subdural from a fall.  Last CT from June 12 shows     mpression   CONCLUSION:       1. Interval decrease in attenuation and thickness of a mixed attenuation subdural hematoma along the right cerebral convexity.  There is a reference thickness of 1.5 cm as compared to 2.1 cm on the previous exam.  Clinical correlation and continued  follow-up is suggested.     2. Interval decrease in right to left midline shift now measuring 2 mm, previously 4 mm.     3. No evidence of acute territorial infarction.  Stable mild chronic microvascular ischemic changes in the cerebral white matter.        [facility]        Admission disposition: 8/3/2024  3:43 PM       CBC shows a white count of 7.0 hemoglobin 9.7.        The EKG shows sinus bradycardia.  First-degree AV block there is no acute ST elevations or ischemic findings.  The rest of the EKG including rate rhythm axis and intervals I agree with the EKG report . The rate is 55      Nonspecific intraventricular conduction delay.  Nonspecific ST changes anteriorly       When compared to an EKG from May 9, 2020 2024 there is no significant changes.            Workup was done to rule out acute electrolytes anemia, acute coronary syndrome.  He is presently pain-free at this pain.  Medical Decision Making      The patient's troponin was negative, CBC shows a hemoglobin 9.7, comprehensive shows an elevated BUN of 83 creatinine 7.42.  Sodium 131.  Potassium was 5.0.    I discussed this case with the patient and family they are concerned because he is having more exertional chest pain not sometimes even amenable not amenable to the nitro and he had some  breast pain this morning.  Although this pain is atypical in nature he does have significant risk factors that he felt uncomfortable taking him home.  He was given full dose aspirin he is pain-free at this point.  Discussed this case with the santy hospitalist, santy cardiology, santy nephrology   Dr. Campbell.    Patient will be admitted for further evaluation treatment    He did miss his dialysis today will need to be dialyzed but he is not in overt heart failure at this present time.  Renal failure      Disposition and Plan     Clinical Impression:  1. Exertional chest pain    2. Acute renal failure on dialysis (HCC)         Disposition:  Admit  8/3/2024  3:43 pm    Follow-up:  No follow-up provider specified.        Medications Prescribed:  Current Discharge Medication List                            Hospital Problems       Present on Admission  Date Reviewed: 5/17/2024            ICD-10-CM Noted POA    * (Principal) Exertional chest pain R07.9 8/3/2024 Unknown    Acute kidney injury (HCC) N17.9 8/3/2024 Yes    Acute renal failure (ARF) (HCC) N17.9 8/3/2024 Yes    Anemia D64.9 8/3/2024 Yes    Hyponatremia E87.1 8/3/2024 Yes

## 2024-08-03 NOTE — CONSULTS
[facility] Nephrology  Report of Consultation    Jonny Haque Patient Status:  Emergency    4/15/1945 MRN [id number]   [facility] EMERGENCY DEPARTMENT Attending Marc Patrick MD   Hosp Day # 0 PCP Gee Jimenez MD     Reason for consult: ESRD  Requesting physician: Marc Patrick MD   Date of consultation: 8/3/2024     HISTORY OF PRESENT ILLNESS:     Jonny Haque is a 79 year old male with past medical history significant for ESRD on HD TTS, diabetes, hypertension, hepatitis C who presents to the hospital with chest pain.  Patient undergoes dialysis at [facility] under the care of Dr. Harvey, [facility] nephrology.  Nephrology consulted for dialysis. Pt did not undergo HD today.   He has no edema or SOB. States HD has been going well overall.     REVIEW OF SYSTEMS:     Please see HPI for pertinent positives. 10 point review of systems otherwise reviewed and negative.     HISTORY:     Past Medical History:    Aortic stenosis    Calculus of kidney    Coronary atherosclerosis    bare metal stents at [facility] 2021    Diabetes (HCC)    Disorder of thyroid    Esophageal varices without bleeding, unspecified esophageal varices type (HCC)    GIB (gastrointestinal bleeding)    Hepatitis, unspecified    hepatitis C-just completed taking Harvoni in 2016    High blood pressure    High cholesterol    Malignant neoplasm of trigone of urinary bladder (HCC)    Malignant neoplasm of urinary bladder, unspecified site (HCC)    Malignant neoplasm of urinary bladder, unspecified site (HCC)    Other disorders of plasma-protein metabolism, not elsewhere classified    Renal disorder    Visual impairment    reading glasses     Past Surgical History:   Procedure Laterality Date    Angiogram  08/15/2017    small caliber RCA with 80% mid lesion.  LAD and left circumflex with 50% lesions.  LV shows inferobasilar aneurysm with scar.  Overall LV function preserved at the  exterior 65%.    Cath bare metal stent (bms)      Other surgical history      Upper GI surgery    Other surgical history  2017    [place] Dr. Lees    Other surgical history  2019    BTS Cysto Dr. Lees     Other surgical history  2020    BTS Cysto (Dr. Lees)     Family History   Problem Relation Age of Onset    Cancer Father     Hypertension Mother       reports that he quit smoking about 44 years ago. His smoking use included cigarettes. He started smoking about 59 years ago. He has a 7.5 pack-year smoking history. He has never used smokeless tobacco. He reports that he does not drink alcohol and does not use drugs.    ALLERGIES:     No Known Allergies    MEDICATIONS:     No current facility-administered medications for this encounter.    (Not in a hospital admission)         PHYSICAL EXAM:     Vital Signs: /69   Pulse 54   Temp 98.3 °F (36.8 °C) (Oral)   Resp 15   Ht 6' (1.829 m)   Wt 210 lb (95.3 kg)   SpO2 98%   BMI 28.48 kg/m²   Temp (24hrs), Av.3 °F (36.8 °C), Min:98.3 °F (36.8 °C), Max:98.3 °F (36.8 °C)     No intake or output data in the 24 hours ending 24 1606  Wt Readings from Last 3 Encounters:   24 210 lb (95.3 kg)   24 204 lb (92.5 kg)   24 204 lb 1.6 oz (92.6 kg)       General: NAD  HEENT: NCAT  Neck: Supple  Cardiac: Regular rate and rhythm, no murmurs  Lungs: CTAB, no crackles   Abdomen: Soft, non-tender, non-distended  Extremities: no LE edema   Neurologic/Psych: mentating well  R permcath     LABORATORY DATA:       Lab Results   Component Value Date     (H) 2024    BUN 83 (H) 2024    BUNCREA 13.0 2022    CREATSERUM 7.42 (H) 2024    ANIONGAP 11 2024    GFR 41 (L) 2017    GFRNAA 8 (L) 2022    GFRAA 9 (L) 2022    CA 9.3 2024    OSMOCALC 301 (H) 2024    ALKPHO 183 (H) 2024    AST 20 2024    ALT 38 2024    BILT 0.4 2024    TP 7.0 2024    ALB 4.3  08/03/2024    GLOBULIN 2.7 08/03/2024    AGRATIO 1.0 (L) 06/07/2016     (L) 08/03/2024    K 5.0 08/03/2024    CL 95 (L) 08/03/2024    CO2 25.0 08/03/2024     Lab Results   Component Value Date    WBC 7.0 08/03/2024    RBC 2.89 (L) 08/03/2024    HGB 9.7 (L) 08/03/2024    HCT 28.7 (L) 08/03/2024    PLT 69.0 (L) 08/03/2024    MCV 99.3 08/03/2024    MCH 33.6 08/03/2024    MCHC 33.8 08/03/2024    RDW 13.8 08/03/2024    NEPRELIM 4.59 08/03/2024    NEPERCENT 65.8 08/03/2024    LYPERCENT 19.8 08/03/2024    MOPERCENT 7.5 08/03/2024    EOPERCENT 5.9 08/03/2024    BAPERCENT 0.4 08/03/2024    NE 4.59 08/03/2024    LYMABS 1.38 08/03/2024    MOABSO 0.52 08/03/2024    EOABSO 0.41 08/03/2024    BAABSO 0.03 08/03/2024     Lab Results   Component Value Date    MALBP 49.2 (H) 09/04/2019    CREUR 91.65 09/04/2019     Lab Results   Component Value Date    COLORUR YELLOW 02/09/2022    CLARITY Hazy (A) 07/18/2019    SPECGRAVITY 1.013 07/02/2021    GLUUR NEGATIVE 02/09/2022    BILUR NEGATIVE 02/09/2022    KETUR NEGATIVE 02/09/2022    BLOODURINE SMALL (A) 02/09/2022    PHURINE 7 02/09/2022    PROUR 100 (A) 02/09/2022    UROBILINOGEN <2.0 07/18/2019    NITRITE NEGATIVE 02/09/2022    LEUUR NEGATIVE 02/09/2022    WBCUR 1-4 07/18/2019    RBCUR 6-10 (A) 07/18/2019    EPIUR RARE (A) 02/09/2022    BACUR NONE 02/09/2022    ANT Present (A) 12/19/2016         IMPRESSION/RECOMMENDATIONS:     ESRD  - HD TTS   Hypertension  -PTA regimen: Toprol 50 mg daily, diltiazem 240 mg daily, losartan 50 mg daily  Hyponatremia  -Likely due to volume excess  Anemia  -Hemoglobin goal greater than 10  CKD BMD  -PTA sevelamer 800 mg with meals  Chest pain      HD today.  BP Rx per cardiology.  Hold losartan.  1.5 L fluid restriction  Hold JIE in setting of chest pain  Trend phosphorus  Per cardiology      Trey Man DO   [facility]   Nephrology

## 2024-08-03 NOTE — ED QUICK NOTES
PT STATES HE IS HAVING INTERMITTENT CHEST PAIN THAT STAYS IN HIS CHEST, DESCRIBES IT as TIGHTNESS, DENIES SHORTNESS OF BREATH, STATES HE HAS WEAKNESS TO HIS LEGS.  PT HAS EXTENSIVE CARDIAC HISTORY.  PLACED ON CARDIAC MONITOR, CALL LIGHT WITHIN REACH, SIDE RAILS IN PLACE AND SON IS AT BEDSIDE.

## 2024-08-04 LAB
ANION GAP SERPL CALC-SCNC: 9 MMOL/L (ref 0–18)
BASOPHILS # BLD AUTO: 0.05 X10(3) UL (ref 0–0.2)
BASOPHILS NFR BLD AUTO: 0.8 %
BUN BLD-MCNC: 45 MG/DL (ref 9–23)
CALCIUM BLD-MCNC: 9.5 MG/DL (ref 8.7–10.4)
CHLORIDE SERPL-SCNC: 98 MMOL/L (ref 98–112)
CO2 SERPL-SCNC: 26 MMOL/L (ref 21–32)
CREAT BLD-MCNC: 5.08 MG/DL
EGFRCR SERPLBLD CKD-EPI 2021: 11 ML/MIN/1.73M2 (ref 60–?)
EOSINOPHIL # BLD AUTO: 0.34 X10(3) UL (ref 0–0.7)
EOSINOPHIL NFR BLD AUTO: 5.7 %
ERYTHROCYTE [DISTWIDTH] IN BLOOD BY AUTOMATED COUNT: 13.8 %
GLUCOSE BLD-MCNC: 120 MG/DL (ref 70–99)
GLUCOSE BLD-MCNC: 150 MG/DL (ref 70–99)
GLUCOSE BLD-MCNC: 151 MG/DL (ref 70–99)
GLUCOSE BLD-MCNC: 175 MG/DL (ref 70–99)
GLUCOSE BLD-MCNC: 187 MG/DL (ref 70–99)
HBV SURFACE AG SER-ACNC: <0.1 [IU]/L
HBV SURFACE AG SERPL QL IA: NONREACTIVE
HCT VFR BLD AUTO: 32.3 %
HGB BLD-MCNC: 10.9 G/DL
IMM GRANULOCYTES # BLD AUTO: 0.04 X10(3) UL (ref 0–1)
IMM GRANULOCYTES NFR BLD: 0.7 %
LYMPHOCYTES # BLD AUTO: 1.16 X10(3) UL (ref 1–4)
LYMPHOCYTES NFR BLD AUTO: 19.5 %
MCH RBC QN AUTO: 33.9 PG (ref 26–34)
MCHC RBC AUTO-ENTMCNC: 33.7 G/DL (ref 31–37)
MCV RBC AUTO: 100.3 FL
MONOCYTES # BLD AUTO: 0.53 X10(3) UL (ref 0.1–1)
MONOCYTES NFR BLD AUTO: 8.9 %
NEUTROPHILS # BLD AUTO: 3.84 X10 (3) UL (ref 1.5–7.7)
NEUTROPHILS # BLD AUTO: 3.84 X10(3) UL (ref 1.5–7.7)
NEUTROPHILS NFR BLD AUTO: 64.4 %
OSMOLALITY SERPL CALC.SUM OF ELEC: 292 MOSM/KG (ref 275–295)
PLATELET # BLD AUTO: 65 10(3)UL (ref 150–450)
POTASSIUM SERPL-SCNC: 4.2 MMOL/L (ref 3.5–5.1)
RBC # BLD AUTO: 3.22 X10(6)UL
SODIUM SERPL-SCNC: 133 MMOL/L (ref 136–145)
TROPONIN I SERPL HS-MCNC: 17 NG/L
WBC # BLD AUTO: 6 X10(3) UL (ref 4–11)

## 2024-08-04 PROCEDURE — 51701 INSERT BLADDER CATHETER: CPT

## 2024-08-04 PROCEDURE — 85025 COMPLETE CBC W/AUTO DIFF WBC: CPT | Performed by: HOSPITALIST

## 2024-08-04 PROCEDURE — 84484 ASSAY OF TROPONIN QUANT: CPT | Performed by: HOSPITALIST

## 2024-08-04 PROCEDURE — 97161 PT EVAL LOW COMPLEX 20 MIN: CPT

## 2024-08-04 PROCEDURE — 97116 GAIT TRAINING THERAPY: CPT

## 2024-08-04 PROCEDURE — 82962 GLUCOSE BLOOD TEST: CPT

## 2024-08-04 PROCEDURE — 80048 BASIC METABOLIC PNL TOTAL CA: CPT | Performed by: HOSPITALIST

## 2024-08-04 RX ORDER — CLOPIDOGREL BISULFATE 75 MG/1
75 TABLET ORAL DAILY
Status: DISCONTINUED | OUTPATIENT
Start: 2024-08-04 | End: 2024-08-14

## 2024-08-04 RX ORDER — LANTHANUM CARBONATE 1000 MG/1
1000 TABLET, CHEWABLE ORAL 2 TIMES DAILY WITH MEALS
Status: DISCONTINUED | OUTPATIENT
Start: 2024-08-04 | End: 2024-08-14

## 2024-08-04 RX ORDER — CARVEDILOL 12.5 MG/1
12.5 TABLET ORAL 2 TIMES DAILY WITH MEALS
Status: DISCONTINUED | OUTPATIENT
Start: 2024-08-04 | End: 2024-08-05

## 2024-08-04 RX ORDER — SODIUM CHLORIDE 9 MG/ML
INJECTION, SOLUTION INTRAVENOUS
Status: COMPLETED | OUTPATIENT
Start: 2024-08-05 | End: 2024-08-05

## 2024-08-04 NOTE — PHYSICAL THERAPY NOTE
PHYSICAL THERAPY EVALUATION - INPATIENT     Room Number: 8612/8612-A  Evaluation Date: 8/4/2024  Type of Evaluation: Initial  Physician Order: PT Eval and Treat    Presenting Problem: chest/back pain  Co-Morbidities : Aortic stenosis, ESRD on HD, HTN, CAD, DM, stents, fall, Hep C, hypothyroidism, subdural hematoma, ischemic cardiomyopathy, GI bleed  Reason for Therapy: Mobility Dysfunction and Discharge Planning    Last Admission:  5/8-5/12 admitted due to fall with dizziness, PT recommended HHPT  4/16-4/17/24: chest pain of uncertain etiology > home  3/31-4/4/24: GIB, not seen by therapy  3/17-3/24/24: acute chest pain, not seen by therapy    PHYSICAL THERAPY ASSESSMENT   Patient is currently functioning below baseline with bed mobility, transfers, gait, stair negotiation, and standing prolonged periods.  Prior to admission, patient's baseline is independent gait without device.  Patient is requiring contact guard assist as a result of the following impairments: decreased functional strength, decreased endurance/aerobic capacity, impaired standing balance, and decreased muscular endurance. Physical Therapy will continue to follow for duration of hospitalization.      Patient will benefit from continued skilled PT Services for duration of hospitalization, however, given the patient is functioning near baseline level do not anticipate skilled therapy needs at discharge .    PLAN  PT Treatment Plan: Bed mobility;Patient education;Gait training;Range of motion;Strengthening;Stair training;Transfer training  Rehab Potential : Good  Frequency (Obs): 3-5x/week         CURRENT GOALS    Goal #1 Patient is able to demonstrate supine - sit EOB @ level: independent     Goal #2 Patient is able to demonstrate transfers Sit to/from Stand at assistance level: independent     Goal #3 Patient is able to ambulate 200 feet with assist device: none at assistance level: independent     Goal #4 Patient to be independent ascend/descend  stairs reciprocal pattern   Goal #5    Goal #6    Goal Comments: Goals established on 2024      PHYSICAL THERAPY MEDICAL/SOCIAL HISTORY  History related to current admission: Patient is a 79 year old male admitted on 8/3/2024 from home for chest and back pain.  Pt diagnosed with exertional chest pain.      HOME SITUATION  Type of Home: House   Home Layout: Multi-level        Stairs to Bedroom: 8       Lives With: Son  Drives: Yes     Patient Regularly Uses: Reading glasses    Prior Level of [place]: Patient is independent gait without device, independent with ADL/self-care, was able to drive.  Patient reports 8 steps between upper floor to main level and 8 steps down to lower level, full flight of stairs to basement where he does his laundry.     SUBJECTIVE  \"This helps\" referring to the O2 via NC      OBJECTIVE  Precautions: Bed/chair alarm  Fall Risk: Standard fall risk    WEIGHT BEARING RESTRICTION  Weight Bearing Restriction: None                PAIN ASSESSMENT  Ratin          COGNITION  Overall Cognitive Status:  WFL - within functional limits    RANGE OF MOTION AND STRENGTH ASSESSMENT  Upper extremity ROM and strength are within functional limits     Lower extremity ROM is within functional limits     Lower extremity strength is within functional limits       BALANCE  Static Sitting: Good  Dynamic Sitting: Good  Static Standing: Fair  Dynamic Standing: Fair    ADDITIONAL TESTS                                    ACTIVITY TOLERANCE                         O2 WALK  Oxygen Therapy  SPO2% on Room Air at Rest: 99    NEUROLOGICAL FINDINGS                        AM-PAC '6-Clicks' INPATIENT SHORT FORM - BASIC MOBILITY  How much difficulty does the patient currently have...  Patient Difficulty: Turning over in bed (including adjusting bedclothes, sheets and blankets)?: None   Patient Difficulty: Sitting down on and standing up from a chair with arms (e.g., wheelchair, bedside commode, etc.): None   Patient  Difficulty: Moving from lying on back to sitting on the side of the bed?: None   How much help from another person does the patient currently need...   Help from Another: Moving to and from a bed to a chair (including a wheelchair)?: A Little   Help from Another: Need to walk in hospital room?: A Little   Help from Another: Climbing 3-5 steps with a railing?: A Little       AM-PAC Score:  Raw Score: 21   Approx Degree of Impairment: 28.97%   Standardized Score (AM-PAC Scale): 50.25   CMS Modifier (G-Code): CJ    FUNCTIONAL ABILITY STATUS  Gait Assessment   Functional Mobility/Gait Assessment  Gait Assistance: Contact guard assist  Distance (ft): 45, 7  Assistive Device: None  Pattern:  (Decr jacob/step length/heel-toe pattern)    Skilled Therapy Provided     Bed Mobility:  Rolling: Supervision with HOB elevated  Supine to sit: Supervision with HOB elevated   Sit to supine: Supervision with HOB elevated     Transfer Mobility:  Sit to stand: cga-SBA without device   Stand to sit: cga-SBA  Gait = cga without device    Therapist's Comments: RN approved session, patient is received in bed with HOB elevated with O2 via NC.  Patient performed bed mobility, transfers and gait as above, patient ambulated slow but (-)LOB, patient reported gait speed is below baseline; patient wanted to use the bathroom prior to returning to bed.  O2sat % after returning to bed on RA, patient is agreeable to being on RA at this time.     Exercise/Education Provided:  Discussed role of PT, goals for the session.  Patient was educated on activity recommendations; patient is agreeable to gait with nursing staff and to sit up  in the chair for meals as able, patient aware to call staff for assist for any transfers/standing/gait to decrease fall risk; patient verbalized understanding.     Patient End of Session: In bed;Needs met;Call light within reach;RN aware of session/findings;All patient questions and concerns addressed;Alarm  set      Patient Evaluation Complexity Level:  History Moderate - 1 or 2 personal factors and/or co-morbidities   Examination of body systems Low -  addressing 1-2 elements   Clinical Presentation  Moderate - Evolving   Clinical Decision Making Low Complexity       PT Session Time: 30 minutes  Gait Training: 10 minutes  Therapeutic Activity: 8 minutes  Neuromuscular Re-education:  minutes  Therapeutic Exercise:  minutes

## 2024-08-04 NOTE — PLAN OF CARE
Problem: Patient/Family Goals  Goal: Patient/Family Long Term Goal  Description: Patient's Long Term Goal: Stay healthy    Interventions:  -Medication management  -Dietary management  -Prompt follow up with physicians  - See additional Care Plan goals for specific interventions  Outcome: Progressing  Goal: Patient/Family Short Term Goal  Description: Patient's Short Term Goal: Discharge home    Interventions:   - Cardiology consult  - Nephrology consult  -HD  Trend trop  - See additional Care Plan goals for specific interventions  Outcome: Progressing     Problem: CARDIOVASCULAR - ADULT  Goal: Maintains optimal cardiac output and hemodynamic stability  Description: INTERVENTIONS:  - Monitor vital signs, rhythm, and trends  - Monitor for bleeding, hypotension and signs of decreased cardiac output  - Evaluate effectiveness of vasoactive medications to optimize hemodynamic stability  - Monitor arterial and/or venous puncture sites for bleeding and/or hematoma  - Assess quality of pulses, skin color and temperature  - Assess for signs of decreased coronary artery perfusion - ex. Angina  - Evaluate fluid balance, assess for edema, trend weights  Outcome: Progressing  Goal: Absence of cardiac arrhythmias or at baseline  Description: INTERVENTIONS:  - Continuous cardiac monitoring, monitor vital signs, obtain 12 lead EKG if indicated  - Evaluate effectiveness of antiarrhythmic and heart rate control medications as ordered  - Initiate emergency measures for life threatening arrhythmias  - Monitor electrolytes and administer replacement therapy as ordered  Outcome: Progressing     Problem: SAFETY ADULT - FALL  Goal: Free from fall injury  Description: INTERVENTIONS:  - Assess pt frequently for physical needs  - Identify cognitive and physical deficits and behaviors that affect risk of falls.  - [place] fall precautions as indicated by assessment.  - Educate pt/family on patient safety including physical limitations  -  Instruct pt to call for assistance with activity based on assessment  - Modify environment to reduce risk of injury  - Provide assistive devices as appropriate  - Consider OT/PT consult to assist with strengthening/mobility  - Encourage toileting schedule  Outcome: Progressing

## 2024-08-04 NOTE — PROGRESS NOTES
[facility]   part of [facility] Hospitalist Progress Note     Jonny Haque Patient Status:  Inpatient    4/15/1945 MRN [id number]   Location [facility] 8NE-A Attending Golden Escalante, DO   Hosp Day # 1 PCP Gee Jimenez MD     Follow Up:  The primary encounter diagnosis was Exertional chest pain. A diagnosis of Acute renal failure on dialysis (HCC) was also pertinent to this visit.    Subjective:     Patient seen and examined.  Denies chest pain or SOB.  No F/C, N/V.  He understands he is going for angiogram tomorrow.     Objective:    Review of Systems:   10 point ROS completed and was negative, except for pertinent positive and negatives stated in subjective.    Vital signs:  Temp:  [97.3 °F (36.3 °C)-98.3 °F (36.8 °C)] 97.9 °F (36.6 °C)  Pulse:  [54-63] 62  Resp:  [9-19] 18  BP: (123-176)/(60-81) 168/81  SpO2:  [96 %-100 %] 96 %    Physical Exam:    Gen: No acute distress, alert and oriented x 3  Pulm: Lungs clear bilaterally, good inspiratory effort   CV:  nL S1/S2  Abd: soft, NT/ND, no hepatomegaly, +BS  MSK: moving all extremities, no edema  Neuro: no focal deficits  Skin: no rashes/lesions  Psych: normal mood/affect      Diagnostic Data:    Labs:  Recent Labs   Lab 24  1437 24  0523   WBC 7.0 6.0   HGB 9.7* 10.9*   MCV 99.3 100.3*   PLT 69.0* 65.0*       Recent Labs   Lab 24  1437 24  0523   * 175*   BUN 83* 45*   CREATSERUM 7.42* 5.08*   CA 9.3 9.5   ALB 4.3  --    * 133*   K 5.0 4.2   CL 95* 98   CO2 25.0 26.0   ALKPHO 183*  --    AST 20  --    ALT 38  --    BILT 0.4  --    TP 7.0  --        Estimated Creatinine Clearance: 13.4 mL/min (A) (based on SCr of 5.08 mg/dL (H)).    No results for input(s): \"PTP\", \"INR\" in the last 168 hours.         COVID-19 Lab Results    COVID-19  Lab Results   Component Value Date    COVID19 Not Detected 2024    COVID19 Not Detected 2024    COVID19 Not Detected 2023        Pro-Calcitonin  No results for input(s): \"PCT\" in the last 168 hours.    Cardiac  No results for input(s): \"TROP\", \"PBNP\" in the last 168 hours.    Creatinine Kinase  No results for input(s): \"CK\" in the last 168 hours.    Inflammatory Markers  No results for input(s): \"CRP\", \"NEELAM\", \"LDH\", \"DDIMER\" in the last 168 hours.    Imaging: Imaging data reviewed in Epic.    Medications:    clopidogrel  75 mg Oral Daily    carvedilol  12.5 mg Oral BID with meals    aspirin  81 mg Oral QOD    amiodarone  200 mg Oral BID with meals    sevelamer carbonate  800 mg Oral TID CC    rosuvastatin  10 mg Oral Nightly    pantoprazole  40 mg Oral BID AC    levothyroxine  150 mcg Oral Before breakfast    [Held by provider] Lanthanum Carbonate  1,000 mg Oral BID with meals    insulin aspart  10 Units Subcutaneous TID AC    insulin degludec  24 Units Subcutaneous Nightly    insulin aspart  2-10 Units Subcutaneous TID AC and HS    heparin  5,000 Units Subcutaneous Q8H ANNEL       Assessment & Plan:       79-year-old male significant past medical history of CAD with stable angina-status post PCI in 2017, 2021 and most recently in March 2024 with 3 drug-eluting stents placed in the left main, LAD and OM, hypertension, pulmonary hypertension paroxysmal A-fib, type 2 diabetes, hypothyroidism, end-stage renal disease on dialysis, history of subdural hematoma, ischemic cardiomyopathy history of HCV cirrhosis moderate aortic stenosis, prior history of GI bleed presented with chest pain     # Chest pain with history of stable angina  # CAD status post PCI  -Serial troponin  -Cardiology consult  -Patient was not on dual antiplatelet therapy secondary to recent subdural hematoma and fall, however pt was cleared by NSGY for DAPT, cont ASA, plavix   -Continue aspirin, statin  -plan [facility] tomorrow   -Recent echo completed and reviewed     # Paroxysmal A-fib  # V. Tach  -Continue amiodarone  -EKG reviewed showed sinus rhythm      # Recent fall  acute  subdural hematoma  -Was admitted in May 2024 with this, holding dual antiplatelet therapy  -Was followed up with neurosurgery  -CT head has been stable  -Currently on aspirin, continue to monitor for falls     # ESRD  -HD per nephrology  -Missed session today, completed dialysis per nephrology  # Hypothyroidism  # HSV cirrhosis  # Moderate aortic stenosis  # Ischemic cardiomyopathy   # Thrombocytopenia platelets of 69 continue to monitor-  -has been relatively stable however may need heme-onc consult  # Type 2 diabetes  -Continue 20 units of insulin, sliding scale     # Essential HTN  - per discussion with pt and his son Ashwin, he was taken off metoprolol, diltiazem, and losartan as of 6/24/24 by outside renal  - coreg started, monitor     Plan of care: inpt care.      Plan of care discussed with patient or family at bedside.    Golden Escalante,     Supplementary Documentation:     Quality:  DVT Prophylaxis: hep subcutaneous   CODE status: FULL  Vásquez: no  Central line: no  If COVID testing is negative, may discontinue isolation: yes     Estimated date of discharge: TBD  Discharge is dependent on: clinical course   At this point Mr. Haque is expected to be discharge to: home    Plan of care discussed with pt

## 2024-08-04 NOTE — CONSULTS
Cardiology Consultation  [facility]    Jonny Haque Patient Status:  Inpatient    4/15/1945 MRN [id number]   Location [facility] 8NE-A Attending Golden Escalante, DO   Hosp Day # 1 PCP Gee Jimenez MD     Reason for Consultation:  Chest pain    History of Present Illness:  Jonny Haque is a a(n) 79 year old male with pmh CAD with multiple prior interventions (last 3/2024 with LM, LAD, OM), ICM (EF 40-45% 2024, mod-severe aortic stenosis), pAF not on anticoagulation, SDH, HTN, HL, DM, ESRD presenting with chest pain.  Patient reports 4-5 day h/o central chest tightness, worse with exertion and resolved with rest.  States similar to prior anginal pain.  Worse over the past few days.  Denies palpitations, dyspnea, orthopnea, PND, LE edema.  Of note, DAPT held multiple times since PCI 3/2024 2/ possible GIB, then SDH, then patient noncompliance.    On admission patient afeb, HR 56, /65.  Trop negative x 3, ECG with SB with 1st deg AVB and LBBB.  Cardiology consulted for further eval and management.    History:  Past Medical History:    Aortic stenosis    Calculus of kidney    Coronary atherosclerosis    bare metal stents at [facility] 2021    Diabetes (HCC)    Disorder of thyroid    Esophageal varices without bleeding, unspecified esophageal varices type (HCC)    GIB (gastrointestinal bleeding)    Hepatitis, unspecified    hepatitis C-just completed taking Harvoni in 2016    High blood pressure    High cholesterol    Other disorders of plasma-protein metabolism, not elsewhere classified    Renal disorder    Visual impairment    reading glasses     Past Surgical History:   Procedure Laterality Date    Angiogram  08/15/2017    small caliber RCA with 80% mid lesion.  LAD and left circumflex with 50% lesions.  LV shows inferobasilar aneurysm with scar.  Overall LV function preserved at the exterior 65%.    Cath bare metal stent (bms)      Other surgical history      Upper  GI surgery    Other surgical history  2017    Cysto Dr. Lees    Other surgical history  2019    BTS Cysto Dr. Lees     Other surgical history  2020    BTS Cysto (Dr. Lees)     Family History   Problem Relation Age of Onset    Cancer Father     Hypertension Mother       reports that he quit smoking about 44 years ago. His smoking use included cigarettes. He started smoking about 59 years ago. He has a 7.5 pack-year smoking history. He has never used smokeless tobacco. He reports that he does not drink alcohol and does not use drugs.    Allergies:  No Known Allergies    Medications:  Current Facility-Administered Medications on File Prior to Encounter   Medication Dose Route Frequency Provider Last Rate Last Admin    [COMPLETED] metoprolol (Lopressor) 5 mg/5mL injection 5 mg  5 mg Intravenous Once Joshua Koenig MD   5 mg at 05/10/24 0010    [] metoprolol (Lopressor) 5 mg/5mL injection             [] sodium chloride 0.9 % IV bolus 100 mL  100 mL Intravenous Q30 Min PRN Johnson eVra MD        And    [] albumin human (Albumin) 25% injection 25 g  25 g Intravenous PRN Dialysis Johnson Vera MD        [COMPLETED] heparin (Porcine) 1000 UNIT/ML injection 1,500 Units  1.5 mL Intracatheter Once Johnson Vera MD   1,500 Units at 24 1645    [COMPLETED] heparin (Porcine) 1000 UNIT/ML injection 1,500 Units  1.5 mL Intracatheter Once Johnson Vera MD   1,500 Units at 24 1432    [COMPLETED] dilTIAZem 10 mg BOLUS FROM BAG infusion  10 mg Intravenous Once Yogi Espinoza MD   10 mg at 24 0918    [COMPLETED] potassium chloride (Klor-Con M20) tab 40 mEq  40 mEq Oral Once Johnson Vera MD   40 mEq at 24 0952    [COMPLETED] levETIRAcetam (Keppra) 500 mg/5mL injection 1,000 mg  1,000 mg Intravenous Once Ignacia De La Garza MD   1,000 mg at 24     Current Outpatient Medications on File Prior to Encounter   Medication Sig Dispense  Refill    Lanthanum Carbonate 1000 MG Oral Chew Tab Chew 1 tablet (1,000 mg total) by mouth 2 (two) times daily with meals.      clopidogrel (PLAVIX) 75 MG Oral Tab Take 1 tablet (75 mg total) by mouth.      aspirin 81 MG Oral Tab EC Take 1 tablet (81 mg total) by mouth every other day.      amiodarone 200 MG Oral Tab Take 1 tablet (200 mg total) by mouth 2 (two) times daily with meals. 60 tablet 3    levothyroxine 150 MCG Oral Tab Take 1 tablet (150 mcg total) by mouth daily. Take one tablet every morning before breakfast      pantoprazole 40 MG Oral Tab EC Take 1 tablet (40 mg total) by mouth 2 (two) times daily before meals.      Sevelamer 800 MG Oral Tab Take 1 tablet (800 mg total) by mouth 3 (three) times daily with meals.      rosuvastatin 10 MG Oral Tab Take 1 tablet (10 mg total) by mouth nightly. (Patient taking differently: Take 1 tablet (10 mg total) by mouth nightly.) 90 tablet 0    nitroGLYCERIN 0.3 MG Sublingual SL Tab Place 1 tablet (0.3 mg total) under the tongue every 5 (five) minutes as needed for Chest pain.      zolpidem 10 MG Oral Tab Take 1 tablet (10 mg total) by mouth nightly as needed for Sleep.      Ferric Citrate (AURYXIA) 1  MG(Fe) Oral Tab       Insulin Pen Needle (TRUEPLUS PEN NEEDLES) 31G X 5 MM Does not apply Misc Use 5 per day 500 each 2    Insulin Glargine, 2 Unit Dial, (TOUJEO MAX SOLOSTAR) 300 UNIT/ML Subcutaneous Solution Pen-injector Inject 20 Units into the skin nightly. 6 mL 2    Glucose Blood (ONETOUCH ULTRA) In Vitro Strip 6 times a day 400 each 3    Insulin Lispro, 1 Unit Dial, (HUMALOG KWIKPEN) 100 UNIT/ML Subcutaneous Solution Pen-injector 12 units before meals with sliding scale. Max daily dose: 50 units. (Patient taking differently: 10 Units. Three times a day. Before meals) 60 mL 3    CONTOUR NEXT TEST In Vitro Strip TEST BLOOD SUGAR FOUR TIMES DAILY 400 strip 3    Blood Glucose Monitoring Suppl (TextbookTime.com Textbook Time CONTOUR NEXT MONITOR) W/DEVICE Does not apply Kit 1 Device  by Does not apply route 4 (four) times daily. 1 kit 0       Review of Systems:  Constitutional: denies fevers, chills, night sweats  HEENT: denies headache, vision changes, trouble or pain with swallowing  Cardiac: + chest pain  Pulm: denies dyspnea, cough, wheeze  GI: denies n/v, abd pain, diarrhea or constipation  : denies hematuria, dysuria, incontinence  MSK: denies muscle or joint pains  Neuro: denies numbness, weakness, paresthesias  Psych: denies anxiety, depression  Integument: denies skin rashes or lesions  Heme: denies easy bruising or bleeding  Endo: denies heat/cold intolerance, skin or nail changes      Physical Exam:  Blood pressure (!) 168/81, pulse 62, temperature 97.9 °F (36.6 °C), temperature source Oral, resp. rate 18, height 6' (1.829 m), weight 208 lb 5.4 oz (94.5 kg), SpO2 96%.  Wt Readings from Last 3 Encounters:   08/04/24 208 lb 5.4 oz (94.5 kg)   05/17/24 204 lb (92.5 kg)   05/12/24 204 lb 1.6 oz (92.6 kg)       General: awake, alert, oriented x 3, no acute distress  HEENT: at/nc, perrl, eomi  Neck: No JVD, carotids 2+ no bruits.  Cardiac: Regular rate and rhythm, S1, S2 normal, 3/6 mid peaking systolic murmur RUSB  Lungs: Clear without wheezes, rales, rhonchi or dullness.  Normal excursions and effort.  Abdomen: Soft, non-tender, non-distended, normal bowel sounds   Extremities: Without clubbing, cyanosis or edema.  Peripheral pulses are 2+.  Neurologic: Alert and oriented, normal affect.  Psych: normal mood and affect  Skin: Warm and dry.       Laboratories and Data:  Diagnostics:      Labs:   CBC:    Lab Results   Component Value Date    WBC 6.0 08/04/2024    WBC 7.0 08/03/2024    WBC 7.0 05/12/2024     Lab Results   Component Value Date    HEMOGLOBIN 10.5 (L) 02/09/2022    HEMOGLOBIN 12.6 06/07/2016    HEMOGLOBIN 15.1 03/07/2013    HGB 10.9 (L) 08/04/2024    HGB 9.7 (L) 08/03/2024    HGB 11.4 (L) 05/12/2024      Lab Results   Component Value Date    PLT 65.0 (L) 08/04/2024    PLT 69.0  (L) 08/03/2024    PLT 83.0 (L) 05/12/2024     BMP:     Lab Results   Component Value Date    GLUCOSE 129 (H) 02/09/2022    GLUCOSE 128 (H) 06/07/2016    GLUCOSE 359 (H) 03/07/2013     Lab Results   Component Value Date    K 4.2 08/04/2024    K 5.0 08/03/2024    K 4.7 05/12/2024     Lab Results   Component Value Date    BUN 45 (H) 08/04/2024    BUN 83 (H) 08/03/2024    BUN 45 (H) 05/12/2024     Lab Results   Component Value Date    CREATSERUM 5.08 (H) 08/04/2024    CREATSERUM 7.42 (H) 08/03/2024    CREATSERUM 5.41 (H) 05/12/2024     Cholesterol:     Lab Results   Component Value Date    CHOLEST 154 03/19/2024    CHOLEST 174 03/17/2024    CHOLEST 104 04/05/2023     Lab Results   Component Value Date    HDL 33 (L) 03/19/2024    HDL 44 03/17/2024    HDL 25 (L) 04/05/2023     Lab Results   Component Value Date    TRIG 304 (H) 03/19/2024    TRIG 215 (H) 03/17/2024    TRIG 202 (H) 04/05/2023    TRIGLY 248 (H) 02/09/2022    TRIGLY 278 (H) 08/19/2015    TRIGLY 490 (H) 03/07/2013     Lab Results   Component Value Date    LDL 72 03/19/2024    LDL 93 03/17/2024    LDL 46 04/05/2023     Lab Results   Component Value Date    AST 20 08/03/2024    AST 24 05/08/2024    AST 26 04/16/2024     Lab Results   Component Value Date    ALT 38 08/03/2024    ALT 24 05/08/2024    ALT 28 04/16/2024       Assessment/Plan:  79 year old male presenting with:    1) Chest pain, concerning for unstable angina  2) CAD with multiple prior PCI (last 3/2024 of LM, LAD, OM); multiple interruptions of DAPT since then as per hpi  3) ICM (EF 40-45% 4/2024, mod-severe aortic stenosis)  4) pAF on amiodarone (no ac likely 2/2 prior GIB and SDH)  5) SDH  6) HTN  7) HL  8) DM  9) ESRD on HD    - Cont asa, plavix, statin (cleared for DAPT by neurosurgery, see notes 5/2024)  - Start carvedilol 12.5mg bid for BP/rate control, CAD, cardiomyopathy (last outpatient cardiology notes lists metoprolol, diltiazem, losartan, imdur; unclear that patient is taking any of  these as prescribed)  - Cont amiodarone for rhythm control  - Recommend [facility] for definitive assessment of coronary anatomy with PCI as needed; risks/benefits/indications discussed with patient and son (phone) at the bedside who are agreeable.  Plan for tomorrow  - Monitor on tele  - Will follow    Shane Brown MD  Interventional cardiologist, [facility]  8/4/2024  8:47 AM

## 2024-08-04 NOTE — H&P
[facility] Hospitalist H&P       CC:   Chief Complaint   Patient presents with    Chest Pain Angina        PCP: Gee Jimenez MD    History of Present Illness:    Patient is a 79-year-old male significant past medical history of CAD with stable angina-status post PCI in 2017, 2021 and most recently in March 2024 with 3 drug-eluting stents placed in the left main, LAD and OM, hypertension, pulmonary hypertension paroxysmal A-fib, type 2 diabetes, hypothyroidism, end-stage renal disease on dialysis, history of subdural hematoma, ischemic cardiomyopathy history of HCV cirrhosis moderate aortic stenosis, prior history of GI bleed presented with chest pain.  Patient stated that he is doing okay however noticed that he had chest pain with minimal exertion-had some shortness of breath-he missed dialysis today-he otherwise denies any fevers chills palpitations lightheadedness or dizziness.      In the emergency room vital signs stable patient was saturating well on room air  Labs were remarkable for an elevated creatinine as expected, chest x-ray was negative EKG actually showed sinus bradycardia with first-degree AV block, troponin x 2 is negative CBC showed a hemoglobin of 9.7 platelets of 69    Cardiology was consulted and patient was admitted    PMH  Past Medical History:    Aortic stenosis    Calculus of kidney    Coronary atherosclerosis    bare metal stents at [facility] Jan 2021    Diabetes (HCC)    Disorder of thyroid    Esophageal varices without bleeding, unspecified esophageal varices type (HCC)    GIB (gastrointestinal bleeding)    Hepatitis, unspecified    hepatitis C-just completed taking Harvoni in November 2016    High blood pressure    High cholesterol    Other disorders of plasma-protein metabolism, not elsewhere classified    Renal disorder    Visual impairment    reading glasses        PSH  Past Surgical History:   Procedure Laterality Date    Angiogram  08/15/2017    small caliber RCA with 80%  mid lesion.  LAD and left circumflex with 50% lesions.  LV shows inferobasilar aneurysm with scar.  Overall LV function preserved at the exterior 65%.    Cath bare metal stent (bms)      Other surgical history      Upper GI surgery    Other surgical history  2017    Caroline Lees    Other surgical history  2019    BTS Caroline Lees     Other surgical history  2020    BTS Cysto (Dr. Lees)        ALL:  No Known Allergies     Home Medications:  Outpatient Medications Marked as Taking for the 8/3/24 encounter (Hospital Encounter)   Medication Sig Dispense Refill    Lanthanum Carbonate 1000 MG Oral Chew Tab Chew 1 tablet (1,000 mg total) by mouth 2 (two) times daily with meals.      clopidogrel (PLAVIX) 75 MG Oral Tab Take 1 tablet (75 mg total) by mouth.      aspirin 81 MG Oral Tab EC Take 1 tablet (81 mg total) by mouth every other day.      amiodarone 200 MG Oral Tab Take 1 tablet (200 mg total) by mouth 2 (two) times daily with meals. 60 tablet 3    levothyroxine 150 MCG Oral Tab Take 1 tablet (150 mcg total) by mouth daily. Take one tablet every morning before breakfast      pantoprazole 40 MG Oral Tab EC Take 1 tablet (40 mg total) by mouth 2 (two) times daily before meals.      Sevelamer 800 MG Oral Tab Take 1 tablet (800 mg total) by mouth 3 (three) times daily with meals.      rosuvastatin 10 MG Oral Tab Take 1 tablet (10 mg total) by mouth nightly. (Patient taking differently: Take 1 tablet (10 mg total) by mouth nightly.) 90 tablet 0         Soc Hx  Social History     Tobacco Use    Smoking status: Former     Current packs/day: 0.00     Average packs/day: 0.5 packs/day for 15.0 years (7.5 ttl pk-yrs)     Types: Cigarettes     Start date: 1965     Quit date: 1980     Years since quittin.5    Smokeless tobacco: Never    Tobacco comments:     quit at age 34   Substance Use Topics    Alcohol use: No        Fam Hx  Family History   Problem Relation Age of Onset    Cancer Father      Hypertension Mother        Review of Systems  General: Denies unintentional weight loss, fevers, or chills-negative except for above  HEENT: Denies vision loss or double vision, denies hearing loss  Cardiovascular: Denies chest pain, palpitations, peripheral edema  Pulmonary: Denies cough, shortness of breath, or wheezing  Gastrointestinal: Denies abdominal pain, melena, or hematochezia  Genitourinary: Denies urinary frequency, urgency, and dysuria  Neurologic: Denies numbness, headaches, focal weakness  Skin: Denies rashes, sores  Endocrine: Denies heat or cold intolerance, denies polydipsia  Hematologic: Denies abnormal bleeding or bruising     OBJECTIVE:  /67 (BP Location: Left arm)   Pulse 56   Temp 97.7 °F (36.5 °C) (Oral)   Resp 14   Ht 6' (1.829 m)   Wt 212 lb 4.9 oz (96.3 kg)   SpO2 96%   BMI 28.79 kg/m²     BP Readings from Last 3 Encounters:   08/03/24 144/67   05/17/24 134/78   05/12/24 159/84     Wt Readings from Last 3 Encounters:   08/03/24 212 lb 4.9 oz (96.3 kg)   05/17/24 204 lb (92.5 kg)   05/12/24 204 lb 1.6 oz (92.6 kg)       Wt Readings from Last 6 Encounters:   08/03/24 212 lb 4.9 oz (96.3 kg)   05/17/24 204 lb (92.5 kg)   05/12/24 204 lb 1.6 oz (92.6 kg)   04/17/24 210 lb 8.6 oz (95.5 kg)   04/04/24 221 lb 5.5 oz (100.4 kg)   03/24/24 223 lb 1.6 oz (101.2 kg)     Gen: No acute distress, alert and oriented x 3  Pulm: Lungs clear bilaterally, good inspiratory effort   CV:  nL S1/S2  Abd: soft, NT/ND, no hepatomegaly, +BS  MSK: moving all extremities, no edema  Neuro: no focal deficits  Skin: no rashes/lesions  Psych: normal mood/affect          Diagnostic Data:    CBC/Chem  Recent Labs   Lab 08/03/24  1437   WBC 7.0   HGB 9.7*   MCV 99.3   PLT 69.0*       Recent Labs   Lab 08/03/24  1437   *   K 5.0   CL 95*   CO2 25.0   BUN 83*   CREATSERUM 7.42*   *   CA 9.3       Recent Labs   Lab 08/03/24  1437   ALT 38   AST 20   ALB 4.3       No results for input(s): \"TROP\" in the  last 168 hours.      Radiology: XR CHEST AP PORTABLE  (CPT=71045)    Result Date: 8/3/2024  PROCEDURE:  XR CHEST AP PORTABLE  (CPT=71045)  TECHNIQUE:  AP chest radiograph was obtained.  COMPARISON:  TITO , XR, XR CHEST AP PORTABLE  (CPT=71045), 4/16/2024, 5:16 PM.  INDICATIONS:  Chest pain  PATIENT STATED HISTORY: (As transcribed by Technologist)  Patient states that his chest pain is in center of chest, feels like pressure. Has been having pain for 3 days. He has a history of 2 heart stents and aheart attack in 2021.    FINDINGS:  Right dialysis catheter tips in the region of the cavoatrial junction.  Cardiomegaly with normal pulmonary vascularity. No pleural effusion or pneumothorax. No lobar consolidation.            CONCLUSION:  No active cardiopulmonary process identified.   LOCATION:  [id number]      Dictated by (CST): Christian Trujillo MD on 8/03/2024 at 3:16 PM     Finalized by (CST): Christian Trujillo MD on 8/03/2024 at 3:16 PM              ASSESSMENT / PLAN:       Patient is a 79-year-old male significant past medical history of CAD with stable angina-status post PCI in 2017, 2021 and most recently in March 2024 with 3 drug-eluting stents placed in the left main, LAD and OM, hypertension, pulmonary hypertension paroxysmal A-fib, type 2 diabetes, hypothyroidism, end-stage renal disease on dialysis, history of subdural hematoma, ischemic cardiomyopathy history of HCV cirrhosis moderate aortic stenosis, prior history of GI bleed presented with chest pain    # Chest pain with history of stable angina  # CAD status post PCI  -Serial troponin  -Cardiology consult  -Patient not on dual antiplatelet therapy secondary to recent subdural hematoma and fall  -Continue aspirin statin  -Defer further stat testing including angiogram and stress test to cardiology  -Recent echo completed and reviewed    # Paroxysmal A-fib  # V. Tach  -Continue amiodarone  -EKG reviewed showed sinus rhythm     # Recent fall  acute subdural  hematoma  -Was admitted in May 2024 with this, holding dual antiplatelet therapy  -Was followed up with neurosurgery  -CT head has been stable  -Currently on aspirin, continue to monitor for falls      # ESRD  -HD per nephrology  -Missed session today, completed dialysis per nephrology  # Hypothyroidism  # HSV cirrhosis  # Moderate aortic stenosis  # Ischemic cardiomyopathy   # Thrombocytopenia platelets of 69 continue to monitor-  -has been relatively stable however may need heme-onc consult  # Type 2 diabetes  -Continue 20 units of insulin, sliding scale     #    Prophy:  DVT: Heparin subcu  Deconditioning prevention: PT OT    Dispo: admit to [facility] with telemetry    Outpatient records reviewed confirming patient's medical history and medications.     Further recommendations pending patient's clinical course.  DM hospitalist to continue to follow patient while in house    Time spent: greater than 95 minutes spent in d/w pt/family, coordination of care, and d/w staff.     Lance vasquez MD  Internal Medicine  [facility]  Pager: [phone]      **Certification      PHYSICIAN Certification of Need for Inpatient Hospitalization - Initial Certification    Patient will require inpatient services that will reasonably be expected to span two midnight's based on the clinical documentation in H+P.   Based on patients current state of illness, I anticipate that, after discharge, patient will require TBD.

## 2024-08-04 NOTE — PROGRESS NOTES
Nephrology Progress Note    Jonny Haque Attending:  Golden Escalante DO       SUBJECTIVE:     HD yday went well  Plan for angiogram tomorrow    PHYSICAL EXAM:     Vital Signs: /76 (BP Location: Left arm)   Pulse 60   Temp 97.6 °F (36.4 °C) (Oral)   Resp 20   Ht 6' (1.829 m)   Wt 208 lb 5.4 oz (94.5 kg)   SpO2 95%   BMI 28.26 kg/m²   Temp (24hrs), Av.7 °F (36.5 °C), Min:97.3 °F (36.3 °C), Max:98.1 °F (36.7 °C)       Intake/Output Summary (Last 24 hours) at 2024 1619  Last data filed at 2024 1300  Gross per 24 hour   Intake 680 ml   Output 2000 ml   Net -1320 ml     Wt Readings from Last 3 Encounters:   24 208 lb 5.4 oz (94.5 kg)   24 204 lb (92.5 kg)   24 204 lb 1.6 oz (92.6 kg)       General: pleasant, well appearing  HEENT: NCAT  Neck: Supple  Cardiac: Regular rate and rhythm, no murmurs  Lungs: CTAB  Abdomen: Soft, non-tender, non-distended  Extremities: no LE edema  Neurologic/Psych: mentating well       LABORATORY DATA:     Lab Results   Component Value Date     (H) 2024    BUN 45 (H) 2024    BUNCREA 13.0 2022    CREATSERUM 5.08 (H) 2024    ANIONGAP 9 2024    GFR 41 (L) 2017    GFRNAA 8 (L) 2022    GFRAA 9 (L) 2022    CA 9.5 2024    OSMOCALC 292 2024    ALKPHO 183 (H) 2024    AST 20 2024    ALT 38 2024    BILT 0.4 2024    TP 7.0 2024    ALB 4.3 2024    GLOBULIN 2.7 2024    AGRATIO 1.0 (L) 2016     (L) 2024    K 4.2 2024    CL 98 2024    CO2 26.0 2024     Lab Results   Component Value Date    WBC 6.0 2024    RBC 3.22 (L) 2024    HGB 10.9 (L) 2024    HCT 32.3 (L) 2024    PLT 65.0 (L) 2024    .3 (H) 2024    MCH 33.9 2024    MCHC 33.7 2024    RDW 13.8 2024    NEPRELIM 3.84 2024    NEPERCENT 64.4 2024    LYPERCENT 19.5 2024    MOPERCENT 8.9 2024     EOPERCENT 5.7 08/04/2024    BAPERCENT 0.8 08/04/2024    NE 3.84 08/04/2024    LYMABS 1.16 08/04/2024    MOABSO 0.53 08/04/2024    EOABSO 0.34 08/04/2024    BAABSO 0.05 08/04/2024     Lab Results   Component Value Date    MALBP 49.2 (H) 09/04/2019    CREUR 91.65 09/04/2019     Lab Results   Component Value Date    COLORUR YELLOW 02/09/2022    CLARITY Hazy (A) 07/18/2019    SPECGRAVITY 1.013 07/02/2021    GLUUR NEGATIVE 02/09/2022    BILUR NEGATIVE 02/09/2022    KETUR NEGATIVE 02/09/2022    BLOODURINE SMALL (A) 02/09/2022    PHURINE 7 02/09/2022    PROUR 100 (A) 02/09/2022    UROBILINOGEN <2.0 07/18/2019    NITRITE NEGATIVE 02/09/2022    LEUUR NEGATIVE 02/09/2022    WBCUR 1-4 07/18/2019    RBCUR 6-10 (A) 07/18/2019    EPIUR RARE (A) 02/09/2022    BACUR NONE 02/09/2022    HYLUR Present (A) 12/19/2016         IMAGING:     New studies reviewed    MEDICATIONS:       Current Facility-Administered Medications   Medication Dose Route Frequency    clopidogrel (Plavix) tab 75 mg  75 mg Oral Daily    [START ON 8/5/2024] sodium chloride 0.9% infusion   Intravenous On Call    carvedilol (Coreg) tab 12.5 mg  12.5 mg Oral BID with meals    sodium chloride 0.9 % IV bolus 100 mL  100 mL Intravenous Q30 Min PRN    And    albumin human (Albumin) 25% injection 25 g  25 g Intravenous PRN Dialysis    aspirin DR tab 81 mg  81 mg Oral QOD    amiodarone (Pacerone) tab 200 mg  200 mg Oral BID with meals    sevelamer carbonate (Renvela) tab 800 mg  800 mg Oral TID CC    rosuvastatin (Crestor) tab 10 mg  10 mg Oral Nightly    pantoprazole (Protonix) DR tab 40 mg  40 mg Oral BID AC    levothyroxine (Synthroid) tab 150 mcg  150 mcg Oral Before breakfast    [Held by provider] Lanthanum Carbonate (FOSRENOL) chewable tab 1,000 mg  1,000 mg Oral BID with meals    insulin aspart (NovoLOG) 100 Units/mL FlexPen 10 Units  10 Units Subcutaneous TID AC    insulin degludec (Tresiba) 100 units/mL flextouch 24 Units  24 Units Subcutaneous Nightly     acetaminophen (Tylenol Extra Strength) tab 500 mg  500 mg Oral Q4H PRN    polyethylene glycol (PEG 3350) (Miralax) 17 g oral packet 17 g  17 g Oral Daily PRN    sennosides (Senokot) tab 17.2 mg  17.2 mg Oral Nightly PRN    bisacodyl (Dulcolax) 10 MG rectal suppository 10 mg  10 mg Rectal Daily PRN    ondansetron (Zofran) 4 MG/2ML injection 4 mg  4 mg Intravenous Q6H PRN    metoclopramide (Reglan) 5 mg/mL injection 5 mg  5 mg Intravenous Q8H PRN    glucose (Dex4) 15 GM/59ML oral liquid 15 g  15 g Oral Q15 Min PRN    Or    glucose (Glutose) 40% oral gel 15 g  15 g Oral Q15 Min PRN    Or    glucose-vitamin C (Dex-4) chewable tab 4 tablet  4 tablet Oral Q15 Min PRN    Or    dextrose 50% injection 50 mL  50 mL Intravenous Q15 Min PRN    Or    glucose (Dex4) 15 GM/59ML oral liquid 30 g  30 g Oral Q15 Min PRN    Or    glucose (Glutose) 40% oral gel 30 g  30 g Oral Q15 Min PRN    Or    glucose-vitamin C (Dex-4) chewable tab 8 tablet  8 tablet Oral Q15 Min PRN    insulin aspart (NovoLOG) 100 Units/mL FlexPen 2-10 Units  2-10 Units Subcutaneous TID AC and HS    heparin (Porcine) 5000 UNIT/ML injection 5,000 Units  5,000 Units Subcutaneous Q8H ANNEL       ASSESSMENT/PLAN:     ESRD  - HD TTS   Hypertension  -PTA regimen: Toprol 50 mg daily, diltiazem 240 mg daily, losartan 50 mg daily  Hyponatremia  -Likely due to volume excess  Anemia  -Hemoglobin goal greater than 10  -Holding JIE until acute thrombotic events ruled out  CKD BMD  -PTA sevelamer 800 mg with meals  Chest pain        HD TTS.  Continue dialysis per routine.  BP Rx per cardiology.  Hold losartan.  1.5 L fluid restriction  Hold JIE in setting of chest pain  Trend phosphorus  Per cardiology, tentative angiogram tomorrow.      Trey Man DO  [facility]

## 2024-08-04 NOTE — PLAN OF CARE
Patient AXOX4.Sinus john on tele.On room air.No c/o pain.Continent of bowel and bladder.Last BM today.Right internal jugular perm a cath intact .HD on Tues,thurs,Saturday.Call light within reach.Plan of care updated.     Problem: Patient/Family Goals  Goal: Patient/Family Long Term Goal  Description: Patient's Long Term Goal: Stay healthy    Interventions:  -Medication management  -Dietary management  -Prompt follow up with physicians  - See additional Care Plan goals for specific interventions  Outcome: Progressing  Goal: Patient/Family Short Term Goal  Description: Patient's Short Term Goal: Discharge home    Interventions:   - Cardiology consult  - Nephrology consult  -HD  Trend trop  - See additional Care Plan goals for specific interventions  Outcome: Progressing     Problem: CARDIOVASCULAR - ADULT  Goal: Maintains optimal cardiac output and hemodynamic stability  Description: INTERVENTIONS:  - Monitor vital signs, rhythm, and trends  - Monitor for bleeding, hypotension and signs of decreased cardiac output  - Evaluate effectiveness of vasoactive medications to optimize hemodynamic stability  - Monitor arterial and/or venous puncture sites for bleeding and/or hematoma  - Assess quality of pulses, skin color and temperature  - Assess for signs of decreased coronary artery perfusion - ex. Angina  - Evaluate fluid balance, assess for edema, trend weights  Outcome: Progressing  Goal: Absence of cardiac arrhythmias or at baseline  Description: INTERVENTIONS:  - Continuous cardiac monitoring, monitor vital signs, obtain 12 lead EKG if indicated  - Evaluate effectiveness of antiarrhythmic and heart rate control medications as ordered  - Initiate emergency measures for life threatening arrhythmias  - Monitor electrolytes and administer replacement therapy as ordered  Outcome: Progressing     Problem: SAFETY ADULT - FALL  Goal: Free from fall injury  Description: INTERVENTIONS:  - Assess pt frequently for physical  needs  - Identify cognitive and physical deficits and behaviors that affect risk of falls.  - [place] fall precautions as indicated by assessment.  - Educate pt/family on patient safety including physical limitations  - Instruct pt to call for assistance with activity based on assessment  - Modify environment to reduce risk of injury  - Provide assistive devices as appropriate  - Consider OT/PT consult to assist with strengthening/mobility  - Encourage toileting schedule  Outcome: Progressing

## 2024-08-05 ENCOUNTER — APPOINTMENT (OUTPATIENT)
Dept: INTERVENTIONAL RADIOLOGY/VASCULAR | Facility: HOSPITAL | Age: 79
End: 2024-08-05
Attending: INTERNAL MEDICINE
Payer: MEDICARE

## 2024-08-05 LAB
ATRIAL RATE: 55 BPM
GLUCOSE BLD-MCNC: 109 MG/DL (ref 70–99)
GLUCOSE BLD-MCNC: 116 MG/DL (ref 70–99)
GLUCOSE BLD-MCNC: 162 MG/DL (ref 70–99)
GLUCOSE BLD-MCNC: 171 MG/DL (ref 70–99)
P AXIS: 55 DEGREES
P-R INTERVAL: 230 MS
PLATELET # BLD AUTO: 57 10(3)UL (ref 150–450)
Q-T INTERVAL: 556 MS
QRS DURATION: 146 MS
QTC CALCULATION (BEZET): 531 MS
R AXIS: 0 DEGREES
T AXIS: 98 DEGREES
VENTRICULAR RATE: 55 BPM

## 2024-08-05 PROCEDURE — 51701 INSERT BLADDER CATHETER: CPT

## 2024-08-05 PROCEDURE — 82962 GLUCOSE BLOOD TEST: CPT

## 2024-08-05 PROCEDURE — 93458 L HRT ARTERY/VENTRICLE ANGIO: CPT | Performed by: INTERNAL MEDICINE

## 2024-08-05 PROCEDURE — 99152 MOD SED SAME PHYS/QHP 5/>YRS: CPT | Performed by: INTERNAL MEDICINE

## 2024-08-05 PROCEDURE — 85049 AUTOMATED PLATELET COUNT: CPT | Performed by: HOSPITALIST

## 2024-08-05 PROCEDURE — 4A023N7 MEASUREMENT OF CARDIAC SAMPLING AND PRESSURE, LEFT HEART, PERCUTANEOUS APPROACH: ICD-10-PCS | Performed by: INTERNAL MEDICINE

## 2024-08-05 PROCEDURE — B211YZZ FLUOROSCOPY OF MULTIPLE CORONARY ARTERIES USING OTHER CONTRAST: ICD-10-PCS | Performed by: INTERNAL MEDICINE

## 2024-08-05 RX ORDER — LOSARTAN POTASSIUM 25 MG/1
25 TABLET ORAL DAILY
Status: DISCONTINUED | OUTPATIENT
Start: 2024-08-05 | End: 2024-08-14

## 2024-08-05 RX ORDER — IODIXANOL 320 MG/ML
100 INJECTION, SOLUTION INTRAVASCULAR
Status: COMPLETED | OUTPATIENT
Start: 2024-08-05 | End: 2024-08-05

## 2024-08-05 RX ORDER — MIDAZOLAM HYDROCHLORIDE 1 MG/ML
INJECTION INTRAMUSCULAR; INTRAVENOUS
Status: COMPLETED
Start: 2024-08-05 | End: 2024-08-05

## 2024-08-05 RX ORDER — HEPARIN SODIUM 5000 [USP'U]/ML
INJECTION, SOLUTION INTRAVENOUS; SUBCUTANEOUS
Status: COMPLETED
Start: 2024-08-05 | End: 2024-08-05

## 2024-08-05 RX ORDER — LIDOCAINE HYDROCHLORIDE 10 MG/ML
INJECTION, SOLUTION EPIDURAL; INFILTRATION; INTRACAUDAL; PERINEURAL
Status: COMPLETED
Start: 2024-08-05 | End: 2024-08-05

## 2024-08-05 RX ORDER — NITROGLYCERIN 20 MG/100ML
INJECTION INTRAVENOUS
Status: COMPLETED
Start: 2024-08-05 | End: 2024-08-05

## 2024-08-05 RX ORDER — CARVEDILOL 6.25 MG/1
6.25 TABLET ORAL 2 TIMES DAILY WITH MEALS
Status: DISCONTINUED | OUTPATIENT
Start: 2024-08-05 | End: 2024-08-14

## 2024-08-05 RX ORDER — VERAPAMIL HYDROCHLORIDE 2.5 MG/ML
INJECTION, SOLUTION INTRAVENOUS
Status: COMPLETED
Start: 2024-08-05 | End: 2024-08-05

## 2024-08-05 NOTE — PROCEDURES
[facility]       Jonny Haque Location: Cath Lab    CSN [id number] MRN [id number]   Admission Date 8/3/2024 Procedure Date 8/5/2024   Attending Physician Golden Escalante DO Procedure Physician Satya Donald MD         CARDIAC CATHETERIZATION/PERCUTANEOUS CORONARY INTERVENTION      PREOPERATIVE DIAGNOSIS: CAD  POSTOPERATIVE DIAGNOSIS:  CAD  PROCEDURE PERFORMED:  Coronary angio, [facility]      PROCEDURE:  The patient was brought to the cardiac catheterization lab in the fasting state.  Informed consent was obtained.  Moderate sedation was employed using 3mg IV Versed and 75mcg IV fentanyl.  I directly observed the patient from 105 to 124p, watching the heart rate, blood pressure, oximetry, and rhythm.  An independent, trained observer was present throughout the procedure and assisted in the monitoring of the patient's level of consciousness and physiologic states.  Please see the [facility] Catheterization report in Epic for further technical details.     ACCESS/CATHETER PLACEMENT:  6F right radial, slender sheath.  A Candis catheter for LM and RCA engagement and angiography.  Standard over the wire technique was utilized.  Standard [place] and GURROLA angiographic views with caudal and cranial angulation were used. A TR band was used for arterial hemostasis.  We crossed the aortic valve with the candis catheter and measured LVEDP and the pullback gradient.     FINDINGS:    1.  Left heart catheterization:  Left ventricular end-diastolic pressure was 20 mmHg.   Gradient by pullback was 30mmHg peak to peak.    2.  Selective coronary angiography:    LMCA: The left main artery is a short, nearly separate ostia.  Mildly diseased.  LAD:  The left anterior descending artery is large vessel. Proximal stent 95% focal ISR.  The rest of it is without significant ISR or disease.  LCX: The left circumflex artery is moderate system with 80% disease into the OM and moderate disease in the AV groove. The DORIS/PDA system is at most moderate with  moderate disease.  RCA:  The right coronary artery is small with an interesting anatomy.  The acute marginal does wrap around to the inferior wall but is limited in size.  Moderate disease.    MEDICATIONS:  See nursing record.     COMPLICATIONS:  None.     IMPRESSION:    Severe, focal ISR in proximal LAD.  80% OM disease.  Aortic stenosis, peak to peak pullback gradient approximately 30mmHg.    RECOMMENDATIONS:   Echo.  Consider revascularization.  Medical management for now.  CCU for now.  Restart heparin drip, no bolus, in 4 hrs if no bleeding issues.  Further recs after discussions with family, patient, and primary cardiologist.    CARLEY DODGE

## 2024-08-05 NOTE — PROGRESS NOTES
[facility]   part of [facility] Hospitalist Progress Note     Jonny Haque Patient Status:  Inpatient    4/15/1945 MRN [id number]   Location [facility] 8NE-A Attending Golden Escalante, DO   Hosp Day # 2 PCP Gee Jimenez MD     Follow Up:  The primary encounter diagnosis was Exertional chest pain. A diagnosis of Acute renal failure on dialysis (HCC) was also pertinent to this visit.    Subjective:     Patient seen and examined.  He underwent LHC this afternoon showing 80% OM disease and severed ISR in prox LAD, medical management recommended.  He continues to have urinary retention, has required straight cath x 2.    Pt denies chest pain or SOB.      Objective:    Review of Systems:   10 point ROS completed and was negative, except for pertinent positive and negatives stated in subjective.    Vital signs:  Temp:  [97.4 °F (36.3 °C)-97.8 °F (36.6 °C)] 97.5 °F (36.4 °C)  Pulse:  [54-62] 55  Resp:  [17-20] 17  BP: (131-176)/(60-82) 176/76  SpO2:  [97 %-100 %] 100 %    Physical Exam:    Gen: No acute distress, alert and oriented x 3  Pulm: Lungs clear bilaterally, good inspiratory effort   CV:  nL S1/S2  Abd: soft, NT/ND, no hepatomegaly, +BS  MSK: moving all extremities, no edema  Neuro: no focal deficits  Skin: no rashes/lesions  Psych: normal mood/affect      Diagnostic Data:    Labs:  Recent Labs   Lab 24  1437 24  0523 24  0524   WBC 7.0 6.0  --    HGB 9.7* 10.9*  --    MCV 99.3 100.3*  --    PLT 69.0* 65.0* 57.0*       Recent Labs   Lab 24  1437 24  0523   * 175*   BUN 83* 45*   CREATSERUM 7.42* 5.08*   CA 9.3 9.5   ALB 4.3  --    * 133*   K 5.0 4.2   CL 95* 98   CO2 25.0 26.0   ALKPHO 183*  --    AST 20  --    ALT 38  --    BILT 0.4  --    TP 7.0  --        Estimated Creatinine Clearance: 13.4 mL/min (A) (based on SCr of 5.08 mg/dL (H)).    No results for input(s): \"PTP\", \"INR\" in the last 168 hours.         COVID-19 Lab  Results    COVID-19  Lab Results   Component Value Date    COVID19 Not Detected 04/16/2024    COVID19 Not Detected 03/17/2024    COVID19 Not Detected 04/05/2023       Pro-Calcitonin  No results for input(s): \"PCT\" in the last 168 hours.    Cardiac  No results for input(s): \"TROP\", \"PBNP\" in the last 168 hours.    Creatinine Kinase  No results for input(s): \"CK\" in the last 168 hours.    Inflammatory Markers  No results for input(s): \"CRP\", \"NEELAM\", \"LDH\", \"DDIMER\" in the last 168 hours.    Imaging: Imaging data reviewed in Epic.    Medications:    carvedilol  6.25 mg Oral BID with meals    losartan  25 mg Oral Daily    clopidogrel  75 mg Oral Daily    Lanthanum Carbonate  1,000 mg Oral BID with meals    aspirin  81 mg Oral QOD    amiodarone  200 mg Oral BID with meals    rosuvastatin  10 mg Oral Nightly    pantoprazole  40 mg Oral BID AC    levothyroxine  150 mcg Oral Before breakfast    insulin aspart  10 Units Subcutaneous TID AC    insulin degludec  24 Units Subcutaneous Nightly    insulin aspart  2-10 Units Subcutaneous TID AC and HS    heparin  5,000 Units Subcutaneous Q8H ANNEL       Assessment & Plan:       79-year-old male significant past medical history of CAD with stable angina-status post PCI in 2017, 2021 and most recently in March 2024 with 3 drug-eluting stents placed in the left main, LAD and OM, hypertension, pulmonary hypertension paroxysmal A-fib, type 2 diabetes, hypothyroidism, end-stage renal disease on dialysis, history of subdural hematoma, ischemic cardiomyopathy history of HCV cirrhosis moderate aortic stenosis, prior history of GI bleed presented with chest pain     # Chest pain with history of stable angina  # CAD status post PCI  -Serial troponin  -Cardiology consult  -Patient was not on dual antiplatelet therapy secondary to recent subdural hematoma and fall, however pt was cleared by NSGY for DAPT, cont ASA, plavix   -Continue aspirin, statin  -LHC done today showed  80% OM disease and  severed ISR in prox LAD, medical management recommended  -Recent echo completed and reviewed     # Paroxysmal A-fib  # V. Tach  -Continue amiodarone  -EKG reviewed showed sinus rhythm      # Recent fall  acute subdural hematoma  -Was admitted in May 2024 with this, holding dual antiplatelet therapy  -Was followed up with neurosurgery  -CT head has been stable  -Currently on aspirin, continue to monitor for falls     # ESRD  -HD per nephrology  -Missed session today, completed dialysis per nephrology  # Hypothyroidism  # HSV cirrhosis  # Moderate aortic stenosis  # Ischemic cardiomyopathy   # Thrombocytopenia platelets of 57 continue to monitor-  -pt last saw Dr. Julian Nolasco 1/24/24, low pltc likely due to Hep C and cirrhosis, monitor, no intervention needed if pltc < 50    # Type 2 diabetes  -Continue 20 units of insulin, sliding scale     # Essential HTN  - per discussion with pt and his son Ashwin, he was taken off metoprolol, diltiazem, and losartan as of 6/24/24 by outside renal  - coreg started, monitor     # Urinary retention   - cont straight cath per protocol  - consider  c/s     Plan of care: inpt care.      Plan of care discussed with patient or family at bedside.    Golden Escalante DO    Supplementary Documentation:     Quality:  DVT Prophylaxis: hep subcutaneous   CODE status: FULL  Vásquez: no  Central line: no  If COVID testing is negative, may discontinue isolation: yes     Estimated date of discharge: TBD  Discharge is dependent on: clinical course   At this point Mr. Haque is expected to be discharge to: home    Plan of care discussed with pt

## 2024-08-05 NOTE — PAYOR COMM NOTE
--------------  ADMISSION REVIEW     Payor: [employer] MEDICARE ADV PPO  Subscriber #:  [id number]  Authorization Number: [id number]    Admit date: 8/3/24  Admit time:  5:02 PM       REVIEW DOCUMENTATION  ED Provider Notes signed by Marc Patrick MD at 8/3/2024  4:13 PM      Patient Seen in: [facility] Emergency Department    History     Chief Complaint   Patient presents with    Chest Pain Angina     Stated Complaint: CP    HPI   this is a 79-year-old male who arrives here with complaints of back pain chest pain intermittently for the last 5 days some of it is very much activity related.  The patient states every time he walks discharge at pain.  He did have some pain today that was not active related.  The patient does have a history of aortic stenosis diabetes he is admitted previous history of coronary disease he had stents placed in February of this year.  The patient states he has had no fevers or chills.  He denies any shortness of breath he just says it was a tightness in his chest anytime he walks he gets nitro and it feels better.  He did have a fall previously and had a small subdural previously.  He does have history of diabetes, renal dialysis patient.  He is on dialysis.  He has had previous coronary disease with LAD lesions,small caliber RCA with 80% mid lesion.  On a previous angiogram.  The patient arrives here presently has no pain his last pain episode was sometime this morning at 5:00.    Past Medical History:    Aortic stenosis    Calculus of kidney    Coronary atherosclerosis    bare metal stents at [facility] Jan 2021    Diabetes (HCC)    Disorder of thyroid    Esophageal varices without bleeding, unspecified esophageal varices type (HCC)    GIB (gastrointestinal bleeding)    Hepatitis, unspecified    hepatitis C-just completed taking Harvoni in November 2016    High blood pressure    High cholesterol    Malignant neoplasm of trigone of urinary bladder (HCC)    Malignant neoplasm of  urinary bladder, unspecified site (HCC)    Malignant neoplasm of urinary bladder, unspecified site (HCC)    Other disorders of plasma-protein metabolism, not elsewhere classified    Renal disorder    Visual impairment    reading glasses     Past Surgical History:   Procedure Laterality Date    Angiogram  08/15/2017    small caliber RCA with 80% mid lesion.  LAD and left circumflex with 50% lesions.  LV shows inferobasilar aneurysm with scar.  Overall LV function preserved at the exterior 65%.    Cath bare metal stent (bms)      Other surgical history      Upper GI surgery    Other surgical history  12/11/2017    Cysto Dr. Lees    Other surgical history  07/18/2019    BTS Cysto Dr. Lees     Other surgical history  02/06/2020    BTS Cysto (Dr. Lees)     Substance and Sexual Activity    Alcohol use: No    Drug use: No     Review of Systems  Positive for stated Chief Complaint: Chest Pain Angina  Other systems are as noted in HPI.  Constitutional and vital signs reviewed.    All other systems reviewed and negative except as noted above.    Physical Exam     ED Triage Vitals [08/03/24 1433]   /65   Pulse 56   Resp 14   Temp 98.3 °F (36.8 °C)   Temp src Oral   SpO2 96 %   O2 Device None (Room air)     Vital Signs  BP: 150/69  Pulse: 54  Resp: 15  Temp: 98.3 °F (36.8 °C)  Temp src: Oral  MAP (mmHg): 94    Oxygen Therapy  SpO2: 98 %  O2 Device: None (Room air)    Physical Exam  General: .  The patient is in no respiratory distress.  No signs of trauma on his head or neck.  The patient is in no respiratory distress  HEENT: Atraumatic, conjunctiva are not pale.  There is no icterus.  Oral mucosa Is wet.  No facial trauma.  The neck is supple.  LUNGS: Clear to auscultation, there is no wheezing or retraction.  No crackles.  Dialysis catheter noted on the right side of his chest.  The patient was placed on monitors, I  CV: Cardiovascular is regular without murmurs or rubs.  ABD: The abdomen is soft nondistended nontender.   There is no rebound.  There is no guarding.  EXT: There is good pulses bilaterally.  There is no calf tenderness.  There is no rash noted.  There is no edema  NEURO: Alert and oriented x4.  Muscle strength and sensory exam is grossly normal.  And the patient is neurologically intact with no focal findings.    ED Course     Labs Reviewed   COMP METABOLIC PANEL (14) - Abnormal; Notable for the following components:       Result Value    Glucose 160 (*)     Sodium 131 (*)     Chloride 95 (*)     BUN 83 (*)     Creatinine 7.42 (*)     Calculated Osmolality 301 (*)     eGFR-Cr 7 (*)     Alkaline Phosphatase 183 (*)     All other components within normal limits   CBC W/ DIFFERENTIAL - Abnormal; Notable for the following components:    RBC 2.89 (*)     HGB 9.7 (*)     HCT 28.7 (*)     PLT 69.0 (*)     All other components within normal limits   TROPONIN I HIGH SENSITIVITY - Normal   CBC WITH DIFFERENTIAL WITH PLATELET     The patient was placed on monitors, IV was started, blood was drawn.  Story is concerning for an exertional chest pain, anginal pain known history of coronary disease.  Did review old records to he is got a history of subdural from a fall.  Last CT from June 12 shows    CONCLUSION:    1. Interval decrease in attenuation and thickness of a mixed attenuation subdural hematoma along the right cerebral convexity.  There is a reference thickness of 1.5 cm as compared to 2.1 cm on the previous exam.  Clinical correlation and continued  follow-up is suggested.     2. Interval decrease in right to left midline shift now measuring 2 mm, previously 4 mm.     3. No evidence of acute territorial infarction.  Stable mild chronic microvascular ischemic changes in the cerebral white matter.        [facility]     Admission disposition: 8/3/2024  3:43 PM    CBC shows a white count of 7.0 hemoglobin 9.7.    The EKG shows sinus bradycardia.  First-degree AV block there is no acute ST elevations or ischemic findings.  The rest of  the EKG including rate rhythm axis and intervals I agree with the EKG report . The rate is 55      Nonspecific intraventricular conduction delay.  Nonspecific ST changes anteriorly       When compared to an EKG from May 9, 2020 2024 there is no significant changes.    Workup was done to rule out acute electrolytes anemia, acute coronary syndrome.  He is presently pain-free at this pain.    Medical Decision Making  The patient's troponin was negative, CBC shows a hemoglobin 9.7, comprehensive shows an elevated BUN of 83 creatinine 7.42.  Sodium 131.  Potassium was 5.0.    I discussed this case with the patient and family they are concerned because he is having more exertional chest pain not sometimes even amenable not amenable to the nitro and he had some breast pain this morning.  Although this pain is atypical in nature he does have significant risk factors that he felt uncomfortable taking him home.  He was given full dose aspirin he is pain-free at this point.  Discussed this case with the [facility] hospitalist, [facility] cardiology, [facility] neph[facility]   Dr. Campbell.    Patient will be admitted for further evaluation treatment    He did miss his dialysis today will need to be dialyzed but he is not in overt heart failure at this present time.  Renal failure    Disposition and Plan     Clinical Impression:  1. Exertional chest pain    2. Acute renal failure on dialysis (HCC)       Disposition:  Admit  8/3/2024  3:43 pm    Hospital Problems       Present on Admission  Date Reviewed: 5/17/2024            ICD-10-CM Noted POA    * (Principal) Exertional chest pain R07.9 8/3/2024 Unknown    Acute kidney injury (HCC) N17.9 8/3/2024 Yes    Acute renal failure (ARF) (HCC) N17.9 8/3/2024 Yes    Anemia D64.9 8/3/2024 Yes    Hyponatremia E87.1 8/3/2024 Yes       Marc Patrick MD on 8/3/2024  4:13 PM      Nephrology  Report of Consultation  Reason for consult: ESRD     HISTORY OF PRESENT ILLNESS:   79 year old male with past medical  history significant for ESRD on HD TTS, diabetes, hypertension, hepatitis C who presents to the hospital with chest pain.  Patient undergoes dialysis at [facility] under the care of Dr. Harvey, [facility] nephrology.  Nephrology consulted for dialysis. Pt did not undergo HD today.   He has no edema or SOB. States HD has been going well overall.       /69   Pulse 54   Temp 98.3 °F (36.8 °C) (Oral)   Resp 15   Ht 6' (1.829 m)   Wt 210 lb (95.3 kg)   SpO2 98%   BMI 28.48 kg/m²   Temp (24hrs), Av.3 °F (36.8 °C), Min:98.3 °F (36.8 °C), Max:98.3 °F (36.8 °C)     Last 3 Encounters:   24 210 lb (95.3 kg)   24 204 lb (92.5 kg)   24 204 lb 1.6 oz (92.6 kg)       omponent Value Date      (H) 2024     BUN 83 (H) 2024     CREATSERUM 7.42 (H) 2024     ANIONGAP 11 2024     CA 9.3 2024     OSMOCALC 301 (H) 2024     ALKPHO 183 (H) 2024     AST 20 2024     ALT 38 2024     BILT 0.4 2024     TP 7.0 2024     ALB 4.3 2024     GLOBULIN 2.7 2024      (L) 2024     K 5.0 2024     CL 95 (L) 2024     CO2 25.0 2024       WBC 7.0 2024     RBC 2.89 (L) 2024     HGB 9.7 (L) 2024     HCT 28.7 (L) 2024     PLT 69.0 (L) 2024     MCV 99.3 2024     MCH 33.6 2024     MCHC 33.8 2024     RDW 13.8 2024     NEPRELIM 4.59 2024     NEPERCENT 65.8 2024     LYPERCENT 19.8 2024     MOPERCENT 7.5 2024     EOPERCENT 5.9 2024     BAPERCENT 0.4 2024     NE 4.59 2024     LYMABS 1.38 2024     MOABSO 0.52 2024     EOABSO 0.41 2024     BAABSO 0.03 2024     IMPRESSION/RECOMMENDATIONS:      ESRD  - HD TTS   Hypertension  -PTA regimen: Toprol 50 mg daily, diltiazem 240 mg daily, losartan 50 mg daily  Hyponatremia  -Likely due to volume excess  Anemia  -Hemoglobin goal greater than 10  CKD  BMD  -PTA sevelamer 800 mg with meals  Chest pain    PLAN:  HD today.  BP Rx per cardiology.  Hold losartan.  1.5 L fluid restriction  Hold JIE in setting of chest pain  Trend phosphorus  Per cardiology  Trey CurriealDO   8/3/2024  4:06 PM       Hospitalist H&P  Chief Complaint   Patient presents with    Chest Pain Angina     History of Present Illness:  Patient is a 79-year-old male significant past medical history of CAD with stable angina-status post PCI in 2017, 2021 and most recently in March 2024 with 3 drug-eluting stents placed in the left main, LAD and OM, hypertension, pulmonary hypertension paroxysmal A-fib, type 2 diabetes, hypothyroidism, end-stage renal disease on dialysis, history of subdural hematoma, ischemic cardiomyopathy history of HCV cirrhosis moderate aortic stenosis, prior history of GI bleed presented with chest pain.  Patient stated that he is doing okay however noticed that he had chest pain with minimal exertion-had some shortness of breath-he missed dialysis today-he otherwise denies any fevers chills palpitations lightheadedness or dizziness.     In the emergency room vital signs stable patient was saturating well on room air  Labs were remarkable for an elevated creatinine as expected, chest x-ray was negative EKG actually showed sinus bradycardia with first-degree AV block, troponin x 2 is negative CBC showed a hemoglobin of 9.7 platelets of 69     Cardiology was consulted and patient was admitted      /67 (BP Location: Left arm)  Pulse 56  Temp 97.7 °F (36.5 °C) (Oral)  Resp 14  Ht 6' (1.829 m)  Wt 212 lb 4.9 oz (96.3 kg)  SpO2 96%  BMI 28.79 kg/m²       Lab 08/03/24  1437   WBC 7.0   HGB 9.7*   MCV 99.3   PLT 69.0*     *   K 5.0   CL 95*   CO2 25.0   BUN 83*   CREATSERUM 7.42*   *   CA 9.3      ALT 38   AST 20   ALB 4.3       ASSESSMENT / PLAN:   Patient is a 79-year-old male significant past medical history of CAD with stable angina-status post PCI in  2017, 2021 and most recently in March 2024 with 3 drug-eluting stents placed in the left main, LAD and OM, hypertension, pulmonary hypertension paroxysmal A-fib, type 2 diabetes, hypothyroidism, end-stage renal disease on dialysis, history of subdural hematoma, ischemic cardiomyopathy history of HCV cirrhosis moderate aortic stenosis, prior history of GI bleed presented with chest pain     # Chest pain with history of stable angina  # CAD status post PCI  -Serial troponin  -Cardiology consult  -Patient not on dual antiplatelet therapy secondary to recent subdural hematoma and fall  -Continue aspirin statin  -Defer further stat testing including angiogram and stress test to cardiology  -Recent echo completed and reviewed     # Paroxysmal A-fib  # V. Tach  -Continue amiodarone  -EKG reviewed showed sinus rhythm      # Recent fall  acute subdural hematoma  -Was admitted in May 2024 with this, holding dual antiplatelet therapy  -Was followed up with neurosurgery  -CT head has been stable  -Currently on aspirin, continue to monitor for falls    # ESRD  -HD per nephrology  -Missed session today, completed dialysis per nephrology  # Hypothyroidism  # HSV cirrhosis  # Moderate aortic stenosis  # Ischemic cardiomyopathy   # Thrombocytopenia platelets of 69 continue to monitor-  -has been relatively stable however may need heme-onc consult  # Type 2 diabetes  -Continue 20 units of insulin, sliding scale     # Prophy:  DVT: Heparin subcu  Deconditioning prevention: PT OT     Dispo: admit to [facility] with telemetry    Further recommendations pending patient's clinical course.    **Certification  PHYSICIAN Certification of Need for Inpatient Hospitalization - Initial Certification  Patient will require inpatient services that will reasonably be expected to span two midnight's based on the clinical documentation in H+P.   Based on patients current state of illness, I anticipate that, after discharge, patient will require  TBD  Lance vasquez MD  8/3/2024 11:02 PM       8/4/2024:  Cardiology Consultation   Reason for Consultation:  Chest pain     History of Present Illness:  79 year old male with pmh CAD with multiple prior interventions (last 3/2024 with LM, LAD, OM), ICM (EF 40-45% 4/2024, mod-severe aortic stenosis), pAF not on anticoagulation, SDH, HTN, HL, DM, ESRD presenting with chest pain.  Patient reports 4-5 day h/o central chest tightness, worse with exertion and resolved with rest.  States similar to prior anginal pain.  Worse over the past few days.  Denies palpitations, dyspnea, orthopnea, PND, LE edema.  Of note, DAPT held multiple times since PCI 3/2024 2/2 possible GIB, then SDH, then patient noncompliance.     On admission patient afeb, HR 56, /65.  Trop negative x 3, ECG with SB with 1st deg AVB and LBBB.  Cardiology consulted for further eval and management.     Blood pressure (!) 168/81, pulse 62, temperature 97.9 °F (36.6 °C), temperature source Oral, resp. rate 18, height 6' (1.829 m), weight 208 lb 5.4 oz (94.5 kg), SpO2 96%      Last 3 Encounters:   08/04/24 208 lb 5.4 oz (94.5 kg)   05/17/24 204 lb (92.5 kg)   05/12/24 204 lb 1.6 oz (92.6 kg)     Lab 08/03/24  1437 08/04/24  0523   WBC 7.0 6.0   HGB 9.7* 10.9*   MCV 99.3 100.3*   PLT 69.0* 65.0*     * 175*   BUN 83* 45*   CREATSERUM 7.42* 5.08*   CA 9.3 9.5   ALB 4.3  --    * 133*   K 5.0 4.2   CL 95* 98   CO2 25.0 26.0     Assessment/Plan:  79 year old male presenting with:     1) Chest pain, concerning for unstable angina  2) CAD with multiple prior PCI (last 3/2024 of LM, LAD, OM); multiple interruptions of DAPT since then as per hpi  3) ICM (EF 40-45% 4/2024, mod-severe aortic stenosis)  4) pAF on amiodarone (no ac likely 2/2 prior GIB and SDH)  5) SDH  6) HTN  7) HL  8) DM  9) ESRD on HD     - Cont asa, plavix, statin (cleared for DAPT by neurosurgery, see notes 5/2024)  - Start carvedilol 12.5mg bid for BP/rate control, CAD,  cardiomyopathy (last outpatient cardiology notes lists metoprolol, diltiazem, losartan, imdur; unclear that patient is taking any of these as prescribed)  - Cont amiodarone for rhythm control  - Recommend [facility] for definitive assessment of coronary anatomy with PCI as needed; risks/benefits/indications discussed with patient and son (phone) at the bedside who are agreeable.  Plan for tomorrow  - Monitor on tele  - Will follow   Shane Brown MD  2024  8:47 AM    Nephrology Progress Note   HD yday went well  Plan for angiogram tomorrow    /76 (BP Location: Left arm)   Pulse 60   Temp 97.6 °F (36.4 °C) (Oral)   Resp 20   Ht 6' (1.829 m)   Wt 208 lb 5.4 oz (94.5 kg)   SpO2 95%   BMI 28.26 kg/m²   Temp (24hrs), Av.7 °F (36.5 °C), Min:97.3 °F (36.3 °C), Max:98.1 °F (36.7 °C)     2024 1300      Gross per 24 hour   Intake 680 ml   Output 2000 ml   Net -1320 ml     PLAN:  HD TTS.  Continue dialysis per routine.  BP Rx per cardiology.  Hold losartan.  1.5 L fluid restriction  Hold JIE in setting of chest pain  Trend phosphorus  Per cardiology, tentative angiogram tomorrow.  Trey Man DO  2024  4:18 PM     2024:  CARDIAC CATHETERIZATION/PERCUTANEOUS CORONARY INTERVENTION     PREOPERATIVE DIAGNOSIS: CAD  POSTOPERATIVE DIAGNOSIS:  CAD  PROCEDURE PERFORMED:  Coronary angio, [facility]      PROCEDURE:  The patient was brought to the cardiac catheterization lab in the fasting state.  Informed consent was obtained.  Moderate sedation was employed using 3mg IV Versed and 75mcg IV fentanyl.  I directly observed the patient from 105 to 124p, watching the heart rate, blood pressure, oximetry, and rhythm.  An independent, trained observer was present throughout the procedure and assisted in the monitoring of the patient's level of consciousness and physiologic states.  Please see the [facility] report in Epic for further technical details.     ACCESS/CATHETER PLACEMENT:  6F right radial, slender  sheath.  A Jadon catheter for LM and RCA engagement and angiography.  Standard over the wire technique was utilized.  Standard [place] and GURROLA angiographic views with caudal and cranial angulation were used. A TR band was used for arterial hemostasis.  We crossed the aortic valve with the jadon catheter and measured LVEDP and the pullback gradient.     FINDINGS:    1.  Left heart catheterization:  Left ventricular end-diastolic pressure was 20 mmHg.   Gradient by pullback was 30mmHg peak to peak.     2.  Selective coronary angiography:    LMCA: The left main artery is a short, nearly separate ostia.  Mildly diseased.  LAD:  The left anterior descending artery is large vessel. Proximal stent 95% focal ISR.  The rest of it is without significant ISR or disease.  LCX: The left circumflex artery is moderate system with 80% disease into the OM and moderate disease in the AV groove. The DORIS/PDA system is at most moderate with moderate disease.  RCA:  The right coronary artery is small with an interesting anatomy.  The acute marginal does wrap around to the inferior wall but is limited in size.  Moderate disease.     MEDICATIONS:  See nursing record.  COMPLICATIONS:  None.     IMPRESSION:    Severe, focal ISR in proximal LAD.  80% OM disease.  Aortic stenosis, peak to peak pullback gradient approximately 30mmHg.     RECOMMENDATIONS:   Echo.  Consider revascularization.  Medical management for now.  CCU for now.  Restart heparin drip, no bolus, in 4 hrs if no bleeding issues.  Further recs after discussions with family, patient, and primary cardiologist.  Satya Donald MD   8/5/2024  1:35 PM     Hospitalist Progress Note   underwent [facility] this afternoon showing 80% OM disease and severed ISR in prox LAD, medical management recommended.  He continues to have urinary retention, has required straight cath x 2.    Pt denies chest pain or SOB.      Temp:  [97.4 °F (36.3 °C)-97.8 °F (36.6 °C)] 97.5 °F (36.4 °C)  Pulse:  [54-62]  55  Resp:  [17-20] 17  BP: (131-176)/(60-82) 176/76  SpO2:  [97 %-100 %] 100 %    Lab 08/03/24  1437 08/04/24  0523 08/05/24  0524   WBC 7.0 6.0  --    HGB 9.7* 10.9*  --    MCV 99.3 100.3*  --    PLT 69.0* 65.0* 57.0*     Assessment & Plan:   79-year-old male significant past medical history of CAD with stable angina-status post PCI in 2017, 2021 and most recently in March 2024 with 3 drug-eluting stents placed in the left main, LAD and OM, hypertension, pulmonary hypertension paroxysmal A-fib, type 2 diabetes, hypothyroidism, end-stage renal disease on dialysis, history of subdural hematoma, ischemic cardiomyopathy history of HCV cirrhosis moderate aortic stenosis, prior history of GI bleed presented with chest pain     # Chest pain with history of stable angina  # CAD status post PCI  -Serial troponin  -Cardiology consult  -Patient was not on dual antiplatelet therapy secondary to recent subdural hematoma and fall, however pt was cleared by NSGY for DAPT, cont ASA, plavix   -Continue aspirin, statin  -LHC done today showed  80% OM disease and severed ISR in prox LAD, medical management recommended  -Recent echo completed and reviewed     # Paroxysmal A-fib  # V. Tach  -Continue amiodarone  -EKG reviewed showed sinus rhythm      # Recent fall  acute subdural hematoma  -Was admitted in May 2024 with this, holding dual antiplatelet therapy  -Was followed up with neurosurgery  -CT head has been stable  -Currently on aspirin, continue to monitor for falls     # ESRD  -HD per nephrology  -Missed session today, completed dialysis per nephrology  # Hypothyroidism  # HSV cirrhosis  # Moderate aortic stenosis  # Ischemic cardiomyopathy   # Thrombocytopenia platelets of 57 continue to monitor-  -pt last saw Dr. Julian Nolasco 1/24/24, low pltc likely due to Hep C and cirrhosis, monitor, no intervention needed if pltc < 50     # Type 2 diabetes  -Continue 20 units of insulin, sliding scale     # Essential HTN  - per discussion  with pt and his son Ashwin, he was taken off metoprolol, diltiazem, and losartan as of 6/24/24 by outside renal  - coreg started, monitor      # Urinary retention   - cont straight cath per protocol  - consider  c/s      Plan of care: inpt care.    DVT Prophylaxis: hep subcutaneous   Estimated date of discharge: TBD  Discharge is dependent on: clinical course   Golden Escalante, DO  8/5/2024  1:28 PM       MEDICATIONS ADMINISTERED:  Medications 08/03/24 08/04/24 08/05/24   amiodarone (Pacerone) tab 200 mg  Dose: 200 mg  Freq: 2 times daily with meals Route: OR  Start: 08/03/24 1800    1842 MJ-Given        0847 MJ-Given   1737 MJ-Given       1050 GL-Given   1800         aspirin chewable tab 324 mg  Dose: 324 mg  Freq: Once Route: OR  Start: 08/03/24 1514 End: 08/03/24 1524    1524 JOVANI-Given            aspirin DR tab 81 mg  Dose: 81 mg  Freq: Every other day Route: OR  Start: 08/04/24 0900   Admin Instructions:   Do not crush     0846 MJ-Given           carvedilol (Coreg) tab 12.5 mg  Dose: 12.5 mg  Freq: 2 times daily with meals Route: OR  Start: 08/04/24 0900 End: 08/05/24 1120     0924 MJ-Given   1734 MJ-Given       (0800 GL)-Not Given   1120-D/C'd       carvedilol (Coreg) tab 6.25 mg  Dose: 6.25 mg  Freq: 2 times daily with meals Route: OR  Start: 08/05/24 1800      1800          clopidogrel (Plavix) tab 75 mg  Dose: 75 mg  Freq: Daily Route: OR  Start: 08/04/24 0930     0924 MJ-Given        0839 GL-Given          heparin (Porcine) 1000 UNIT/ML injection 1,500 Units  Dose: 1,500 Units  Freq: Once Route: IV  Start: 08/03/24 2330 End: 08/04/24 0246     0246 MD-Given           heparin (Porcine) 5000 UNIT/ML injection  Start: 08/05/24 1144 End: 08/05/24 1144   Admin Instructions:   Ena Orr: maira override         heparin (Porcine) 5000 UNIT/ML injection  Start: 08/05/24 1141 End: 08/05/24 1141   Admin Instructions:   Ena Orr: maira override         heparin (Porcine) 5000 UNIT/ML injection 5,000  Units  Dose: 5,000 Units  Freq: Every 8 hours scheduled Route: SC  Start: 08/03/24 2200 2044 MD-Given        0623 MD-Given   1411 MJ-Given   2203 AW-Given      (0600 AW)-Not Given   (1400 GL)-Not Given [C]   2200        insulin aspart (NovoLOG) 100 Units/mL FlexPen 10 Units  Dose: 10 Units  Freq: 3 times daily before meals Route: SC  Start: 08/03/24 1815    1843 MJ-Given        0624 MD-Given   1410 MJ-Given   (1700 MJ)-Not Given      (0700 AW)-Not Given [C]   (1100 GL)-Not Given [C]   1700        insulin aspart (NovoLOG) 100 Units/mL FlexPen 2-10 Units  Dose: 2-10 Units  Freq: 3 times daily before meals and nightly Route: SC  Start: 08/03/24 2100   Admin Instructions:   CORRECTION FACTOR - COLUMN MEDIUM DOSE.  Continue to give correction insulin (Novolog/aspart) even if NPO; DO NOT HOLD OR ALTER INSULIN DOSE WITHOUT A PHYSICIAN ORDER.  1 unit of Novolog/aspart insulin is expected to decrease blood glucose by 20 mg/dL.    Give 2 unit if blood glucose 141-180 mg/dL  Give 3 units if blood glucose 181-220 mg/dL  Give 6 units if blood glucose 221-260 mg/dL  Give 8 units if blood glucose 261-300 mg/dL  Give 10 units if blood glucose 301-350 mg/dL  Call Physician if blood glucose is greater than 351 mg/dl with time and last dose of correction insulin given.    2137 MD-Given        0624 MD-Given   (1100 MJ)-Not Given   (1600 MJ)-Not Given     2204 AW-Given        (0700 AW)-Not Given [C]   (1100 GL)-Not Given [C]   1600 2100          insulin degludec (Tresiba) 100 units/mL flextouch 24 Units  Dose: 24 Units  Freq: Nightly Route: SC  Start: 08/03/24 2100   Admin Instructions:   This is LONG ACTING insulin.  Continue to give basal insulin (insulin degludec - Tresiba) even if NPO.  DO NOT hold or alter insulin dose without a physician order.    2144 MD-Given        2204 AW-Given        2100          levothyroxine (Synthroid) tab 150 mcg  Dose: 150 mcg  Freq: Before breakfast Route: OR  Start: 08/04/24 0700   Admin  Instructions:   Give on an empty stomach. Hold tube feedings 1 hour before AND after.     0623 MD-Given        0604 AW-Given          losartan (Cozaar) tab 25 mg  Dose: 25 mg  Freq: Daily Route: OR  Start: 08/05/24 1130      1502 GL-Given          pantoprazole (Protonix) DR tab 40 mg  Dose: 40 mg  Freq: 2 times daily before meals Route: OR  Start: 08/03/24 1745   Admin Instructions:   Do not crush    1842 MJ-Given        0623 MD-Given   1734 MJ-Given       0603 AW-Given   1700         rosuvastatin (Crestor) tab 10 mg  Dose: 10 mg  Freq: Nightly Route: OR  Indications of Use: ATHEROSCLEROTIC DISEASE  Start: 08/03/24 2100    2044 MD-Given        2203 AW-Given        2100          sevelamer carbonate (Renvela) tab 800 mg  Dose: 800 mg  Freq: 3 times daily with meals Route: OR  Start: 08/03/24 1745 End: 08/04/24 1838    1842 MJ-Given        0846 MJ-Given   (1200 MJ)-Not Given   1734 MJ-Given     1838-D/C'd         sodium chloride 0.9% infusion  Freq: On call Route: IV  Start: 08/05/24 0000 End: 08/05/24 0845   Admin Instructions:   Pre-op      0845 GL-New Bag            iodixanol (VISIPAQUE) 320 MG/ML injection 100 mL  Dose: 100 mL  Freq: IMG once as needed Route: IJ  PRN Reason: contrast  Start: 08/05/24 1151 End: 08/05/24 1330   Order specific questions:         1330 UL-Given            Vitals (last day)       Date/Time Temp Pulse Resp BP SpO2 Weight O2 Device O2 Flow Rate (L/min) Who    08/05/24 1430 -- 55 16 135/95 100 % -- None (Room air) -- GL    08/05/24 1416 -- 54 -- 139/72 100 % -- -- -- DT    08/05/24 1349 98 °F (36.7 °C) 54 18 142/67 99 % -- None (Room air) -- DT    08/05/24 1101 97.5 °F (36.4 °C) 55 17 176/76 100 % -- None (Room air) -- DT    08/05/24 0805 97.4 °F (36.3 °C) 54 19 158/82 100 % -- None (Room air) -- DT    08/05/24 0500 -- 55 -- -- -- 205 lb 11 oz (93.3 kg) -- --     08/05/24 0400 97.6 °F (36.4 °C) 54 20 137/60 99 % -- None (Room air) 0 L/min     08/04/24 2330 97.8 °F (36.6 °C) 57 19 131/63  100 % -- None (Room air) 0 L/min KB    08/04/24 2040 -- 62 -- -- -- -- -- --     08/04/24 1900 97.6 °F (36.4 °C) 57 19 137/70 98 % -- None (Room air) 0 L/min     08/04/24 1627 97.7 °F (36.5 °C) 55 19 158/76 97 % -- None (Room air) --     08/04/24 1230 97.6 °F (36.4 °C) 60 20 148/76 95 % -- None (Room air) --     08/04/24 0843 97.9 °F (36.6 °C) 62 18 168/81 96 % -- None (Room air) --     08/04/24 0500 97.8 °F (36.6 °C) -- 15 -- -- -- None (Room air) 0 L/min AM    08/04/24 0500 -- 59 -- 162/77 97 % -- -- -- MD    08/04/24 0257 -- 63 -- 176/78 100 % 208 lb 5.4 oz (94.5 kg) -- -- MD    08/04/24 0100 -- 56 -- 174/79 100 % -- -- -- MD     08/03/24 2345 98.1 °F (36.7 °C) 55 15 172/74 Abnormal  100 % -- None (Room air) 0 L/min AM   08/03/24 2300 -- 54 -- 158/73 99 % -- -- -- MD   08/03/24 2000 97.7 °F (36.5 °C) 56 14 144/67 96 % -- None (Room air) 0 L/min AM   08/03/24 1715 -- 54 14 145/71 96 % 212 lb 4.9 oz (96.3 kg) -- --    08/03/24 1713 -- 55 11 147/73 97 % -- -- --    08/03/24 1710 97.3 °F (36.3 °C) 55 19 147/79 98 % -- None (Room air) --    08/03/24 1545 -- 54 15 150/69 98 % -- None (Room air) --    08/03/24 1457 -- -- -- -- -- -- None (Room air) --    08/03/24 1445 -- 54 9 Abnormal  123/60 96 % -- None (Room air) --    08/03/24 1433 98.3 °F (36.8 °C) 56 14 126/65 96 % 210 lb (95.3 kg) None (Room air) --      FOR REVIEW/APPROVAL OF INPT ADMISSION

## 2024-08-05 NOTE — PROGRESS NOTES
Cardiology Consultation  [facility]    Jonny Haque Patient Status:  Inpatient    4/15/1945 MRN [id number]   [facility] 8NE-A Attending Golden Escalante, DO   Hosp Day # 2 PCP Gee Jimenez MD     Reason for Consultation:  Chest pain    History of Present Illness:  Jonny Haque is a a(n) 79 year old male with pmh CAD with multiple prior interventions (last 3/2024 with LM, LAD, OM), ICM (EF 40-45% 2024, mod-severe aortic stenosis), pAF not on anticoagulation, SDH, HTN, HL, DM, ESRD presenting with chest pain.  Patient reports 4-5 day h/o central chest tightness, worse with exertion and resolved with rest.  States similar to prior anginal pain.  Worse over the past few days.  Denies palpitations, dyspnea, orthopnea, PND, LE edema.  Of note, DAPT held multiple times since PCI 3/2024 2/ possible GIB, then SDH, then patient noncompliance.    On admission patient afeb, HR 56, /65.  Trop negative x 3, ECG with SB with 1st deg AVB and LBBB.  Cardiology consulted for further eval and management.    Subjective:  Chest pain has improved     History:  Past Medical History:    Aortic stenosis    Calculus of kidney    Coronary atherosclerosis    bare metal stents at [facility] 2021    Diabetes (HCC)    Disorder of thyroid    Esophageal varices without bleeding, unspecified esophageal varices type (HCC)    GIB (gastrointestinal bleeding)    Hepatitis, unspecified    hepatitis C-just completed taking Harvoni in 2016    High blood pressure    High cholesterol    Other disorders of plasma-protein metabolism, not elsewhere classified    Renal disorder    Visual impairment    reading glasses     Past Surgical History:   Procedure Laterality Date    Angiogram  08/15/2017    small caliber RCA with 80% mid lesion.  LAD and left circumflex with 50% lesions.  LV shows inferobasilar aneurysm with scar.  Overall LV function preserved at the exterior 65%.    Cath bare metal stent (bms)       Other surgical history      Upper GI surgery    Other surgical history  2017    Cysto Dr. Lees    Other surgical history  2019    BTS Cysto Dr. Lees     Other surgical history  2020    BTS Cysto (Dr. Lees)     Family History   Problem Relation Age of Onset    Cancer Father     Hypertension Mother       reports that he quit smoking about 44 years ago. His smoking use included cigarettes. He started smoking about 59 years ago. He has a 7.5 pack-year smoking history. He has never used smokeless tobacco. He reports that he does not drink alcohol and does not use drugs.    Allergies:  No Known Allergies    Medications:  Current Facility-Administered Medications on File Prior to Encounter   Medication Dose Route Frequency Provider Last Rate Last Admin    [COMPLETED] metoprolol (Lopressor) 5 mg/5mL injection 5 mg  5 mg Intravenous Once Joshua Koenig MD   5 mg at 05/10/24 0010    [] metoprolol (Lopressor) 5 mg/5mL injection             [] sodium chloride 0.9 % IV bolus 100 mL  100 mL Intravenous Q30 Min PRN Johnson Vera MD        And    [] albumin human (Albumin) 25% injection 25 g  25 g Intravenous PRN Dialysis Johnson Vera MD        [COMPLETED] heparin (Porcine) 1000 UNIT/ML injection 1,500 Units  1.5 mL Intracatheter Once Johnson Vera MD   1,500 Units at 24 1645    [COMPLETED] heparin (Porcine) 1000 UNIT/ML injection 1,500 Units  1.5 mL Intracatheter Once Johnson Vera MD   1,500 Units at 24 1432    [COMPLETED] dilTIAZem 10 mg BOLUS FROM BAG infusion  10 mg Intravenous Once Yogi Espinoza MD   10 mg at 24 0918    [COMPLETED] potassium chloride (Klor-Con M20) tab 40 mEq  40 mEq Oral Once Johnson Vera MD   40 mEq at 24 0952    [COMPLETED] levETIRAcetam (Keppra) 500 mg/5mL injection 1,000 mg  1,000 mg Intravenous Once Ignacia De La Garza MD   1,000 mg at 24     Current Outpatient Medications on File Prior to  Encounter   Medication Sig Dispense Refill    Lanthanum Carbonate 1000 MG Oral Chew Tab Chew 1 tablet (1,000 mg total) by mouth 2 (two) times daily with meals.      clopidogrel (PLAVIX) 75 MG Oral Tab Take 1 tablet (75 mg total) by mouth.      aspirin 81 MG Oral Tab EC Take 1 tablet (81 mg total) by mouth every other day.      amiodarone 200 MG Oral Tab Take 1 tablet (200 mg total) by mouth 2 (two) times daily with meals. 60 tablet 3    levothyroxine 150 MCG Oral Tab Take 1 tablet (150 mcg total) by mouth daily. Take one tablet every morning before breakfast      pantoprazole 40 MG Oral Tab EC Take 1 tablet (40 mg total) by mouth 2 (two) times daily before meals.      Sevelamer 800 MG Oral Tab Take 1 tablet (800 mg total) by mouth 3 (three) times daily with meals.      rosuvastatin 10 MG Oral Tab Take 1 tablet (10 mg total) by mouth nightly. (Patient taking differently: Take 1 tablet (10 mg total) by mouth nightly.) 90 tablet 0    nitroGLYCERIN 0.3 MG Sublingual SL Tab Place 1 tablet (0.3 mg total) under the tongue every 5 (five) minutes as needed for Chest pain.      zolpidem 10 MG Oral Tab Take 1 tablet (10 mg total) by mouth nightly as needed for Sleep.      Ferric Citrate (AURYXIA) 1  MG(Fe) Oral Tab       Insulin Pen Needle (TRUEPLUS PEN NEEDLES) 31G X 5 MM Does not apply Misc Use 5 per day 500 each 2    Insulin Glargine, 2 Unit Dial, (TOUJEO MAX SOLOSTAR) 300 UNIT/ML Subcutaneous Solution Pen-injector Inject 20 Units into the skin nightly. 6 mL 2    Glucose Blood (ONETOUCH ULTRA) In Vitro Strip 6 times a day 400 each 3    Insulin Lispro, 1 Unit Dial, (HUMALOG KWIKPEN) 100 UNIT/ML Subcutaneous Solution Pen-injector 12 units before meals with sliding scale. Max daily dose: 50 units. (Patient taking differently: 10 Units. Three times a day. Before meals) 60 mL 3    CONTOUR NEXT TEST In Vitro Strip TEST BLOOD SUGAR FOUR TIMES DAILY 400 strip 3    Blood Glucose Monitoring Suppl ([employer] CONTOUR NEXT MONITOR)  W/DEVICE Does not apply Kit 1 Device by Does not apply route 4 (four) times daily. 1 kit 0       Review of Systems:  Constitutional: denies fevers, chills, night sweats  HEENT: denies headache, vision changes, trouble or pain with swallowing  Cardiac: + chest pain  Pulm: denies dyspnea, cough, wheeze  GI: denies n/v, abd pain, diarrhea or constipation  : denies hematuria, dysuria, incontinence  MSK: denies muscle or joint pains  Neuro: denies numbness, weakness, paresthesias  Psych: denies anxiety, depression  Integument: denies skin rashes or lesions  Heme: denies easy bruising or bleeding  Endo: denies heat/cold intolerance, skin or nail changes      Physical Exam:  Blood pressure 137/60, pulse 55, temperature 97.6 °F (36.4 °C), temperature source Oral, resp. rate 20, height 6' (1.829 m), weight 205 lb 11 oz (93.3 kg), SpO2 99%.  Wt Readings from Last 3 Encounters:   08/05/24 205 lb 11 oz (93.3 kg)   05/17/24 204 lb (92.5 kg)   05/12/24 204 lb 1.6 oz (92.6 kg)       General: awake, alert, oriented x 3, no acute distress  HEENT: at/nc, perrl, eomi  Neck: No JVD, carotids 2+ no bruits.  Cardiac: Regular rate and rhythm, S1, S2 normal, 3/6 mid peaking systolic murmur RUSB  Lungs: Clear without wheezes, rales, rhonchi or dullness.  Normal excursions and effort.  Abdomen: Soft, non-tender, non-distended, normal bowel sounds   Extremities: Without clubbing, cyanosis or edema.  Peripheral pulses are 2+.  Neurologic: Alert and oriented, normal affect.  Psych: normal mood and affect  Skin: Warm and dry.       Laboratories and Data:  Diagnostics:      Labs:   CBC:    Lab Results   Component Value Date    WBC 6.0 08/04/2024    WBC 7.0 08/03/2024    WBC 7.0 05/12/2024     Lab Results   Component Value Date    HEMOGLOBIN 10.5 (L) 02/09/2022    HEMOGLOBIN 12.6 06/07/2016    HEMOGLOBIN 15.1 03/07/2013    HGB 10.9 (L) 08/04/2024    HGB 9.7 (L) 08/03/2024    HGB 11.4 (L) 05/12/2024      Lab Results   Component Value Date    PLT  65.0 (L) 08/04/2024    PLT 69.0 (L) 08/03/2024    PLT 83.0 (L) 05/12/2024     BMP:     Lab Results   Component Value Date    GLUCOSE 129 (H) 02/09/2022    GLUCOSE 128 (H) 06/07/2016    GLUCOSE 359 (H) 03/07/2013     Lab Results   Component Value Date    K 4.2 08/04/2024    K 5.0 08/03/2024    K 4.7 05/12/2024     Lab Results   Component Value Date    BUN 45 (H) 08/04/2024    BUN 83 (H) 08/03/2024    BUN 45 (H) 05/12/2024     Lab Results   Component Value Date    CREATSERUM 5.08 (H) 08/04/2024    CREATSERUM 7.42 (H) 08/03/2024    CREATSERUM 5.41 (H) 05/12/2024     Cholesterol:     Lab Results   Component Value Date    CHOLEST 154 03/19/2024    CHOLEST 174 03/17/2024    CHOLEST 104 04/05/2023     Lab Results   Component Value Date    HDL 33 (L) 03/19/2024    HDL 44 03/17/2024    HDL 25 (L) 04/05/2023     Lab Results   Component Value Date    TRIG 304 (H) 03/19/2024    TRIG 215 (H) 03/17/2024    TRIG 202 (H) 04/05/2023    TRIGLY 248 (H) 02/09/2022    TRIGLY 278 (H) 08/19/2015    TRIGLY 490 (H) 03/07/2013     Lab Results   Component Value Date    LDL 72 03/19/2024    LDL 93 03/17/2024    LDL 46 04/05/2023     Lab Results   Component Value Date    AST 20 08/03/2024    AST 24 05/08/2024    AST 26 04/16/2024     Lab Results   Component Value Date    ALT 38 08/03/2024    ALT 24 05/08/2024    ALT 28 04/16/2024       Assessment/Plan:  79 year old male presenting with:    1) Chest pain, concerning for unstable angina  2) CAD with multiple prior PCI (last 3/2024 of LM, LAD, OM); multiple interruptions of DAPT since then as per hpi  3) ICM (EF 40-45% 4/2024, mod-severe aortic stenosis)  4) pAF on amiodarone (no ac likely 2/2 prior GIB and SDH)  5) SDH  6) HTN  7) HL  8) DM  9) ESRD on HD    - Cont asa, plavix, statin (cleared for DAPT by neurosurgery, see notes 5/2024)  - Contiinue carvedilol 12.5mg bid for BP/rate control, CAD, cardiomyopathy (last outpatient cardiology notes lists metoprolol, diltiazem, losartan, imdur;  unclear that patient is taking any of these as prescribed)  - Cont amiodarone for rhythm control  - Recommend [facility] today.   - Monitor on tele  - Will follow    August Orosco MD

## 2024-08-05 NOTE — PLAN OF CARE
Received patient at 0730. Patient alert and oriented x4. Tele Rhythm SB w/ 1st HB. O2 sats on RA. Lungs clear. Bed is locked and low position. Call light & personal belongings within reach. Denies pain at this time. Patient voiding WNL. Patient tolerating ambulation well/ SBA. Skin dry and intact. Reviewed plan of care with patient and verbalized understanding.     POC:  Cards, Nephro  following:    [facility] today  HD t, Th, Sat    11:06 - [facility] called for HD ordered for tomorrow 8/6    13:45pm, received patient back from cath lab , right wrist accessed, TR band in place with 9 ml,  no intervention at this time.     Problem: Patient/Family Goals  Goal: Patient/Family Long Term Goal  Description: Patient's Long Term Goal: Stay healthy    Interventions:  -Medication management  -Dietary management  -Prompt follow up with physicians  - See additional Care Plan goals for specific interventions  Outcome: Progressing  Goal: Patient/Family Short Term Goal  Description: Patient's Short Term Goal: Discharge home    Interventions:   - Cardiology consult  - Nephrology consult  -HD  Trend trop  - See additional Care Plan goals for specific interventions  Outcome: Progressing     Problem: CARDIOVASCULAR - ADULT  Goal: Maintains optimal cardiac output and hemodynamic stability  Description: INTERVENTIONS:  - Monitor vital signs, rhythm, and trends  - Monitor for bleeding, hypotension and signs of decreased cardiac output  - Evaluate effectiveness of vasoactive medications to optimize hemodynamic stability  - Monitor arterial and/or venous puncture sites for bleeding and/or hematoma  - Assess quality of pulses, skin color and temperature  - Assess for signs of decreased coronary artery perfusion - ex. Angina  - Evaluate fluid balance, assess for edema, trend weights  Outcome: Progressing  Goal: Absence of cardiac arrhythmias or at baseline  Description: INTERVENTIONS:  - Continuous cardiac monitoring, monitor vital signs, obtain 12  lead EKG if indicated  - Evaluate effectiveness of antiarrhythmic and heart rate control medications as ordered  - Initiate emergency measures for life threatening arrhythmias  - Monitor electrolytes and administer replacement therapy as ordered  Outcome: Progressing

## 2024-08-05 NOTE — PLAN OF CARE
Assumed patient at 1930. Alert and and oriented x 4 on room air. Lung sounds clear bilaterally.  Sinus john on tele. Continent of bowel. Incontinent of bladder. Patient complaining of pain and pressure in lower abdomen bladder scanned came back at 806 mL. Straight cath patient per protocol 706 mL out. Up standby assist. Updated patient and family on plan or care and patient and family verbalized understanding.     Plan of care: C, HD on Tuesday.     Problem: Patient/Family Goals  Goal: Patient/Family Long Term Goal  Description: Patient's Long Term Goal: Stay healthy    Interventions:  -Medication management  -Dietary management  -Prompt follow up with physicians  - See additional Care Plan goals for specific interventions  Outcome: Progressing  Goal: Patient/Family Short Term Goal  Description: Patient's Short Term Goal: Discharge home    Interventions:   - Cardiology consult  - Nephrology consult  -HD  Trend trop  - See additional Care Plan goals for specific interventions  Outcome: Progressing     Problem: CARDIOVASCULAR - ADULT  Goal: Maintains optimal cardiac output and hemodynamic stability  Description: INTERVENTIONS:  - Monitor vital signs, rhythm, and trends  - Monitor for bleeding, hypotension and signs of decreased cardiac output  - Evaluate effectiveness of vasoactive medications to optimize hemodynamic stability  - Monitor arterial and/or venous puncture sites for bleeding and/or hematoma  - Assess quality of pulses, skin color and temperature  - Assess for signs of decreased coronary artery perfusion - ex. Angina  - Evaluate fluid balance, assess for edema, trend weights  Outcome: Progressing  Goal: Absence of cardiac arrhythmias or at baseline  Description: INTERVENTIONS:  - Continuous cardiac monitoring, monitor vital signs, obtain 12 lead EKG if indicated  - Evaluate effectiveness of antiarrhythmic and heart rate control medications as ordered  - Initiate emergency measures for life threatening  arrhythmias  - Monitor electrolytes and administer replacement therapy as ordered  Outcome: Progressing     Problem: SAFETY ADULT - FALL  Goal: Free from fall injury  Description: INTERVENTIONS:  - Assess pt frequently for physical needs  - Identify cognitive and physical deficits and behaviors that affect risk of falls.  - [place] fall precautions as indicated by assessment.  - Educate pt/family on patient safety including physical limitations  - Instruct pt to call for assistance with activity based on assessment  - Modify environment to reduce risk of injury  - Provide assistive devices as appropriate  - Consider OT/PT consult to assist with strengthening/mobility  - Encourage toileting schedule  Outcome: Progressing

## 2024-08-05 NOTE — PROGRESS NOTES
Nephrology Progress Note    Jonny Haque Attending:  Golden Escalante DO       SUBJECTIVE:     Plan for HD tomorrow     PHYSICAL EXAM:     Vital Signs: /66 (BP Location: Left arm)   Pulse 55   Temp 98 °F (36.7 °C) (Oral)   Resp 18   Ht 6' (1.829 m)   Wt 205 lb 11 oz (93.3 kg)   SpO2 97%   BMI 27.90 kg/m²   Temp (24hrs), Av.7 °F (36.5 °C), Min:97.4 °F (36.3 °C), Max:98 °F (36.7 °C)       Intake/Output Summary (Last 24 hours) at 2024 1609  Last data filed at 2024 1200  Gross per 24 hour   Intake --   Output 1506 ml   Net -1506 ml     Wt Readings from Last 3 Encounters:   24 205 lb 11 oz (93.3 kg)   24 204 lb (92.5 kg)   24 204 lb 1.6 oz (92.6 kg)       General: pleasant, well appearing  HEENT: NCAT  Cardiac: RRR  Lungs: no distress  Abdomen: soft, non-tender  Extremities: no LE edema  Neurologic/Psych: mentating well     LABORATORY DATA:     Lab Results   Component Value Date     (H) 2024    BUN 45 (H) 2024    BUNCREA 13.0 2022    CREATSERUM 5.08 (H) 2024    ANIONGAP 9 2024    GFR 41 (L) 2017    GFRNAA 8 (L) 2022    GFRAA 9 (L) 2022    CA 9.5 2024    OSMOCALC 292 2024    ALKPHO 183 (H) 2024    AST 20 2024    ALT 38 2024    BILT 0.4 2024    TP 7.0 2024    ALB 4.3 2024    GLOBULIN 2.7 2024    AGRATIO 1.0 (L) 2016     (L) 2024    K 4.2 2024    CL 98 2024    CO2 26.0 2024     Lab Results   Component Value Date    WBC 6.0 2024    RBC 3.22 (L) 2024    HGB 10.9 (L) 2024    HCT 32.3 (L) 2024    PLT 57.0 (L) 2024    .3 (H) 2024    MCH 33.9 2024    MCHC 33.7 2024    RDW 13.8 2024    NEPRELIM 3.84 2024    NEPERCENT 64.4 2024    LYPERCENT 19.5 2024    MOPERCENT 8.9 2024    EOPERCENT 5.7 2024    BAPERCENT 0.8 2024    NE 3.84 2024    LYMABS  1.16 08/04/2024    MOABSO 0.53 08/04/2024    EOABSO 0.34 08/04/2024    BAABSO 0.05 08/04/2024     Lab Results   Component Value Date    MALBP 49.2 (H) 09/04/2019    CREUR 91.65 09/04/2019     Lab Results   Component Value Date    COLORUR YELLOW 02/09/2022    CLARITY Hazy (A) 07/18/2019    SPECGRAVITY 1.013 07/02/2021    GLUUR NEGATIVE 02/09/2022    BILUR NEGATIVE 02/09/2022    KETUR NEGATIVE 02/09/2022    BLOODURINE SMALL (A) 02/09/2022    PHURINE 7 02/09/2022    PROUR 100 (A) 02/09/2022    UROBILINOGEN <2.0 07/18/2019    NITRITE NEGATIVE 02/09/2022    LEUUR NEGATIVE 02/09/2022    WBCUR 1-4 07/18/2019    RBCUR 6-10 (A) 07/18/2019    EPIUR RARE (A) 02/09/2022    BACUR NONE 02/09/2022    HYLUR Present (A) 12/19/2016       IMAGING:     Reviewed    MEDICATIONS:       Current Facility-Administered Medications   Medication Dose Route Frequency    carvedilol (Coreg) tab 6.25 mg  6.25 mg Oral BID with meals    losartan (Cozaar) tab 25 mg  25 mg Oral Daily    clopidogrel (Plavix) tab 75 mg  75 mg Oral Daily    Lanthanum Carbonate (FOSRENOL) chewable tab 1,000 mg  1,000 mg Oral BID with meals    sodium chloride 0.9 % IV bolus 100 mL  100 mL Intravenous Q30 Min PRN    And    albumin human (Albumin) 25% injection 25 g  25 g Intravenous PRN Dialysis    aspirin DR tab 81 mg  81 mg Oral QOD    amiodarone (Pacerone) tab 200 mg  200 mg Oral BID with meals    rosuvastatin (Crestor) tab 10 mg  10 mg Oral Nightly    pantoprazole (Protonix) DR tab 40 mg  40 mg Oral BID AC    levothyroxine (Synthroid) tab 150 mcg  150 mcg Oral Before breakfast    insulin aspart (NovoLOG) 100 Units/mL FlexPen 10 Units  10 Units Subcutaneous TID AC    insulin degludec (Tresiba) 100 units/mL flextouch 24 Units  24 Units Subcutaneous Nightly    acetaminophen (Tylenol Extra Strength) tab 500 mg  500 mg Oral Q4H PRN    polyethylene glycol (PEG 3350) (Miralax) 17 g oral packet 17 g  17 g Oral Daily PRN    sennosides (Senokot) tab 17.2 mg  17.2 mg Oral Nightly  PRN    bisacodyl (Dulcolax) 10 MG rectal suppository 10 mg  10 mg Rectal Daily PRN    ondansetron (Zofran) 4 MG/2ML injection 4 mg  4 mg Intravenous Q6H PRN    metoclopramide (Reglan) 5 mg/mL injection 5 mg  5 mg Intravenous Q8H PRN    glucose (Dex4) 15 GM/59ML oral liquid 15 g  15 g Oral Q15 Min PRN    Or    glucose (Glutose) 40% oral gel 15 g  15 g Oral Q15 Min PRN    Or    glucose-vitamin C (Dex-4) chewable tab 4 tablet  4 tablet Oral Q15 Min PRN    Or    dextrose 50% injection 50 mL  50 mL Intravenous Q15 Min PRN    Or    glucose (Dex4) 15 GM/59ML oral liquid 30 g  30 g Oral Q15 Min PRN    Or    glucose (Glutose) 40% oral gel 30 g  30 g Oral Q15 Min PRN    Or    glucose-vitamin C (Dex-4) chewable tab 8 tablet  8 tablet Oral Q15 Min PRN    insulin aspart (NovoLOG) 100 Units/mL FlexPen 2-10 Units  2-10 Units Subcutaneous TID AC and HS    heparin (Porcine) 5000 UNIT/ML injection 5,000 Units  5,000 Units Subcutaneous Q8H ANNEL       ASSESSMENT/PLAN:     ESRD  - HD TTS     Hypertension    Hyponatremia  -Likely due to volume excess    Anemia  -Hemoglobin goal greater than 10  -Holding JIE until acute thrombotic events ruled out    CKD BMD  -Lanthanum 1000mg BID    Chest pain        HD TTS.  Continue dialysis per routine schedule  UF with HD as tolerated  BP medications per cardiology  No indication for JIE with hemoglobin >10      We will continue to follow    DO Joselito Hoff [facility] - Nephrology

## 2024-08-06 ENCOUNTER — APPOINTMENT (OUTPATIENT)
Dept: CT IMAGING | Facility: HOSPITAL | Age: 79
End: 2024-08-06
Attending: UROLOGY
Payer: MEDICARE

## 2024-08-06 LAB
ANION GAP SERPL CALC-SCNC: 10 MMOL/L (ref 0–18)
ATRIAL RATE: 61 BPM
BASOPHILS # BLD AUTO: 0.04 X10(3) UL (ref 0–0.2)
BASOPHILS NFR BLD AUTO: 0.7 %
BUN BLD-MCNC: 80 MG/DL (ref 9–23)
CALCIUM BLD-MCNC: 9.1 MG/DL (ref 8.7–10.4)
CHLORIDE SERPL-SCNC: 99 MMOL/L (ref 98–112)
CO2 SERPL-SCNC: 25 MMOL/L (ref 21–32)
CREAT BLD-MCNC: 7.63 MG/DL
EGFRCR SERPLBLD CKD-EPI 2021: 7 ML/MIN/1.73M2 (ref 60–?)
EOSINOPHIL # BLD AUTO: 0.29 X10(3) UL (ref 0–0.7)
EOSINOPHIL NFR BLD AUTO: 5.2 %
ERYTHROCYTE [DISTWIDTH] IN BLOOD BY AUTOMATED COUNT: 13.6 %
GLUCOSE BLD-MCNC: 102 MG/DL (ref 70–99)
GLUCOSE BLD-MCNC: 107 MG/DL (ref 70–99)
GLUCOSE BLD-MCNC: 113 MG/DL (ref 70–99)
GLUCOSE BLD-MCNC: 114 MG/DL (ref 70–99)
GLUCOSE BLD-MCNC: 214 MG/DL (ref 70–99)
GLUCOSE BLD-MCNC: 225 MG/DL (ref 70–99)
HCT VFR BLD AUTO: 30.9 %
HGB BLD-MCNC: 10.8 G/DL
IMM GRANULOCYTES # BLD AUTO: 0.02 X10(3) UL (ref 0–1)
IMM GRANULOCYTES NFR BLD: 0.4 %
LYMPHOCYTES # BLD AUTO: 1.37 X10(3) UL (ref 1–4)
LYMPHOCYTES NFR BLD AUTO: 24.4 %
MCH RBC QN AUTO: 34.1 PG (ref 26–34)
MCHC RBC AUTO-ENTMCNC: 35 G/DL (ref 31–37)
MCV RBC AUTO: 97.5 FL
MONOCYTES # BLD AUTO: 0.41 X10(3) UL (ref 0.1–1)
MONOCYTES NFR BLD AUTO: 7.3 %
NEUTROPHILS # BLD AUTO: 3.48 X10 (3) UL (ref 1.5–7.7)
NEUTROPHILS # BLD AUTO: 3.48 X10(3) UL (ref 1.5–7.7)
NEUTROPHILS NFR BLD AUTO: 62 %
OSMOLALITY SERPL CALC.SUM OF ELEC: 303 MOSM/KG (ref 275–295)
P AXIS: 38 DEGREES
P-R INTERVAL: 204 MS
PLATELET # BLD AUTO: 52 10(3)UL (ref 150–450)
PLATELET # BLD AUTO: 85 10(3)UL (ref 150–450)
POTASSIUM SERPL-SCNC: 4.7 MMOL/L (ref 3.5–5.1)
Q-T INTERVAL: 510 MS
QRS DURATION: 140 MS
QTC CALCULATION (BEZET): 513 MS
R AXIS: -33 DEGREES
RBC # BLD AUTO: 3.17 X10(6)UL
SODIUM SERPL-SCNC: 134 MMOL/L (ref 136–145)
T AXIS: 105 DEGREES
VENTRICULAR RATE: 61 BPM
WBC # BLD AUTO: 5.6 X10(3) UL (ref 4–11)

## 2024-08-06 PROCEDURE — 90935 HEMODIALYSIS ONE EVALUATION: CPT | Performed by: STUDENT IN AN ORGANIZED HEALTH CARE EDUCATION/TRAINING PROGRAM

## 2024-08-06 PROCEDURE — 80048 BASIC METABOLIC PNL TOTAL CA: CPT | Performed by: STUDENT IN AN ORGANIZED HEALTH CARE EDUCATION/TRAINING PROGRAM

## 2024-08-06 PROCEDURE — 85049 AUTOMATED PLATELET COUNT: CPT | Performed by: HOSPITALIST

## 2024-08-06 PROCEDURE — 93010 ELECTROCARDIOGRAM REPORT: CPT | Performed by: INTERNAL MEDICINE

## 2024-08-06 PROCEDURE — 82962 GLUCOSE BLOOD TEST: CPT

## 2024-08-06 PROCEDURE — 85025 COMPLETE CBC W/AUTO DIFF WBC: CPT | Performed by: INTERNAL MEDICINE

## 2024-08-06 PROCEDURE — 74176 CT ABD & PELVIS W/O CONTRAST: CPT | Performed by: UROLOGY

## 2024-08-06 PROCEDURE — 93005 ELECTROCARDIOGRAM TRACING: CPT

## 2024-08-06 RX ORDER — NITROGLYCERIN 0.4 MG/1
0.4 TABLET SUBLINGUAL EVERY 5 MIN PRN
Status: DISCONTINUED | OUTPATIENT
Start: 2024-08-06 | End: 2024-08-14

## 2024-08-06 RX ORDER — NITROGLYCERIN 0.6 MG/1
0.6 TABLET SUBLINGUAL EVERY 5 MIN PRN
Status: DISCONTINUED | OUTPATIENT
Start: 2024-08-06 | End: 2024-08-06

## 2024-08-06 RX ORDER — HEPARIN SODIUM 1000 [USP'U]/ML
1.5 INJECTION, SOLUTION INTRAVENOUS; SUBCUTANEOUS
Status: DISCONTINUED | OUTPATIENT
Start: 2024-08-06 | End: 2024-08-14

## 2024-08-06 NOTE — PLAN OF CARE
Received patient at 0730. Patient alert and oriented x4. Tele Rhythm SB w/ 1st HB. O2 sats on RA. Lungs clear. Bed is locked and low position. Call light & personal belongings within reach. Denies pain at this time. Patient voiding WNL. Patient tolerating ambulation well/ SBA. Skin dry and intact. Reviewed plan of care with patient and verbalized understanding.     POC:  Cards, Nephro  followin: Patient c/o chest pain , cards notified, nitroglycerin and EKG ordered. Patient reports relief post nitroglycerin     Urology to see, patient had some hematuria this morning 50ml, per urology if patient uncomfortable & retaining - insert 3 way mccloud and may need CBI otherwise monitor for now     Problem: Patient/Family Goals  Goal: Patient/Family Long Term Goal  Description: Patient's Long Term Goal: Stay healthy    Interventions:  -Medication management  -Dietary management  -Prompt follow up with physicians  - See additional Care Plan goals for specific interventions  Outcome: Progressing  Goal: Patient/Family Short Term Goal  Description: Patient's Short Term Goal: Discharge home    Interventions:   - Cardiology consult  - Nephrology consult  -HD  Trend trop  - See additional Care Plan goals for specific interventions  Outcome: Progressing     Problem: CARDIOVASCULAR - ADULT  Goal: Maintains optimal cardiac output and hemodynamic stability  Description: INTERVENTIONS:  - Monitor vital signs, rhythm, and trends  - Monitor for bleeding, hypotension and signs of decreased cardiac output  - Evaluate effectiveness of vasoactive medications to optimize hemodynamic stability  - Monitor arterial and/or venous puncture sites for bleeding and/or hematoma  - Assess quality of pulses, skin color and temperature  - Assess for signs of decreased coronary artery perfusion - ex. Angina  - Evaluate fluid balance, assess for edema, trend weights  Outcome: Progressing  Goal: Absence of cardiac arrhythmias or at  baseline  Description: INTERVENTIONS:  - Continuous cardiac monitoring, monitor vital signs, obtain 12 lead EKG if indicated  - Evaluate effectiveness of antiarrhythmic and heart rate control medications as ordered  - Initiate emergency measures for life threatening arrhythmias  - Monitor electrolytes and administer replacement therapy as ordered  Outcome: Progressing

## 2024-08-06 NOTE — PROGRESS NOTES
Nephrology Progress Note    Jonny Garland Attending:  Golden Escalante DO       SUBJECTIVE:     HD today.     PHYSICAL EXAM:     Vital Signs: /81 (BP Location: Left arm)   Pulse 64   Temp 97.8 °F (36.6 °C) (Oral)   Resp 16   Ht 6' (1.829 m)   Wt 205 lb 11 oz (93.3 kg)   SpO2 98%   BMI 27.90 kg/m²   Temp (24hrs), Av.6 °F (36.4 °C), Min:97.5 °F (36.4 °C), Max:97.8 °F (36.6 °C)       Intake/Output Summary (Last 24 hours) at 2024 1657  Last data filed at 2024 0900  Gross per 24 hour   Intake 240 ml   Output 100 ml   Net 140 ml     Wt Readings from Last 3 Encounters:   24 205 lb 11 oz (93.3 kg)   24 204 lb (92.5 kg)   24 204 lb 1.6 oz (92.6 kg)       General: pleasant, well appearing  HEENT: NCAT  Cardiac: RRR  Lungs: no distress  Abdomen: soft, non-tender  Extremities: no LE edema  Neurologic/Psych: mentating well     LABORATORY DATA:     Lab Results   Component Value Date     (H) 2024    BUN 80 (H) 2024    BUNCREA 13.0 2022    CREATSERUM 7.63 (H) 2024    ANIONGAP 10 2024    GFR 41 (L) 2017    GFRNAA 8 (L) 2022    GFRAA 9 (L) 2022    CA 9.1 2024    OSMOCALC 303 (H) 2024    ALKPHO 183 (H) 2024    AST 20 2024    ALT 38 2024    BILT 0.4 2024    TP 7.0 2024    ALB 4.3 2024    GLOBULIN 2.7 2024    AGRATIO 1.0 (L) 2016     (L) 2024    K 4.7 2024    CL 99 2024    CO2 25.0 2024     Lab Results   Component Value Date    WBC 5.6 2024    RBC 3.17 (L) 2024    HGB 10.8 (L) 2024    HCT 30.9 (L) 2024    PLT 85.0 (L) 2024    MCV 97.5 2024    MCH 34.1 (H) 2024    MCHC 35.0 2024    RDW 13.6 2024    NEPRELIM 3.48 2024    NEPERCENT 62.0 2024    LYPERCENT 24.4 2024    MOPERCENT 7.3 2024    EOPERCENT 5.2 2024    BAPERCENT 0.7 2024    NE 3.48 2024    LYMABS 1.37  08/06/2024    MOABSO 0.41 08/06/2024    EOABSO 0.29 08/06/2024    BAABSO 0.04 08/06/2024     Lab Results   Component Value Date    MALBP 49.2 (H) 09/04/2019    CREUR 91.65 09/04/2019     Lab Results   Component Value Date    COLORUR YELLOW 02/09/2022    CLARITY Hazy (A) 07/18/2019    SPECGRAVITY 1.013 07/02/2021    GLUUR NEGATIVE 02/09/2022    BILUR NEGATIVE 02/09/2022    KETUR NEGATIVE 02/09/2022    BLOODURINE SMALL (A) 02/09/2022    PHURINE 7 02/09/2022    PROUR 100 (A) 02/09/2022    UROBILINOGEN <2.0 07/18/2019    NITRITE NEGATIVE 02/09/2022    LEUUR NEGATIVE 02/09/2022    WBCUR 1-4 07/18/2019    RBCUR 6-10 (A) 07/18/2019    EPIUR RARE (A) 02/09/2022    BACUR NONE 02/09/2022    HYLUR Present (A) 12/19/2016       IMAGING:     Reviewed    MEDICATIONS:       Current Facility-Administered Medications   Medication Dose Route Frequency    nitroglycerin (Nitrostat) SL tab 0.4 mg  0.4 mg Sublingual Q5 Min PRN    heparin (Porcine) 1000 UNIT/ML injection 1,500 Units  1.5 mL Intracatheter PRN Dialysis    carvedilol (Coreg) tab 6.25 mg  6.25 mg Oral BID with meals    losartan (Cozaar) tab 25 mg  25 mg Oral Daily    clopidogrel (Plavix) tab 75 mg  75 mg Oral Daily    Lanthanum Carbonate (FOSRENOL) chewable tab 1,000 mg  1,000 mg Oral BID with meals    aspirin DR tab 81 mg  81 mg Oral QOD    amiodarone (Pacerone) tab 200 mg  200 mg Oral BID with meals    rosuvastatin (Crestor) tab 10 mg  10 mg Oral Nightly    pantoprazole (Protonix) DR tab 40 mg  40 mg Oral BID AC    levothyroxine (Synthroid) tab 150 mcg  150 mcg Oral Before breakfast    insulin aspart (NovoLOG) 100 Units/mL FlexPen 10 Units  10 Units Subcutaneous TID AC    insulin degludec (Tresiba) 100 units/mL flextouch 24 Units  24 Units Subcutaneous Nightly    acetaminophen (Tylenol Extra Strength) tab 500 mg  500 mg Oral Q4H PRN    polyethylene glycol (PEG 3350) (Miralax) 17 g oral packet 17 g  17 g Oral Daily PRN    sennosides (Senokot) tab 17.2 mg  17.2 mg Oral Nightly  PRN    bisacodyl (Dulcolax) 10 MG rectal suppository 10 mg  10 mg Rectal Daily PRN    ondansetron (Zofran) 4 MG/2ML injection 4 mg  4 mg Intravenous Q6H PRN    metoclopramide (Reglan) 5 mg/mL injection 5 mg  5 mg Intravenous Q8H PRN    glucose (Dex4) 15 GM/59ML oral liquid 15 g  15 g Oral Q15 Min PRN    Or    glucose (Glutose) 40% oral gel 15 g  15 g Oral Q15 Min PRN    Or    glucose-vitamin C (Dex-4) chewable tab 4 tablet  4 tablet Oral Q15 Min PRN    Or    dextrose 50% injection 50 mL  50 mL Intravenous Q15 Min PRN    Or    glucose (Dex4) 15 GM/59ML oral liquid 30 g  30 g Oral Q15 Min PRN    Or    glucose (Glutose) 40% oral gel 30 g  30 g Oral Q15 Min PRN    Or    glucose-vitamin C (Dex-4) chewable tab 8 tablet  8 tablet Oral Q15 Min PRN    insulin aspart (NovoLOG) 100 Units/mL FlexPen 2-10 Units  2-10 Units Subcutaneous TID AC and HS    heparin (Porcine) 5000 UNIT/ML injection 5,000 Units  5,000 Units Subcutaneous Q8H ANNEL       ASSESSMENT/PLAN:     ESRD  - HD TTS     Hypertension    Hyponatremia  -Likely due to volume excess    Anemia  -Hemoglobin goal greater than 10  -Holding JIE until acute thrombotic events ruled out    CKD BMD  -Lanthanum 1000mg BID    Chest pain        HD TTS.  Continue dialysis per routine schedule  UF with HD as tolerated  BP medications per cardiology  No indication for JIE with hemoglobin >10      We will continue to follow    DO Joselito Hoff [facility] - Nephrology

## 2024-08-06 NOTE — PROGRESS NOTES
[facility]   part of [facility] Hospitalist Progress Note     Jonny Haque Patient Status:  Inpatient    4/15/1945 MRN [id number]   Location [facility] 8NE-A Attending Golden Escalante, DO   Hosp Day # 3 PCP Gee Jimenez MD     Follow Up:  The primary encounter diagnosis was Exertional chest pain. A diagnosis of Acute renal failure on dialysis (HCC) was also pertinent to this visit.    Subjective:     Patient seen and examined.  He had episode of chest pain this AM, improved with SL NTG.  Also noted to have some hematuria this AM, still has difficulty voiding, plan for possible mccloud.  Receiving HD currently.     Objective:    Review of Systems:   10 point ROS completed and was negative, except for pertinent positive and negatives stated in subjective.    Vital signs:  Temp:  [97.5 °F (36.4 °C)-97.8 °F (36.6 °C)] 97.8 °F (36.6 °C)  Pulse:  [54-70] 70  Resp:  [16-20] 18  BP: (123-154)/(60-95) 145/80  SpO2:  [97 %-100 %] 99 %    Physical Exam:    Gen: No acute distress, alert and oriented x 3. Chronically ill appearing.   Pulm: Lungs clear bilaterally, good inspiratory effort   CV:  nL S1/S2  Abd: soft, NT/ND, no hepatomegaly, +BS  MSK: moving all extremities, no edema  Neuro: no focal deficits  Skin: no rashes/lesions  Psych: normal mood/affect      Diagnostic Data:    Labs:  Recent Labs   Lab 24  0440   WBC 7.0 6.0  --   --    HGB 9.7* 10.9*  --   --    MCV 99.3 100.3*  --   --    PLT 69.0* 65.0* 57.0* 52.0*       Recent Labs   Lab 24  0440   * 175* 107*   BUN 83* 45* 80*   CREATSERUM 7.42* 5.08* 7.63*   CA 9.3 9.5 9.1   ALB 4.3  --   --    * 133* 134*   K 5.0 4.2 4.7   CL 95* 98 99   CO2 25.0 26.0 25.0   ALKPHO 183*  --   --    AST 20  --   --    ALT 38  --   --    BILT 0.4  --   --    TP 7.0  --   --        Estimated Creatinine Clearance: 8.9 mL/min (A) (based on  SCr of 7.63 mg/dL (H)).    No results for input(s): \"PTP\", \"INR\" in the last 168 hours.         COVID-19 Lab Results    COVID-19  Lab Results   Component Value Date    COVID19 Not Detected 04/16/2024    COVID19 Not Detected 03/17/2024    COVID19 Not Detected 04/05/2023       Pro-Calcitonin  No results for input(s): \"PCT\" in the last 168 hours.    Cardiac  No results for input(s): \"TROP\", \"PBNP\" in the last 168 hours.    Creatinine Kinase  No results for input(s): \"CK\" in the last 168 hours.    Inflammatory Markers  No results for input(s): \"CRP\", \"NEELAM\", \"LDH\", \"DDIMER\" in the last 168 hours.    Imaging: Imaging data reviewed in Epic.    Medications:    carvedilol  6.25 mg Oral BID with meals    losartan  25 mg Oral Daily    clopidogrel  75 mg Oral Daily    Lanthanum Carbonate  1,000 mg Oral BID with meals    aspirin  81 mg Oral QOD    amiodarone  200 mg Oral BID with meals    rosuvastatin  10 mg Oral Nightly    pantoprazole  40 mg Oral BID AC    levothyroxine  150 mcg Oral Before breakfast    insulin aspart  10 Units Subcutaneous TID AC    insulin degludec  24 Units Subcutaneous Nightly    insulin aspart  2-10 Units Subcutaneous TID AC and HS    heparin  5,000 Units Subcutaneous Q8H ANNEL       Assessment & Plan:       79-year-old male significant past medical history of CAD with stable angina-status post PCI in 2017, 2021 and most recently in March 2024 with 3 drug-eluting stents placed in the left main, LAD and OM, hypertension, pulmonary hypertension paroxysmal A-fib, type 2 diabetes, hypothyroidism, end-stage renal disease on dialysis, history of subdural hematoma, ischemic cardiomyopathy history of HCV cirrhosis moderate aortic stenosis, prior history of GI bleed presented with chest pain     # Chest pain with history of stable angina  # CAD status post PCI  -Serial troponin  -Cardiology consult  -Patient was not on dual antiplatelet therapy secondary to recent subdural hematoma and fall, however pt was cleared  by NSGY for DAPT, cont ASA, plavix   -Continue aspirin, statin  -LHC done today showed  80% OM disease and severed ISR in prox LAD, medical management recommended  -Recent echo completed and reviewed  -pt not a candidate for CABG, plan further PCI per cards     # Paroxysmal A-fib  # V. Tach  -Continue amiodarone  -EKG reviewed showed sinus rhythm      # Recent fall  acute subdural hematoma  -Was admitted in May 2024 with this, holding dual antiplatelet therapy  -Was followed up with neurosurgery  -CT head has been stable  -Currently on aspirin, continue to monitor for falls     # ESRD  -HD per nephrology  -Missed session today, completed dialysis per nephrology  # Hypothyroidism  # HSV cirrhosis  # Moderate aortic stenosis  # Ischemic cardiomyopathy   # Thrombocytopenia platelets of 57 continue to monitor-  -pt last saw Dr. Julian Nolasco 1/24/24, low pltc likely due to Hep C and cirrhosis, monitor, no intervention needed if pltc < 50    # Type 2 diabetes  -Continue 20 units of insulin, sliding scale     # Essential HTN  - per discussion with pt and his son Ashwin, he was taken off metoprolol, diltiazem, and losartan as of 6/24/24 by outside renal  - coreg started, monitor     # Urinary retention   - cont straight cath per protocol  -  c/s appreciated, may need mccloud     Plan of care: inpt care.      Plan of care discussed with patient or family at bedside.    Golden Escalante,     Supplementary Documentation:     Quality:  DVT Prophylaxis: hep subcutaneous (on hold due to hematuria)  CODE status: FULL  Mccloud: no  Central line: no  If COVID testing is negative, may discontinue isolation: yes     Estimated date of discharge: TBD  Discharge is dependent on: clinical course   At this point Mr. Haque is expected to be discharge to: home    Plan of care discussed with pt

## 2024-08-06 NOTE — PROGRESS NOTES
Cardiology Consultation  [facility]    Jonny Haque Patient Status:  Inpatient    4/15/1945 MRN [id number]   Location [facility] 8NE-A Attending Golden Escalante, DO   Hosp Day # 3 PCP Gee Jimenez MD     Reason for Consultation:  Chest pain    History of Present Illness:  Jonny Haque is a a(n) 79 year old male with pmh CAD with multiple prior interventions (last 3/2024 with LM, LAD, OM), ICM (EF 40-45% 2024, mod-severe aortic stenosis), pAF not on anticoagulation, SDH, HTN, HL, DM, ESRD presenting with chest pain.  Patient reports 4-5 day h/o central chest tightness, worse with exertion and resolved with rest.  States similar to prior anginal pain.  Worse over the past few days.  Denies palpitations, dyspnea, orthopnea, PND, LE edema.  Of note, DAPT held multiple times since PCI 3/2024 2/ possible GIB, then SDH, then patient noncompliance.      Subjective:  -Had some CP this AM.  Improved with NTG.  -No subsequent pain today.  -Angio yesterday, doing well.  -No overnight events.  -Echo not done/order disappeared.    History:  Past Medical History:    Aortic stenosis    Calculus of kidney    Coronary atherosclerosis    bare metal stents at [facility] 2021    Diabetes (HCC)    Disorder of thyroid    Esophageal varices without bleeding, unspecified esophageal varices type (HCC)    GIB (gastrointestinal bleeding)    Hepatitis, unspecified    hepatitis C-just completed taking Harvoni in 2016    High blood pressure    High cholesterol    Other disorders of plasma-protein metabolism, not elsewhere classified    Renal disorder    Visual impairment    reading glasses     Past Surgical History:   Procedure Laterality Date    Angiogram  08/15/2017    small caliber RCA with 80% mid lesion.  LAD and left circumflex with 50% lesions.  LV shows inferobasilar aneurysm with scar.  Overall LV function preserved at the exterior 65%.    Cath bare metal stent (bms)      Other surgical  history      Upper GI surgery    Other surgical history  2017    Cysto Dr. Lees    Other surgical history  2019    BTS Cysto Dr. Lees     Other surgical history  2020    BTS Cysto (Dr. Lees)     Family History   Problem Relation Age of Onset    Cancer Father     Hypertension Mother       reports that he quit smoking about 44 years ago. His smoking use included cigarettes. He started smoking about 59 years ago. He has a 7.5 pack-year smoking history. He has never used smokeless tobacco. He reports that he does not drink alcohol and does not use drugs.    Allergies:  No Known Allergies    Medications:  Current Facility-Administered Medications on File Prior to Encounter   Medication Dose Route Frequency Provider Last Rate Last Admin    [COMPLETED] metoprolol (Lopressor) 5 mg/5mL injection 5 mg  5 mg Intravenous Once Joshua Koenig MD   5 mg at 05/10/24 0010    [] metoprolol (Lopressor) 5 mg/5mL injection             [] sodium chloride 0.9 % IV bolus 100 mL  100 mL Intravenous Q30 Min PRN Johnson Vera MD        And    [] albumin human (Albumin) 25% injection 25 g  25 g Intravenous PRN Dialysis Johnson Vera MD        [COMPLETED] heparin (Porcine) 1000 UNIT/ML injection 1,500 Units  1.5 mL Intracatheter Once Johnson Vera MD   1,500 Units at 24 1645    [COMPLETED] heparin (Porcine) 1000 UNIT/ML injection 1,500 Units  1.5 mL Intracatheter Once Johnson Vera MD   1,500 Units at 24 1432    [COMPLETED] dilTIAZem 10 mg BOLUS FROM BAG infusion  10 mg Intravenous Once Yogi Espinoza MD   10 mg at 24 0918    [COMPLETED] potassium chloride (Klor-Con M20) tab 40 mEq  40 mEq Oral Once Johnson Vera MD   40 mEq at 24 0952    [COMPLETED] levETIRAcetam (Keppra) 500 mg/5mL injection 1,000 mg  1,000 mg Intravenous Once Ignacia De La Garza MD   1,000 mg at 24     Current Outpatient Medications on File Prior to Encounter   Medication  Sig Dispense Refill    Lanthanum Carbonate 1000 MG Oral Chew Tab Chew 1 tablet (1,000 mg total) by mouth 2 (two) times daily with meals.      clopidogrel (PLAVIX) 75 MG Oral Tab Take 1 tablet (75 mg total) by mouth.      aspirin 81 MG Oral Tab EC Take 1 tablet (81 mg total) by mouth every other day.      amiodarone 200 MG Oral Tab Take 1 tablet (200 mg total) by mouth 2 (two) times daily with meals. 60 tablet 3    levothyroxine 150 MCG Oral Tab Take 1 tablet (150 mcg total) by mouth daily. Take one tablet every morning before breakfast      pantoprazole 40 MG Oral Tab EC Take 1 tablet (40 mg total) by mouth 2 (two) times daily before meals.      Sevelamer 800 MG Oral Tab Take 1 tablet (800 mg total) by mouth 3 (three) times daily with meals.      rosuvastatin 10 MG Oral Tab Take 1 tablet (10 mg total) by mouth nightly. (Patient taking differently: Take 1 tablet (10 mg total) by mouth nightly.) 90 tablet 0    nitroGLYCERIN 0.3 MG Sublingual SL Tab Place 1 tablet (0.3 mg total) under the tongue every 5 (five) minutes as needed for Chest pain.      zolpidem 10 MG Oral Tab Take 1 tablet (10 mg total) by mouth nightly as needed for Sleep.      Ferric Citrate (AURYXIA) 1  MG(Fe) Oral Tab       Insulin Pen Needle (TRUEPLUS PEN NEEDLES) 31G X 5 MM Does not apply Misc Use 5 per day 500 each 2    Insulin Glargine, 2 Unit Dial, (TOUJEO MAX SOLOSTAR) 300 UNIT/ML Subcutaneous Solution Pen-injector Inject 20 Units into the skin nightly. 6 mL 2    Glucose Blood (ONETOUCH ULTRA) In Vitro Strip 6 times a day 400 each 3    Insulin Lispro, 1 Unit Dial, (HUMALOG KWIKPEN) 100 UNIT/ML Subcutaneous Solution Pen-injector 12 units before meals with sliding scale. Max daily dose: 50 units. (Patient taking differently: 10 Units. Three times a day. Before meals) 60 mL 3    CONTOUR NEXT TEST In Vitro Strip TEST BLOOD SUGAR FOUR TIMES DAILY 400 strip 3    Blood Glucose Monitoring Suppl (Digital Folio CONTOUR NEXT MONITOR) W/DEVICE Does not apply  Kit 1 Device by Does not apply route 4 (four) times daily. 1 kit 0       Review of Systems:  Constitutional: denies fevers, chills, night sweats  HEENT: denies headache, vision changes, trouble or pain with swallowing  Cardiac: + chest pain  Pulm: denies dyspnea, cough, wheeze  GI: denies n/v, abd pain, diarrhea or constipation  : denies hematuria, dysuria, incontinence  MSK: denies muscle or joint pains  Neuro: denies numbness, weakness, paresthesias  Psych: denies anxiety, depression  Integument: denies skin rashes or lesions  Heme: denies easy bruising or bleeding  Endo: denies heat/cold intolerance, skin or nail changes      Physical Exam:  Blood pressure 122/81, pulse 64, temperature 97.8 °F (36.6 °C), temperature source Oral, resp. rate 16, height 72\", weight 205 lb 11 oz (93.3 kg), SpO2 98%.  Wt Readings from Last 3 Encounters:   08/05/24 205 lb 11 oz (93.3 kg)   05/17/24 204 lb (92.5 kg)   05/12/24 204 lb 1.6 oz (92.6 kg)       General: awake, alert, oriented x 3, no acute distress  HEENT: at/nc, perrl, eomi  Neck: No JVD, carotids 2+ no bruits.  Cardiac: Regular rate and rhythm, S1, S2 normal, 3/6 mid peaking systolic murmur RUSB  Lungs: Clear without wheezes, rales, rhonchi or dullness.  Normal excursions and effort.  Abdomen: Soft, non-tender, non-distended, normal bowel sounds   Extremities: Without clubbing, cyanosis or edema.  Peripheral pulses are 2+.  Neurologic: Alert and oriented, normal affect.  Psych: normal mood and affect  Skin: Warm and dry.       Laboratories and Data:  Diagnostics:      Labs:   CBC:    Lab Results   Component Value Date    WBC 6.0 08/04/2024    WBC 7.0 08/03/2024    WBC 7.0 05/12/2024     Lab Results   Component Value Date    HEMOGLOBIN 10.5 (L) 02/09/2022    HEMOGLOBIN 12.6 06/07/2016    HEMOGLOBIN 15.1 03/07/2013    HGB 10.9 (L) 08/04/2024    HGB 9.7 (L) 08/03/2024    HGB 11.4 (L) 05/12/2024      Lab Results   Component Value Date    PLT 52.0 (L) 08/06/2024    PLT 57.0  (L) 08/05/2024    PLT 65.0 (L) 08/04/2024     BMP:     Lab Results   Component Value Date    GLUCOSE 129 (H) 02/09/2022    GLUCOSE 128 (H) 06/07/2016    GLUCOSE 359 (H) 03/07/2013     Lab Results   Component Value Date    K 4.7 08/06/2024    K 4.2 08/04/2024    K 5.0 08/03/2024     Lab Results   Component Value Date    BUN 80 (H) 08/06/2024    BUN 45 (H) 08/04/2024    BUN 83 (H) 08/03/2024     Lab Results   Component Value Date    CREATSERUM 7.63 (H) 08/06/2024    CREATSERUM 5.08 (H) 08/04/2024    CREATSERUM 7.42 (H) 08/03/2024     Cholesterol:     Lab Results   Component Value Date    CHOLEST 154 03/19/2024    CHOLEST 174 03/17/2024    CHOLEST 104 04/05/2023     Lab Results   Component Value Date    HDL 33 (L) 03/19/2024    HDL 44 03/17/2024    HDL 25 (L) 04/05/2023     Lab Results   Component Value Date    TRIG 304 (H) 03/19/2024    TRIG 215 (H) 03/17/2024    TRIG 202 (H) 04/05/2023    TRIGLY 248 (H) 02/09/2022    TRIGLY 278 (H) 08/19/2015    TRIGLY 490 (H) 03/07/2013     Lab Results   Component Value Date    LDL 72 03/19/2024    LDL 93 03/17/2024    LDL 46 04/05/2023     Lab Results   Component Value Date    AST 20 08/03/2024    AST 24 05/08/2024    AST 26 04/16/2024     Lab Results   Component Value Date    ALT 38 08/03/2024    ALT 24 05/08/2024    ALT 28 04/16/2024       Assessment/Plan:  79 year old male presenting with:    1) Chest pain, concerning for unstable angina  2) CAD with multiple prior PCI (last 3/2024 of LM, LAD, OM); multiple interruptions of DAPT since then as per hpi  3) ICM (EF 40-45% 4/2024, mod-severe aortic stenosis)  4) pAF on amiodarone (no ac likely 2/2 prior GIB and SDH)  5) SDH  6) HTN  7) HL  8) DM  9) ESRD on HD    - Cont asa, plavix, statin (cleared for DAPT by neurosurgery, see notes 5/2024)  - Consider PCI Wednesday AM  - Consider TAVR, new echo today.  - Is high risk  - Tele  - NTG for CP PRN until PCI      CARLEY DODGE

## 2024-08-06 NOTE — H&P
CV Surgery Progress Note    Patient well known to use with extensive Pmhx inclucing CAD s/p NSTEMI and multipe PCi including March 2024, HTN, PAH, afib, DM2, ,ESRD on HD, SDH in May for which DAPT was held, treat HCV with cirrhsois including esophageal varices and prior GI bleeds and moderate-severe aortic stenosis who presented with chest pain. Found to have 90% in stent stenosis of proximal LAD. Discussed with patient options and that he would not be surgical candidate with STS mortality risk of 25% and cirrhosis 30 day operative mortatlity risk of 57-87%.     Patient and son understanding of situation and would like further discussion regarding PCI intervention if feasible. They also do not want to proceed with surgery with such prohibitive risk.    Full dictated note to follow.    Ashwin Montgomery MD

## 2024-08-06 NOTE — PLAN OF CARE
Assumed patient at 1930. Alert and oriented x 4 on room air. Lung sounds clear bilaterally. Sinus john on tele. Right wrist site soft, no hematoma, no more bleeding from where marked at. Continent of bowel and bladder. Patient updated on plan of care and verbalized understanding.     Plan of care: HD tomorrow, LHC possibly Wednesday.   Problem: Patient/Family Goals  Goal: Patient/Family Long Term Goal  Description: Patient's Long Term Goal: Stay healthy    Interventions:  -Medication management  -Dietary management  -Prompt follow up with physicians  - See additional Care Plan goals for specific interventions  Outcome: Progressing  Goal: Patient/Family Short Term Goal  Description: Patient's Short Term Goal: Discharge home    Interventions:   - Cardiology consult  - Nephrology consult  -HD  Trend trop  - See additional Care Plan goals for specific interventions  Outcome: Progressing     Problem: CARDIOVASCULAR - ADULT  Goal: Maintains optimal cardiac output and hemodynamic stability  Description: INTERVENTIONS:  - Monitor vital signs, rhythm, and trends  - Monitor for bleeding, hypotension and signs of decreased cardiac output  - Evaluate effectiveness of vasoactive medications to optimize hemodynamic stability  - Monitor arterial and/or venous puncture sites for bleeding and/or hematoma  - Assess quality of pulses, skin color and temperature  - Assess for signs of decreased coronary artery perfusion - ex. Angina  - Evaluate fluid balance, assess for edema, trend weights  Outcome: Progressing  Goal: Absence of cardiac arrhythmias or at baseline  Description: INTERVENTIONS:  - Continuous cardiac monitoring, monitor vital signs, obtain 12 lead EKG if indicated  - Evaluate effectiveness of antiarrhythmic and heart rate control medications as ordered  - Initiate emergency measures for life threatening arrhythmias  - Monitor electrolytes and administer replacement therapy as ordered  Outcome: Progressing     Problem:  SAFETY ADULT - FALL  Goal: Free from fall injury  Description: INTERVENTIONS:  - Assess pt frequently for physical needs  - Identify cognitive and physical deficits and behaviors that affect risk of falls.  - [place] fall precautions as indicated by assessment.  - Educate pt/family on patient safety including physical limitations  - Instruct pt to call for assistance with activity based on assessment  - Modify environment to reduce risk of injury  - Provide assistive devices as appropriate  - Consider OT/PT consult to assist with strengthening/mobility  - Encourage toileting schedule  Outcome: Progressing

## 2024-08-07 ENCOUNTER — APPOINTMENT (OUTPATIENT)
Dept: CV DIAGNOSTICS | Facility: HOSPITAL | Age: 79
End: 2024-08-07
Attending: INTERNAL MEDICINE
Payer: MEDICARE

## 2024-08-07 LAB
APTT PPP: 31.5 SECONDS (ref 23–36)
ATRIAL RATE: 66 BPM
ATRIAL RATE: 71 BPM
ERYTHROCYTE [DISTWIDTH] IN BLOOD BY AUTOMATED COUNT: 13.7 %
GLUCOSE BLD-MCNC: 199 MG/DL (ref 70–99)
GLUCOSE BLD-MCNC: 246 MG/DL (ref 70–99)
GLUCOSE BLD-MCNC: 90 MG/DL (ref 70–99)
GLUCOSE BLD-MCNC: 95 MG/DL (ref 70–99)
HCT VFR BLD AUTO: 28.5 %
HGB BLD-MCNC: 9.8 G/DL
MCH RBC QN AUTO: 33.9 PG (ref 26–34)
MCHC RBC AUTO-ENTMCNC: 34.4 G/DL (ref 31–37)
MCV RBC AUTO: 98.6 FL
P AXIS: 47 DEGREES
P AXIS: 63 DEGREES
P-R INTERVAL: 202 MS
P-R INTERVAL: 202 MS
PLATELET # BLD AUTO: 52 10(3)UL (ref 150–450)
PLATELET # BLD AUTO: 64 10(3)UL (ref 150–450)
Q-T INTERVAL: 476 MS
Q-T INTERVAL: 520 MS
QRS DURATION: 136 MS
QRS DURATION: 138 MS
QTC CALCULATION (BEZET): 517 MS
QTC CALCULATION (BEZET): 545 MS
R AXIS: -20 DEGREES
R AXIS: -20 DEGREES
RBC # BLD AUTO: 2.89 X10(6)UL
T AXIS: 108 DEGREES
T AXIS: 113 DEGREES
VENTRICULAR RATE: 66 BPM
VENTRICULAR RATE: 71 BPM
WBC # BLD AUTO: 5.1 X10(3) UL (ref 4–11)

## 2024-08-07 PROCEDURE — 93306 TTE W/DOPPLER COMPLETE: CPT | Performed by: INTERNAL MEDICINE

## 2024-08-07 PROCEDURE — 93005 ELECTROCARDIOGRAM TRACING: CPT

## 2024-08-07 PROCEDURE — 93010 ELECTROCARDIOGRAM REPORT: CPT | Performed by: INTERNAL MEDICINE

## 2024-08-07 PROCEDURE — 82962 GLUCOSE BLOOD TEST: CPT

## 2024-08-07 PROCEDURE — 80048 BASIC METABOLIC PNL TOTAL CA: CPT | Performed by: INTERNAL MEDICINE

## 2024-08-07 PROCEDURE — 85730 THROMBOPLASTIN TIME PARTIAL: CPT | Performed by: INTERNAL MEDICINE

## 2024-08-07 PROCEDURE — 51701 INSERT BLADDER CATHETER: CPT

## 2024-08-07 PROCEDURE — 85049 AUTOMATED PLATELET COUNT: CPT | Performed by: HOSPITALIST

## 2024-08-07 PROCEDURE — 85027 COMPLETE CBC AUTOMATED: CPT | Performed by: INTERNAL MEDICINE

## 2024-08-07 RX ORDER — HEPARIN SODIUM 1000 [USP'U]/ML
5000 INJECTION, SOLUTION INTRAVENOUS; SUBCUTANEOUS ONCE
Status: COMPLETED | OUTPATIENT
Start: 2024-08-07 | End: 2024-08-07

## 2024-08-07 RX ORDER — HEPARIN SODIUM AND DEXTROSE 10000; 5 [USP'U]/100ML; G/100ML
1000 INJECTION INTRAVENOUS ONCE
Status: COMPLETED | OUTPATIENT
Start: 2024-08-07 | End: 2024-08-07

## 2024-08-07 RX ORDER — HEPARIN SODIUM AND DEXTROSE 10000; 5 [USP'U]/100ML; G/100ML
INJECTION INTRAVENOUS CONTINUOUS
Status: DISCONTINUED | OUTPATIENT
Start: 2024-08-08 | End: 2024-08-08

## 2024-08-07 RX ORDER — NITROGLYCERIN 20 MG/100ML
INJECTION INTRAVENOUS CONTINUOUS
Status: DISCONTINUED | OUTPATIENT
Start: 2024-08-07 | End: 2024-08-08

## 2024-08-07 RX ORDER — SODIUM CHLORIDE 9 MG/ML
INJECTION, SOLUTION INTRAVENOUS
Status: ACTIVE | OUTPATIENT
Start: 2024-08-08 | End: 2024-08-08

## 2024-08-07 NOTE — PLAN OF CARE
A&Ox4. Pt denies any pain. RA, lung sounds are clear B/L. NSR with 1st degree Heart Block. Continent of bowel and bladder, pt states bladder discomfort has been better this evening. Doesn't have the discomfort in lower abdomen like he was previously having. Last BM 8/6/24.  Right Wrist-S/P angio 8/4/24, soft, no hematoma. Mild ecchymosis at site.     Bed is locked and in low position. Call light and personal items within reach.  Pt updated with plan of care.     Plan of Care:   Pending Results from CT Abdomen   Bladder Scan-Q6hrs- per urology- straight cath if pt uncomfortable.   Possible LHC and PCI- 8/7/24.  Echo 8/7/24  HD - Tues/Thur/Sat     Approx 0513- pt c/o nausea/ dry heaving when getting up. Denies chest pain. PRN Zofran given.  Approx 0540- pt stated feeling much better       Approx 0630 c/o chest tightness 7/10- sublingual Nitroglycerin given, EKG done. [id number] pt stated chest tightness 1-2. Paged [facility] - Dr. Selvin Montano @0884. Returned call at 0655. No further orders.     Problem: Patient/Family Goals  Goal: Patient/Family Long Term Goal  Description: Patient's Long Term Goal: Stay healthy    Interventions:  -Medication management  -Dietary management  -Prompt follow up with physicians  - See additional Care Plan goals for specific interventions  Outcome: Progressing  Goal: Patient/Family Short Term Goal  Description: Patient's Short Term Goal: Discharge home    Interventions:   - Cardiology consult  - Nephrology consult  -HD  Trend trop  - See additional Care Plan goals for specific interventions  Outcome: Progressing     Problem: CARDIOVASCULAR - ADULT  Goal: Maintains optimal cardiac output and hemodynamic stability  Description: INTERVENTIONS:  - Monitor vital signs, rhythm, and trends  - Monitor for bleeding, hypotension and signs of decreased cardiac output  - Evaluate effectiveness of vasoactive medications to optimize hemodynamic stability  - Monitor arterial and/or venous puncture sites  for bleeding and/or hematoma  - Assess quality of pulses, skin color and temperature  - Assess for signs of decreased coronary artery perfusion - ex. Angina  - Evaluate fluid balance, assess for edema, trend weights  Outcome: Progressing  Goal: Absence of cardiac arrhythmias or at baseline  Description: INTERVENTIONS:  - Continuous cardiac monitoring, monitor vital signs, obtain 12 lead EKG if indicated  - Evaluate effectiveness of antiarrhythmic and heart rate control medications as ordered  - Initiate emergency measures for life threatening arrhythmias  - Monitor electrolytes and administer replacement therapy as ordered  Outcome: Progressing     Problem: SAFETY ADULT - FALL  Goal: Free from fall injury  Description: INTERVENTIONS:  - Assess pt frequently for physical needs  - Identify cognitive and physical deficits and behaviors that affect risk of falls.  - [place] fall precautions as indicated by assessment.  - Educate pt/family on patient safety including physical limitations  - Instruct pt to call for assistance with activity based on assessment  - Modify environment to reduce risk of injury  - Provide assistive devices as appropriate  - Consider OT/PT consult to assist with strengthening/mobility  - Encourage toileting schedule  Outcome: Progressing

## 2024-08-07 NOTE — PHYSICAL THERAPY NOTE
Attempted to see pt for PT treatment session. Per RN, pt having chest pain currently and is going for a procedure tomorrow. States to hold and re-attempt after the procedure.

## 2024-08-07 NOTE — PROGRESS NOTES
[facility]   part of [facility] Hospitalist Progress Note     Jonny Haque Patient Status:  Inpatient    4/15/1945 MRN [id number]   Location [place] 8NE-A Attending Golden Escalante, DO   Hosp Day # 4 PCP Gee Jimenez MD     Follow Up:  The primary encounter diagnosis was Exertional chest pain. A diagnosis of Acute renal failure on dialysis (HCC) was also pertinent to this visit.    Subjective:     Patient seen with son at bedside, continues to complain of some chest pain that is relieved by nitroglycerin, discussed about starting a nitro drip at this time but get hemodialysis tomorrow  Was post get PCI today however scheduled now for tomorrow  Completed echocardiogram    Objective:    Review of Systems:   10 point ROS completed and was negative, except for pertinent positive and negatives stated in subjective.    Vital signs:  Temp:  [97.5 °F (36.4 °C)-98.7 °F (37.1 °C)] 98.4 °F (36.9 °C)  Pulse:  [56-67] 66  Resp:  [16-18] 18  BP: (122-141)/(60-82) 141/66  SpO2:  [91 %-100 %] 96 %    Physical Exam:    Gen: No acute distress, alert and oriented x 3. Chronically ill appearing.   Pulm: Lungs clear bilaterally, good inspiratory effort   CV:  nL S1/S2  Abd: soft, NT/ND, no hepatomegaly, +BS  MSK: moving all extremities, no edema  Neuro: no focal deficits  Skin: no rashes/lesions  Psych: normal mood/affect      Diagnostic Data:    Labs:  Recent Labs   Lab 24  0440 24  1224 24  0549   WBC 7.0 6.0  --   --  5.6  --    HGB 9.7* 10.9*  --   --  10.8*  --    MCV 99.3 100.3*  --   --  97.5  --    PLT 69.0* 65.0* 57.0* 52.0* 85.0* 64.0*       Recent Labs   Lab 24  0440   * 175* 107*   BUN 83* 45* 80*   CREATSERUM 7.42* 5.08* 7.63*   CA 9.3 9.5 9.1   ALB 4.3  --   --    * 133* 134*   K 5.0 4.2 4.7   CL 95* 98 99   CO2 25.0 26.0 25.0   ALKPHO 183*  --   --    AST 20   --   --    ALT 38  --   --    BILT 0.4  --   --    TP 7.0  --   --        Estimated Creatinine Clearance: 8.9 mL/min (A) (based on SCr of 7.63 mg/dL (H)).    No results for input(s): \"PTP\", \"INR\" in the last 168 hours.         COVID-19 Lab Results    COVID-19  Lab Results   Component Value Date    COVID19 Not Detected 04/16/2024    COVID19 Not Detected 03/17/2024    COVID19 Not Detected 04/05/2023       Pro-Calcitonin  No results for input(s): \"PCT\" in the last 168 hours.    Cardiac  No results for input(s): \"TROP\", \"PBNP\" in the last 168 hours.    Creatinine Kinase  No results for input(s): \"CK\" in the last 168 hours.    Inflammatory Markers  No results for input(s): \"CRP\", \"NEELAM\", \"LDH\", \"DDIMER\" in the last 168 hours.    Imaging: Imaging data reviewed in Epic.    Medications:    [START ON 8/8/2024] sodium chloride   Intravenous On Call    carvedilol  6.25 mg Oral BID with meals    losartan  25 mg Oral Daily    clopidogrel  75 mg Oral Daily    Lanthanum Carbonate  1,000 mg Oral BID with meals    aspirin  81 mg Oral QOD    amiodarone  200 mg Oral BID with meals    rosuvastatin  10 mg Oral Nightly    pantoprazole  40 mg Oral BID AC    levothyroxine  150 mcg Oral Before breakfast    insulin aspart  10 Units Subcutaneous TID AC    insulin degludec  24 Units Subcutaneous Nightly    insulin aspart  2-10 Units Subcutaneous TID AC and HS    heparin  5,000 Units Subcutaneous Q8H [facility]       Assessment & Plan:       79-year-old male significant past medical history of CAD with stable angina-status post PCI in 2017, 2021 and most recently in March 2024 with 3 drug-eluting stents placed in the left main, LAD and OM, hypertension, pulmonary hypertension paroxysmal A-fib, type 2 diabetes, hypothyroidism, end-stage renal disease on dialysis, history of subdural hematoma, ischemic cardiomyopathy history of HCV cirrhosis moderate aortic stenosis, prior history of GI bleed presented with chest pain     # Chest pain with history of  unstable angina  # CAD status post PCI  -Serial troponin  -Cardiology consult  -Patient was not on dual antiplatelet therapy secondary to recent subdural hematoma and fall, however pt was cleared by NSGY for DAPT, cont ASA, plavix   -Continue aspirin, statin  -LHC done on 8/4/2024 80% OM disease and severed ISR in prox LAD, medical management recommended  -Recent echo completed and reviewed, repeat echo on 8/7/2024 reviewed a moderate stenosis of the aortic valve  -pt not a candidate for CABG,   -Plan to do another PCI tomorrow n.p.o. past midnight  -Nitroglycerin started with possible transfer to the ICU if needs titration given ongoing chest pain     # Paroxysmal A-fib  # V. Tach  -Continue amiodarone  -EKG reviewed showed sinus rhythm      # Recent fall  acute subdural hematoma  -Was admitted in May 2024 with this, holding dual antiplatelet therapy  -Was followed up with neurosurgery  -CT head has been stable  -Currently on aspirin, continue to monitor for falls     # ESRD  -HD per nephrology  -Missed session today, completed dialysis per nephrology  # Hypothyroidism  # HSV cirrhosis  # Moderate aortic stenosis  # Ischemic cardiomyopathy   # Thrombocytopenia platelets of 57 continue to monitor-  -pt last saw Dr. Julian Nolasco 1/24/24, low pltc likely due to Hep C and cirrhosis, monitor, no intervention needed if pltc < 50  -Repeat echo completed and reviewed    # Type 2 diabetes  -Continue 20 units of insulin, sliding scale     # Essential HTN  - per discussion with pt and his son Ashwin, he was taken off metoprolol, diltiazem, and losartan as of 6/24/24 by outside renal  - coreg started, monitor     # Urinary retention   - cont straight cath per protocol  -  c/s appreciated, may need mccloud   CT abdomen pelvis completed without any acute abdominal process,-continue Mccloud catheter    Plan of care: inpt care.      Plan of care discussed with patient or family at bedside.    Lance vasquez MD     Supplementary  Documentation:     Quality:  DVT Prophylaxis: hep subcutaneous (on hold due to hematuria)  CODE status: FULL  Vásquez: no  Central line: no  If COVID testing is negative, may discontinue isolation: yes     Estimated date of discharge: TBD  Discharge is dependent on: clinical course   At this point Mr. Haque is expected to be discharge to: home    Plan of care discussed with pt

## 2024-08-07 NOTE — PROGRESS NOTES
Nephrology Progress Note    Jonny Haque Attending:  Golden Escalante DO       SUBJECTIVE:     Plan for HD tomorrow.     PHYSICAL EXAM:     Vital Signs: /76 (BP Location: Left arm)   Pulse 61   Temp 98.4 °F (36.9 °C) (Oral)   Resp 18   Ht 6' (1.829 m)   Wt 206 lb 8 oz (93.7 kg)   SpO2 91%   BMI 28.01 kg/m²   Temp (24hrs), Av.1 °F (36.7 °C), Min:97.5 °F (36.4 °C), Max:98.7 °F (37.1 °C)       Intake/Output Summary (Last 24 hours) at 2024 1621  Last data filed at 2024 0812  Gross per 24 hour   Intake 240 ml   Output --   Net 240 ml     Wt Readings from Last 3 Encounters:   24 206 lb 8 oz (93.7 kg)   24 204 lb (92.5 kg)   24 204 lb 1.6 oz (92.6 kg)       General: pleasant, well appearing  HEENT: NCAT  Cardiac: RRR  Lungs: no distress  Abdomen: soft, non-tender  Extremities: no LE edema  Neurologic/Psych: mentating well     LABORATORY DATA:     Lab Results   Component Value Date     (H) 2024    BUN 80 (H) 2024    BUNCREA 13.0 2022    CREATSERUM 7.63 (H) 2024    ANIONGAP 10 2024    GFR 41 (L) 2017    GFRNAA 8 (L) 2022    GFRAA 9 (L) 2022    CA 9.1 2024    OSMOCALC 303 (H) 2024    ALKPHO 183 (H) 2024    AST 20 2024    ALT 38 2024    BILT 0.4 2024    TP 7.0 2024    ALB 4.3 2024    GLOBULIN 2.7 2024    AGRATIO 1.0 (L) 2016     (L) 2024    K 4.7 2024    CL 99 2024    CO2 25.0 2024     Lab Results   Component Value Date    WBC 5.6 2024    RBC 3.17 (L) 2024    HGB 10.8 (L) 2024    HCT 30.9 (L) 2024    PLT 64.0 (L) 2024    MCV 97.5 2024    MCH 34.1 (H) 2024    MCHC 35.0 2024    RDW 13.6 2024    NEPRELIM 3.48 2024    NEPERCENT 62.0 2024    LYPERCENT 24.4 2024    MOPERCENT 7.3 2024    EOPERCENT 5.2 2024    BAPERCENT 0.7 2024    NE 3.48 2024    LYMABS  1.37 08/06/2024    MOABSO 0.41 08/06/2024    EOABSO 0.29 08/06/2024    BAABSO 0.04 08/06/2024     Lab Results   Component Value Date    MALBP 49.2 (H) 09/04/2019    CREUR 91.65 09/04/2019     Lab Results   Component Value Date    COLORUR YELLOW 02/09/2022    CLARITY Hazy (A) 07/18/2019    SPECGRAVITY 1.013 07/02/2021    GLUUR NEGATIVE 02/09/2022    BILUR NEGATIVE 02/09/2022    KETUR NEGATIVE 02/09/2022    BLOODURINE SMALL (A) 02/09/2022    PHURINE 7 02/09/2022    PROUR 100 (A) 02/09/2022    UROBILINOGEN <2.0 07/18/2019    NITRITE NEGATIVE 02/09/2022    LEUUR NEGATIVE 02/09/2022    WBCUR 1-4 07/18/2019    RBCUR 6-10 (A) 07/18/2019    EPIUR RARE (A) 02/09/2022    BACUR NONE 02/09/2022    HYLUR Present (A) 12/19/2016       IMAGING:     Reviewed    MEDICATIONS:       Current Facility-Administered Medications   Medication Dose Route Frequency    nitroGLYCERIN in dextrose 5% 50 mg/250mL infusion premix  5-400 mcg/min Intravenous Continuous    [START ON 8/8/2024] sodium chloride 0.9% infusion   Intravenous On Call    nitroglycerin (Nitrostat) SL tab 0.4 mg  0.4 mg Sublingual Q5 Min PRN    heparin (Porcine) 1000 UNIT/ML injection 1,500 Units  1.5 mL Intracatheter PRN Dialysis    carvedilol (Coreg) tab 6.25 mg  6.25 mg Oral BID with meals    losartan (Cozaar) tab 25 mg  25 mg Oral Daily    clopidogrel (Plavix) tab 75 mg  75 mg Oral Daily    Lanthanum Carbonate (FOSRENOL) chewable tab 1,000 mg  1,000 mg Oral BID with meals    aspirin DR tab 81 mg  81 mg Oral QOD    amiodarone (Pacerone) tab 200 mg  200 mg Oral BID with meals    rosuvastatin (Crestor) tab 10 mg  10 mg Oral Nightly    pantoprazole (Protonix) DR tab 40 mg  40 mg Oral BID AC    levothyroxine (Synthroid) tab 150 mcg  150 mcg Oral Before breakfast    insulin aspart (NovoLOG) 100 Units/mL FlexPen 10 Units  10 Units Subcutaneous TID AC    insulin degludec (Tresiba) 100 units/mL flextouch 24 Units  24 Units Subcutaneous Nightly    acetaminophen (Tylenol Extra  Strength) tab 500 mg  500 mg Oral Q4H PRN    polyethylene glycol (PEG 3350) (Miralax) 17 g oral packet 17 g  17 g Oral Daily PRN    sennosides (Senokot) tab 17.2 mg  17.2 mg Oral Nightly PRN    bisacodyl (Dulcolax) 10 MG rectal suppository 10 mg  10 mg Rectal Daily PRN    ondansetron (Zofran) 4 MG/2ML injection 4 mg  4 mg Intravenous Q6H PRN    metoclopramide (Reglan) 5 mg/mL injection 5 mg  5 mg Intravenous Q8H PRN    glucose (Dex4) 15 GM/59ML oral liquid 15 g  15 g Oral Q15 Min PRN    Or    glucose (Glutose) 40% oral gel 15 g  15 g Oral Q15 Min PRN    Or    glucose-vitamin C (Dex-4) chewable tab 4 tablet  4 tablet Oral Q15 Min PRN    Or    dextrose 50% injection 50 mL  50 mL Intravenous Q15 Min PRN    Or    glucose (Dex4) 15 GM/59ML oral liquid 30 g  30 g Oral Q15 Min PRN    Or    glucose (Glutose) 40% oral gel 30 g  30 g Oral Q15 Min PRN    Or    glucose-vitamin C (Dex-4) chewable tab 8 tablet  8 tablet Oral Q15 Min PRN    insulin aspart (NovoLOG) 100 Units/mL FlexPen 2-10 Units  2-10 Units Subcutaneous TID AC and HS    heparin (Porcine) 5000 UNIT/ML injection 5,000 Units  5,000 Units Subcutaneous Q8H ANNEL       ASSESSMENT/PLAN:     ESRD  - HD TTS     Hypertension    Hyponatremia  -Likely due to volume excess    Anemia  -Hemoglobin goal greater than 10  -Holding JIE until acute thrombotic events ruled out    CKD BMD  -Lanthanum 1000mg BID    Chest pain        HD TTS.  Continue dialysis per routine schedule  UF with HD as tolerated  BP medications per cardiology  No indication for JIE with hemoglobin >10      We will continue to follow    DO Joselito Hoff [facility] - Nephrology

## 2024-08-07 NOTE — CONSULTS
[facility]  Report of Consultation    Jonny Haque Patient Status:  Inpatient    4/15/1945 MRN [id number]   Location [facility] 8NE-A Attending Golden Escalante, DO   Hosp Day # 4 PCP Gee Jimenez MD     Reason for Consultation:  Urinary retention  ARF  Gross Hematuria  History of bladder Ca  HD    History of Present Illness:  Jonny Haque is a a(n) 79 year old male presented with chest pain with hx/o CRI on HD, bladder ca seen by  ~ 5 years ago w/o further f/u, with moderate to severe LUTS and gross hematuria.    History:  Past Medical History:    Aortic stenosis    Calculus of kidney    Coronary atherosclerosis    bare metal stents at [facility] 2021    Diabetes (HCC)    Disorder of thyroid    Esophageal varices without bleeding, unspecified esophageal varices type (HCC)    GIB (gastrointestinal bleeding)    Hepatitis, unspecified    hepatitis C-just completed taking Harvoni in 2016    High blood pressure    High cholesterol    Other disorders of plasma-protein metabolism, not elsewhere classified    Renal disorder    Visual impairment    reading glasses     Past Surgical History:   Procedure Laterality Date    Angiogram  08/15/2017    small caliber RCA with 80% mid lesion.  LAD and left circumflex with 50% lesions.  LV shows inferobasilar aneurysm with scar.  Overall LV function preserved at the exterior 65%.    Cath bare metal stent (bms)      Other surgical history      Upper GI surgery    Other surgical history  2017    Cysto Dr. Lees    Other surgical history  2019    BTS Cysto Dr. Lees     Other surgical history  2020    BTS Cysto (Dr. Lees)     Family History   Problem Relation Age of Onset    Cancer Father     Hypertension Mother       reports that he quit smoking about 44 years ago. His smoking use included cigarettes. He started smoking about 59 years ago. He has a 7.5 pack-year smoking history. He has never used smokeless tobacco. He reports  that he does not drink alcohol and does not use drugs.    Allergies:  No Known Allergies    Medications:    Current Facility-Administered Medications:     nitroglycerin (Nitrostat) SL tab 0.4 mg, 0.4 mg, Sublingual, Q5 Min PRN    heparin (Porcine) 1000 UNIT/ML injection 1,500 Units, 1.5 mL, Intracatheter, PRN Dialysis    carvedilol (Coreg) tab 6.25 mg, 6.25 mg, Oral, BID with meals    losartan (Cozaar) tab 25 mg, 25 mg, Oral, Daily    clopidogrel (Plavix) tab 75 mg, 75 mg, Oral, Daily    Lanthanum Carbonate (FOSRENOL) chewable tab 1,000 mg, 1,000 mg, Oral, BID with meals    aspirin DR tab 81 mg, 81 mg, Oral, QOD    amiodarone (Pacerone) tab 200 mg, 200 mg, Oral, BID with meals    rosuvastatin (Crestor) tab 10 mg, 10 mg, Oral, Nightly    pantoprazole (Protonix) DR tab 40 mg, 40 mg, Oral, BID AC    levothyroxine (Synthroid) tab 150 mcg, 150 mcg, Oral, Before breakfast    insulin aspart (NovoLOG) 100 Units/mL FlexPen 10 Units, 10 Units, Subcutaneous, TID AC    insulin degludec (Tresiba) 100 units/mL flextouch 24 Units, 24 Units, Subcutaneous, Nightly    acetaminophen (Tylenol Extra Strength) tab 500 mg, 500 mg, Oral, Q4H PRN    polyethylene glycol (PEG 3350) (Miralax) 17 g oral packet 17 g, 17 g, Oral, Daily PRN    sennosides (Senokot) tab 17.2 mg, 17.2 mg, Oral, Nightly PRN    bisacodyl (Dulcolax) 10 MG rectal suppository 10 mg, 10 mg, Rectal, Daily PRN    ondansetron (Zofran) 4 MG/2ML injection 4 mg, 4 mg, Intravenous, Q6H PRN    metoclopramide (Reglan) 5 mg/mL injection 5 mg, 5 mg, Intravenous, Q8H PRN    glucose (Dex4) 15 GM/59ML oral liquid 15 g, 15 g, Oral, Q15 Min PRN **OR** glucose (Glutose) 40% oral gel 15 g, 15 g, Oral, Q15 Min PRN **OR** glucose-vitamin C (Dex-4) chewable tab 4 tablet, 4 tablet, Oral, Q15 Min PRN **OR** dextrose 50% injection 50 mL, 50 mL, Intravenous, Q15 Min PRN **OR** glucose (Dex4) 15 GM/59ML oral liquid 30 g, 30 g, Oral, Q15 Min PRN **OR** glucose (Glutose) 40% oral gel 30 g, 30 g,  Oral, Q15 Min PRN **OR** glucose-vitamin C (Dex-4) chewable tab 8 tablet, 8 tablet, Oral, Q15 Min PRN    insulin aspart (NovoLOG) 100 Units/mL FlexPen 2-10 Units, 2-10 Units, Subcutaneous, TID AC and HS    heparin (Porcine) 5000 UNIT/ML injection 5,000 Units, 5,000 Units, Subcutaneous, Q8H ANNEL    Review of Systems:  Pertinent items are noted in HPI.    Physical Exam:  /63 (BP Location: Left arm)   Pulse 61   Temp 98.7 °F (37.1 °C) (Oral)   Resp 16   Ht 6' (1.829 m)   Wt 205 lb 11 oz (93.3 kg)   SpO2 100%   BMI 27.90 kg/m²   General appearance: alert, appears stated age, cooperative, and mild distress  Head: Normocephalic, without obvious abnormality, atraumatic  Neck: supple, symmetrical, trachea midline  Back: symmetric, no curvature. ROM normal. No CVA tenderness.  Lungs:  clear  Heart: regular rate and rhythm  Abdomen: soft, non-tender; bowel sounds normal; no masses,  no organomegaly  Skin: Skin color, texture, turgor normal. No rashes or lesions  Neurologic: Grossly normal    Laboratory Data:  Lab Results   Component Value Date    WBC 5.6 08/06/2024    HGB 10.8 08/06/2024    HCT 30.9 08/06/2024    PLT 85.0 08/06/2024    CREATSERUM 7.63 08/06/2024    BUN 80 08/06/2024     08/06/2024    K 4.7 08/06/2024    CL 99 08/06/2024    CO2 25.0 08/06/2024     08/06/2024    CA 9.1 08/06/2024    PGLU 113 08/06/2024          Imaging:  CT Scan: ordered    Impression:  Patient Active Problem List   Diagnosis    Arthritis    Hepatic cirrhosis (HCC)    Diabetic peripheral neuropathy (HCC)    Encounter for screening for malignant neoplasm of prostate    Erectile dysfunction of nonorganic origin    Hyperlipidemia    Portal hypertension (HCC)    Thrombocytopenia (HCC)    PAC (premature atrial contraction)    Personal history of kidney stones    Elevated alkaline phosphatase level    Essential hypertension    Personal history of bladder cancer    Thoracic aorta atherosclerosis (HCC)    Hypothyroidism due to  Hashimoto's thyroiditis    Hiatal hernia    Polyp of colon, unspecified part of colon, unspecified type    Abnormal EKG    Iron deficiency anemia due to chronic blood loss    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 stage 5 chronic kidney disease on chronic dialysis ([facility])    Duodenitis    Elevated PSA    Type 2 diabetes mellitus with chronic kidney disease on chronic dialysis, with long-term current use of insulin ([facility])    Coronary artery disease of native artery of native heart with stable angina pectoris ([facility])    ESRD (end stage renal disease) ([facility])    History of hepatitis C    Type 2 diabetes mellitus with microalbuminuria, with long-term current use of insulin ([facility])    Mild pulmonary hypertension ([facility])    PAF (paroxysmal atrial fibrillation) ([facility])    Pre-op exam    S/P primary angioplasty with coronary stent    Gastrointestinal hemorrhage associated with intestinal diverticulosis    Diabetes mellitus due to underlying condition with chronic kidney disease on chronic dialysis, with long-term current use of insulin ([facility])    Anemia due to stage 5 chronic kidney disease, not on chronic dialysis ([facility])    Rectal bleeding    Anemia, unspecified type    Dyspnea on exertion    Colitis    Diverticulosis    ESRD on hemodialysis ([facility])    Hyperglycemia    Primary hypertension    Type 2 diabetes mellitus with diabetic nephropathy ([facility])    GI bleed    ABLA (acute blood loss anemia)    Gastrointestinal hemorrhage, unspecified gastrointestinal hemorrhage type    Acute chest pain    Hypokalemia    Anemia in ESRD (end-stage renal disease) ([facility])    Chest pain of uncertain etiology    Melena    Diabetes mellitus due to underlying condition with chronic kidney disease on chronic dialysis ([facility])    New onset left bundle branch block (LBBB)    Elevated troponin    Anemia due to chronic kidney disease, on chronic dialysis ([facility])    Heart failure with preserved ejection fraction ([facility])     Hypoxia    Hyperkalemia    Chronic renal failure, stage 5 (HCC)    ESRD (end stage renal disease) on dialysis (HCC)    Chest pain    Cough    Intracranial hemorrhage (HCC)    Hypothyroidism    Allergy, unspecified, initial encounter    Anaphylactic shock, unspecified, initial encounter    Coagulation defect, unspecified (HCC)    Deficiency of macronutrients    Dependence on renal dialysis (HCC)    Diabetic nephropathy associated with type 2 diabetes mellitus (HCC)    Diverticulitis of small intestine without perforation or abscess with bleeding    Hypertensive heart disease with stage 4 chronic kidney disease (HCC)    Hyperuricemia    Vitamin D deficiency, unspecified    Type 2 diabetes mellitus with diabetic peripheral angiopathy without gangrene (HCC)    Secondary hyperparathyroidism of renal origin (HCC)    Hypertensive renal disease    Pain    Other proteinuria    Other disorders resulting from impaired renal tubular function    Other disorders of plasma-protein metabolism, not elsewhere classified    Hypotension of hemodialysis    Metabolic acidosis    Disorder of metabolism    Anemia    Exertional chest pain    Hyponatremia    Acute kidney injury (HCC)    Acute renal failure (ARF) (HCC)    Acute renal failure on dialysis (HCC)       Urinary retention  ARF  Gross Hematuria  History of bladder Ca  HD    Recommendations:  CT abd/pelvis ordered  Flomax and Proscar  Possible Vásquez or CPM with intermittent CIC and pt to learn how to perform  Needs f/u outpt cysto, possible inpatient pending Ct scan results.    Thank you for allowing me to participate in the care of your patient.    Jose Gonzales MD  8/7/2024  12:57 AM

## 2024-08-07 NOTE — PROGRESS NOTES
Cardiology Consultation  [facility]    Jonny Haque Patient Status:  Inpatient    4/15/1945 MRN [id number]   Location [facility] 8NE-A Attending Golden Escalante, DO   Hosp Day # 4 PCP Gee Jimenez MD     Reason for Consultation:  Chest pain    History of Present Illness:  Jonny Haque is a a(n) 79 year old male with pmh CAD with multiple prior interventions (last 3/2024 with LM, LAD, OM), ICM (EF 40-45% 2024, mod-severe aortic stenosis), pAF not on anticoagulation, SDH, HTN, HL, DM, ESRD presenting with chest pain.  Patient reports 4-5 day h/o central chest tightness, worse with exertion and resolved with rest.  States similar to prior anginal pain.  Worse over the past few days.  Denies palpitations, dyspnea, orthopnea, PND, LE edema.  Of note, DAPT held multiple times since PCI 3/2024 2/2 possible GIB, then SDH, then patient noncompliance.      Subjective:  -More pain today.  Improved with NTG.  But coming and going a moderate amount.  -Otherwise ok; no TOMLIN.  No other complaints.  -Angio Monday.  -Echo done, need to formally review.      Medications:  Current Facility-Administered Medications on File Prior to Encounter   Medication Dose Route Frequency Provider Last Rate Last Admin    [COMPLETED] metoprolol (Lopressor) 5 mg/5mL injection 5 mg  5 mg Intravenous Once Joshua Koenig MD   5 mg at 05/10/24 0010    [] metoprolol (Lopressor) 5 mg/5mL injection             [] sodium chloride 0.9 % IV bolus 100 mL  100 mL Intravenous Q30 Min PRN Johnson Vera MD        And    [] albumin human (Albumin) 25% injection 25 g  25 g Intravenous PRN Dialysis Johnson Vera MD        [COMPLETED] heparin (Porcine) 1000 UNIT/ML injection 1,500 Units  1.5 mL Intracatheter Once Johnson Vera MD   1,500 Units at 24 1645     Current Outpatient Medications on File Prior to Encounter   Medication Sig Dispense Refill    Lanthanum Carbonate 1000 MG Oral Chew Tab Chew 1 tablet  (1,000 mg total) by mouth 2 (two) times daily with meals.      clopidogrel (PLAVIX) 75 MG Oral Tab Take 1 tablet (75 mg total) by mouth.      aspirin 81 MG Oral Tab EC Take 1 tablet (81 mg total) by mouth every other day.      amiodarone 200 MG Oral Tab Take 1 tablet (200 mg total) by mouth 2 (two) times daily with meals. 60 tablet 3    levothyroxine 150 MCG Oral Tab Take 1 tablet (150 mcg total) by mouth daily. Take one tablet every morning before breakfast      pantoprazole 40 MG Oral Tab EC Take 1 tablet (40 mg total) by mouth 2 (two) times daily before meals.      Sevelamer 800 MG Oral Tab Take 1 tablet (800 mg total) by mouth 3 (three) times daily with meals.      rosuvastatin 10 MG Oral Tab Take 1 tablet (10 mg total) by mouth nightly. (Patient taking differently: Take 1 tablet (10 mg total) by mouth nightly.) 90 tablet 0    nitroGLYCERIN 0.3 MG Sublingual SL Tab Place 1 tablet (0.3 mg total) under the tongue every 5 (five) minutes as needed for Chest pain.      zolpidem 10 MG Oral Tab Take 1 tablet (10 mg total) by mouth nightly as needed for Sleep.      Ferric Citrate (AURYXIA) 1  MG(Fe) Oral Tab       Insulin Pen Needle (TRUEPLUS PEN NEEDLES) 31G X 5 MM Does not apply Misc Use 5 per day 500 each 2    Insulin Glargine, 2 Unit Dial, (TOUJEO MAX SOLOSTAR) 300 UNIT/ML Subcutaneous Solution Pen-injector Inject 20 Units into the skin nightly. 6 mL 2    Glucose Blood (ONETOUCH ULTRA) In Vitro Strip 6 times a day 400 each 3    Insulin Lispro, 1 Unit Dial, (HUMALOG KWIKPEN) 100 UNIT/ML Subcutaneous Solution Pen-injector 12 units before meals with sliding scale. Max daily dose: 50 units. (Patient taking differently: 10 Units. Three times a day. Before meals) 60 mL 3    CONTOUR NEXT TEST In Vitro Strip TEST BLOOD SUGAR FOUR TIMES DAILY 400 strip 3    Blood Glucose Monitoring Suppl (Cloud[employer] I/O CONTOUR NEXT MONITOR) W/DEVICE Does not apply Kit 1 Device by Does not apply route 4 (four) times daily. 1 kit 0       Review  of Systems:  Constitutional: denies fevers, chills, night sweats  HEENT: denies headache, vision changes, trouble or pain with swallowing  Cardiac: + chest pain  Pulm: denies dyspnea, cough, wheeze  GI: denies n/v, abd pain, diarrhea or constipation  : denies hematuria, dysuria, incontinence  MSK: denies muscle or joint pains  Neuro: denies numbness, weakness, paresthesias  Psych: denies anxiety, depression  Integument: denies skin rashes or lesions  Heme: denies easy bruising or bleeding  Endo: denies heat/cold intolerance, skin or nail changes      Physical Exam:  Blood pressure 138/76, pulse 61, temperature 98.4 °F (36.9 °C), temperature source Oral, resp. rate 18, height 72\", weight 206 lb 8 oz (93.7 kg), SpO2 91%.  Wt Readings from Last 3 Encounters:   08/07/24 206 lb 8 oz (93.7 kg)   05/17/24 204 lb (92.5 kg)   05/12/24 204 lb 1.6 oz (92.6 kg)       General: awake, alert, oriented x 3, no acute distress  HEENT: at/nc, perrl, eomi  Neck: No JVD, carotids 2+ no bruits.  Cardiac: Regular rate and rhythm, S1, S2 normal, 3/6 mid peaking systolic murmur RUSB  Lungs: Clear without wheezes, rales, rhonchi or dullness.  Normal excursions and effort.  Abdomen: Soft, non-tender, non-distended, normal bowel sounds   Extremities: Without clubbing, cyanosis or edema.  Peripheral pulses are 2+.  Neurologic: Alert and oriented, normal affect.  Psych: normal mood and affect  Skin: Warm and dry.       Laboratories and Data:  Diagnostics:      Labs:   CBC:    Lab Results   Component Value Date    WBC 5.6 08/06/2024    WBC 6.0 08/04/2024    WBC 7.0 08/03/2024     Lab Results   Component Value Date    HEMOGLOBIN 10.5 (L) 02/09/2022    HEMOGLOBIN 12.6 06/07/2016    HEMOGLOBIN 15.1 03/07/2013    HGB 10.8 (L) 08/06/2024    HGB 10.9 (L) 08/04/2024    HGB 9.7 (L) 08/03/2024      Lab Results   Component Value Date    PLT 64.0 (L) 08/07/2024    PLT 85.0 (L) 08/06/2024    PLT 52.0 (L) 08/06/2024     BMP:     Lab Results   Component  Value Date    GLUCOSE 129 (H) 02/09/2022    GLUCOSE 128 (H) 06/07/2016    GLUCOSE 359 (H) 03/07/2013     Lab Results   Component Value Date    K 4.7 08/06/2024    K 4.2 08/04/2024    K 5.0 08/03/2024     Lab Results   Component Value Date    BUN 80 (H) 08/06/2024    BUN 45 (H) 08/04/2024    BUN 83 (H) 08/03/2024     Lab Results   Component Value Date    CREATSERUM 7.63 (H) 08/06/2024    CREATSERUM 5.08 (H) 08/04/2024    CREATSERUM 7.42 (H) 08/03/2024     Cholesterol:     Lab Results   Component Value Date    CHOLEST 154 03/19/2024    CHOLEST 174 03/17/2024    CHOLEST 104 04/05/2023     Lab Results   Component Value Date    HDL 33 (L) 03/19/2024    HDL 44 03/17/2024    HDL 25 (L) 04/05/2023     Lab Results   Component Value Date    TRIG 304 (H) 03/19/2024    TRIG 215 (H) 03/17/2024    TRIG 202 (H) 04/05/2023    TRIGLY 248 (H) 02/09/2022    TRIGLY 278 (H) 08/19/2015    TRIGLY 490 (H) 03/07/2013     Lab Results   Component Value Date    LDL 72 03/19/2024    LDL 93 03/17/2024    LDL 46 04/05/2023     Lab Results   Component Value Date    AST 20 08/03/2024    AST 24 05/08/2024    AST 26 04/16/2024     Lab Results   Component Value Date    ALT 38 08/03/2024    ALT 24 05/08/2024    ALT 28 04/16/2024       Assessment/Plan:  79 year old male presenting with:    1) Chest pain, concerning for unstable angina  2) CAD with multiple prior PCI (last 3/2024 of LM, LAD, OM); multiple interruptions of DAPT since then as per hpi  3) ICM (EF 40-45% 4/2024, mod-severe aortic stenosis)  4) pAF on amiodarone (no ac likely 2/2 prior GIB and SDH)  5) SDH  6) HTN  7) HL  8) DM  9) ESRD on HD    - Cont asa, plavix, statin (cleared for DAPT by neurosurgery, see notes 5/2024)  - Prob PCI tomorrow late AM  - XFer to CCU given severe ostial LAD disease and ongoing CP.   - NTG drip.  - Will review echo with structural team.  Most likely moderate to severe, but may be worth calcium scoring the valve.  - Discussed with the patient who agrees and  wishes to proceed.    [facility] MD

## 2024-08-07 NOTE — PLAN OF CARE
Pt alert and oriented x 4   RA  Tele-NSR; SB-1st degree HB  NPO for possible procedure  Urinal at bedside   Chest pain this AM has subsided since nitroglycerin; EKG completed  2D ECHO-completed pending result  Bladder scan q6h  Pt up standby   Denies pain currently    Problem: CARDIOVASCULAR - ADULT  Goal: Maintains optimal cardiac output and hemodynamic stability  Description: INTERVENTIONS:  - Monitor vital signs, rhythm, and trends  - Monitor for bleeding, hypotension and signs of decreased cardiac output  - Evaluate effectiveness of vasoactive medications to optimize hemodynamic stability  - Monitor arterial and/or venous puncture sites for bleeding and/or hematoma  - Assess quality of pulses, skin color and temperature  - Assess for signs of decreased coronary artery perfusion - ex. Angina  - Evaluate fluid balance, assess for edema, trend weights  Outcome: Progressing  Goal: Absence of cardiac arrhythmias or at baseline  Description: INTERVENTIONS:  - Continuous cardiac monitoring, monitor vital signs, obtain 12 lead EKG if indicated  - Evaluate effectiveness of antiarrhythmic and heart rate control medications as ordered  - Initiate emergency measures for life threatening arrhythmias  - Monitor electrolytes and administer replacement therapy as ordered  Outcome: Progressing     Problem: SAFETY ADULT - FALL  Goal: Free from fall injury  Description: INTERVENTIONS:  - Assess pt frequently for physical needs  - Identify cognitive and physical deficits and behaviors that affect risk of falls.  - [place] fall precautions as indicated by assessment.  - Educate pt/family on patient safety including physical limitations  - Instruct pt to call for assistance with activity based on assessment  - Modify environment to reduce risk of injury  - Provide assistive devices as appropriate  - Consider OT/PT consult to assist with strengthening/mobility  - Encourage toileting schedule  Outcome: Progressing

## 2024-08-08 ENCOUNTER — APPOINTMENT (OUTPATIENT)
Dept: INTERVENTIONAL RADIOLOGY/VASCULAR | Facility: HOSPITAL | Age: 79
End: 2024-08-08
Payer: MEDICARE

## 2024-08-08 LAB
ANION GAP SERPL CALC-SCNC: 6 MMOL/L (ref 0–18)
ANION GAP SERPL CALC-SCNC: 9 MMOL/L (ref 0–18)
APTT PPP: 31.3 SECONDS (ref 23–36)
APTT PPP: >240 SECONDS (ref 23–36)
ATRIAL RATE: 73 BPM
BASOPHILS # BLD AUTO: 0.02 X10(3) UL (ref 0–0.2)
BASOPHILS # BLD AUTO: 0.04 X10(3) UL (ref 0–0.2)
BASOPHILS NFR BLD AUTO: 0.4 %
BASOPHILS NFR BLD AUTO: 0.4 %
BUN BLD-MCNC: 59 MG/DL (ref 9–23)
BUN BLD-MCNC: 61 MG/DL (ref 9–23)
CALCIUM BLD-MCNC: 9.3 MG/DL (ref 8.7–10.4)
CALCIUM BLD-MCNC: 9.5 MG/DL (ref 8.7–10.4)
CHLORIDE SERPL-SCNC: 100 MMOL/L (ref 98–112)
CHLORIDE SERPL-SCNC: 99 MMOL/L (ref 98–112)
CO2 SERPL-SCNC: 25 MMOL/L (ref 21–32)
CO2 SERPL-SCNC: 26 MMOL/L (ref 21–32)
CREAT BLD-MCNC: 6.6 MG/DL
CREAT BLD-MCNC: 6.86 MG/DL
EGFRCR SERPLBLD CKD-EPI 2021: 8 ML/MIN/1.73M2 (ref 60–?)
EGFRCR SERPLBLD CKD-EPI 2021: 8 ML/MIN/1.73M2 (ref 60–?)
EOSINOPHIL # BLD AUTO: 0.31 X10(3) UL (ref 0–0.7)
EOSINOPHIL # BLD AUTO: 0.48 X10(3) UL (ref 0–0.7)
EOSINOPHIL NFR BLD AUTO: 5.1 %
EOSINOPHIL NFR BLD AUTO: 6.6 %
ERYTHROCYTE [DISTWIDTH] IN BLOOD BY AUTOMATED COUNT: 13.7 %
ERYTHROCYTE [DISTWIDTH] IN BLOOD BY AUTOMATED COUNT: 13.9 %
GLUCOSE BLD-MCNC: 128 MG/DL (ref 70–99)
GLUCOSE BLD-MCNC: 138 MG/DL (ref 70–99)
GLUCOSE BLD-MCNC: 156 MG/DL (ref 70–99)
GLUCOSE BLD-MCNC: 179 MG/DL (ref 70–99)
GLUCOSE BLD-MCNC: 183 MG/DL (ref 70–99)
GLUCOSE BLD-MCNC: 219 MG/DL (ref 70–99)
GLUCOSE BLD-MCNC: 98 MG/DL (ref 70–99)
HCT VFR BLD AUTO: 23.5 %
HCT VFR BLD AUTO: 28.1 %
HGB BLD-MCNC: 8.2 G/DL
HGB BLD-MCNC: 9.4 G/DL
IMM GRANULOCYTES # BLD AUTO: 0.01 X10(3) UL (ref 0–1)
IMM GRANULOCYTES # BLD AUTO: 0.04 X10(3) UL (ref 0–1)
IMM GRANULOCYTES NFR BLD: 0.2 %
IMM GRANULOCYTES NFR BLD: 0.4 %
ISTAT ACTIVATED CLOTTING TIME: 189 SECONDS (ref 74–137)
LYMPHOCYTES # BLD AUTO: 1.24 X10(3) UL (ref 1–4)
LYMPHOCYTES # BLD AUTO: 2.51 X10(3) UL (ref 1–4)
LYMPHOCYTES NFR BLD AUTO: 26.4 %
LYMPHOCYTES NFR BLD AUTO: 26.9 %
MCH RBC QN AUTO: 33.7 PG (ref 26–34)
MCH RBC QN AUTO: 34.2 PG (ref 26–34)
MCHC RBC AUTO-ENTMCNC: 33.5 G/DL (ref 31–37)
MCHC RBC AUTO-ENTMCNC: 34.9 G/DL (ref 31–37)
MCV RBC AUTO: 100.7 FL
MCV RBC AUTO: 97.9 FL
MONOCYTES # BLD AUTO: 0.43 X10(3) UL (ref 0.1–1)
MONOCYTES # BLD AUTO: 0.72 X10(3) UL (ref 0.1–1)
MONOCYTES NFR BLD AUTO: 7.7 %
MONOCYTES NFR BLD AUTO: 9.2 %
MRSA DNA SPEC QL NAA+PROBE: NEGATIVE
NEUTROPHILS # BLD AUTO: 2.68 X10 (3) UL (ref 1.5–7.7)
NEUTROPHILS # BLD AUTO: 2.68 X10(3) UL (ref 1.5–7.7)
NEUTROPHILS # BLD AUTO: 5.55 X10 (3) UL (ref 1.5–7.7)
NEUTROPHILS # BLD AUTO: 5.55 X10(3) UL (ref 1.5–7.7)
NEUTROPHILS NFR BLD AUTO: 57.2 %
NEUTROPHILS NFR BLD AUTO: 59.5 %
OSMOLALITY SERPL CALC.SUM OF ELEC: 296 MOSM/KG (ref 275–295)
OSMOLALITY SERPL CALC.SUM OF ELEC: 298 MOSM/KG (ref 275–295)
P AXIS: 60 DEGREES
P-R INTERVAL: 200 MS
PLATELET # BLD AUTO: 56 10(3)UL (ref 150–450)
PLATELET # BLD AUTO: 84 10(3)UL (ref 150–450)
PLATELETS.RETICULATED NFR BLD AUTO: 1.4 % (ref 0–7)
POTASSIUM SERPL-SCNC: 5 MMOL/L (ref 3.5–5.1)
POTASSIUM SERPL-SCNC: 5.4 MMOL/L (ref 3.5–5.1)
Q-T INTERVAL: 476 MS
QRS DURATION: 154 MS
QTC CALCULATION (BEZET): 524 MS
R AXIS: -43 DEGREES
RBC # BLD AUTO: 2.4 X10(6)UL
RBC # BLD AUTO: 2.79 X10(6)UL
SODIUM SERPL-SCNC: 131 MMOL/L (ref 136–145)
SODIUM SERPL-SCNC: 134 MMOL/L (ref 136–145)
T AXIS: 113 DEGREES
VENTRICULAR RATE: 73 BPM
WBC # BLD AUTO: 4.7 X10(3) UL (ref 4–11)
WBC # BLD AUTO: 9.3 X10(3) UL (ref 4–11)

## 2024-08-08 PROCEDURE — 99153 MOD SED SAME PHYS/QHP EA: CPT | Performed by: INTERNAL MEDICINE

## 2024-08-08 PROCEDURE — 92978 ENDOLUMINL IVUS OCT C 1ST: CPT | Performed by: INTERNAL MEDICINE

## 2024-08-08 PROCEDURE — 99152 MOD SED SAME PHYS/QHP 5/>YRS: CPT | Performed by: INTERNAL MEDICINE

## 2024-08-08 PROCEDURE — 82962 GLUCOSE BLOOD TEST: CPT

## 2024-08-08 PROCEDURE — 027034Z DILATION OF CORONARY ARTERY, ONE ARTERY WITH DRUG-ELUTING INTRALUMINAL DEVICE, PERCUTANEOUS APPROACH: ICD-10-PCS | Performed by: INTERNAL MEDICINE

## 2024-08-08 PROCEDURE — 92972 PERQ TRLUML CORONRY LITHOTRP: CPT | Performed by: INTERNAL MEDICINE

## 2024-08-08 PROCEDURE — 80048 BASIC METABOLIC PNL TOTAL CA: CPT | Performed by: HOSPITALIST

## 2024-08-08 PROCEDURE — 85730 THROMBOPLASTIN TIME PARTIAL: CPT | Performed by: INTERNAL MEDICINE

## 2024-08-08 PROCEDURE — 85347 COAGULATION TIME ACTIVATED: CPT

## 2024-08-08 PROCEDURE — B41FYZZ FLUOROSCOPY OF RIGHT LOWER EXTREMITY ARTERIES USING OTHER CONTRAST: ICD-10-PCS | Performed by: INTERNAL MEDICINE

## 2024-08-08 PROCEDURE — 85025 COMPLETE CBC W/AUTO DIFF WBC: CPT | Performed by: HOSPITALIST

## 2024-08-08 PROCEDURE — 93005 ELECTROCARDIOGRAM TRACING: CPT

## 2024-08-08 PROCEDURE — 92920 PRQ TRLUML C ANGIOP 1ART&/BR: CPT | Performed by: INTERNAL MEDICINE

## 2024-08-08 PROCEDURE — 93010 ELECTROCARDIOGRAM REPORT: CPT | Performed by: INTERNAL MEDICINE

## 2024-08-08 PROCEDURE — B241ZZ3 ULTRASONOGRAPHY OF MULTIPLE CORONARY ARTERIES, INTRAVASCULAR: ICD-10-PCS | Performed by: INTERNAL MEDICINE

## 2024-08-08 PROCEDURE — 87641 MR-STAPH DNA AMP PROBE: CPT | Performed by: HOSPITALIST

## 2024-08-08 PROCEDURE — 02F13ZZ FRAGMENTATION IN CORONARY ARTERY, TWO ARTERIES, PERCUTANEOUS APPROACH: ICD-10-PCS | Performed by: INTERNAL MEDICINE

## 2024-08-08 PROCEDURE — 02713Z6 DILATION OF CORONARY ARTERY, TWO ARTERIES, BIFURCATION, PERCUTANEOUS APPROACH: ICD-10-PCS | Performed by: INTERNAL MEDICINE

## 2024-08-08 PROCEDURE — 90935 HEMODIALYSIS ONE EVALUATION: CPT | Performed by: STUDENT IN AN ORGANIZED HEALTH CARE EDUCATION/TRAINING PROGRAM

## 2024-08-08 RX ORDER — ISOSORBIDE MONONITRATE 30 MG/1
30 TABLET, EXTENDED RELEASE ORAL DAILY
COMMUNITY
Start: 2024-08-02 | End: 2024-08-14

## 2024-08-08 RX ORDER — HEPARIN SODIUM 5000 [USP'U]/ML
INJECTION, SOLUTION INTRAVENOUS; SUBCUTANEOUS
Status: COMPLETED
Start: 2024-08-08 | End: 2024-08-08

## 2024-08-08 RX ORDER — SODIUM CHLORIDE 9 MG/ML
INJECTION, SOLUTION INTRAVENOUS CONTINUOUS
Status: ACTIVE | OUTPATIENT
Start: 2024-08-08 | End: 2024-08-08

## 2024-08-08 RX ORDER — DOPAMINE HYDROCHLORIDE 320 MG/100ML
INJECTION, SOLUTION INTRAVENOUS
Status: DISCONTINUED
Start: 2024-08-08 | End: 2024-08-08 | Stop reason: WASHOUT

## 2024-08-08 RX ORDER — LIDOCAINE HYDROCHLORIDE 10 MG/ML
INJECTION, SOLUTION EPIDURAL; INFILTRATION; INTRACAUDAL; PERINEURAL
Status: COMPLETED
Start: 2024-08-08 | End: 2024-08-08

## 2024-08-08 RX ORDER — CLOPIDOGREL BISULFATE 75 MG/1
TABLET ORAL
Status: COMPLETED
Start: 2024-08-08 | End: 2024-08-08

## 2024-08-08 RX ORDER — CLOPIDOGREL BISULFATE 75 MG/1
75 TABLET ORAL DAILY
Status: DISCONTINUED | OUTPATIENT
Start: 2024-08-09 | End: 2024-08-08

## 2024-08-08 RX ORDER — LEVOTHYROXINE SODIUM 175 UG/1
175 TABLET ORAL
COMMUNITY
Start: 2024-07-19

## 2024-08-08 RX ORDER — MIDAZOLAM HYDROCHLORIDE 1 MG/ML
INJECTION INTRAMUSCULAR; INTRAVENOUS
Status: COMPLETED
Start: 2024-08-08 | End: 2024-08-08

## 2024-08-08 RX ORDER — HEPARIN SODIUM 1000 [USP'U]/ML
30 INJECTION, SOLUTION INTRAVENOUS; SUBCUTANEOUS ONCE
Status: COMPLETED | OUTPATIENT
Start: 2024-08-08 | End: 2024-08-08

## 2024-08-08 RX ORDER — PROTAMINE SULFATE 10 MG/ML
INJECTION, SOLUTION INTRAVENOUS
Status: COMPLETED
Start: 2024-08-08 | End: 2024-08-08

## 2024-08-08 RX ORDER — ASPIRIN 81 MG/1
81 TABLET ORAL DAILY
Status: DISCONTINUED | OUTPATIENT
Start: 2024-08-09 | End: 2024-08-08

## 2024-08-08 RX ORDER — IODIXANOL 320 MG/ML
100 INJECTION, SOLUTION INTRAVASCULAR
Status: COMPLETED | OUTPATIENT
Start: 2024-08-08 | End: 2024-08-08

## 2024-08-08 RX ORDER — MORPHINE SULFATE 2 MG/ML
2 INJECTION, SOLUTION INTRAMUSCULAR; INTRAVENOUS
Status: DISCONTINUED | OUTPATIENT
Start: 2024-08-08 | End: 2024-08-14

## 2024-08-08 NOTE — PHYSICAL THERAPY NOTE
Reviewed pt's chart and discuss pt with RN. At this time RN States that pt has a planned procedure today. PT will follow up tomorrow.

## 2024-08-08 NOTE — PROCEDURES
[facility]       Jonny Haque Location: Cath Lab    CSN [id number] MRN [id number]   Admission Date 8/3/2024 Procedure Date 8/8/2024   Attending Physician Golden Escalante DO Procedure Physician Satya Donald MD         CARDIAC CATHETERIZATION/PERCUTANEOUS CORONARY INTERVENTION      PREOPERATIVE DIAGNOSIS:  NSTEMI  POSTOPERATIVE DIAGNOSIS:  CAD  PROCEDURE PERFORMED:  DCB to ostial LAD/distal LM, PCI to OM with TRACY, IVUS of LAD/LM      PROCEDURE:  The patient was brought to the cardiac catheterization lab in the fasting state.  Informed consent was obtained.  Moderate sedation was employed using 4mg IV Versed and 100mcg IV fentanyl.  I directly observed the patient from 1221 to 107p, watching the heart rate, blood pressure, oximetry, and rhythm.  An independent, trained observer was present throughout the procedure and assisted in the monitoring of the patient's level of consciousness and physiologic states.  Please see the [facility] Catheterization report in Epic for further technical details.     ACCESS/CATHETER PLACEMENT:  7F right femoral, slender sheath.   Standard over the wire technique was utilized.  Standard [place] and GURROLA angiographic views with caudal and cranial angulation were used. A perclose was used for arterial hemostasis.    INTERVENTIONAL PROCEDURE: EBU 3.5 guide, heparin for anticoag.  BMW wire down LAD, 2.0 balloon to predilate to pass IVUS.  Sized vessel.  3.5mm Shockwave several inflations and pulses to 12-14ATM.  Then 4.0mm NC to 18ATM.  Then 4.0x20mm [place] LETICIA at 12ATM for 40s.  Had wired the CX previously.  Wire flew down OM.  Balloon crossed very easily (previously I only got limited gear down the OM despite all attempts and support catheters).  Stented with 2.40k82am [place] at 14-16ATM.  Excellent result.  Final pics taken.  Perclose for hemostasis.  Plavix 300mg load (has been on 75mg daily for a few days).       MEDICATIONS:  See nursing record.     COMPLICATIONS:  None.     IMPRESSION:     CAD.  S/P PCI to LM into LAD for ISR with 1) lithotripsy angiogplasty, 2) NC balloon angioplasty, 3) DCB angioplasty (drug coated balloon).  S/P  PCI to OM with TRACY.    RECOMMENDATIONS:   DAPT.  Uninterrupted.  Medical management.  Surveillance with stress, CTA, and angio if needed.  Consider further eval for TAVR.    CARLEY DODGE

## 2024-08-08 NOTE — DIETARY NOTE
Clinical Nutrition     Dietitian consult received per cardiac rehab standing order. Pt to be educated by cardiac rehab staff and encouraged to attend outpatient classes taught by TRACEE. TRACEE available PRN.    Gi Hagan RD, LDN, [facility]  Clinical Dietitian  Phone [phone]

## 2024-08-08 NOTE — PROGRESS NOTES
[facility]  Urology Progress Note    Jonny Haque Patient Status:  Inpatient    4/15/1945 MRN [id number]   Location [facility] 6NE-A Attending Golden Escalante,    Hosp Day # 5 PCP Gee Jimenez MD     Subjective:  Jonny Haque is a(n) 79 year old male.    Current complaints:     Straight cath 706 mL > 800 mL > 950 mL    Bladder scan this  mL     Urine yellow per patient.  Dribbling.      No constipation.    Objective:  General appearance: alert, appears stated age, and cooperative  Blood pressure 131/66, pulse 60, temperature 97.7 °F (36.5 °C), temperature source Temporal, resp. rate 14, height 6' (1.829 m), weight 208 lb 15.9 oz (94.8 kg), SpO2 97%.  Abdomen: soft, non-tender  Lab Results   Component Value Date    WBC 4.7 2024    HGB 9.4 2024    HCT 28.1 2024    PLT 56.0 2024    CREATSERUM 6.86 2024    BUN 59 2024     2024    K 5.0 2024     2024    CO2 25.0 2024     2024    CA 9.3 2024    PTT 31.3 2024    PGLU 183 2024       No results found for this visit on 24.     CT ABDOMEN+PELVIS(CPT=74176)    Result Date: 2024  CONCLUSION:  1. No acute intra-abdominal or pelvic process noted. 2.  Stable periumbilical hernia containing omental fat with fat stranding. 3. Uncomplicated diverticulosis of the descending and sigmoid colon. 4. Pancreatic atrophy with stable low-density focus along the uncinate process of the pancreas.  This is nonspecific and may represent IPMN. 5. Please see the body of the report above for further details.   LOCATION:  [place]   Dictated by (CST): Nadiya Castellon DO on 2024 at 11:45 PM     Finalized by (CST): Nadiya Castellon DO on 2024 at 11:53 PM         Assessment:    URINARY RETENTION  GROSS HEMATURIA  HISTORY OF BLADDER CANCER   CT A/P without contrast 24: no renal stones/hydronephrosis, prostatomegaly (measure up to 5.4 cm)    ESRD  -on  HD  -nephrology following    Plan:    Follow PVR bladder volumes and CIC prn. May need to consider mccloud catheter placement pending clinical course  Tamsulosin when deemed safe/appropriate from medical team  Outpatient cystoscopy    Will follow peripherally.    Above discussed with patient, nurse    ANYA Buchanan [facility]  Department of Urology  8/8/2024  9:30 AM

## 2024-08-08 NOTE — PROGRESS NOTES
Cardiology Consultation  [facility]    Jonny Haque Patient Status:  Inpatient    4/15/1945 MRN [id number]   [facility] 8NE-A Attending Golden Escalante, DO   Hosp Day # 5 PCP Gee Jimenez MD     Reason for Consultation:  Chest pain    History of Present Illness:  Jonny Haque is a a(n) 79 year old male with pmh CAD with multiple prior interventions (last 3/2024 with LM, LAD, OM), ICM (EF 40-45% 2024, mod-severe aortic stenosis), pAF not on anticoagulation, SDH, HTN, HL, DM, ESRD presenting with chest pain.  Patient reports 4-5 day h/o central chest tightness, worse with exertion and resolved with rest.  States similar to prior anginal pain.  Worse over the past few days.  Denies palpitations, dyspnea, orthopnea, PND, LE edema.  Of note, DAPT held multiple times since PCI 3/2024 2/ possible GIB, then SDH, then patient noncompliance.      Subjective:  - More pain last night.  Xfer to CCU.  EKG stable.  - Otherwise ok; no TOMLIN.  No other complaints.  - Planning PCI today.    Medications:  Current Facility-Administered Medications on File Prior to Encounter   Medication Dose Route Frequency Provider Last Rate Last Admin    [] sodium chloride 0.9 % IV bolus 100 mL  100 mL Intravenous Q30 Min PRN Johnson Vera MD        And    [] albumin human (Albumin) 25% injection 25 g  25 g Intravenous PRN Dialysis Johnson Vera MD        [COMPLETED] heparin (Porcine) 1000 UNIT/ML injection 1,500 Units  1.5 mL Intracatheter Once Johnson Vera MD   1,500 Units at 24 1645     Current Outpatient Medications on File Prior to Encounter   Medication Sig Dispense Refill    levothyroxine 175 MCG Oral Tab Take 1 tablet (175 mcg total) by mouth before breakfast.      isosorbide mononitrate ER 30 MG Oral Tablet 24 Hr Take 1 tablet (30 mg total) by mouth daily.      Lanthanum Carbonate 1000 MG Oral Chew Tab Chew 1 tablet (1,000 mg total) by mouth 2 (two) times daily with meals.       clopidogrel (PLAVIX) 75 MG Oral Tab Take 1 tablet (75 mg total) by mouth.      aspirin 81 MG Oral Tab EC Take 1 tablet (81 mg total) by mouth every other day.      amiodarone 200 MG Oral Tab Take 1 tablet (200 mg total) by mouth 2 (two) times daily with meals. 60 tablet 3    pantoprazole 40 MG Oral Tab EC Take 1 tablet (40 mg total) by mouth 2 (two) times daily before meals.      Sevelamer 800 MG Oral Tab Take 1 tablet (800 mg total) by mouth 3 (three) times daily with meals.      rosuvastatin 10 MG Oral Tab Take 1 tablet (10 mg total) by mouth nightly. (Patient taking differently: Take 1 tablet (10 mg total) by mouth nightly.) 90 tablet 0    nitroGLYCERIN 0.3 MG Sublingual SL Tab Place 1 tablet (0.3 mg total) under the tongue every 5 (five) minutes as needed for Chest pain.      zolpidem 10 MG Oral Tab Take 1 tablet (10 mg total) by mouth nightly as needed for Sleep.      Ferric Citrate (AURYXIA) 1  MG(Fe) Oral Tab       Insulin Pen Needle (TRUEPLUS PEN NEEDLES) 31G X 5 MM Does not apply Misc Use 5 per day 500 each 2    Insulin Glargine, 2 Unit Dial, (TOUJEO MAX SOLOSTAR) 300 UNIT/ML Subcutaneous Solution Pen-injector Inject 20 Units into the skin nightly. 6 mL 2    Glucose Blood (ONETOUCH ULTRA) In Vitro Strip 6 times a day 400 each 3    Insulin Lispro, 1 Unit Dial, (HUMALOG KWIKPEN) 100 UNIT/ML Subcutaneous Solution Pen-injector 12 units before meals with sliding scale. Max daily dose: 50 units. (Patient taking differently: 10 Units. Three times a day. Before meals) 60 mL 3    CONTOUR NEXT TEST In Vitro Strip TEST BLOOD SUGAR FOUR TIMES DAILY 400 strip 3    Blood Glucose Monitoring Suppl ([place] CONTOUR NEXT MONITOR) W/DEVICE Does not apply Kit 1 Device by Does not apply route 4 (four) times daily. 1 kit 0       Review of Systems:  Constitutional: denies fevers, chills, night sweats  HEENT: denies headache, vision changes, trouble or pain with swallowing  Cardiac: + chest pain  Pulm: denies dyspnea,  cough, wheeze  GI: denies n/v, abd pain, diarrhea or constipation  : denies hematuria, dysuria, incontinence  MSK: denies muscle or joint pains  Neuro: denies numbness, weakness, paresthesias  Psych: denies anxiety, depression  Integument: denies skin rashes or lesions  Heme: denies easy bruising or bleeding  Endo: denies heat/cold intolerance, skin or nail changes      Physical Exam:  Blood pressure 108/63, pulse 55, temperature 98 °F (36.7 °C), temperature source Temporal, resp. rate 11, height 72\", weight 208 lb 15.9 oz (94.8 kg), SpO2 100%.  Wt Readings from Last 3 Encounters:   08/08/24 208 lb 15.9 oz (94.8 kg)   05/17/24 204 lb (92.5 kg)   05/12/24 204 lb 1.6 oz (92.6 kg)       General: awake, alert, oriented x 3, no acute distress  HEENT: at/nc, perrl, eomi  Neck: No JVD, carotids 2+ no bruits.  Cardiac: Regular rate and rhythm, S1, S2 normal, 3/6 mid peaking systolic murmur RUSB  Lungs: Clear without wheezes, rales, rhonchi or dullness.  Normal excursions and effort.  Abdomen: Soft, non-tender, non-distended, normal bowel sounds   Extremities: Without clubbing, cyanosis or edema.  Peripheral pulses are 2+.  Neurologic: Alert and oriented, normal affect.  Psych: normal mood and affect  Skin: Warm and dry.       Laboratories and Data:  Diagnostics:      Labs:   CBC:    Lab Results   Component Value Date    WBC 4.7 08/08/2024    WBC 5.1 08/07/2024    WBC 5.6 08/06/2024     Lab Results   Component Value Date    HEMOGLOBIN 10.5 (L) 02/09/2022    HEMOGLOBIN 12.6 06/07/2016    HEMOGLOBIN 15.1 03/07/2013    HGB 9.4 (L) 08/08/2024    HGB 9.8 (L) 08/07/2024    HGB 10.8 (L) 08/06/2024      Lab Results   Component Value Date    PLT 56.0 (L) 08/08/2024    PLT 52.0 (L) 08/07/2024    PLT 64.0 (L) 08/07/2024     BMP:     Lab Results   Component Value Date    GLUCOSE 129 (H) 02/09/2022    GLUCOSE 128 (H) 06/07/2016    GLUCOSE 359 (H) 03/07/2013     Lab Results   Component Value Date    K 5.0 08/08/2024    K 5.4 (H)  08/07/2024    K 4.7 08/06/2024     Lab Results   Component Value Date    BUN 59 (H) 08/08/2024    BUN 61 (H) 08/07/2024    BUN 80 (H) 08/06/2024     Lab Results   Component Value Date    CREATSERUM 6.86 (H) 08/08/2024    CREATSERUM 6.60 (H) 08/07/2024    CREATSERUM 7.63 (H) 08/06/2024     Cholesterol:     Lab Results   Component Value Date    CHOLEST 154 03/19/2024    CHOLEST 174 03/17/2024    CHOLEST 104 04/05/2023     Lab Results   Component Value Date    HDL 33 (L) 03/19/2024    HDL 44 03/17/2024    HDL 25 (L) 04/05/2023     Lab Results   Component Value Date    TRIG 304 (H) 03/19/2024    TRIG 215 (H) 03/17/2024    TRIG 202 (H) 04/05/2023    TRIGLY 248 (H) 02/09/2022    TRIGLY 278 (H) 08/19/2015    TRIGLY 490 (H) 03/07/2013     Lab Results   Component Value Date    LDL 72 03/19/2024    LDL 93 03/17/2024    LDL 46 04/05/2023     Lab Results   Component Value Date    AST 20 08/03/2024    AST 24 05/08/2024    AST 26 04/16/2024     Lab Results   Component Value Date    ALT 38 08/03/2024    ALT 24 05/08/2024    ALT 28 04/16/2024       Assessment/Plan:  79 year old male presenting with:    1) Chest pain, concerning for unstable angina  2) CAD with multiple prior PCI (last 3/2024 of LM, LAD, OM); multiple interruptions of DAPT since then as per hpi  3) ICM (EF 40-45% 4/2024, mod-severe aortic stenosis)  4) pAF on amiodarone (no ac likely 2/2 prior GIB and SDH)  5) SDH  6) HTN  7) HL  8) DM  9) ESRD on HD    - Cont asa, plavix, statin (cleared for DAPT by neurosurgery, see notes 5/2024)  - Prob PCI this AM  - NTG/heparin for now.  - CCU for now.  - TAVR CTA tomorrow.  - Discussed with the patient and family who agree and wishes to proceed.    CARLEY DODGE

## 2024-08-08 NOTE — PROGRESS NOTES
0730 - L forearm IV infiltration. IV removed by NOC shift RN. Borders marked. Arm elevated and cold compress applied. Will continue to monitor.

## 2024-08-08 NOTE — PROGRESS NOTES
Nephrology Progress Note    Jonny Garland Attending:  Golden Escalante DO       SUBJECTIVE:     Cath today. HD today.    PHYSICAL EXAM:     Vital Signs: /54   Pulse 57   Temp 97.3 °F (36.3 °C) (Temporal)   Resp 17   Ht 6' (1.829 m)   Wt 208 lb 15.9 oz (94.8 kg)   SpO2 94%   BMI 28.34 kg/m²   Temp (24hrs), Av.9 °F (36.6 °C), Min:97.3 °F (36.3 °C), Max:98.4 °F (36.9 °C)       Intake/Output Summary (Last 24 hours) at 2024 1607  Last data filed at 2024 0619  Gross per 24 hour   Intake 133.9 ml   Output 970 ml   Net -836.1 ml     Wt Readings from Last 3 Encounters:   24 208 lb 15.9 oz (94.8 kg)   24 204 lb (92.5 kg)   24 204 lb 1.6 oz (92.6 kg)       General: no distress  HEENT: NCAT  Cardiac: RRR  Lungs: no distress  Abdomen: soft, non-tender  Extremities: no LE edema  Neurologic/Psych: mentating well     LABORATORY DATA:     Lab Results   Component Value Date     (H) 2024    BUN 59 (H) 2024    BUNCREA 13.0 2022    CREATSERUM 6.86 (H) 2024    ANIONGAP 9 2024    GFR 41 (L) 2017    GFRNAA 8 (L) 2022    GFRAA 9 (L) 2022    CA 9.3 2024    OSMOCALC 298 (H) 2024    ALKPHO 183 (H) 2024    AST 20 2024    ALT 38 2024    BILT 0.4 2024    TP 7.0 2024    ALB 4.3 2024    GLOBULIN 2.7 2024    AGRATIO 1.0 (L) 2016     (L) 2024    K 5.0 2024     2024    CO2 25.0 2024     Lab Results   Component Value Date    WBC 4.7 2024    RBC 2.79 (L) 2024    HGB 9.4 (L) 2024    HCT 28.1 (L) 2024    PLT 56.0 (L) 2024    .7 (H) 2024    MCH 33.7 2024    MCHC 33.5 2024    RDW 13.7 2024    NEPRELIM 2.68 2024    NEPERCENT 57.2 2024    LYPERCENT 26.4 2024    MOPERCENT 9.2 2024    EOPERCENT 6.6 2024    BAPERCENT 0.4 2024    NE 2.68 2024    LYMABS 1.24 2024     MOABSO 0.43 08/08/2024    EOABSO 0.31 08/08/2024    BAABSO 0.02 08/08/2024     Lab Results   Component Value Date    MALBP 49.2 (H) 09/04/2019    CREUR 91.65 09/04/2019     Lab Results   Component Value Date    COLORUR YELLOW 02/09/2022    CLARITY Hazy (A) 07/18/2019    SPECGRAVITY 1.013 07/02/2021    GLUUR NEGATIVE 02/09/2022    BILUR NEGATIVE 02/09/2022    KETUR NEGATIVE 02/09/2022    BLOODURINE SMALL (A) 02/09/2022    PHURINE 7 02/09/2022    PROUR 100 (A) 02/09/2022    UROBILINOGEN <2.0 07/18/2019    NITRITE NEGATIVE 02/09/2022    LEUUR NEGATIVE 02/09/2022    WBCUR 1-4 07/18/2019    RBCUR 6-10 (A) 07/18/2019    EPIUR RARE (A) 02/09/2022    BACUR NONE 02/09/2022    HYLUR Present (A) 12/19/2016       IMAGING:     Reviewed    MEDICATIONS:       Current Facility-Administered Medications   Medication Dose Route Frequency    sodium chloride 0.9% infusion   Intravenous Continuous    [START ON 8/9/2024] levothyroxine (Synthroid) tab 175 mcg  175 mcg Oral Daily @ 0700    morphINE PF 2 MG/ML injection 2 mg  2 mg Intravenous Q1H PRN    nitroGLYCERIN in dextrose 5% 50 mg/250mL infusion premix  5-400 mcg/min Intravenous Continuous    heparin (Porcine) 52208 units/250mL infusion ACS/AFIB CONTINUOUS  200-3,000 Units/hr Intravenous Continuous    nitroglycerin (Nitrostat) SL tab 0.4 mg  0.4 mg Sublingual Q5 Min PRN    heparin (Porcine) 1000 UNIT/ML injection 1,500 Units  1.5 mL Intracatheter PRN Dialysis    carvedilol (Coreg) tab 6.25 mg  6.25 mg Oral BID with meals    losartan (Cozaar) tab 25 mg  25 mg Oral Daily    clopidogrel (Plavix) tab 75 mg  75 mg Oral Daily    Lanthanum Carbonate (FOSRENOL) chewable tab 1,000 mg  1,000 mg Oral BID with meals    aspirin DR tab 81 mg  81 mg Oral QOD    amiodarone (Pacerone) tab 200 mg  200 mg Oral BID with meals    rosuvastatin (Crestor) tab 10 mg  10 mg Oral Nightly    pantoprazole (Protonix) DR tab 40 mg  40 mg Oral BID AC    insulin aspart (NovoLOG) 100 Units/mL FlexPen 10 Units  10  Units Subcutaneous TID AC    insulin degludec (Tresiba) 100 units/mL flextouch 24 Units  24 Units Subcutaneous Nightly    acetaminophen (Tylenol Extra Strength) tab 500 mg  500 mg Oral Q4H PRN    polyethylene glycol (PEG 3350) (Miralax) 17 g oral packet 17 g  17 g Oral Daily PRN    sennosides (Senokot) tab 17.2 mg  17.2 mg Oral Nightly PRN    bisacodyl (Dulcolax) 10 MG rectal suppository 10 mg  10 mg Rectal Daily PRN    ondansetron (Zofran) 4 MG/2ML injection 4 mg  4 mg Intravenous Q6H PRN    metoclopramide (Reglan) 5 mg/mL injection 5 mg  5 mg Intravenous Q8H PRN    glucose (Dex4) 15 GM/59ML oral liquid 15 g  15 g Oral Q15 Min PRN    Or    glucose (Glutose) 40% oral gel 15 g  15 g Oral Q15 Min PRN    Or    glucose-vitamin C (Dex-4) chewable tab 4 tablet  4 tablet Oral Q15 Min PRN    Or    dextrose 50% injection 50 mL  50 mL Intravenous Q15 Min PRN    Or    glucose (Dex4) 15 GM/59ML oral liquid 30 g  30 g Oral Q15 Min PRN    Or    glucose (Glutose) 40% oral gel 30 g  30 g Oral Q15 Min PRN    Or    glucose-vitamin C (Dex-4) chewable tab 8 tablet  8 tablet Oral Q15 Min PRN    insulin aspart (NovoLOG) 100 Units/mL FlexPen 2-10 Units  2-10 Units Subcutaneous TID AC and HS       ASSESSMENT/PLAN:     ESRD  - HD TTS     Hypertension    Hyponatremia  -Likely due to volume excess    Anemia  -Hemoglobin goal greater than 10  -Holding JIE until acute thrombotic events ruled out    CKD BMD  -Lanthanum 1000mg BID    NSTEMI        HD TTS.  Continue dialysis per routine schedule  UF with HD as tolerated  NSTEMI management per cardiology - s/p cath  No indication for JIE with hemoglobin >10      We will continue to follow    DO Joselito Hoff [facility] - Nephrology

## 2024-08-08 NOTE — PLAN OF CARE
A&Ox4, pt currently denies any chest pain. Nitroglycerin gtt running at 10mcg/min. RA, lung sounds are clear B/L. NSR w/ 1st degree Heart Block. Continent of bowel, last BM 8/6/24. Diminished urine output.    Bed is locked and in low position. Call light and personal items within reach.  Pt updated with plan of care.       Approx 2215 pt c/o chest pain 7/10. Titrated Nitroglycerin gtt to 15mcg. (See MAR for details of further medications) EKG obtained. Paged Dr. Francisco Espinoza. Received orders to transfer pt to CCU. Called pt's son, Ashwin, to make him aware of transfer. Report given to FRANCIA Emerson. Transferred pt to unit.      Problem: Patient/Family Goals  Goal: Patient/Family Long Term Goal  Description: Patient's Long Term Goal: Stay healthy    Interventions:  -Medication management  -Dietary management  -Prompt follow up with physicians  - See additional Care Plan goals for specific interventions  Outcome: Progressing  Goal: Patient/Family Short Term Goal  Description: Patient's Short Term Goal: Discharge home    Interventions:   - Cardiology consult  - Nephrology consult  -HD  Trend trop  - See additional Care Plan goals for specific interventions  Outcome: Progressing     Problem: CARDIOVASCULAR - ADULT  Goal: Maintains optimal cardiac output and hemodynamic stability  Description: INTERVENTIONS:  - Monitor vital signs, rhythm, and trends  - Monitor for bleeding, hypotension and signs of decreased cardiac output  - Evaluate effectiveness of vasoactive medications to optimize hemodynamic stability  - Monitor arterial and/or venous puncture sites for bleeding and/or hematoma  - Assess quality of pulses, skin color and temperature  - Assess for signs of decreased coronary artery perfusion - ex. Angina  - Evaluate fluid balance, assess for edema, trend weights  Outcome: Progressing  Goal: Absence of cardiac arrhythmias or at baseline  Description: INTERVENTIONS:  - Continuous cardiac monitoring, monitor vital  signs, obtain 12 lead EKG if indicated  - Evaluate effectiveness of antiarrhythmic and heart rate control medications as ordered  - Initiate emergency measures for life threatening arrhythmias  - Monitor electrolytes and administer replacement therapy as ordered  Outcome: Progressing     Problem: SAFETY ADULT - FALL  Goal: Free from fall injury  Description: INTERVENTIONS:  - Assess pt frequently for physical needs  - Identify cognitive and physical deficits and behaviors that affect risk of falls.  - [place] fall precautions as indicated by assessment.  - Educate pt/family on patient safety including physical limitations  - Instruct pt to call for assistance with activity based on assessment  - Modify environment to reduce risk of injury  - Provide assistive devices as appropriate  - Consider OT/PT consult to assist with strengthening/mobility  - Encourage toileting schedule  Outcome: Progressing

## 2024-08-08 NOTE — CM/SW NOTE
08/08/24 1000   CM/SW Referral Data   Referral Source    Reason for Referral Discharge planning   Informant EMR;Clinical Staff Member   Patient Status Prior to Admission   Services in place prior to admission Dialysis   Dialysis Clinic [facility]     Chart reviewed for discharge planning.    Pt is a 79 year old male who admitted with chest/back pain. Pt currently in CCU on nitroglycerine gtt and awaiting PCI today    From therapy note:     HOME SITUATION  Type of Home: House   Home Layout: Multi-level  Stairs to Bedroom: 8     Lives With: Son  Drives: Yes  Patient Regularly Uses: Reading glasses     Prior Level of Union: Patient is independent gait without device, independent with ADL/self-care, was able to drive.  Patient reports 8 steps between upper floor to main level and 8 steps down to lower level, full flight of stairs to basement where he does his laundry.     / to remain available for support and/or discharge planning.     Porsche Guadarrama MBA MSN, RN CTL/  [id number]

## 2024-08-08 NOTE — PLAN OF CARE
Pt arrived to unit around 2320 on ntg gtt. A+Ox4. Denied any cp upon arrival. C/o bladder feeling full, pt bladder scanned and straight cathed per protocol. No hematuria noted. SR with 1st degree AVB. Pulses palpable. Heparin gtt started per orders. Breath sounds clear. NPO at midnight for PCI.

## 2024-08-08 NOTE — PLAN OF CARE
1345-Assumed patient care, returned from cath lab.   Alert, awake. Breathing unlabored. Denied pain on arrival. Tele SB with rates in mid 50s. Right groin access site with femostop on arrival from cath lab.     1500-femostop removed, no bleeding noted.   1530-groin with hematoma, pressure applied and femostop re-applied. Paged Dr. Odilon Donald called, updated, do not continue heparin/nitroglycerin (not infusing on arrival from cath lab), keep femostop in place for 1 more hour.     HD treatment started and around 1645 patient started dropping b/p as low as sbp in the 60s, symptomatic with c/o nausea/dizzy/lightheaded/diaphoretic. Also with c/o some pain to lower back. Dr. Aguayo at bedside, HD stopped and bolus given by HD RN with improved in b/p and normalizing. Dr. Aguayo updated Dr. Donald who requested a follow up EKG. CBC ordered as well. HD RN reviewed with Dr. Mejia, treatment restarted and will plan to stop if SBP <80.

## 2024-08-09 ENCOUNTER — APPOINTMENT (OUTPATIENT)
Dept: CT IMAGING | Facility: HOSPITAL | Age: 79
End: 2024-08-09
Payer: MEDICARE

## 2024-08-09 LAB
ANION GAP SERPL CALC-SCNC: 6 MMOL/L (ref 0–18)
ATRIAL RATE: 56 BPM
BUN BLD-MCNC: 34 MG/DL (ref 9–23)
CALCIUM BLD-MCNC: 8.8 MG/DL (ref 8.7–10.4)
CHLORIDE SERPL-SCNC: 102 MMOL/L (ref 98–112)
CO2 SERPL-SCNC: 26 MMOL/L (ref 21–32)
CREAT BLD-MCNC: 4.69 MG/DL
EGFRCR SERPLBLD CKD-EPI 2021: 12 ML/MIN/1.73M2 (ref 60–?)
ERYTHROCYTE [DISTWIDTH] IN BLOOD BY AUTOMATED COUNT: 13.9 %
GLUCOSE BLD-MCNC: 100 MG/DL (ref 70–99)
GLUCOSE BLD-MCNC: 108 MG/DL (ref 70–99)
GLUCOSE BLD-MCNC: 142 MG/DL (ref 70–99)
HCT VFR BLD AUTO: 23.3 %
HGB BLD-MCNC: 7.8 G/DL
MCH RBC QN AUTO: 33.3 PG (ref 26–34)
MCHC RBC AUTO-ENTMCNC: 33.5 G/DL (ref 31–37)
MCV RBC AUTO: 99.6 FL
OSMOLALITY SERPL CALC.SUM OF ELEC: 286 MOSM/KG (ref 275–295)
P AXIS: 61 DEGREES
P-R INTERVAL: 200 MS
PHOSPHATE SERPL-MCNC: 5.4 MG/DL (ref 2.4–5.1)
PLATELET # BLD AUTO: 54 10(3)UL (ref 150–450)
POTASSIUM SERPL-SCNC: 4.5 MMOL/L (ref 3.5–5.1)
Q-T INTERVAL: 534 MS
QRS DURATION: 132 MS
QTC CALCULATION (BEZET): 515 MS
R AXIS: 2 DEGREES
RBC # BLD AUTO: 2.34 X10(6)UL
SODIUM SERPL-SCNC: 134 MMOL/L (ref 136–145)
T AXIS: 129 DEGREES
VENTRICULAR RATE: 56 BPM
WBC # BLD AUTO: 4.7 X10(3) UL (ref 4–11)

## 2024-08-09 PROCEDURE — 97530 THERAPEUTIC ACTIVITIES: CPT

## 2024-08-09 PROCEDURE — 82962 GLUCOSE BLOOD TEST: CPT

## 2024-08-09 PROCEDURE — 80048 BASIC METABOLIC PNL TOTAL CA: CPT | Performed by: INTERNAL MEDICINE

## 2024-08-09 PROCEDURE — 71275 CT ANGIOGRAPHY CHEST: CPT

## 2024-08-09 PROCEDURE — 99211 OFF/OP EST MAY X REQ PHY/QHP: CPT

## 2024-08-09 PROCEDURE — 74174 CTA ABD&PLVS W/CONTRAST: CPT

## 2024-08-09 PROCEDURE — 84100 ASSAY OF PHOSPHORUS: CPT | Performed by: INTERNAL MEDICINE

## 2024-08-09 PROCEDURE — 85049 AUTOMATED PLATELET COUNT: CPT | Performed by: HOSPITALIST

## 2024-08-09 PROCEDURE — 85027 COMPLETE CBC AUTOMATED: CPT | Performed by: INTERNAL MEDICINE

## 2024-08-09 PROCEDURE — 97116 GAIT TRAINING THERAPY: CPT

## 2024-08-09 RX ORDER — DILTIAZEM HYDROCHLORIDE 5 MG/ML
5 INJECTION INTRAVENOUS SEE ADMIN INSTRUCTIONS
Status: CANCELLED | OUTPATIENT
Start: 2024-08-09

## 2024-08-09 RX ORDER — TAMSULOSIN HYDROCHLORIDE 0.4 MG/1
0.4 CAPSULE ORAL
Status: DISCONTINUED | OUTPATIENT
Start: 2024-08-10 | End: 2024-08-14

## 2024-08-09 RX ORDER — METOPROLOL TARTRATE 1 MG/ML
5 INJECTION, SOLUTION INTRAVENOUS SEE ADMIN INSTRUCTIONS
Status: CANCELLED | OUTPATIENT
Start: 2024-08-09

## 2024-08-09 NOTE — PHYSICAL THERAPY NOTE
PHYSICAL THERAPY TREATMENT NOTE - INPATIENT    Room Number: 6607/6607-A     Session: 1          Presenting Problem: chest/back pain  Co-Morbidities : Aortic stenosis, ESRD on HD, HTN, CAD, DM, stents, fall, Hep C, hypothyroidism, subdural hematoma, ischemic cardiomyopathy, GI bleed    ASSESSMENT   Patient demonstrates good  progress this session, goals remain in progress.    Patient continues to function below baseline with bed mobility, transfers, gait, stair negotiation, and standing prolonged periods. Contributing factors to remaining limitations include decreased functional strength, decreased endurance/aerobic capacity, impaired standing balance, decreased muscular endurance, and medical status.  Next session anticipate patient to progress bed mobility, transfers, gait, stair negotiation, and standing prolonged periods.  Physical Therapy will continue to follow patient for duration of hospitalization.    Patient continues to benefit from continued skilled PT services: at discharge to promote prior level of function.  Anticipate patient will return home with home health PT.    PLAN  PT Treatment Plan: Bed mobility;Endurance;Energy conservation;Patient education;Family education;Gait training;Strengthening;Transfer training  Rehab Potential : Good  Frequency (Obs): 3-5x/week    CURRENT GOALS     Goal #1 Patient is able to demonstrate supine - sit EOB @ level: independent      Goal #2 Patient is able to demonstrate transfers Sit to/from Stand at assistance level: independent      Goal #3 Patient is able to ambulate 200 feet with assist device: none at assistance level: independent      Goal #4 Patient to be independent ascend/descend stairs reciprocal pattern   Goal #5     Goal #6     Goal Comments: Goals established on 8/4/2024 8/9/2024 all goals progressing/ ongoing    SUBJECTIVE  \"I have been up with the nurses. I am waiting on my lunch.\"     OBJECTIVE  Precautions: Bed/chair alarm    WEIGHT BEARING  RESTRICTION  Weight Bearing Restriction: None                PAIN ASSESSMENT   Ratin  Location: patient denies       BALANCE                                                                                                                       Static Sitting: Good  Dynamic Sitting: Good           Static Standing: Fair +  Dynamic Standing: Fair +    ACTIVITY TOLERANCE  Pulse: 65  Heart Rate Source: Monitor                   O2 WALK         AM-PAC '6-Clicks' INPATIENT SHORT FORM - BASIC MOBILITY  How much difficulty does the patient currently have...  Patient Difficulty: Turning over in bed (including adjusting bedclothes, sheets and blankets)?: A Little   Patient Difficulty: Sitting down on and standing up from a chair with arms (e.g., wheelchair, bedside commode, etc.): A Little   Patient Difficulty: Moving from lying on back to sitting on the side of the bed?: A Little   How much help from another person does the patient currently need...   Help from Another: Moving to and from a bed to a chair (including a wheelchair)?: A Little   Help from Another: Need to walk in hospital room?: A Little   Help from Another: Climbing 3-5 steps with a railing?: A Little       AM-PAC Score:  Raw Score: 18   Approx Degree of Impairment: 46.58%   Standardized Score (AM-PAC Scale): 43.63   CMS Modifier (G-Code): CK    FUNCTIONAL ABILITY STATUS  Gait Assessment   Functional Mobility/Gait Assessment  Gait Assistance: Contact guard assist  Distance (ft): 130  Assistive Device: Rolling walker  Pattern:  (slight forward flexed trunk posture)  Stairs: Stairs  How Many Stairs: 12  Device: 1 Rail  Assist: Contact guard assist  Pattern: Ascend and Descend  Ascend and Descend : Step to    Skilled Therapy Provided    Bed Mobility:  Rolling: supervision   Supine<>Sit: supervision   Sit<>Supine: NT     Transfer Mobility:  Sit<>Stand: CGA, RW   Stand<>Sit: CGA, RW   Gait: x130 ft, RW    Therapist's Comments:  Patient presents to PT supine in  bed, denies any pain at rest and his son present and supportive at bedside. Patient is progressing well with PT as he performed sit <> stand transfers, ambulation within the halls using a RW and stair training with CGA. Patient returned to his room and left seated in the bedside chair, meal tray delivered and son remaining supportively. He will continue to benefit from skilled IP PT to progress his functional independence as tolerated. RN updated.     Patient End of Session: Up in chair;Needs met;Call light within reach;RN aware of session/findings;All patient questions and concerns addressed;Family present    PT Session Time: 25 minutes  Gait Trainin minutes  Therapeutic Activity: 12 minutes  Therapeutic Exercise: 0 minutes   Neuromuscular Re-education: 0 minutes

## 2024-08-09 NOTE — PAYOR COMM NOTE
--------------  8/5 8/6 8/7 8/8:  CONTINUED STAY REVIEW    Payor: [employer] MEDICARE ADV PPO  Subscriber #:  [id number]  Authorization Number: [id number]    Admit date: 8/3/24  Admit time:  5:02 PM      8/5 CARDIOLOGY:    CARDIAC CATHETERIZATION/PERCUTANEOUS CORONARY INTERVENTION      PREOPERATIVE DIAGNOSIS: CAD  POSTOPERATIVE DIAGNOSIS:  CAD  PROCEDURE PERFORMED:  Coronary angio, [facility]      PROCEDURE:  The patient was brought to the cardiac catheterization lab in the fasting state.  Informed consent was obtained.  Moderate sedation was employed using 3mg IV Versed and 75mcg IV fentanyl.  I directly observed the patient from 105 to 124p, watching the heart rate, blood pressure, oximetry, and rhythm.  An independent, trained observer was present throughout the procedure and assisted in the monitoring of the patient's level of consciousness and physiologic states.  Please see the Merge Catheterization report in Epic for further technical details.     ACCESS/CATHETER PLACEMENT:  6F right radial, slender sheath.  A Jadon catheter for LM and RCA engagement and angiography.  Standard over the wire technique was utilized.  Standard Sami and GURROLA angiographic views with caudal and cranial angulation were used. A TR band was used for arterial hemostasis.  We crossed the aortic valve with the jadon catheter and measured LVEDP and the pullback gradient.     FINDINGS:    1.  Left heart catheterization:  Left ventricular end-diastolic pressure was 20 mmHg.   Gradient by pullback was 30mmHg peak to peak.     2.  Selective coronary angiography:    LMCA: The left main artery is a short, nearly separate ostia.  Mildly diseased.  LAD:  The left anterior descending artery is large vessel. Proximal stent 95% focal ISR.  The rest of it is without significant ISR or disease.  LCX: The left circumflex artery is moderate system with 80% disease into the OM and moderate disease in the AV groove. The DORIS/PDA system is at most moderate with  moderate disease.  RCA:  The right coronary artery is small with an interesting anatomy.  The acute marginal does wrap around to the inferior wall but is limited in size.  Moderate disease.     MEDICATIONS:  See nursing record.     COMPLICATIONS:  None.     IMPRESSION:    Severe, focal ISR in proximal LAD.  80% OM disease.  Aortic stenosis, peak to peak pullback gradient approximately 30mmHg.     RECOMMENDATIONS:   Echo.  Consider revascularization.  Medical management for now.  CCU for now.  Restart heparin drip, no bolus, in 4 hrs if no bleeding issues.  Further recs after discussions with family, patient, and primary cardiologist.     8/5 HOSPITALIST:    Assessment & Plan:   79-year-old male significant past medical history of CAD with stable angina-status post PCI in 2017, 2021 and most recently in March 2024 with 3 drug-eluting stents placed in the left main, LAD and OM, hypertension, pulmonary hypertension paroxysmal A-fib, type 2 diabetes, hypothyroidism, end-stage renal disease on dialysis, history of subdural hematoma, ischemic cardiomyopathy history of HCV cirrhosis moderate aortic stenosis, prior history of GI bleed presented with chest pain     # Chest pain with history of stable angina  # CAD status post PCI  -Serial troponin  -Cardiology consult  -Patient was not on dual antiplatelet therapy secondary to recent subdural hematoma and fall, however pt was cleared by NSGY for DAPT, cont ASA, plavix   -Continue aspirin, statin  -LHC done today showed  80% OM disease and severed ISR in prox LAD, medical management recommended  -Recent echo completed and reviewed     # Paroxysmal A-fib  # V. Tach  -Continue amiodarone  -EKG reviewed showed sinus rhythm      # Recent fall  acute subdural hematoma  -Was admitted in May 2024 with this, holding dual antiplatelet therapy  -Was followed up with neurosurgery  -CT head has been stable  -Currently on aspirin, continue to monitor for falls     # ESRD  -HD per  nephrology  -Missed session today, completed dialysis per nephrology  # Hypothyroidism  # HSV cirrhosis  # Moderate aortic stenosis  # Ischemic cardiomyopathy   # Thrombocytopenia platelets of 57 continue to monitor-  -pt last saw Dr. Julian Nolasco 24, low pltc likely due to Hep C and cirrhosis, monitor, no intervention needed if pltc < 50     # Type 2 diabetes  -Continue 20 units of insulin, sliding scale     # Essential HTN  - per discussion with pt and his son Ashwin, he was taken off metoprolol, diltiazem, and losartan as of 24 by outside renal  - coreg started, monitor      # Urinary retention   - cont straight cath per protocol  - consider  c/s      Plan of care: inpt care.       Plan of care discussed with patient or family at bedside.     Golden Escalante DO       CARDIOTHORACIC SURGERY:     2024     TO:     Satya Donald M.D.             Yogi Espinoza M.D.     RE:     Jonny HAQUE  :  04/15/1945     I had the pleasure of seeing your patient, Mr. Jonny Haque, today who is known to us for a prior consultation in March for severe multivessel coronary artery disease at that time complicated by NSTEMI in addition to moderate to severe aortic valve stenosis.       Mr. Haque is a 79-year-old gentleman with an extensive past medical history including coronary artery disease status post prior stenting in  and , hepatitis C treated in  complicated by cirrhosis and esophageal varices with recurrent GI bleeds previously on dual antiplatelet therapy, and end-stage renal disease on dialysis.  The patient presented in March with NSTEMI and was determined to not be an operative candidate at that time.  He subsequently underwent PCI with stenting to the left main, LAD, and OM.  The patient re-presented on 2024 with chest pain and subsequently underwent catheterization yesterday which reveals re in-stent stenosis of 95% of the proximal LAD as well as 80% lesion of  a good-sized OM and moderate disease of the proximal RCA.       Of note, the patient was admitted in May after a fall for subdural hematoma and subsequently had to have his aspirin and Plavix held for what appears to be at least one month or a little bit longer.  He was recently restarted on aspirin and Plavix by neurology.  The patient currently complains of chest tightness and discomfort at this time.  He states that nitroglycerin usually resolves these symptoms.       The most recent echocardiogram does show slight improvement in function with an EF now about 40-45%.  He also has an aortic valve with a mean gradient of 33 mmHg, valve area of 1.27 cm2, peak gradient of 50 mmHg, and a peak velocity of 3.8 m/second.  He also was noted to have mild aortic insufficiency, mild tricuspid regurgitation, and mild to moderate mitral annular calcification.  That echo was completed on April 17, 2024.  Of note, another echo completed on April 2, 2024 actually showed the mean gradient to be measuring 37 mmHg.       As previously mentioned, the patient has an extensive past medical history including cirrhosis confirmed on CT and an ultrasound for hepatitis C which was treated with antiviral therapy in 2017 as well as coronary artery disease status post stents in 2017 and 2021 and most recently treated again in March 2024 complicated by re in-stent stenosis.  The patient also has a history of diabetes on insulin, hyperlipidemia on rosuvastatin, and he takes nitroglycerin for the chest pressure/pain.  The patient also uses sevelamer to help with phosphate for hemodialysis which he gets three times a week.  He also takes Norvasc and Synthroid for hypothyroid.  As mentioned, the patient has a history of GI bleed and varices for which he was held off of two antiplatelet therapies starting in October of 2022.  Of note, the patient has baseline anemia and thrombocytopenia.  He quit smoking 15 years ago in 1980.  The patient denies  alcohol or illicit drug use.       On review of systems, the patient complains of chest pain that currently started a few moments before I actually entered the room.  He describes it as mild chest pressure.  He states that it usually resolves with nitroglycerin.  He actually had this occur while he is at rest.  He denies lightheadedness, dizziness, syncope, fevers, chills, nausea, vomiting, or unintentional weight loss.  He does complain of some mild sweats as the chest pressure started.  There is no gross motor sensory deficit or vision changes.     On physical examination, this is a 79-year-old gentleman who is 6' tall and weighs 205 pounds.  BMI is 27.9 (the patient has actually lost 15-20 pounds since March).  Eyes are equal and reactive to light.  There is no scleral icterus.  There is no jaundice.  Gross motor sensory is intact.  Lungs are clear to auscultation bilaterally with nonlabored breathing on room air.  Cardiac examination shows a normal S1 and S2 with a regular rate and rhythm; there is a 4/6 holosystolic murmur.  Bilateral lower extremities reveal scattered spider veins, varicose veins, and mild nonpitting edema.       The patient's white blood cell count is 6, hematocrit 32, platelets 52,000, INR 1.13, creatinine 7.07, total bilirubin 0.4, AST and ALT are normal, and albumin is 4.3.       I had a lengthy conversation with Mr. Haque and his son who was on the phone today regarding his current cardiac condition of severe multivessel coronary artery disease with re in-stent stenosis as well as heart failure with reduced ejection fraction, moderate to severe aortic valve stenosis, and paroxysmal atrial fibrillation.  I discussed with the patient his current treatment options which include continued medical management, versus possible PCI with further evaluation about whether any type of TAVR intervention would happen in the future, versus the possibility of surgery which would include coronary  revascularization to the LAD and proximal OM, aortic valve replacement, left atrial appendage excision, and possible EnCompass Maze.  I discussed with the patient his STS risk of mortality for said procedure would be about 25% and his STS risk of stroke would be about 1.9%.  I have discussed the STS risk factors and score at length with the patient.  I also discussed with the patient his cirrhosis Peter risk calculator score.  I discussed with the patient that if we considered him to be decompensated cirrhotic his seven day mortality would be 39%, 30 day mortality would be 87%, and 90 day mortality would be 96%.  At best, even if we considered him compensated, which is unlikely with his history of GI bleed and varices and end-stage renal disease, his seven day mortality would still be about 18.6%, his 30 day mortality would be 57.1%, and his 90 day mortality would be 74.5%.  The patient and his son both agree that based on those risk factors they would not want to proceed with surgery but would want to see what types of percutaneous options might be available to them to address the re in-stent stenosis and potentially also the aortic valve in the future.  We will further discuss with cardiology about what might be intervened on safely versus medical management.  I also offered the patient the option of getting a secondary opinion regarding surgery but at this point they do not seem interested in proceeding with a secondary opinion after hearing how high risk any type of operation would be.       All of the patient's questions and concerns were addressed and we are in agreement with the plan going forward.       Thank you for involving us in the consultation of your patient.       Sincerely,     Ashwin SMITH/regina          8/7 CARDIOLOGY:    Reason for Consultation:  Chest pain     History of Present Illness:  Jonny Haque is a a(n) 79 year old male with pmh CAD with multiple prior interventions (last 3/2024  with LM, LAD, OM), ICM (EF 40-45% 4/2024, mod-severe aortic stenosis), pAF not on anticoagulation, SDH, HTN, HL, DM, ESRD presenting with chest pain.  Patient reports 4-5 day h/o central chest tightness, worse with exertion and resolved with rest.  States similar to prior anginal pain.  Worse over the past few days.  Denies palpitations, dyspnea, orthopnea, PND, LE edema.  Of note, DAPT held multiple times since PCI 3/2024 2/2 possible GIB, then SDH, then patient noncompliance.        Subjective:  -More pain today.  Improved with NTG.  But coming and going a moderate amount.  -Otherwise ok; no TOMLIN.  No other complaints.  -Angio Monday.  -Echo done, need to formally review.        Assessment/Plan:  79 year old male presenting with:     1) Chest pain, concerning for unstable angina  2) CAD with multiple prior PCI (last 3/2024 of LM, LAD, OM); multiple interruptions of DAPT since then as per hpi  3) ICM (EF 40-45% 4/2024, mod-severe aortic stenosis)  4) pAF on amiodarone (no ac likely 2/2 prior GIB and SDH)  5) SDH  6) HTN  7) HL  8) DM  9) ESRD on HD     - Cont asa, plavix, statin (cleared for DAPT by neurosurgery, see notes 5/2024)  - Prob PCI tomorrow late AM  - XFer to CCU given severe ostial LAD disease and ongoing CP.   - NTG drip.  - Will review echo with structural team.  Most likely moderate to severe, but may be worth calcium scoring the valve.  - Discussed with the patient who agrees and wishes to proceed.     CARLEY DODGE      8/8 CARDIOLOGY:    CARDIAC CATHETERIZATION/PERCUTANEOUS CORONARY INTERVENTION      PREOPERATIVE DIAGNOSIS:  NSTEMI  POSTOPERATIVE DIAGNOSIS:  CAD  PROCEDURE PERFORMED:  DCB to ostial LAD/distal LM, PCI to OM with TRACY, IVUS of LAD/LM      PROCEDURE:  The patient was brought to the cardiac catheterization lab in the fasting state.  Informed consent was obtained.  Moderate sedation was employed using 4mg IV Versed and 100mcg IV fentanyl.  I directly observed the patient from 1221 to 107p,  watching the heart rate, blood pressure, oximetry, and rhythm.  An independent, trained observer was present throughout the procedure and assisted in the monitoring of the patient's level of consciousness and physiologic states.  Please see the Merge Catheterization report in Epic for further technical details.     ACCESS/CATHETER PLACEMENT:  7F right femoral, slender sheath.   Standard over the wire technique was utilized.  Standard GOPAL and GURROLA angiographic views with caudal and cranial angulation were used. A perclose was used for arterial hemostasis.     INTERVENTIONAL PROCEDURE: EBU 3.5 guide, heparin for anticoag.  BMW wire down LAD, 2.0 balloon to predilate to pass IVUS.  Sized vessel.  3.5mm Shockwave several inflations and pulses to 12-14ATM.  Then 4.0mm NC to 18ATM.  Then 4.0x20mm [place] Sci. LETICIA at 12ATM for 40s.  Had wired the CX previously.  Wire flew down OM.  Balloon crossed very easily (previously I only got limited gear down the OM despite all attempts and support catheters).  Stented with 2.35m43na [place] at 14-16ATM.  Excellent result.  Final pics taken.  Perclose for hemostasis.  Plavix 300mg load (has been on 75mg daily for a few days).       MEDICATIONS:  See nursing record.     COMPLICATIONS:  None.     IMPRESSION:    CAD.  S/P PCI to LM into LAD for ISR with 1) lithotripsy angiogplasty, 2) NC balloon angioplasty, 3) DCB angioplasty (drug coated balloon).  S/P  PCI to OM with TRACY.     RECOMMENDATIONS:   DAPT.  Uninterrupted.  Medical management.  Surveillance with stress, CTA, and angio if needed.  Consider further eval for TAVR.     [facility] MD                    MEDICATIONS ADMINISTERED IN LAST 1 DAY:  acetaminophen (Tylenol Extra Strength) tab 500 mg       Date Action Dose Route User    8/8/2024 2116 Given 500 mg Oral Ashanti Jordan RN          amiodarone (Pacerone) tab 200 mg       Date Action Dose Route User    8/9/2024 0830 Given 200 mg Oral Georgina Sauceda RN    8/8/2024 1825 Given 200 mg  Oral Jose Hagen RN          carvedilol (Coreg) tab 6.25 mg       Date Action Dose Route User    8/9/2024 0830 Given 6.25 mg Oral Georgina Sauceda RN    8/8/2024 1825 Given 6.25 mg Oral Jose Hagen RN          clopidogrel (Plavix) tab 75 mg       Date Action Dose Route User    8/9/2024 0830 Given 75 mg Oral Georgina Sauceda RN          heparin (Porcine) 1000 UNIT/ML injection 1,500 Units       Date Action Dose Route User    8/8/2024 1733 Given by Other 1,500 Units Intracatheter Jose Hagen RN          insulin aspart (NovoLOG) 100 Units/mL FlexPen 10 Units       Date Action Dose Route User    8/9/2024 0649 Given 10 Units Subcutaneous (Right Lower Abdomen) Ashanti Jordan RN          insulin aspart (NovoLOG) 100 Units/mL FlexPen 2-10 Units       Date Action Dose Route User    8/8/2024 2129 Given 2 Units Subcutaneous (Left Lower Abdomen) Ashanti Jordan RN          insulin degludec (Tresiba) 100 units/mL flextouch 24 Units       Date Action Dose Route User    8/8/2024 2130 Given 24 Units Subcutaneous (Left Lower Abdomen) Ashanti Jordan RN          iodixanol (VISIPAQUE) 320 MG/ML injection 100 mL       Date Action Dose Route User    8/8/2024 1308 Given 195 mL Injection Ashlee Mclean RN          Lanthanum Carbonate (FOSRENOL) chewable tab 1,000 mg       Date Action Dose Route User    8/9/2024 0830 Given 1,000 mg Oral Georgina Sauceda RN          losartan (Cozaar) tab 25 mg       Date Action Dose Route User    8/9/2024 0829 Given 25 mg Oral Georgina Sauceda RN          morphINE PF 2 MG/ML injection 2 mg       Date Action Dose Route User    8/8/2024 1609 Given 2 mg Intravenous Jose Hagen RN          ondansetron (Zofran) 4 MG/2ML injection 4 mg       Date Action Dose Route User    8/8/2024 1705 Given 4 mg Intravenous Jose Hagen RN          pantoprazole (Protonix) DR tab 40 mg       Date Action Dose Route User    8/9/2024 0619 Given 40 mg Oral Ashanti Jordan, RN    8/8/2024 6810  Given 40 mg Oral Jose Hagen RN          rosuvastatin (Crestor) tab 10 mg       Date Action Dose Route User    8/8/2024 2112 Given 10 mg Oral Ashanti Jordan RN          sodium chloride 0.9% infusion       Date Action Dose Route User    8/8/2024 1400 New Bag (none) Intravenous Jose Hagen RN          levothyroxine (Synthroid) tab 175 mcg       Date Action Dose Route User    8/9/2024 0619 Given 175 mcg Oral Ashanti Jordan RN            Vitals (last day)       Date/Time Temp Pulse Resp BP SpO2 Weight O2 Device O2 Flow Rate (L/min) [facility]    08/09/24 1100 -- 60 13 139/65 100 % -- -- --     08/09/24 1000 -- 57 13 136/62 100 % -- -- --     08/09/24 0900 -- 58 13 100/46 97 % -- -- --     08/09/24 0800 98 °F (36.7 °C) 63 10 117/48 99 % -- None (Room air) --     08/09/24 0700 -- 57 13 132/85 100 % -- -- -- AB    08/09/24 0645 -- 57 14 149/63 100 % -- -- -- AB    08/09/24 0630 -- 58 20 150/59 100 % -- -- -- AB    08/09/24 0615 -- 57 18 148/56 99 % -- -- -- AB    08/09/24 0600 -- 58 12 130/81 100 % 209 lb 14.1 oz (95.2 kg) -- -- AB    08/09/24 0500 -- 57 12 151/69 100 % -- -- -- AB    08/09/24 0400 98.4 °F (36.9 °C) 58 15 153/65 100 % -- None (Room air) -- AB    08/09/24 0300 -- 59 14 129/60 99 % -- -- -- AB    08/09/24 0200 -- 57 13 116/65 98 % -- -- -- AB    08/09/24 0100 -- 60 22 98/53 99 % -- -- -- AB    08/09/24 0015 -- 58 19 123/66 100 % -- -- -- AB    08/09/24 0000 97.5 °F (36.4 °C) 58 12 135/67 100 % -- None (Room air) -- AB    08/08/24 2300 -- 55 12 102/39 95 % -- -- -- AB    08/08/24 2200 -- 54 12 125/48 100 % -- -- -- AB    08/08/24 2100 98.2 °F (36.8 °C) 63 21 127/84 100 % -- None (Room air) -- AB    08/08/24 2000 -- 55 15 116/52 98 % -- -- -- AB    08/08/24 1900 -- 58 13 94/50 97 % -- None (Room air) --     08/08/24 1845 -- 55 12 106/59 100 % -- -- --     08/08/24 1830 -- 57 13 99/61 100 % -- -- --     08/08/24 1815 -- 58 12 91/52 100 % -- -- --     08/08/24 1800 -- 59 18 99/53  100 % -- None (Room air) --     08/08/24 1745 -- 58 10 89/54 100 % -- -- --     08/08/24 1730 -- 57 12 94/54 99 % -- -- --     08/08/24 1715 -- 57 12 110/55 100 % -- -- --     08/08/24 1700 -- 58 13 62/38 98 % -- None (Room air) --     08/08/24 1645 -- 59 13 87/57 98 % -- -- --     08/08/24 1630 -- 55 11 108/63 100 % -- -- --     08/08/24 1615 -- 58 12 131/59 100 % -- -- --     08/08/24 1600 98 °F (36.7 °C) 57 15 120/62 100 % -- None (Room air) --     08/08/24 1545 -- 55 12 121/60 100 % -- -- --     08/08/24 1530 -- 55 16 132/64 100 % -- -- --     08/08/24 1515 -- 56 16 152/60 99 % -- -- --     08/08/24 1500 -- 57 17 135/54 94 % -- None (Room air) --     08/08/24 1445 -- 55 11 127/52 99 % -- -- --     08/08/24 1430 -- 56 12 128/62 100 % -- -- --     08/08/24 1415 -- 55 13 106/62 100 % -- None (Room air) --     08/08/24 1400 -- 54 12 117/56 100 % -- None (Room air) --     08/08/24 1345 97.3 °F (36.3 °C) 55 14 122/66 100 % -- None (Room air) --     08/08/24 1100 -- 58 12 126/63 97 % -- -- --     08/08/24 1000 -- 60 12 125/68 100 % -- -- --     08/08/24 0900 -- 60 14 131/66 97 % -- -- --     08/08/24 0800 97.7 °F (36.5 °C) 62 12 112/74 98 % -- None (Room air) -- JJ    08/08/24 0700 -- 63 18 138/84 100 % -- -- -- MW    08/08/24 0600 -- 60 16 134/70 97 % -- -- -- MW    08/08/24 0500 -- 63 12 154/70 100 % -- -- -- MW    08/08/24 0400 97.4 °F (36.3 °C) 61 11 133/66 99 % -- None (Room air) -- MW    08/08/24 0330 -- 61 13 143/62 100 % -- -- -- MW    08/08/24 0300 -- 59 9 138/62 99 % 208 lb 15.9 oz (94.8 kg) -- -- MW    08/08/24 0230 -- 60 11 120/64 98 % -- -- -- MW    08/08/24 0200 -- 59 12 134/67 98 % -- -- -- MW    08/08/24 0130 -- 61 15 103/51 100 % -- -- -- MW    08/08/24 0100 -- 63 13 118/48 100 % -- -- -- MW    08/08/24 0030 -- 61 15 120/54 94 % -- -- -- MW    08/08/24 0015 -- 62 12 148/68 100 % -- -- -- MW    08/08/24 0000 -- 65 17 122/64 98 % -- None (Room air) -- MW

## 2024-08-09 NOTE — PROGRESS NOTES
Nephrology Progress Note    Jonny Haque Attending:  Golden Escalante DO       SUBJECTIVE:     HD yesterday, did not tolerate fluid removal as had hypotension.     PHYSICAL EXAM:     Vital Signs: /42 (BP Location: Right arm)   Pulse 82   Temp 97.3 °F (36.3 °C) (Temporal)   Resp 16   Ht 6' (1.829 m)   Wt 209 lb 14.1 oz (95.2 kg)   SpO2 99%   BMI 28.46 kg/m²   Temp (24hrs), Av.9 °F (36.6 °C), Min:97.3 °F (36.3 °C), Max:98.4 °F (36.9 °C)       Intake/Output Summary (Last 24 hours) at 2024 1617  Last data filed at 2024 0800  Gross per 24 hour   Intake 590 ml   Output --   Net 590 ml     Wt Readings from Last 3 Encounters:   24 209 lb 14.1 oz (95.2 kg)   24 204 lb (92.5 kg)   24 204 lb 1.6 oz (92.6 kg)       General: no distress  HEENT: NCAT  Cardiac: RRR  Lungs: no distress  Abdomen: soft, non-tender  Extremities: no LE edema  Neurologic/Psych: mentating well     LABORATORY DATA:     Lab Results   Component Value Date     (H) 2024    BUN 34 (H) 2024    BUNCREA 13.0 2022    CREATSERUM 4.69 (H) 2024    ANIONGAP 6 2024    GFR 41 (L) 2017    GFRNAA 8 (L) 2022    GFRAA 9 (L) 2022    CA 8.8 2024    OSMOCALC 286 2024    ALKPHO 183 (H) 2024    AST 20 2024    ALT 38 2024    BILT 0.4 2024    TP 7.0 2024    ALB 4.3 2024    GLOBULIN 2.7 2024    AGRATIO 1.0 (L) 2016     (L) 2024    K 4.5 2024     2024    CO2 26.0 2024     Lab Results   Component Value Date    WBC 4.7 2024    RBC 2.34 (L) 2024    HGB 7.8 (L) 2024    HCT 23.3 (L) 2024    PLT 54.0 (L) 2024    MCV 99.6 2024    MCH 33.3 2024    MCHC 33.5 2024    RDW 13.9 2024    NEPRELIM 5.55 2024    NEPERCENT 59.5 2024    LYPERCENT 26.9 2024    MOPERCENT 7.7 2024    EOPERCENT 5.1 2024    BAPERCENT 0.4 2024     NE 5.55 08/08/2024    LYMABS 2.51 08/08/2024    MOABSO 0.72 08/08/2024    EOABSO 0.48 08/08/2024    BAABSO 0.04 08/08/2024     Lab Results   Component Value Date    MALBP 49.2 (H) 09/04/2019    CREUR 91.65 09/04/2019     Lab Results   Component Value Date    COLORUR YELLOW 02/09/2022    CLARITY Hazy (A) 07/18/2019    SPECGRAVITY 1.013 07/02/2021    GLUUR NEGATIVE 02/09/2022    BILUR NEGATIVE 02/09/2022    KETUR NEGATIVE 02/09/2022    BLOODURINE SMALL (A) 02/09/2022    PHURINE 7 02/09/2022    PROUR 100 (A) 02/09/2022    UROBILINOGEN <2.0 07/18/2019    NITRITE NEGATIVE 02/09/2022    LEUUR NEGATIVE 02/09/2022    WBCUR 1-4 07/18/2019    RBCUR 6-10 (A) 07/18/2019    EPIUR RARE (A) 02/09/2022    BACUR NONE 02/09/2022    HYLUR Present (A) 12/19/2016       IMAGING:     Reviewed    MEDICATIONS:       Current Facility-Administered Medications   Medication Dose Route Frequency    levothyroxine (Synthroid) tab 175 mcg  175 mcg Oral Daily @ 0700    morphINE PF 2 MG/ML injection 2 mg  2 mg Intravenous Q1H PRN    nitroglycerin (Nitrostat) SL tab 0.4 mg  0.4 mg Sublingual Q5 Min PRN    heparin (Porcine) 1000 UNIT/ML injection 1,500 Units  1.5 mL Intracatheter PRN Dialysis    carvedilol (Coreg) tab 6.25 mg  6.25 mg Oral BID with meals    losartan (Cozaar) tab 25 mg  25 mg Oral Daily    clopidogrel (Plavix) tab 75 mg  75 mg Oral Daily    Lanthanum Carbonate (FOSRENOL) chewable tab 1,000 mg  1,000 mg Oral BID with meals    aspirin DR tab 81 mg  81 mg Oral QOD    amiodarone (Pacerone) tab 200 mg  200 mg Oral BID with meals    rosuvastatin (Crestor) tab 10 mg  10 mg Oral Nightly    pantoprazole (Protonix) DR tab 40 mg  40 mg Oral BID AC    insulin aspart (NovoLOG) 100 Units/mL FlexPen 10 Units  10 Units Subcutaneous TID AC    insulin degludec (Tresiba) 100 units/mL flextouch 24 Units  24 Units Subcutaneous Nightly    acetaminophen (Tylenol Extra Strength) tab 500 mg  500 mg Oral Q4H PRN    polyethylene glycol (PEG 3350) (Miralax) 17  g oral packet 17 g  17 g Oral Daily PRN    sennosides (Senokot) tab 17.2 mg  17.2 mg Oral Nightly PRN    bisacodyl (Dulcolax) 10 MG rectal suppository 10 mg  10 mg Rectal Daily PRN    ondansetron (Zofran) 4 MG/2ML injection 4 mg  4 mg Intravenous Q6H PRN    metoclopramide (Reglan) 5 mg/mL injection 5 mg  5 mg Intravenous Q8H PRN    glucose (Dex4) 15 GM/59ML oral liquid 15 g  15 g Oral Q15 Min PRN    Or    glucose (Glutose) 40% oral gel 15 g  15 g Oral Q15 Min PRN    Or    glucose-vitamin C (Dex-4) chewable tab 4 tablet  4 tablet Oral Q15 Min PRN    Or    dextrose 50% injection 50 mL  50 mL Intravenous Q15 Min PRN    Or    glucose (Dex4) 15 GM/59ML oral liquid 30 g  30 g Oral Q15 Min PRN    Or    glucose (Glutose) 40% oral gel 30 g  30 g Oral Q15 Min PRN    Or    glucose-vitamin C (Dex-4) chewable tab 8 tablet  8 tablet Oral Q15 Min PRN    insulin aspart (NovoLOG) 100 Units/mL FlexPen 2-10 Units  2-10 Units Subcutaneous TID AC and HS       ASSESSMENT/PLAN:     ESRD  - HD TTS     Hypertension    Hyponatremia  -Likely due to volume excess    Anemia  -Hemoglobin goal greater than 10    CKD BMD  -Lanthanum 1000mg BID    NSTEMI        HD TTS.  Continue dialysis per routine schedule  UF with HD as tolerated  NSTEMI management per cardiology - s/p cath  Will plan for EPO with HD      We will continue to follow    DO Joselito Hoff [facility] - Nephrology

## 2024-08-09 NOTE — PROGRESS NOTES
[facility]   part of [facility] Hospitalist Progress Note     Jonny Haque Patient Status:  Inpatient    4/15/1945 MRN [id number]   Location [facility] 8NE-A Attending Golden Escalante, DO   Hosp Day # 6 PCP Gee Jimenez MD     Follow Up:  The primary encounter diagnosis was Exertional chest pain. A diagnosis of Acute renal failure on dialysis (HCC) was also pertinent to this visit.    Subjective:     Patient is doing well, significantly better today than yesterday was able to ambulate without any chest pain, no abdominal pain no back pain, groin hematoma has been stable, completed dialysis yesterday, son at bedside and discussed extensively with the son as well  Son is very concerned about urinary retention      Objective:    Review of Systems:   10 point ROS completed and was negative, except for pertinent positive and negatives stated in subjective.    Vital signs:  Temp:  [97.3 °F (36.3 °C)-98.4 °F (36.9 °C)] 97.3 °F (36.3 °C)  Pulse:  [54-82] 82  Resp:  [10-22] 16  BP: ()/(38-85) 108/42  SpO2:  [95 %-100 %] 99 %    Physical Exam:    Gen: No acute distress, alert and oriented x 3. Chronically ill appearing.   Pulm: Lungs clear bilaterally, good inspiratory effort   CV:  nL S1/S2  Abd: soft, NT/ND, no hepatomegaly, +BS  MSK: moving all extremities, no edema  Neuro: no focal deficits  Skin: no rashes/lesions  Psych: normal mood/affect      Diagnostic Data:    Labs:  Recent Labs   Lab 24  1224 24  0549 24  2323 24  0552 24  1712 24  0428   WBC 5.6  --  5.1 4.7 9.3 4.7   HGB 10.8*  --  9.8* 9.4* 8.2* 7.8*   MCV 97.5  --  98.6 100.7* 97.9 99.6   PLT 85.0* 64.0* 52.0* 56.0* 84.0* 54.0*       Recent Labs   Lab 24  1437 24  0523 24  2323 24  0552 24  0428   *   < > 219* 156* 108*   BUN 83*   < > 61* 59* 34*   CREATSERUM 7.42*   < > 6.60* 6.86* 4.69*   CA 9.3   < > 9.5 9.3 8.8   ALB 4.3  --    --   --   --    *   < > 131* 134* 134*   K 5.0   < > 5.4* 5.0 4.5   CL 95*   < > 99 100 102   CO2 25.0   < > 26.0 25.0 26.0   ALKPHO 183*  --   --   --   --    AST 20  --   --   --   --    ALT 38  --   --   --   --    BILT 0.4  --   --   --   --    TP 7.0  --   --   --   --     < > = values in this interval not displayed.       Estimated Creatinine Clearance: 14.5 mL/min (A) (based on SCr of 4.69 mg/dL (H)).    No results for input(s): \"PTP\", \"INR\" in the last 168 hours.         COVID-19 Lab Results    COVID-19  Lab Results   Component Value Date    COVID19 Not Detected 04/16/2024    COVID19 Not Detected 03/17/2024    COVID19 Not Detected 04/05/2023       Pro-Calcitonin  No results for input(s): \"PCT\" in the last 168 hours.    Cardiac  No results for input(s): \"TROP\", \"PBNP\" in the last 168 hours.    Creatinine Kinase  No results for input(s): \"CK\" in the last 168 hours.    Inflammatory Markers  No results for input(s): \"CRP\", \"NEELAM\", \"LDH\", \"DDIMER\" in the last 168 hours.    Imaging: Imaging data reviewed in Epic.    Medications:    levothyroxine  175 mcg Oral Daily @ 0700    carvedilol  6.25 mg Oral BID with meals    losartan  25 mg Oral Daily    clopidogrel  75 mg Oral Daily    Lanthanum Carbonate  1,000 mg Oral BID with meals    aspirin  81 mg Oral QOD    amiodarone  200 mg Oral BID with meals    rosuvastatin  10 mg Oral Nightly    pantoprazole  40 mg Oral BID AC    insulin aspart  10 Units Subcutaneous TID AC    insulin degludec  24 Units Subcutaneous Nightly    insulin aspart  2-10 Units Subcutaneous TID AC and HS       Assessment & Plan:       79-year-old male significant past medical history of CAD with stable angina-status post PCI in 2017, 2021 and most recently in March 2024 with 3 drug-eluting stents placed in the left main, LAD and OM, hypertension, pulmonary hypertension paroxysmal A-fib, type 2 diabetes, hypothyroidism, end-stage renal disease on dialysis, history of subdural hematoma, ischemic  cardiomyopathy history of HCV cirrhosis moderate aortic stenosis, prior history of GI bleed presented with chest pain     # Chest pain with history of unstable angina  # CAD status post PCI  # NSTEMI  -Serial troponin  -Cardiology consult  -Patient was not on dual antiplatelet therapy secondary to recent subdural hematoma and fall, however pt was cleared by NSGY for DAPT, cont ASA, plavix   -Continue aspirin, statin, Plavix uninterrupted  -LHC done on 8/4/2024 80% OM disease and severed ISR in prox LAD, medical management recommended  -Recent echo completed and reviewed, repeat echo on 8/7/2024 reviewed a moderate stenosis of the aortic valve  -pt not a candidate for CABG,   -Repeat angiogram on 8/8/2024 with PCI to LM into the LAD with lithotripsy angioplasty, NC balloon angioplasty, drug-coated balloon angioplasty also PCI to OM with drug-eluting stent  -Further management per cardiology  -Groin hematoma has been stable      # Post angiogram [id number]  -Resolved currently normotensive  -Improved with fluids during dialysis  -Mild hematoma noted on the right groin, stable with FemoStop  -Hemoglobin is stable as well at 7.8  -Patient would like a blood transfusion will discuss with cardiology      # Anemia  -Small hematoma noted in the right groin  -Hemoglobin slightly down trended to 7.8 g, not a candidate for transfusion yet however will discuss with cardiology as patient is a cardiac patient with a history of GI bleed in the past currently on dual antiplatelet therapy  -     # Paroxysmal A-fib  # V. Tach  -Continue amiodarone  -EKG reviewed showed sinus rhythm      # Recent fall  acute subdural hematoma  -Was admitted in May 2024 with this, -Was followed up with neurosurgery  -CT head has been stable  -Okay for dual antiplatelet therapy therapy     # ESRD  -HD per nephrology nephrology  # Hypothyroidism  # HSV cirrhosis  # Moderate aortic stenosis  # Ischemic cardiomyopathy   # Thrombocytopenia platelets of 57  continue to monitor-  -pt last saw Dr. Julian Nolasco 1/24/24, low pltc likely due to Hep C and cirrhosis, monitor, no intervention needed if pltc < 50  -Repeat echo completed and reviewed    # Type 2 diabetes  -Continue 20 units of insulin, sliding scale     # Essential HTN  - per discussion with pt and his son Ashwin, he was taken off metoprolol, diltiazem, and losartan as of 6/24/24 by outside renal  - coreg started, monitor     # Urinary retention   - cont straight cath per protocol  -  c/s appreciated, may need mccloud   CT abdomen pelvis completed without any acute abdominal process,-continue Mccloud catheter  -Tamsulosin started    Plan of care: inpt care.      Plan of care discussed with patient or family at bedside.    Lance vasquez MD       Greater than 35 minutes of critical care time was spent at bedside as patient became diaphoretic and hypotensive during dialysis, given a bolus, recheck blood pressure, examine groin and stat CBC and Accu-Cheks done, discussed with cardiology EKG completed, blood pressure improved after given 2 small boluses reverse Trendelenburg, patient had a bout of emesis and felt improved.      Supplementary Documentation:     Quality:  DVT Prophylaxis: hep subcutaneous (on hold due to hematuria)  CODE status: FULL  Mccloud: no  Central line: no  If COVID testing is negative, may discontinue isolation: yes     Estimated date of discharge: TBD  Discharge is dependent on: clinical course   At this point Mr. Haque is expected to be discharge to: home    Plan of care discussed with pt

## 2024-08-09 NOTE — PROGRESS NOTES
[facility]   part of [facility] Hospitalist Progress Note     Jonny Haque Patient Status:  Inpatient    4/15/1945 MRN [id number]   [facility] 8NE-A Attending Golden Escalante, DO   Hosp Day # 5 PCP Gee Jimenez MD     Follow Up:  The primary encounter diagnosis was Exertional chest pain. A diagnosis of Acute renal failure on dialysis (HCC) was also pertinent to this visit.    Subjective:     Patient was back milligram, evaluated while patient was getting hemodialysis stated that he was not feeling well, began to become diaphoretic and hypotensive with blood pressure dropping during dialysis into the 60s, given a bolus at that time patient then proceeded to have an episode of emesis, Trendelenburg repeat blood pressure improved.  Groin and some back pain FemoStop present noted stable hematoma    Discussed with Dr. Donald as well as nurse at bedside  Resuming HD after blood pressure was improved      Objective:    Review of Systems:   10 point ROS completed and was negative, except for pertinent positive and negatives stated in subjective.    Vital signs:  Temp:  [97.3 °F (36.3 °C)-98.2 °F (36.8 °C)] 98.2 °F (36.8 °C)  Pulse:  [54-70] 63  Resp:  [9-21] 21  BP: ()/(38-88) 127/84  SpO2:  [93 %-100 %] 100 %    Physical Exam:    Gen: No acute distress, alert and oriented x 3. Chronically ill appearing.   Pulm: Lungs clear bilaterally, good inspiratory effort   CV:  nL S1/S2  Abd: soft, NT/ND, no hepatomegaly, +BS  MSK: moving all extremities, no edema  Neuro: no focal deficits  Skin: no rashes/lesions  Psych: normal mood/affect      Diagnostic Data:    Labs:  Recent Labs   Lab 24  0523 24  0524 24  1224 24  0549 24  2323 24  0552 24  1712   WBC 6.0  --  5.6  --  5.1 4.7 9.3   HGB 10.9*  --  10.8*  --  9.8* 9.4* 8.2*   .3*  --  97.5  --  98.6 100.7* 97.9   PLT 65.0*   < > 85.0* 64.0* 52.0* 56.0* 84.0*    < > =  values in this interval not displayed.       Recent Labs   Lab 08/03/24  1437 08/04/24  0523 08/06/24  0440 08/07/24  2323 08/08/24  0552   *   < > 107* 219* 156*   BUN 83*   < > 80* 61* 59*   CREATSERUM 7.42*   < > 7.63* 6.60* 6.86*   CA 9.3   < > 9.1 9.5 9.3   ALB 4.3  --   --   --   --    *   < > 134* 131* 134*   K 5.0   < > 4.7 5.4* 5.0   CL 95*   < > 99 99 100   CO2 25.0   < > 25.0 26.0 25.0   ALKPHO 183*  --   --   --   --    AST 20  --   --   --   --    ALT 38  --   --   --   --    BILT 0.4  --   --   --   --    TP 7.0  --   --   --   --     < > = values in this interval not displayed.       Estimated Creatinine Clearance: 9.9 mL/min (A) (based on SCr of 6.86 mg/dL (H)).    No results for input(s): \"PTP\", \"INR\" in the last 168 hours.         COVID-19 Lab Results    COVID-19  Lab Results   Component Value Date    COVID19 Not Detected 04/16/2024    COVID19 Not Detected 03/17/2024    COVID19 Not Detected 04/05/2023       Pro-Calcitonin  No results for input(s): \"PCT\" in the last 168 hours.    Cardiac  No results for input(s): \"TROP\", \"PBNP\" in the last 168 hours.    Creatinine Kinase  No results for input(s): \"CK\" in the last 168 hours.    Inflammatory Markers  No results for input(s): \"CRP\", \"NEELAM\", \"LDH\", \"DDIMER\" in the last 168 hours.    Imaging: Imaging data reviewed in Epic.    Medications:    [START ON 8/9/2024] levothyroxine  175 mcg Oral Daily @ 0700    carvedilol  6.25 mg Oral BID with meals    losartan  25 mg Oral Daily    clopidogrel  75 mg Oral Daily    Lanthanum Carbonate  1,000 mg Oral BID with meals    aspirin  81 mg Oral QOD    amiodarone  200 mg Oral BID with meals    rosuvastatin  10 mg Oral Nightly    pantoprazole  40 mg Oral BID AC    insulin aspart  10 Units Subcutaneous TID AC    insulin degludec  24 Units Subcutaneous Nightly    insulin aspart  2-10 Units Subcutaneous TID AC and HS       Assessment & Plan:       79-year-old male significant past medical history of CAD with  stable angina-status post PCI in 2017, 2021 and most recently in March 2024 with 3 drug-eluting stents placed in the left main, LAD and OM, hypertension, pulmonary hypertension paroxysmal A-fib, type 2 diabetes, hypothyroidism, end-stage renal disease on dialysis, history of subdural hematoma, ischemic cardiomyopathy history of HCV cirrhosis moderate aortic stenosis, prior history of GI bleed presented with chest pain     # Chest pain with history of unstable angina  # CAD status post PCI  # NSTEMI  -Serial troponin  -Cardiology consult  -Patient was not on dual antiplatelet therapy secondary to recent subdural hematoma and fall, however pt was cleared by NSGY for DAPT, cont ASA, plavix   -Continue aspirin, statin, Plavix uninterrupted  -LHC done on 8/4/2024 80% OM disease and severed ISR in prox LAD, medical management recommended  -Recent echo completed and reviewed, repeat echo on 8/7/2024 reviewed a moderate stenosis of the aortic valve  -pt not a candidate for CABG,   -Repeat angiogram on 8/8/2024 with PCI to LM into the LAD with lithotripsy angioplasty, NC balloon angioplasty, drug-coated balloon angioplasty also PCI to OM with drug-eluting stent  -Further management per cardiology      # Post angiogram [id number]  -Improved with fluids during dialysis  -Mild hematoma noted on the right groin, stable with FemoStop  -     # Paroxysmal A-fib  # V. Tach  -Continue amiodarone  -EKG reviewed showed sinus rhythm      # Recent fall  acute subdural hematoma  -Was admitted in May 2024 with this, -Was followed up with neurosurgery  -CT head has been stable  -Okay for dual antiplatelet therapy therapy     # ESRD  -HD per nephrology nephrology  # Hypothyroidism  # HSV cirrhosis  # Moderate aortic stenosis  # Ischemic cardiomyopathy   # Thrombocytopenia platelets of 57 continue to monitor-  -pt last saw Dr. Julian Nolasco 1/24/24, low pltc likely due to Hep C and cirrhosis, monitor, no intervention needed if pltc <  50  -Repeat echo completed and reviewed    # Type 2 diabetes  -Continue 20 units of insulin, sliding scale     # Essential HTN  - per discussion with pt and his son Ashwin, he was taken off metoprolol, diltiazem, and losartan as of 6/24/24 by outside renal  - coreg started, monitor     # Urinary retention   - cont straight cath per protocol  -  c/s appreciated, may need mccloud   CT abdomen pelvis completed without any acute abdominal process,-continue Mccloud catheter  -Tamsulosin when appropriate    Plan of care: inpt care.      Plan of care discussed with patient or family at bedside.    Lance vasquez MD       Greater than 35 minutes of critical care time was spent at bedside as patient became diaphoretic and hypotensive during dialysis, given a bolus, recheck blood pressure, examine groin and stat CBC and Accu-Cheks done, discussed with cardiology EKG completed, blood pressure improved after given 2 small boluses reverse Trendelenburg, patient had a bout of emesis and felt improved.      Supplementary Documentation:     Quality:  DVT Prophylaxis: hep subcutaneous (on hold due to hematuria)  CODE status: FULL  Mccloud: no  Central line: no  If COVID testing is negative, may discontinue isolation: yes     Estimated date of discharge: TBD  Discharge is dependent on: clinical course   At this point Mr. Haque is expected to be discharge to: home    Plan of care discussed with pt

## 2024-08-09 NOTE — DIETARY NOTE
[facility]   part of [facility]   CLINICAL NUTRITION    Jonny Haque     Admitting diagnosis:  Exertional chest pain [R07.9]  Acute renal failure on dialysis (HCC) [N17.9, Z99.2]    Ht: 182.9 cm (6')  Wt: 95.2 kg (209 lb 14.1 oz).   Body mass index is 28.46 kg/m².  IBW: 81 kg    Wt Readings from Last 6 Encounters:   08/09/24 95.2 kg (209 lb 14.1 oz)   05/17/24 92.5 kg (204 lb)   05/12/24 92.6 kg (204 lb 1.6 oz)   04/17/24 95.5 kg (210 lb 8.6 oz)   04/04/24 100.4 kg (221 lb 5.5 oz)   03/24/24 101.2 kg (223 lb 1.6 oz)        Labs/Meds reviewed  -POC Glu:, Glu:108, Na++:134, K+:4.5, Phos:5.4, BUN:34, Cr:4.69, GFR:12  -Insulin, Fosrenol    Diet:       Procedures    Cardiac diet Cardiac; Fluid Restriction: 1500 ml; Is Patient on Accuchecks? Yes     Percent Meals Eaten (last 3 days)       Date/Time Percent Meals Eaten (%)    08/06/24 0859 100 %    08/06/24 1935 100 %    08/09/24 0800 100 %          Pt chart reviewed d/t length of stay. Pt off unit for procedure at time of visit.     S/p LHC on 8/5. S/p cardiac cath w/ PCI on 8/8. Had HD yesterday.    Recorded PO intakes:100% most meals.  Nursing notes reports Percent Meals Eaten (%): 100 % intake for last meal.  Will assess need for ONS at f/u visit.  Last BM:8/6. No pressure ulcers per RN documentation.  No significant weight changes noted per EMR Hx.    PMH:former tobacco use, HTN, CAD, s/p PCI, DM, ESRD on HD, hypothyroidism, SDH d/t fall, HCV cirrhosis, GIB.    Patient is at low nutrition risk at this time.    Please consult if patient status changes or nutrition issues arise.    Fior Perkins MS, RD, LDN  Clinical Dietitian  Ext:[phone]

## 2024-08-09 NOTE — CARDIAC REHAB
CAD education completed.  Stent card given.  Pt declined cardiac rehab at this time.  Info and phone number left with patient and son.

## 2024-08-09 NOTE — PLAN OF CARE
Patient alert and oriented throughout the night, able to make needs known.    Right groin site remains stable throughout the night   Patient bladder scanned per protocol - please refer to Flow sheets  Problem: CARDIOVASCULAR - ADULT  Goal: Maintains optimal cardiac output and hemodynamic stability  Description: INTERVENTIONS:  - Monitor vital signs, rhythm, and trends  - Monitor for bleeding, hypotension and signs of decreased cardiac output  - Evaluate effectiveness of vasoactive medications to optimize hemodynamic stability  - Monitor arterial and/or venous puncture sites for bleeding and/or hematoma  - Assess quality of pulses, skin color and temperature  - Assess for signs of decreased coronary artery perfusion - ex. Angina  - Evaluate fluid balance, assess for edema, trend weights  Outcome: Progressing     Problem: SAFETY ADULT - FALL  Goal: Free from fall injury  Description: INTERVENTIONS:  - Assess pt frequently for physical needs  - Identify cognitive and physical deficits and behaviors that affect risk of falls.  - [place] fall precautions as indicated by assessment.  - Educate pt/family on patient safety including physical limitations  - Instruct pt to call for assistance with activity based on assessment  - Modify environment to reduce risk of injury  - Provide assistive devices as appropriate  - Consider OT/PT consult to assist with strengthening/mobility  - Encourage toileting schedule  Outcome: Progressing

## 2024-08-09 NOTE — PAYOR COMM NOTE
--------------  8/9 CONTINUED STAY REVIEW    Payor: [employer] MEDICARE ADV PPO  Subscriber #:  [id number]  Authorization Number: [id number]    CARDS:    Post PCI yesterday. No chest pain. Mild sob with walking. Otherwise no new complaints. Concerned with Hgb trending down.       Physical Exam:  Blood pressure 108/42, pulse 82, temperature 97.3 °F (36.3 °C), temperature source Temporal, resp. rate 16, height 6' (1.829 m), weight 209 lb 14.1 oz (95.2 kg), SpO2 99%.     General: awake, alert, oriented x 3, no acute distress  HEENT: at/nc, perrl, eomi  Neck: No JVD, carotids 2+ no bruits.  Cardiac: Regular rate and rhythm, S1, S2 normal, 3/6 mid peaking systolic murmur RUSB  Lungs: Clear without wheezes, rales, rhonchi or dullness.  Normal excursions and effort.  Abdomen: Soft, non-tender, non-distended, normal bowel sounds   Extremities: Without clubbing, cyanosis or edema.  Peripheral pulses are 2+.  Neurologic: Alert and oriented, normal affect.  Psych: normal mood and affect  Skin: Warm and dry.       [facility]  IMPRESSION:    CAD.  S/P PCI to LM into LAD for ISR with 1) lithotripsy angiogplasty, 2) NC balloon angioplasty, 3) DCB angioplasty (drug coated balloon).  S/P  PCI to OM with TRACY.     RECOMMENDATIONS:   DAPT.  Uninterrupted.  Medical management.  Surveillance with stress, CTA, and angio if needed.  Consider further eval for TAVR.     Assessment/Plan:  79 year old male presenting with:     1) Chest pain, concerning for unstable angina  2) CAD with multiple prior PCI (last 3/2024 of LM, LAD, OM); multiple interruptions of DAPT since then as per hpi  3) ICM (EF 40-45% 4/2024, mod-severe aortic stenosis)  4) pAF on amiodarone (no ac likely 2/2 prior GIB and SDH)  5) SDH  6) HTN  7) HL  8) DM  9) ESRD on HD     - Cont asa, plavix, statin (cleared for DAPT by neurosurgery, see notes 5/2024)  - s/p PCI 8/8 -> DAPT  - BB  - TAVR CTA today  - Hgb trending down -> managed by nephrology       RENAL:    HD yesterday, did not  tolerate fluid removal as had hypotension.      PHYSICAL EXAM:      Vital Signs: /42 (BP Location: Right arm)   Pulse 82   Temp 97.3 °F (36.3 °C) (Temporal)   Resp 16   Ht 6' (1.829 m)   Wt 209 lb 14.1 oz (95.2 kg)   SpO2 99%   BMI 28.46 kg/m²       Lab 08/06/24  1224 08/07/24  2323 08/08/24  0552 08/08/24  1712 08/09/24  0428   WBC 5.6 5.1 4.7 9.3 4.7   HGB 10.8* 9.8* 9.4* 8.2* 7.8*   MCV 97.5 98.6 100.7* 97.9 99.6   PLT 85.0* 52.0* 56.0* 84.0* 54.0*      Lab 08/03/24  1437 08/07/24  2323 08/08/24  0552 08/09/24  0428   * 219* 156* 108*   BUN 83* 61* 59* 34*   CREATSERUM 7.42* 6.60* 6.86* 4.69*   CA 9.3 9.5 9.3 8.8   ALB 4.3  --   --   --    * 131* 134* 134*   K 5.0 5.4* 5.0 4.5   CL 95* 99 100 102   CO2 25.0 26.0 25.0 26.0     Medications:   Scheduled Medications[]Expand by Default    levothyroxine  175 mcg Oral Daily @ 0700    carvedilol  6.25 mg Oral BID with meals    losartan  25 mg Oral Daily    clopidogrel  75 mg Oral Daily    Lanthanum Carbonate  1,000 mg Oral BID with meals    aspirin  81 mg Oral QOD    amiodarone  200 mg Oral BID with meals    rosuvastatin  10 mg Oral Nightly    pantoprazole  40 mg Oral BID AC    insulin aspart  10 Units Subcutaneous TID AC    insulin degludec  24 Units Subcutaneous Nightly    insulin aspart  2-10 Units Subcutaneous TID AC and HS          Assessment & Plan:   79-year-old male significant past medical history of CAD with stable angina-status post PCI in 2017, 2021 and most recently in March 2024 with 3 drug-eluting stents placed in the left main, LAD and OM, hypertension, pulmonary hypertension paroxysmal A-fib, type 2 diabetes, hypothyroidism, end-stage renal disease on dialysis, history of subdural hematoma, ischemic cardiomyopathy history of HCV cirrhosis moderate aortic stenosis, prior history of GI bleed presented with chest pain     # Chest pain with history of unstable angina  # CAD status post PCI  # NSTEMI  -Serial troponin  -Cardiology  consult  -Patient was not on dual antiplatelet therapy secondary to recent subdural hematoma and fall, however pt was cleared by NSGY for DAPT, cont ASA, plavix   -Continue aspirin, statin, Plavix uninterrupted  -LHC done on 8/4/2024 80% OM disease and severed ISR in prox LAD, medical management recommended  -Recent echo completed and reviewed, repeat echo on 8/7/2024 reviewed a moderate stenosis of the aortic valve  -pt not a candidate for CABG,   -Repeat angiogram on 8/8/2024 with PCI to LM into the LAD with lithotripsy angioplasty, NC balloon angioplasty, drug-coated balloon angioplasty also PCI to OM with drug-eluting stent  -Further management per cardiology  -Groin hematoma has been stable        # Post angiogram [id number]  -Resolved currently normotensive  -Improved with fluids during dialysis  -Mild hematoma noted on the right groin, stable with FemoStop  -Hemoglobin is stable as well at 7.8  -Patient would like a blood transfusion will discuss with cardiology        # Anemia  -Small hematoma noted in the right groin  -Hemoglobin slightly down trended to 7.8 g, not a candidate for transfusion yet however will discuss with cardiology as patient is a cardiac patient with a history of GI bleed in the past currently on dual antiplatelet therapy  -     # Paroxysmal A-fib  # V. Tach  -Continue amiodarone  -EKG reviewed showed sinus rhythm      # Recent fall  acute subdural hematoma  -Was admitted in May 2024 with this, -Was followed up with neurosurgery  -CT head has been stable  -Okay for dual antiplatelet therapy therapy     # ESRD  -HD per nephrology nephrology  # Hypothyroidism  # HSV cirrhosis  # Moderate aortic stenosis  # Ischemic cardiomyopathy   # Thrombocytopenia platelets of 57 continue to monitor-  -pt last saw Dr. Julian Nolasco 1/24/24, low pltc likely due to Hep C and cirrhosis, monitor, no intervention needed if pltc < 50  -Repeat echo completed and reviewed     # Type 2 diabetes  -Continue 20 units  of insulin, sliding scale     # Essential HTN  - per discussion with pt and his son Ashwin, he was taken off metoprolol, diltiazem, and losartan as of 6/24/24 by outside renal  - coreg started, monitor      # Urinary retention   - cont straight cath per protocol  -  c/s appreciated, may need mccloud   CT abdomen pelvis completed without any acute abdominal process,-continue Mccloud catheter  -Tamsulosin started     Plan of care: inpt care.       MEDICATIONS ADMINISTERED IN LAST 1 DAY:  acetaminophen (Tylenol Extra Strength) tab 500 mg       Date Action Dose Route User    8/9/2024 1405 Given 500 mg Oral Cady Vale RN    8/8/2024 2116 Given 500 mg Oral Ashanti Jordan RN          amiodarone (Pacerone) tab 200 mg       Date Action Dose Route User    8/9/2024 0830 Given 200 mg Oral Georgina Sauceda RN    8/8/2024 1825 Given 200 mg Oral Jose Hagen RN          carvedilol (Coreg) tab 6.25 mg       Date Action Dose Route User    8/9/2024 0830 Given 6.25 mg Oral Georgina Sauceda RN    8/8/2024 1825 Given 6.25 mg Oral Jose Hagen RN          clopidogrel (Plavix) tab 75 mg       Date Action Dose Route User    8/9/2024 0830 Given 75 mg Oral Georgina Sauceda RN          heparin (Porcine) 1000 UNIT/ML injection 1,500 Units       Date Action Dose Route User    8/8/2024 1733 Given by Other 1,500 Units Intracatheter Jose Hagen RN          insulin aspart (NovoLOG) 100 Units/mL FlexPen 10 Units       Date Action Dose Route User    8/9/2024 1213 Given 10 Units Subcutaneous (Right Upper Arm) Georgina Sauceda RN    8/9/2024 0649 Given 10 Units Subcutaneous (Right Lower Abdomen) Ashanti Jordan RN          insulin aspart (NovoLOG) 100 Units/mL FlexPen 2-10 Units       Date Action Dose Route User    8/8/2024 2129 Given 2 Units Subcutaneous (Left Lower Abdomen) Ashanti Jordan RN          insulin degludec (Tresiba) 100 units/mL flextouch 24 Units       Date Action Dose Route User    8/8/2024 2130 Given 24 Units  Subcutaneous (Left Lower Abdomen) Ashanti Jordan RN          Lanthanum Carbonate (FOSRENOL) chewable tab 1,000 mg       Date Action Dose Route User    8/9/2024 0830 Given 1,000 mg Oral Georgina Sauceda RN          losartan (Cozaar) tab 25 mg       Date Action Dose Route User    8/9/2024 0829 Given 25 mg Oral Georgina Sauceda RN          ondansetron (Zofran) 4 MG/2ML injection 4 mg       Date Action Dose Route User    8/8/2024 1705 Given 4 mg Intravenous Jose Hagen RN          pantoprazole (Protonix) DR tab 40 mg       Date Action Dose Route User    8/9/2024 0619 Given 40 mg Oral Ashanti Jordan RN          rosuvastatin (Crestor) tab 10 mg       Date Action Dose Route User    8/8/2024 2112 Given 10 mg Oral Ashanti Jordan RN          levothyroxine (Synthroid) tab 175 mcg       Date Action Dose Route User    8/9/2024 0619 Given 175 mcg Oral Ashanti Jordan RN            Vitals (last day)       Date/Time Temp Pulse Resp BP SpO2 Weight O2 Device O2 Flow Rate (L/min) [facility]    08/09/24 1623 -- 58 -- 140/65 -- -- Nasal cannula 2 L/min     08/09/24 1300 97.3 °F (36.3 °C) 82 16 108/42 99 % -- None (Room air) --     08/09/24 1210 -- 65 -- -- -- -- -- -- AK    08/09/24 1200 -- 64 15 121/58 97 % -- -- --     08/09/24 1100 -- 60 13 139/65 100 % -- -- --     08/09/24 1000 -- 57 13 136/62 100 % -- -- --     08/09/24 0900 -- 58 13 100/46 97 % -- -- --     08/09/24 0800 98 °F (36.7 °C) 63 10 117/48 99 % -- None (Room air) --     08/09/24 0700 -- 57 13 132/85 100 % -- -- -- AB    08/09/24 0645 -- 57 14 149/63 100 % -- -- -- AB    08/09/24 0630 -- 58 20 150/59 100 % -- -- -- AB    08/09/24 0615 -- 57 18 148/56 99 % -- -- -- AB    08/09/24 0600 -- 58 12 130/81 100 % 209 lb 14.1 oz (95.2 kg) -- -- AB    08/09/24 0500 -- 57 12 151/69 100 % -- -- -- AB    08/09/24 0400 98.4 °F (36.9 °C) 58 15 153/65 100 % -- None (Room air) -- AB    08/09/24 0300 -- 59 14 129/60 99 % -- -- -- AB    08/09/24 0200 -- 57 13  116/65 98 % -- -- -- AB    08/09/24 0100 -- 60 22 98/53 99 % -- -- -- AB    08/09/24 0015 -- 58 19 123/66 100 % -- -- -- AB    08/09/24 0000 97.5 °F (36.4 °C) 58 12 135/67 100 % -- None (Room air) -- AB    08/08/24 2300 -- 55 12 102/39 95 % -- -- -- AB    08/08/24 2200 -- 54 12 125/48 100 % -- -- -- AB    08/08/24 2100 98.2 °F (36.8 °C) 63 21 127/84 100 % -- None (Room air) -- AB    08/08/24 2000 -- 55 15 116/52 98 % -- -- -- AB    08/08/24 1900 -- 58 13 94/50 97 % -- None (Room air) --     08/08/24 1845 -- 55 12 106/59 100 % -- -- --     08/08/24 1830 -- 57 13 99/61 100 % -- -- --     08/08/24 1815 -- 58 12 91/52 100 % -- -- --     08/08/24 1800 -- 59 18 99/53 100 % -- None (Room air) --     08/08/24 1745 -- 58 10 89/54 100 % -- -- --     08/08/24 1730 -- 57 12 94/54 99 % -- -- --     08/08/24 1715 -- 57 12 110/55 100 % -- -- --     08/08/24 1700 -- 58 13 62/38 98 % -- None (Room air) --     08/08/24 1645 -- 59 13 87/57 98 % -- -- --     08/08/24 1630 -- 55 11 108/63 100 % -- -- --     08/08/24 1615 -- 58 12 131/59 100 % -- -- --     08/08/24 1600 98 °F (36.7 °C) 57 15 120/62 100 % -- None (Room air) --     08/08/24 1545 -- 55 12 121/60 100 % -- -- --     08/08/24 1530 -- 55 16 132/64 100 % -- -- --     08/08/24 1515 -- 56 16 152/60 99 % -- -- --     08/08/24 1500 -- 57 17 135/54 94 % -- None (Room air) --     08/08/24 1445 -- 55 11 127/52 99 % -- -- --     08/08/24 1430 -- 56 12 128/62 100 % -- -- --     08/08/24 1415 -- 55 13 106/62 100 % -- None (Room air) --     08/08/24 1400 -- 54 12 117/56 100 % -- None (Room air) --     08/08/24 1345 97.3 °F (36.3 °C) 55 14 122/66 100 % -- None (Room air) --     08/08/24 1100 -- 58 12 126/63 97 % -- -- --     08/08/24 1000 -- 60 12 125/68 100 % -- -- --     08/08/24 0900 -- 60 14 131/66 97 % -- -- --     08/08/24 0800 97.7 °F (36.5 °C) 62 12 112/74 98 % -- None (Room air) --     08/08/24 0700 -- 63 18 138/84 100 % -- -- --  MW    08/08/24 0600 -- 60 16 134/70 97 % -- -- -- MW    08/08/24 0500 -- 63 12 154/70 100 % -- -- -- MW    08/08/24 0400 97.4 °F (36.3 °C) 61 11 133/66 99 % -- None (Room air) -- MW    08/08/24 0330 -- 61 13 143/62 100 % -- -- -- MW    08/08/24 0300 -- 59 9 138/62 99 % 208 lb 15.9 oz (94.8 kg) -- -- MW    08/08/24 0230 -- 60 11 120/64 98 % -- -- -- MW    08/08/24 0200 -- 59 12 134/67 98 % -- -- -- MW    08/08/24 0130 -- 61 15 103/51 100 % -- -- -- MW    08/08/24 0100 -- 63 13 118/48 100 % -- -- -- MW    08/08/24 0030 -- 61 15 120/54 94 % -- -- -- MW    08/08/24 0015 -- 62 12 148/68 100 % -- -- -- MW    08/08/24 0000 -- 65 17 122/64 98 % -- None (Room air) -- MW

## 2024-08-09 NOTE — PLAN OF CARE
Resting in bed.  Groin soft, no hematoma, tender to touch. Pulses palpable.  SR on monitors. SBP maintained normotensive.  Remains on RA.  Ambulating halls. Groins stable after.  Pain well controlled.  Ice on/off for infiltrated IV site.  Able to void in bathroom, .  Family at bedside.

## 2024-08-09 NOTE — PROGRESS NOTES
Cardiology Progress Note  [facility]    Jonny Haque Patient Status:  Inpatient    4/15/1945 MRN [id number]   [facility] 8NE-A Attending Golden Escalante, DO   Hosp Day # 6 PCP Gee Jimenez MD       Subjective:  Post PCI yesterday. No chest pain. Mild sob with walking. Otherwise no new complaints. Concerned with Hgb trending down.         Medications:  Current Facility-Administered Medications on File Prior to Encounter   Medication Dose Route Frequency Provider Last Rate Last Admin    [] sodium chloride 0.9 % IV bolus 100 mL  100 mL Intravenous Q30 Min PRN Johnson Vera MD        And    [] albumin human (Albumin) 25% injection 25 g  25 g Intravenous PRN Dialysis Johnson Vera MD        [COMPLETED] heparin (Porcine) 1000 UNIT/ML injection 1,500 Units  1.5 mL Intracatheter Once Johnson Vera MD   1,500 Units at 24 1645     Current Outpatient Medications on File Prior to Encounter   Medication Sig Dispense Refill    levothyroxine 175 MCG Oral Tab Take 1 tablet (175 mcg total) by mouth before breakfast.      isosorbide mononitrate ER 30 MG Oral Tablet 24 Hr Take 1 tablet (30 mg total) by mouth daily.      Lanthanum Carbonate 1000 MG Oral Chew Tab Chew 1 tablet (1,000 mg total) by mouth 2 (two) times daily with meals.      clopidogrel (PLAVIX) 75 MG Oral Tab Take 1 tablet (75 mg total) by mouth.      aspirin 81 MG Oral Tab EC Take 1 tablet (81 mg total) by mouth every other day.      amiodarone 200 MG Oral Tab Take 1 tablet (200 mg total) by mouth 2 (two) times daily with meals. 60 tablet 3    pantoprazole 40 MG Oral Tab EC Take 1 tablet (40 mg total) by mouth 2 (two) times daily before meals.      Sevelamer 800 MG Oral Tab Take 1 tablet (800 mg total) by mouth 3 (three) times daily with meals.      rosuvastatin 10 MG Oral Tab Take 1 tablet (10 mg total) by mouth nightly. (Patient taking differently: Take 1 tablet (10 mg total) by mouth nightly.) 90  tablet 0    nitroGLYCERIN 0.3 MG Sublingual SL Tab Place 1 tablet (0.3 mg total) under the tongue every 5 (five) minutes as needed for Chest pain.      zolpidem 10 MG Oral Tab Take 1 tablet (10 mg total) by mouth nightly as needed for Sleep.      Ferric Citrate (AURYXIA) 1  MG(Fe) Oral Tab       Insulin Pen Needle (TRUEPLUS PEN NEEDLES) 31G X 5 MM Does not apply Misc Use 5 per day 500 each 2    Insulin Glargine, 2 Unit Dial, (TOUJEO MAX SOLOSTAR) 300 UNIT/ML Subcutaneous Solution Pen-injector Inject 20 Units into the skin nightly. 6 mL 2    Glucose Blood (ONETOUCH ULTRA) In Vitro Strip 6 times a day 400 each 3    Insulin Lispro, 1 Unit Dial, (HUMALOG KWIKPEN) 100 UNIT/ML Subcutaneous Solution Pen-injector 12 units before meals with sliding scale. Max daily dose: 50 units. (Patient taking differently: 10 Units. Three times a day. Before meals) 60 mL 3    CONTOUR NEXT TEST In Vitro Strip TEST BLOOD SUGAR FOUR TIMES DAILY 400 strip 3    Blood Glucose Monitoring Suppl (Microblr CONTOUR NEXT MONITOR) W/DEVICE Does not apply Kit 1 Device by Does not apply route 4 (four) times daily. 1 kit 0       Review of Systems:  Constitutional: denies fevers, chills, night sweats  HEENT: denies headache, vision changes, trouble or pain with swallowing  Cardiac: no chest pain  Pulm: denies dyspnea, cough, wheeze  GI: denies n/v, abd pain, diarrhea or constipation  : denies hematuria, dysuria, incontinence  MSK: denies muscle or joint pains  Neuro: denies numbness, weakness, paresthesias  Psych: denies anxiety, depression  Integument: denies skin rashes or lesions  Heme: denies easy bruising or bleeding  Endo: denies heat/cold intolerance, skin or nail changes      Physical Exam:  Blood pressure 108/42, pulse 82, temperature 97.3 °F (36.3 °C), temperature source Temporal, resp. rate 16, height 6' (1.829 m), weight 209 lb 14.1 oz (95.2 kg), SpO2 99%.  Wt Readings from Last 3 Encounters:   08/09/24 209 lb 14.1 oz (95.2 kg)   05/17/24  204 lb (92.5 kg)   05/12/24 204 lb 1.6 oz (92.6 kg)       General: awake, alert, oriented x 3, no acute distress  HEENT: at/nc, perrl, eomi  Neck: No JVD, carotids 2+ no bruits.  Cardiac: Regular rate and rhythm, S1, S2 normal, 3/6 mid peaking systolic murmur RUSB  Lungs: Clear without wheezes, rales, rhonchi or dullness.  Normal excursions and effort.  Abdomen: Soft, non-tender, non-distended, normal bowel sounds   Extremities: Without clubbing, cyanosis or edema.  Peripheral pulses are 2+.  Neurologic: Alert and oriented, normal affect.  Psych: normal mood and affect  Skin: Warm and dry.       Laboratories and Data:  Diagnostics:    [facility]  IMPRESSION:    CAD.  S/P PCI to LM into LAD for ISR with 1) lithotripsy angiogplasty, 2) NC balloon angioplasty, 3) DCB angioplasty (drug coated balloon).  S/P  PCI to OM with TRACY.     RECOMMENDATIONS:   DAPT.  Uninterrupted.  Medical management.  Surveillance with stress, CTA, and angio if needed.  Consider further eval for TAVR.    Labs:   CBC:    Lab Results   Component Value Date    WBC 4.7 08/09/2024    WBC 9.3 08/08/2024    WBC 4.7 08/08/2024     Lab Results   Component Value Date    HEMOGLOBIN 10.5 (L) 02/09/2022    HEMOGLOBIN 12.6 06/07/2016    HEMOGLOBIN 15.1 03/07/2013    HGB 7.8 (L) 08/09/2024    HGB 8.2 (L) 08/08/2024    HGB 9.4 (L) 08/08/2024      Lab Results   Component Value Date    PLT 54.0 (L) 08/09/2024    PLT 84.0 (L) 08/08/2024    PLT 56.0 (L) 08/08/2024     BMP:     Lab Results   Component Value Date    GLUCOSE 129 (H) 02/09/2022    GLUCOSE 128 (H) 06/07/2016    GLUCOSE 359 (H) 03/07/2013     Lab Results   Component Value Date    K 4.5 08/09/2024    K 5.0 08/08/2024    K 5.4 (H) 08/07/2024     Lab Results   Component Value Date    BUN 34 (H) 08/09/2024    BUN 59 (H) 08/08/2024    BUN 61 (H) 08/07/2024     Lab Results   Component Value Date    CREATSERUM 4.69 (H) 08/09/2024    CREATSERUM 6.86 (H) 08/08/2024    CREATSERUM 6.60 (H) 08/07/2024     Cholesterol:      Lab Results   Component Value Date    CHOLEST 154 03/19/2024    CHOLEST 174 03/17/2024    CHOLEST 104 04/05/2023     Lab Results   Component Value Date    HDL 33 (L) 03/19/2024    HDL 44 03/17/2024    HDL 25 (L) 04/05/2023     Lab Results   Component Value Date    TRIG 304 (H) 03/19/2024    TRIG 215 (H) 03/17/2024    TRIG 202 (H) 04/05/2023    TRIGLY 248 (H) 02/09/2022    TRIGLY 278 (H) 08/19/2015    TRIGLY 490 (H) 03/07/2013     Lab Results   Component Value Date    LDL 72 03/19/2024    LDL 93 03/17/2024    LDL 46 04/05/2023     Lab Results   Component Value Date    AST 20 08/03/2024    AST 24 05/08/2024    AST 26 04/16/2024     Lab Results   Component Value Date    ALT 38 08/03/2024    ALT 24 05/08/2024    ALT 28 04/16/2024       Assessment/Plan:  79 year old male presenting with:    1) Chest pain, concerning for unstable angina  2) CAD with multiple prior PCI (last 3/2024 of LM, LAD, OM); multiple interruptions of DAPT since then as per hpi  3) ICM (EF 40-45% 4/2024, mod-severe aortic stenosis)  4) pAF on amiodarone (no ac likely 2/2 prior GIB and SDH)  5) SDH  6) HTN  7) HL  8) DM  9) ESRD on HD    - Cont asa, plavix, statin (cleared for DAPT by neurosurgery, see notes 5/2024)  - s/p PCI 8/8 -> DAPT  - BB  - TAVR CTA today  - Hgb trending down -> managed by nephrology     Ok to transfer to the floor. D/w RN     I saw and examined the patient and agree with the note above.  MDM:  Groin ok.  DAPT.  CP improved.  CTA today to eval aortic stenosis.  Can DC home over the weekend; absolutely needs DAPT without interruption for 3 months.  Needs to FU with me in 3-4 weeks.  Will modify as needed after CTA is read and team discusses his aortic stenosis and possible TAVR.    Satya Donald MD

## 2024-08-10 LAB
ANION GAP SERPL CALC-SCNC: 8 MMOL/L (ref 0–18)
ANTIBODY SCREEN: NEGATIVE
BASOPHILS # BLD AUTO: 0.01 X10(3) UL (ref 0–0.2)
BASOPHILS NFR BLD AUTO: 0.3 %
BUN BLD-MCNC: 56 MG/DL (ref 9–23)
CALCIUM BLD-MCNC: 8.9 MG/DL (ref 8.7–10.4)
CHLORIDE SERPL-SCNC: 99 MMOL/L (ref 98–112)
CO2 SERPL-SCNC: 24 MMOL/L (ref 21–32)
CREAT BLD-MCNC: 6.48 MG/DL
EGFRCR SERPLBLD CKD-EPI 2021: 8 ML/MIN/1.73M2 (ref 60–?)
EOSINOPHIL # BLD AUTO: 0.21 X10(3) UL (ref 0–0.7)
EOSINOPHIL NFR BLD AUTO: 5.3 %
ERYTHROCYTE [DISTWIDTH] IN BLOOD BY AUTOMATED COUNT: 13.9 %
GLUCOSE BLD-MCNC: 157 MG/DL (ref 70–99)
GLUCOSE BLD-MCNC: 159 MG/DL (ref 70–99)
GLUCOSE BLD-MCNC: 164 MG/DL (ref 70–99)
GLUCOSE BLD-MCNC: 183 MG/DL (ref 70–99)
GLUCOSE BLD-MCNC: 187 MG/DL (ref 70–99)
HCT VFR BLD AUTO: 19.4 %
HGB BLD-MCNC: 6.5 G/DL
HGB BLD-MCNC: 8.2 G/DL
IMM GRANULOCYTES # BLD AUTO: 0.01 X10(3) UL (ref 0–1)
IMM GRANULOCYTES NFR BLD: 0.3 %
LYMPHOCYTES # BLD AUTO: 0.88 X10(3) UL (ref 1–4)
LYMPHOCYTES NFR BLD AUTO: 22 %
MCH RBC QN AUTO: 33.3 PG (ref 26–34)
MCHC RBC AUTO-ENTMCNC: 33.5 G/DL (ref 31–37)
MCV RBC AUTO: 99.5 FL
MONOCYTES # BLD AUTO: 0.38 X10(3) UL (ref 0.1–1)
MONOCYTES NFR BLD AUTO: 9.5 %
NEUTROPHILS # BLD AUTO: 2.51 X10 (3) UL (ref 1.5–7.7)
NEUTROPHILS # BLD AUTO: 2.51 X10(3) UL (ref 1.5–7.7)
NEUTROPHILS NFR BLD AUTO: 62.6 %
OSMOLALITY SERPL CALC.SUM OF ELEC: 291 MOSM/KG (ref 275–295)
PLATELET # BLD AUTO: 46 10(3)UL (ref 150–450)
POTASSIUM SERPL-SCNC: 5.2 MMOL/L (ref 3.5–5.1)
RBC # BLD AUTO: 1.95 X10(6)UL
RBC #/AREA URNS AUTO: >10 /HPF
RH BLOOD TYPE: POSITIVE
SODIUM SERPL-SCNC: 131 MMOL/L (ref 136–145)
SP GR UR REFRACTOMETRY: 1.02 (ref 1–1.03)
WBC # BLD AUTO: 4 X10(3) UL (ref 4–11)
WBC #/AREA URNS AUTO: >50 /HPF

## 2024-08-10 PROCEDURE — 30243J1 TRANSFUSION OF NONAUTOLOGOUS SERUM ALBUMIN INTO CENTRAL VEIN, PERCUTANEOUS APPROACH: ICD-10-PCS | Performed by: STUDENT IN AN ORGANIZED HEALTH CARE EDUCATION/TRAINING PROGRAM

## 2024-08-10 PROCEDURE — 85025 COMPLETE CBC W/AUTO DIFF WBC: CPT | Performed by: HOSPITALIST

## 2024-08-10 PROCEDURE — 86901 BLOOD TYPING SEROLOGIC RH(D): CPT | Performed by: HOSPITALIST

## 2024-08-10 PROCEDURE — 86920 COMPATIBILITY TEST SPIN: CPT

## 2024-08-10 PROCEDURE — 86850 RBC ANTIBODY SCREEN: CPT | Performed by: HOSPITALIST

## 2024-08-10 PROCEDURE — P9047 ALBUMIN (HUMAN), 25%, 50ML: HCPCS | Performed by: STUDENT IN AN ORGANIZED HEALTH CARE EDUCATION/TRAINING PROGRAM

## 2024-08-10 PROCEDURE — 30233N1 TRANSFUSION OF NONAUTOLOGOUS RED BLOOD CELLS INTO PERIPHERAL VEIN, PERCUTANEOUS APPROACH: ICD-10-PCS | Performed by: HOSPITALIST

## 2024-08-10 PROCEDURE — 87186 SC STD MICRODIL/AGAR DIL: CPT | Performed by: UROLOGY

## 2024-08-10 PROCEDURE — 87077 CULTURE AEROBIC IDENTIFY: CPT | Performed by: UROLOGY

## 2024-08-10 PROCEDURE — 86900 BLOOD TYPING SEROLOGIC ABO: CPT | Performed by: HOSPITALIST

## 2024-08-10 PROCEDURE — 87086 URINE CULTURE/COLONY COUNT: CPT | Performed by: UROLOGY

## 2024-08-10 PROCEDURE — 82962 GLUCOSE BLOOD TEST: CPT

## 2024-08-10 PROCEDURE — 36430 TRANSFUSION BLD/BLD COMPNT: CPT

## 2024-08-10 PROCEDURE — 81001 URINALYSIS AUTO W/SCOPE: CPT | Performed by: UROLOGY

## 2024-08-10 PROCEDURE — 85018 HEMOGLOBIN: CPT | Performed by: HOSPITALIST

## 2024-08-10 PROCEDURE — 90935 HEMODIALYSIS ONE EVALUATION: CPT | Performed by: STUDENT IN AN ORGANIZED HEALTH CARE EDUCATION/TRAINING PROGRAM

## 2024-08-10 PROCEDURE — 80048 BASIC METABOLIC PNL TOTAL CA: CPT | Performed by: HOSPITALIST

## 2024-08-10 RX ORDER — ALBUMIN (HUMAN) 12.5 G/50ML
25 SOLUTION INTRAVENOUS
Status: DISPENSED | OUTPATIENT
Start: 2024-08-10 | End: 2024-08-12

## 2024-08-10 RX ORDER — SODIUM CHLORIDE 9 MG/ML
INJECTION, SOLUTION INTRAVENOUS ONCE
Status: COMPLETED | OUTPATIENT
Start: 2024-08-10 | End: 2024-08-10

## 2024-08-10 NOTE — PROGRESS NOTES
[facility]   part of [facility] Hospitalist Progress Note     Jonny Haque Patient Status:  Inpatient    4/15/1945 MRN [id number]   [facility] 8NE-A Attending Golden Escalante, DO   Hosp Day # 7 PCP Gee Jimenez MD     Follow Up:  The primary encounter diagnosis was Exertional chest pain. A diagnosis of Acute renal failure on dialysis (HCC) was also pertinent to this visit.    Subjective:       Patient is doing well, however complaining some chest tightness again with ambulation also was anemic this morning  Urinary retention         Objective:    Review of Systems:   10 point ROS completed and was negative, except for pertinent positive and negatives stated in subjective.    Vital signs:  Temp:  [97.3 °F (36.3 °C)-98.6 °F (37 °C)] 98.6 °F (37 °C)  Pulse:  [54-82] 63  Resp:  [15-19] 18  BP: ()/(39-81) 142/58  SpO2:  [96 %-100 %] 100 %    Physical Exam:    Gen: No acute distress, alert and oriented x 3. Chronically ill appearing.   Pulm: Lungs clear bilaterally, good inspiratory effort   CV:  nL S1/S2  Abd: soft, NT/ND, no hepatomegaly, +BS  MSK: moving all extremities, no edema  Neuro: no focal deficits  Skin: no rashes/lesions  Psych: normal mood/affect      Diagnostic Data:    Labs:  Recent Labs   Lab 24  2323 24  0552 24  1712 24  0428 08/10/24  0444   WBC 5.1 4.7 9.3 4.7 4.0   HGB 9.8* 9.4* 8.2* 7.8* 6.5*   MCV 98.6 100.7* 97.9 99.6 99.5   PLT 52.0* 56.0* 84.0* 54.0* 46.0*       Recent Labs   Lab 24  1437 24  0523 24  0552 24  0428 08/10/24  0444   *   < > 156* 108* 164*   BUN 83*   < > 59* 34* 56*   CREATSERUM 7.42*   < > 6.86* 4.69* 6.48*   CA 9.3   < > 9.3 8.8 8.9   ALB 4.3  --   --   --   --    *   < > 134* 134* 131*   K 5.0   < > 5.0 4.5 5.2*   CL 95*   < > 100 102 99   CO2 25.0   < > 25.0 26.0 24.0   ALKPHO 183*  --   --   --   --    AST 20  --   --   --   --    ALT 38  --   --    --   --    BILT 0.4  --   --   --   --    TP 7.0  --   --   --   --     < > = values in this interval not displayed.       Estimated Creatinine Clearance: 10.5 mL/min (A) (based on SCr of 6.48 mg/dL (H)).    No results for input(s): \"PTP\", \"INR\" in the last 168 hours.         COVID-19 Lab Results    COVID-19  Lab Results   Component Value Date    COVID19 Not Detected 04/16/2024    COVID19 Not Detected 03/17/2024    COVID19 Not Detected 04/05/2023       Pro-Calcitonin  No results for input(s): \"PCT\" in the last 168 hours.    Cardiac  No results for input(s): \"TROP\", \"PBNP\" in the last 168 hours.    Creatinine Kinase  No results for input(s): \"CK\" in the last 168 hours.    Inflammatory Markers  No results for input(s): \"CRP\", \"NEELAM\", \"LDH\", \"DDIMER\" in the last 168 hours.    Imaging: Imaging data reviewed in Epic.    Medications:    sodium chloride   Intravenous Once    tamsulosin  0.4 mg Oral Daily @ 0700    levothyroxine  175 mcg Oral Daily @ 0700    carvedilol  6.25 mg Oral BID with meals    losartan  25 mg Oral Daily    clopidogrel  75 mg Oral Daily    Lanthanum Carbonate  1,000 mg Oral BID with meals    aspirin  81 mg Oral QOD    amiodarone  200 mg Oral BID with meals    rosuvastatin  10 mg Oral Nightly    pantoprazole  40 mg Oral BID AC    insulin aspart  10 Units Subcutaneous TID AC    insulin degludec  24 Units Subcutaneous Nightly    insulin aspart  2-10 Units Subcutaneous TID AC and HS       Assessment & Plan:       79-year-old male significant past medical history of CAD with stable angina-status post PCI in 2017, 2021 and most recently in March 2024 with 3 drug-eluting stents placed in the left main, LAD and OM, hypertension, pulmonary hypertension paroxysmal A-fib, type 2 diabetes, hypothyroidism, end-stage renal disease on dialysis, history of subdural hematoma, ischemic cardiomyopathy history of HCV cirrhosis moderate aortic stenosis, prior history of GI bleed presented with chest pain     # Chest pain  with history of unstable angina  # CAD status post PCI  # NSTEMI  -Serial troponin  -Cardiology consult  -Patient was not on dual antiplatelet therapy secondary to recent subdural hematoma and fall, however pt was cleared by NSGY for DAPT, cont ASA, plavix   -Continue aspirin, statin, Plavix uninterrupted  -LHC done on 8/4/2024 80% OM disease and severed ISR in prox LAD, medical management recommended  -Recent echo completed and reviewed, repeat echo on 8/7/2024 reviewed a moderate stenosis of the aortic valve  -pt not a candidate for CABG,   -Repeat angiogram on 8/8/2024 with PCI to LM into the LAD with lithotripsy angioplasty, NC balloon angioplasty, drug-coated balloon angioplasty also PCI to OM with drug-eluting stent  -Further management per cardiology  -Groin is stable, CTA was done yesterday that showed moderate narrowing origin and proximal left common iliac artery with high-grade stenosis at the origin of the right SFA, fat stranding in the right groin no retroperitoneal hematoma  -Diffuse urinary bladder wall thickening with chronic outlet obstruction                # Post angiogram [id number]  -Resolved currently normotensive  -Improved with fluids during dialysis  -Mild hematoma noted on the right groin, stable with FemoStop  -Patient would like a blood transfusion will discuss with cardiology      # Anemia-acute on chronic  -Small hematoma noted in the right groin  -Hemoglobin further down trended to 6.5, will need 1 unit of PRBC at this time  -Given history of GI bleed GI also consulted     # Paroxysmal A-fib  # V. Tach  -Continue amiodarone  -EKG reviewed showed sinus rhythm      # Recent fall  acute subdural hematoma  -Was admitted in May 2024 with this, -Was followed up with neurosurgery  -CT head has been stable  -Okay for dual antiplatelet therapy therapy     # ESRD  -HD per nephrology nephrology  # Hypothyroidism  # HSV cirrhosis  # Moderate aortic stenosis  # Ischemic cardiomyopathy   #  Thrombocytopenia platelets of 57 continue to monitor-  -pt last saw Dr. Julian Nolasco 1/24/24, low pltc likely due to Hep C and cirrhosis, monitor, no intervention needed if pltc < 50  -Repeat echo completed and reviewed    # Type 2 diabetes  -Continue 20 units of insulin, sliding scale     # Essential HTN  - per discussion with pt and his son Ashwin, he was taken off metoprolol, diltiazem, and losartan as of 6/24/24 by outside renal  - coreg started, monitor     # Urinary retention   - cont straight cath per protocol  -  c/s appreciated, may need mccloud   -Tamsulosin started  -Will go ahead and place a Mccloud catheter as patient has had dark bloody urine  -Will reconsult urology as well as patient may need cystoscopy    Plan of care: inpt care.      Plan of care discussed with patient or family at bedside.    Lance vasquez MD           Supplementary Documentation:     Quality:  DVT Prophylaxis: hep subcutaneous (on hold due to hematuria)  CODE status: FULL  Mccloud: no  Central line: no  If COVID testing is negative, may discontinue isolation: yes     Estimated date of discharge: TBD  Discharge is dependent on: clinical course   At this point Mr. Haque is expected to be discharge to: home    Plan of care discussed with pt

## 2024-08-10 NOTE — PROGRESS NOTES
GASTROENTEROLOGY CONSULTATION  Andrew Vargas MD    Department of Gastroenterology  [facility]    Jonny Haque Patient Status:  Inpatient    4/15/1945 MRN [id number]   Location [address] Attending Golden Escalante DO   Hosp Day # 7 PCP Gee Jimenez MD     Reason for Consultation:  Anemia    History of Present Illness:  Jonny Haque is a a(n) 79 year old male with hx of CAD, a fib, pulmonary HTN, subdural hematoma, ischemic CM, HTN, HL, DM, ESRD on HD, hepatitis C related cirrhosis with protal HTN - admitted on 8/3 with chest pain, unstable angina.  GI consult requested to evaluate for anemia.   Hgb on admission on 8/3 was 9.7, today 6.5.  pt denies any specific GI complaints other than constipation.  No N/V/heartburn/dysphagia, rectal bleeding or melena.   Recently evaluated by  for gross hematuria with hx of bladder cancer with hgb 10.8 on . S/p coronary angioplasty with stent placement .  Currently on asa and plavix.  Groin hematoma s/p angioplasty.   In  - had multiple negative egds and colonoscopies as well as capsule endoscopy.   CTA abdomen and pelvis yesterday - nodular liver, bladder thickening.  US abd 3/2024 - cirrhosis with SM.  Hgb 6.5 with plt 46.      [facility]           Jonny Haque Patient Status:  Inpatient    4/15/1945 MRN [id number]   Location [address] Attending Tadeo Nieves MD   Hosp Day # 0 PCP Gee Jimenez MD         Jonny Haque is a 78 year old male for anemia consult.   Had recent MI stent placed 2 weeks ago.  Started on Plavix along with ASA.  Noted dark stool today.  Hx of DM and ESRD on HD,afib, liver cirrhosis with a splenorenal shunt, no gastric or esophageal varices and recurrent GI bleeding.     Pt has had numerous GI evaluations for the bleeding with numerous EGD, colonoscopies, small bowel video capsule endoscopies, CT angiogram.  All negative for active bleeding at the time of the exams. Mostly as at   Jd's.  Saw Dr Granger last year, deferred eval.       Also seen Dr. Stover, Dr. Aponte and Dr. Bragg as well were reluctant for eval then.  ASSESSMENT AND PLAN:   Jonny Haque is a 78 year old male with anemia.  Pt recent MI stent placed 2 weeks ago resumed Plavix.  Noted black stool today.  Has ESRD on HD Hb stable.  No sig active bleed.     Has had recurrent anemia in the past on Plavix with extensive eval per HPI.  No bleeding x 2 years when off Plavix.  Seen lasrt year eval deferred.  EGD colons angios SB capsule.  Has cirrhosis no varices mentioned.  Would consider colitis, AVM's, portal gastropathy, PUD, gastritis or other. Has deferred endoscopic evaluation in the past. .       Resume diet.  Cont BID PPI.  Will readdress role for colonoscopy and EGD of Hb cont to decrease despite supportive care and if agreeable.  If not agreeable, plan BID PPI and resume Plavix if needed and follow..     Sb Talley MD  3/31/2024  4:43 PM               Electronically signed by Sb Talley MD at 3/31/2024  4:50 PM  History:  Past Medical History:    Aortic stenosis    Calculus of kidney    Coronary atherosclerosis    bare metal stents at [facility] Jan 2021    Diabetes (HCC)    Disorder of thyroid    Esophageal varices without bleeding, unspecified esophageal varices type (HCC)    GIB (gastrointestinal bleeding)    Hepatitis, unspecified    hepatitis C-just completed taking Harvoni in November 2016    High blood pressure    High cholesterol    Other disorders of plasma-protein metabolism, not elsewhere classified    Renal disorder    Visual impairment    reading glasses     Past Surgical History:   Procedure Laterality Date    Angiogram  08/15/2017    small caliber RCA with 80% mid lesion.  LAD and left circumflex with 50% lesions.  LV shows inferobasilar aneurysm with scar.  Overall LV function preserved at the exterior 65%.    Cath bare metal stent (bms)      Other surgical history      Upper GI surgery    Other  surgical history  12/11/2017    Cysto Dr. Lees    Other surgical history  07/18/2019    BTS Cysto Dr. Lees     Other surgical history  02/06/2020    BTS Cysto (Dr. Lees)     Family History   Problem Relation Age of Onset    Cancer Father     Hypertension Mother       reports that he quit smoking about 44 years ago. His smoking use included cigarettes. He started smoking about 59 years ago. He has a 7.5 pack-year smoking history. He has never used smokeless tobacco. He reports that he does not drink alcohol and does not use drugs.    Allergies:  No Known Allergies    Medications:    Current Facility-Administered Medications:     sodium chloride 0.9% infusion, , Intravenous, Once    tamsulosin (Flomax) cap 0.4 mg, 0.4 mg, Oral, Daily @ 0700    levothyroxine (Synthroid) tab 175 mcg, 175 mcg, Oral, Daily @ 0700    morphINE PF 2 MG/ML injection 2 mg, 2 mg, Intravenous, Q1H PRN    nitroglycerin (Nitrostat) SL tab 0.4 mg, 0.4 mg, Sublingual, Q5 Min PRN    heparin (Porcine) 1000 UNIT/ML injection 1,500 Units, 1.5 mL, Intracatheter, PRN Dialysis    carvedilol (Coreg) tab 6.25 mg, 6.25 mg, Oral, BID with meals    losartan (Cozaar) tab 25 mg, 25 mg, Oral, Daily    clopidogrel (Plavix) tab 75 mg, 75 mg, Oral, Daily    Lanthanum Carbonate (FOSRENOL) chewable tab 1,000 mg, 1,000 mg, Oral, BID with meals    aspirin DR tab 81 mg, 81 mg, Oral, QOD    amiodarone (Pacerone) tab 200 mg, 200 mg, Oral, BID with meals    rosuvastatin (Crestor) tab 10 mg, 10 mg, Oral, Nightly    pantoprazole (Protonix) DR tab 40 mg, 40 mg, Oral, BID AC    insulin aspart (NovoLOG) 100 Units/mL FlexPen 10 Units, 10 Units, Subcutaneous, TID AC    insulin degludec (Tresiba) 100 units/mL flextouch 24 Units, 24 Units, Subcutaneous, Nightly    acetaminophen (Tylenol Extra Strength) tab 500 mg, 500 mg, Oral, Q4H PRN    polyethylene glycol (PEG 3350) (Miralax) 17 g oral packet 17 g, 17 g, Oral, Daily PRN    sennosides (Senokot) tab 17.2 mg, 17.2 mg, Oral, Nightly  PRN    bisacodyl (Dulcolax) 10 MG rectal suppository 10 mg, 10 mg, Rectal, Daily PRN    ondansetron (Zofran) 4 MG/2ML injection 4 mg, 4 mg, Intravenous, Q6H PRN    metoclopramide (Reglan) 5 mg/mL injection 5 mg, 5 mg, Intravenous, Q8H PRN    glucose (Dex4) 15 GM/59ML oral liquid 15 g, 15 g, Oral, Q15 Min PRN **OR** glucose (Glutose) 40% oral gel 15 g, 15 g, Oral, Q15 Min PRN **OR** glucose-vitamin C (Dex-4) chewable tab 4 tablet, 4 tablet, Oral, Q15 Min PRN **OR** dextrose 50% injection 50 mL, 50 mL, Intravenous, Q15 Min PRN **OR** glucose (Dex4) 15 GM/59ML oral liquid 30 g, 30 g, Oral, Q15 Min PRN **OR** glucose (Glutose) 40% oral gel 30 g, 30 g, Oral, Q15 Min PRN **OR** glucose-vitamin C (Dex-4) chewable tab 8 tablet, 8 tablet, Oral, Q15 Min PRN    insulin aspart (NovoLOG) 100 Units/mL FlexPen 2-10 Units, 2-10 Units, Subcutaneous, TID AC and HS    Review of Systems:  Gastrointestinal: See above  General: Denies fatigue, chills/fever, night sweats, weight loss, loss of appetite, weight gain, sleep disturbance.  Cardiovascular: Denies history of heart murmur, chest pain or angina.  Respiratory: Denies shortness of breath, chronic/frequent hoarseness, wheezing, chronic cough, cough up sputum.  Genitourinary: Denies kidney stones, painful/difficult urination, frequent urinary infections, frequent urination, blood in urine, incontinence, kidney failure.  Psychosocial: noncontributory  Neuro: no tremor, dysarthria, gait disturbance  Skin: Denies severe itching, unusual moles, rash, flushing, change in hair or nails.  Bone/joint: No joint pain or inflammation  Heme/Lymphatic: Denies easy bruising, anemia, excessive bleeding, enlarging or painful lymph nodes.  Allergy: Denies medication allergy, latex/rubber allergy, anaphylactic or other reaction to anesthesia, food allergy.   Eyes: Denies blurred/double vision, eye disease, glasses or contacts, glaucoma.  ENT: Denies nose or gums bleeding, mouth sores, bad breath or bad  taste in mouth, hearing loss.  Physical Exam:    Blood pressure 111/81, pulse 59, temperature 98.6 °F (37 °C), temperature source Oral, resp. rate 16, height 6' (1.829 m), weight 209 lb 14.1 oz (95.2 kg), SpO2 100%.    General: Appears alert, oriented x3 and in no acute distress.  HEENT: Normal. No neck vein distention. Thyroid not enlarged.  No lymphadenopathy.  CV: S1 and S2 normal.  No murmurs or gallops.  Lungs: Clear to auscultation.  Abdomen: Soft and nondistended.  Nontender.  No masses.  Bowel sounds are present.  Hematomas over both groins and lower abdomen  Back: No CVA tenderness.  Extremities: No edema, cyanosis, or clubbing.  Skin: Warm and dry.  Rectal: deferred  Laboratory Data:  Lab Results   Component Value Date    WBC 4.0 08/10/2024    HGB 6.5 08/10/2024    HCT 19.4 08/10/2024    PLT 46.0 08/10/2024    CREATSERUM 6.48 08/10/2024    BUN 56 08/10/2024     08/10/2024    K 5.2 08/10/2024    CL 99 08/10/2024    CO2 24.0 08/10/2024     08/10/2024    CA 8.9 08/10/2024    PGLU 183 08/10/2024         Assessment:     Anemia  Hepatitis C related cirrhosis   Acute blood loss anemia likely related to hematuria and groin hematomas.  Pt is on asa and plavix with baseline thrombocytopenia related to cirrhosis.   S/p angioplasty with stenting 8/8.   No overt GI bleeding noted.  Last endoscopies - EGD and colonoscopy with video capsule endoscopy in 2020.    Plan:  1. No plans for endoscopy - EGD or colonoscopy - at present unless pt noted to have overt GI bleeding.  2. Continue BID protonix  3. Continue cardiac diet.  4. Monitor labs    Cc: Dr. Aguayo      Thank you for allowing to participate in the care of this patient.    Andrew Vargas MD  8/10/2024  9:38 AM

## 2024-08-10 NOTE — PROGRESS NOTES
Cardiology Progress Note  [facility]    Jonny Haque Patient Status:  Inpatient    4/15/1945 MRN [id number]   [facility] 8NE-A Attending Golden Escalante, DO   Hosp Day # 7 PCP Gee Jimenez MD       Subjective: still with TOMLIN and some exertional chest tightness  Hgb down to 6.5    CT abdomen- no RP bleed    Medications:  Current Facility-Administered Medications on File Prior to Encounter   Medication Dose Route Frequency Provider Last Rate Last Admin    [] sodium chloride 0.9 % IV bolus 100 mL  100 mL Intravenous Q30 Min PRN Johnson Vera MD        And    [] albumin human (Albumin) 25% injection 25 g  25 g Intravenous PRN Dialysis Johnson Vera MD         Current Outpatient Medications on File Prior to Encounter   Medication Sig Dispense Refill    levothyroxine 175 MCG Oral Tab Take 1 tablet (175 mcg total) by mouth before breakfast.      isosorbide mononitrate ER 30 MG Oral Tablet 24 Hr Take 1 tablet (30 mg total) by mouth daily.      Lanthanum Carbonate 1000 MG Oral Chew Tab Chew 1 tablet (1,000 mg total) by mouth 2 (two) times daily with meals.      clopidogrel (PLAVIX) 75 MG Oral Tab Take 1 tablet (75 mg total) by mouth.      aspirin 81 MG Oral Tab EC Take 1 tablet (81 mg total) by mouth every other day.      amiodarone 200 MG Oral Tab Take 1 tablet (200 mg total) by mouth 2 (two) times daily with meals. 60 tablet 3    pantoprazole 40 MG Oral Tab EC Take 1 tablet (40 mg total) by mouth 2 (two) times daily before meals.      Sevelamer 800 MG Oral Tab Take 1 tablet (800 mg total) by mouth 3 (three) times daily with meals.      rosuvastatin 10 MG Oral Tab Take 1 tablet (10 mg total) by mouth nightly. (Patient taking differently: Take 1 tablet (10 mg total) by mouth nightly.) 90 tablet 0    nitroGLYCERIN 0.3 MG Sublingual SL Tab Place 1 tablet (0.3 mg total) under the tongue every 5 (five) minutes as needed for Chest pain.      zolpidem 10 MG Oral Tab Take 1  tablet (10 mg total) by mouth nightly as needed for Sleep.      Ferric Citrate (AURYXIA) 1  MG(Fe) Oral Tab       Insulin Pen Needle (TRUEPLUS PEN NEEDLES) 31G X 5 MM Does not apply Misc Use 5 per day 500 each 2    Insulin Glargine, 2 Unit Dial, (TOUJEO MAX SOLOSTAR) 300 UNIT/ML Subcutaneous Solution Pen-injector Inject 20 Units into the skin nightly. 6 mL 2    Glucose Blood (ONETOUCH ULTRA) In Vitro Strip 6 times a day 400 each 3    Insulin Lispro, 1 Unit Dial, (HUMALOG KWIKPEN) 100 UNIT/ML Subcutaneous Solution Pen-injector 12 units before meals with sliding scale. Max daily dose: 50 units. (Patient taking differently: 10 Units. Three times a day. Before meals) 60 mL 3    CONTOUR NEXT TEST In Vitro Strip TEST BLOOD SUGAR FOUR TIMES DAILY 400 strip 3    Blood Glucose Monitoring Suppl ([employer] CONTOUR NEXT MONITOR) W/DEVICE Does not apply Kit 1 Device by Does not apply route 4 (four) times daily. 1 kit 0       Review of Systems:  Constitutional: denies fevers, chills, night sweats  HEENT: denies headache, vision changes, trouble or pain with swallowing  Cardiac: no chest pain  Pulm: denies dyspnea, cough, wheeze  GI: denies n/v, abd pain, diarrhea or constipation  : denies hematuria, dysuria, incontinence  MSK: denies muscle or joint pains  Neuro: denies numbness, weakness, paresthesias  Psych: denies anxiety, depression  Integument: denies skin rashes or lesions  Heme: denies easy bruising or bleeding  Endo: denies heat/cold intolerance, skin or nail changes      Physical Exam:  Blood pressure 121/61, pulse 62, temperature 98.2 °F (36.8 °C), temperature source Oral, resp. rate 18, height 6' (1.829 m), weight 209 lb 14.1 oz (95.2 kg), SpO2 100%.  Wt Readings from Last 3 Encounters:   08/09/24 209 lb 14.1 oz (95.2 kg)   05/17/24 204 lb (92.5 kg)   05/12/24 204 lb 1.6 oz (92.6 kg)       General: awake, alert, oriented x 3, no acute distress  HEENT: at/nc, perrl, eomi  Neck: No JVD, carotids 2+ no  bruits.  Cardiac: Regular rate and rhythm, S1, S2 normal, 3/6 mid peaking systolic murmur RUSB  Lungs: Clear without wheezes, rales, rhonchi or dullness.  Normal excursions and effort.  Abdomen: Soft, non-tender, non-distended, normal bowel sounds   Extremities: Without clubbing, cyanosis or edema.  Peripheral pulses are 2+.  Neurologic: Alert and oriented, normal affect.  Psych: normal mood and affect  Skin: Warm and dry.       Laboratories and Data:  Diagnostics:    [facility]  IMPRESSION:    CAD.  S/P PCI to LM into LAD for ISR with 1) lithotripsy angiogplasty, 2) NC balloon angioplasty, 3) DCB angioplasty (drug coated balloon).  S/P  PCI to OM with TRACY.     RECOMMENDATIONS:   DAPT.  Uninterrupted.  Medical management.  Surveillance with stress, CTA, and angio if needed.  Consider further eval for TAVR.    Labs:   CBC:    Lab Results   Component Value Date    WBC 4.0 08/10/2024    WBC 4.7 08/09/2024    WBC 9.3 08/08/2024     Lab Results   Component Value Date    HEMOGLOBIN 10.5 (L) 02/09/2022    HEMOGLOBIN 12.6 06/07/2016    HEMOGLOBIN 15.1 03/07/2013    HGB 6.5 (LL) 08/10/2024    HGB 7.8 (L) 08/09/2024    HGB 8.2 (L) 08/08/2024      Lab Results   Component Value Date    PLT 46.0 (L) 08/10/2024    PLT 54.0 (L) 08/09/2024    PLT 84.0 (L) 08/08/2024     BMP:     Lab Results   Component Value Date    GLUCOSE 129 (H) 02/09/2022    GLUCOSE 128 (H) 06/07/2016    GLUCOSE 359 (H) 03/07/2013     Lab Results   Component Value Date    K 5.2 (H) 08/10/2024    K 4.5 08/09/2024    K 5.0 08/08/2024     Lab Results   Component Value Date    BUN 56 (H) 08/10/2024    BUN 34 (H) 08/09/2024    BUN 59 (H) 08/08/2024     Lab Results   Component Value Date    CREATSERUM 6.48 (H) 08/10/2024    CREATSERUM 4.69 (H) 08/09/2024    CREATSERUM 6.86 (H) 08/08/2024     Cholesterol:     Lab Results   Component Value Date    CHOLEST 154 03/19/2024    CHOLEST 174 03/17/2024    CHOLEST 104 04/05/2023     Lab Results   Component Value Date    HDL 33 (L)  03/19/2024    HDL 44 03/17/2024    HDL 25 (L) 04/05/2023     Lab Results   Component Value Date    TRIG 304 (H) 03/19/2024    TRIG 215 (H) 03/17/2024    TRIG 202 (H) 04/05/2023    TRIGLY 248 (H) 02/09/2022    TRIGLY 278 (H) 08/19/2015    TRIGLY 490 (H) 03/07/2013     Lab Results   Component Value Date    LDL 72 03/19/2024    LDL 93 03/17/2024    LDL 46 04/05/2023     Lab Results   Component Value Date    AST 20 08/03/2024    AST 24 05/08/2024    AST 26 04/16/2024     Lab Results   Component Value Date    ALT 38 08/03/2024    ALT 24 05/08/2024    ALT 28 04/16/2024       Assessment/Plan:  79 year old male presenting with:    1) Chest pain, concerning for unstable angina  2) CAD with multiple prior PCI (last 3/2024 of LM, LAD, OM); multiple interruptions of DAPT since then as per hpi  3) ICM (EF 40-45% 4/2024, mod-severe aortic stenosis)  4) pAF on amiodarone (no ac likely 2/2 prior GIB and SDH)  5) SDH  6) HTN  7) HL  8) DM  9) ESRD on HD    - Cont asa, plavix, statin (cleared for DAPT by neurosurgery, see notes 5/2024)  - s/p PCI 8/8 -> DAPT  - BB  - TAVR CTA today  - Hgb trending down -> managed by nephrology     MDM:  Groin ok.  DAPT.  CP improved.  CTA today to eval aortic stenosis.  Can DC home over the weekend; absolutely needs DAPT without interruption for 3 months.  Needs to FU with me in 3-4 weeks.  Will modify as needed after CTA is read and team discusses his aortic stenosis and possible TAVR.    No DC today- PRBC's and HD today. Anemia eval per IM( acute on chronic), CT with no RP bleed

## 2024-08-10 NOTE — PROGRESS NOTES
Nephrology Progress Note    Jonny Garland Attending:  Golden Escalante DO       SUBJECTIVE:     HD today.    PHYSICAL EXAM:     Vital Signs: /62 (BP Location: Right arm)   Pulse 67   Temp 98 °F (36.7 °C) (Oral)   Resp 16   Ht 6' (1.829 m)   Wt 209 lb 14.1 oz (95.2 kg)   SpO2 100%   BMI 28.46 kg/m²   Temp (24hrs), Av.4 °F (36.9 °C), Min:98 °F (36.7 °C), Max:98.6 °F (37 °C)       Intake/Output Summary (Last 24 hours) at 8/10/2024 1329  Last data filed at 8/10/2024 1234  Gross per 24 hour   Intake 120 ml   Output 1 ml   Net 119 ml     Wt Readings from Last 3 Encounters:   24 209 lb 14.1 oz (95.2 kg)   24 204 lb (92.5 kg)   24 204 lb 1.6 oz (92.6 kg)       General: no distress  HEENT: NCAT  Cardiac: RRR  Lungs: no distress  Abdomen: soft, non-tender  Extremities: trace LE edema  Neurologic/Psych: mentating well     LABORATORY DATA:     Lab Results   Component Value Date     (H) 08/10/2024    BUN 56 (H) 08/10/2024    BUNCREA 13.0 2022    CREATSERUM 6.48 (H) 08/10/2024    ANIONGAP 8 08/10/2024    GFR 41 (L) 2017    GFRNAA 8 (L) 2022    GFRAA 9 (L) 2022    CA 8.9 08/10/2024    OSMOCALC 291 08/10/2024    ALKPHO 183 (H) 2024    AST 20 2024    ALT 38 2024    BILT 0.4 2024    TP 7.0 2024    ALB 4.3 2024    GLOBULIN 2.7 2024    AGRATIO 1.0 (L) 2016     (L) 08/10/2024    K 5.2 (H) 08/10/2024    CL 99 08/10/2024    CO2 24.0 08/10/2024     Lab Results   Component Value Date    WBC 4.0 08/10/2024    RBC 1.95 (L) 08/10/2024    HGB 8.2 (L) 08/10/2024    HCT 19.4 (L) 08/10/2024    PLT 46.0 (L) 08/10/2024    MCV 99.5 08/10/2024    MCH 33.3 08/10/2024    MCHC 33.5 08/10/2024    RDW 13.9 08/10/2024    NEPRELIM 2.51 08/10/2024    NEPERCENT 62.6 08/10/2024    LYPERCENT 22.0 08/10/2024    MOPERCENT 9.5 08/10/2024    EOPERCENT 5.3 08/10/2024    BAPERCENT 0.3 08/10/2024    NE 2.51 08/10/2024    LYMABS 0.88 (L) 08/10/2024     MOABSO 0.38 08/10/2024    EOABSO 0.21 08/10/2024    BAABSO 0.01 08/10/2024     Lab Results   Component Value Date    MALBP 49.2 (H) 09/04/2019    CREUR 91.65 09/04/2019     Lab Results   Component Value Date    COLORUR YELLOW 02/09/2022    CLARITY Hazy (A) 07/18/2019    SPECGRAVITY 1.013 07/02/2021    GLUUR NEGATIVE 02/09/2022    BILUR NEGATIVE 02/09/2022    KETUR NEGATIVE 02/09/2022    BLOODURINE SMALL (A) 02/09/2022    PHURINE 7 02/09/2022    PROUR 100 (A) 02/09/2022    UROBILINOGEN <2.0 07/18/2019    NITRITE NEGATIVE 02/09/2022    LEUUR NEGATIVE 02/09/2022    WBCUR 1-4 07/18/2019    RBCUR 6-10 (A) 07/18/2019    EPIUR RARE (A) 02/09/2022    BACUR NONE 02/09/2022    HYLUR Present (A) 12/19/2016       IMAGING:     Reviewed    MEDICATIONS:       Current Facility-Administered Medications   Medication Dose Route Frequency    sodium chloride 0.9 % IV bolus 100 mL  100 mL Intravenous Q30 Min PRN    And    albumin human (Albumin) 25% injection 25 g  25 g Intravenous PRN Dialysis    tamsulosin (Flomax) cap 0.4 mg  0.4 mg Oral Daily @ 0700    levothyroxine (Synthroid) tab 175 mcg  175 mcg Oral Daily @ 0700    morphINE PF 2 MG/ML injection 2 mg  2 mg Intravenous Q1H PRN    nitroglycerin (Nitrostat) SL tab 0.4 mg  0.4 mg Sublingual Q5 Min PRN    heparin (Porcine) 1000 UNIT/ML injection 1,500 Units  1.5 mL Intracatheter PRN Dialysis    carvedilol (Coreg) tab 6.25 mg  6.25 mg Oral BID with meals    losartan (Cozaar) tab 25 mg  25 mg Oral Daily    clopidogrel (Plavix) tab 75 mg  75 mg Oral Daily    Lanthanum Carbonate (FOSRENOL) chewable tab 1,000 mg  1,000 mg Oral BID with meals    aspirin DR tab 81 mg  81 mg Oral QOD    amiodarone (Pacerone) tab 200 mg  200 mg Oral BID with meals    rosuvastatin (Crestor) tab 10 mg  10 mg Oral Nightly    pantoprazole (Protonix) DR tab 40 mg  40 mg Oral BID AC    insulin aspart (NovoLOG) 100 Units/mL FlexPen 10 Units  10 Units Subcutaneous TID AC    insulin degludec (Tresiba) 100 units/mL  flextouch 24 Units  24 Units Subcutaneous Nightly    acetaminophen (Tylenol Extra Strength) tab 500 mg  500 mg Oral Q4H PRN    polyethylene glycol (PEG 3350) (Miralax) 17 g oral packet 17 g  17 g Oral Daily PRN    sennosides (Senokot) tab 17.2 mg  17.2 mg Oral Nightly PRN    bisacodyl (Dulcolax) 10 MG rectal suppository 10 mg  10 mg Rectal Daily PRN    ondansetron (Zofran) 4 MG/2ML injection 4 mg  4 mg Intravenous Q6H PRN    metoclopramide (Reglan) 5 mg/mL injection 5 mg  5 mg Intravenous Q8H PRN    glucose (Dex4) 15 GM/59ML oral liquid 15 g  15 g Oral Q15 Min PRN    Or    glucose (Glutose) 40% oral gel 15 g  15 g Oral Q15 Min PRN    Or    glucose-vitamin C (Dex-4) chewable tab 4 tablet  4 tablet Oral Q15 Min PRN    Or    dextrose 50% injection 50 mL  50 mL Intravenous Q15 Min PRN    Or    glucose (Dex4) 15 GM/59ML oral liquid 30 g  30 g Oral Q15 Min PRN    Or    glucose (Glutose) 40% oral gel 30 g  30 g Oral Q15 Min PRN    Or    glucose-vitamin C (Dex-4) chewable tab 8 tablet  8 tablet Oral Q15 Min PRN    insulin aspart (NovoLOG) 100 Units/mL FlexPen 2-10 Units  2-10 Units Subcutaneous TID AC and HS       ASSESSMENT/PLAN:     ESRD  - HD TTS     Hypertension    Hyponatremia  -Likely due to volume excess    Anemia  -Hemoglobin goal greater than 10    CKD BMD  -Lanthanum 1000mg BID    NSTEMI        HD TTS.  Continue dialysis per routine schedule  UF with HD as tolerated  NSTEMI management per cardiology - s/p cath  Will plan for EPO with HD      We will continue to follow    DO Joselito Hoff [facility] - Nephrology

## 2024-08-10 NOTE — PROGRESS NOTES
[facility]  Urology Progress Note    Jonny Haque Patient Status:  Inpatient    4/15/1945 MRN [id number]   Location [facility] 6NE-A Attending Golden Escalante, DO   Hosp Day # 7 PCP Gee Jimenez MD     Subjective:  Jonny Haque is a(n) 79 year old male.    Current complaints:     He has had mild dysuria with urination. Urine is dark yellow but no hematuria. PVR this am was low.      Objective:  General appearance: alert, appears stated age, and cooperative  Blood pressure 118/62, pulse 86, temperature 98 °F (36.7 °C), temperature source Oral, resp. rate 16, height 6' (1.829 m), weight 209 lb 14.1 oz (95.2 kg), SpO2 97%.  Abdomen: soft, non-tender  Lab Results   Component Value Date    WBC 4.0 08/10/2024    HGB 8.2 08/10/2024    HCT 19.4 08/10/2024    PLT 46.0 08/10/2024    CREATSERUM 6.48 08/10/2024    BUN 56 08/10/2024     08/10/2024    K 5.2 08/10/2024    CL 99 08/10/2024    CO2 24.0 08/10/2024     08/10/2024    CA 8.9 08/10/2024    PGLU 187 08/10/2024       No results found for this visit on 24.     CTA ABD/PEL (CPT=74174)    Result Date: 2024  CONCLUSION:  1. Moderate to severe overall calcified atherosclerosis. 2. Moderate narrowing origin and proximal left common iliac artery. 3. High-grade stenosis at the origin of the right superficial femoral artery. 4. There is stranding at the right groin.  This may be any combination of scarring and or inflammation.  No focal fluid collection or hematoma. 5. Diffuse urinary bladder wall thickening.  Given prostatomegaly, it may be secondary to chronic outlet obstruction.  Differential would include etiologies of acute and chronic cystitis. 6. Nodular contour of the liver.  This can be seen with cirrhosis. 7. Details as above.  Continued clinical correlation recommended.    LOCATION:  [place]   Dictated by (CST): Abran Erwin MD on 2024 at 5:02 PM     Finalized by (CST): Abran Erwin MD on 2024 at 5:12 PM       CT CARDIAC  OVER READ    Result Date: 8/9/2024  CONCLUSION:  1. Emphysema with mild scarring and or atelectasis at the left base. 2. Nodular contour of the liver.  This finding can be seen with cirrhosis. 3. Continued clinical correlation recommended.     LOCATION:  [place]    Dictated by (CST): Abran Erwin MD on 8/09/2024 at 4:58 PM     Finalized by (CST): Abran Erwin MD on 8/09/2024 at 5:01 PM         Assessment:    URINARY RETENTION  GROSS HEMATURIA  -8/10/24 Urine is dark yellow but no hematuria    HISTORY OF BLADDER CANCER   CT A/P without contrast 8/6/24: no renal stones/hydronephrosis, prostatomegaly (measure up to 5.4 cm)    ESRD  -on HD  -nephrology following    Dysuria  8/10/24 Likely due to concentrated urine but no recent UA    PLAN    UA/UCx  Monitor PVR but no current need for mccloud - no gross hematuria either    Patient verbalized understanding and all questions answered.      Discussed with FRANCIA Lorenzana D.O.  [facility]

## 2024-08-10 NOTE — PLAN OF CARE
Assumed patient care, patient is alert and oriented x4. On room air. Sinus rhythm on tele, no complains of chest pain. Abdomen is soft, non-tendered, bowel sounds are active in all four quadrants. Had difficulty to urinate, is aware. Per 's oral order, this writer did bladder scan after patient urinating, it was 107ml. This writer also paged urologist , no mccloud at this time. Patient had dialysis today, and 2 liters out, patient tolerated well. Patient's hemoglobin was 6.5 this AM, one unit of blood given during dialysis by dialysis RN. Bed in lower position, call light within reach. Plan of care updated, questions are answered.     Problem: Patient/Family Goals  Goal: Patient/Family Long Term Goal  Description: Patient's Long Term Goal: Stay healthy    Interventions:  -Medication management  -Dietary management  -Prompt follow up with physicians  - See additional Care Plan goals for specific interventions  Outcome: Progressing  Goal: Patient/Family Short Term Goal  Description: Patient's Short Term Goal: Discharge home    Interventions:   - Cardiology consult  - Nephrology consult  -HD  Trend trop  - See additional Care Plan goals for specific interventions  Outcome: Progressing

## 2024-08-10 NOTE — PLAN OF CARE
Alert and oriented x4 on tele monitor hr 50's sinus john. Right permacath c/d/I. Right groin bruised lawrence no bleeding and no hematoma. Pedal pulses present and palpable. Updated w/ poc and verbalized understanding. All needs attended and will continue to monitor. Call light within reach at all times.  Problem: Patient/Family Goals  Goal: Patient/Family Long Term Goal  Description: Patient's Long Term Goal: Stay healthy    Interventions:  -Medication management  -Dietary management  -Prompt follow up with physicians  - See additional Care Plan goals for specific interventions  Outcome: Progressing  Goal: Patient/Family Short Term Goal  Description: Patient's Short Term Goal: Discharge home    Interventions:   - Cardiology consult  - Nephrology consult  -HD  Trend trop  - See additional Care Plan goals for specific interventions  Outcome: Progressing     Problem: CARDIOVASCULAR - ADULT  Goal: Maintains optimal cardiac output and hemodynamic stability  Description: INTERVENTIONS:  - Monitor vital signs, rhythm, and trends  - Monitor for bleeding, hypotension and signs of decreased cardiac output  - Evaluate effectiveness of vasoactive medications to optimize hemodynamic stability  - Monitor arterial and/or venous puncture sites for bleeding and/or hematoma  - Assess quality of pulses, skin color and temperature  - Assess for signs of decreased coronary artery perfusion - ex. Angina  - Evaluate fluid balance, assess for edema, trend weights  Outcome: Progressing  Goal: Absence of cardiac arrhythmias or at baseline  Description: INTERVENTIONS:  - Continuous cardiac monitoring, monitor vital signs, obtain 12 lead EKG if indicated  - Evaluate effectiveness of antiarrhythmic and heart rate control medications as ordered  - Initiate emergency measures for life threatening arrhythmias  - Monitor electrolytes and administer replacement therapy as ordered  Outcome: Progressing     Problem: SAFETY ADULT - FALL  Goal: Free from  fall injury  Description: INTERVENTIONS:  - Assess pt frequently for physical needs  - Identify cognitive and physical deficits and behaviors that affect risk of falls.  - [place] fall precautions as indicated by assessment.  - Educate pt/family on patient safety including physical limitations  - Instruct pt to call for assistance with activity based on assessment  - Modify environment to reduce risk of injury  - Provide assistive devices as appropriate  - Consider OT/PT consult to assist with strengthening/mobility  - Encourage toileting schedule  Outcome: Progressing

## 2024-08-11 ENCOUNTER — APPOINTMENT (OUTPATIENT)
Dept: ULTRASOUND IMAGING | Facility: HOSPITAL | Age: 79
End: 2024-08-11
Attending: HOSPITALIST
Payer: MEDICARE

## 2024-08-11 LAB
ANION GAP SERPL CALC-SCNC: 7 MMOL/L (ref 0–18)
BASOPHILS # BLD AUTO: 0.02 X10(3) UL (ref 0–0.2)
BASOPHILS NFR BLD AUTO: 0.5 %
BLOOD TYPE BARCODE: 9500
BUN BLD-MCNC: 38 MG/DL (ref 9–23)
CALCIUM BLD-MCNC: 9.3 MG/DL (ref 8.7–10.4)
CHLORIDE SERPL-SCNC: 101 MMOL/L (ref 98–112)
CO2 SERPL-SCNC: 26 MMOL/L (ref 21–32)
CREAT BLD-MCNC: 4.75 MG/DL
EGFRCR SERPLBLD CKD-EPI 2021: 12 ML/MIN/1.73M2 (ref 60–?)
EOSINOPHIL # BLD AUTO: 0.23 X10(3) UL (ref 0–0.7)
EOSINOPHIL NFR BLD AUTO: 5.4 %
ERYTHROCYTE [DISTWIDTH] IN BLOOD BY AUTOMATED COUNT: 17.4 %
GLUCOSE BLD-MCNC: 114 MG/DL (ref 70–99)
GLUCOSE BLD-MCNC: 147 MG/DL (ref 70–99)
GLUCOSE BLD-MCNC: 157 MG/DL (ref 70–99)
GLUCOSE BLD-MCNC: 173 MG/DL (ref 70–99)
GLUCOSE BLD-MCNC: 189 MG/DL (ref 70–99)
HCT VFR BLD AUTO: 21.9 %
HGB BLD-MCNC: 7.4 G/DL
IMM GRANULOCYTES # BLD AUTO: 0.01 X10(3) UL (ref 0–1)
IMM GRANULOCYTES NFR BLD: 0.2 %
LYMPHOCYTES # BLD AUTO: 0.97 X10(3) UL (ref 1–4)
LYMPHOCYTES NFR BLD AUTO: 22.7 %
MCH RBC QN AUTO: 32 PG (ref 26–34)
MCHC RBC AUTO-ENTMCNC: 33.8 G/DL (ref 31–37)
MCV RBC AUTO: 94.8 FL
MONOCYTES # BLD AUTO: 0.34 X10(3) UL (ref 0.1–1)
MONOCYTES NFR BLD AUTO: 8 %
NEUTROPHILS # BLD AUTO: 2.7 X10 (3) UL (ref 1.5–7.7)
NEUTROPHILS # BLD AUTO: 2.7 X10(3) UL (ref 1.5–7.7)
NEUTROPHILS NFR BLD AUTO: 63.2 %
OSMOLALITY SERPL CALC.SUM OF ELEC: 288 MOSM/KG (ref 275–295)
PLATELET # BLD AUTO: 41 10(3)UL (ref 150–450)
POTASSIUM SERPL-SCNC: 4.8 MMOL/L (ref 3.5–5.1)
RBC # BLD AUTO: 2.31 X10(6)UL
SODIUM SERPL-SCNC: 134 MMOL/L (ref 136–145)
UNIT VOLUME: 350 ML
WBC # BLD AUTO: 4.3 X10(3) UL (ref 4–11)

## 2024-08-11 PROCEDURE — 85025 COMPLETE CBC W/AUTO DIFF WBC: CPT | Performed by: HOSPITALIST

## 2024-08-11 PROCEDURE — 97116 GAIT TRAINING THERAPY: CPT

## 2024-08-11 PROCEDURE — 97110 THERAPEUTIC EXERCISES: CPT

## 2024-08-11 PROCEDURE — 82962 GLUCOSE BLOOD TEST: CPT

## 2024-08-11 PROCEDURE — 93971 EXTREMITY STUDY: CPT | Performed by: HOSPITALIST

## 2024-08-11 PROCEDURE — 80048 BASIC METABOLIC PNL TOTAL CA: CPT | Performed by: HOSPITALIST

## 2024-08-11 PROCEDURE — 36430 TRANSFUSION BLD/BLD COMPNT: CPT

## 2024-08-11 RX ORDER — CEPHALEXIN 250 MG/1
250 CAPSULE ORAL DAILY
Status: DISCONTINUED | OUTPATIENT
Start: 2024-08-11 | End: 2024-08-12

## 2024-08-11 RX ORDER — SODIUM CHLORIDE 9 MG/ML
INJECTION, SOLUTION INTRAVENOUS ONCE
Status: COMPLETED | OUTPATIENT
Start: 2024-08-11 | End: 2024-08-11

## 2024-08-11 RX ORDER — CEFUROXIME AXETIL 250 MG/1
250 TABLET ORAL DAILY
Status: DISCONTINUED | OUTPATIENT
Start: 2024-08-11 | End: 2024-08-11

## 2024-08-11 RX ORDER — CEFUROXIME AXETIL 500 MG/1
500 TABLET ORAL DAILY
Status: DISCONTINUED | OUTPATIENT
Start: 2024-08-11 | End: 2024-08-11 | Stop reason: DRUGHIGH

## 2024-08-11 NOTE — PROGRESS NOTES
[facility]  Progress Note    Jonny Haque Patient Status:  Inpatient    4/15/1945 MRN [id number]   [facility] 8NE-A Attending Golden Escalante, DO   Hosp Day # 8 PCP Gee Jimenez MD     Subjective:  No GI bleeding.  No GI issues.      Current Facility-Administered Medications:     sodium chloride 0.9 % IV bolus 100 mL, 100 mL, Intravenous, Q30 Min PRN **AND** albumin human (Albumin) 25% injection 25 g, 25 g, Intravenous, PRN Dialysis    [START ON 2024] epoetin eunice (Epogen, Procrit) 3000 UNIT/ML injection 3,000 Units, 3,000 Units, Subcutaneous, Once per day on     tamsulosin (Flomax) cap 0.4 mg, 0.4 mg, Oral, Daily @ 0700    levothyroxine (Synthroid) tab 175 mcg, 175 mcg, Oral, Daily @ 0700    morphINE PF 2 MG/ML injection 2 mg, 2 mg, Intravenous, Q1H PRN    nitroglycerin (Nitrostat) SL tab 0.4 mg, 0.4 mg, Sublingual, Q5 Min PRN    heparin (Porcine) 1000 UNIT/ML injection 1,500 Units, 1.5 mL, Intracatheter, PRN Dialysis    carvedilol (Coreg) tab 6.25 mg, 6.25 mg, Oral, BID with meals    losartan (Cozaar) tab 25 mg, 25 mg, Oral, Daily    clopidogrel (Plavix) tab 75 mg, 75 mg, Oral, Daily    Lanthanum Carbonate (FOSRENOL) chewable tab 1,000 mg, 1,000 mg, Oral, BID with meals    aspirin DR tab 81 mg, 81 mg, Oral, QOD    amiodarone (Pacerone) tab 200 mg, 200 mg, Oral, BID with meals    rosuvastatin (Crestor) tab 10 mg, 10 mg, Oral, Nightly    pantoprazole (Protonix) DR tab 40 mg, 40 mg, Oral, BID AC    insulin aspart (NovoLOG) 100 Units/mL FlexPen 10 Units, 10 Units, Subcutaneous, TID AC    insulin degludec (Tresiba) 100 units/mL flextouch 24 Units, 24 Units, Subcutaneous, Nightly    acetaminophen (Tylenol Extra Strength) tab 500 mg, 500 mg, Oral, Q4H PRN    polyethylene glycol (PEG 3350) (Miralax) 17 g oral packet 17 g, 17 g, Oral, Daily PRN    sennosides (Senokot) tab 17.2 mg, 17.2 mg, Oral, Nightly PRN    bisacodyl (Dulcolax) 10 MG rectal suppository 10 mg,  10 mg, Rectal, Daily PRN    ondansetron (Zofran) 4 MG/2ML injection 4 mg, 4 mg, Intravenous, Q6H PRN    metoclopramide (Reglan) 5 mg/mL injection 5 mg, 5 mg, Intravenous, Q8H PRN    glucose (Dex4) 15 GM/59ML oral liquid 15 g, 15 g, Oral, Q15 Min PRN **OR** glucose (Glutose) 40% oral gel 15 g, 15 g, Oral, Q15 Min PRN **OR** glucose-vitamin C (Dex-4) chewable tab 4 tablet, 4 tablet, Oral, Q15 Min PRN **OR** dextrose 50% injection 50 mL, 50 mL, Intravenous, Q15 Min PRN **OR** glucose (Dex4) 15 GM/59ML oral liquid 30 g, 30 g, Oral, Q15 Min PRN **OR** glucose (Glutose) 40% oral gel 30 g, 30 g, Oral, Q15 Min PRN **OR** glucose-vitamin C (Dex-4) chewable tab 8 tablet, 8 tablet, Oral, Q15 Min PRN    insulin aspart (NovoLOG) 100 Units/mL FlexPen 2-10 Units, 2-10 Units, Subcutaneous, TID AC and HS    Past Medical History:    Aortic stenosis    Calculus of kidney    Coronary atherosclerosis    bare metal stents at [facility] Jan 2021    Diabetes (HCC)    Disorder of thyroid    Esophageal varices without bleeding, unspecified esophageal varices type (HCC)    GIB (gastrointestinal bleeding)    Hepatitis, unspecified    hepatitis C-just completed taking Harvoni in November 2016    High blood pressure    High cholesterol    Other disorders of plasma-protein metabolism, not elsewhere classified    Renal disorder    Visual impairment    reading glasses     Past Surgical History:   Procedure Laterality Date    Angiogram  08/15/2017    small caliber RCA with 80% mid lesion.  LAD and left circumflex with 50% lesions.  LV shows inferobasilar aneurysm with scar.  Overall LV function preserved at the exterior 65%.    Cath bare metal stent (bms)      Other surgical history      Upper GI surgery    Other surgical history  12/11/2017    Caroline Lees    Other surgical history  07/18/2019    BTS Cysttrell Lees     Other surgical history  02/06/2020    BTS Cysto (Dr. Lees)         Objective:  Blood pressure 144/63, pulse 74, temperature  98.2 °F (36.8 °C), temperature source Oral, resp. rate 18, height 6' (1.829 m), weight 209 lb 14.1 oz (95.2 kg), SpO2 100%.      EXAM:  /63 (BP Location: Right arm)   Pulse 74   Temp 98.2 °F (36.8 °C) (Oral)   Resp 18   Ht 6' (1.829 m)   Wt 209 lb 14.1 oz (95.2 kg)   SpO2 100%   BMI 28.46 kg/m²   GENERAL: well developed, well nourished, in no apparent distress  HEENT: atraumatic, normocephalic  CHEST: no chest tenderness  LUNGS: clear to auscultation  CARDIO: RRR without murmur  GI: good BS's and no masses, HSM or tenderness  EXTREMITIES: no cyanosis, clubbing or edema      Labs:   Lab Results   Component Value Date    WBC 4.3 08/11/2024    HGB 7.4 08/11/2024    HCT 21.9 08/11/2024    PLT 41.0 08/11/2024    CREATSERUM 4.75 08/11/2024    BUN 38 08/11/2024     08/11/2024    K 4.8 08/11/2024     08/11/2024    CO2 26.0 08/11/2024     08/11/2024    CA 9.3 08/11/2024    PGLU 189 08/11/2024       Assessment:     Anemia  Hepatitis C related cirrhosis                Acute blood loss anemia likely related to hematuria and groin hematomas.  Pt is on asa and plavix with baseline thrombocytopenia related to cirrhosis.                S/p angioplasty with stenting 8/8.                No overt GI bleeding noted.  Last endoscopies - EGD and colonoscopy with video capsule endoscopy in 2020.     Plan:  1. No plans for endoscopy - EGD or colonoscopy - at present unless pt noted to have overt GI bleeding.  2. Continue BID protonix  3.May use anticoagulants as needed.  4. Will sign off.  Follow up with GI as needed.    Andrew Vargas MD  8/11/2024  12:45 PM

## 2024-08-11 NOTE — PROGRESS NOTES
Nephrology Progress Note    Jonny Haque Attending:  Golden Escalante DO       SUBJECTIVE:     Plan for HD TTS.     PHYSICAL EXAM:     Vital Signs: /63 (BP Location: Right arm)   Pulse 74   Temp 98.2 °F (36.8 °C) (Oral)   Resp 18   Ht 6' (1.829 m)   Wt 209 lb 14.1 oz (95.2 kg)   SpO2 100%   BMI 28.46 kg/m²   Temp (24hrs), Av.3 °F (36.8 °C), Min:98 °F (36.7 °C), Max:98.7 °F (37.1 °C)       Intake/Output Summary (Last 24 hours) at 2024 1326  Last data filed at 2024 0845  Gross per 24 hour   Intake 360 ml   Output 2000 ml   Net -1640 ml     Wt Readings from Last 3 Encounters:   24 209 lb 14.1 oz (95.2 kg)   24 204 lb (92.5 kg)   24 204 lb 1.6 oz (92.6 kg)       General: no distress  HEENT: NCAT  Cardiac: RRR  Lungs: no distress  Abdomen: soft, non-tender  Extremities: trace LE edema  Neurologic/Psych: mentating well     LABORATORY DATA:     Lab Results   Component Value Date     (H) 2024    BUN 38 (H) 2024    BUNCREA 13.0 2022    CREATSERUM 4.75 (H) 2024    ANIONGAP 7 2024    GFR 41 (L) 2017    GFRNAA 8 (L) 2022    GFRAA 9 (L) 2022    CA 9.3 2024    OSMOCALC 288 2024    ALKPHO 183 (H) 2024    AST 20 2024    ALT 38 2024    BILT 0.4 2024    TP 7.0 2024    ALB 4.3 2024    GLOBULIN 2.7 2024    AGRATIO 1.0 (L) 2016     (L) 2024    K 4.8 2024     2024    CO2 26.0 2024     Lab Results   Component Value Date    WBC 4.3 2024    RBC 2.31 (L) 2024    HGB 7.4 (L) 2024    HCT 21.9 (L) 2024    PLT 41.0 (L) 2024    MCV 94.8 2024    MCH 32.0 2024    MCHC 33.8 2024    RDW 17.4 2024    NEPRELIM 2.70 2024    NEPERCENT 63.2 2024    LYPERCENT 22.7 2024    MOPERCENT 8.0 2024    EOPERCENT 5.4 2024    BAPERCENT 0.5 2024    NE 2.70 2024    LYMABS 0.97 (L)  08/11/2024    MOABSO 0.34 08/11/2024    EOABSO 0.23 08/11/2024    BAABSO 0.02 08/11/2024     Lab Results   Component Value Date    MALBP 49.2 (H) 09/04/2019    CREUR 91.65 09/04/2019     Lab Results   Component Value Date    COLORUR Brown (A) 08/10/2024    CLARITY Cloudy (A) 08/10/2024    SPECGRAVITY  08/10/2024      Comment:      Unable to perform dipstick due to color interference.    Refer to the specific gravity test for the specific gravitiy result.      SPECGRAVITY 1.022 08/10/2024    GLUUR  08/10/2024      Comment:      Unable to perform dipstick due to color interference.        BILUR  08/10/2024      Comment:      Unable to perform dipstick due to color interference.      KETUR  08/10/2024      Comment:      Unable to perform dipstick due to color interference.        BLOODURINE  08/10/2024      Comment:      Unable to perform dipstick due to color interference.        PHURINE  08/10/2024      Comment:      Unable to perform dipstick due to color interference.        PROUR  08/10/2024      Comment:      Unable to perform dipstick due to color interference.        UROBILINOGEN  08/10/2024      Comment:      Unable to perform dipstick due to color interference.        NITRITE  08/10/2024      Comment:      Unable to perform dipstick due to color interference.        LEUUR  08/10/2024      Comment:      Unable to perform dipstick due to color interference.        Manish/uL Interpretation   25          Trace   75          1+   250         2+   500         3+    WBCUR >50 (A) 08/10/2024    RBCUR >10 (A) 08/10/2024    EPIUR Few (A) 08/10/2024    BACUR 2+ (A) 08/10/2024    HYLUR Present (A) 12/19/2016       IMAGING:     Reviewed    MEDICATIONS:       Current Facility-Administered Medications   Medication Dose Route Frequency    sodium chloride 0.9 % IV bolus 100 mL  100 mL Intravenous Q30 Min PRN    And    albumin human (Albumin) 25% injection 25 g  25 g Intravenous PRN Dialysis    [START ON 8/13/2024] epoetin eunice  (Epogen, Procrit) 3000 UNIT/ML injection 3,000 Units  3,000 Units Subcutaneous Once per day on Tuesday Thursday Saturday    tamsulosin (Flomax) cap 0.4 mg  0.4 mg Oral Daily @ 0700    levothyroxine (Synthroid) tab 175 mcg  175 mcg Oral Daily @ 0700    morphINE PF 2 MG/ML injection 2 mg  2 mg Intravenous Q1H PRN    nitroglycerin (Nitrostat) SL tab 0.4 mg  0.4 mg Sublingual Q5 Min PRN    heparin (Porcine) 1000 UNIT/ML injection 1,500 Units  1.5 mL Intracatheter PRN Dialysis    carvedilol (Coreg) tab 6.25 mg  6.25 mg Oral BID with meals    losartan (Cozaar) tab 25 mg  25 mg Oral Daily    clopidogrel (Plavix) tab 75 mg  75 mg Oral Daily    Lanthanum Carbonate (FOSRENOL) chewable tab 1,000 mg  1,000 mg Oral BID with meals    aspirin DR tab 81 mg  81 mg Oral QOD    amiodarone (Pacerone) tab 200 mg  200 mg Oral BID with meals    rosuvastatin (Crestor) tab 10 mg  10 mg Oral Nightly    pantoprazole (Protonix) DR tab 40 mg  40 mg Oral BID AC    insulin aspart (NovoLOG) 100 Units/mL FlexPen 10 Units  10 Units Subcutaneous TID AC    insulin degludec (Tresiba) 100 units/mL flextouch 24 Units  24 Units Subcutaneous Nightly    acetaminophen (Tylenol Extra Strength) tab 500 mg  500 mg Oral Q4H PRN    polyethylene glycol (PEG 3350) (Miralax) 17 g oral packet 17 g  17 g Oral Daily PRN    sennosides (Senokot) tab 17.2 mg  17.2 mg Oral Nightly PRN    bisacodyl (Dulcolax) 10 MG rectal suppository 10 mg  10 mg Rectal Daily PRN    ondansetron (Zofran) 4 MG/2ML injection 4 mg  4 mg Intravenous Q6H PRN    metoclopramide (Reglan) 5 mg/mL injection 5 mg  5 mg Intravenous Q8H PRN    glucose (Dex4) 15 GM/59ML oral liquid 15 g  15 g Oral Q15 Min PRN    Or    glucose (Glutose) 40% oral gel 15 g  15 g Oral Q15 Min PRN    Or    glucose-vitamin C (Dex-4) chewable tab 4 tablet  4 tablet Oral Q15 Min PRN    Or    dextrose 50% injection 50 mL  50 mL Intravenous Q15 Min PRN    Or    glucose (Dex4) 15 GM/59ML oral liquid 30 g  30 g Oral Q15 Min PRN    Or     glucose (Glutose) 40% oral gel 30 g  30 g Oral Q15 Min PRN    Or    glucose-vitamin C (Dex-4) chewable tab 8 tablet  8 tablet Oral Q15 Min PRN    insulin aspart (NovoLOG) 100 Units/mL FlexPen 2-10 Units  2-10 Units Subcutaneous TID AC and HS       ASSESSMENT/PLAN:     ESRD  - HD TTS     Hypertension    Anemia  -Hemoglobin goal greater than 10    CKD BMD  -Lanthanum 1000mg BID    NSTEMI        HD TTS.  Continue dialysis per routine schedule  UF with HD as tolerated  NSTEMI management per cardiology - s/p cath  Will plan for EPO with HD  Dark urine - hematuria? Urology following      We will continue to follow    DO Joselito Hoff [facility] - Nephrology

## 2024-08-11 NOTE — PROGRESS NOTES
Cardiology Progress Note  [facility]    Jonny Haque Patient Status:  Inpatient    4/15/1945 MRN [id number]   Formerly [facility] 8NE-A Attending Golden Escalante, DO   Hosp Day # 8 PCP Gee Jimenez MD       Subjective: hgb 7.4, still some SOB    CT abdomen- no RP bleed    Medications:  No current facility-administered medications on file prior to encounter.     Current Outpatient Medications on File Prior to Encounter   Medication Sig Dispense Refill    levothyroxine 175 MCG Oral Tab Take 1 tablet (175 mcg total) by mouth before breakfast.      isosorbide mononitrate ER 30 MG Oral Tablet 24 Hr Take 1 tablet (30 mg total) by mouth daily.      Lanthanum Carbonate 1000 MG Oral Chew Tab Chew 1 tablet (1,000 mg total) by mouth 2 (two) times daily with meals.      clopidogrel (PLAVIX) 75 MG Oral Tab Take 1 tablet (75 mg total) by mouth.      aspirin 81 MG Oral Tab EC Take 1 tablet (81 mg total) by mouth every other day.      amiodarone 200 MG Oral Tab Take 1 tablet (200 mg total) by mouth 2 (two) times daily with meals. 60 tablet 3    pantoprazole 40 MG Oral Tab EC Take 1 tablet (40 mg total) by mouth 2 (two) times daily before meals.      Sevelamer 800 MG Oral Tab Take 1 tablet (800 mg total) by mouth 3 (three) times daily with meals.      rosuvastatin 10 MG Oral Tab Take 1 tablet (10 mg total) by mouth nightly. (Patient taking differently: Take 1 tablet (10 mg total) by mouth nightly.) 90 tablet 0    nitroGLYCERIN 0.3 MG Sublingual SL Tab Place 1 tablet (0.3 mg total) under the tongue every 5 (five) minutes as needed for Chest pain.      zolpidem 10 MG Oral Tab Take 1 tablet (10 mg total) by mouth nightly as needed for Sleep.      Ferric Citrate (AURYXIA) 1  MG(Fe) Oral Tab       Insulin Pen Needle (TRUEPLUS PEN NEEDLES) 31G X 5 MM Does not apply Misc Use 5 per day 500 each 2    Insulin Glargine, 2 Unit Dial, (TOUJEO MAX SOLOSTAR) 300 UNIT/ML Subcutaneous Solution Pen-injector Inject 20  Units into the skin nightly. 6 mL 2    Glucose Blood (ONETOUCH ULTRA) In Vitro Strip 6 times a day 400 each 3    Insulin Lispro, 1 Unit Dial, (HUMALOG KWIKPEN) 100 UNIT/ML Subcutaneous Solution Pen-injector 12 units before meals with sliding scale. Max daily dose: 50 units. (Patient taking differently: 10 Units. Three times a day. Before meals) 60 mL 3    CONTOUR NEXT TEST In Vitro Strip TEST BLOOD SUGAR FOUR TIMES DAILY 400 strip 3    Blood Glucose Monitoring Suppl (Fetch MD CONTOUR NEXT MONITOR) W/DEVICE Does not apply Kit 1 Device by Does not apply route 4 (four) times daily. 1 kit 0             Physical Exam:  Blood pressure 147/64, pulse 73, temperature 98.7 °F (37.1 °C), temperature source Oral, resp. rate 19, height 6' (1.829 m), weight 209 lb 14.1 oz (95.2 kg), SpO2 100%.  Wt Readings from Last 3 Encounters:   08/09/24 209 lb 14.1 oz (95.2 kg)   05/17/24 204 lb (92.5 kg)   05/12/24 204 lb 1.6 oz (92.6 kg)       General: awake, alert, oriented x 3, no acute distress  HEENT: at/nc, perrl, eomi  Neck: No JVD, carotids 2+ no bruits.  Cardiac: Regular rate and rhythm, S1, S2 normal, 3/6 mid peaking systolic murmur RUSB  Lungs: Clear without wheezes, rales, rhonchi or dullness.  Normal excursions and effort.  Abdomen: Soft, non-tender, non-distended, normal bowel sounds   Extremities: Without clubbing, cyanosis or edema.  Peripheral pulses are 2+.  Neurologic: Alert and oriented, normal affect.  Psych: normal mood and affect  Skin: Warm and dry.       Laboratories and Data:  Diagnostics:    [facility]  IMPRESSION:    CAD.  S/P PCI to LM into LAD for ISR with 1) lithotripsy angiogplasty, 2) NC balloon angioplasty, 3) DCB angioplasty (drug coated balloon).  S/P  PCI to OM with TRACY.     RECOMMENDATIONS:   DAPT.  Uninterrupted.  Medical management.  Surveillance with stress, CTA, and angio if needed.  Consider further eval for TAVR.    Labs:   CBC:    Lab Results   Component Value Date    WBC 4.3 08/11/2024    WBC 4.0  08/10/2024    WBC 4.7 08/09/2024     Lab Results   Component Value Date    HEMOGLOBIN 10.5 (L) 02/09/2022    HEMOGLOBIN 12.6 06/07/2016    HEMOGLOBIN 15.1 03/07/2013    HGB 7.4 (L) 08/11/2024    HGB 8.2 (L) 08/10/2024    HGB 6.5 (LL) 08/10/2024      Lab Results   Component Value Date    PLT 41.0 (L) 08/11/2024    PLT 46.0 (L) 08/10/2024    PLT 54.0 (L) 08/09/2024     BMP:     Lab Results   Component Value Date    GLUCOSE 129 (H) 02/09/2022    GLUCOSE 128 (H) 06/07/2016    GLUCOSE 359 (H) 03/07/2013     Lab Results   Component Value Date    K 4.8 08/11/2024    K 5.2 (H) 08/10/2024    K 4.5 08/09/2024     Lab Results   Component Value Date    BUN 38 (H) 08/11/2024    BUN 56 (H) 08/10/2024    BUN 34 (H) 08/09/2024     Lab Results   Component Value Date    CREATSERUM 4.75 (H) 08/11/2024    CREATSERUM 6.48 (H) 08/10/2024    CREATSERUM 4.69 (H) 08/09/2024     Cholesterol:     Lab Results   Component Value Date    CHOLEST 154 03/19/2024    CHOLEST 174 03/17/2024    CHOLEST 104 04/05/2023     Lab Results   Component Value Date    HDL 33 (L) 03/19/2024    HDL 44 03/17/2024    HDL 25 (L) 04/05/2023     Lab Results   Component Value Date    TRIG 304 (H) 03/19/2024    TRIG 215 (H) 03/17/2024    TRIG 202 (H) 04/05/2023    TRIGLY 248 (H) 02/09/2022    TRIGLY 278 (H) 08/19/2015    TRIGLY 490 (H) 03/07/2013     Lab Results   Component Value Date    LDL 72 03/19/2024    LDL 93 03/17/2024    LDL 46 04/05/2023     Lab Results   Component Value Date    AST 20 08/03/2024    AST 24 05/08/2024    AST 26 04/16/2024     Lab Results   Component Value Date    ALT 38 08/03/2024    ALT 24 05/08/2024    ALT 28 04/16/2024       Assessment/Plan:  79 year old male presenting with:    1) Chest pain, concerning for unstable angina  2) CAD with multiple prior PCI (last 3/2024 of LM, LAD, OM); multiple interruptions of DAPT since then as per hpi  3) ICM (EF 40-45% 4/2024, mod-severe aortic stenosis)  4) pAF on amiodarone (no ac likely 2/2 prior GIB and  SDH)  5) SDH  6) HTN  7) HL  8) DM  9) ESRD on HD    - Cont asa, plavix, statin (cleared for DAPT by neurosurgery, see notes 5/2024)  - s/p PCI 8/8 -> DAPT  - BB  - TAVR CTA today  - Hgb trending down -> managed by nephrology     MDM:  Krista thakkar.  DAPT.  CP improved.  CTA today to eval aortic stenosis.  Can DC home over the weekend; absolutely needs DAPT without interruption for 3 months.  Needs to FU with me in 3-4 weeks.  Will modify as needed after CTA is read and team discusses his aortic stenosis and possible TAVR.    Would consider more PRBC's as still seems symptomatic

## 2024-08-11 NOTE — PROGRESS NOTES
[facility]  Urology Progress Note    Jonny Haque Patient Status:  Inpatient    4/15/1945 MRN [id number]   Location [facility] 6NE-A Attending Golden Escalante, DO   Hosp Day # 8 PCP Gee Jimenez MD     Subjective:  Jonny Haque is a(n) 79 year old male.    Current complaints:     He has had mild dysuria with urination. Urine is dark with some debris today.      Objective:  General appearance: alert, appears stated age, and cooperative  Blood pressure 122/53, pulse 91, temperature 98.8 °F (37.1 °C), temperature source Oral, resp. rate 18, height 6' (1.829 m), weight 209 lb 14.1 oz (95.2 kg), SpO2 100%.  Abdomen: soft, non-tender  Lab Results   Component Value Date    WBC 4.3 2024    HGB 7.4 2024    HCT 21.9 2024    PLT 41.0 2024    CREATSERUM 4.75 2024    BUN 38 2024     2024    K 4.8 2024     2024    CO2 26.0 2024     2024    CA 9.3 2024    PGLU 189 2024       No results found for this visit on 24.     US VENOUS DOPPLER ARM LEFT - DIAG IMG (CPT=93971)    Result Date: 2024  CONCLUSION:  Unremarkable venous ultrasound of left upper extremity.   LOCATION:  [facility]   Dictated by (CST): Humberto Altman MD on 2024 at 11:56 AM     Finalized by (CST): Humberto Altman MD on 2024 at 11:56 AM       CTA ABD/PEL (CPT=74174)    Result Date: 2024  CONCLUSION:  1. Moderate to severe overall calcified atherosclerosis. 2. Moderate narrowing origin and proximal left common iliac artery. 3. High-grade stenosis at the origin of the right superficial femoral artery. 4. There is stranding at the right groin.  This may be any combination of scarring and or inflammation.  No focal fluid collection or hematoma. 5. Diffuse urinary bladder wall thickening.  Given prostatomegaly, it may be secondary to chronic outlet obstruction.  Differential would include etiologies of acute and chronic cystitis. 6. Nodular  contour of the liver.  This can be seen with cirrhosis. 7. Details as above.  Continued clinical correlation recommended.    LOCATION:  [facility]   Dictated by (CST): Abran Erwin MD on 8/09/2024 at 5:02 PM     Finalized by (CST): Abran Erwin MD on 8/09/2024 at 5:12 PM       CT CARDIAC OVER READ    Result Date: 8/9/2024  CONCLUSION:  1. Emphysema with mild scarring and or atelectasis at the left base. 2. Nodular contour of the liver.  This finding can be seen with cirrhosis. 3. Continued clinical correlation recommended.     LOCATION:  [facility]    Dictated by (CST): Abran Erwin MD on 8/09/2024 at 4:58 PM     Finalized by (CST): Abran Erwin MD on 8/09/2024 at 5:01 PM         Assessment:    URINARY RETENTION  GROSS HEMATURIA  -8/10/24 Urine is dark yellow but no gross hematuria -> may be due to UTI  -UA + WBCs RBCs and bacteria    HISTORY OF BLADDER CANCER   CT A/P without contrast 8/6/24: no renal stones/hydronephrosis, prostatomegaly (measure up to 5.4 cm)  No recent cystoscopy    ESRD  -on HD  -nephrology following    Dysuria  8/10/24 Likely due to concentrated urine but no recent UA    PLAN    Start abx and await UCx  Will need outpatient cystoscopy after discharge as well    Patient verbalized understanding and all questions answered.      Discussed with FRANCIA Lorenzana D.O.  [facility]

## 2024-08-11 NOTE — PLAN OF CARE
Assumed patient care at 1930. Patient alert and oriented x 4. Maintaining O2 saturation NWL on room air. NSR on tele monitor. No complains of chest pian or shortness of breath. Safety precaution in place, call light within reach, bed alarm on. Patient aware of plan of care. All needs met at this time.      Problem: Patient/Family Goals  Goal: Patient/Family Long Term Goal  Description: Patient's Long Term Goal: Stay healthy    Interventions:  -Medication management  -Dietary management  -Prompt follow up with physicians  - See additional Care Plan goals for specific interventions  Outcome: Progressing  Goal: Patient/Family Short Term Goal  Description: Patient's Short Term Goal: Discharge home    Interventions:   - Cardiology consult  - Nephrology consult  -HD  Trend trop  - See additional Care Plan goals for specific interventions  Outcome: Progressing     Problem: CARDIOVASCULAR - ADULT  Goal: Maintains optimal cardiac output and hemodynamic stability  Description: INTERVENTIONS:  - Monitor vital signs, rhythm, and trends  - Monitor for bleeding, hypotension and signs of decreased cardiac output  - Evaluate effectiveness of vasoactive medications to optimize hemodynamic stability  - Monitor arterial and/or venous puncture sites for bleeding and/or hematoma  - Assess quality of pulses, skin color and temperature  - Assess for signs of decreased coronary artery perfusion - ex. Angina  - Evaluate fluid balance, assess for edema, trend weights  Outcome: Progressing  Goal: Absence of cardiac arrhythmias or at baseline  Description: INTERVENTIONS:  - Continuous cardiac monitoring, monitor vital signs, obtain 12 lead EKG if indicated  - Evaluate effectiveness of antiarrhythmic and heart rate control medications as ordered  - Initiate emergency measures for life threatening arrhythmias  - Monitor electrolytes and administer replacement therapy as ordered  Outcome: Progressing     Problem: SAFETY ADULT - FALL  Goal: Free  from fall injury  Description: INTERVENTIONS:  - Assess pt frequently for physical needs  - Identify cognitive and physical deficits and behaviors that affect risk of falls.  - [place] fall precautions as indicated by assessment.  - Educate pt/family on patient safety including physical limitations  - Instruct pt to call for assistance with activity based on assessment  - Modify environment to reduce risk of injury  - Provide assistive devices as appropriate  - Consider OT/PT consult to assist with strengthening/mobility  - Encourage toileting schedule  Outcome: Progressing

## 2024-08-11 NOTE — PLAN OF CARE
Assumed patient care, patient is alert and oriented x4. On room air. Sinus rhythm on tele, no complains of chest pain. Abdomen is soft, non-tendered, bowel sounds are active in all four quadrants. Bed in lower position, call light within reach. Plan of care updated, questions are answered.        Problem: Patient/Family Goals  Goal: Patient/Family Long Term Goal  Description: Patient's Long Term Goal: Stay healthy    Interventions:  -Medication management  -Dietary management  -Prompt follow up with physicians  - See additional Care Plan goals for specific interventions  Outcome: Progressing  Goal: Patient/Family Short Term Goal  Description: Patient's Short Term Goal: Discharge home    Interventions:   - Cardiology consult  - Nephrology consult  -HD  Trend trop  - See additional Care Plan goals for specific interventions  Outcome: Progressing

## 2024-08-11 NOTE — PROGRESS NOTES
Cardiology Progress Note  [facility]    Jonny Haque Patient Status:  Inpatient    4/15/1945 MRN [id number]   Formerly [facility] 8NE-A Attending Golden Escalante, DO   Hosp Day # 8 PCP Gee Jimenez MD       Subjective: hgb 7.4    CT abdomen- no RP bleed    Medications:  No current facility-administered medications on file prior to encounter.     Current Outpatient Medications on File Prior to Encounter   Medication Sig Dispense Refill    levothyroxine 175 MCG Oral Tab Take 1 tablet (175 mcg total) by mouth before breakfast.      isosorbide mononitrate ER 30 MG Oral Tablet 24 Hr Take 1 tablet (30 mg total) by mouth daily.      Lanthanum Carbonate 1000 MG Oral Chew Tab Chew 1 tablet (1,000 mg total) by mouth 2 (two) times daily with meals.      clopidogrel (PLAVIX) 75 MG Oral Tab Take 1 tablet (75 mg total) by mouth.      aspirin 81 MG Oral Tab EC Take 1 tablet (81 mg total) by mouth every other day.      amiodarone 200 MG Oral Tab Take 1 tablet (200 mg total) by mouth 2 (two) times daily with meals. 60 tablet 3    pantoprazole 40 MG Oral Tab EC Take 1 tablet (40 mg total) by mouth 2 (two) times daily before meals.      Sevelamer 800 MG Oral Tab Take 1 tablet (800 mg total) by mouth 3 (three) times daily with meals.      rosuvastatin 10 MG Oral Tab Take 1 tablet (10 mg total) by mouth nightly. (Patient taking differently: Take 1 tablet (10 mg total) by mouth nightly.) 90 tablet 0    nitroGLYCERIN 0.3 MG Sublingual SL Tab Place 1 tablet (0.3 mg total) under the tongue every 5 (five) minutes as needed for Chest pain.      zolpidem 10 MG Oral Tab Take 1 tablet (10 mg total) by mouth nightly as needed for Sleep.      Ferric Citrate (AURYXIA) 1  MG(Fe) Oral Tab       Insulin Pen Needle (TRUEPLUS PEN NEEDLES) 31G X 5 MM Does not apply Misc Use 5 per day 500 each 2    Insulin Glargine, 2 Unit Dial, (TOUJEO MAX SOLOSTAR) 300 UNIT/ML Subcutaneous Solution Pen-injector Inject 20 Units into the  skin nightly. 6 mL 2    Glucose Blood (ONETOUCH ULTRA) In Vitro Strip 6 times a day 400 each 3    Insulin Lispro, 1 Unit Dial, (HUMALOG KWIKPEN) 100 UNIT/ML Subcutaneous Solution Pen-injector 12 units before meals with sliding scale. Max daily dose: 50 units. (Patient taking differently: 10 Units. Three times a day. Before meals) 60 mL 3    CONTOUR NEXT TEST In Vitro Strip TEST BLOOD SUGAR FOUR TIMES DAILY 400 strip 3    Blood Glucose Monitoring Suppl (Only-apartments CONTOUR NEXT MONITOR) W/DEVICE Does not apply Kit 1 Device by Does not apply route 4 (four) times daily. 1 kit 0       Review of Systems:  Constitutional: denies fevers, chills, night sweats  HEENT: denies headache, vision changes, trouble or pain with swallowing  Cardiac: no chest pain  Pulm: denies dyspnea, cough, wheeze  GI: denies n/v, abd pain, diarrhea or constipation  : denies hematuria, dysuria, incontinence  MSK: denies muscle or joint pains  Neuro: denies numbness, weakness, paresthesias  Psych: denies anxiety, depression  Integument: denies skin rashes or lesions  Heme: denies easy bruising or bleeding  Endo: denies heat/cold intolerance, skin or nail changes      Physical Exam:  Blood pressure 147/64, pulse 73, temperature 98.7 °F (37.1 °C), temperature source Oral, resp. rate 19, height 6' (1.829 m), weight 209 lb 14.1 oz (95.2 kg), SpO2 100%.  Wt Readings from Last 3 Encounters:   08/09/24 209 lb 14.1 oz (95.2 kg)   05/17/24 204 lb (92.5 kg)   05/12/24 204 lb 1.6 oz (92.6 kg)       General: awake, alert, oriented x 3, no acute distress  HEENT: at/nc, perrl, eomi  Neck: No JVD, carotids 2+ no bruits.  Cardiac: Regular rate and rhythm, S1, S2 normal, 3/6 mid peaking systolic murmur RUSB  Lungs: Clear without wheezes, rales, rhonchi or dullness.  Normal excursions and effort.  Abdomen: Soft, non-tender, non-distended, normal bowel sounds   Extremities: Without clubbing, cyanosis or edema.  Peripheral pulses are 2+.  Neurologic: Alert and oriented,  normal affect.  Psych: normal mood and affect  Skin: Warm and dry.       Laboratories and Data:  Diagnostics:    [facility]  IMPRESSION:    CAD.  S/P PCI to LM into LAD for ISR with 1) lithotripsy angiogplasty, 2) NC balloon angioplasty, 3) DCB angioplasty (drug coated balloon).  S/P  PCI to OM with TRACY.     RECOMMENDATIONS:   DAPT.  Uninterrupted.  Medical management.  Surveillance with stress, CTA, and angio if needed.  Consider further eval for TAVR.    Labs:   CBC:    Lab Results   Component Value Date    WBC 4.3 08/11/2024    WBC 4.0 08/10/2024    WBC 4.7 08/09/2024     Lab Results   Component Value Date    HEMOGLOBIN 10.5 (L) 02/09/2022    HEMOGLOBIN 12.6 06/07/2016    HEMOGLOBIN 15.1 03/07/2013    HGB 7.4 (L) 08/11/2024    HGB 8.2 (L) 08/10/2024    HGB 6.5 (LL) 08/10/2024      Lab Results   Component Value Date    PLT 41.0 (L) 08/11/2024    PLT 46.0 (L) 08/10/2024    PLT 54.0 (L) 08/09/2024     BMP:     Lab Results   Component Value Date    GLUCOSE 129 (H) 02/09/2022    GLUCOSE 128 (H) 06/07/2016    GLUCOSE 359 (H) 03/07/2013     Lab Results   Component Value Date    K 4.8 08/11/2024    K 5.2 (H) 08/10/2024    K 4.5 08/09/2024     Lab Results   Component Value Date    BUN 38 (H) 08/11/2024    BUN 56 (H) 08/10/2024    BUN 34 (H) 08/09/2024     Lab Results   Component Value Date    CREATSERUM 4.75 (H) 08/11/2024    CREATSERUM 6.48 (H) 08/10/2024    CREATSERUM 4.69 (H) 08/09/2024     Cholesterol:     Lab Results   Component Value Date    CHOLEST 154 03/19/2024    CHOLEST 174 03/17/2024    CHOLEST 104 04/05/2023     Lab Results   Component Value Date    HDL 33 (L) 03/19/2024    HDL 44 03/17/2024    HDL 25 (L) 04/05/2023     Lab Results   Component Value Date    TRIG 304 (H) 03/19/2024    TRIG 215 (H) 03/17/2024    TRIG 202 (H) 04/05/2023    TRIGLY 248 (H) 02/09/2022    TRIGLY 278 (H) 08/19/2015    TRIGLY 490 (H) 03/07/2013     Lab Results   Component Value Date    LDL 72 03/19/2024    LDL 93 03/17/2024    LDL 46  04/05/2023     Lab Results   Component Value Date    AST 20 08/03/2024    AST 24 05/08/2024    AST 26 04/16/2024     Lab Results   Component Value Date    ALT 38 08/03/2024    ALT 24 05/08/2024    ALT 28 04/16/2024       Assessment/Plan:  79 year old male presenting with:    1) Chest pain, concerning for unstable angina  2) CAD with multiple prior PCI (last 3/2024 of LM, LAD, OM); multiple interruptions of DAPT since then as per hpi  3) ICM (EF 40-45% 4/2024, mod-severe aortic stenosis)  4) pAF on amiodarone (no ac likely 2/2 prior GIB and SDH)  5) SDH  6) HTN  7) HL  8) DM  9) ESRD on HD    - Cont asa, plavix, statin (cleared for DAPT by neurosurgery, see notes 5/2024)  - s/p PCI 8/8 -> DAPT  - BB  - TAVR CTA today  - Hgb trending down -> managed by nephrology     MDM:  Krista thakkar.  DAPT.  CP improved.  CTA today to eval aortic stenosis.  Can DC home over the weekend; absolutely needs DAPT without interruption for 3 months.  Needs to FU with me in 3-4 weeks.  Will modify as needed after CTA is read and team discusses his aortic stenosis and possible TAVR.

## 2024-08-11 NOTE — PROGRESS NOTES
[facility]   part of [facility] Hospitalist Progress Note     Jonny Haque Patient Status:  Inpatient    4/15/1945 MRN [id number]   Location [facility] 8NE-A Attending Golden Escalante, DO   Hosp Day # 8 PCP Gee Jimenez MD     Follow Up:  The primary encounter diagnosis was Exertional chest pain. A diagnosis of Acute renal failure on dialysis (HCC) was also pertinent to this visit.    Subjective:     Patient with multiple complaints at this time complaining some chest tightness and pain with minimal exertion, inquiring about his hemoglobin dropping again to 7.4 and wanting another unit of blood      He also stated that his urine is extremely dark and muddy no fevers or chills overnight        Objective:    Review of Systems:   10 point ROS completed and was negative, except for pertinent positive and negatives stated in subjective.    Vital signs:  Temp:  [98 °F (36.7 °C)-98.8 °F (37.1 °C)] 98.8 °F (37.1 °C)  Pulse:  [66-91] 91  Resp:  [18-19] 18  BP: (122-147)/(47-64) 122/53  SpO2:  [97 %-100 %] 100 %    Physical Exam:    Gen: No acute distress, alert and oriented x 3. Chronically ill appearing.   Pulm: Lungs clear bilaterally, good inspiratory effort   CV:  nL S1/S2  Abd: soft, NT/ND, no hepatomegaly, +BS  MSK: moving all extremities, no edema  Neuro: no focal deficits  Skin: no rashes/lesions  Psych: normal mood/affect      Diagnostic Data:    Labs:  Recent Labs   Lab 24  0552 24  1712 08/09/24  0428 08/10/24  0444 08/10/24  1200 24  0454   WBC 4.7 9.3 4.7 4.0  --  4.3   HGB 9.4* 8.2* 7.8* 6.5* 8.2* 7.4*   .7* 97.9 99.6 99.5  --  94.8   PLT 56.0* 84.0* 54.0* 46.0*  --  41.0*       Recent Labs   Lab 08/09/24  0428 08/10/24  0444 24  0454   * 164* 114*   BUN 34* 56* 38*   CREATSERUM 4.69* 6.48* 4.75*   CA 8.8 8.9 9.3   * 131* 134*   K 4.5 5.2* 4.8    99 101   CO2 26.0 24.0 26.0       Estimated Creatinine Clearance:  14.3 mL/min (A) (based on SCr of 4.75 mg/dL (H)).    No results for input(s): \"PTP\", \"INR\" in the last 168 hours.         COVID-19 Lab Results    COVID-19  Lab Results   Component Value Date    COVID19 Not Detected 04/16/2024    COVID19 Not Detected 03/17/2024    COVID19 Not Detected 04/05/2023       Pro-Calcitonin  No results for input(s): \"PCT\" in the last 168 hours.    Cardiac  No results for input(s): \"TROP\", \"PBNP\" in the last 168 hours.    Creatinine Kinase  No results for input(s): \"CK\" in the last 168 hours.    Inflammatory Markers  No results for input(s): \"CRP\", \"NEELAM\", \"LDH\", \"DDIMER\" in the last 168 hours.    Imaging: Imaging data reviewed in Epic.    Medications:    cephalexin  250 mg Oral Daily    sodium chloride   Intravenous Once    [START ON 8/13/2024] epoetin eunice  3,000 Units Subcutaneous Once per day on Tuesday Thursday Saturday    tamsulosin  0.4 mg Oral Daily @ 0700    levothyroxine  175 mcg Oral Daily @ 0700    carvedilol  6.25 mg Oral BID with meals    losartan  25 mg Oral Daily    clopidogrel  75 mg Oral Daily    Lanthanum Carbonate  1,000 mg Oral BID with meals    aspirin  81 mg Oral QOD    amiodarone  200 mg Oral BID with meals    rosuvastatin  10 mg Oral Nightly    pantoprazole  40 mg Oral BID AC    insulin aspart  10 Units Subcutaneous TID AC    insulin degludec  24 Units Subcutaneous Nightly    insulin aspart  2-10 Units Subcutaneous TID AC and HS       Assessment & Plan:       79-year-old male significant past medical history of CAD with stable angina-status post PCI in 2017, 2021 and most recently in March 2024 with 3 drug-eluting stents placed in the left main, LAD and OM, hypertension, pulmonary hypertension paroxysmal A-fib, type 2 diabetes, hypothyroidism, end-stage renal disease on dialysis, history of subdural hematoma, ischemic cardiomyopathy history of HCV cirrhosis moderate aortic stenosis, prior history of GI bleed presented with chest pain     # Chest pain with history of  unstable angina  # CAD status post PCI  # NSTEMI  -Continue aspirin, statin, Plavix uninterrupted  -LHC done on 8/4/2024 80% OM disease and severed ISR in prox LAD, medical management recommended  -Recent echo completed and reviewed, repeat echo on 8/7/2024 reviewed a moderate stenosis of the aortic valve  -pt not a candidate for CABG,   -Repeat angiogram on 8/8/2024 with PCI to LM into the LAD with lithotripsy angioplasty, NC balloon angioplasty, drug-coated balloon angioplasty also PCI to OM with drug-eluting stent  -Groin is stable, CTA was done yesterday that showed moderate narrowing origin and proximal left common iliac artery with high-grade stenosis at the origin of the right SFA, fat stranding in the right groin no retroperitoneal hematoma  -Okay to discharge per cardiology on dual antiplatelet therapy uninterrupted, follow-up for possible need for TAVR                  # Post angiogram [id number]  -Resolved currently normotensive  -Improved with fluids during dialysis  -Mild hematoma noted on the right groin, stable with FemoStop  -Patient would like a blood transfusion will discuss with cardiology  -To transfuse another unit of blood today    # Anemia-acute on chronic  -Small hematoma noted in the right groin  -Hemoglobin further down trended to 6.5, received 1 unit of blood yesterday, to get another unit of blood today for hemoglobin of 7.4 and ongoing cardiac symptoms  -Given history of GI bleed GI also consulted-no further workup inpatient okay to discharge per GI     # Paroxysmal A-fib  # V. Tach  -Continue amiodarone  -EKG reviewed showed sinus rhythm      # Recent fall  acute subdural hematoma  -Was admitted in May 2024 with this, -Was followed up with neurosurgery  -CT head has been stable  -Okay for dual antiplatelet therapy therapy     # ESRD  -HD per nephrology nephrology  # Hypothyroidism  # HSV cirrhosis  # Moderate aortic stenosis  # Ischemic cardiomyopathy   # Thrombocytopenia platelets of  57 continue to monitor-  -pt last saw Dr. Julian Nolasco 1/24/24, low pltc likely due to Hep C and cirrhosis, monitor, no intervention needed if pltc < 50  -Repeat echo completed and reviewed    # Type 2 diabetes  -Continue 20 units of insulin, sliding scale     # Essential HTN  - per discussion with pt and his son Ashwin, he was taken off metoprolol, diltiazem, and losartan as of 6/24/24 by outside renal  - coreg started, monitor     # Urinary retention   # UTI  - cont straight cath per protocol  -  c/s appreciated, may need mccloud   -Tamsulosin started  -Urology consulted, patient cystoscopy started on antibiotics as well    Plan of care: inpt care.      Plan of care discussed with patient or family at bedside.    Lance vasquez MD           Supplementary Documentation:     Quality:  DVT Prophylaxis: hep subcutaneous (on hold due to hematuria)  CODE status: FULL  [place]: no  Central line: no  If COVID testing is negative, may discontinue isolation: yes     Estimated date of discharge: TBD  Discharge is dependent on: clinical course   At this point Mr. Haque is expected to be discharge to: home    Plan of care discussed with pt

## 2024-08-11 NOTE — PHYSICAL THERAPY NOTE
PHYSICAL THERAPY TREATMENT NOTE - INPATIENT    Room Number: 8615/8615-A     Session: 1          Presenting Problem: chest/back pain  Co-Morbidities : Aortic stenosis, ESRD on HD, HTN, CAD, DM, stents, fall, Hep C, hypothyroidism, subdural hematoma, ischemic cardiomyopathy, GI bleed    ASSESSMENT   Patient demonstrates excellent progress this session, goals remain in progress.    Patient continues to function near baseline with gait. Contributing factors to remaining limitations include decreased functional strength, decreased muscular endurance, and ongoing chest discomfort .  Next session anticipate patient to progress gait and stair negotiation.  Physical Therapy will continue to follow patient for duration of hospitalization.    Patient continues to benefit from continued skilled PT services: at discharge to promote prior level of function.  Anticipate patient will return home with home health PT.    PLAN  PT Treatment Plan: Bed mobility;Endurance;Energy conservation;Patient education;Family education;Gait training;Strengthening;Transfer training  Rehab Potential : Good  Frequency (Obs): 3-5x/week    CURRENT GOALS      Goal #1 Patient is able to demonstrate supine - sit EOB @ level: independent   Met 8/11   Goal #2 Patient is able to demonstrate transfers Sit to/from Stand at assistance level: independent      Goal #3 Patient is able to ambulate 200 feet with assist device: none at assistance level: independent      Goal #4 Patient to be independent ascend/descend stairs reciprocal pattern   Goal #5     Goal #6     Goal Comments: Goals established on 8/4/2024 8/11/2024 all goals ongoing    History related to current admission: Patient is a 79 year old male admitted on 8/3/2024 from home for chest and back pain.  Pt diagnosed with exertional chest pain.        HOME SITUATION  Type of Home: House   Home Layout: Multi-level  Stairs to Bedroom: 8     Lives With: Son  Drives: Yes  Patient Regularly Uses: Reading  glasses     Prior Level of [place]: Patient is independent gait without device, independent with ADL/self-care, was able to drive.  Patient reports 8 steps between upper floor to main level and 8 steps down to lower level, full flight of stairs to basement where he does his laundry.     SUBJECTIVE  \"I've asked the doctors for a blood transfusion b/c my hemoglobin is 7.4\"    OBJECTIVE  Precautions: Bed/chair alarm    WEIGHT BEARING RESTRICTION  Weight Bearing Restriction: None                PAIN ASSESSMENT   Rating: Unable to rate  Location: Chest discomfort  Management Techniques: Activity promotion;Repositioning    BALANCE                                                                                                                       Static Sitting: Good  Dynamic Sitting: Good           Static Standing: Fair  Dynamic Standing: Fair    ACTIVITY TOLERANCE  Pulse: 91  Heart Rate Source: Monitor                   O2 WALK         AM-PAC '6-Clicks' INPATIENT SHORT FORM - BASIC MOBILITY  How much difficulty does the patient currently have...  Patient Difficulty: Turning over in bed (including adjusting bedclothes, sheets and blankets)?: None   Patient Difficulty: Sitting down on and standing up from a chair with arms (e.g., wheelchair, bedside commode, etc.): A Little   Patient Difficulty: Moving from lying on back to sitting on the side of the bed?: None   How much help from another person does the patient currently need...   Help from Another: Moving to and from a bed to a chair (including a wheelchair)?: A Little   Help from Another: Need to walk in hospital room?: A Little   Help from Another: Climbing 3-5 steps with a railing?: A Little       AM-PAC Score:  Raw Score: 20   Approx Degree of Impairment: 35.83%   Standardized Score (AM-PAC Scale): 47.67   CMS Modifier (G-Code): CJ    FUNCTIONAL ABILITY STATUS  Gait Assessment   Functional Mobility/Gait Assessment  Gait Assistance: Contact guard  assist  Distance (ft): 200  Assistive Device: None  Pattern: Comment (Decreased step length w/o device and mild lateral sway)  Stairs: Stairs  How Many Stairs: 12  Device: 1 Rail  Assist: Contact guard assist  Pattern: Ascend and Descend  Ascend and Descend : Step to    Skilled Therapy Provided    Bed Mobility:  Rolling: Right w/ hob flat - mod I   Supine<>Sit: Mod I w/ hob flat   Sit<>Supine: NT  Pt able to don socks and shoes on eob w/ good balance     Transfer Mobility:  Sit<>Stand: Supervision assist   Stand<>Sit: CGA - simply b/c pt made unpredictable move to test if the recliner was locked.  Gait: CGA - 200'x1 w/o device.  Mildly guarded pattern w/ decreased arm swing.  Pt w/ small step length and mild lateral sway, but no lob noted.  Pt reports he feels ok w/o the rw, but his gait is still not yet his baseline.  Pt noted mild chest discomfort at the very end of walk - heart rate in low 90's.    Therapist's Comments: Completed le ex as outlined below.      THERAPEUTIC EXERCISES  Lower Extremity Alternating marching  Ankle pumps  Hip adduction squeezes  LAQ     Upper Extremity      Position Sitting     Repetitions   10   Sets   1     Patient End of Session: Up in chair;Needs met;Call light within reach;RN aware of session/findings;All patient questions and concerns addressed    PT Session Time: 28 minutes  Gait Trainin minutes  Therapeutic Activity: 4 minutes  Therapeutic Exercise: 8 minutes   Neuromuscular Re-education: 0 minutes

## 2024-08-12 LAB
ANION GAP SERPL CALC-SCNC: 9 MMOL/L (ref 0–18)
BASOPHILS # BLD AUTO: 0.02 X10(3) UL (ref 0–0.2)
BASOPHILS NFR BLD AUTO: 0.5 %
BUN BLD-MCNC: 55 MG/DL (ref 9–23)
CALCIUM BLD-MCNC: 9.3 MG/DL (ref 8.7–10.4)
CHLORIDE SERPL-SCNC: 102 MMOL/L (ref 98–112)
CO2 SERPL-SCNC: 23 MMOL/L (ref 21–32)
CREAT BLD-MCNC: 6.3 MG/DL
EGFRCR SERPLBLD CKD-EPI 2021: 9 ML/MIN/1.73M2 (ref 60–?)
EOSINOPHIL # BLD AUTO: 0.3 X10(3) UL (ref 0–0.7)
EOSINOPHIL NFR BLD AUTO: 7.1 %
ERYTHROCYTE [DISTWIDTH] IN BLOOD BY AUTOMATED COUNT: 17.4 %
GLUCOSE BLD-MCNC: 116 MG/DL (ref 70–99)
GLUCOSE BLD-MCNC: 147 MG/DL (ref 70–99)
GLUCOSE BLD-MCNC: 148 MG/DL (ref 70–99)
GLUCOSE BLD-MCNC: 161 MG/DL (ref 70–99)
GLUCOSE BLD-MCNC: 210 MG/DL (ref 70–99)
HCT VFR BLD AUTO: 23.1 %
HGB BLD-MCNC: 8.1 G/DL
IMM GRANULOCYTES # BLD AUTO: 0 X10(3) UL (ref 0–1)
IMM GRANULOCYTES NFR BLD: 0 %
LYMPHOCYTES # BLD AUTO: 0.81 X10(3) UL (ref 1–4)
LYMPHOCYTES NFR BLD AUTO: 19.2 %
MCH RBC QN AUTO: 32.8 PG (ref 26–34)
MCHC RBC AUTO-ENTMCNC: 35.1 G/DL (ref 31–37)
MCV RBC AUTO: 93.5 FL
MONOCYTES # BLD AUTO: 0.34 X10(3) UL (ref 0.1–1)
MONOCYTES NFR BLD AUTO: 8.1 %
NEUTROPHILS # BLD AUTO: 2.74 X10 (3) UL (ref 1.5–7.7)
NEUTROPHILS # BLD AUTO: 2.74 X10(3) UL (ref 1.5–7.7)
NEUTROPHILS NFR BLD AUTO: 65.1 %
OSMOLALITY SERPL CALC.SUM OF ELEC: 294 MOSM/KG (ref 275–295)
PLATELET # BLD AUTO: 43 10(3)UL (ref 150–450)
POTASSIUM SERPL-SCNC: 5.1 MMOL/L (ref 3.5–5.1)
RBC # BLD AUTO: 2.47 X10(6)UL
SODIUM SERPL-SCNC: 134 MMOL/L (ref 136–145)
WBC # BLD AUTO: 4.2 X10(3) UL (ref 4–11)

## 2024-08-12 PROCEDURE — 82962 GLUCOSE BLOOD TEST: CPT

## 2024-08-12 PROCEDURE — 85025 COMPLETE CBC W/AUTO DIFF WBC: CPT | Performed by: HOSPITALIST

## 2024-08-12 PROCEDURE — 80048 BASIC METABOLIC PNL TOTAL CA: CPT | Performed by: HOSPITALIST

## 2024-08-12 RX ORDER — AMOXICILLIN 250 MG/1
250 CAPSULE ORAL DAILY
Status: DISCONTINUED | OUTPATIENT
Start: 2024-08-12 | End: 2024-08-12 | Stop reason: DRUGHIGH

## 2024-08-12 RX ORDER — AMOXICILLIN 500 MG/1
500 CAPSULE ORAL DAILY
Status: DISCONTINUED | OUTPATIENT
Start: 2024-08-12 | End: 2024-08-14

## 2024-08-12 RX ORDER — AMOXICILLIN 500 MG/1
500 CAPSULE ORAL
Status: DISCONTINUED | OUTPATIENT
Start: 2024-08-13 | End: 2024-08-12 | Stop reason: DRUGHIGH

## 2024-08-12 NOTE — DISCHARGE INSTRUCTIONS
Doctor has asked you to schedule a Cystoscopy    This is a procedure done in the office.  There is no preparation needed on your part.    A Cystoscopy is a test that allows your doctor to look the inside the bladder and the urethra using a thin, lighted instrument called a cystoscope.  The cystoscope is inserted into your urethra and slowly advanced into the bladder. A cystoscopy allows your doctor to look at areas of your bladder and urethra that usually do not show up well on X-rays. Tiny surgical instruments can be inserted through the cystoscope that allow your doctor to remove samples of tissue (biopsy) or samples of urine.  Small bladder stones and some small growths can be removed during cystoscopy. This may eliminate the need for more extensive surgery.   Why It Is Done  Find the cause of symptoms such as blood in the urine (hematuria), painful urination (dysuria), urinary incontinence, urinary frequency or hesitancy, an inability to pass urine (retention), or a sudden and overwhelming need to urinate (urgency).   Find the cause of problems of the urinary tract, such as frequent, repeated urinary tract infections or urinary tract infections that do not respond to treatment.   Look for problems in the urinary tract, such as blockage in the urethra caused by an enlarged prostate, kidney stones, or tumors.   Evaluate problems that cannot be seen on X-ray or to further investigate problems detected by ultrasound or during intravenous pyelography, such as kidney stones or tumors.   Remove tissue samples for biopsy.   Remove foreign objects.   Treat urinary tract problems. For example, cystoscopy can be done to remove urinary tract stones or growths, treat bleeding in the bladder, relieve blockages in the urethra, or treat or remove tumors.   How To Prepare  You may eat and drink normally before the procedure. You may be asked to give a urine sample at the time of your procedure. Please do not urinate before.  How  It Is Done  A cystoscopy is performed by a urologist, with one or more assistants. The test is done in a special testing room in the doctor's office.  You will undress from the waist down, and be given a paper drape to cover yourself. You will lie on your back on a special table. Females will have their knees bent and feet placed in stirrups. Males will lay flat on the table, legs straight. Your genital area is cleaned with an antiseptic solution.  A local anesthetic jelly will be inserted into the urethra. No needles are used.   After the anesthetic takes effect, a well-lubricated cystoscope is inserted into your urethra and slowly advanced into your bladder. If your urethra has a spot that is too narrow to allow the scope to pass, other smaller instruments are inserted first to gradually enlarge the opening.  After the cystoscope is inside your bladder, sterile water runs through the scope to help expand your bladder and to create a clear view. Tiny instruments may be inserted through the scope to collect tissue samples for a biopsy if necessary; the tissue samples are sent to the laboratory for analysis.  Cautery may be used if bleeding occurs from a specimen site.  The cystoscope is usually in your bladder for only 1-2 minutes. But the entire test with preparation may take up to 20 minutes or longer.  You will be able to get up immediately following the procedure and proceed with normal daily activities.   After the test  You may need to urinate frequently. You may experience some burning during urination for a day or two. Drink lots of fluids to help minimize the burning and help prevent a urinary tract infection. You may also see a tinge of blood in the urine for 2-3 days. Some patients experience pain for a few days afterwards.  This is normal.  If the pain is intolerable please contact our office or go to the nearest Emergency Room/Immediate Care.   You will be given an instruction sheet following your  cystoscopy with post procedure instructions.   Results  Your doctor may be able to talk to you about the results during the cystoscopy. If a biopsy is preformed, the results usually take several days to become available. You will be called promptly with results.         HOME CARE INSTRUCTIONS FOLLOWING CORONARY ANGIOGRAPHY, ANGIOPLASTY (PTCA/PTA) OR INSERTION OF STENT IN THE CORONARY ARTERIES        Activity  DO NOT drive after the procedure.  You may resume driving late the following day according to the nurse or physician's instructions  Plan on resting and relaxing tonight and tomorrow  Resume your normal activity after 48 hours, or as instructed by your physician  Do not lift anything over 10 pounds for the next 24 hours  Avoid drinking alcohol for the next 24 hours  If the groin site was used, avoid repeated stair use and excessive walking for the next 24 hours  If the wrist was used, avoid bending/flexing of the wrist for the next 24 hours     What is Normal?  A small lump at the procedure site associated with mild tenderness when touched  The procedure site may be bruised or discolored  There may be a small amount of drainage on the bandage     Special Instructions  Drink plenty of fluids during the next 24 hours to \"flush\" the contrast from your system  The bandage is to remain in place for 24 hours  Keep the bandage clean and dry  DO NOT submerge the procedure site for 72 hours (no bath tubs or pools)  This includes dishwashing/submersion of the wrist, if the wrist was used  After 24 hours, you must remove the bandage  You should shower after removing the bandage, and wash the procedure site gently with soap and water  If you choose to wear a bandage for a few days, make sure it remains clean and dry and that it is changed daily     When you should NOTIFY YOUR PHYSICIAN  Bleeding can occur at the procedure site - both on the outside of the skin and/or beneath the surface of the skin  Swelling or a large  lump at the procedure site can occur, which may be accompanied by moderate to severe pain     If either of the above occurs, lie down flat.  Have someone apply pressure to the procedure site with both hands, as instructed by the nurse.  Hold pressure for 20 minutes and the bleeding should stop.  Notify your physician of the occurrence  If the bleeding does not stop, call 911 and continue to apply pressure     If you experience signs of a fever, temperature > 101°, chills, infection (redness, swelling, thick yellow drainage, or a foul odor from the procedure site)  If you notice any numbness, tingling, or loss of feeling to your leg or foot or groin access  If you notice any numbness, tingling, or loss of feeling to your fingers or hand, if wrist access was utilized     If You Received a Stent:     You will remain on an antiplatelet drug and/or aspirin.  Antiplatelet medications are usually taken for six months to one year and should not be stopped unless your cardiologist directs you to do so.  These medications help to prevent blockage at the stent site.  If another physician or dentist asks you to stop your antiplatelet medication, you need to consult your cardiologist first.  Together, your cardiologist and other physician can discuss the risks that may be involved if you are not taking the antiplatelet medication   If an MRI is necessary, it may be done 4-6 weeks after your procedure.  Verify this with your cardiologist  Keep your stent card with you at all times!  If you need an MRI in the future, your stent card will need to be shown to the technologist before performing the MRI.  A duplicate card CANNOT be reproduced.     Other  You may resume your present diet, unless otherwise specified by your physician.  You may resume all of your medications as prescribed, unless otherwise directed by your physician.  A list of your medications was provided to you at discharge.  Continue the walking program initiated in  the hospital and progress your walking as directed.  Or, gradually resume your previous aerobic exercise schedule as tolerated.  Please call your physician’s office for a follow-up appointment.  You should be seen in 2 weeks.

## 2024-08-12 NOTE — PLAN OF CARE
Assumed patient care, patient is alert and oriented x4. On room air. Sinus rhythm on tele, no complains of chest pain. Abdomen is soft, non-tendered, bowel sounds are active in all four quadrants. Bed in lower position, call light within reach. Plan of care updated, questions are answered. POC:Patient will have dialysis tomorrow.    Problem: Patient/Family Goals  Goal: Patient/Family Long Term Goal  Description: Patient's Long Term Goal: Stay healthy    Interventions:  -Medication management  -Dietary management  -Prompt follow up with physicians  - See additional Care Plan goals for specific interventions  Outcome: Progressing  Goal: Patient/Family Short Term Goal  Description: Patient's Short Term Goal: Discharge home    Interventions:   - Cardiology consult  - Nephrology consult  -HD  Trend trop  - See additional Care Plan goals for specific interventions  Outcome: Progressing

## 2024-08-12 NOTE — PROGRESS NOTES
CC: follow-up hospital admission chest pain    SUBJECTIVE:  Interval History:     Able to walk more today with less sob / pain  No nv  Urine appearing better  Left arm swelling is improved  Dw family at bs  Dw rn as well    OBJECTIVE:  Scheduled Meds:    cephalexin  250 mg Oral Daily    [START ON 8/13/2024] epoetin eunice  3,000 Units Subcutaneous Once per day on Tuesday Thursday Saturday    tamsulosin  0.4 mg Oral Daily @ 0700    levothyroxine  175 mcg Oral Daily @ 0700    carvedilol  6.25 mg Oral BID with meals    losartan  25 mg Oral Daily    clopidogrel  75 mg Oral Daily    Lanthanum Carbonate  1,000 mg Oral BID with meals    aspirin  81 mg Oral QOD    amiodarone  200 mg Oral BID with meals    rosuvastatin  10 mg Oral Nightly    pantoprazole  40 mg Oral BID AC    insulin aspart  10 Units Subcutaneous TID AC    insulin degludec  24 Units Subcutaneous Nightly    insulin aspart  2-10 Units Subcutaneous TID AC and HS     Continuous Infusions:   PRN Meds:   sodium chloride **AND** albumin human    morphINE    nitroglycerin    heparin    acetaminophen    polyethylene glycol (PEG 3350)    sennosides    bisacodyl    ondansetron    metoclopramide    glucose **OR** glucose **OR** glucose-vitamin C **OR** dextrose **OR** glucose **OR** glucose **OR** glucose-vitamin C    PHYSICAL EXAM  Vital signs: Temp:  [97.4 °F (36.3 °C)-98.8 °F (37.1 °C)] 97.4 °F (36.3 °C)  Pulse:  [60-91] 64  Resp:  [18-20] 18  BP: (105-162)/(48-74) 156/74  SpO2:  [99 %-100 %] 100 %      GENERAL - NAD, AAO  EYES- sclera anicteric,   HENT- normocephalic, OP - MMM  NECK - supple  CV- RRR  RESP - decreased at bases normal resp effort  ABDOMEN- soft, NT/ND, no guarding or rebound  EXT- no LE edema, LUE edema, good distal pulses,   PSYCH - normal mentation/ normal affect    Data Review:   Labs:   Recent Labs   Lab 08/08/24  1712 08/09/24  0428 08/10/24  0444 08/10/24  1200 08/11/24  0454 08/12/24  0548   WBC 9.3 4.7 4.0  --  4.3 4.2   HGB 8.2* 7.8* 6.5* 8.2*  7.4* 8.1*   MCV 97.9 99.6 99.5  --  94.8 93.5   PLT 84.0* 54.0* 46.0*  --  41.0* 43.0*       Recent Labs   Lab 08/08/24  0552 08/09/24  0428 08/10/24  0444 08/11/24  0454 08/12/24  0548   * 134* 131* 134* 134*   K 5.0 4.5 5.2* 4.8 5.1    102 99 101 102   CO2 25.0 26.0 24.0 26.0 23.0   BUN 59* 34* 56* 38* 55*   CREATSERUM 6.86* 4.69* 6.48* 4.75* 6.30*   CA 9.3 8.8 8.9 9.3 9.3   PHOS  --  5.4*  --   --   --    * 108* 164* 114* 116*       No results for input(s): \"ALT\", \"AST\", \"ALB\", \"AMYLASE\", \"LIPASE\", \"LDH\" in the last 168 hours.    Invalid input(s): \"ALPHOS\", \"TBIL\", \"DBIL\", \"TPROT\"    Recent Labs   Lab 08/11/24  0442 08/11/24  1152 08/11/24  1716 08/11/24  2123 08/12/24  0554   PGLU 147* 189* 157* 173* 148*           ASSESSMENT/PLAN:    79-year-old male significant past medical history of CAD with stable angina-status post PCI in 2017, 2021 and most recently in March 2024 with 3 drug-eluting stents placed in the left main, LAD and OM, hypertension, pulmonary hypertension paroxysmal A-fib, type 2 diabetes, hypothyroidism, end-stage renal disease on dialysis, history of subdural hematoma, ischemic cardiomyopathy history of HCV cirrhosis moderate aortic stenosis, prior history of GI bleed presented with chest pain     # Chest pain with history of unstable angina  # CAD status post PCI  # NSTEMI  Ischemic CM  -Continue aspirin, statin, Plavix uninterrupted  -LHC done on 8/4/2024 80% OM disease and severed ISR in prox LAD, medical management recommended -pt not a candidate for CABG,   -Repeat angiogram on 8/8/2024 with PCI to LM into the LAD with lithotripsy angioplasty, NC balloon angioplasty, drug-coated balloon angioplasty also PCI to OM with drug-eluting stent  -Groin is stable, CTA was done yesterday that showed moderate narrowing origin and proximal left common iliac artery with high-grade stenosis at the origin of the right SFA, fat stranding in the right groin no retroperitoneal hematoma -  will refer to vascular as ouptatient given findings.   -Okay to discharge per cardiology on dual antiplatelet therapy uninterrupted, follow-up for possible need for TAVR       # Post angiogram hypotension  -Resolved currently normotensive  -Improved with fluids during dialysis  -Mild hematoma noted on the right groin, stable with FemoStop  -Patient would like a blood transfusion will discuss with cardiology     # Anemia-acute on chronic  -has chronic anemia but likely worsened from groin hematoma and hematuria  -sp 2 unit pRBC  -goal hgb > 8 given cardiac hx  -no evidence of GIB- cont emperic ppi per GI. No role of endoscopy in absence of gross gi bleed  Check b12 / folate     # Paroxysmal A-fib  # V. Tach  -Continue amiodarone     # Recent fall  with subdural hematoma  -Was admitted in May 2024 with this, -Was followed up with neurosurgery  -CT head has been stable  -Okay for dual antiplatelet therapy therapy     # ESRD  -HD per nephrology nephrology    # Hypothyroidism - on synthroid    # HSV cirrhosis - outpt gi fu     # Moderate aortic stenosis - plan for tavr eval as outpt     # Thrombocytopenia  -pt last saw Dr. Julian Nolasco 1/24/24, low pltc likely due to Hep C and cirrhosis, monitor, no intervention needed if pltc < 50  -Repeat echo completed and reviewed     # Type 2 diabetes  -Continue 20 units of insulin, sliding scale     # Essential HTN  - per discussion with pt and his son Ashwin, he was taken off metoprolol, diltiazem, and losartan as of 6/24/24 by outside renal  - meds adjusted by renal / cards - currently on losartan, coreg     # Urinary retention   # UTI  - cont straight cath per protocol  -  c/s appreciated, may need mccloud   -Tamsulosin started  -Urology consulted, patient cystoscopy started on antibiotics as well - change to amoxicillin     Plan of care: inpt care.      Dispo - no dc today, awaiting ucx       Will continue to follow while hospitalized. Please page me or the on-call hospitalist  with questions or concerns.    Gilles Yee Hospitalist  [phone]  Answering Service: [phone]

## 2024-08-12 NOTE — PLAN OF CARE
Received patient awake and oriented. On room air, breath sounds clear. On tele, SR. Coverage given for elevated blood glucose level. No c/o pain voiced. Patient received 1 units PRBCs, no adverse reaction noted.

## 2024-08-12 NOTE — PAYOR COMM NOTE
--------------  8/10 - 11 CONTINUED STAY REVIEW    Payor: [facility] MEDICARE ADV PPO  Subscriber #:  [id number]  Authorization Number: [id number]    8/10:     GI:    Jonny Haque is a a(n) 79 year old male with hx of CAD, a fib, pulmonary HTN, subdural hematoma, ischemic CM, HTN, HL, DM, ESRD on HD, hepatitis C related cirrhosis with protal HTN - admitted on 8/3 with chest pain, unstable angina.  GI consult requested to evaluate for anemia.   Hgb on admission on 8/3 was 9.7, today 6.5.  pt denies any specific GI complaints other than constipation.  No N/V/heartburn/dysphagia, rectal bleeding or melena.                Recently evaluated by  for gross hematuria with hx of bladder cancer with hgb 10.8 on 8/7. S/p coronary angioplasty with stent placement 8/8.  Currently on asa and plavix.  Groin hematoma s/p angioplasty.                In 2020 - had multiple negative egds and colonoscopies as well as capsule endoscopy.                CTA abdomen and pelvis yesterday - nodular liver, bladder thickening.  US abd 3/2024 - cirrhosis with SM.  Hgb 6.5 with plt 46.        ASSESSMENT AND PLAN:   Jonny Haque is a 78 year old male with anemia.  Pt recent MI stent placed 2 weeks ago resumed Plavix.  Noted black stool today.  Has ESRD on HD Hb stable.  No sig active bleed.     Has had recurrent anemia in the past on Plavix with extensive eval per HPI.  No bleeding x 2 years when off Plavix.  Seen lasrt year eval deferred.  EGD colons angios SB capsule.  Has cirrhosis no varices mentioned.  Would consider colitis, AVM's, portal gastropathy, PUD, gastritis or other. Has deferred endoscopic evaluation in the past. .       Resume diet.  Cont BID PPI.  Will readdress role for colonoscopy and EGD of Hb cont to decrease despite supportive care and if agreeable.  If not agreeable, plan BID PPI and resume Plavix if needed and follow..                Blood pressure 111/81, pulse 59, temperature 98.6 °F (37 °C), temperature source Oral, resp.  rate 16, height 6' (1.829 m), weight 209 lb 14.1 oz (95.2 kg), SpO2 100%.     General: Appears alert, oriented x3 and in no acute distress.  HEENT: Normal. No neck vein distention. Thyroid not enlarged.  No lymphadenopathy.  CV: S1 and S2 normal.  No murmurs or gallops.  Lungs: Clear to auscultation.  Abdomen: Soft and nondistended.  Nontender.  No masses.  Bowel sounds are present.  Hematomas over both groins and lower abdomen  Back: No CVA tenderness.  Extremities: No edema, cyanosis, or clubbing.  Skin: Warm and dry.  Rectal: deferred  Lab Results   Component Value Date     WBC 4.0 08/10/2024     HGB 6.5 08/10/2024     HCT 19.4 08/10/2024     PLT 46.0 08/10/2024     CREATSERUM 6.48 08/10/2024     BUN 56 08/10/2024      08/10/2024     K 5.2 08/10/2024     CL 99 08/10/2024     CO2 24.0 08/10/2024      08/10/2024     CA 8.9 08/10/2024     PGLU 183 08/10/2024         Assessment:     Anemia  Hepatitis C related cirrhosis                Acute blood loss anemia likely related to hematuria and groin hematomas.  Pt is on asa and plavix with baseline thrombocytopenia related to cirrhosis.                S/p angioplasty with stenting 8/8.                No overt GI bleeding noted.  Last endoscopies - EGD and colonoscopy with video capsule endoscopy in 2020.     Plan:  1. No plans for endoscopy - EGD or colonoscopy - at present unless pt noted to have overt GI bleeding.  2. Continue BID protonix  3. Continue cardiac diet.  4. Monitor labs      CARDS:    Subjective: still with TOMLIN and some exertional chest tightness  Hgb down to 6.5     CT abdomen- no RP bleed      Assessment/Plan:  79 year old male presenting with:     1) Chest pain, concerning for unstable angina  2) CAD with multiple prior PCI (last 3/2024 of LM, LAD, OM); multiple interruptions of DAPT since then as per hpi  3) ICM (EF 40-45% 4/2024, mod-severe aortic stenosis)  4) pAF on amiodarone (no ac likely 2/2 prior GIB and SDH)  5) SDH  6) HTN  7)  HL  8) DM  9) ESRD on HD     - Cont asa, plavix, statin (cleared for DAPT by neurosurgery, see notes 5/2024)  - s/p PCI 8/8 -> DAPT  - BB  - TAVR CTA today  - Hgb trending down -> managed by nephrology      MDM:  Krista thakkar.  DAPT.  CP improved.  CTA today to eval aortic stenosis.  Can DC home over the weekend; absolutely needs DAPT without interruption for 3 months.  Needs to FU with me in 3-4 weeks.  Will modify as needed after CTA is read and team discusses his aortic stenosis and possible TAVR.     No DC today- PRBC's and HD today. Anemia eval per IM( acute on chronic), CT with no RP bleed       HOSPITALIST:    Patient is doing well, however complaining some chest tightness again with ambulation also was anemic this morning  Urinary retention    Vital signs:  Temp:  [97.3 °F (36.3 °C)-98.6 °F (37 °C)] 98.6 °F (37 °C)  Pulse:  [54-82] 63  Resp:  [15-19] 18  BP: ()/(39-81) 142/58  SpO2:  [96 %-100 %] 100 %       Lab 08/07/24  2323 08/08/24  0552 08/08/24  1712 08/09/24  0428 08/10/24  0444   WBC 5.1 4.7 9.3 4.7 4.0   HGB 9.8* 9.4* 8.2* 7.8* 6.5*   MCV 98.6 100.7* 97.9 99.6 99.5   PLT 52.0* 56.0* 84.0* 54.0* 46.0*      Lab 08/09/24  0428 08/10/24  0444   * 164*   BUN 34* 56*   CREATSERUM 4.69* 6.48*   CA 8.8 8.9   ALB  --   --    * 131*   K 4.5 5.2*    99   CO2 26.0 24.0       Scheduled Medications[]Expand by Default    sodium chloride   Intravenous Once    tamsulosin  0.4 mg Oral Daily @ 0700    levothyroxine  175 mcg Oral Daily @ 0700    carvedilol  6.25 mg Oral BID with meals    losartan  25 mg Oral Daily    clopidogrel  75 mg Oral Daily    Lanthanum Carbonate  1,000 mg Oral BID with meals    aspirin  81 mg Oral QOD    amiodarone  200 mg Oral BID with meals    rosuvastatin  10 mg Oral Nightly    pantoprazole  40 mg Oral BID AC    insulin aspart  10 Units Subcutaneous TID AC    insulin degludec  24 Units Subcutaneous Nightly    insulin aspart  2-10 Units Subcutaneous TID AC and HS          Assessment & Plan:   79-year-old male significant past medical history of CAD with stable angina-status post PCI in 2017, 2021 and most recently in March 2024 with 3 drug-eluting stents placed in the left main, LAD and OM, hypertension, pulmonary hypertension paroxysmal A-fib, type 2 diabetes, hypothyroidism, end-stage renal disease on dialysis, history of subdural hematoma, ischemic cardiomyopathy history of HCV cirrhosis moderate aortic stenosis, prior history of GI bleed presented with chest pain     # Chest pain with history of unstable angina  # CAD status post PCI  # NSTEMI  -Serial troponin  -Cardiology consult  -Patient was not on dual antiplatelet therapy secondary to recent subdural hematoma and fall, however pt was cleared by NSGY for DAPT, cont ASA, plavix   -Continue aspirin, statin, Plavix uninterrupted  -LHC done on 8/4/2024 80% OM disease and severed ISR in prox LAD, medical management recommended  -Recent echo completed and reviewed, repeat echo on 8/7/2024 reviewed a moderate stenosis of the aortic valve  -pt not a candidate for CABG,   -Repeat angiogram on 8/8/2024 with PCI to LM into the LAD with lithotripsy angioplasty, NC balloon angioplasty, drug-coated balloon angioplasty also PCI to OM with drug-eluting stent  -Further management per cardiology  -Groin is stable, CTA was done yesterday that showed moderate narrowing origin and proximal left common iliac artery with high-grade stenosis at the origin of the right SFA, fat stranding in the right groin no retroperitoneal hematoma  -Diffuse urinary bladder wall thickening with chronic outlet obstruction      # Post angiogram [id number]  -Resolved currently normotensive  -Improved with fluids during dialysis  -Mild hematoma noted on the right groin, stable with FemoStop  -Patient would like a blood transfusion will discuss with cardiology      # Anemia-acute on chronic  -Small hematoma noted in the right groin  -Hemoglobin further down trended  to 6.5, will need 1 unit of PRBC at this time  -Given history of GI bleed GI also consulted     # Paroxysmal A-fib  # V. Tach  -Continue amiodarone  -EKG reviewed showed sinus rhythm      # Recent fall  acute subdural hematoma  -Was admitted in May 2024 with this, -Was followed up with neurosurgery  -CT head has been stable  -Okay for dual antiplatelet therapy therapy     # ESRD  -HD per nephrology nephrology  # Hypothyroidism  # HSV cirrhosis  # Moderate aortic stenosis  # Ischemic cardiomyopathy   # Thrombocytopenia platelets of 57 continue to monitor-  -pt last saw Dr. Julian Nolasco 1/24/24, low pltc likely due to Hep C and cirrhosis, monitor, no intervention needed if pltc < 50  -Repeat echo completed and reviewed     # Type 2 diabetes  -Continue 20 units of insulin, sliding scale     # Essential HTN  - per discussion with pt and his son Ashwin, he was taken off metoprolol, diltiazem, and losartan as of 6/24/24 by outside renal  - coreg started, monitor      # Urinary retention   - cont straight cath per protocol  -  c/s appreciated, may need mccloud   -Tamsulosin started  -Will go ahead and place a Mccloud catheter as patient has had dark bloody urine  -Will reconsult urology as well as patient may need cystoscopy     Plan of care: inpt care.       UROLOGY:    Dysuria  8/10/24 Likely due to concentrated urine but no recent UA     PLAN     UA/UCx  Monitor PVR but no current need for mccloud - no gross hematuria either    8/11:    CARDS:    Subjective: hgb 7.4, still some SOB      Would consider more PRBC's as still seems symptomatic    UROLOGY:   He has had mild dysuria with urination. Urine is dark with some debris today.    General appearance: alert, appears stated age, and cooperative  Blood pressure 122/53, pulse 91, temperature 98.8 °F (37.1 °C), temperature source Oral, resp. rate 18, height 6' (1.829 m), weight 209 lb 14.1 oz (95.2 kg), SpO2 100%.  Abdomen: soft, non-tender    US VENOUS DOPPLER ARM  LEFT  Unremarkable venous ultrasound of left upper extremity.    Assessment:    URINARY RETENTION  GROSS HEMATURIA  -8/10/24 Urine is dark yellow but no gross hematuria -> may be due to UTI  -UA + WBCs RBCs and bacteria     HISTORY OF BLADDER CANCER   CT A/P without contrast 8/6/24: no renal stones/hydronephrosis, prostatomegaly (measure up to 5.4 cm)  No recent cystoscopy     ESRD  -on HD  -nephrology following     Dysuria  8/10/24 Likely due to concentrated urine but no recent UA     PLAN     Start abx and await UCx  Will need outpatient cystoscopy after discharge as well      HOSPITALIST:    Patient with multiple complaints at this time complaining some chest tightness and pain with minimal exertion, inquiring about his hemoglobin dropping again to 7.4 and wanting another unit of blood      He also stated that his urine is extremely dark and muddy no fevers or chills overnight     Vital signs:  Temp:  [98 °F (36.7 °C)-98.8 °F (37.1 °C)] 98.8 °F (37.1 °C)  Pulse:  [66-91] 91  Resp:  [18-19] 18  BP: (122-147)/(47-64) 122/53  SpO2:  [97 %-100 %] 100 %     Physical Exam:    Gen: No acute distress, alert and oriented x 3. Chronically ill appearing.   Pulm: Lungs clear bilaterally, good inspiratory effort   CV:  nL S1/S2  Abd: soft, NT/ND, no hepatomegaly, +BS  MSK: moving all extremities, no edema  Neuro: no focal deficits  Skin: no rashes/lesions  Psych: normal mood/affect      Lab 08/08/24  0552 08/08/24  1712 08/09/24  0428 08/10/24  0444 08/10/24  1200 08/11/24  0454   WBC 4.7 9.3 4.7 4.0  --  4.3   HGB 9.4* 8.2* 7.8* 6.5* 8.2* 7.4*   .7* 97.9 99.6 99.5  --  94.8   PLT 56.0* 84.0* 54.0* 46.0*  --  41.0*      Lab 08/09/24  0428 08/10/24  0444 08/11/24  0454   * 164* 114*   BUN 34* 56* 38*   CREATSERUM 4.69* 6.48* 4.75*   CA 8.8 8.9 9.3   * 131* 134*   K 4.5 5.2* 4.8    99 101   CO2 26.0 24.0 26.0       Assessment & Plan:   79-year-old male significant past medical history of CAD with  stable angina-status post PCI in 2017, 2021 and most recently in March 2024 with 3 drug-eluting stents placed in the left main, LAD and OM, hypertension, pulmonary hypertension paroxysmal A-fib, type 2 diabetes, hypothyroidism, end-stage renal disease on dialysis, history of subdural hematoma, ischemic cardiomyopathy history of HCV cirrhosis moderate aortic stenosis, prior history of GI bleed presented with chest pain     # Chest pain with history of unstable angina  # CAD status post PCI  # NSTEMI  -Continue aspirin, statin, Plavix uninterrupted  -LHC done on 8/4/2024 80% OM disease and severed ISR in prox LAD, medical management recommended  -Recent echo completed and reviewed, repeat echo on 8/7/2024 reviewed a moderate stenosis of the aortic valve  -pt not a candidate for CABG,   -Repeat angiogram on 8/8/2024 with PCI to LM into the LAD with lithotripsy angioplasty, NC balloon angioplasty, drug-coated balloon angioplasty also PCI to OM with drug-eluting stent  -Groin is stable, CTA was done yesterday that showed moderate narrowing origin and proximal left common iliac artery with high-grade stenosis at the origin of the right SFA, fat stranding in the right groin no retroperitoneal hematoma  -Okay to discharge per cardiology on dual antiplatelet therapy uninterrupted, follow-up for possible need for TAVR      # Post angiogram [id number]  -Resolved currently normotensive  -Improved with fluids during dialysis  -Mild hematoma noted on the right groin, stable with FemoStop  -Patient would like a blood transfusion will discuss with cardiology  -To transfuse another unit of blood today     # Anemia-acute on chronic  -Small hematoma noted in the right groin  -Hemoglobin further down trended to 6.5, received 1 unit of blood yesterday, to get another unit of blood today for hemoglobin of 7.4 and ongoing cardiac symptoms  -Given history of GI bleed GI also consulted-no further workup inpatient okay to discharge per GI      # Paroxysmal A-fib  # V. Tach  -Continue amiodarone  -EKG reviewed showed sinus rhythm      # Recent fall  acute subdural hematoma  -Was admitted in May 2024 with this, -Was followed up with neurosurgery  -CT head has been stable  -Okay for dual antiplatelet therapy therapy     # ESRD  -HD per nephrology nephrology  # Hypothyroidism  # HSV cirrhosis  # Moderate aortic stenosis  # Ischemic cardiomyopathy   # Thrombocytopenia platelets of 57 continue to monitor-  -pt last saw Dr. Julian Nolasco 1/24/24, low pltc likely due to Hep C and cirrhosis, monitor, no intervention needed if pltc < 50  -Repeat echo completed and reviewed     # Type 2 diabetes  -Continue 20 units of insulin, sliding scale     # Essential HTN  - per discussion with pt and his son Ashwin, he was taken off metoprolol, diltiazem, and losartan as of 6/24/24 by outside renal  - coreg started, monitor      # Urinary retention   # UTI  - cont straight cath per protocol  -  c/s appreciated, may need mccloud   -Tamsulosin started  -Urology consulted, patient cystoscopy started on antibiotics as well     Plan of care: inpt care.      MEDICATIONS ADMINISTERED IN LAST 1 DAY:  Transfuse RBC       Date Action Dose Route User    8/11/2024 2056 New Bag (none) Intravenous Ena Moctezuma RN          cephalexin (Keflex) cap 250 mg       Date Action Dose Route User    8/11/2024 1722 Given 250 mg Oral Rainer Baptiste RN          pantoprazole (Protonix) DR tab 40 mg       Date Action Dose Route User    8/12/2024 0605 Given 40 mg Oral Ena Moctezuma RN    8/11/2024 1721 Given 40 mg Oral Rainer Baptiste RN       sodium chloride 0.9% infusion       Date Action Dose Route User    8/11/2024 1725 New Bag (none) Intravenous Rainer Baptiste RN          tamsulosin (Flomax) cap 0.4 mg       Date Action Dose Route User    8/12/2024 0605 Given 0.4 mg Oral Ena Moctezuma RN            Blood Transfusion Record       Product Unit Status Volume Start End            Transfuse RBC       24   [id number]  *-[id number] Stopped 331.67 mL 08/11/24 2056 08/12/24 0015       24  [id number]  [id number] Completed 08/10/24 1523 350 mL 08/10/24 1015 08/10/24 1101

## 2024-08-12 NOTE — PROGRESS NOTES
Nephrology Progress Note    Jonny Garland Attending:  Golden Escalante DO       SUBJECTIVE:     HD tomorrow.     PHYSICAL EXAM:     Vital Signs: /74 (BP Location: Right arm)   Pulse 64   Temp 97.4 °F (36.3 °C) (Oral)   Resp 18   Ht 6' (1.829 m)   Wt 215 lb 6.2 oz (97.7 kg)   SpO2 100%   BMI 29.21 kg/m²   Temp (24hrs), Av.2 °F (36.8 °C), Min:97.4 °F (36.3 °C), Max:98.5 °F (36.9 °C)       Intake/Output Summary (Last 24 hours) at 2024 1657  Last data filed at 2024 0832  Gross per 24 hour   Intake 451.67 ml   Output --   Net 451.67 ml     Wt Readings from Last 3 Encounters:   24 215 lb 6.2 oz (97.7 kg)   24 204 lb (92.5 kg)   24 204 lb 1.6 oz (92.6 kg)       General: no distress  HEENT: NCAT  Cardiac: RRR  Lungs: no distress  Extremities: trace LE edema  Neurologic/Psych: mentating well     LABORATORY DATA:     Lab Results   Component Value Date     (H) 2024    BUN 55 (H) 2024    BUNCREA 13.0 2022    CREATSERUM 6.30 (H) 2024    ANIONGAP 9 2024    GFR 41 (L) 2017    GFRNAA 8 (L) 2022    GFRAA 9 (L) 2022    CA 9.3 2024    OSMOCALC 294 2024    ALKPHO 183 (H) 2024    AST 20 2024    ALT 38 2024    BILT 0.4 2024    TP 7.0 2024    ALB 4.3 2024    GLOBULIN 2.7 2024    AGRATIO 1.0 (L) 2016     (L) 2024    K 5.1 2024     2024    CO2 23.0 2024     Lab Results   Component Value Date    WBC 4.2 2024    RBC 2.47 (L) 2024    HGB 8.1 (L) 2024    HCT 23.1 (L) 2024    PLT 43.0 (L) 2024    MCV 93.5 2024    MCH 32.8 2024    MCHC 35.1 2024    RDW 17.4 2024    NEPRELIM 2.74 2024    NEPERCENT 65.1 2024    LYPERCENT 19.2 2024    MOPERCENT 8.1 2024    EOPERCENT 7.1 2024    BAPERCENT 0.5 2024    NE 2.74 2024    LYMABS 0.81 (L) 2024    MOABSO 0.34  08/12/2024    EOABSO 0.30 08/12/2024    BAABSO 0.02 08/12/2024     Lab Results   Component Value Date    MALBP 49.2 (H) 09/04/2019    CREUR 91.65 09/04/2019     Lab Results   Component Value Date    COLORUR Brown (A) 08/10/2024    CLARITY Cloudy (A) 08/10/2024    SPECGRAVITY  08/10/2024      Comment:      Unable to perform dipstick due to color interference.    Refer to the specific gravity test for the specific gravitiy result.      SPECGRAVITY 1.022 08/10/2024    GLUUR  08/10/2024      Comment:      Unable to perform dipstick due to color interference.        BILUR  08/10/2024      Comment:      Unable to perform dipstick due to color interference.      KETUR  08/10/2024      Comment:      Unable to perform dipstick due to color interference.        BLOODURINE  08/10/2024      Comment:      Unable to perform dipstick due to color interference.        PHURINE  08/10/2024      Comment:      Unable to perform dipstick due to color interference.        PROUR  08/10/2024      Comment:      Unable to perform dipstick due to color interference.        UROBILINOGEN  08/10/2024      Comment:      Unable to perform dipstick due to color interference.        NITRITE  08/10/2024      Comment:      Unable to perform dipstick due to color interference.        LEUUR  08/10/2024      Comment:      Unable to perform dipstick due to color interference.        Manish/uL Interpretation   25          Trace   75          1+   250         2+   500         3+    WBCUR >50 (A) 08/10/2024    RBCUR >10 (A) 08/10/2024    EPIUR Few (A) 08/10/2024    BACUR 2+ (A) 08/10/2024    HYLUR Present (A) 12/19/2016       IMAGING:     Reviewed    MEDICATIONS:       Current Facility-Administered Medications   Medication Dose Route Frequency    amoxicillin (Amoxil) cap 500 mg  500 mg Oral Daily    [START ON 8/13/2024] epoetin eunice (Epogen, Procrit) 3000 UNIT/ML injection 3,000 Units  3,000 Units Subcutaneous Once per day on Tuesday Thursday Saturday     tamsulosin (Flomax) cap 0.4 mg  0.4 mg Oral Daily @ 0700    levothyroxine (Synthroid) tab 175 mcg  175 mcg Oral Daily @ 0700    morphINE PF 2 MG/ML injection 2 mg  2 mg Intravenous Q1H PRN    nitroglycerin (Nitrostat) SL tab 0.4 mg  0.4 mg Sublingual Q5 Min PRN    heparin (Porcine) 1000 UNIT/ML injection 1,500 Units  1.5 mL Intracatheter PRN Dialysis    carvedilol (Coreg) tab 6.25 mg  6.25 mg Oral BID with meals    losartan (Cozaar) tab 25 mg  25 mg Oral Daily    clopidogrel (Plavix) tab 75 mg  75 mg Oral Daily    Lanthanum Carbonate (FOSRENOL) chewable tab 1,000 mg  1,000 mg Oral BID with meals    aspirin DR tab 81 mg  81 mg Oral QOD    amiodarone (Pacerone) tab 200 mg  200 mg Oral BID with meals    rosuvastatin (Crestor) tab 10 mg  10 mg Oral Nightly    pantoprazole (Protonix) DR tab 40 mg  40 mg Oral BID AC    insulin aspart (NovoLOG) 100 Units/mL FlexPen 10 Units  10 Units Subcutaneous TID AC    insulin degludec (Tresiba) 100 units/mL flextouch 24 Units  24 Units Subcutaneous Nightly    acetaminophen (Tylenol Extra Strength) tab 500 mg  500 mg Oral Q4H PRN    polyethylene glycol (PEG 3350) (Miralax) 17 g oral packet 17 g  17 g Oral Daily PRN    sennosides (Senokot) tab 17.2 mg  17.2 mg Oral Nightly PRN    bisacodyl (Dulcolax) 10 MG rectal suppository 10 mg  10 mg Rectal Daily PRN    ondansetron (Zofran) 4 MG/2ML injection 4 mg  4 mg Intravenous Q6H PRN    metoclopramide (Reglan) 5 mg/mL injection 5 mg  5 mg Intravenous Q8H PRN    glucose (Dex4) 15 GM/59ML oral liquid 15 g  15 g Oral Q15 Min PRN    Or    glucose (Glutose) 40% oral gel 15 g  15 g Oral Q15 Min PRN    Or    glucose-vitamin C (Dex-4) chewable tab 4 tablet  4 tablet Oral Q15 Min PRN    Or    dextrose 50% injection 50 mL  50 mL Intravenous Q15 Min PRN    Or    glucose (Dex4) 15 GM/59ML oral liquid 30 g  30 g Oral Q15 Min PRN    Or    glucose (Glutose) 40% oral gel 30 g  30 g Oral Q15 Min PRN    Or    glucose-vitamin C (Dex-4) chewable tab 8 tablet  8  tablet Oral Q15 Min PRN    insulin aspart (NovoLOG) 100 Units/mL FlexPen 2-10 Units  2-10 Units Subcutaneous TID AC and HS       ASSESSMENT/PLAN:     ESRD  - HD TTS     Hypertension    Anemia  -Hemoglobin goal greater than 10    CKD BMD  -Lanthanum 1000mg BID    NSTEMI        HD TTS.  Continue dialysis per routine schedule  UF with HD as tolerated  NSTEMI management per cardiology - s/p cath  Will plan for EPO with HD  Dark urine - hematuria? Urology following      We will continue to follow    DO Joselito Hoff [facility] - Nephrology

## 2024-08-12 NOTE — CM/SW NOTE
08/12/24 1112   Choice of Post-Acute Provider   Informed patient of right to choose their preferred provider Yes   List of appropriate post-acute services provided to patient/family with quality data Yes   Information given to Patient     SW met with pt at bedside to discuss discharge planning. SW discussed that pt would benefit from discharging with [facility]. Choice list provided for pt to review. Pt wants to think about it, pt feels that he may not need it at discharge. Pt states that he drives himself to/from HD. Anticipate a family member will transport pt home at discharge. All questions addressed.      &  to remain available and supportive for discharge planning needs.    Rosana Carvalho, MSW, LSW  Discharge Planner

## 2024-08-12 NOTE — PROGRESS NOTES
Cardiology Progress Note  [facility]    Jonny Haque Patient Status:  Inpatient    4/15/1945 MRN [id number]   Formerly [facility] 8NE-A Attending Golden Escalante, DO   Hosp Day # 9 PCP Gee Jimenez MD       Subjective:  Hgb up over 8.  Ambulating halls without TOMLIN or CP (no \"chest tightness\").  Worried about going home with anemia.  Wants another unit.    Medications:  No current facility-administered medications on file prior to encounter.     Current Outpatient Medications on File Prior to Encounter   Medication Sig Dispense Refill    levothyroxine 175 MCG Oral Tab Take 1 tablet (175 mcg total) by mouth before breakfast.      isosorbide mononitrate ER 30 MG Oral Tablet 24 Hr Take 1 tablet (30 mg total) by mouth daily.      Lanthanum Carbonate 1000 MG Oral Chew Tab Chew 1 tablet (1,000 mg total) by mouth 2 (two) times daily with meals.      clopidogrel (PLAVIX) 75 MG Oral Tab Take 1 tablet (75 mg total) by mouth.      aspirin 81 MG Oral Tab EC Take 1 tablet (81 mg total) by mouth every other day.      amiodarone 200 MG Oral Tab Take 1 tablet (200 mg total) by mouth 2 (two) times daily with meals. 60 tablet 3    pantoprazole 40 MG Oral Tab EC Take 1 tablet (40 mg total) by mouth 2 (two) times daily before meals.      Sevelamer 800 MG Oral Tab Take 1 tablet (800 mg total) by mouth 3 (three) times daily with meals.      rosuvastatin 10 MG Oral Tab Take 1 tablet (10 mg total) by mouth nightly. (Patient taking differently: Take 1 tablet (10 mg total) by mouth nightly.) 90 tablet 0    nitroGLYCERIN 0.3 MG Sublingual SL Tab Place 1 tablet (0.3 mg total) under the tongue every 5 (five) minutes as needed for Chest pain.      zolpidem 10 MG Oral Tab Take 1 tablet (10 mg total) by mouth nightly as needed for Sleep.      Ferric Citrate (AURYXIA) 1  MG(Fe) Oral Tab       Insulin Pen Needle (TRUEPLUS PEN NEEDLES) 31G X 5 MM Does not apply Misc Use 5 per day 500 each 2    Insulin Glargine, 2 Unit  Dial, (TOUJEO MAX SOLOSTAR) 300 UNIT/ML Subcutaneous Solution Pen-injector Inject 20 Units into the skin nightly. 6 mL 2    Glucose Blood (ONETOUCH ULTRA) In Vitro Strip 6 times a day 400 each 3    Insulin Lispro, 1 Unit Dial, (HUMALOG KWIKPEN) 100 UNIT/ML Subcutaneous Solution Pen-injector 12 units before meals with sliding scale. Max daily dose: 50 units. (Patient taking differently: 10 Units. Three times a day. Before meals) 60 mL 3    CONTOUR NEXT TEST In Vitro Strip TEST BLOOD SUGAR FOUR TIMES DAILY 400 strip 3    Blood Glucose Monitoring Suppl ([employer] CONTOUR NEXT MONITOR) W/DEVICE Does not apply Kit 1 Device by Does not apply route 4 (four) times daily. 1 kit 0             Physical Exam:  Blood pressure 156/74, pulse 64, temperature 97.4 °F (36.3 °C), temperature source Oral, resp. rate 18, height 72\", weight 215 lb 6.2 oz (97.7 kg), SpO2 100%.  Wt Readings from Last 3 Encounters:   08/12/24 215 lb 6.2 oz (97.7 kg)   05/17/24 204 lb (92.5 kg)   05/12/24 204 lb 1.6 oz (92.6 kg)       General: awake, alert, oriented x 3, no acute distress  HEENT: at/nc, perrl, eomi  Neck: No JVD, carotids 2+ no bruits.  Cardiac: Regular rate and rhythm, S1, S2 normal, 3/6 mid peaking systolic murmur RUSB  Lungs: Clear without wheezes, rales, rhonchi or dullness.  Normal excursions and effort.  Abdomen: Soft, non-tender, non-distended, normal bowel sounds   Extremities: Without clubbing, cyanosis or edema.  Peripheral pulses are 2+.  Neurologic: Alert and oriented, normal affect.  Psych: normal mood and affect  Skin: Warm and dry.       Laboratories and Data:  Diagnostics:    [facility]  IMPRESSION:    CAD.  S/P PCI to LM into LAD for ISR with 1) lithotripsy angiogplasty, 2) NC balloon angioplasty, 3) DCB angioplasty (drug coated balloon).  S/P  PCI to OM with TRACY.     RECOMMENDATIONS:   DAPT.  Uninterrupted.  Medical management.  Surveillance with stress, CTA, and angio if needed.  Consider further eval for TAVR.    Labs:   CBC:    Lab  Results   Component Value Date    WBC 4.2 08/12/2024    WBC 4.3 08/11/2024    WBC 4.0 08/10/2024     Lab Results   Component Value Date    HEMOGLOBIN 10.5 (L) 02/09/2022    HEMOGLOBIN 12.6 06/07/2016    HEMOGLOBIN 15.1 03/07/2013    HGB 8.1 (L) 08/12/2024    HGB 7.4 (L) 08/11/2024    HGB 8.2 (L) 08/10/2024      Lab Results   Component Value Date    PLT 43.0 (L) 08/12/2024    PLT 41.0 (L) 08/11/2024    PLT 46.0 (L) 08/10/2024     BMP:     Lab Results   Component Value Date    GLUCOSE 129 (H) 02/09/2022    GLUCOSE 128 (H) 06/07/2016    GLUCOSE 359 (H) 03/07/2013     Lab Results   Component Value Date    K 5.1 08/12/2024    K 4.8 08/11/2024    K 5.2 (H) 08/10/2024     Lab Results   Component Value Date    BUN 55 (H) 08/12/2024    BUN 38 (H) 08/11/2024    BUN 56 (H) 08/10/2024     Lab Results   Component Value Date    CREATSERUM 6.30 (H) 08/12/2024    CREATSERUM 4.75 (H) 08/11/2024    CREATSERUM 6.48 (H) 08/10/2024     Cholesterol:     Lab Results   Component Value Date    CHOLEST 154 03/19/2024    CHOLEST 174 03/17/2024    CHOLEST 104 04/05/2023     Lab Results   Component Value Date    HDL 33 (L) 03/19/2024    HDL 44 03/17/2024    HDL 25 (L) 04/05/2023     Lab Results   Component Value Date    TRIG 304 (H) 03/19/2024    TRIG 215 (H) 03/17/2024    TRIG 202 (H) 04/05/2023    TRIGLY 248 (H) 02/09/2022    TRIGLY 278 (H) 08/19/2015    TRIGLY 490 (H) 03/07/2013     Lab Results   Component Value Date    LDL 72 03/19/2024    LDL 93 03/17/2024    LDL 46 04/05/2023     Lab Results   Component Value Date    AST 20 08/03/2024    AST 24 05/08/2024    AST 26 04/16/2024     Lab Results   Component Value Date    ALT 38 08/03/2024    ALT 24 05/08/2024    ALT 28 04/16/2024       Assessment/Plan:  79 year old male presenting with:    1) Chest pain, concerning for unstable angina  2) CAD with multiple prior PCI (last 3/2024 of LM, LAD, OM); multiple interruptions of DAPT since then as per hpi  3) ICM (EF 40-45% 4/2024, mod-severe aortic  stenosis)  4) pAF on amiodarone (no ac likely 2/2 prior GIB and SDH)  5) SDH  6) HTN  7) HL  8) DM  9) ESRD on HD    - Cont asa, plavix, statin (cleared for DAPT by neurosurgery, see notes 5/2024)  - s/p PCI 8/8 -> DAPT  - BB  - TAVR CTA today  - Hgb trending down -> managed by nephrology     MDM:  Groin ok.  DAPT.  CP improved.  Hgb improved.  Ambulating well.  Plan to discuss aortic stenosis/TAVR at valve conference in AM.  Hopefully discharge tomorrow.    [facility] MD

## 2024-08-13 LAB
ANION GAP SERPL CALC-SCNC: 9 MMOL/L (ref 0–18)
ANTIBODY SCREEN: NEGATIVE
BLOOD TYPE BARCODE: 9500
BUN BLD-MCNC: 66 MG/DL (ref 9–23)
CALCIUM BLD-MCNC: 9.5 MG/DL (ref 8.7–10.4)
CHLORIDE SERPL-SCNC: 105 MMOL/L (ref 98–112)
CO2 SERPL-SCNC: 22 MMOL/L (ref 21–32)
CREAT BLD-MCNC: 7.34 MG/DL
EGFRCR SERPLBLD CKD-EPI 2021: 7 ML/MIN/1.73M2 (ref 60–?)
ERYTHROCYTE [DISTWIDTH] IN BLOOD BY AUTOMATED COUNT: 16.9 %
FOLATE SERPL-MCNC: 15.3 NG/ML (ref 5.4–?)
GLUCOSE BLD-MCNC: 120 MG/DL (ref 70–99)
GLUCOSE BLD-MCNC: 148 MG/DL (ref 70–99)
GLUCOSE BLD-MCNC: 213 MG/DL (ref 70–99)
GLUCOSE BLD-MCNC: 225 MG/DL (ref 70–99)
HCT VFR BLD AUTO: 24.5 %
HGB BLD-MCNC: 8.2 G/DL
HGB BLD-MCNC: 9.4 G/DL
MAGNESIUM SERPL-MCNC: 1.9 MG/DL (ref 1.6–2.6)
MCH RBC QN AUTO: 32.3 PG (ref 26–34)
MCHC RBC AUTO-ENTMCNC: 33.5 G/DL (ref 31–37)
MCV RBC AUTO: 96.5 FL
OSMOLALITY SERPL CALC.SUM OF ELEC: 302 MOSM/KG (ref 275–295)
PLATELET # BLD AUTO: 52 10(3)UL (ref 150–450)
POTASSIUM SERPL-SCNC: 5.4 MMOL/L (ref 3.5–5.1)
RBC # BLD AUTO: 2.54 X10(6)UL
RH BLOOD TYPE: POSITIVE
SODIUM SERPL-SCNC: 136 MMOL/L (ref 136–145)
UNIT VOLUME: 350 ML
VIT B12 SERPL-MCNC: 694 PG/ML (ref 211–911)
WBC # BLD AUTO: 4.5 X10(3) UL (ref 4–11)

## 2024-08-13 PROCEDURE — 86900 BLOOD TYPING SEROLOGIC ABO: CPT | Performed by: HOSPITALIST

## 2024-08-13 PROCEDURE — 80048 BASIC METABOLIC PNL TOTAL CA: CPT | Performed by: HOSPITALIST

## 2024-08-13 PROCEDURE — 86850 RBC ANTIBODY SCREEN: CPT | Performed by: HOSPITALIST

## 2024-08-13 PROCEDURE — 82962 GLUCOSE BLOOD TEST: CPT

## 2024-08-13 PROCEDURE — 82607 VITAMIN B-12: CPT | Performed by: HOSPITALIST

## 2024-08-13 PROCEDURE — 90935 HEMODIALYSIS ONE EVALUATION: CPT | Performed by: STUDENT IN AN ORGANIZED HEALTH CARE EDUCATION/TRAINING PROGRAM

## 2024-08-13 PROCEDURE — 85027 COMPLETE CBC AUTOMATED: CPT | Performed by: HOSPITALIST

## 2024-08-13 PROCEDURE — 85018 HEMOGLOBIN: CPT | Performed by: HOSPITALIST

## 2024-08-13 PROCEDURE — 86920 COMPATIBILITY TEST SPIN: CPT

## 2024-08-13 PROCEDURE — 83735 ASSAY OF MAGNESIUM: CPT | Performed by: HOSPITALIST

## 2024-08-13 PROCEDURE — 82746 ASSAY OF FOLIC ACID SERUM: CPT | Performed by: HOSPITALIST

## 2024-08-13 PROCEDURE — 36430 TRANSFUSION BLD/BLD COMPNT: CPT

## 2024-08-13 PROCEDURE — 86901 BLOOD TYPING SEROLOGIC RH(D): CPT | Performed by: HOSPITALIST

## 2024-08-13 RX ORDER — SODIUM CHLORIDE 9 MG/ML
INJECTION, SOLUTION INTRAVENOUS ONCE
Status: COMPLETED | OUTPATIENT
Start: 2024-08-13 | End: 2024-08-13

## 2024-08-13 NOTE — PLAN OF CARE
Patient seen and examined by structural team (Damaris Donald, Davian and myself). Discussed at valve conference today. CT calcium score does not meet criteria for severe aortic stenosis, so question of aortic stenosis severity remains unanswered.     Recommend dobutamine stress echocardiogram tomorrow to confirm assess severity and assess for contractile reserve (prognostication).     Discussed with patient and son Jevon. They are agreeable with staying until tomorrow to get this done.     Full progress note to be written later today by Dr. Donald.

## 2024-08-13 NOTE — PROGRESS NOTES
CC: follow-up hospital admission chest pain    SUBJECTIVE:  Interval History:     No acute issues overnight  No n/v/cp  Getting HD now    OBJECTIVE:  Scheduled Meds:    epoetin eunice  10,000 Units Subcutaneous Once per day on Tuesday Thursday Saturday    amoxicillin  500 mg Oral Daily    tamsulosin  0.4 mg Oral Daily @ 0700    levothyroxine  175 mcg Oral Daily @ 0700    [Held by provider] carvedilol  6.25 mg Oral BID with meals    losartan  25 mg Oral Daily    clopidogrel  75 mg Oral Daily    Lanthanum Carbonate  1,000 mg Oral BID with meals    aspirin  81 mg Oral QOD    amiodarone  200 mg Oral BID with meals    rosuvastatin  10 mg Oral Nightly    pantoprazole  40 mg Oral BID AC    insulin aspart  10 Units Subcutaneous TID AC    insulin degludec  24 Units Subcutaneous Nightly    insulin aspart  2-10 Units Subcutaneous TID AC and HS     Continuous Infusions:   PRN Meds:   morphINE    nitroglycerin    heparin    acetaminophen    polyethylene glycol (PEG 3350)    sennosides    bisacodyl    ondansetron    metoclopramide    glucose **OR** glucose **OR** glucose-vitamin C **OR** dextrose **OR** glucose **OR** glucose **OR** glucose-vitamin C    PHYSICAL EXAM  Vital signs: Temp:  [97.5 °F (36.4 °C)-98.6 °F (37 °C)] 98 °F (36.7 °C)  Pulse:  [56-69] 69  Resp:  [16-20] 18  BP: (128-159)/(59-74) 128/71  SpO2:  [99 %-100 %] 100 %      GENERAL - NAD, AAO  EYES- sclera anicteric,   HENT- normocephalic, OP - dry  NECK - supple  CV- RRR  RESP - decreased at bases normal resp effort  ABDOMEN- soft, NT/ND   EXT- no LE edema   PSYCH - normal mentation/ normal affect    Data Review:   Labs:   Recent Labs   Lab 08/09/24  0428 08/10/24  0444 08/10/24  1200 08/11/24  0454 08/12/24  0548 08/13/24  0452   WBC 4.7 4.0  --  4.3 4.2 4.5   HGB 7.8* 6.5* 8.2* 7.4* 8.1* 8.2*   MCV 99.6 99.5  --  94.8 93.5 96.5   PLT 54.0* 46.0*  --  41.0* 43.0* 52.0*       Recent Labs   Lab 08/09/24  0428 08/10/24  0444 08/11/24  0454 08/12/24  0548 08/13/24  0452    * 131* 134* 134* 136   K 4.5 5.2* 4.8 5.1 5.4*    99 101 102 105   CO2 26.0 24.0 26.0 23.0 22.0   BUN 34* 56* 38* 55* 66*   CREATSERUM 4.69* 6.48* 4.75* 6.30* 7.34*   CA 8.8 8.9 9.3 9.3 9.5   MG  --   --   --   --  1.9   PHOS 5.4*  --   --   --   --    * 164* 114* 116* 120*       No results for input(s): \"ALT\", \"AST\", \"ALB\", \"AMYLASE\", \"LIPASE\", \"LDH\" in the last 168 hours.    Invalid input(s): \"ALPHOS\", \"TBIL\", \"DBIL\", \"TPROT\"    Recent Labs   Lab 08/12/24  1139 08/12/24  1734 08/12/24  2254 08/13/24  0501 08/13/24  1119   PGLU 147* 210* 161* 148* 213*           ASSESSMENT/PLAN:    79-year-old male significant past medical history of CAD with stable angina-status post PCI in 2017, 2021 and most recently in March 2024 with 3 drug-eluting stents placed in the left main, LAD and OM, hypertension, pulmonary hypertension paroxysmal A-fib, type 2 diabetes, hypothyroidism, end-stage renal disease on dialysis, history of subdural hematoma, ischemic cardiomyopathy history of HCV cirrhosis moderate aortic stenosis, prior history of GI bleed presented with chest pain     # Chest pain with history of unstable angina  # CAD status post PCI  # NSTEMI  Ischemic CM  -Continue aspirin, statin, Plavix uninterrupted  -LHC done on 8/4/2024 80% OM disease and severed ISR in prox LAD, medical management recommended -pt not a candidate for CABG,   -Repeat angiogram on 8/8/2024 with PCI to LM into the LAD with lithotripsy angioplasty, NC balloon angioplasty, drug-coated balloon angioplasty also PCI to OM with drug-eluting stent  -Groin is stable, CTA was done yesterday that showed moderate narrowing origin and proximal left common iliac artery with high-grade stenosis at the origin of the right SFA, fat stranding in the right groin no retroperitoneal hematoma - will refer to vascular as ouptatient given findings.   - plan for dobutamine stress tmorrow       # Post angiogram hypotension  -Resolved currently  normotensive  -Improved with fluids during dialysis  -Mild hematoma noted on the right groin, stable with FemoStop     # Anemia-acute on chronic  -has chronic anemia but likely worsened from groin hematoma and hematuria  -sp 2 unit pRBC  -goal hgb > 8 given cardiac hx  -no evidence of GIB- cont emperic ppi per GI. No role of endoscopy in absence of gross gi bleed  Check b12 / folate - ok     # Paroxysmal A-fib  # V. Tach  -Continue amiodarone     # Recent fall  with subdural hematoma  -Was admitted in May 2024 with this, -Was followed up with neurosurgery  -CT head has been stable  -Okay for dual antiplatelet therapy therapy     # ESRD  -HD per nephrology nephrology    # Hypothyroidism - on synthroid    # HSV cirrhosis - outpt gi fu     # Moderate aortic stenosis - may need tavr this admission, cards following     # Thrombocytopenia  -pt last saw Dr. Julian Nolasco 1/24/24, low pltc likely due to Hep C and cirrhosis, monitor, no intervention needed if pltc < 50  -Repeat echo completed and reviewed     # Type 2 diabetes  -Continue 20 units of insulin, sliding scale     # Essential HTN  - per discussion with pt and his son Ashwin, he was taken off metoprolol, diltiazem, and losartan as of 6/24/24 by outside renal  - meds adjusted by renal / cards - currently on losartan, coreg     # Urinary retention   # UTI  - cont straight cath per protocol  -  c/s appreciated, may need mccloud   -Tamsulosin started  -Urology consulted, patient cystoscopy started on antibiotics as well - change to amoxicillin     Plan of care: inpt care.      Dispo - no dc today, awaiting ucx       Will continue to follow while hospitalized. Please page me or the on-call hospitalist with questions or concerns.    Gilles Yee Hospitalist  [phone]  Answering Service: [phone]

## 2024-08-13 NOTE — PHYSICAL THERAPY NOTE
Attempted to see Pt this AM - RN aware of attempt.      Pt occupied with breakfast - stated confidence in mobility.  Pt with HD starting momentarily, and plans to discharge after treatment.        Will f/u later today if time permits, after all other patients are attempted per tentative schedule.

## 2024-08-13 NOTE — PLAN OF CARE
Assumed care for this patient at 1930. Patient alert and oriented x4. Up with standby assist. NSR/SB on tele. Maintaining o2 sats on RA. Continent of bowel/bladder, uses urinal. Bladder scan performed, PVR within limits. Denies pain. Reviewed POC with patient, verbalized understanding.     Problem: Patient/Family Goals  Goal: Patient/Family Long Term Goal  Description: Patient's Long Term Goal: Stay healthy    Interventions:  -Medication management  -Dietary management  -Prompt follow up with physicians  - See additional Care Plan goals for specific interventions  Outcome: Progressing  Goal: Patient/Family Short Term Goal  Description: Patient's Short Term Goal: Discharge home    Interventions:   - Cardiology consult  - Nephrology consult  -HD  Trend trop  - See additional Care Plan goals for specific interventions  Outcome: Progressing     Problem: CARDIOVASCULAR - ADULT  Goal: Maintains optimal cardiac output and hemodynamic stability  Description: INTERVENTIONS:  - Monitor vital signs, rhythm, and trends  - Monitor for bleeding, hypotension and signs of decreased cardiac output  - Evaluate effectiveness of vasoactive medications to optimize hemodynamic stability  - Monitor arterial and/or venous puncture sites for bleeding and/or hematoma  - Assess quality of pulses, skin color and temperature  - Assess for signs of decreased coronary artery perfusion - ex. Angina  - Evaluate fluid balance, assess for edema, trend weights  Outcome: Progressing  Goal: Absence of cardiac arrhythmias or at baseline  Description: INTERVENTIONS:  - Continuous cardiac monitoring, monitor vital signs, obtain 12 lead EKG if indicated  - Evaluate effectiveness of antiarrhythmic and heart rate control medications as ordered  - Initiate emergency measures for life threatening arrhythmias  - Monitor electrolytes and administer replacement therapy as ordered  Outcome: Progressing     Problem: SAFETY ADULT - FALL  Goal: Free from fall  injury  Description: INTERVENTIONS:  - Assess pt frequently for physical needs  - Identify cognitive and physical deficits and behaviors that affect risk of falls.  - [place] fall precautions as indicated by assessment.  - Educate pt/family on patient safety including physical limitations  - Instruct pt to call for assistance with activity based on assessment  - Modify environment to reduce risk of injury  - Provide assistive devices as appropriate  - Consider OT/PT consult to assist with strengthening/mobility  - Encourage toileting schedule  Outcome: Progressing

## 2024-08-13 NOTE — PLAN OF CARE
Assumed patient care, patient is alert and oriented x4. On room air. Sinus rhythm on tele, no complains of chest pain. Abdomen is soft, non-tendered, bowel sounds are active in all four quadrants. Bed in lower position, call light within reach. Plan of care updated, questions are answered. Patient had one unit of blood product during dialysis today, patient tolerated well without any distress. 2.2 liters fluids out from dialysis. POC: stress test tomorrow.    Problem: Patient/Family Goals  Goal: Patient/Family Long Term Goal  Description: Patient's Long Term Goal: Stay healthy    Interventions:  -Medication management  -Dietary management  -Prompt follow up with physicians  - See additional Care Plan goals for specific interventions  Outcome: Progressing  Goal: Patient/Family Short Term Goal  Description: Patient's Short Term Goal: Discharge home    Interventions:   - Cardiology consult  - Nephrology consult  -HD  Trend trop  - See additional Care Plan goals for specific interventions  Outcome: Progressing

## 2024-08-13 NOTE — PAYOR COMM NOTE
--------------  8/12 CONTINUED STAY REVIEW    Payor: [facility] MEDICARE ADV PPO  Subscriber #:  [id number]  Authorization Number: [id number]    HOSPITALIST:       Able to walk more today with less sob / pain  No nv  Urine appearing better  Left arm swelling is improved  Dw family at bs  Dw rn as well    PHYSICAL EXAM  Vital signs: Temp:  [97.4 °F (36.3 °C)-98.8 °F (37.1 °C)] 97.4 °F (36.3 °C)  Pulse:  [60-91] 64  Resp:  [18-20] 18  BP: (105-162)/(48-74) 156/74  SpO2:  [99 %-100 %] 100 %      GENERAL - NAD, AAO  EYES- sclera anicteric,   HENT- normocephalic, OP - MMM  NECK - supple  CV- RRR  RESP - decreased at bases normal resp effort  ABDOMEN- soft, NT/ND, no guarding or rebound  EXT- no LE edema, LUE edema, good distal pulses,   PSYCH - normal mentation/ normal affect     Lab 08/08/24  1712 08/09/24  0428 08/10/24  0444 08/10/24  1200 08/11/24  0454 08/12/24  0548   WBC 9.3 4.7 4.0  --  4.3 4.2   HGB 8.2* 7.8* 6.5* 8.2* 7.4* 8.1*   MCV 97.9 99.6 99.5  --  94.8 93.5   PLT 84.0* 54.0* 46.0*  --  41.0* 43.0*      Lab 08/08/24  0552 08/09/24  0428 08/10/24  0444 08/11/24  0454 08/12/24  0548   * 134* 131* 134* 134*   K 5.0 4.5 5.2* 4.8 5.1    102 99 101 102   CO2 25.0 26.0 24.0 26.0 23.0   BUN 59* 34* 56* 38* 55*   CREATSERUM 6.86* 4.69* 6.48* 4.75* 6.30*   CA 9.3 8.8 8.9 9.3 9.3   PHOS  --  5.4*  --   --   --    * 108* 164* 114* 116*         ASSESSMENT/PLAN:     79-year-old male significant past medical history of CAD with stable angina-status post PCI in 2017, 2021 and most recently in March 2024 with 3 drug-eluting stents placed in the left main, LAD and OM, hypertension, pulmonary hypertension paroxysmal A-fib, type 2 diabetes, hypothyroidism, end-stage renal disease on dialysis, history of subdural hematoma, ischemic cardiomyopathy history of HCV cirrhosis moderate aortic stenosis, prior history of GI bleed presented with chest pain     # Chest pain with history of unstable angina  # CAD status  post PCI  # NSTEMI  Ischemic CM  -Continue aspirin, statin, Plavix uninterrupted  -LHC done on 8/4/2024 80% OM disease and severed ISR in prox LAD, medical management recommended -pt not a candidate for CABG,   -Repeat angiogram on 8/8/2024 with PCI to LM into the LAD with lithotripsy angioplasty, NC balloon angioplasty, drug-coated balloon angioplasty also PCI to OM with drug-eluting stent  -Groin is stable, CTA was done yesterday that showed moderate narrowing origin and proximal left common iliac artery with high-grade stenosis at the origin of the right SFA, fat stranding in the right groin no retroperitoneal hematoma - will refer to vascular as ouptatient given findings.   -Okay to discharge per cardiology on dual antiplatelet therapy uninterrupted, follow-up for possible need for TAVR       # Post angiogram hypotension  -Resolved currently normotensive  -Improved with fluids during dialysis  -Mild hematoma noted on the right groin, stable with FemoStop  -Patient would like a blood transfusion will discuss with cardiology     # Anemia-acute on chronic  -has chronic anemia but likely worsened from groin hematoma and hematuria  -sp 2 unit pRBC  -goal hgb > 8 given cardiac hx  -no evidence of GIB- cont emperic ppi per GI. No role of endoscopy in absence of gross gi bleed  Check b12 / folate     # Paroxysmal A-fib  # V. Tach  -Continue amiodarone     # Recent fall  with subdural hematoma  -Was admitted in May 2024 with this, -Was followed up with neurosurgery  -CT head has been stable  -Okay for dual antiplatelet therapy therapy     # ESRD  -HD per nephrology nephrology     # Hypothyroidism - on synthroid     # HSV cirrhosis - outpt gi fu      # Moderate aortic stenosis - plan for tavr eval as outpt      # Thrombocytopenia  -pt last saw Dr. Julian Nolasco 1/24/24, low pltc likely due to Hep C and cirrhosis, monitor, no intervention needed if pltc < 50  -Repeat echo completed and reviewed     # Type 2  diabetes  -Continue 20 units of insulin, sliding scale     # Essential HTN  - per discussion with pt and his son Ashwin, he was taken off metoprolol, diltiazem, and losartan as of 6/24/24 by outside renal  - meds adjusted by renal / cards - currently on losartan, coreg     # Urinary retention   # UTI  - cont straight cath per protocol  -  c/s appreciated, may need mccloud   -Tamsulosin started  -Urology consulted, patient cystoscopy started on antibiotics as well - change to amoxicillin     Plan of care: inpt care.       Dispo - no dc today, awaiting ucx     CARDS:    MDM:  Groin ok.  DAPT.  CP improved.  Hgb improved.  Ambulating well.  Plan to discuss aortic stenosis/TAVR at valve conference in AM.  Hopefully discharge tomorrow.    MEDICATIONS ADMINISTERED IN LAST 1 DAY:  Transfuse RBC       Date Action Dose Route User    8/13/2024 1103 New Bag (none) Intravenous Rainer Baptiste RN       heparin (Porcine) 1000 UNIT/ML injection 1,500 Units       Date Action Dose Route User    8/13/2024 1442 Given 1,500 Units Intracatheter Rainer Baptiste RN     sodium chloride 0.9% infusion       Date Action Dose Route User    8/13/2024 1118 New Bag (none) Intravenous Rainer Baptiste, FRANCIA       Vitals (last day)       Date/Time Temp Pulse Resp BP SpO2 Weight O2 Device O2 Flow Rate (L/min) [facility]    08/13/24 1200 98 °F (36.7 °C) 69 18 128/71 100 % -- -- -- ES    08/13/24 1108 97.7 °F (36.5 °C) 64 -- 146/70 99 % -- -- -- HG    08/13/24 0754 98.5 °F (36.9 °C) 64 18 158/63 100 % -- None (Room air) -- ES    08/13/24 0500 98.6 °F (37 °C) 57 18 156/74 100 % 216 lb 7.9 oz (98.2 kg) None (Room air) --     08/13/24 0201 -- 60 -- -- -- -- -- -- YN    08/12/24 2256 97.7 °F (36.5 °C) 56 20 142/59 100 % -- None (Room air) --     08/12/24 2141 -- 57 -- -- -- -- -- -- PM    08/12/24 1932 -- 63 16 159/63 100 % -- -- -- PM    08/12/24 1707 97.5 °F (36.4 °C) 58 20 146/65 100 % -- None (Room air) -- LS    08/12/24 1120 97.4 °F (36.3 °C) 64 18 156/74 100 % --  None (Room air) -- LS    08/12/24 0832 98.5 °F (36.9 °C) 62 20 132/62 99 % -- None (Room air) -- LS    08/12/24 0600 98.3 °F (36.8 °C) 62 19 154/70 100 % 215 lb 6.2 oz (97.7 kg) None (Room air) 0 L/min AM    08/12/24 0430 -- 61 -- -- -- -- -- -- AM    08/12/24 0015 98.4 °F (36.9 °C) 61 18 162/69 99 % -- -- -- AM         Blood Transfusion Record       Product Unit Status Volume Start End            Transfuse RBC       24  [id number]  [id number] Completed 08/13/24 1241 379.17 mL 08/13/24 1103 08/13/24 1208      24  [id number]  [id number] Stopped 331.67 mL 08/11/24 2056 08/12/24 0015       24  [id number]  [id number] Completed 08/10/24 1523 350 mL 08/10/24 1015 08/10/24 1101

## 2024-08-13 NOTE — PROGRESS NOTES
[facility]   part of [facility]    Nephrology Progress Note    Jonny Haque Attending:  Gilles Wolfe DO       SUBJECTIVE:     Undergoing iHD at the time of my visit.  Tolerating without CP, sob, cramping.    PHYSICAL EXAM:     Vital Signs: /71 (BP Location: Right arm)   Pulse 69   Temp 98 °F (36.7 °C) (Oral)   Resp 18   Ht 182.9 cm (6')   Wt 216 lb 7.9 oz (98.2 kg)   SpO2 100%   BMI 29.36 kg/m²   Temp (24hrs), Av °F (36.7 °C), Min:97.5 °F (36.4 °C), Max:98.6 °F (37 °C)       Intake/Output Summary (Last 24 hours) at 2024 1239  Last data filed at 2024 0501  Gross per 24 hour   Intake 120 ml   Output 550 ml   Net -430 ml     Wt Readings from Last 3 Encounters:   24 216 lb 7.9 oz (98.2 kg)   24 204 lb (92.5 kg)   24 204 lb 1.6 oz (92.6 kg)       General: pleasant, well appearing  Skin: no visible rashes  HEENT: NCAT  Cardiac: irreg irreg, 3/6 systolic murmur at apex  Lungs: CTAB, no wheeze, no rale, no rhonchi  Abdomen: Soft, NTND  Extremities: warm, well perfused, no leg edema, L arm swelling and bruising  Neurologic/Psych: mentating well, no asterixis  Vascular: RIJ tunnelled HD catheter    LABORATORY DATA:     Lab Results   Component Value Date     (H) 2024    BUN 66 (H) 2024    BUNCREA 13.0 2022    CREATSERUM 7.34 (H) 2024    ANIONGAP 9 2024    GFR 41 (L) 2017    GFRNAA 8 (L) 2022    GFRAA 9 (L) 2022    CA 9.5 2024    OSMOCALC 302 (H) 2024    ALKPHO 183 (H) 2024    AST 20 2024    ALT 38 2024    BILT 0.4 2024    TP 7.0 2024    ALB 4.3 2024    GLOBULIN 2.7 2024    AGRATIO 1.0 (L) 2016     2024    K 5.4 (H) 2024     2024    CO2 22.0 2024     Lab Results   Component Value Date    WBC 4.5 2024    RBC 2.54 (L) 2024    HGB 8.2 (L) 2024    HCT 24.5 (L) 2024    PLT 52.0 (L) 2024    MCV  96.5 08/13/2024    MCH 32.3 08/13/2024    MCHC 33.5 08/13/2024    RDW 16.9 08/13/2024    NEPRELIM 2.74 08/12/2024    NEPERCENT 65.1 08/12/2024    LYPERCENT 19.2 08/12/2024    MOPERCENT 8.1 08/12/2024    EOPERCENT 7.1 08/12/2024    BAPERCENT 0.5 08/12/2024    NE 2.74 08/12/2024    LYMABS 0.81 (L) 08/12/2024    MOABSO 0.34 08/12/2024    EOABSO 0.30 08/12/2024    BAABSO 0.02 08/12/2024     Lab Results   Component Value Date    MALBP 49.2 (H) 09/04/2019    CREUR 91.65 09/04/2019     Lab Results   Component Value Date    COLORUR Brown (A) 08/10/2024    CLARITY Cloudy (A) 08/10/2024    SPECGRAVITY  08/10/2024      Comment:      Unable to perform dipstick due to color interference.    Refer to the specific gravity test for the specific gravitiy result.      SPECGRAVITY 1.022 08/10/2024    GLUUR  08/10/2024      Comment:      Unable to perform dipstick due to color interference.        BILUR  08/10/2024      Comment:      Unable to perform dipstick due to color interference.      KETUR  08/10/2024      Comment:      Unable to perform dipstick due to color interference.        BLOODURINE  08/10/2024      Comment:      Unable to perform dipstick due to color interference.        PHURINE  08/10/2024      Comment:      Unable to perform dipstick due to color interference.        PROUR  08/10/2024      Comment:      Unable to perform dipstick due to color interference.        UROBILINOGEN  08/10/2024      Comment:      Unable to perform dipstick due to color interference.        NITRITE  08/10/2024      Comment:      Unable to perform dipstick due to color interference.        LEUUR  08/10/2024      Comment:      Unable to perform dipstick due to color interference.        Manish/uL Interpretation   25          Trace   75          1+   250         2+   500         3+    WBCUR >50 (A) 08/10/2024    RBCUR >10 (A) 08/10/2024    EPIUR Few (A) 08/10/2024    BACUR 2+ (A) 08/10/2024    HYLUR Present (A) 12/19/2016         IMAGING:      Reviewed.    MEDICATIONS:       Current Facility-Administered Medications   Medication Dose Route Frequency    epoetin eunice (Epogen, Procrit) 41789 UNIT/ML injection 10,000 Units  10,000 Units Subcutaneous Once per day on Tuesday Thursday Saturday    amoxicillin (Amoxil) cap 500 mg  500 mg Oral Daily    tamsulosin (Flomax) cap 0.4 mg  0.4 mg Oral Daily @ 0700    levothyroxine (Synthroid) tab 175 mcg  175 mcg Oral Daily @ 0700    morphINE PF 2 MG/ML injection 2 mg  2 mg Intravenous Q1H PRN    nitroglycerin (Nitrostat) SL tab 0.4 mg  0.4 mg Sublingual Q5 Min PRN    heparin (Porcine) 1000 UNIT/ML injection 1,500 Units  1.5 mL Intracatheter PRN Dialysis    [Held by provider] carvedilol (Coreg) tab 6.25 mg  6.25 mg Oral BID with meals    losartan (Cozaar) tab 25 mg  25 mg Oral Daily    clopidogrel (Plavix) tab 75 mg  75 mg Oral Daily    Lanthanum Carbonate (FOSRENOL) chewable tab 1,000 mg  1,000 mg Oral BID with meals    aspirin DR tab 81 mg  81 mg Oral QOD    amiodarone (Pacerone) tab 200 mg  200 mg Oral BID with meals    rosuvastatin (Crestor) tab 10 mg  10 mg Oral Nightly    pantoprazole (Protonix) DR tab 40 mg  40 mg Oral BID AC    insulin aspart (NovoLOG) 100 Units/mL FlexPen 10 Units  10 Units Subcutaneous TID AC    insulin degludec (Tresiba) 100 units/mL flextouch 24 Units  24 Units Subcutaneous Nightly    acetaminophen (Tylenol Extra Strength) tab 500 mg  500 mg Oral Q4H PRN    polyethylene glycol (PEG 3350) (Miralax) 17 g oral packet 17 g  17 g Oral Daily PRN    sennosides (Senokot) tab 17.2 mg  17.2 mg Oral Nightly PRN    bisacodyl (Dulcolax) 10 MG rectal suppository 10 mg  10 mg Rectal Daily PRN    ondansetron (Zofran) 4 MG/2ML injection 4 mg  4 mg Intravenous Q6H PRN    metoclopramide (Reglan) 5 mg/mL injection 5 mg  5 mg Intravenous Q8H PRN    glucose (Dex4) 15 GM/59ML oral liquid 15 g  15 g Oral Q15 Min PRN    Or    glucose (Glutose) 40% oral gel 15 g  15 g Oral Q15 Min PRN    Or    glucose-vitamin C  (Dex-4) chewable tab 4 tablet  4 tablet Oral Q15 Min PRN    Or    dextrose 50% injection 50 mL  50 mL Intravenous Q15 Min PRN    Or    glucose (Dex4) 15 GM/59ML oral liquid 30 g  30 g Oral Q15 Min PRN    Or    glucose (Glutose) 40% oral gel 30 g  30 g Oral Q15 Min PRN    Or    glucose-vitamin C (Dex-4) chewable tab 8 tablet  8 tablet Oral Q15 Min PRN    insulin aspart (NovoLOG) 100 Units/mL FlexPen 2-10 Units  2-10 Units Subcutaneous TID AC and HS       ASSESSMENT/PLAN:     78 yo M with history of ESRD on iHD TTS via RIJ CVC at [facility] with Dr. Harvey, DM, HTN, HCV cirrhosis, CAD with prior PCI with interruptions in DAPT due to GIB/SAH, pAfib, ischemic CM (EF 40-45%) presented with NSTEMI s/p PCI and aortic stenosis for which he is undergoing evaluation for possible TAVR.    ESRD:  - HD today  - avoid gadolinium    Anemia in ESRD:  -- increase epo to 10K  -- transfuse per primary    MBD:  -- phos with AM labs  -- on lanthanum 1000 mg BID with meals    Hyperkalemia:  -- 2K bath  -- low K diet    HTN/CHF/aortic stenosis:  -- coreg on hold  -- on losartan    UTI, urinary retention:  -- on amoxicillin per primary/urology    Thank you for allowing me to participate in the care of this patient. Please do not hesitate to call with questions or concerns.        Marifer Nolasco MD  [facility]  [address] [place] [id number]    8/13/2024  12:39 PM

## 2024-08-14 ENCOUNTER — APPOINTMENT (OUTPATIENT)
Dept: CV DIAGNOSTICS | Facility: HOSPITAL | Age: 79
End: 2024-08-14
Attending: INTERNAL MEDICINE
Payer: MEDICARE

## 2024-08-14 VITALS
OXYGEN SATURATION: 100 % | RESPIRATION RATE: 18 BRPM | SYSTOLIC BLOOD PRESSURE: 163 MMHG | WEIGHT: 212.94 LBS | TEMPERATURE: 99 F | BODY MASS INDEX: 28.84 KG/M2 | HEART RATE: 65 BPM | HEIGHT: 72 IN | DIASTOLIC BLOOD PRESSURE: 77 MMHG

## 2024-08-14 LAB
ALBUMIN SERPL-MCNC: 4 G/DL (ref 3.2–4.8)
ANION GAP SERPL CALC-SCNC: 7 MMOL/L (ref 0–18)
BLOOD TYPE BARCODE: 5100
BUN BLD-MCNC: 48 MG/DL (ref 9–23)
CALCIUM BLD-MCNC: 9.4 MG/DL (ref 8.7–10.4)
CHLORIDE SERPL-SCNC: 103 MMOL/L (ref 98–112)
CO2 SERPL-SCNC: 26 MMOL/L (ref 21–32)
CREAT BLD-MCNC: 5.55 MG/DL
EGFRCR SERPLBLD CKD-EPI 2021: 10 ML/MIN/1.73M2 (ref 60–?)
ERYTHROCYTE [DISTWIDTH] IN BLOOD BY AUTOMATED COUNT: 17 %
GLUCOSE BLD-MCNC: 134 MG/DL (ref 70–99)
GLUCOSE BLD-MCNC: 146 MG/DL (ref 70–99)
GLUCOSE BLD-MCNC: 160 MG/DL (ref 70–99)
GLUCOSE BLD-MCNC: 165 MG/DL (ref 70–99)
HCT VFR BLD AUTO: 26.8 %
HGB BLD-MCNC: 8.7 G/DL
MAGNESIUM SERPL-MCNC: 1.8 MG/DL (ref 1.6–2.6)
MCH RBC QN AUTO: 31.1 PG (ref 26–34)
MCHC RBC AUTO-ENTMCNC: 32.5 G/DL (ref 31–37)
MCV RBC AUTO: 95.7 FL
OSMOLALITY SERPL CALC.SUM OF ELEC: 297 MOSM/KG (ref 275–295)
PHOSPHATE SERPL-MCNC: 5.1 MG/DL (ref 2.4–5.1)
PLATELET # BLD AUTO: 48 10(3)UL (ref 150–450)
POTASSIUM SERPL-SCNC: 4.9 MMOL/L (ref 3.5–5.1)
RBC # BLD AUTO: 2.8 X10(6)UL
SODIUM SERPL-SCNC: 136 MMOL/L (ref 136–145)
UNIT VOLUME: 350 ML
WBC # BLD AUTO: 4.5 X10(3) UL (ref 4–11)

## 2024-08-14 PROCEDURE — 82962 GLUCOSE BLOOD TEST: CPT

## 2024-08-14 PROCEDURE — 83735 ASSAY OF MAGNESIUM: CPT | Performed by: HOSPITALIST

## 2024-08-14 PROCEDURE — 93018 CV STRESS TEST I&R ONLY: CPT | Performed by: INTERNAL MEDICINE

## 2024-08-14 PROCEDURE — 93017 CV STRESS TEST TRACING ONLY: CPT | Performed by: INTERNAL MEDICINE

## 2024-08-14 PROCEDURE — 93350 STRESS TTE ONLY: CPT | Performed by: INTERNAL MEDICINE

## 2024-08-14 PROCEDURE — 80069 RENAL FUNCTION PANEL: CPT | Performed by: INTERNAL MEDICINE

## 2024-08-14 PROCEDURE — 85027 COMPLETE CBC AUTOMATED: CPT | Performed by: HOSPITALIST

## 2024-08-14 RX ORDER — TAMSULOSIN HYDROCHLORIDE 0.4 MG/1
0.4 CAPSULE ORAL DAILY
Qty: 30 CAPSULE | Refills: 0 | Status: SHIPPED | OUTPATIENT
Start: 2024-08-14 | End: 2024-09-13

## 2024-08-14 RX ORDER — NITROGLYCERIN 0.4 MG/1
0.4 TABLET SUBLINGUAL EVERY 5 MIN PRN
Qty: 10 TABLET | Refills: 3 | Status: SHIPPED | OUTPATIENT
Start: 2024-08-14

## 2024-08-14 RX ORDER — AMOXICILLIN 500 MG/1
500 CAPSULE ORAL DAILY
Qty: 4 CAPSULE | Refills: 0 | Status: SHIPPED | OUTPATIENT
Start: 2024-08-15 | End: 2024-08-19

## 2024-08-14 RX ORDER — ALBUMIN (HUMAN) 12.5 G/50ML
25 SOLUTION INTRAVENOUS
Status: DISCONTINUED | OUTPATIENT
Start: 2024-08-14 | End: 2024-08-14

## 2024-08-14 RX ORDER — LOSARTAN POTASSIUM 25 MG/1
25 TABLET ORAL DAILY
Qty: 30 TABLET | Refills: 3 | Status: SHIPPED | OUTPATIENT
Start: 2024-08-15

## 2024-08-14 RX ORDER — CARVEDILOL 6.25 MG/1
6.25 TABLET ORAL 2 TIMES DAILY WITH MEALS
Qty: 60 TABLET | Refills: 3 | Status: SHIPPED | OUTPATIENT
Start: 2024-08-15

## 2024-08-14 RX ORDER — HEPARIN SODIUM 1000 [USP'U]/ML
1.5 INJECTION, SOLUTION INTRAVENOUS; SUBCUTANEOUS
Status: DISCONTINUED | OUTPATIENT
Start: 2024-08-15 | End: 2024-08-14

## 2024-08-14 RX ORDER — CLOPIDOGREL BISULFATE 75 MG/1
75 TABLET ORAL DAILY
Qty: 30 TABLET | Refills: 11 | Status: SHIPPED | OUTPATIENT
Start: 2024-08-15

## 2024-08-14 RX ORDER — DOBUTAMINE HYDROCHLORIDE 200 MG/100ML
INJECTION INTRAVENOUS
Status: DISCONTINUED
Start: 2024-08-14 | End: 2024-08-14 | Stop reason: WASHOUT

## 2024-08-14 RX ORDER — DOBUTAMINE HYDROCHLORIDE 200 MG/100ML
INJECTION INTRAVENOUS
Status: COMPLETED
Start: 2024-08-14 | End: 2024-08-14

## 2024-08-14 NOTE — PROGRESS NOTES
[facility]   part of [facility]    Nephrology Progress Note    Jonny Garland Attending:  Gilles Wolfe DO       SUBJECTIVE:     No complaints.    PHYSICAL EXAM:     Vital Signs: BP (!) 172/73 (BP Location: Right arm)   Pulse 68   Temp 98.4 °F (36.9 °C) (Oral)   Resp 20   Ht 182.9 cm (6')   Wt 212 lb 15.4 oz (96.6 kg)   SpO2 100%   BMI 28.88 kg/m²   Temp (24hrs), Av.2 °F (36.8 °C), Min:97.9 °F (36.6 °C), Max:98.4 °F (36.9 °C)       Intake/Output Summary (Last 24 hours) at 2024 1221  Last data filed at 2024 0838  Gross per 24 hour   Intake 480 ml   Output 2200 ml   Net -1720 ml     Wt Readings from Last 3 Encounters:   24 212 lb 15.4 oz (96.6 kg)   24 204 lb (92.5 kg)   24 204 lb 1.6 oz (92.6 kg)          General: pleasant, well appearing  Skin: no visible rashes  HEENT: NCAT  Cardiac: irreg irreg, 3/6 systolic murmur at apex  Lungs: CTAB, no wheeze, no rale, no rhonchi  Abdomen: Soft, NTND  Extremities: warm, well perfused, no leg edema, L arm swelling and bruising  Neurologic/Psych: mentating well, no asterixis  Vascular: RIJ tunnelled HD catheter    LABORATORY DATA:     Lab Results   Component Value Date     (H) 2024    BUN 48 (H) 2024    BUNCREA 13.0 2022    CREATSERUM 5.55 (H) 2024    ANIONGAP 7 2024    GFR 41 (L) 2017    GFRNAA 8 (L) 2022    GFRAA 9 (L) 2022    CA 9.4 2024    OSMOCALC 297 (H) 2024    ALKPHO 183 (H) 2024    AST 20 2024    ALT 38 2024    BILT 0.4 2024    TP 7.0 2024    ALB 4.0 2024    GLOBULIN 2.7 2024    AGRATIO 1.0 (L) 2016     2024    K 4.9 2024     2024    CO2 26.0 2024     Lab Results   Component Value Date    WBC 4.5 2024    RBC 2.80 (L) 2024    HGB 8.7 (L) 2024    HCT 26.8 (L) 2024    PLT 48.0 (L) 2024    MCV 95.7 2024    MCH 31.1 2024    MCHC 32.5  08/14/2024    RDW 17.0 08/14/2024    NEPRELIM 2.74 08/12/2024    NEPERCENT 65.1 08/12/2024    LYPERCENT 19.2 08/12/2024    MOPERCENT 8.1 08/12/2024    EOPERCENT 7.1 08/12/2024    BAPERCENT 0.5 08/12/2024    NE 2.74 08/12/2024    LYMABS 0.81 (L) 08/12/2024    MOABSO 0.34 08/12/2024    EOABSO 0.30 08/12/2024    BAABSO 0.02 08/12/2024     Lab Results   Component Value Date    MALBP 49.2 (H) 09/04/2019    CREUR 91.65 09/04/2019     Lab Results   Component Value Date    COLORUR Brown (A) 08/10/2024    CLARITY Cloudy (A) 08/10/2024    SPECGRAVITY  08/10/2024      Comment:      Unable to perform dipstick due to color interference.    Refer to the specific gravity test for the specific gravitiy result.      SPECGRAVITY 1.022 08/10/2024    GLUUR  08/10/2024      Comment:      Unable to perform dipstick due to color interference.        BILUR  08/10/2024      Comment:      Unable to perform dipstick due to color interference.      KETUR  08/10/2024      Comment:      Unable to perform dipstick due to color interference.        BLOODURINE  08/10/2024      Comment:      Unable to perform dipstick due to color interference.        PHURINE  08/10/2024      Comment:      Unable to perform dipstick due to color interference.        PROUR  08/10/2024      Comment:      Unable to perform dipstick due to color interference.        UROBILINOGEN  08/10/2024      Comment:      Unable to perform dipstick due to color interference.        NITRITE  08/10/2024      Comment:      Unable to perform dipstick due to color interference.        LEUUR  08/10/2024      Comment:      Unable to perform dipstick due to color interference.        Manish/uL Interpretation   25          Trace   75          1+   250         2+   500         3+    WBCUR >50 (A) 08/10/2024    RBCUR >10 (A) 08/10/2024    EPIUR Few (A) 08/10/2024    BACUR 2+ (A) 08/10/2024    HYLUR Present (A) 12/19/2016         IMAGING:     Reviewed.    MEDICATIONS:       Current  Facility-Administered Medications   Medication Dose Route Frequency    epoetin eunice (Epogen, Procrit) 03283 UNIT/ML injection 10,000 Units  10,000 Units Subcutaneous Once per day on Tuesday Thursday Saturday    amoxicillin (Amoxil) cap 500 mg  500 mg Oral Daily    tamsulosin (Flomax) cap 0.4 mg  0.4 mg Oral Daily @ 0700    levothyroxine (Synthroid) tab 175 mcg  175 mcg Oral Daily @ 0700    morphINE PF 2 MG/ML injection 2 mg  2 mg Intravenous Q1H PRN    nitroglycerin (Nitrostat) SL tab 0.4 mg  0.4 mg Sublingual Q5 Min PRN    heparin (Porcine) 1000 UNIT/ML injection 1,500 Units  1.5 mL Intracatheter PRN Dialysis    [Held by provider] carvedilol (Coreg) tab 6.25 mg  6.25 mg Oral BID with meals    losartan (Cozaar) tab 25 mg  25 mg Oral Daily    clopidogrel (Plavix) tab 75 mg  75 mg Oral Daily    Lanthanum Carbonate (FOSRENOL) chewable tab 1,000 mg  1,000 mg Oral BID with meals    aspirin DR tab 81 mg  81 mg Oral QOD    amiodarone (Pacerone) tab 200 mg  200 mg Oral BID with meals    rosuvastatin (Crestor) tab 10 mg  10 mg Oral Nightly    pantoprazole (Protonix) DR tab 40 mg  40 mg Oral BID AC    insulin aspart (NovoLOG) 100 Units/mL FlexPen 10 Units  10 Units Subcutaneous TID AC    insulin degludec (Tresiba) 100 units/mL flextouch 24 Units  24 Units Subcutaneous Nightly    acetaminophen (Tylenol Extra Strength) tab 500 mg  500 mg Oral Q4H PRN    polyethylene glycol (PEG 3350) (Miralax) 17 g oral packet 17 g  17 g Oral Daily PRN    sennosides (Senokot) tab 17.2 mg  17.2 mg Oral Nightly PRN    bisacodyl (Dulcolax) 10 MG rectal suppository 10 mg  10 mg Rectal Daily PRN    ondansetron (Zofran) 4 MG/2ML injection 4 mg  4 mg Intravenous Q6H PRN    metoclopramide (Reglan) 5 mg/mL injection 5 mg  5 mg Intravenous Q8H PRN    glucose (Dex4) 15 GM/59ML oral liquid 15 g  15 g Oral Q15 Min PRN    Or    glucose (Glutose) 40% oral gel 15 g  15 g Oral Q15 Min PRN    Or    glucose-vitamin C (Dex-4) chewable tab 4 tablet  4 tablet Oral  Q15 Min PRN    Or    dextrose 50% injection 50 mL  50 mL Intravenous Q15 Min PRN    Or    glucose (Dex4) 15 GM/59ML oral liquid 30 g  30 g Oral Q15 Min PRN    Or    glucose (Glutose) 40% oral gel 30 g  30 g Oral Q15 Min PRN    Or    glucose-vitamin C (Dex-4) chewable tab 8 tablet  8 tablet Oral Q15 Min PRN    insulin aspart (NovoLOG) 100 Units/mL FlexPen 2-10 Units  2-10 Units Subcutaneous TID AC and HS       ASSESSMENT/PLAN:     80 yo M with history of ESRD on iHD TTS via RIJ CVC at [facility] with Dr. Harvey, DM, HTN, HCV cirrhosis, CAD with prior PCI with interruptions in DAPT due to GIB/SAH, pAfib, ischemic CM (EF 40-45%) presented with NSTEMI s/p PCI and aortic stenosis for which he is undergoing evaluation for possible TAVR.     ESRD:  - HD tomorrow per outpatient schedule  - avoid gadolinium     Anemia in ESRD:  -- increase epo to 10K TTS  -- transfuse per primary     MBD:  -- phos at goal  -- on lanthanum 1000 mg BID with meals     Hyperkalemia:  -- 2K bath  -- low K diet     HTN/CHF/aortic stenosis:  -- coreg on hold due to dobutamine stress test  -- on losartan     UTI, urinary retention:  -- on amoxicillin per primary/urology        Thank you for allowing me to participate in the care of this patient. Please do not hesitate to call with questions or concerns.        Marifer Nolasco MD  [facility]ph[facility]  [address] [place] [id number]    8/14/2024  12:21 PM

## 2024-08-14 NOTE — CM/SW NOTE
Met with pt to discuss HH.  Pt said he has been independent at home and his son lives with him so he feels he does not need HH at this time.  Pt declining HH.

## 2024-08-14 NOTE — DISCHARGE SUMMARY
[facility] Discharge Summary    Patient ID  Jonny Haque  [id number]  79 year old  4/15/1945    Admit date: 8/3/2024    Discharge date: 08/14/24    Attending: Gilles Wolfe DO     Primary Care Physician: Gee Jimenez MD      Reason for admission: cp    Discharge condition: stable    Disposition: home    Important follow up:  -PCP within 7 d  -specialists: cards - has appt next week     -labs:    -radiology:      Additional patient instructions       Discharge med list     Medication List        START taking these medications      amoxicillin 500 MG Caps  Commonly known as: Amoxil  Take 1 capsule (500 mg total) by mouth daily for 4 days. Take after dialysis on dialysis days  Start taking on: August 15, 2024     carvedilol 6.25 MG Tabs  Commonly known as: Coreg  Take 1 tablet (6.25 mg total) by mouth 2 (two) times daily with meals.  Start taking on: August 15, 2024     losartan 25 MG Tabs  Commonly known as: Cozaar  Take 1 tablet (25 mg total) by mouth daily.  Start taking on: August 15, 2024     tamsulosin 0.4 MG Caps  Commonly known as: Flomax  Take 1 capsule (0.4 mg total) by mouth daily.            CHANGE how you take these medications      clopidogrel 75 MG Tabs  Commonly known as: Plavix  Take 1 tablet (75 mg total) by mouth daily.  Start taking on: August 15, 2024  What changed: when to take this     Insulin Lispro (1 Unit Dial) 100 UNIT/ML Sopn  Commonly known as: HumaLOG KwikPen  12 units before meals with sliding scale. Max daily dose: 50 units.  What changed:   how much to take  additional instructions     nitroglycerin 0.4 MG Subl  Commonly known as: Nitrostat  Place 1 tablet (0.4 mg total) under the tongue every 5 (five) minutes as needed for Chest pain.  What changed:   medication strength  how much to take            CONTINUE taking these medications      amiodarone 200 MG Tabs  Commonly known as: Pacerone  Take 1 tablet (200 mg total) by mouth 2 (two) times daily with meals.     aspirin  81 MG Tbec     Auryxia 1  MG(Fe) Tabs  Generic drug: Ferric Citrate     Vernell Contour Next Monitor w/Device Kit  1 Device by Does not apply route 4 (four) times daily.     * Contour Next Test Strp  Generic drug: Glucose Blood  TEST BLOOD SUGAR FOUR TIMES DAILY     * OneTouch Ultra Strp  Generic drug: Glucose Blood  6 times a day     Lanthanum Carbonate 1000 MG Chew  Commonly known as: FOSRENOL     levothyroxine 175 MCG Tabs  Commonly known as: Synthroid     pantoprazole 40 MG Tbec  Commonly known as: Protonix     rosuvastatin 10 MG Tabs  Commonly known as: Crestor  Take 1 tablet (10 mg total) by mouth nightly.     sevelamer carbonate 800 MG Tabs  Commonly known as: Renvela     Toujeo Max SoloStar 300 UNIT/ML Sopn  Generic drug: Insulin Glargine (2 Unit Dial)  Inject 20 Units into the skin nightly.     TRUEplus Pen Needles 31G X 5 MM Misc  Generic drug: Insulin Pen Needle  Use 5 per day     zolpidem 10 MG Tabs  Commonly known as: Ambien           * This list has 2 medication(s) that are the same as other medications prescribed for you. Read the directions carefully, and ask your doctor or other care provider to review them with you.                STOP taking these medications      isosorbide mononitrate ER 30 MG Tb24  Commonly known as: Imdur               Where to Get Your Medications        These medications were sent to [id number] - [place], [place] - [address] AT [facility], [phone], [phone] [address], [facility] [address]      Phone: [phone]   amoxicillin 500 MG Caps  carvedilol 6.25 MG Tabs  clopidogrel 75 MG Tabs  losartan 25 MG Tabs  nitroglycerin 0.4 MG Subl       You can get these medications from any pharmacy    Bring a paper prescription for each of these medications  tamsulosin 0.4 MG Caps         Discharge Diagnoses:    # Chest pain with history of unstable angina  # CAD status post PCI  # NSTEMI  Ischemic CM  # Post angiogram  hypotension   # Anemia-acute on chronic   # Paroxysmal A-fib  # V. Tach  # Recent fall with subdural hematoma   # ESRD   # Hypothyroidism     # HSV cirrhosis   # Moderate aortic stenosis   # Thrombocytopenia   # Type 2 diabetes   # Essential HTN   # Urinary retention   # UTI      Consults:  IP CONSULT TO HOSPITALIST  IP CONSULT TO NEPHROLOGY  IP CONSULT TO CARDIOLOGY  IP CONSULT TO UROLOGY  IP CONSULT TO CARDIAC REHAB  IP CONSULT TO FOOD AND NUTRITION SERVICES  IP CONSULT TO GASTROENTEROLOGY  IP CONSULT TO SOCIAL WORK    Radiology:  CARD ECHO LOW-DOSE DOBUT CHALLENGE (CPT=93350/94253)    Result Date: 8/14/2024  Table formatting from the original result was not included. PATIENT'S NAME: Jonny Haque PRIMARY PHYSICIAN: Gee Jimenez MD ORDERING PROVIDER: Randal Correia SONOGRAPHER: JOAN Bhardwaj PATIENT ACCOUNT #: [id number] MEDICAL RECORD #: [id number] YOB: 1945 DATE OF TEST: 8/14/2024 AGE AT TEST:  79 year old     DOBUTAMINE CHALLENGE STRESS ECHOCARDIOGRAM RESTING EKG: Sinus bradycardia, first degree AV block, IVCD. STAGE   Infusion Rate  BP HR  REST 130/64 66   0 mcg/kg/min 1 136/64 61 5 mcg/kg/min 2 144/64 63 10 mcg/kg/min 3 150/64 66 15 mcg/kg/min 4   20 mcg/kg/min 5    Peak 150/64 66 Immediate    2 minute post 142/64 70 5 minute post  66 7 minute post     RESULTS  Maximal predicted heart rate: 143 Target heart rate (90%): 128 % Achieved: 50% EKG Response: Normal Reason for stopping test: Achieved peak velocity Duration of exercise: 17:00 INDICATIONS:  Low gradient aortic stenosis with reduced ejection fraction. TECHNIQUE:  Resting echocardiography was performed. Dobutamine infusion was started. The maximum dose of dobutamine administered was 15 mcg/kg/min. Additional echocardiography was obtained with dobutamine infusion. Dobutamine was discontinued.  The patient tolerated the procedure well without complications. FINDINGS:   The resting echocardiography revealed global left ventricular  systolic dysfunction. The estimated ejection fraction is 40%. The aortic valve appears calcified with restricted leaflet motion. The mean gradient at rest was 23 mmHg. The peak velocity at rest was 3.3 m/s. The VTI ratio is 0.31. The valve area is 1.4 cm2. The indexed valve area is 0.64 cm2 per m2. The stroke volume at rest was 107 mL. At the peak dose of dobutamine infusion, the left ventricular ejection fraction improved to 55%. The mean gradient increased to 36 mmHg.  The peak velocity increased to 4.2 m/s. The VTI ratio was 0.29. The valve area was 1.3 cm2. The indexed valve area is 0.6 cm2 per m2. CONCLUSION:  Severe low gradient aortic stenosis confirmed with dobutamine echocardiography. At peak dose of dobutamine, the patient had a 4.2 m/s jet across the aortic valve with an indexed valve area of 0.6 cm2 per m2, both of which meet criteria for severe aortic stenosis. lexie Correia MD 08/14/2024 14:25 t  [id number] 8/14/2024 3:16 PM #[id number] cc:    US VENOUS DOPPLER ARM LEFT - DIAG IMG (CPT=93971)    Result Date: 8/11/2024  PROCEDURE:  US VENOUS DOPPLER ARM LEFT - DIAG IMG (CPT=93971)  COMPARISON:  None.  INDICATIONS:  DVT  TECHNIQUE:  Real time, grey scale, and duplex ultrasound was used to evaluate the upper extremity venous system. B-mode two-dimensional images of the vascular structures, Doppler spectral analysis, and color flow.  Doppler imaging were performed.  The following veins were imaged:  Subclavian, Jugular, Axillary, Brachial, Basilic, Cephalic, and the contralateral Subclavian and Jugular.  PATIENT STATED HISTORY: (As transcribed by Technologist)     FINDINGS:  EXTREMITY:  Left upper extremity THROMBI:  None visible. COMPRESSION:  Normal compressibility, phasicity, and augmentation of the subclavian, jugular, axillary, brachial, basilic, and cephalic veins. OTHER:  Negative.            CONCLUSION:  Unremarkable venous ultrasound of left upper extremity.   LOCATION:  [facility]   Dictated by  (CST): Humberto Altman MD on 8/11/2024 at 11:56 AM     Finalized by (CST): Humberto Altman MD on 8/11/2024 at 11:56 AM       CTA GATED THORACIC AORTA (CPT=71275)    Result Date: 8/10/2024  Table formatting from the original result was not included. PROCEDURE: CTA GATED THORACIC AORTA (CPT=71275) DATE: 8/9/24 COMPARISON: CTA ABD/PEL (CPT=74174) 08/09/2024 [id number] Final INDICATIONS: None TECHNIQUE: The patient was placed supine on the multidetector CT table. Axial sections were obtained before and after administration of intravenous contrast. IV contrast was used for the study. Post-processing on an independent workstation including maximum intensity projections and 3D volume rendering was utilized. A narrow field of view was used to maximize cardiovascular imaging. See the Radiologist over-read for evaluation of non-vascular structures. Dose reduction techniques were used. Dose information is transmitted to the ACR (American College of Radiology) NRDR (National Radiology Data Registry) which includes the Dose Index Registry. STUDY QUALITY:  Good LIMITATIONS:    None EXAM FORM: CT Scanner:   GE CT Revolution Contrast Given:   Isovue 370 Contrast Amount (cc):  100 Dose (msv):   9 BMI:    28 Heart Rate (bpm):  54 Medication Used:  None FINDINGS: RIGHT ATRIUM: Systemic venous return to the right atrium appears normal. The interatrial septum appears intact. LEFT ATRIUM: Pulmonary venous return to the left atrium appears normal. Four pulmonary veins are noted: right superior, right inferior, left superior and left inferior. RIGHT VENTRICLE: The right ventricle appears normal in size. LEFT VENTRICLE: The left ventricle appears normal in size. There is concentric hypertrophy.  AORTIC VALVE: The aortic valve morphology is trileaflet. There is moderate aortic valve calcification (1612 Agatston units). There is no calcification of the left ventricular outflow tract. The aortic valve annulus measures 22.3 mm in minimum diameter,  30.8 mm in maximal diameter, 26.5 mm in average diameter, 85.1 mm in perimeter and 550 mm2 in area. The predicted fluoroscopic coplanar orientation is 19 degrees of GOPAL and 9 degrees of caudal angulation. MITRAL VALVE: There is moderate mitral annular calcification. AORTIC ROOT: The sinuses of Valsalva have an average diameter of 35.6 mm. The sinotubular height is 24.1 mm from the aortic valve annulus. The sinotubular junction has an average diameter of 29.2 mm. ASCENDING AORTA: The aortoventricular angle is 3 degrees. There is moderate calcification of the sinotubular junction. There is no calcification of the ascending aorta. The ascending aorta is normal in size. AORTIC ARCH:   The aortic arch is normal in size. There is moderate calcification of the aortic arch. Three vessels arise from the arch: brachiocephalic trunk, left common carotid artery, and left subclavian artery. DESCENDING AORTA:   The descending thoracic aorta is normal in size. There is moderate calcification of the descending thoracic aorta.  LM: The left main coronary artery originates from the left coronary cusp. The left main coronary artery height is 16.1 mm superior to the aortic valve annulus. It is a normal sized vessel. A stent is noted in the left main. LAD: The left anterior descending coronary artery is a large vessel. LCx: The left circumflex is a large, dominant vessel. RCA: The right coronary artery originates from the right coronary cusp. The right coronary artery height is 21.3 mm superior to the aortic valve annulus. It is a small, nondominant vessel.  PERICARDIUM: There is no pericardial effusion.  CONCLUSIONS:  1.  The aortic valve morphology is trileaflet. There is moderate aortic valve calcification (1612 Agatston units). There is no calcification of the left ventricular outflow tract. The aortic valve annulus measures 22.3 mm in minimum diameter, 30.8 mm in maximal diameter, 26.5 mm in average diameter, 85.1 mm in perimeter  and 550 mm2 in area. 2.  The predicted fluoroscopic coplanar orientation is 19 degrees of [place] and 9 degrees of caudal angulation. 3.  Please refer to the radiologist's interpretation of non-cardiac structures. Randal Correia MD, [facility] 8/10/2024 7:15 AM    CTA ABD/PEL (CPT=74174)    Result Date: 8/9/2024  PROCEDURE:  CTA ABD/PEL (CPT=74174  COMPARISON:  [facility] , CT, CT ABDOMEN+PELVIS(CPT=74176), 8/06/2024, 11:26 PM.  INDICATIONS:  tavr work-up; use TAVR protocol - Dr. Tee to read  TECHNIQUE:  CT images of the abdomen, pelvis, and lower extremities were obtained pre- and post- injection of non-ionic intravenous contrast material. Multi-planar reformatted/3-D images were created to optimize visualization of vascular anatomy.  Dose reduction techniques were used. Dose information is transmitted to the ACR (American College of Radiology) NRDR (National Radiology Data Registry) which includes the Dose Index Registry.  PATIENT STATED HISTORY:(As transcribed by Technologist)  TAVR   CONTRAST USED:  100cc of Isovue 370  FINDINGS:  AORTO-ILIAC:  Moderate to severe calcified atherosclerosis, with relative sparing of the external iliac artery segments.  Smooth tapering of the abdominal aorta.  No infrarenal abdominal aortic aneurysm.   Patent celiac artery, SMA and RODY.  Moderate to severe origin and proximal disease.   Single renal arteries are seen bilaterally.  These are small vessels with severe origin stenoses.  No poststenotic dilatation.  Circumaortic left renal vein, anatomic variation.   There is high-grade stenosis at the origin of the right superficial femoral artery. LIVER:  Uniform parenchyma.  Nodular contour. BILIARY:  Nondistended gallbladder.  No biliary dilatation. PANCREAS:  Uniform parenchyma.  No ductal dilatation. SPLEEN:  Not enlarged. KIDNEYS:  Normal anatomic positions.  No hydronephrosis.  Scattered cortical and parapelvic cysts.  The largest is on the right measuring 2.1 cm. ADRENALS:  Not  enlarged. RETROPERITONEUM:  No adenopathy. BOWEL/MESENTERY:  Normal bowel caliber.  Redundant colon.  Moderate to severe colonic diverticulosis.  No acute diverticulitis.  No ascites. ABDOMINAL WALL:  Large fat containing umbilical hernia.  Sac measured up to 6.9 cm. Defect measured 2.8 cm.  Small fat containing bilateral inguinal hernias.  There is stranding at the right groin.  No focal fluid collection or mass. URINARY BLADDER:  Moderately filled.  Diffuse wall thickening.  Smooth contour. PELVIC NODES:  None enlarged. PELVIC ORGANS:  Moderate prostatomegaly. BONES:  Overall moderate degenerative changes.  Normal vertebral body heights.  No subluxation. LUNG BASES:  Mild scar and or atelectasis at the left base. OTHER:  None.  RIGHT Femoral Artery:      Tortuosity:      Mild iliac tortuosity.      Calcification:   Moderate to severe common iliac, mild external iliac calcified atherosclerosis.      Minimum luminal diameters:           Common iliac:     7 mm            External iliac:  6 mm           Common femoral:  5 mm  LEFT Femoral Artery:      Tortuosity:      Mild iliac tortuosity.      Calcification:   Moderate to severe common iliac, mild external iliac calcified atherosclerosis.      Minimum luminal diameters:           Common iliac:     4 mm            External iliac:  5 mm           Common femoral:  5 mm             CONCLUSION:  1. Moderate to severe overall calcified atherosclerosis. 2. Moderate narrowing origin and proximal left common iliac artery. 3. High-grade stenosis at the origin of the right superficial femoral artery. 4. There is stranding at the right groin.  This may be any combination of scarring and or inflammation.  No focal fluid collection or hematoma. 5. Diffuse urinary bladder wall thickening.  Given prostatomegaly, it may be secondary to chronic outlet obstruction.  Differential would include etiologies of acute and chronic cystitis. 6. Nodular contour of the liver.  This can be seen  with cirrhosis. 7. Details as above.  Continued clinical correlation recommended.    LOCATION:  [facility]   Dictated by (CST): Abran Erwin MD on 8/09/2024 at 5:02 PM     Finalized by (CST): Abran Erwin MD on 8/09/2024 at 5:12 PM       CT CARDIAC OVER READ    Result Date: 8/9/2024  This is an over read for the non-cardiac, non-vascular limited visualized portions of the chest and abdomen.  PROCEDURE:  CT CARDIAC OVER READ  COMPARISON:  None.  INDICATIONS:  cp  TECHNIQUE:  CT images of the chest were obtained as part of a cardiac CT evaluation.  Please refer to Cardiologist report for contrast volume and technique.  Dose reduction techniques were used. Dose information is transmitted to the ACR (American College of Radiology) NRDR (National Radiology Data Registry) which includes the Dose Index Registry.  PATIENT STATED HISTORY: (As transcribed by Technologist)     FINDINGS:  LUNGS:  Lucencies are present consistent with mild to moderate manifestations of centrilobular emphysema.   Mild scarring and or atelectasis at the left base.   No bronchiectasis. HELDER:  No adenopathy. MEDIASTINUM:  No adenopathy. PLEURA:  No pleural effusion. CHEST WALL:  No abnormality. LIMITED ABDOMEN:  The liver is partially imaged.  Nodular contour. BONES:  Scattered up to moderate to severe degenerative changes.  Most pronounced lower cervical.             CONCLUSION:  1. Emphysema with mild scarring and or atelectasis at the left base. 2. Nodular contour of the liver.  This finding can be seen with cirrhosis. 3. Continued clinical correlation recommended.     LOCATION:  [facility]    Dictated by (CST): Abran Erwin MD on 8/09/2024 at 4:58 PM     Finalized by (CST): Abran Erwin MD on 8/09/2024 at 5:01 PM       CATH ANGIO    Result Date: 8/8/2024  This exam has been completed. Please refer to Notes for the results to this procedure.    CARD ECHO 2D DOPPLER (CPT=93306)    Result Date: 8/7/2024  Transthoracic Echocardiogram Name:Garland Jonny Date:  2024 :  04/15/1945 Ht:  (72in)  BP: 128 / 75 MRN:  [id number]    Age:  79years    Wt:  (206lb) HR: 62bpm Loc:  [facility]       Gndr: M          BSA: 2.16m^2 Sonographer: JOAN Johnson Ordering:    Satya Donald MD Consulting:  Ashwin Montgomery ---------------------------------------------------------------------------- History/Indications:   Aortic stenosis. Exertional chest pain. ---------------------------------------------------------------------------- Procedure information:  A transthoracic complete 2D study was performed. Additional evaluation included M-mode, complete spectral Doppler, and color Doppler.  Patient status:  Inpatient.  Location:  Room 12.    Comparison was made to the study of 2024.    This was a routine study. Transthoracic echocardiography for ventricular function evaluation and assessment of valvular function. Image quality was adequate. ECG rhythm:   Normal sinus ---------------------------------------------------------------------------- Conclusions: 1. Left ventricle: The cavity size was normal. Wall thickness was mildly    increased. Systolic function was mildly reduced. The estimated ejection    fraction was 45-50%, by visual assessment. Left ventricular diastolic    function parameters were normal for the patient's age. 2. Ventricular septum: Septal motion was dyssynergic. These changes are    consistent with a left bundle branch block. 3. Left atrium: The left atrial volume was mildly increased. 4. Aortic valve: Transvalvular velocity was increased, due to stenosis. The    findings were consistent with moderate stenosis. The peak systolic    velocity was 3.51m/sec. The mean systolic gradient was 26mm Hg. The valve    area (VTI) was 1.28cm^2. The valve area (VTI) index was 0.59cm^2/m^2. 5. Aortic root: The aortic root was at the upper limits of normal. The    aortic root was 3.8cm diameter. 6. Pulmonary arteries: Systolic pressure was within the normal range, in the    range of  25mm Hg to 30mm Hg. Impressions:  This study is compared with previous dated 4/17/2024: * ---------------------------------------------------------------------------- * Findings: Left ventricle:  The cavity size was normal. Wall thickness was mildly increased. Systolic function was mildly reduced. The estimated ejection fraction was 45-50%, by visual assessment. Left ventricular diastolic function parameters were normal for the patient's age. Ventricular septum:   Septal motion was dyssynergic. These changes are consistent with a left bundle branch block. Left atrium:  The left atrial volume was mildly increased. Right ventricle:  The cavity size was normal. Systolic function was normal. Right atrium:  The atrium was normal in size. Mitral valve:  The annulus was mildly calcified. Leaflet separation was normal.  Doppler:  Transvalvular velocity was within the normal range. There was no evidence for stenosis. There was trivial regurgitation. Aortic valve:   The valve was trileaflet. The leaflets were mildly to moderately calcified.  Doppler:  Transvalvular velocity was increased, due to stenosis. The findings were consistent with moderate stenosis. There was no significant regurgitation.    The valve area (VTI) was 1.28cm^2. The valve area (VTI) index was 0.59cm^2/m^2.    The mean systolic gradient was 26mm Hg. The peak systolic gradient was 49mm Hg. Tricuspid valve:  The valve is structurally normal. Leaflet separation was normal.  Doppler:  Transvalvular velocity was within the normal range. There was no evidence for stenosis. There was trivial regurgitation. Pulmonic valve:   The valve is structurally normal. Cusp separation was normal.  Doppler:  Transvalvular velocity was within the normal range. There was no evidence for stenosis. There was trivial regurgitation. Pericardium:   There was no pericardial effusion. Aorta: Aortic root: The aortic root was at the upper limits of normal. The aortic root was 3.8cm  diameter. Ascending aorta: The ascending aorta was normal. Pulmonary arteries: Systolic pressure was within the normal range, in the range of 25mm Hg to 30mm Hg. Systemic veins:  Central venous respirophasic diameter changes are in the normal range (>50%). Inferior vena cava: The IVC was normal-sized. The IVC was normal-sized. ---------------------------------------------------------------------------- Measurements  Left ventricle       Value          Ref       04/17/2024  IVS thickness,   (H) 1.3   cm       0.6 - 1.0 1.1  ED, PLAX  LV ID, ED, PLAX      5.4   cm       4.2 - 5.8 5.3  LV ID, ES, PLAX      3.9   cm       2.5 - 4.0 3.8  LV PW thickness,     0.9   cm       0.6 - 1.0 1.1  ED, PLAX  IVS/LV PW ratio,     1.35           --------- 1.00  ED, PLAX  LV PW/LV ID          0.18           --------- 0.21  ratio, ED, PLAX  LV ejection          54    %        52 - 72   50  fraction  Stroke               49    ml/m^2   --------- 41  volume/bsa, 2D  LV e', lateral   (L) 7.7   cm/sec   >=10.0    ----------  LV E/e', lateral     9              <=13      ----------  LV e', medial    (L) 3.3   cm/sec   >=7.0     ----------  LV E/e', medial      21             --------- ----------  LV e', average       5.5   cm/sec   --------- 6.4  LV E/e', average     12             <=14      11  LVOT                 Value          Ref       04/17/2024  LVOT ID              2.3   cm       --------- 2.3  LVOT peak            0.92  m/sec    --------- 0.98  velocity, S  LVOT VTI, S          25.4  cm       --------- 21.4  LVOT mean            2     mm Hg    --------- 2  gradient, S  Stroke volume        106   ml       --------- 89  (SV), LVOT DP  Stroke index         49    ml/m^2   --------- 41  (SV/bsa), LVOT  DP  Aortic valve         Value          Ref       04/17/2024  Aortic valve         3.51  m/sec    --------- 3.8  peak velocity, S  Aortic valve         74.2  cm       --------- 70.0  VTI, S  Aortic mean          26    mm Hg    ---------  33  gradient, S  Aortic peak          49    mm Hg    --------- 58  gradient, S  Aortic valve         1.28  cm^2     --------- 1.27  area, VTI  Aortic valve         0.59  cm^2/m^2 --------- 0.59  area/bsa, VTI  Velocity ratio,      0.26           --------- 0.26  peak, LVOT/AV  Aortic root          Value          Ref       04/17/2024  Aortic root ID,      3.8   cm       2.8 - 4.2 3.8  ED  Ascending aorta      Value          Ref       04/17/2024  Ascending aorta      3.4   cm       2.2 - 3.8 3.2  ID, A-P, ED  Left atrium          Value          Ref       04/17/2024  LA volume, S     (H) 75    ml       18 - 58   ----------  LA volume/bsa, S (H) 35    ml/m^2   16 - 34   ----------  LA volume, ES,   (H) 68    ml       18 - 58   93  1-p A4C  LA volume, ES,   (H) 78    ml       18 - 58   ----------  1-p A2C  LA volume, ES,       85    ml       --------- ----------  A/L  LA volume/bsa,   (H) 39    ml/m^2   16 - 34   ----------  ES, A/L  Mitral valve         Value          Ref       04/17/2024  Mitral E-wave        0.69  m/sec    --------- 0.73  peak velocity  Mitral A-wave        1.26  m/sec    --------- 1.09  peak velocity  Mitral E/A           0.5            --------- 0.7  ratio, peak  Pulmonary artery     Value          Ref       04/17/2024  PA pressure, S,      27    mm Hg    --------- ----------  DP  Tricuspid valve      Value          Ref       04/17/2024  Tricuspid regurg     2.45  m/sec    <=2.8     2.34  peak velocity  Tricuspid peak       24    mm Hg    --------- 22  RV-RA gradient  Systemic veins       Value          Ref       04/17/2024  Estimated CVP        3     mm Hg    --------- 10  Right ventricle      Value          Ref       04/17/2024  RV pressure, S,      27    mm Hg    --------- 32  DP Legend: (L)  and  (H)  gali values outside specified reference range. ---------------------------------------------------------------------------- Prepared and electronically signed by Francisco Espinoza MD 08/07/2024  16:15     CT ABDOMEN+PELVIS(CPT=74176)    Result Date: 8/6/2024  PROCEDURE:  CT ABDOMEN+PELVIS (CPT=74176)  COMPARISON:  [facility] , CT, CT ABDOMEN+PELVIS(ALL W+WO)(CPT=74178), 10/25/2022, 3:31 PM.  INDICATIONS:  Gross hematuria, history of bladder cancer.  TECHNIQUE:  Unenhanced multislice CT scanning was performed from the dome of the diaphragm to the pubic symphysis.  Dose reduction techniques were used. Dose information is transmitted to the ACR (American College of Radiology) NRDR (National Radiology Data Registry) which includes the Dose Index Registry.  PATIENT STATED HISTORY: (As transcribed by Technologist)  Gross hematuria. History of bladder cancer.    FINDINGS:  LIVER:  There is a nodular contour to the liver and relative enlargement of the caudate lobe suspicious for hepatic cirrhosis.  Please correlate with patient's history.  No focal hepatic lesions are noted. BILIARY:  Mild high density sludge within the gallbladder.  No discrete evidence of stones or wall thickening.  No biliary ductal dilatation. PANCREAS:  Moderate pancreatic atrophy with fatty infiltrative changes.  There is suggestion of a low-density focus along the uncinate process of the pancreas measuring 1.2 cm.  This can be retrospectively seen on the previous study and is stable. SPLEEN:  Borderline splenomegaly measuring 12.9 cm in craniocaudal dimension. KIDNEYS:  No renal stones or hydronephrosis.  Bilateral low-density renal cysts, the largest of which involves the parapelvic region of the right kidney measuring 12.4 cm. ADRENALS:  No mass or enlargement.  AORTA/VASCULAR:    Extensive atherosclerotic calcifications of the aorta and iliac vessels.  Extensive atherosclerotic plaquing involving the splenic artery, common hepatic artery and SMA.  Extensive plaquing involving the right renal artery origin.  No aortic aneurysm.  There is a retroaortic left renal vein. RETROPERITONEUM:  No mass or adenopathy.  BOWEL/MESENTERY:  The stomach,  duodenum sweep and small bowel are unremarkable.  Moderate colonic stool burden.  Uncomplicated diverticulosis of the descending and sigmoid colon. ABDOMINAL WALL:  There is a periumbilical hernia containing omental fat that measures 6.3 x 4.7 x 7.8 cm in maximal dimension.  There is mild fat stranding within the hernia sac that extends into the non herniated portion of the greater omentum, which is  similar in appearance to previous imaging dating back to 2022. URINARY BLADDER:  No visible focal wall thickening, lesion, or calculus.  PELVIC NODES:  No adenopathy.  PELVIC ORGANS:  There is prostatomegaly measuring up to 5.4 cm in diameter. BONES:  No bony lesion or fracture.  LUNG BASES:  No visible pulmonary or pleural disease.             CONCLUSION:  1. No acute intra-abdominal or pelvic process noted. 2.  Stable periumbilical hernia containing omental fat with fat stranding. 3. Uncomplicated diverticulosis of the descending and sigmoid colon. 4. Pancreatic atrophy with stable low-density focus along the uncinate process of the pancreas.  This is nonspecific and may represent IPMN. 5. Please see the body of the report above for further details.   LOCATION:  [facility]   Dictated by (CST): Nadiya Castellon DO on 8/06/2024 at 11:45 PM     Finalized by (CST): Nadiya Castellon DO on 8/06/2024 at 11:53 PM       CATH ANGIO    Result Date: 8/5/2024  This exam has been completed. Please refer to Notes for the results to this procedure.    XR CHEST AP PORTABLE  (CPT=71045)    Result Date: 8/3/2024  PROCEDURE:  XR CHEST AP PORTABLE  (CPT=71045)  TECHNIQUE:  AP chest radiograph was obtained.  COMPARISON:  [facility] , XR, XR CHEST AP PORTABLE  (CPT=71045), 4/16/2024, 5:16 PM.  INDICATIONS:  Chest pain  PATIENT STATED HISTORY: (As transcribed by Technologist)  Patient states that his chest pain is in center of chest, feels like pressure. Has been having pain for 3 days. He has a history of 2 heart stents and aheart attack in 2021.     FINDINGS:  Right dialysis catheter tips in the region of the cavoatrial junction.  Cardiomegaly with normal pulmonary vascularity. No pleural effusion or pneumothorax. No lobar consolidation.            CONCLUSION:  No active cardiopulmonary process identified.   LOCATION:  [id number]      Dictated by (CST): Christian Trujillo MD on 8/03/2024 at 3:16 PM     Finalized by (CST): Christian Trujillo MD on 8/03/2024 at 3:16 PM         Operative reports:      Hospital course:    79-year-old male significant past medical history of CAD with stable angina-status post PCI in 2017, 2021 and most recently in March 2024 with 3 drug-eluting stents placed in the left main, LAD and OM, hypertension, pulmonary hypertension paroxysmal A-fib, type 2 diabetes, hypothyroidism, end-stage renal disease on dialysis, history of subdural hematoma, ischemic cardiomyopathy history of HCV cirrhosis moderate aortic stenosis, prior history of GI bleed presented with chest pain     # Chest pain with history of unstable angina  # CAD status post PCI  # NSTEMI  Ischemic CM  -Continue aspirin, statin, Plavix uninterrupted  -LHC done on 8/4/2024 80% OM disease and severed ISR in prox LAD, medical management recommended -pt not a candidate for CABG,   -Repeat angiogram on 8/8/2024 with PCI to LM into the LAD with lithotripsy angioplasty, NC balloon angioplasty, drug-coated balloon angioplasty also PCI to OM with drug-eluting stent  -Groin is stable, CTA was  showed moderate narrowing origin and proximal left common iliac artery with high-grade stenosis at the origin of the right SFA, fat stranding in the right groin no retroperitoneal hematoma - will refer to vascular as ouptatient given findings.   - sp dobutamine stress - showing severe aortic stenosis - paln for outpt eval with cards for tavr       # Post angiogram hypotension  -Resolved currently normotensive  -Improved with fluids during dialysis  -Mild hematoma noted on the right groin, stable with  FemoStop     # Anemia-acute on chronic  -has chronic anemia but likely worsened from groin hematoma and hematuria  -sp 3 unit pRBC  -goal hgb > 8 given cardiac hx  -no evidence of GIB- cont emperic ppi per GI. No role of endoscopy in absence of gross gi bleed  Check b12 / folate - ok     # Paroxysmal A-fib  # V. Tach  -Continue amiodarone     # Recent fall  with subdural hematoma  -Was admitted in May 2024 with this, -Was followed up with neurosurgery  -CT head has been stable  -Okay for dual antiplatelet therapy therapy     # ESRD  -HD per nephrology nephrology     # Hypothyroidism - on synthroid     # HSV cirrhosis - outpt gi fu      # Moderate aortic stenosis - may need tavr this admission, cards following      # Thrombocytopenia  -pt last saw Dr. Julian Nolasco 1/24/24, low pltc likely due to Hep C and cirrhosis, monitor, no intervention needed if pltc < 50  -Repeat echo completed and reviewed     # Type 2 diabetes  -Continue 20 units of insulin, sliding scale     # Essential HTN  - per discussion with pt and his son Ashwin, he was taken off metoprolol, diltiazem, and losartan as of 6/24/24 by outside renal  - meds adjusted by renal / cards - currently on losartan, coreg (latter held fro stress)     # Urinary retention   # UTI  - cont straight cath per protocol  -  c/s appreciated, may need mccloud   -Tamsulosin started  -Urology consulted, patient cystoscopy started on antibiotics as well - change to amoxicillin        Day of discharge exam:  Vitals:    08/14/24 1704   BP: (!) 163/77   Pulse: 65   Resp: 18   Temp:           Total time coordinating care 32 min      Patient and/or family had opportunity to ask questions and expressed understanding and agreement with therapeutic plan as outlined         Gilles Yee Hospitalist  [phone]  Answering Service: [phone]

## 2024-08-14 NOTE — PROGRESS NOTES
CC: follow-up hospital admission chest pain    SUBJECTIVE:  Interval History:     No acute issues overnight  Upset over dieatery opitions given stress diet he had  No cp    OBJECTIVE:  Scheduled Meds:    [START ON 8/15/2024] heparin  1.5 mL Intracatheter Once    epoetin eunice  10,000 Units Subcutaneous Once per day on Tuesday Thursday Saturday    amoxicillin  500 mg Oral Daily    tamsulosin  0.4 mg Oral Daily @ 0700    levothyroxine  175 mcg Oral Daily @ 0700    [Held by provider] carvedilol  6.25 mg Oral BID with meals    losartan  25 mg Oral Daily    clopidogrel  75 mg Oral Daily    Lanthanum Carbonate  1,000 mg Oral BID with meals    aspirin  81 mg Oral QOD    amiodarone  200 mg Oral BID with meals    rosuvastatin  10 mg Oral Nightly    pantoprazole  40 mg Oral BID AC    insulin aspart  10 Units Subcutaneous TID AC    insulin degludec  24 Units Subcutaneous Nightly    insulin aspart  2-10 Units Subcutaneous TID AC and HS     Continuous Infusions:   PRN Meds:   sodium chloride **AND** albumin human    morphINE    nitroglycerin    heparin    acetaminophen    polyethylene glycol (PEG 3350)    sennosides    bisacodyl    ondansetron    metoclopramide    glucose **OR** glucose **OR** glucose-vitamin C **OR** dextrose **OR** glucose **OR** glucose **OR** glucose-vitamin C    PHYSICAL EXAM  Vital signs: Temp:  [97.9 °F (36.6 °C)-98.6 °F (37 °C)] 98.6 °F (37 °C)  Pulse:  [63-68] 65  Resp:  [18-20] 18  BP: (140-183)/(58-85) 167/77  SpO2:  [100 %] 100 %      GENERAL - NAD, AAO  EYES- sclera anicteric,   HENT- normocephalic, OP - dry  NECK - supple  CV- RRR  RESP - ctab normal resp effort  ABDOMEN- soft, NT/ND   EXT- no LE edema   PSYCH - normal mentation/ normal affect    Data Review:   Labs:   Recent Labs   Lab 08/10/24  0444 08/10/24  1200 08/11/24  0454 08/12/24  0548 08/13/24  0452 08/13/24  1421 08/14/24  0520   WBC 4.0  --  4.3 4.2 4.5  --  4.5   HGB 6.5*   < > 7.4* 8.1* 8.2* 9.4* 8.7*   MCV 99.5  --  94.8 93.5 96.5   --  95.7   PLT 46.0*  --  41.0* 43.0* 52.0*  --  48.0*    < > = values in this interval not displayed.       Recent Labs   Lab 08/09/24  0428 08/10/24  0444 08/11/24  0454 08/12/24  0548 08/13/24  0452 08/14/24  0520   * 131* 134* 134* 136 136   K 4.5 5.2* 4.8 5.1 5.4* 4.9    99 101 102 105 103   CO2 26.0 24.0 26.0 23.0 22.0 26.0   BUN 34* 56* 38* 55* 66* 48*   CREATSERUM 4.69* 6.48* 4.75* 6.30* 7.34* 5.55*   CA 8.8 8.9 9.3 9.3 9.5 9.4   MG  --   --   --   --  1.9 1.8   PHOS 5.4*  --   --   --   --  5.1   * 164* 114* 116* 120* 146*       Recent Labs   Lab 08/14/24  0520   ALB 4.0       Recent Labs   Lab 08/13/24  0501 08/13/24  1119 08/13/24  2059 08/14/24  0508 08/14/24  1243   PGLU 148* 213* 225* 165* 134*           ASSESSMENT/PLAN:    79-year-old male significant past medical history of CAD with stable angina-status post PCI in 2017, 2021 and most recently in March 2024 with 3 drug-eluting stents placed in the left main, LAD and OM, hypertension, pulmonary hypertension paroxysmal A-fib, type 2 diabetes, hypothyroidism, end-stage renal disease on dialysis, history of subdural hematoma, ischemic cardiomyopathy history of HCV cirrhosis moderate aortic stenosis, prior history of GI bleed presented with chest pain     # Chest pain with history of unstable angina  # CAD status post PCI  # NSTEMI  Ischemic CM  -Continue aspirin, statin, Plavix uninterrupted  -LHC done on 8/4/2024 80% OM disease and severed ISR in prox LAD, medical management recommended -pt not a candidate for CABG,   -Repeat angiogram on 8/8/2024 with PCI to LM into the LAD with lithotripsy angioplasty, NC balloon angioplasty, drug-coated balloon angioplasty also PCI to OM with drug-eluting stent  -Groin is stable, CTA was done yesterday that showed moderate narrowing origin and proximal left common iliac artery with high-grade stenosis at the origin of the right SFA, fat stranding in the right groin no retroperitoneal hematoma -  will refer to vascular as ouptatient given findings.   - sp dobutamine stress tday       # Post angiogram hypotension  -Resolved currently normotensive  -Improved with fluids during dialysis  -Mild hematoma noted on the right groin, stable with FemoStop     # Anemia-acute on chronic  -has chronic anemia but likely worsened from groin hematoma and hematuria  -sp 3 unit pRBC  -goal hgb > 8 given cardiac hx  -no evidence of GIB- cont emperic ppi per GI. No role of endoscopy in absence of gross gi bleed  Check b12 / folate - ok     # Paroxysmal A-fib  # V. Tach  -Continue amiodarone     # Recent fall  with subdural hematoma  -Was admitted in May 2024 with this, -Was followed up with neurosurgery  -CT head has been stable  -Okay for dual antiplatelet therapy therapy     # ESRD  -HD per nephrology nephrology    # Hypothyroidism - on synthroid    # HSV cirrhosis - outpt gi fu     # Moderate aortic stenosis - may need tavr this admission, cards following     # Thrombocytopenia  -pt last saw Dr. Julian Nolasco 1/24/24, low pltc likely due to Hep C and cirrhosis, monitor, no intervention needed if pltc < 50  -Repeat echo completed and reviewed     # Type 2 diabetes  -Continue 20 units of insulin, sliding scale     # Essential HTN  - per discussion with pt and his son Ashwin, he was taken off metoprolol, diltiazem, and losartan as of 6/24/24 by outside renal  - meds adjusted by renal / cards - currently on losartan, coreg (latter held fro stress)     # Urinary retention   # UTI  - cont straight cath per protocol  -  c/s appreciated, may need mccloud   -Tamsulosin started  -Urology consulted, patient cystoscopy started on antibiotics as well - change to amoxicillin     Plan of care: inpt care.             Will continue to follow while hospitalized. Please page me or the on-call hospitalist with questions or concerns.    Gilles Yee Hospitalist  [phone]  Answering Service: [phone]

## 2024-08-14 NOTE — PROGRESS NOTES
Cardiology Progress Note  [facility]    Jonny Haque Patient Status:  Inpatient    4/15/1945 MRN [id number]   [facility] 8NE-A Attending Golden Escalante, DO   Hosp Day # 11 PCP Gee Jimenez MD       Subjective:  Ambulating without significant symptoms.  Stress echo this AM, peak velocity 4.2m/s (positive).  Index severe.  Planning TAVR in 2 weeks.  Wants to go home tonight.    Medications:  No current facility-administered medications on file prior to encounter.     Current Outpatient Medications on File Prior to Encounter   Medication Sig Dispense Refill    levothyroxine 175 MCG Oral Tab Take 1 tablet (175 mcg total) by mouth before breakfast.      isosorbide mononitrate ER 30 MG Oral Tablet 24 Hr Take 1 tablet (30 mg total) by mouth daily.      Lanthanum Carbonate 1000 MG Oral Chew Tab Chew 1 tablet (1,000 mg total) by mouth 2 (two) times daily with meals.      clopidogrel (PLAVIX) 75 MG Oral Tab Take 1 tablet (75 mg total) by mouth.      aspirin 81 MG Oral Tab EC Take 1 tablet (81 mg total) by mouth every other day.      amiodarone 200 MG Oral Tab Take 1 tablet (200 mg total) by mouth 2 (two) times daily with meals. 60 tablet 3    pantoprazole 40 MG Oral Tab EC Take 1 tablet (40 mg total) by mouth 2 (two) times daily before meals.      Sevelamer 800 MG Oral Tab Take 1 tablet (800 mg total) by mouth 3 (three) times daily with meals.      rosuvastatin 10 MG Oral Tab Take 1 tablet (10 mg total) by mouth nightly. (Patient taking differently: Take 1 tablet (10 mg total) by mouth nightly.) 90 tablet 0    nitroGLYCERIN 0.3 MG Sublingual SL Tab Place 1 tablet (0.3 mg total) under the tongue every 5 (five) minutes as needed for Chest pain.      zolpidem 10 MG Oral Tab Take 1 tablet (10 mg total) by mouth nightly as needed for Sleep.      Ferric Citrate (AURYXIA) 1  MG(Fe) Oral Tab       Insulin Pen Needle (TRUEPLUS PEN NEEDLES) 31G X 5 MM Does not apply Misc Use 5 per day 500 each  2    Insulin Glargine, 2 Unit Dial, (TOUJEO MAX SOLOSTAR) 300 UNIT/ML Subcutaneous Solution Pen-injector Inject 20 Units into the skin nightly. 6 mL 2    Glucose Blood (ONETOUCH ULTRA) In Vitro Strip 6 times a day 400 each 3    Insulin Lispro, 1 Unit Dial, (HUMALOG KWIKPEN) 100 UNIT/ML Subcutaneous Solution Pen-injector 12 units before meals with sliding scale. Max daily dose: 50 units. (Patient taking differently: 10 Units. Three times a day. Before meals) 60 mL 3    CONTOUR NEXT TEST In Vitro Strip TEST BLOOD SUGAR FOUR TIMES DAILY 400 strip 3    Blood Glucose Monitoring Suppl ([employer] CONTOUR NEXT MONITOR) W/DEVICE Does not apply Kit 1 Device by Does not apply route 4 (four) times daily. 1 kit 0             Physical Exam:  Blood pressure (!) 163/77, pulse 65, temperature 98.6 °F (37 °C), temperature source Oral, resp. rate 18, height 72\", weight 212 lb 15.4 oz (96.6 kg), SpO2 100%.  Wt Readings from Last 3 Encounters:   08/14/24 212 lb 15.4 oz (96.6 kg)   05/17/24 204 lb (92.5 kg)   05/12/24 204 lb 1.6 oz (92.6 kg)       General: awake, alert, oriented x 3, no acute distress  HEENT: at/nc, perrl, eomi  Neck: No JVD, carotids 2+ no bruits.  Cardiac: Regular rate and rhythm, S1, S2 normal, 3/6 mid peaking systolic murmur RUSB  Lungs: Clear without wheezes, rales, rhonchi or dullness.  Normal excursions and effort.  Abdomen: Soft, non-tender, non-distended, normal bowel sounds   Extremities: Without clubbing, cyanosis or edema.  Peripheral pulses are 2+.  Neurologic: Alert and oriented, normal affect.  Psych: normal mood and affect  Skin: Warm and dry.       Laboratories and Data:  Diagnostics:    [facility]  IMPRESSION:    CAD.  S/P PCI to LM into LAD for ISR with 1) lithotripsy angiogplasty, 2) NC balloon angioplasty, 3) DCB angioplasty (drug coated balloon).  S/P  PCI to OM with TRACY.     RECOMMENDATIONS:   DAPT.  Uninterrupted.  Medical management.  Surveillance with stress, CTA, and angio if needed.  Consider further  eval for TAVR.    Labs:   CBC:    Lab Results   Component Value Date    WBC 4.5 08/14/2024    WBC 4.5 08/13/2024    WBC 4.2 08/12/2024     Lab Results   Component Value Date    HEMOGLOBIN 10.5 (L) 02/09/2022    HEMOGLOBIN 12.6 06/07/2016    HEMOGLOBIN 15.1 03/07/2013    HGB 8.7 (L) 08/14/2024    HGB 9.4 (L) 08/13/2024    HGB 8.2 (L) 08/13/2024      Lab Results   Component Value Date    PLT 48.0 (L) 08/14/2024    PLT 52.0 (L) 08/13/2024    PLT 43.0 (L) 08/12/2024     BMP:     Lab Results   Component Value Date    GLUCOSE 129 (H) 02/09/2022    GLUCOSE 128 (H) 06/07/2016    GLUCOSE 359 (H) 03/07/2013     Lab Results   Component Value Date    K 4.9 08/14/2024    K 5.4 (H) 08/13/2024    K 5.1 08/12/2024     Lab Results   Component Value Date    BUN 48 (H) 08/14/2024    BUN 66 (H) 08/13/2024    BUN 55 (H) 08/12/2024     Lab Results   Component Value Date    CREATSERUM 5.55 (H) 08/14/2024    CREATSERUM 7.34 (H) 08/13/2024    CREATSERUM 6.30 (H) 08/12/2024     Cholesterol:     Lab Results   Component Value Date    CHOLEST 154 03/19/2024    CHOLEST 174 03/17/2024    CHOLEST 104 04/05/2023     Lab Results   Component Value Date    HDL 33 (L) 03/19/2024    HDL 44 03/17/2024    HDL 25 (L) 04/05/2023     Lab Results   Component Value Date    TRIG 304 (H) 03/19/2024    TRIG 215 (H) 03/17/2024    TRIG 202 (H) 04/05/2023    TRIGLY 248 (H) 02/09/2022    TRIGLY 278 (H) 08/19/2015    TRIGLY 490 (H) 03/07/2013     Lab Results   Component Value Date    LDL 72 03/19/2024    LDL 93 03/17/2024    LDL 46 04/05/2023     Lab Results   Component Value Date    AST 20 08/03/2024    AST 24 05/08/2024    AST 26 04/16/2024     Lab Results   Component Value Date    ALT 38 08/03/2024    ALT 24 05/08/2024    ALT 28 04/16/2024       Assessment/Plan:  79 year old male presenting with:    1) Chest pain, concerning for unstable angina  2) CAD with multiple prior PCI (last 3/2024 of LM, LAD, OM); multiple interruptions of DAPT since then as per hpi  3) ICM  (EF 40-45% 4/2024, mod-severe aortic stenosis)  4) pAF on amiodarone (no ac likely 2/2 prior GIB and SDH)  5) SDH  6) HTN  7) HL  8) DM  9) ESRD on HD  10) Aortic stenosis likely severe, by multiple criteria he is right on the cusp (mean gradient 38mmHg, peak velocity 3.8m/s, valve area index 0.55) and has EF 40% range.  Reviewed the echo and CTA with CT surgery and valve team and feel he may benefit from TAVR.  But dobutamine stress echo would help confirm and predict result.    - Cont asa, plavix, statin (cleared for DAPT by neurosurgery, see notes 5/2024)  - s/p PCI 8/8 -> DAPT  - BB  - TAVR CTA today  - Hgb trending down -> managed by nephrology     MDM:  Valve severe; TAVR teaching next Tuesday, TAVR the following (27th).  DAPT.  CP improved.  Hgb improved.  Ambulating well.  BP tolerable with resumption of Coreg.  Anemia per nephrology and IM.  Follow CBC.  TAVR and plans discussed at length with family and patient.  Will have coordinator contact Ashwin (son) to confirm timing.    Ok to DC home on current meds and FU as above with TAVR teaching next week and TAVR the following Tuesday.  Ashwin has my cellphone if there are any questions.    CARLEY DODGE

## 2024-08-14 NOTE — PLAN OF CARE
Received care of pt at 2300. A&Ox4. Pt denies any pain. RA, lung sounds are clear B/L. NSR on tele. Continent of bowel and bladder, last bm 8/12/24. Ecchymosis generalized along extremities,primarily along right groin and left upper extremity.     Bed is locked and in low position. Call light and personal items within reach.  Pt updated with plan of care    Problem: Patient/Family Goals  Goal: Patient/Family Long Term Goal  Description: Patient's Long Term Goal: Stay healthy    Interventions:  -Medication management  -Dietary management  -Prompt follow up with physicians  - See additional Care Plan goals for specific interventions  Outcome: Progressing  Goal: Patient/Family Short Term Goal  Description: Patient's Short Term Goal: Discharge home    Interventions:   - echo 8/14  -dobutamine stress test  -HD  Trend trop  - See additional Care Plan goals for specific interventions  Outcome: Progressing     Problem: CARDIOVASCULAR - ADULT  Goal: Maintains optimal cardiac output and hemodynamic stability  Description: INTERVENTIONS:  - Monitor vital signs, rhythm, and trends  - Monitor for bleeding, hypotension and signs of decreased cardiac output  - Evaluate effectiveness of vasoactive medications to optimize hemodynamic stability  - Monitor arterial and/or venous puncture sites for bleeding and/or hematoma  - Assess quality of pulses, skin color and temperature  - Assess for signs of decreased coronary artery perfusion - ex. Angina  - Evaluate fluid balance, assess for edema, trend weights  Outcome: Progressing  Goal: Absence of cardiac arrhythmias or at baseline  Description: INTERVENTIONS:  - Continuous cardiac monitoring, monitor vital signs, obtain 12 lead EKG if indicated  - Evaluate effectiveness of antiarrhythmic and heart rate control medications as ordered  - Initiate emergency measures for life threatening arrhythmias  - Monitor electrolytes and administer replacement therapy as ordered  Outcome:  Progressing     Problem: SAFETY ADULT - FALL  Goal: Free from fall injury  Description: INTERVENTIONS:  - Assess pt frequently for physical needs  - Identify cognitive and physical deficits and behaviors that affect risk of falls.  - [place] fall precautions as indicated by assessment.  - Educate pt/family on patient safety including physical limitations  - Instruct pt to call for assistance with activity based on assessment  - Modify environment to reduce risk of injury  - Provide assistive devices as appropriate  - Consider OT/PT consult to assist with strengthening/mobility  - Encourage toileting schedule  Outcome: Progressing

## 2024-08-14 NOTE — PAYOR COMM NOTE
--------------  8/13:  CONTINUED STAY REVIEW    Payor: [employer]  Subscriber #:  [id number]  Authorization Number: [id number]    Admit date: 8/3/24  Admit time:  5:02 PM      CARDIOLOGY:      Subjective:  Ambulating without significant symptoms.  Worried about going home with anemia.  Wants another unit.  Discussed aortic stenosis and need for dobutamine echo to secure diagnosis and potential benefit of possible TAVR.           Assessment/Plan:  79 year old male presenting with:     1) Chest pain, concerning for unstable angina  2) CAD with multiple prior PCI (last 3/2024 of LM, LAD, OM); multiple interruptions of DAPT since then as per hpi  3) ICM (EF 40-45% 4/2024, mod-severe aortic stenosis)  4) pAF on amiodarone (no ac likely 2/2 prior GIB and SDH)  5) SDH  6) HTN  7) HL  8) DM  9) ESRD on HD  10) Aortic stenosis likely severe, by multiple criteria he is right on the cusp (mean gradient 38mmHg, peak velocity 3.8m/s, valve area index 0.55) and has EF 40% range.  Reviewed the echo and CTA with CT surgery and valve team and feel he may benefit from TAVR.  But dobutamine stress echo would help confirm and predict result.     - Cont asa, plavix, statin (cleared for DAPT by neurosurgery, see notes 5/2024)  - s/p PCI 8/8 -> DAPT  - BB  - TAVR CTA today  - Hgb trending down -> managed by nephrology      MDM:  Dobutamine stress echo in AM (hold coreg today and tomorrow AM).  DAPT.  CP improved.  Hgb improved.  Ambulating well.  Anemia per nephrology and IM.  Follow CBC.  Likely DC after stress echo and allow him to recover prior to potential TAVR.     CARLEY DODGE  MEDICATIONS ADMINISTERED IN LAST 1 DAY:  amiodarone (Pacerone) tab 200 mg       Date Action Dose Route User    8/14/2024 0835 Given 200 mg Oral Regla Varner RN    8/13/2024 1745 Given 200 mg Oral Rainer Baptiste, FRANCIA          amoxicillin (Amoxil) cap 500 mg       Date Action Dose Route User    8/13/2024 1749 Given 500 mg Oral Rainer Baptiste RN           aspirin DR tab 81 mg       Date Action Dose Route User    8/14/2024 0836 Given 81 mg Oral Regla Varner RN          clopidogrel (Plavix) tab 75 mg       Date Action Dose Route User    8/14/2024 0836 Given 75 mg Oral Regla Varner RN          DOBUTamine in dextrose 5% (Dobutrex) 500 mg/250mL infusion premix       Date Action Dose Route User    8/14/2024 0930 New Bag 250 mg Intravenous (Right Hand) Shaylee Stringer RN          epoetin eunice (Epogen, Procrit) 05604 UNIT/ML injection 10,000 Units       Date Action Dose Route User    8/13/2024 1749 Given 10,000 Units Subcutaneous (Left Upper Abdomen) Rainer Baptiste RN          heparin (Porcine) 1000 UNIT/ML injection 1,500 Units       Date Action Dose Route User    8/13/2024 1442 Given 1,500 Units Intracatheter Rainer Baptiste RN          insulin aspart (NovoLOG) 100 Units/mL FlexPen 10 Units       Date Action Dose Route User    8/14/2024 0836 Given 10 Units Subcutaneous (Left Upper Arm) Regla Varner RN    8/13/2024 1744 Given 10 Units Subcutaneous (Left Upper Arm) Rainer Baptiste RN          insulin aspart (NovoLOG) 100 Units/mL FlexPen 2-10 Units       Date Action Dose Route User    8/14/2024 0509 Given 2 Units Subcutaneous (Left Upper Arm) Taylor Waters RN    8/13/2024 2100 Given 6 Units Subcutaneous (Left Upper Arm) Avril Espana RN          insulin degludec (Tresiba) 100 units/mL flextouch 24 Units       Date Action Dose Route User    8/13/2024 2058 Given 24 Units Subcutaneous (Left Upper Arm) Avril Espana RN          Lanthanum Carbonate (FOSRENOL) chewable tab 1,000 mg       Date Action Dose Route User    8/14/2024 0800 Given 1,000 mg Oral Regla Varner RN          losartan (Cozaar) tab 25 mg       Date Action Dose Route User    8/14/2024 0836 Given 25 mg Oral Regla Varner RN          pantoprazole (Protonix) DR tab 40 mg       Date Action Dose Route User    8/14/2024 0505 Given 40 mg Oral Taylor Waters RN    8/13/2024 2208 Given 40 mg  Oral Rainer Baptiste RN          rosuvastatin (Crestor) tab 10 mg       Date Action Dose Route User    8/13/2024 2058 Given 10 mg Oral Avril Espana RN          tamsulosin (Flomax) cap 0.4 mg       Date Action Dose Route User    8/14/2024 0505 Given 0.4 mg Oral Taylor Waters RN          levothyroxine (Synthroid) tab 175 mcg       Date Action Dose Route User    8/14/2024 0505 Given 175 mcg Oral Taylor Waters RN            Vitals (last day)       Date/Time Temp Pulse Resp BP SpO2 Weight O2 Device O2 Flow Rate (L/min) [facility]    08/14/24 0837 98.4 °F (36.9 °C) 68 20 172/73 100 % -- None (Room air) -- KM    08/14/24 0500 -- -- -- -- -- 212 lb 15.4 oz (96.6 kg) -- -- MV    08/14/24 0418 -- 63 -- 154/82 100 % -- None (Room air) -- MV    08/14/24 0409 98.3 °F (36.8 °C) 67 20 171/85 -- -- -- -- KJ    08/13/24 2221 98 °F (36.7 °C) 64 20 140/75 -- -- -- -- KJ    08/13/24 1921 98.4 °F (36.9 °C) 65 20 141/58 -- -- -- -- KJ    08/13/24 1610 -- 68 -- -- -- -- -- -- ES    08/13/24 1547 97.9 °F (36.6 °C) 69 20 155/82 100 % -- -- -- LS    08/13/24 1200 98 °F (36.7 °C) 69 18 128/71 100 % -- -- -- ES    08/13/24 1108 97.7 °F (36.5 °C) 64 -- 146/70 99 % -- -- -- HG    08/13/24 0754 98.5 °F (36.9 °C) 64 18 158/63 100 % -- None (Room air) -- ES    08/13/24 0500 98.6 °F (37 °C) 57 18 156/74 100 % 216 lb 7.9 oz (98.2 kg) None (Room air) --     08/13/24 0201 -- 60 -- -- -- -- -- -- YN                   Blood Transfusion Record       Product Unit Status Volume Start End            Transfuse RBC      24  [id number]  [id number] Completed 08/13/24 1241 379.17 mL 08/13/24 1103 08/13/24 1208      24  [id number]  [id number] Stopped 331.67 mL 08/11/24 2056 08/12/24 0015      24  [id number]  [id number] Completed 08/10/24 1523 350 mL 08/10/24 1015 08/10/24 1101

## 2024-08-14 NOTE — PLAN OF CARE
Assumed care for patient 0730  AOx4.  Independent.  Up ad travon.  NSR AVB BBB on cardiac monitor.  Adequate saturation on RA.  Lung sounds clear.  Denies sob, chest discomfort, n/v.  Denies pain. Voiding without difficulty.      Dobutamine stress test completed.      Problem: Patient/Family Goals  Goal: Patient/Family Long Term Goal  Description: Patient's Long Term Goal: Stay healthy    Interventions:  -Medication management  -Dietary management  -Prompt follow up with physicians  - See additional Care Plan goals for specific interventions  Outcome: Progressing  Goal: Patient/Family Short Term Goal  Description: Patient's Short Term Goal: Discharge home    Interventions:   - echo 8/14  -dobutamine stress test  -HD  Trend trop  - See additional Care Plan goals for specific interventions  Outcome: Progressing     Problem: CARDIOVASCULAR - ADULT  Goal: Maintains optimal cardiac output and hemodynamic stability  Description: INTERVENTIONS:  - Monitor vital signs, rhythm, and trends  - Monitor for bleeding, hypotension and signs of decreased cardiac output  - Evaluate effectiveness of vasoactive medications to optimize hemodynamic stability  - Monitor arterial and/or venous puncture sites for bleeding and/or hematoma  - Assess quality of pulses, skin color and temperature  - Assess for signs of decreased coronary artery perfusion - ex. Angina  - Evaluate fluid balance, assess for edema, trend weights  Outcome: Progressing  Goal: Absence of cardiac arrhythmias or at baseline  Description: INTERVENTIONS:  - Continuous cardiac monitoring, monitor vital signs, obtain 12 lead EKG if indicated  - Evaluate effectiveness of antiarrhythmic and heart rate control medications as ordered  - Initiate emergency measures for life threatening arrhythmias  - Monitor electrolytes and administer replacement therapy as ordered  Outcome: Progressing     Problem: SAFETY ADULT - FALL  Goal: Free from fall injury  Description:  INTERVENTIONS:  - Assess pt frequently for physical needs  - Identify cognitive and physical deficits and behaviors that affect risk of falls.  - [place] fall precautions as indicated by assessment.  - Educate pt/family on patient safety including physical limitations  - Instruct pt to call for assistance with activity based on assessment  - Modify environment to reduce risk of injury  - Provide assistive devices as appropriate  - Consider OT/PT consult to assist with strengthening/mobility  - Encourage toileting schedule  Outcome: Progressing

## 2024-08-15 NOTE — PROGRESS NOTES
Discharge     Patient cleared for discharge   Discharge education and handout provided to patient.   Follow-up appointments reviewed.   Medications reviewed  Telemetry discontinued.  All belongings sent home with patient.   Patient escorted  via wheelchair to [facility]. Accompanied by son

## 2024-08-15 NOTE — PAYOR COMM NOTE
--------------  DISCHARGE REVIEW    Payor: [employer] MEDICARE ADV PPO  Subscriber #:  [id number]  Authorization Number: [id number]    Admit date: 8/3/24  Admit time:   5:02 PM  Discharge Date: 8/14/2024  7:03 PM     [facility] Discharge Summary    Patient ID  Jonny Haque  [id number]  79 year old  4/15/1945    Admit date: 8/3/2024    Discharge date: 08/14/24    Attending: Gilles Wolfe DO     Primary Care Physician: Gee Jimenez MD      Reason for admission: cp    Discharge condition: stable    Disposition: home    Important follow up:  -PCP within 7 d  -specialists: cards - has appt next week     -labs:    -radiology:      Additional patient instructions       Discharge med list     Medication List        START taking these medications      amoxicillin 500 MG Caps  Commonly known as: Amoxil  Take 1 capsule (500 mg total) by mouth daily for 4 days. Take after dialysis on dialysis days  Start taking on: August 15, 2024     carvedilol 6.25 MG Tabs  Commonly known as: Coreg  Take 1 tablet (6.25 mg total) by mouth 2 (two) times daily with meals.  Start taking on: August 15, 2024     losartan 25 MG Tabs  Commonly known as: Cozaar  Take 1 tablet (25 mg total) by mouth daily.  Start taking on: August 15, 2024     tamsulosin 0.4 MG Caps  Commonly known as: Flomax  Take 1 capsule (0.4 mg total) by mouth daily.            CHANGE how you take these medications      clopidogrel 75 MG Tabs  Commonly known as: Plavix  Take 1 tablet (75 mg total) by mouth daily.  Start taking on: August 15, 2024  What changed: when to take this     Insulin Lispro (1 Unit Dial) 100 UNIT/ML Sopn  Commonly known as: HumaLOG KwikPen  12 units before meals with sliding scale. Max daily dose: 50 units.  What changed:   how much to take  additional instructions     nitroglycerin 0.4 MG Subl  Commonly known as: Nitrostat  Place 1 tablet (0.4 mg total) under the tongue every 5 (five) minutes as needed for Chest pain.  What changed:    medication strength  how much to take            CONTINUE taking these medications      amiodarone 200 MG Tabs  Commonly known as: Pacerone  Take 1 tablet (200 mg total) by mouth 2 (two) times daily with meals.     aspirin 81 MG Tbec     Auryxia 1  MG(Fe) Tabs  Generic drug: Ferric Citrate     Vernell Contour Next Monitor w/Device Kit  1 Device by Does not apply route 4 (four) times daily.     * Contour Next Test Strp  Generic drug: Glucose Blood  TEST BLOOD SUGAR FOUR TIMES DAILY     * OneTouch Ultra Strp  Generic drug: Glucose Blood  6 times a day     Lanthanum Carbonate 1000 MG Chew  Commonly known as: FOSRENOL     levothyroxine 175 MCG Tabs  Commonly known as: Synthroid     pantoprazole 40 MG Tbec  Commonly known as: Protonix     rosuvastatin 10 MG Tabs  Commonly known as: Crestor  Take 1 tablet (10 mg total) by mouth nightly.     sevelamer carbonate 800 MG Tabs  Commonly known as: Renvela     Toujeo Max SoloStar 300 UNIT/ML Sopn  Generic drug: Insulin Glargine (2 Unit Dial)  Inject 20 Units into the skin nightly.     TRUEplus Pen Needles 31G X 5 MM Misc  Generic drug: Insulin Pen Needle  Use 5 per day     zolpidem 10 MG Tabs  Commonly known as: Ambien           * This list has 2 medication(s) that are the same as other medications prescribed for you. Read the directions carefully, and ask your doctor or other care provider to review them with you.                STOP taking these medications      isosorbide mononitrate ER 30 MG Tb24  Commonly known as: Imdur               Where to Get Your Medications        These medications were sent to [id number] - [place], [place] - [address] AT [facility], [phone], [phone] [address], [facility] [address]      Phone: [phone]   amoxicillin 500 MG Caps  carvedilol 6.25 MG Tabs  clopidogrel 75 MG Tabs  losartan 25 MG Tabs  nitroglycerin 0.4 MG Subl       You can get these medications from any pharmacy     Bring a paper prescription for each of these medications  tamsulosin 0.4 MG Caps         Discharge Diagnoses:    # Chest pain with history of unstable angina  # CAD status post PCI  # NSTEMI  Ischemic CM  # Post angiogram hypotension   # Anemia-acute on chronic   # Paroxysmal A-fib  # V. Tach  # Recent fall with subdural hematoma   # ESRD   # Hypothyroidism     # HSV cirrhosis   # Moderate aortic stenosis   # Thrombocytopenia   # Type 2 diabetes   # Essential HTN   # Urinary retention   # UTI      Consults:  IP CONSULT TO HOSPITALIST  IP CONSULT TO NEPHROLOGY  IP CONSULT TO CARDIOLOGY  IP CONSULT TO UROLOGY  IP CONSULT TO CARDIAC REHAB  IP CONSULT TO FOOD AND NUTRITION SERVICES  IP CONSULT TO GASTROENTEROLOGY  IP CONSULT TO SOCIAL WORK    Radiology:  CARD ECHO LOW-DOSE DOBUT CHALLENGE (CPT=93350/36143)    Result Date: 8/14/2024  Table formatting from the original result was not included. PATIENT'S NAME: Jonny Haque PRIMARY PHYSICIAN: Gee Jimenez MD ORDERING PROVIDER: Randal Correia SONOGRAPHER: JOAN Bhardwaj PATIENT ACCOUNT #: [id number] MEDICAL RECORD #: [id number] YOB: 1945 DATE OF TEST: 8/14/2024 AGE AT TEST:  79 year old     DOBUTAMINE CHALLENGE STRESS ECHOCARDIOGRAM RESTING EKG: Sinus bradycardia, first degree AV block, IVCD. STAGE   Infusion Rate  BP HR  REST 130/64 66   0 mcg/kg/min 1 136/64 61 5 mcg/kg/min 2 144/64 63 10 mcg/kg/min 3 150/64 66 15 mcg/kg/min 4   20 mcg/kg/min 5    Peak 150/64 66 Immediate    2 minute post 142/64 70 5 minute post  66 7 minute post     RESULTS  Maximal predicted heart rate: 143 Target heart rate (90%): 128 % Achieved: 50% EKG Response: Normal Reason for stopping test: Achieved peak velocity Duration of exercise: 17:00 INDICATIONS:  Low gradient aortic stenosis with reduced ejection fraction. TECHNIQUE:  Resting echocardiography was performed. Dobutamine infusion was started. The maximum dose of dobutamine administered was 15 mcg/kg/min. Additional  echocardiography was obtained with dobutamine infusion. Dobutamine was discontinued.  The patient tolerated the procedure well without complications. FINDINGS:   The resting echocardiography revealed global left ventricular systolic dysfunction. The estimated ejection fraction is 40%. The aortic valve appears calcified with restricted leaflet motion. The mean gradient at rest was 23 mmHg. The peak velocity at rest was 3.3 m/s. The VTI ratio is 0.31. The valve area is 1.4 cm2. The indexed valve area is 0.64 cm2 per m2. The stroke volume at rest was 107 mL. At the peak dose of dobutamine infusion, the left ventricular ejection fraction improved to 55%. The mean gradient increased to 36 mmHg.  The peak velocity increased to 4.2 m/s. The VTI ratio was 0.29. The valve area was 1.3 cm2. The indexed valve area is 0.6 cm2 per m2. CONCLUSION:  Severe low gradient aortic stenosis confirmed with dobutamine echocardiography. At peak dose of dobutamine, the patient had a 4.2 m/s jet across the aortic valve with an indexed valve area of 0.6 cm2 per m2, both of which meet criteria for severe aortic stenosis. lexie Correia MD 08/14/2024 14:25 t  [id number] 8/14/2024 3:16 PM #[id number] cc:    US VENOUS DOPPLER ARM LEFT - DIAG IMG (CPT=93971)    Result Date: 8/11/2024  PROCEDURE:  US VENOUS DOPPLER ARM LEFT - DIAG IMG (CPT=93971)  COMPARISON:  None.  INDICATIONS:  DVT  TECHNIQUE:  Real time, grey scale, and duplex ultrasound was used to evaluate the upper extremity venous system. B-mode two-dimensional images of the vascular structures, Doppler spectral analysis, and color flow.  Doppler imaging were performed.  The following veins were imaged:  Subclavian, Jugular, Axillary, Brachial, Basilic, Cephalic, and the contralateral Subclavian and Jugular.  PATIENT STATED HISTORY: (As transcribed by Technologist)     FINDINGS:  EXTREMITY:  Left upper extremity THROMBI:  None visible. COMPRESSION:  Normal compressibility, phasicity,  and augmentation of the subclavian, jugular, axillary, brachial, basilic, and cephalic veins. OTHER:  Negative.            CONCLUSION:  Unremarkable venous ultrasound of left upper extremity.   LOCATION:  [facility]   Dictated by (CST): Humberto Altman MD on 8/11/2024 at 11:56 AM     Finalized by (CST): Humberto Altman MD on 8/11/2024 at 11:56 AM       CTA GATED THORACIC AORTA (CPT=71275)    Result Date: 8/10/2024  Table formatting from the original result was not included. PROCEDURE: CTA GATED THORACIC AORTA (CPT=71275) DATE: 8/9/24 COMPARISON: CTA ABD/PEL (CPT=74174) 08/09/2024 [id number] Final INDICATIONS: None TECHNIQUE: The patient was placed supine on the multidetector CT table. Axial sections were obtained before and after administration of intravenous contrast. IV contrast was used for the study. Post-processing on an independent workstation including maximum intensity projections and 3D volume rendering was utilized. A narrow field of view was used to maximize cardiovascular imaging. See the Radiologist over-read for evaluation of non-vascular structures. Dose reduction techniques were used. Dose information is transmitted to the ACR (American College of Radiology) NRDR (National Radiology Data Registry) which includes the Dose Index Registry. STUDY QUALITY:  Good LIMITATIONS:    None EXAM FORM: CT Scanner:   GE CT Revolution Contrast Given:   Isovue 370 Contrast Amount (cc):  100 Dose (msv):   9 BMI:    28 Heart Rate (bpm):  54 Medication Used:  None FINDINGS: RIGHT ATRIUM: Systemic venous return to the right atrium appears normal. The interatrial septum appears intact. LEFT ATRIUM: Pulmonary venous return to the left atrium appears normal. Four pulmonary veins are noted: right superior, right inferior, left superior and left inferior. RIGHT VENTRICLE: The right ventricle appears normal in size. LEFT VENTRICLE: The left ventricle appears normal in size. There is concentric hypertrophy.  AORTIC VALVE: The aortic  valve morphology is trileaflet. There is moderate aortic valve calcification (1612 Agatston units). There is no calcification of the left ventricular outflow tract. The aortic valve annulus measures 22.3 mm in minimum diameter, 30.8 mm in maximal diameter, 26.5 mm in average diameter, 85.1 mm in perimeter and 550 mm2 in area. The predicted fluoroscopic coplanar orientation is 19 degrees of Bulgarian and 9 degrees of caudal angulation. MITRAL VALVE: There is moderate mitral annular calcification. AORTIC ROOT: The sinuses of Valsalva have an average diameter of 35.6 mm. The sinotubular height is 24.1 mm from the aortic valve annulus. The sinotubular junction has an average diameter of 29.2 mm. ASCENDING AORTA: The aortoventricular angle is 3 degrees. There is moderate calcification of the sinotubular junction. There is no calcification of the ascending aorta. The ascending aorta is normal in size. AORTIC ARCH:   The aortic arch is normal in size. There is moderate calcification of the aortic arch. Three vessels arise from the arch: brachiocephalic trunk, left common carotid artery, and left subclavian artery. DESCENDING AORTA:   The descending thoracic aorta is normal in size. There is moderate calcification of the descending thoracic aorta.  LM: The left main coronary artery originates from the left coronary cusp. The left main coronary artery height is 16.1 mm superior to the aortic valve annulus. It is a normal sized vessel. A stent is noted in the left main. LAD: The left anterior descending coronary artery is a large vessel. LCx: The left circumflex is a large, dominant vessel. RCA: The right coronary artery originates from the right coronary cusp. The right coronary artery height is 21.3 mm superior to the aortic valve annulus. It is a small, nondominant vessel.  PERICARDIUM: There is no pericardial effusion.  CONCLUSIONS:  1.  The aortic valve morphology is trileaflet. There is moderate aortic valve calcification  (1612 Agatston units). There is no calcification of the left ventricular outflow tract. The aortic valve annulus measures 22.3 mm in minimum diameter, 30.8 mm in maximal diameter, 26.5 mm in average diameter, 85.1 mm in perimeter and 550 mm2 in area. 2.  The predicted fluoroscopic coplanar orientation is 19 degrees of Arabic and 9 degrees of caudal angulation. 3.  Please refer to the radiologist's interpretation of non-cardiac structures. Randal Correia MD, [facility] 8/10/2024 7:15 AM    CTA ABD/PEL (CPT=74174)    Result Date: 8/9/2024  PROCEDURE:  CTA ABD/PEL (CPT=74174  COMPARISON:  [facility] , CT, CT ABDOMEN+PELVIS(CPT=74176), 8/06/2024, 11:26 PM.  INDICATIONS:  tavr work-up; use TAVR protocol - Dr. Tee to read  TECHNIQUE:  CT images of the abdomen, pelvis, and lower extremities were obtained pre- and post- injection of non-ionic intravenous contrast material. Multi-planar reformatted/3-D images were created to optimize visualization of vascular anatomy.  Dose reduction techniques were used. Dose information is transmitted to the ACR (American College of Radiology) NRDR (National Radiology Data Registry) which includes the Dose Index Registry.  PATIENT STATED HISTORY:(As transcribed by Technologist)  TAVR   CONTRAST USED:  100cc of Isovue 370  FINDINGS:  AORTO-ILIAC:  Moderate to severe calcified atherosclerosis, with relative sparing of the external iliac artery segments.  Smooth tapering of the abdominal aorta.  No infrarenal abdominal aortic aneurysm.   Patent celiac artery, SMA and RODY.  Moderate to severe origin and proximal disease.   Single renal arteries are seen bilaterally.  These are small vessels with severe origin stenoses.  No poststenotic dilatation.  Circumaortic left renal vein, anatomic variation.   There is high-grade stenosis at the origin of the right superficial femoral artery. LIVER:  Uniform parenchyma.  Nodular contour. BILIARY:  Nondistended gallbladder.  No biliary dilatation. PANCREAS:   Uniform parenchyma.  No ductal dilatation. SPLEEN:  Not enlarged. KIDNEYS:  Normal anatomic positions.  No hydronephrosis.  Scattered cortical and parapelvic cysts.  The largest is on the right measuring 2.1 cm. ADRENALS:  Not enlarged. RETROPERITONEUM:  No adenopathy. BOWEL/MESENTERY:  Normal bowel caliber.  Redundant colon.  Moderate to severe colonic diverticulosis.  No acute diverticulitis.  No ascites. ABDOMINAL WALL:  Large fat containing umbilical hernia.  Sac measured up to 6.9 cm. Defect measured 2.8 cm.  Small fat containing bilateral inguinal hernias.  There is stranding at the right groin.  No focal fluid collection or mass. URINARY BLADDER:  Moderately filled.  Diffuse wall thickening.  Smooth contour. PELVIC NODES:  None enlarged. PELVIC ORGANS:  Moderate prostatomegaly. BONES:  Overall moderate degenerative changes.  Normal vertebral body heights.  No subluxation. LUNG BASES:  Mild scar and or atelectasis at the left base. OTHER:  None.  RIGHT Femoral Artery:      Tortuosity:      Mild iliac tortuosity.      Calcification:   Moderate to severe common iliac, mild external iliac calcified atherosclerosis.      Minimum luminal diameters:           Common iliac:     7 mm            External iliac:  6 mm           Common femoral:  5 mm  LEFT Femoral Artery:      Tortuosity:      Mild iliac tortuosity.      Calcification:   Moderate to severe common iliac, mild external iliac calcified atherosclerosis.      Minimum luminal diameters:           Common iliac:     4 mm            External iliac:  5 mm           Common femoral:  5 mm             CONCLUSION:  1. Moderate to severe overall calcified atherosclerosis. 2. Moderate narrowing origin and proximal left common iliac artery. 3. High-grade stenosis at the origin of the right superficial femoral artery. 4. There is stranding at the right groin.  This may be any combination of scarring and or inflammation.  No focal fluid collection or hematoma. 5. Diffuse  urinary bladder wall thickening.  Given prostatomegaly, it may be secondary to chronic outlet obstruction.  Differential would include etiologies of acute and chronic cystitis. 6. Nodular contour of the liver.  This can be seen with cirrhosis. 7. Details as above.  Continued clinical correlation recommended.    LOCATION:  [facility]   Dictated by ([facility]): Abran Erwin MD on 8/09/2024 at 5:02 PM     Finalized by (CST): Abran Erwin MD on 8/09/2024 at 5:12 PM       CT CARDIAC OVER READ    Result Date: 8/9/2024  This is an over read for the non-cardiac, non-vascular limited visualized portions of the chest and abdomen.  PROCEDURE:  CT CARDIAC OVER READ  COMPARISON:  None.  INDICATIONS:  cp  TECHNIQUE:  CT images of the chest were obtained as part of a cardiac CT evaluation.  Please refer to Cardiologist report for contrast volume and technique.  Dose reduction techniques were used. Dose information is transmitted to the ACR (American College of Radiology) NRDR (National Radiology Data Registry) which includes the Dose Index Registry.  PATIENT STATED HISTORY: (As transcribed by Technologist)     FINDINGS:  LUNGS:  Lucencies are present consistent with mild to moderate manifestations of centrilobular emphysema.   Mild scarring and or atelectasis at the left base.   No bronchiectasis. HELDER:  No adenopathy. MEDIASTINUM:  No adenopathy. PLEURA:  No pleural effusion. CHEST WALL:  No abnormality. LIMITED ABDOMEN:  The liver is partially imaged.  Nodular contour. BONES:  Scattered up to moderate to severe degenerative changes.  Most pronounced lower cervical.             CONCLUSION:  1. Emphysema with mild scarring and or atelectasis at the left base. 2. Nodular contour of the liver.  This finding can be seen with cirrhosis. 3. Continued clinical correlation recommended.     LOCATION:  [facility]    Dictated by ([facility]): Abran Erwin MD on 8/09/2024 at 4:58 PM     Finalized by (CST): Abran Erwin MD on 8/09/2024 at 5:01 PM       CATH  ANGIO    Result Date: 2024  This exam has been completed. Please refer to Notes for the results to this procedure.    CARD ECHO 2D DOPPLER (CPT=93306)    Result Date: 2024  Transthoracic Echocardiogram Name:Jonny Haque Date: 2024 :  04/15/1945 Ht:  (72in)  BP: 128 / 75 MRN:  [id number]    Age:  79years    Wt:  (206lb) HR: 62bpm Loc:  [facility]       Gndr: M          BSA: 2.16m^2 Sonographer: JOAN Johnson Ordering:    Satya Donald MD Consulting:  Ashwin Montgomery ---------------------------------------------------------------------------- History/Indications:   Aortic stenosis. Exertional chest pain. ---------------------------------------------------------------------------- Procedure information:  A transthoracic complete 2D study was performed. Additional evaluation included M-mode, complete spectral Doppler, and color Doppler.  Patient status:  Inpatient.  Location:  [facility].    Comparison was made to the study of 2024.    This was a routine study. Transthoracic echocardiography for ventricular function evaluation and assessment of valvular function. Image quality was adequate. ECG rhythm:   Normal sinus ---------------------------------------------------------------------------- Conclusions: 1. Left ventricle: The cavity size was normal. Wall thickness was mildly    increased. Systolic function was mildly reduced. The estimated ejection    fraction was 45-50%, by visual assessment. Left ventricular diastolic    function parameters were normal for the patient's age. 2. Ventricular septum: Septal motion was dyssynergic. These changes are    consistent with a left bundle branch block. 3. Left atrium: The left atrial volume was mildly increased. 4. Aortic valve: Transvalvular velocity was increased, due to stenosis. The    findings were consistent with moderate stenosis. The peak systolic    velocity was 3.51m/sec. The mean systolic gradient was 26mm Hg. The valve    area (VTI) was 1.28cm^2. The  valve area (VTI) index was 0.59cm^2/m^2. 5. Aortic root: The aortic root was at the upper limits of normal. The    aortic root was 3.8cm diameter. 6. Pulmonary arteries: Systolic pressure was within the normal range, in the    range of 25mm Hg to 30mm Hg. Impressions:  This study is compared with previous dated 4/17/2024: * ---------------------------------------------------------------------------- * Findings: Left ventricle:  The cavity size was normal. Wall thickness was mildly increased. Systolic function was mildly reduced. The estimated ejection fraction was 45-50%, by visual assessment. Left ventricular diastolic function parameters were normal for the patient's age. Ventricular septum:   Septal motion was dyssynergic. These changes are consistent with a left bundle branch block. Left atrium:  The left atrial volume was mildly increased. Right ventricle:  The cavity size was normal. Systolic function was normal. Right atrium:  The atrium was normal in size. Mitral valve:  The annulus was mildly calcified. Leaflet separation was normal.  Doppler:  Transvalvular velocity was within the normal range. There was no evidence for stenosis. There was trivial regurgitation. Aortic valve:   The valve was trileaflet. The leaflets were mildly to moderately calcified.  Doppler:  Transvalvular velocity was increased, due to stenosis. The findings were consistent with moderate stenosis. There was no significant regurgitation.    The valve area (VTI) was 1.28cm^2. The valve area (VTI) index was 0.59cm^2/m^2.    The mean systolic gradient was 26mm Hg. The peak systolic gradient was 49mm Hg. Tricuspid valve:  The valve is structurally normal. Leaflet separation was normal.  Doppler:  Transvalvular velocity was within the normal range. There was no evidence for stenosis. There was trivial regurgitation. Pulmonic valve:   The valve is structurally normal. Cusp separation was normal.  Doppler:  Transvalvular velocity was within  the normal range. There was no evidence for stenosis. There was trivial regurgitation. Pericardium:   There was no pericardial effusion. Aorta: Aortic root: The aortic root was at the upper limits of normal. The aortic root was 3.8cm diameter. Ascending aorta: The ascending aorta was normal. Pulmonary arteries: Systolic pressure was within the normal range, in the range of 25mm Hg to 30mm Hg. Systemic veins:  Central venous respirophasic diameter changes are in the normal range (>50%). Inferior vena cava: The IVC was normal-sized. The IVC was normal-sized. ---------------------------------------------------------------------------- Measurements  Left ventricle       Value          Ref       04/17/2024  IVS thickness,   (H) 1.3   cm       0.6 - 1.0 1.1  ED, PLAX  LV ID, ED, PLAX      5.4   cm       4.2 - 5.8 5.3  LV ID, ES, PLAX      3.9   cm       2.5 - 4.0 3.8  LV PW thickness,     0.9   cm       0.6 - 1.0 1.1  ED, PLAX  IVS/LV PW ratio,     1.35           --------- 1.00  ED, PLAX  LV PW/LV ID          0.18           --------- 0.21  ratio, ED, PLAX  LV ejection          54    %        52 - 72   50  fraction  Stroke               49    ml/m^2   --------- 41  volume/bsa, 2D  LV e', lateral   (L) 7.7   cm/sec   >=10.0    ----------  LV E/e', lateral     9              <=13      ----------  LV e', medial    (L) 3.3   cm/sec   >=7.0     ----------  LV E/e', medial      21             --------- ----------  LV e', average       5.5   cm/sec   --------- 6.4  LV E/e', average     12             <=14      11  LVOT                 Value          Ref       04/17/2024  LVOT ID              2.3   cm       --------- 2.3  LVOT peak            0.92  m/sec    --------- 0.98  velocity, S  LVOT VTI, S          25.4  cm       --------- 21.4  LVOT mean            2     mm Hg    --------- 2  gradient, S  Stroke volume        106   ml       --------- 89  (SV), LVOT DP  Stroke index         49    ml/m^2   --------- 41  (SV/bsa), LVOT  DP   Aortic valve         Value          Ref       04/17/2024  Aortic valve         3.51  m/sec    --------- 3.8  peak velocity, S  Aortic valve         74.2  cm       --------- 70.0  VTI, S  Aortic mean          26    mm Hg    --------- 33  gradient, S  Aortic peak          49    mm Hg    --------- 58  gradient, S  Aortic valve         1.28  cm^2     --------- 1.27  area, VTI  Aortic valve         0.59  cm^2/m^2 --------- 0.59  area/bsa, VTI  Velocity ratio,      0.26           --------- 0.26  peak, LVOT/AV  Aortic root          Value          Ref       04/17/2024  Aortic root ID,      3.8   cm       2.8 - 4.2 3.8  ED  Ascending aorta      Value          Ref       04/17/2024  Ascending aorta      3.4   cm       2.2 - 3.8 3.2  ID, A-P, ED  Left atrium          Value          Ref       04/17/2024  LA volume, S     (H) 75    ml       18 - 58   ----------  LA volume/bsa, S (H) 35    ml/m^2   16 - 34   ----------  LA volume, ES,   (H) 68    ml       18 - 58   93  1-p A4C  LA volume, ES,   (H) 78    ml       18 - 58   ----------  1-p A2C  LA volume, ES,       85    ml       --------- ----------  A/L  LA volume/bsa,   (H) 39    ml/m^2   16 - 34   ----------  ES, A/L  Mitral valve         Value          Ref       04/17/2024  Mitral E-wave        0.69  m/sec    --------- 0.73  peak velocity  Mitral A-wave        1.26  m/sec    --------- 1.09  peak velocity  Mitral E/A           0.5            --------- 0.7  ratio, peak  Pulmonary artery     Value          Ref       04/17/2024  PA pressure, S,      27    mm Hg    --------- ----------  DP  Tricuspid valve      Value          Ref       04/17/2024  Tricuspid regurg     2.45  m/sec    <=2.8     2.34  peak velocity  Tricuspid peak       24    mm Hg    --------- 22  RV-RA gradient  Systemic veins       Value          Ref       04/17/2024  Estimated CVP        3     mm Hg    --------- 10  Right ventricle      Value          Ref       04/17/2024  RV pressure, S,      27    mm Hg     --------- 32  DP Legend: (L)  and  (H)  gali values outside specified reference range. ---------------------------------------------------------------------------- Prepared and electronically signed by Francisco Espinoza MD 08/07/2024 16:15     CT ABDOMEN+PELVIS(CPT=74176)    Result Date: 8/6/2024  PROCEDURE:  CT ABDOMEN+PELVIS (CPT=74176)  COMPARISON:  [facility] , CT, CT ABDOMEN+PELVIS(ALL W+WO)(CPT=74178), 10/25/2022, 3:31 PM.  INDICATIONS:  Gross hematuria, history of bladder cancer.  TECHNIQUE:  Unenhanced multislice CT scanning was performed from the dome of the diaphragm to the pubic symphysis.  Dose reduction techniques were used. Dose information is transmitted to the ACR (American College of Radiology) NRDR (National Radiology Data Registry) which includes the Dose Index Registry.  PATIENT STATED HISTORY: (As transcribed by Technologist)  Gross hematuria. History of bladder cancer.    FINDINGS:  LIVER:  There is a nodular contour to the liver and relative enlargement of the caudate lobe suspicious for hepatic cirrhosis.  Please correlate with patient's history.  No focal hepatic lesions are noted. BILIARY:  Mild high density sludge within the gallbladder.  No discrete evidence of stones or wall thickening.  No biliary ductal dilatation. PANCREAS:  Moderate pancreatic atrophy with fatty infiltrative changes.  There is suggestion of a low-density focus along the uncinate process of the pancreas measuring 1.2 cm.  This can be retrospectively seen on the previous study and is stable. SPLEEN:  Borderline splenomegaly measuring 12.9 cm in craniocaudal dimension. KIDNEYS:  No renal stones or hydronephrosis.  Bilateral low-density renal cysts, the largest of which involves the parapelvic region of the right kidney measuring 12.4 cm. ADRENALS:  No mass or enlargement.  AORTA/VASCULAR:    Extensive atherosclerotic calcifications of the aorta and iliac vessels.  Extensive atherosclerotic plaquing involving the splenic  artery, common hepatic artery and SMA.  Extensive plaquing involving the right renal artery origin.  No aortic aneurysm.  There is a retroaortic left renal vein. RETROPERITONEUM:  No mass or adenopathy.  BOWEL/MESENTERY:  The stomach, duodenum sweep and small bowel are unremarkable.  Moderate colonic stool burden.  Uncomplicated diverticulosis of the descending and sigmoid colon. ABDOMINAL WALL:  There is a periumbilical hernia containing omental fat that measures 6.3 x 4.7 x 7.8 cm in maximal dimension.  There is mild fat stranding within the hernia sac that extends into the non herniated portion of the greater omentum, which is  similar in appearance to previous imaging dating back to 2022. URINARY BLADDER:  No visible focal wall thickening, lesion, or calculus.  PELVIC NODES:  No adenopathy.  PELVIC ORGANS:  There is prostatomegaly measuring up to 5.4 cm in diameter. BONES:  No bony lesion or fracture.  LUNG BASES:  No visible pulmonary or pleural disease.             CONCLUSION:  1. No acute intra-abdominal or pelvic process noted. 2.  Stable periumbilical hernia containing omental fat with fat stranding. 3. Uncomplicated diverticulosis of the descending and sigmoid colon. 4. Pancreatic atrophy with stable low-density focus along the uncinate process of the pancreas.  This is nonspecific and may represent IPMN. 5. Please see the body of the report above for further details.   LOCATION:  [place]   Dictated by (CST): Nadiya Castellon DO on 8/06/2024 at 11:45 PM     Finalized by (CST): Nadiya Castellon DO on 8/06/2024 at 11:53 PM       CATH ANGIO    Result Date: 8/5/2024  This exam has been completed. Please refer to Notes for the results to this procedure.    XR CHEST AP PORTABLE  (CPT=71045)    Result Date: 8/3/2024  PROCEDURE:  XR CHEST AP PORTABLE  (CPT=71045)  TECHNIQUE:  AP chest radiograph was obtained.  COMPARISON:  [facility] , XR, XR CHEST AP PORTABLE  (CPT=71045), 4/16/2024, 5:16 PM.  INDICATIONS:  Chest pain   PATIENT STATED HISTORY: (As transcribed by Technologist)  Patient states that his chest pain is in center of chest, feels like pressure. Has been having pain for 3 days. He has a history of 2 heart stents and aheart attack in 2021.    FINDINGS:  Right dialysis catheter tips in the region of the cavoatrial junction.  Cardiomegaly with normal pulmonary vascularity. No pleural effusion or pneumothorax. No lobar consolidation.            CONCLUSION:  No active cardiopulmonary process identified.   LOCATION:  [id number]      Dictated by (CST): Christian Trujillo MD on 8/03/2024 at 3:16 PM     Finalized by (CST): Christian Trujillo MD on 8/03/2024 at 3:16 PM         Operative reports:      Hospital course:    79-year-old male significant past medical history of CAD with stable angina-status post PCI in 2017, 2021 and most recently in March 2024 with 3 drug-eluting stents placed in the left main, LAD and OM, hypertension, pulmonary hypertension paroxysmal A-fib, type 2 diabetes, hypothyroidism, end-stage renal disease on dialysis, history of subdural hematoma, ischemic cardiomyopathy history of HCV cirrhosis moderate aortic stenosis, prior history of GI bleed presented with chest pain     # Chest pain with history of unstable angina  # CAD status post PCI  # NSTEMI  Ischemic CM  -Continue aspirin, statin, Plavix uninterrupted  -LHC done on 8/4/2024 80% OM disease and severed ISR in prox LAD, medical management recommended -pt not a candidate for CABG,   -Repeat angiogram on 8/8/2024 with PCI to LM into the LAD with lithotripsy angioplasty, NC balloon angioplasty, drug-coated balloon angioplasty also PCI to OM with drug-eluting stent  -Groin is stable, CTA was  showed moderate narrowing origin and proximal left common iliac artery with high-grade stenosis at the origin of the right SFA, fat stranding in the right groin no retroperitoneal hematoma - will refer to vascular as ouptatient given findings.   - sp dobutamine stress -  showing severe aortic stenosis - paln for outpt eval with cards for tavr       # Post angiogram hypotension  -Resolved currently normotensive  -Improved with fluids during dialysis  -Mild hematoma noted on the right groin, stable with FemoStop     # Anemia-acute on chronic  -has chronic anemia but likely worsened from groin hematoma and hematuria  -sp 3 unit pRBC  -goal hgb > 8 given cardiac hx  -no evidence of GIB- cont emperic ppi per GI. No role of endoscopy in absence of gross gi bleed  Check b12 / folate - ok     # Paroxysmal A-fib  # V. Tach  -Continue amiodarone     # Recent fall  with subdural hematoma  -Was admitted in May 2024 with this, -Was followed up with neurosurgery  -CT head has been stable  -Okay for dual antiplatelet therapy therapy     # ESRD  -HD per nephrology nephrology     # Hypothyroidism - on synthroid     # HSV cirrhosis - outpt gi fu      # Moderate aortic stenosis - may need tavr this admission, cards following      # Thrombocytopenia  -pt last saw Dr. Julian Nolasco 1/24/24, low pltc likely due to Hep C and cirrhosis, monitor, no intervention needed if pltc < 50  -Repeat echo completed and reviewed     # Type 2 diabetes  -Continue 20 units of insulin, sliding scale     # Essential HTN  - per discussion with pt and his son Ashwin, he was taken off metoprolol, diltiazem, and losartan as of 6/24/24 by outside renal  - meds adjusted by renal / cards - currently on losartan, coreg (latter held fro stress)     # Urinary retention   # UTI  - cont straight cath per protocol  -  c/s appreciated, may need mccloud   -Tamsulosin started  -Urology consulted, patient cystoscopy started on antibiotics as well - change to amoxicillin        Day of discharge exam:  Vitals:    08/14/24 1704   BP: (!) 163/77   Pulse: 65   Resp: 18   Temp:           Total time coordinating care 32 min      Patient and/or family had opportunity to ask questions and expressed understanding and agreement with therapeutic plan as  sherlyn Yee Hospitalist  [phone]  Answering Service: [phone]      Electronically signed by Gilles Wolfe DO on 8/14/2024  6:35 PM         REVIEWER COMMENTS

## 2024-08-23 RX ORDER — SODIUM CHLORIDE 9 MG/ML
INJECTION, SOLUTION INTRAVENOUS CONTINUOUS
OUTPATIENT
Start: 2024-08-23

## 2024-08-23 NOTE — DISCHARGE INSTRUCTIONS
Instructions after Transcatheter Aortic Valve Replacement (TAVR):    ACTIVITY:   Do not drive for 3 days after procedure, or as directed by your physician.  Do not lift anything heavier than 5-10bs for 1 week, or as directed by your physician.  Walk at least 3 times per day for 5-10 minutes at a time. Increase your activity gradually.  Keep legs elevated while sitting.   No tight-fitting underwear.     HYGIENE:  Wash incisions with mild soap and water daily. Showering is allowed. No tub baths or swimming pools until totally healed.  Do not apply lotions, ointments or creams to the incisions.   Avoid constipation or straining to have a bowel movement. If necessary, use Milk of Magnesia, Metamucil or other supplements to relieve straining.     NOTIFY YOUR PHYSICIAN IF:  Temperature is greater than 101 degrees, have chills, sweating or fever for more than one day.  Drainage, tenderness, redness, swelling, persistent pain from the incisions, or new numbness in the legs or feet.  Persistent chest discomfort or pain (angina) that radiates to the neck, jaw, or arm.   Nausea or profuse sweating.  Increasing shortness of breath with activity or at rest.  If your pain medication is not relieving your pain.    WOUND HEALING:  If you notice minor bleeding from the puncture wound, please do the following things. If it does not stop with these measures, please notify your doctor immediately:  Immediately lie flat.   Apply firm pressure just above the puncture site and hold firm pressure for 15 minutes. You may use a clean cloth to apply pressure. If possible, have another person apply the pressure.   After 15 minutes remove pressure. The wound should be dry and flat, without bleeding. You should continue to lie flat for about 1 hour before getting up and walking.   Cover the wound with a Band-Aid.     FOLLOW-UP CARE:  You will need a follow-up visit to check your incisions approximately 7 to 10 days after discharge, and will be  seen in your cardiologist office by the APN.  You will be seen in the valve clinic 1 month after discharge, if not given an appointment please call [phone] for [facility] patient's or [phone] for [facility] patients to make an appointment    IMPORTANT:  Always inform other doctors about your heart valve replacement before any medical or dental procedure.  Your dentist or physician should prescribe antibiotic prophylaxis prior to any invasive dental procedure or surgery.  Before undergoing MRI (magnetic resonance imaging), always notify the doctor (or medical technician) that you have an implanted heart valve.   If there are any changes in your condition, or you are hospitalized elsewhere, please notify the valve clinic.

## 2024-08-27 ENCOUNTER — HOSPITAL ENCOUNTER (OUTPATIENT)
Dept: CARDIOLOGY CLINIC | Facility: HOSPITAL | Age: 79
Discharge: HOME | End: 2024-08-27
Attending: INTERNAL MEDICINE
Payer: MEDICARE

## 2024-08-27 ENCOUNTER — HOSPITAL ENCOUNTER (OUTPATIENT)
Dept: LAB | Facility: HOSPITAL | Age: 79
Discharge: HOME | End: 2024-08-27
Attending: INTERNAL MEDICINE
Payer: MEDICARE

## 2024-08-27 DIAGNOSIS — I48.91 A-FIB (HCC): ICD-10-CM

## 2024-08-27 DIAGNOSIS — B19.20 HEPATITIS-C: ICD-10-CM

## 2024-08-27 DIAGNOSIS — I35.0 SEVERE AORTIC STENOSIS: Primary | ICD-10-CM

## 2024-08-27 DIAGNOSIS — R09.89 OTHER SPECIFIED SYMPTOMS AND SIGNS INVOLVING THE CIRCULATORY AND RESPIRATORY SYSTEMS: ICD-10-CM

## 2024-08-27 DIAGNOSIS — I35.0 AORTIC STENOSIS: ICD-10-CM

## 2024-08-27 DIAGNOSIS — I25.10 CAD (CORONARY ARTERY DISEASE): ICD-10-CM

## 2024-08-27 DIAGNOSIS — Z01.810 ENCOUNTER FOR PREPROCEDURAL CARDIOVASCULAR EXAMINATION: ICD-10-CM

## 2024-08-27 DIAGNOSIS — Z99.2 DIALYSIS PATIENT (HCC): ICD-10-CM

## 2024-08-27 DIAGNOSIS — I50.9 CHF (CONGESTIVE HEART FAILURE) (HCC): ICD-10-CM

## 2024-08-27 DIAGNOSIS — Z01.818 PRE-OP EXAM: Primary | ICD-10-CM

## 2024-08-27 LAB
ALBUMIN SERPL-MCNC: 4.2 G/DL (ref 3.2–4.8)
ALBUMIN/GLOB SERPL: 1.5 {RATIO} (ref 1–2)
ALP LIVER SERPL-CCNC: 190 U/L
ALT SERPL-CCNC: 46 U/L
ANION GAP SERPL CALC-SCNC: 8 MMOL/L (ref 0–18)
AST SERPL-CCNC: 40 U/L (ref ?–34)
BILIRUB SERPL-MCNC: 0.4 MG/DL (ref 0.2–1.1)
BUN BLD-MCNC: 102 MG/DL (ref 9–23)
CALCIUM BLD-MCNC: 9.3 MG/DL (ref 8.7–10.4)
CHLORIDE SERPL-SCNC: 105 MMOL/L (ref 98–112)
CO2 SERPL-SCNC: 30 MMOL/L (ref 21–32)
CREAT BLD-MCNC: 7.99 MG/DL
EGFRCR SERPLBLD CKD-EPI 2021: 6 ML/MIN/1.73M2 (ref 60–?)
ERYTHROCYTE [DISTWIDTH] IN BLOOD BY AUTOMATED COUNT: 18 %
FASTING STATUS PATIENT QL REPORTED: NO
GLOBULIN PLAS-MCNC: 2.8 G/DL (ref 2–3.5)
GLUCOSE BLD-MCNC: 129 MG/DL (ref 70–99)
HCT VFR BLD AUTO: 26.6 %
HGB BLD-MCNC: 9.2 G/DL
MCH RBC QN AUTO: 33.9 PG (ref 26–34)
MCHC RBC AUTO-ENTMCNC: 34.6 G/DL (ref 31–37)
MCV RBC AUTO: 98.2 FL
NT-PROBNP SERPL-MCNC: 5627 PG/ML (ref ?–450)
OSMOLALITY SERPL CALC.SUM OF ELEC: 330 MOSM/KG (ref 275–295)
PLATELET # BLD AUTO: 54 10(3)UL (ref 150–450)
POTASSIUM SERPL-SCNC: 4.4 MMOL/L (ref 3.5–5.1)
PROT SERPL-MCNC: 7 G/DL (ref 5.7–8.2)
RBC # BLD AUTO: 2.71 X10(6)UL
SODIUM SERPL-SCNC: 143 MMOL/L (ref 136–145)
WBC # BLD AUTO: 3.6 X10(3) UL (ref 4–11)

## 2024-08-27 PROCEDURE — 99212 OFFICE O/P EST SF 10 MIN: CPT

## 2024-08-27 PROCEDURE — 36415 COLL VENOUS BLD VENIPUNCTURE: CPT | Performed by: INTERNAL MEDICINE

## 2024-08-27 PROCEDURE — 80053 COMPREHEN METABOLIC PANEL: CPT | Performed by: INTERNAL MEDICINE

## 2024-08-27 PROCEDURE — 83880 ASSAY OF NATRIURETIC PEPTIDE: CPT | Performed by: INTERNAL MEDICINE

## 2024-08-27 PROCEDURE — 85027 COMPLETE CBC AUTOMATED: CPT | Performed by: INTERNAL MEDICINE

## 2024-08-27 NOTE — PROGRESS NOTES
[facility]   Valve Clinic Progress Note  2024    Jonny Haque is here with his son for evaluation for severe Degenerative Aortic stenosis.     Patient was referred by Dr. Dos Santos, primary cardiologist.    Patient was seen in consult by Dr. Dr. Donald.    Recent hospitalizations: Patient reports 4-5 day h/o central chest tightness, worse with exertion and resolved with rest.  Had PCI completed during hospitalization      The patient use  no assistive devices,home health services, or home oxygen  Pt denies any problems with anesthesia.     SOCIAL HISTORY:   Social History    Tobacco Use      Smoking status: Former        Packs/day: 0.00        Years: 0.5 packs/day for 15.0 years (7.5 ttl pk-yrs)        Types: Cigarettes        Start date: 1965        Quit date: 1980        Years since quittin.5      Smokeless tobacco: Never      Tobacco comments: quit at age 34    Vaping Use      Vaping status: Never Used    Alcohol use: No    Drug use: No      PAST MEDICAL HX:     Past Medical History:   Diagnosis Date    Calculus of kidney     Diabetes (HCC)     Disorder of thyroid     Esophageal varices without bleeding, unspecified esophageal varices type (HCC) 2019    Hepatitis, unspecified diagnosed many years ago.    hepatitis C-just completed taking Harvoni in 2016    High blood pressure     Malignant neoplasm of trigone of urinary bladder (HCC) 2017    Malignant neoplasm of urinary bladder, unspecified site (HCC) 2017    Malignant neoplasm of urinary bladder, unspecified site (HCC) 2017    PAF (paroxysmal atrial fibrillation) (HCC)     Renal disorder     Visual impairment     reading glasses       ALLERGIES:   Patient has no known allergies.    PAST SURGICAL HX:     Past Surgical History:   Procedure Laterality Date    ANGIOGRAM  08/15/2017    small caliber RCA with 80% mid lesion.  LAD and left circumflex with 50% lesions.  LV shows inferobasilar aneurysm with scar.   Overall LV function preserved at the exterior 65%.    ANGIOPLASTY (CORONARY)      Mid LAD PTCA with stent in 2021;  NSTEMI 2024: PCI LM, PTCA/stent LCx/OM    OTHER SURGICAL HISTORY      Upper GI surgery    OTHER SURGICAL HISTORY  12/11/2017    [facility]    OTHER SURGICAL HISTORY  07/18/2019    [facility] Cyst[facility] Le[facility]     OTHER SURGICAL HISTORY  02/06/2020    [facility]TS Cysto (Dr. Lees)     Denies any Anesthesia problems    Family HX:     Family History   Problem Relation Age of Onset    Cancer Father     Hypertension Mother        MEDICATIONS:     Current Outpatient Medications:     carvedilol 6.25 MG Oral Tab, Take 6.25 mg by mouth 2 (two) times daily with meals., Disp: , Rfl:     losartan 25 MG Oral Tab, Take 25 mg by mouth daily., Disp: , Rfl:     tamsulosin (Flomax) cap, Take 0.4 mg by mouth daily., Disp: , Rfl:     isosorbide mononitrate ER 30 MG Oral Tablet 24 Hr, Take 1 tablet (30 mg total) by mouth daily., Disp: 30 tablet, Rfl: 5    clopidogrel 75 MG Oral Tab, Take 1 tablet (75 mg total) by mouth daily., Disp: 90 tablet, Rfl: 2    levothyroxine 175 MCG Oral Tab, Take 175 mcg by mouth daily., Disp: , Rfl:     dilTIAZem HCl  MG Oral Tablet 24 Hr, Take 1 tablet by mouth., Disp: , Rfl:     ergocalciferol 1.25 MG (94878 UT) Oral Cap, Take 50,000 Units by mouth every 30 (thirty) days., Disp: , Rfl:     levETIRAcetam 500 MG Oral Tab, Take 1 tablet by mouth., Disp: , Rfl:     butalbital-acetaminophen-caffeine -40 MG Oral Tab, Take 1 tablet by mouth every 4 (four) hours as needed., Disp: , Rfl:     tiZANidine 2 MG Oral Tab, Take 1 tablet by mouth every 6 (six) hours as needed., Disp: , Rfl:     Sevelamer 800 MG Oral Tab, Take 800 mg by mouth 3 (three) times daily with meals., Disp: , Rfl:     metoprolol succinate ER 50 MG Oral Tablet 24 Hr, Take 1 tablet by mouth daily., Disp: , Rfl:     amiodarone 200 MG Oral Tab, Take 1 tablet (200 mg total) by mouth daily., Disp: 90 tablet, Rfl: 3    nitroGLYCERIN 0.3 MG  Sublingual SL Tab, ONE TABLET UNDER TONGUE AS NEEDED FOR CHEST PAIN EVERY 5 MINUTES, Disp: 100 tablet, Rfl: 1    ROSUVASTATIN 10 MG Oral Tab, TAKE 1 TABLET(10 MG) BY MOUTH EVERY NIGHT, Disp: 90 tablet, Rfl: 2    Methoxy PEG-Epoetin Beta (MIRCERA IJ), 150 mcg., Disp: , Rfl:     HUMALOG KWIKPEN 100 UNIT/ML Subcutaneous Solution Pen-injector, INJECT 10 UNITS UNDER THE SKIN BEFORE MEALS WITH SLIDING SCALE, MAXIMUM DAILY DOSE IS 50 UNITS, Disp: 6 mL, Rfl: 3    Glucose Blood (ONETOUCH ULTRA) In Vitro Strip, 6 times a day, Disp: 400 each, Rfl: 3    Insulin Glargine, 2 Unit Dial, (TOUJEO MAX SOLOSTAR) 300 UNIT/ML Subcutaneous Solution Pen-injector, Inject 24 Units into the skin nightly., Disp: 9 mL, Rfl: 2    Lanthanum Carbonate 1000 MG Oral Chew Tab, Chew 1,000 mg by mouth daily., Disp: , Rfl:     AURYXIA 1  MG(Fe) Oral Tab, , Disp: , Rfl:     aspirin 81 MG Oral Chew Tab, Chew 81 mg by mouth every other day., Disp: , Rfl:     zolpidem 10 MG Oral Tab, as needed., Disp: , Rfl:     Insulin Pen Needle (TRUEPLUS PEN NEEDLES) 31G X 5 MM Does not apply Misc, Use 5 per day, Disp: 500 each, Rfl: 2    pantoprazole 40 MG Oral Tab EC, Take 1 tablet (40 mg total) by mouth daily. (Patient taking differently: Take 40 mg by mouth 2 (two) times daily before meals.), Disp: 90 tablet, Rfl: 1    Blood Glucose Monitoring Suppl (TechnoVax CONTOUR NEXT MONITOR) W/DEVICE Does not apply Kit, 1 Device by Does not apply route 4 (four) times daily., Disp: 1 kit, Rfl: 0    REVIEW OF SYSTEMS:   Review of Systems (except for as stated above in HPI )   No neurological complaints or hx of CVA/TIA.   No fevers or chills; unusual bleeding or bruising; no DVT hx.    No nausea/vomiting or significant GI/ complaints.   No musculoskeletal limitations except those stated above.   A ten point review of systems has been performed and is otherwise negative, except as described above.    PHYSICAL EXAM:   There were no vitals taken for this visit. Wt Readings  from Last 2 Encounters:  08/19/24 : 213 lb (96.6 kg)  06/12/24 : 207 lb (93.9 kg)      GENERAL: well developed, well nourished, in no apparent distress  NECK: no JVD  RESPIRATORY: clear to auscultation  CARDIOVASCULAR: S1, S2 normal, Regular rate; regular; no S3, no S4; no click;  III/VI JACQUELYN   ABDOMEN: soft, non-distended; non-tender;  EXTREMITIES: no cyanosis, clubbing. Peripheral pulses intact.  BLE edema  DERM: no rashes. Skin is warm & dry   PSYCHE: Cooperative with normal affect  NEURO:  Alert & oriented. No focal Deficit.   STS, KCCQ & 5 meter walk:   STS 9.06%       KCCQ:  1A= 5 1B= 4  1C= 1    2= 5 3= 3     4= 3   5= 5     6= 4   7= 4     8A= 3 8B= 3 8C= 5    5 meter walk   #1 6.83 secs #2 5.83 secs #3 6.61 secs    ECHO 08/07/2024 at [facility]: See full report   1. Left ventricle: The cavity size was normal. Wall thickness was mildly      increased. Systolic function was mildly reduced. The estimated ejection      fraction was 45-50%, by visual assessment. Left ventricular diastolic      function parameters were normal for the patient's age.   2. Ventricular septum: Septal motion was dyssynergic. These changes are      consistent with a left bundle branch block.   3. Left atrium: The left atrial volume was mildly increased.   4. Aortic valve: Transvalvular velocity was increased, due to stenosis. The      findings were consistent with moderate stenosis. The peak systolic      velocity was 3.51m/sec. The mean systolic gradient was 26mm Hg. The valve      area (VTI) was 1.28cm^2. The valve area (VTI) index was 0.59cm^2/m^2.   5. Aortic root: The aortic root was at the upper limits of normal. The      aortic root was 3.8cm diameter.   6. Pulmonary arteries: Systolic pressure was within the normal range, in the      range of 25mm Hg to 30mm Hg.   Impressions:  This study is compared with previous dated 4/17/2024:     R/C 08/03/2024 at [facility]: See full report   S/P PCI to LM into LAD for ISR with 1) lithotripsy  angiogplasty, 2) NC balloon angioplasty, 3) DCB angioplasty (drug coated balloon).  S/P PCI to OM with TRACY.     [id number]    Cardiovascular Surgeon Consult:  Ulises    Discussed with patient options and that he would not be surgical candidate with STS mortality risk of 25% and cirrhosis 30 day operative mortatlity risk of 57-87%.     Patient and son understanding of situation and would like further discussion regarding PCI intervention if feasible. They also do not want to proceed with surgery with such prohibitive risk.     Reviewed last cardiology note Reviewed last cardiac labs  Reviewed hospital testing     Patient Active Problem List:     Arthritis     Hepatic cirrhosis (HCC)     Diabetic peripheral neuropathy (HCC)     Encounter for screening for malignant neoplasm of prostate     Erectile dysfunction of nonorganic origin     Hyperlipidemia LDL goal <70     Portal hypertension (HCC)     Thrombocytopenia (HCC)     PAC (premature atrial contraction)     Personal history of kidney stones     Elevated alkaline phosphatase level     Personal history of bladder cancer     Thoracic aorta atherosclerosis (HCC)     Hypothyroidism due to Hashimoto's thyroiditis     Hiatal hernia     Polyp of colon, unspecified part of colon, unspecified type     Abnormal EKG     Iron deficiency anemia due to chronic blood loss     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 stage 5 chronic kidney disease on chronic dialysis (HCC)     Duodenitis     Elevated PSA     Type 2 diabetes mellitus with chronic kidney disease on chronic dialysis, with long-term current use of insulin (HCC)     Atherosclerosis of native coronary artery of native heart with stable angina pectoris (HCC)     History of hepatitis C     Type 2 diabetes mellitus with microalbuminuria, with long-term current use of insulin (HCC)     Mild pulmonary hypertension (HCC)     Paroxysmal atrial fibrillation (HCC)     Pre-op  exam     S/P primary angioplasty with coronary stent     Gastrointestinal hemorrhage associated with intestinal diverticulosis     Diabetes mellitus with cataract (HCC)     Unspecified severe protein-calorie malnutrition (HCC)     Type 2 diabetes mellitus with diabetic peripheral angiopathy without gangrene (HCC)     Colitis     Dependence on renal dialysis (HCC)     Diabetic nephropathy associated with type 2 diabetes mellitus (HCC)     Diverticular disease     Diverticulitis of small intestine without perforation or abscess with bleeding     Allergy, unspecified, initial encounter     Coagulation defect, unspecified (HCC)     End-stage renal disease on hemodialysis (HCC)     Hyperuricemia     Hypokalemia     Hypothyroidism     Metabolic acidosis     Other disorders of plasma-protein metabolism, not elsewhere classified     Pain     Rectal hemorrhage     Secondary hyperparathyroidism of renal origin (HCC)     Fluid overload     Anemia in ESRD (end-stage renal disease) (HCC)     Hypoxia     New onset left bundle branch block (LBBB)     Vitamin D deficiency, unspecified     Cough     Intracranial hemorrhage (HCC)     Fall from bed, initial encounter      ASSESSMENT/PLAN      Severe, Symptomatic Degenerative Aortic Stenosis, NYHA class 2  ECHO 45-50% wi  Discussed TAVR procedure at length.  We discussed the  benefits and risks including heart attack, stroke, open heart surgery, need for a pacemaker, bleeding, and death. All questions answered.   We discussed holding advanced directives and DNR status for 30 days starting on the day of the TAVR procedure.   Patient will be seen by a CV surgeon        Seen by Dr. Donald and myself. Informed patient that the case will be discussed at the multidisciplinary meeting week and that he would receive a call from the Structural Heart team to let he  now what the final decision would be to replace the aortic valve.  The results of the testing done today would also be discussed and  should further testing be needed the patient will be informed of how to follow up. All testing today will be sent to your primary care provider for review.      Sara Humes, AGACNP-PR[facility], Structural Heart

## 2024-09-04 RX ORDER — SODIUM CHLORIDE 9 MG/ML
INJECTION, SOLUTION INTRAVENOUS CONTINUOUS
OUTPATIENT
Start: 2024-09-04

## 2024-09-09 ENCOUNTER — EKG ENCOUNTER (OUTPATIENT)
Dept: LAB | Age: 79
End: 2024-09-09
Payer: MEDICARE

## 2024-09-09 ENCOUNTER — LAB ENCOUNTER (OUTPATIENT)
Dept: LAB | Age: 79
End: 2024-09-09
Payer: MEDICARE

## 2024-09-09 ENCOUNTER — HOSPITAL ENCOUNTER (OUTPATIENT)
Dept: ULTRASOUND IMAGING | Age: 79
Discharge: HOME | End: 2024-09-09
Payer: MEDICARE

## 2024-09-09 DIAGNOSIS — Z99.2 DIALYSIS PATIENT (HCC): ICD-10-CM

## 2024-09-09 DIAGNOSIS — Z01.810 ENCOUNTER FOR PREPROCEDURAL CARDIOVASCULAR EXAMINATION: ICD-10-CM

## 2024-09-09 DIAGNOSIS — I35.0 AORTIC STENOSIS: ICD-10-CM

## 2024-09-09 DIAGNOSIS — B19.20 HEPATITIS-C: ICD-10-CM

## 2024-09-09 DIAGNOSIS — I48.91 A-FIB (HCC): ICD-10-CM

## 2024-09-09 DIAGNOSIS — I25.10 CAD (CORONARY ARTERY DISEASE): ICD-10-CM

## 2024-09-09 DIAGNOSIS — I35.0 SEVERE AORTIC STENOSIS: ICD-10-CM

## 2024-09-09 DIAGNOSIS — R09.89 OTHER SPECIFIED SYMPTOMS AND SIGNS INVOLVING THE CIRCULATORY AND RESPIRATORY SYSTEMS: ICD-10-CM

## 2024-09-09 DIAGNOSIS — I50.9 CHF (CONGESTIVE HEART FAILURE) (HCC): ICD-10-CM

## 2024-09-09 LAB
ANTIBODY SCREEN: NEGATIVE
RH BLOOD TYPE: POSITIVE

## 2024-09-09 PROCEDURE — 86900 BLOOD TYPING SEROLOGIC ABO: CPT

## 2024-09-09 PROCEDURE — 93880 EXTRACRANIAL BILAT STUDY: CPT

## 2024-09-09 PROCEDURE — 86850 RBC ANTIBODY SCREEN: CPT

## 2024-09-09 PROCEDURE — 86901 BLOOD TYPING SEROLOGIC RH(D): CPT

## 2024-09-10 ENCOUNTER — ANESTHESIA EVENT (OUTPATIENT)
Dept: CARDIAC SURGERY | Facility: HOSPITAL | Age: 79
End: 2024-09-10
Payer: MEDICARE

## 2024-09-10 ENCOUNTER — HOSPITAL ENCOUNTER (INPATIENT)
Facility: HOSPITAL | Age: 79
LOS: 1 days | Discharge: HOME | End: 2024-09-10
Attending: INTERNAL MEDICINE | Admitting: INTERNAL MEDICINE
Payer: MEDICARE

## 2024-09-10 ENCOUNTER — ANESTHESIA (OUTPATIENT)
Dept: CARDIAC SURGERY | Facility: HOSPITAL | Age: 79
End: 2024-09-10
Payer: MEDICARE

## 2024-09-10 LAB
ATRIAL RATE: 61 BPM
P AXIS: 47 DEGREES
P-R INTERVAL: 198 MS
Q-T INTERVAL: 530 MS
QRS DURATION: 126 MS
QTC CALCULATION (BEZET): 533 MS
R AXIS: -6 DEGREES
T AXIS: 93 DEGREES
VENTRICULAR RATE: 61 BPM

## 2024-09-10 NOTE — PROGRESS NOTES
Pt TAVR cancelled today. Pt drank a half cup of water with cream and sugar this morning at 0730. Dr Brooks and Dr. Moran aware and pt cancelled. Dr. Moran came and spoke to pt and son at bedside.

## 2024-09-15 ENCOUNTER — LAB ENCOUNTER (OUTPATIENT)
Dept: LAB | Facility: HOSPITAL | Age: 79
End: 2024-09-15
Attending: INTERNAL MEDICINE
Payer: MEDICARE

## 2024-09-15 DIAGNOSIS — I35.0 AORTIC STENOSIS: ICD-10-CM

## 2024-09-15 DIAGNOSIS — Z99.2 DIALYSIS PATIENT (HCC): ICD-10-CM

## 2024-09-15 DIAGNOSIS — I50.9 CHF (CONGESTIVE HEART FAILURE) (HCC): ICD-10-CM

## 2024-09-15 PROCEDURE — 36415 COLL VENOUS BLD VENIPUNCTURE: CPT

## 2024-09-15 PROCEDURE — 86850 RBC ANTIBODY SCREEN: CPT

## 2024-09-15 PROCEDURE — 86901 BLOOD TYPING SEROLOGIC RH(D): CPT

## 2024-09-15 PROCEDURE — 86900 BLOOD TYPING SEROLOGIC ABO: CPT

## 2024-09-15 PROCEDURE — 86920 COMPATIBILITY TEST SPIN: CPT

## 2024-09-16 LAB
ANTIBODY SCREEN: NEGATIVE
RH BLOOD TYPE: POSITIVE

## 2024-09-17 ENCOUNTER — HOSPITAL ENCOUNTER (INPATIENT)
Facility: HOSPITAL | Age: 79
LOS: 1 days | Discharge: HOME | End: 2024-09-18
Attending: INTERNAL MEDICINE | Admitting: INTERNAL MEDICINE
Payer: MEDICARE

## 2024-09-17 ENCOUNTER — APPOINTMENT (OUTPATIENT)
Dept: CV DIAGNOSTICS | Facility: HOSPITAL | Age: 79
End: 2024-09-17
Payer: MEDICARE

## 2024-09-17 ENCOUNTER — HOSPITAL ENCOUNTER (OUTPATIENT)
Dept: CV DIAGNOSTICS | Facility: HOSPITAL | Age: 79
Discharge: HOME | End: 2024-09-17
Attending: INTERNAL MEDICINE
Payer: MEDICARE

## 2024-09-17 ENCOUNTER — APPOINTMENT (OUTPATIENT)
Dept: GENERAL RADIOLOGY | Facility: HOSPITAL | Age: 79
End: 2024-09-17
Payer: MEDICARE

## 2024-09-17 PROBLEM — I35.0 AORTIC STENOSIS: Status: ACTIVE | Noted: 2024-09-17

## 2024-09-17 LAB
GLUCOSE BLD-MCNC: 139 MG/DL (ref 70–99)
GLUCOSE BLD-MCNC: 156 MG/DL (ref 70–99)
GLUCOSE BLD-MCNC: 181 MG/DL (ref 70–99)
GLUCOSE BLD-MCNC: 195 MG/DL (ref 70–99)
ISTAT ACTIVATED CLOTTING TIME: 171 SECONDS (ref 74–137)
ISTAT ACTIVATED CLOTTING TIME: 501 SECONDS (ref 74–137)
NT-PROBNP SERPL-MCNC: 4967 PG/ML (ref ?–450)

## 2024-09-17 PROCEDURE — 93325 DOPPLER ECHO COLOR FLOW MAPG: CPT

## 2024-09-17 PROCEDURE — 4A023N7 MEASUREMENT OF CARDIAC SAMPLING AND PRESSURE, LEFT HEART, PERCUTANEOUS APPROACH: ICD-10-PCS | Performed by: INTERNAL MEDICINE

## 2024-09-17 PROCEDURE — 93308 TTE F-UP OR LMTD: CPT

## 2024-09-17 PROCEDURE — 5A1D70Z PERFORMANCE OF URINARY FILTRATION, INTERMITTENT, LESS THAN 6 HOURS PER DAY: ICD-10-PCS | Performed by: INTERNAL MEDICINE

## 2024-09-17 PROCEDURE — 93312 ECHO TRANSESOPHAGEAL: CPT | Performed by: ANESTHESIOLOGY

## 2024-09-17 PROCEDURE — B4101ZZ FLUOROSCOPY OF ABDOMINAL AORTA USING LOW OSMOLAR CONTRAST: ICD-10-PCS | Performed by: INTERNAL MEDICINE

## 2024-09-17 PROCEDURE — 02RF38Z REPLACEMENT OF AORTIC VALVE WITH ZOOPLASTIC TISSUE, PERCUTANEOUS APPROACH: ICD-10-PCS | Performed by: INTERNAL MEDICINE

## 2024-09-17 PROCEDURE — B24BZZ4 ULTRASONOGRAPHY OF HEART WITH AORTA, TRANSESOPHAGEAL: ICD-10-PCS | Performed by: INTERNAL MEDICINE

## 2024-09-17 PROCEDURE — 71045 X-RAY EXAM CHEST 1 VIEW: CPT

## 2024-09-17 PROCEDURE — 93355 ECHO TRANSESOPHAGEAL (TEE): CPT | Performed by: INTERNAL MEDICINE

## 2024-09-17 PROCEDURE — 93321 DOPPLER ECHO F-UP/LMTD STD: CPT

## 2024-09-17 PROCEDURE — B2151ZZ FLUOROSCOPY OF LEFT HEART USING LOW OSMOLAR CONTRAST: ICD-10-PCS | Performed by: INTERNAL MEDICINE

## 2024-09-17 PROCEDURE — 99222 1ST HOSP IP/OBS MODERATE 55: CPT | Performed by: INTERNAL MEDICINE

## 2024-09-17 DEVICE — IMPLANTABLE DEVICE: Type: IMPLANTABLE DEVICE | Status: FUNCTIONAL

## 2024-09-17 RX ORDER — PROTAMINE SULFATE 10 MG/ML
INJECTION, SOLUTION INTRAVENOUS AS NEEDED
Status: DISCONTINUED | OUTPATIENT
Start: 2024-09-17 | End: 2024-09-17 | Stop reason: SURG

## 2024-09-17 RX ORDER — ROCURONIUM BROMIDE 10 MG/ML
INJECTION, SOLUTION INTRAVENOUS AS NEEDED
Status: DISCONTINUED | OUTPATIENT
Start: 2024-09-17 | End: 2024-09-17 | Stop reason: SURG

## 2024-09-17 RX ORDER — CARVEDILOL 6.25 MG/1
6.25 TABLET ORAL
Status: DISCONTINUED | OUTPATIENT
Start: 2024-09-17 | End: 2024-09-18

## 2024-09-17 RX ORDER — NALOXONE HYDROCHLORIDE 0.4 MG/ML
0.08 INJECTION, SOLUTION INTRAMUSCULAR; INTRAVENOUS; SUBCUTANEOUS AS NEEDED
Status: ACTIVE | OUTPATIENT
Start: 2024-09-17 | End: 2024-09-17

## 2024-09-17 RX ORDER — HYDRALAZINE HYDROCHLORIDE 20 MG/ML
10 INJECTION INTRAMUSCULAR; INTRAVENOUS EVERY 2 HOUR PRN
Status: DISCONTINUED | OUTPATIENT
Start: 2024-09-17 | End: 2024-09-18

## 2024-09-17 RX ORDER — SENNOSIDES 8.6 MG
17.2 TABLET ORAL NIGHTLY PRN
Status: DISCONTINUED | OUTPATIENT
Start: 2024-09-17 | End: 2024-09-18

## 2024-09-17 RX ORDER — ACETAMINOPHEN 325 MG/1
650 TABLET ORAL EVERY 4 HOURS PRN
Status: DISCONTINUED | OUTPATIENT
Start: 2024-09-17 | End: 2024-09-18

## 2024-09-17 RX ORDER — ASPIRIN 81 MG/1
81 TABLET ORAL EVERY OTHER DAY
Status: DISCONTINUED | OUTPATIENT
Start: 2024-09-17 | End: 2024-09-18

## 2024-09-17 RX ORDER — ZOLPIDEM TARTRATE 10 MG/1
5 TABLET ORAL NIGHTLY PRN
Status: DISCONTINUED | OUTPATIENT
Start: 2024-09-17 | End: 2024-09-18

## 2024-09-17 RX ORDER — ACETAMINOPHEN 325 MG/1
TABLET ORAL
Status: COMPLETED
Start: 2024-09-17 | End: 2024-09-17

## 2024-09-17 RX ORDER — SODIUM CHLORIDE, SODIUM LACTATE, POTASSIUM CHLORIDE, CALCIUM CHLORIDE 600; 310; 30; 20 MG/100ML; MG/100ML; MG/100ML; MG/100ML
INJECTION, SOLUTION INTRAVENOUS CONTINUOUS
Status: DISCONTINUED | OUTPATIENT
Start: 2024-09-17 | End: 2024-09-18

## 2024-09-17 RX ORDER — AMIODARONE HYDROCHLORIDE 200 MG/1
200 TABLET ORAL 2 TIMES DAILY WITH MEALS
Status: DISCONTINUED | OUTPATIENT
Start: 2024-09-17 | End: 2024-09-18

## 2024-09-17 RX ORDER — SODIUM CHLORIDE 9 MG/ML
INJECTION, SOLUTION INTRAVENOUS CONTINUOUS
Status: DISCONTINUED | OUTPATIENT
Start: 2024-09-17 | End: 2024-09-18

## 2024-09-17 RX ORDER — HYDRALAZINE HYDROCHLORIDE 20 MG/ML
INJECTION INTRAMUSCULAR; INTRAVENOUS
Status: COMPLETED
Start: 2024-09-17 | End: 2024-09-17

## 2024-09-17 RX ORDER — ONDANSETRON 2 MG/ML
4 INJECTION INTRAMUSCULAR; INTRAVENOUS EVERY 6 HOURS PRN
Status: DISCONTINUED | OUTPATIENT
Start: 2024-09-17 | End: 2024-09-17

## 2024-09-17 RX ORDER — ROSUVASTATIN CALCIUM 10 MG/1
10 TABLET, COATED ORAL NIGHTLY
Status: DISCONTINUED | OUTPATIENT
Start: 2024-09-17 | End: 2024-09-18

## 2024-09-17 RX ORDER — MIDAZOLAM HYDROCHLORIDE 1 MG/ML
INJECTION INTRAMUSCULAR; INTRAVENOUS AS NEEDED
Status: DISCONTINUED | OUTPATIENT
Start: 2024-09-17 | End: 2024-09-17 | Stop reason: SURG

## 2024-09-17 RX ORDER — LIDOCAINE HYDROCHLORIDE AND EPINEPHRINE BITARTRATE 20; .01 MG/ML; MG/ML
INJECTION, SOLUTION SUBCUTANEOUS
Status: COMPLETED
Start: 2024-09-17 | End: 2024-09-17

## 2024-09-17 RX ORDER — HEPARIN SODIUM 1000 [USP'U]/ML
1.5 INJECTION, SOLUTION INTRAVENOUS; SUBCUTANEOUS
Status: COMPLETED | OUTPATIENT
Start: 2024-09-17 | End: 2024-09-17

## 2024-09-17 RX ORDER — PROCHLORPERAZINE EDISYLATE 5 MG/ML
10 INJECTION INTRAMUSCULAR; INTRAVENOUS EVERY 6 HOURS PRN
Status: DISCONTINUED | OUTPATIENT
Start: 2024-09-17 | End: 2024-09-18

## 2024-09-17 RX ORDER — HEPARIN SODIUM 1000 [USP'U]/ML
INJECTION, SOLUTION INTRAVENOUS; SUBCUTANEOUS AS NEEDED
Status: DISCONTINUED | OUTPATIENT
Start: 2024-09-17 | End: 2024-09-17 | Stop reason: SURG

## 2024-09-17 RX ORDER — PANTOPRAZOLE SODIUM 40 MG/1
40 TABLET, DELAYED RELEASE ORAL
Status: DISCONTINUED | OUTPATIENT
Start: 2024-09-17 | End: 2024-09-18

## 2024-09-17 RX ORDER — ALBUMIN, HUMAN INJ 5% 5 %
12.5 SOLUTION INTRAVENOUS ONCE AS NEEDED
Status: ACTIVE | OUTPATIENT
Start: 2024-09-17 | End: 2024-09-17

## 2024-09-17 RX ORDER — ALBUMIN (HUMAN) 12.5 G/50ML
25 SOLUTION INTRAVENOUS
Status: DISCONTINUED | OUTPATIENT
Start: 2024-09-17 | End: 2024-09-18

## 2024-09-17 RX ORDER — NITROGLYCERIN 20 MG/100ML
INJECTION INTRAVENOUS CONTINUOUS PRN
Status: DISCONTINUED | OUTPATIENT
Start: 2024-09-17 | End: 2024-09-18

## 2024-09-17 RX ORDER — CLOPIDOGREL BISULFATE 75 MG/1
75 TABLET ORAL DAILY
Status: DISCONTINUED | OUTPATIENT
Start: 2024-09-17 | End: 2024-09-18

## 2024-09-17 RX ORDER — LIDOCAINE HYDROCHLORIDE 10 MG/ML
INJECTION, SOLUTION EPIDURAL; INFILTRATION; INTRACAUDAL; PERINEURAL AS NEEDED
Status: DISCONTINUED | OUTPATIENT
Start: 2024-09-17 | End: 2024-09-17 | Stop reason: SURG

## 2024-09-17 RX ORDER — TAMSULOSIN HYDROCHLORIDE 0.4 MG/1
0.4 CAPSULE ORAL DAILY
Status: DISCONTINUED | OUTPATIENT
Start: 2024-09-17 | End: 2024-09-18

## 2024-09-17 RX ORDER — DEXTROSE MONOHYDRATE 25 G/50ML
50 INJECTION, SOLUTION INTRAVENOUS
Status: DISCONTINUED | OUTPATIENT
Start: 2024-09-17 | End: 2024-09-18

## 2024-09-17 RX ORDER — INSULIN DEGLUDEC 100 U/ML
15 INJECTION, SOLUTION SUBCUTANEOUS NIGHTLY
Status: DISCONTINUED | OUTPATIENT
Start: 2024-09-17 | End: 2024-09-18

## 2024-09-17 RX ORDER — NICOTINE POLACRILEX 4 MG
30 LOZENGE BUCCAL
Status: DISCONTINUED | OUTPATIENT
Start: 2024-09-17 | End: 2024-09-18

## 2024-09-17 RX ORDER — ONDANSETRON 2 MG/ML
INJECTION INTRAMUSCULAR; INTRAVENOUS
Status: COMPLETED
Start: 2024-09-17 | End: 2024-09-17

## 2024-09-17 RX ORDER — NICOTINE POLACRILEX 4 MG
15 LOZENGE BUCCAL
Status: DISCONTINUED | OUTPATIENT
Start: 2024-09-17 | End: 2024-09-18

## 2024-09-17 RX ORDER — LOSARTAN POTASSIUM 25 MG/1
25 TABLET ORAL DAILY
Status: DISCONTINUED | OUTPATIENT
Start: 2024-09-17 | End: 2024-09-18

## 2024-09-17 RX ADMIN — ROCURONIUM BROMIDE 30 MG: 10 INJECTION, SOLUTION INTRAVENOUS at 11:08:00

## 2024-09-17 RX ADMIN — MIDAZOLAM HYDROCHLORIDE 2 MG: 1 INJECTION INTRAMUSCULAR; INTRAVENOUS at 11:07:00

## 2024-09-17 RX ADMIN — PROTAMINE SULFATE 70 MG: 10 INJECTION, SOLUTION INTRAVENOUS at 12:11:00

## 2024-09-17 RX ADMIN — HEPARIN SODIUM 19000 UNITS: 1000 INJECTION, SOLUTION INTRAVENOUS; SUBCUTANEOUS at 11:55:00

## 2024-09-17 RX ADMIN — LIDOCAINE HYDROCHLORIDE 100 MG: 10 INJECTION, SOLUTION EPIDURAL; INFILTRATION; INTRACAUDAL; PERINEURAL at 11:08:00

## 2024-09-17 NOTE — OPERATIVE REPORT
OPERATIVE REPORT    PATIENT'S NAME: Jonny Haque  DATE: 2024  : 4/15/1945  CSN: [id number]     PREOPERATIVE DIAGNOSIS:    Severe symptomatic aortic stenosis  Coronary artery disease s/p prior TRACY  Cirrhosis complicated by esophageal varices  Heart failure  GI bleeding  Kidney disease  Diabetes  Hyptohyroidism  Hepatitis C   POSTOPERATIVE DIAGNOSIS:  Same  PROCEDURE PERFORMED:  Transcatheter aortic valve replacement with 26 mm Beauchamp Albert 3 Ultra Resilia valve     OPERATORS:  Hussain Marcelo MD and Satya Donald MD  SURGEON: Ashwin Montgomery MD    INDICATION: Jonny Haque is a 79 year old male with symptomatic severe aortic stenosis who has been reviewed by our multidisciplinary TAVR committee and deemed suitable for placement of a transcatheter aortic valve replacement.      DESCRIPTION OF PROCEDURE:  After informed consent was obtained, the patient was prepped and draped in the usual sterile fashion.  The patient underwent general anesthesia.  Bilateral common femoral arteries were accessed using a micropuncture needle and sheath. A 14-Tajik sheath was placed on the Right side.  Heparin was given.  A pigtail catheter was advanced to the aortic root to obtain images of the annulus. A 26 mm Beauchamp Albert 3 Ultra Resilia valve was chosen and prepped accordingly with standard contrast.  The aortic valve was crossed. The valve was brought into the abdominal aorta and paired with its balloon.  It was brought across the aortic annulus and deployed with rapid pacing.  Followup MAKEDA interrogation revealed trivial perivalvular aortic insufficiency.  The delivery device was removed.  The left main was selectively engaged to confirm the prior placed stent was patent and not disturbed by the TAVR deployment. The Right side was closed using Perclose devices and a Perclose device was used on the Left side to achieve hemostasis.    Patient tolerate the procedure without difficulty.       Ashwin Montgomery MD  [facility]

## 2024-09-17 NOTE — ANESTHESIA PREPROCEDURE EVALUATION
PRE-OP EVALUATION    Patient Name: Jonny Haque    Admit Diagnosis: AORTIC STENOSIS    Pre-op Diagnosis: AORTIC STENOSIS    TRANSCATHETER AORTIC VALVE REPLACEMENT FEMORAL APPROACH, 26MMM АНДРЕЙ    Anesthesia Procedure: TRANSCATHETER AORTIC VALVE REPLACEMENT FEMORAL APPROACH, 26MMM АНДРЕЙ    Surgeons and Role:     * Satya Donald MD - Primary     * Hussain Marcelo MD     * Ashwin Montgomery MD    Pre-op vitals reviewed.        There is no height or weight on file to calculate BMI.    Current medications reviewed.  Hospital Medications:  No current facility-administered medications on file as of 9/17/2024.       Outpatient Medications:     Medications Prior to Admission   Medication Sig Dispense Refill Last Dose    clopidogrel 75 MG Oral Tab Take 1 tablet (75 mg total) by mouth daily. 30 tablet 11 9/16/2024 at PM    carvedilol 6.25 MG Oral Tab Take 1 tablet (6.25 mg total) by mouth 2 (two) times daily with meals. 60 tablet 3 9/17/2024 at AM    tamsulosin 0.4 MG Oral Cap Take 1 capsule (0.4 mg total) by mouth daily. 30 capsule 0 9/16/2024 at PM    levothyroxine 175 MCG Oral Tab Take 1 tablet (175 mcg total) by mouth before breakfast.   9/16/2024 at AM    Lanthanum Carbonate 1000 MG Oral Chew Tab Chew 1 tablet (1,000 mg total) by mouth 2 (two) times daily with meals.   9/16/2024 at PM    amiodarone 200 MG Oral Tab Take 1 tablet (200 mg total) by mouth 2 (two) times daily with meals. 60 tablet 3 9/16/2024 at PM    pantoprazole 40 MG Oral Tab EC Take 1 tablet (40 mg total) by mouth 2 (two) times daily before meals.   9/16/2024 at PM    Insulin Glargine, 2 Unit Dial, (TOUJEO MAX SOLOSTAR) 300 UNIT/ML Subcutaneous Solution Pen-injector Inject 20 Units into the skin nightly. 6 mL 2 9/16/2024 at PM    rosuvastatin 10 MG Oral Tab Take 1 tablet (10 mg total) by mouth nightly. (Patient taking differently: Take 1 tablet (10 mg total) by mouth nightly.) 90 tablet 0 9/16/2024 at PM    Insulin Lispro, 1 Unit Dial, (HUMALOG KWIKPEN)  100 UNIT/ML Subcutaneous Solution Pen-injector 12 units before meals with sliding scale. Max daily dose: 50 units. (Patient taking differently: 10 Units. Three times a day. Before meals) 60 mL 3 9/16/2024 at PM    losartan 25 MG Oral Tab Take 1 tablet (25 mg total) by mouth daily. 30 tablet 3 9/15/2024    nitroglycerin 0.4 MG Sublingual SL Tab Place 1 tablet (0.4 mg total) under the tongue every 5 (five) minutes as needed for Chest pain. 10 tablet 3     aspirin 81 MG Oral Tab EC Take 1 tablet (81 mg total) by mouth every other day.   9/15/2024    zolpidem 10 MG Oral Tab Take 1 tablet (10 mg total) by mouth nightly as needed for Sleep.       Ferric Citrate (AURYXIA) 1  MG(Fe) Oral Tab    9/14/2024    Insulin Pen Needle (TRUEPLUS PEN NEEDLES) 31G X 5 MM Does not apply Misc Use 5 per day 500 each 2     Glucose Blood (ONETOUCH ULTRA) In Vitro Strip 6 times a day 400 each 3     CONTOUR NEXT TEST In Vitro Strip TEST BLOOD SUGAR FOUR TIMES DAILY 400 strip 3     Blood Glucose Monitoring Suppl (M-SIX CONTOUR NEXT MONITOR) W/DEVICE Does not apply Kit 1 Device by Does not apply route 4 (four) times daily. 1 kit 0        Allergies: Patient has no known allergies.      Anesthesia Evaluation    Patient summary reviewed.    Anesthetic Complications  (-) history of anesthetic complications         GI/Hepatic/Renal  Comment: Bladder tumor presents for turbt  Hepatic cirrhosis with portal hypertension           (+) chronic renal disease and ESRD  (+) liver disease  (+) hepatitis               Cardiovascular  Comment: Abnormal stress echo/ekg signs of ischemia with maximal hr. Cardiology evaluation deemed low risk for anesthesia and surgery by [facility]  cardiologist Gail Dos Santos    ECG reviewed.            (+) hypertension   (+) hyperlipidemia  (+) CAD             (+) dysrhythmias   (+) CHF                Endo/Other      (+) diabetes     (+) hypothyroidism            (+) thrombocytopenia    (+) arthritis       Pulmonary                (+) shortness of breath            Neuro/Psych  Comment: Diabetic peripheral neuropathy               (+) neuromuscular disease             Patient Active Problem List:     Arthritis     Hepatic cirrhosis (HCC)     Diabetic peripheral neuropathy (HCC)     Encounter for screening for malignant neoplasm of prostate     Erectile dysfunction of nonorganic origin     Hyperlipidemia     Portal hypertension (HCC)     Thrombocytopenia (HCC)     PAC (premature atrial contraction)     Personal history of kidney stones     Elevated alkaline phosphatase level     Essential hypertension     Personal history of bladder cancer     Thoracic aorta atherosclerosis (HCC)     Hypothyroidism due to Hashimoto's thyroiditis     Hiatal hernia     Polyp of colon, unspecified part of colon, unspecified type     Abnormal EKG     Iron deficiency anemia due to chronic blood loss     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 stage 5 chronic kidney disease on chronic dialysis (HCC)     Duodenitis     Elevated PSA     Type 2 diabetes mellitus with chronic kidney disease on chronic dialysis, with long-term current use of insulin (HCC)     Coronary artery disease of native artery of native heart with stable angina pectoris (HCC)     ESRD (end stage renal disease) (HCC)     History of hepatitis C     Type 2 diabetes mellitus with microalbuminuria, with long-term current use of insulin (HCC)     Mild pulmonary hypertension (HCC)     PAF (paroxysmal atrial fibrillation) (HCC)     Pre-op exam     S/P primary angioplasty with coronary stent     Gastrointestinal hemorrhage associated with intestinal diverticulosis     Diabetes mellitus due to underlying condition with chronic kidney disease on chronic dialysis, with long-term current use of insulin (HCC)     Anemia due to stage 5 chronic kidney disease, not on chronic dialysis (HCC)     Rectal bleeding     Anemia, unspecified type     Dyspnea on  exertion     Colitis     Diverticulosis     ESRD on hemodialysis (HCC)     Hyperglycemia     Primary hypertension     Type 2 diabetes mellitus with diabetic nephropathy (HCC)     GI bleed     ABLA (acute blood loss anemia)     Gastrointestinal hemorrhage, unspecified gastrointestinal hemorrhage type     Acute chest pain     Hypokalemia     Anemia in ESRD (end-stage renal disease) (HCC)     Chest pain of uncertain etiology     Melena     Diabetes mellitus due to underlying condition with chronic kidney disease on chronic dialysis (HCC)     New onset left bundle branch block (LBBB)     Elevated troponin     Anemia due to chronic kidney disease, on chronic dialysis (HCC)     Heart failure with preserved ejection fraction (HCC)     Hypoxia     Hyperkalemia     Chronic renal failure, stage 5 (HCC)     ESRD (end stage renal disease) on dialysis (HCC)     Chest pain     Cough     Intracranial hemorrhage (HCC)     Hypothyroidism     Allergy, unspecified, initial encounter     Anaphylactic shock, unspecified, initial encounter     Coagulation defect, unspecified (HCC)     Deficiency of macronutrients     Dependence on renal dialysis (HCC)     Diabetic nephropathy associated with type 2 diabetes mellitus (HCC)     Diverticulitis of small intestine without perforation or abscess with bleeding     Hypertensive heart disease with stage 4 chronic kidney disease (HCC)     Hyperuricemia     Vitamin D deficiency, unspecified     Type 2 diabetes mellitus with diabetic peripheral angiopathy without gangrene (HCC)     Secondary hyperparathyroidism of renal origin (HCC)     Hypertensive renal disease     Pain     Other proteinuria     Other disorders resulting from impaired renal tubular function     Other disorders of plasma-protein metabolism, not elsewhere classified     Hypotension of hemodialysis     Metabolic acidosis     Disorder of metabolism     Anemia     Exertional chest pain     Hyponatremia     Acute kidney injury (HCC)      Acute renal failure (ARF) (HCC)     Acute renal failure on dialysis (HCC)            Past Surgical History:   Procedure Laterality Date    Angiogram  08/15/2017    small caliber RCA with 80% mid lesion.  LAD and left circumflex with 50% lesions.  LV shows inferobasilar aneurysm with scar.  Overall LV function preserved at the exterior 65%.    Cath bare metal stent (bms)      Other surgical history      Upper GI surgery    Other surgical history  2017    [facility]    Other surgical history  2019    [facility]     Other surgical history  2020    [facility] (Dr. Lees)     Social History     Socioeconomic History    Marital status:    Tobacco Use    Smoking status: Former     Current packs/day: 0.00     Average packs/day: 0.5 packs/day for 15.0 years (7.5 ttl pk-yrs)     Types: Cigarettes     Start date: 1965     Quit date: 1980     Years since quittin.6    Smokeless tobacco: Never    Tobacco comments:     quit at age 34   Vaping Use    Vaping status: Never Used   Substance and Sexual Activity    Alcohol use: No    Drug use: No     History   Drug Use No     Available pre-op labs reviewed.  Lab Results   Component Value Date    WBC 3.6 (L) 2024    RBC 2.71 (L) 2024    HGB 9.2 (L) 2024    HCT 26.6 (L) 2024    MCV 98.2 2024    MCH 33.9 2024    MCHC 34.6 2024    RDW 18.0 2024    PLT 54.0 (L) 2024     Lab Results   Component Value Date     2024    K 4.4 2024     2024    CO2 30.0 2024     (H) 2024    CREATSERUM 7.99 (H) 2024     (H) 2024    CA 9.3 2024            Airway      Mallampati: II  Mouth opening: >3 FB  TM distance: > 6 cm  Neck ROM: full Cardiovascular    Cardiovascular exam normal.         Dental    Dentition appears grossly intact         Pulmonary    Pulmonary exam normal.                 Other findings              ASA: 4   Plan:  general  NPO status verified and patient meets guidelines.        Comment:  I explained intrinsic risks of general anesthesia, including nausea, dental damage, sore throat, mouth injury,and hoarseness from airway management.  All questions were answered and understanding was demonstrated of risks.  Informed permission was obtained to proceed as documented in the signed consent form.         Plan/risks discussed with: patient                Present on Admission:  **None**

## 2024-09-17 NOTE — CONSULTS
.Reason for consult: periop mgmt    Consulted by: Dr. Donald    PCP: Gee Jimenez MD      History of Present Illness: Patient is a 79 year old male with PMH sig for dm, htn/hl, esrd on hd, cad s/p pci, hep c with cirrhosis, esrd on hd, who presented for TAVR for severe aortic stenosis. He is doing well currently. No sob/nausea/pain.       PMH:  Past Medical History:    Aortic stenosis    Calculus of kidney    Coronary atherosclerosis    bare metal stents at [facility] Jan 2021    Diabetes (HCC)    Disorder of thyroid    Esophageal varices without bleeding, unspecified esophageal varices type (HCC)    GIB (gastrointestinal bleeding)    Hepatitis, unspecified    hepatitis C-just completed taking Harvoni in November 2016    High blood pressure    High cholesterol    Other disorders of plasma-protein metabolism, not elsewhere classified    Renal disorder    Visual impairment    reading glasses        PSH:  Past Surgical History:   Procedure Laterality Date    Angiogram  08/15/2017    small caliber RCA with 80% mid lesion.  LAD and left circumflex with 50% lesions.  LV shows inferobasilar aneurysm with scar.  Overall LV function preserved at the exterior 65%.    Cath bare metal stent (bms)      Other surgical history      Upper GI surgery    Other surgical history  12/11/2017    [facility]    Other surgical history  07/18/2019    [facility] Le[facility]     Other surgical history  02/06/2020    [facility] Cysto (Dr. Lees)        Home Medications:  Outpatient Medications Marked as Taking for the 9/17/24 encounter (Hospital Encounter)   Medication Sig Dispense Refill    clopidogrel 75 MG Oral Tab Take 1 tablet (75 mg total) by mouth daily. 30 tablet 11    carvedilol 6.25 MG Oral Tab Take 1 tablet (6.25 mg total) by mouth 2 (two) times daily with meals. 60 tablet 3    tamsulosin 0.4 MG Oral Cap Take 1 capsule (0.4 mg total) by mouth daily. 30 capsule 0    levothyroxine 175 MCG Oral Tab Take 1 tablet (175 mcg total) by  mouth before breakfast.      Lanthanum Carbonate 1000 MG Oral Chew Tab Chew 1 tablet (1,000 mg total) by mouth 2 (two) times daily with meals.      amiodarone 200 MG Oral Tab Take 1 tablet (200 mg total) by mouth 2 (two) times daily with meals. 60 tablet 3    pantoprazole 40 MG Oral Tab EC Take 1 tablet (40 mg total) by mouth 2 (two) times daily before meals.      Insulin Glargine, 2 Unit Dial, (TOUJEO MAX SOLOSTAR) 300 UNIT/ML Subcutaneous Solution Pen-injector Inject 20 Units into the skin nightly. 6 mL 2    rosuvastatin 10 MG Oral Tab Take 1 tablet (10 mg total) by mouth nightly. (Patient taking differently: Take 1 tablet (10 mg total) by mouth nightly.) 90 tablet 0    Insulin Lispro, 1 Unit Dial, (HUMALOG KWIKPEN) 100 UNIT/ML Subcutaneous Solution Pen-injector 12 units before meals with sliding scale. Max daily dose: 50 units. (Patient taking differently: 10 Units. Three times a day. Before meals) 60 mL 3       Scheduled Medication:   amiodarone  200 mg Oral BID with meals    aspirin  81 mg Oral QOD    carvedilol  6.25 mg Oral 2x Daily(Beta Blocker)    clopidogrel  75 mg Oral Daily    losartan  25 mg Oral Daily    rosuvastatin  10 mg Oral Nightly     Continuous Infusing Medication:   sodium chloride      sodium chloride      lactated ringers      nitroGLYCERIN in dextrose 5%      norepinephrine       PRN Medication:  atropine    naloxone    hydrALAzine    nitroGLYCERIN in dextrose 5%    albumin human    norepinephrine    sennosides     ALL:  No Known Allergies     Soc Hx:  Social History     Tobacco Use    Smoking status: Former     Current packs/day: 0.00     Average packs/day: 0.5 packs/day for 15.0 years (7.5 ttl pk-yrs)     Types: Cigarettes     Start date: 1965     Quit date: 1980     Years since quittin.6    Smokeless tobacco: Never    Tobacco comments:     quit at age 34   Substance Use Topics    Alcohol use: No        Fam Hx  Family History   Problem Relation Age of Onset    Cancer Father      Hypertension Mother        Review of Systems  Comprehensive ROS reviewed and negative except for what's stated above.  Including negative for chest pain, shortness of breath, syncope.       OBJECTIVE:  /47   Pulse 52   Temp 97.1 °F (36.2 °C) (Temporal)   Resp 11   SpO2 96%   General:  Alert, no distress, appears stated age.   Head:  Normocephalic, without obvious abnormality, atraumatic.   Eyes:  Sclera anicteric, No conjunctival pallor, EOMs intact.    Nose: Nares normal. Septum midline. Mucosa normal. No drainage.   Throat: Lips, mucosa, and tongue normal. Teeth and gums normal.   Neck: Supple, symmetrical, trachea midline, no cervical or supraclavicular lymph adenopathy, thyroid: no enlargment/tenderness/nodules appreciated   Lungs:   Clear to auscultation bilaterally. Normal effort   Chest wall:  No tenderness or deformity.   Heart:  Regular rate and rhythm, S1, S2 normal, no murmur, rub or gallop appreciated   Abdomen:   Soft, non-tender. Bowel sounds normal. No masses,  No organomegaly. Non distended   Extremities: Extremities normal, atraumatic, no cyanosis or edema.   Skin: Skin color, texture, turgor normal. No rashes or lesions.    Neurologic: Normal strength, no focal deficit appreciated       Diagnostics:     Radiology: XR CHEST AP PORTABLE  (CPT=71045)    Result Date: 9/17/2024  PROCEDURE:  XR CHEST AP PORTABLE  (CPT=71045)  TECHNIQUE:  AP chest radiograph was obtained.  COMPARISON:  [facility] , XR, XR CHEST AP PORTABLE  (CPT=71045), 8/03/2024, 3:03 PM.  [facility] , XR, XR CHEST AP PORTABLE  (CPT=71045), 4/16/2024, 5:16 PM.  INDICATIONS:  s/p surgery  PATIENT STATED HISTORY: (As transcribed by Technologist)  Patient offered no additional history at this time.      FINDINGS:  Stable right-sided dialysis catheter.  Cardiac size within normal limits.  Cardiac valve prosthesis.  Interstitial opacities bilaterally.  No significant pleural effusions.  No pneumothorax.  Minimal left basilar atelectasis.             CONCLUSION:  1. Interstitial opacities likely representing edema.  2. Minimal left basilar atelectasis.    LOCATION:  [facility]      Dictated by (CST): Susan Liang MD on 9/17/2024 at 2:02 PM     Finalized by (CST): Susan Liang MD on 9/17/2024 at 2:03 PM       CARD ECHO MAKEDA STRUCTURAL HEART(CPT-69244)    Result Date: 9/17/2024  Randal Correia MD     9/17/2024 12:16 PM Transesophageal Echocardiogram Report PREOPERATIVE DIAGNOSIS:   Severe aortic stenosis POSTOPERATIVE DIAGNOSIS:  Transcatheter aortic valve replacement PROCEDURE PERFORMED: Transesophageal echocardiogram with Doppler for interventional guidance of transcatheter aortic valve replacement (CPT code 62316)  PROCEDURAL COMMENTS: Complete Omniplane transesophageal echocardiography with live biplane imaging, Doppler and color flow mapping was performed in the hybrid operating room. The MAKEDA probe was placed by the anesthesiologist, prior to my arrival. The MAKEDA was done for procedural guidance of transcatheter aortic valve replacement. Views were obtained from the standard imaging windows and were of diagnostic quality.  Postprocedural imaging after TAVR shows: LVEF: 65% Mean aortic valve gradient: 5 mm Hg Aortic valve area (continuity method): 2.8 cm2 Aortic regurgitation: Mild paravalvular No pericardial effusion  CONCLUSIONS: Successful implantation of a 26 mm Albert 3 Ultra Resilia valve in the aortic position via a transfemoral approach. Randal Correia MD, [facility] 9/17/2024 12:15 PM     CARD ECHO MAKEDA STRUCTURAL HEART(CPT-55107)    Result Date: 9/17/2024  Randal Correia MD     9/17/2024 12:16 PM Transesophageal Echocardiogram Report PREOPERATIVE DIAGNOSIS:   Severe aortic stenosis POSTOPERATIVE DIAGNOSIS:  Transcatheter aortic valve replacement PROCEDURE PERFORMED: Transesophageal echocardiogram with Doppler for interventional guidance of transcatheter aortic valve replacement (CPT code 00547)  PROCEDURAL COMMENTS:  Complete Omniplane transesophageal echocardiography with live biplane imaging, Doppler and color flow mapping was performed in the hybrid operating room. The MAKEDA probe was placed by the anesthesiologist, prior to my arrival. The MAKEDA was done for procedural guidance of transcatheter aortic valve replacement. Views were obtained from the standard imaging windows and were of diagnostic quality.  Postprocedural imaging after TAVR shows: LVEF: 65% Mean aortic valve gradient: 5 mm Hg Aortic valve area (continuity method): 2.8 cm2 Aortic regurgitation: Mild paravalvular No pericardial effusion  CONCLUSIONS: Successful implantation of a 26 mm Albert 3 Ultra Resilia valve in the aortic position via a transfemoral approach. Randal Correia MD, [facility] 9/17/2024 12:15 PM     CARD ECHO MAKEDA STRUCTURAL HEART(CPT-70708)    Result Date: 9/17/2024  Randal Correia MD     9/17/2024 12:16 PM Transesophageal Echocardiogram Report PREOPERATIVE DIAGNOSIS:   Severe aortic stenosis POSTOPERATIVE DIAGNOSIS:  Transcatheter aortic valve replacement PROCEDURE PERFORMED: Transesophageal echocardiogram with Doppler for interventional guidance of transcatheter aortic valve replacement (CPT code 57576)  PROCEDURAL COMMENTS: Complete Omniplane transesophageal echocardiography with live biplane imaging, Doppler and color flow mapping was performed in the hybrid operating room. The MAKEDA probe was placed by the anesthesiologist, prior to my arrival. The MAKEDA was done for procedural guidance of transcatheter aortic valve replacement. Views were obtained from the standard imaging windows and were of diagnostic quality.  Postprocedural imaging after TAVR shows: LVEF: 65% Mean aortic valve gradient: 5 mm Hg Aortic valve area (continuity method): 2.8 cm2 Aortic regurgitation: Mild paravalvular No pericardial effusion  CONCLUSIONS: Successful implantation of a 26 mm Albert 3 Ultra Resilia valve in the aortic position via a transfemoral approach.  Randal Correia MD, [facility] 9/17/2024 12:15 PM     US CAROTID DOPPLER BILAT - DIAG IMG (CPT=93880)    Result Date: 9/9/2024  PROCEDURE:  US CAROTID DOPPLER BILAT - DIAG IMG (CPT=93880)  COMPARISON:  None.  INDICATIONS:  R09.89 Other specified symptoms and signs involving the circulatory and respiratory systems Z01.810 Encounter for preprocedural cardiovascular examination I35.*  TECHNIQUE:  Real-time gray-scale and duplex ultrasound was used to evaluate the bilateral extracranial carotid and vertebral arteries.  B-mode 2-dimensional images of the vascular structures, Doppler spectral analysis, and color flow Doppler imaging were  performed.  Stenosis measurements obtained in this exam were performed using Consensus Panel categories and NASCET criteria  PATIENT STATED HISTORY: (As transcribed by Technologist)  Patient states this is pre-op for his surgery tomorrow.    FINDINGS:   IMAGE FINDINGS:  Grayscale examination of bifurcations demonstrates calcified atherosclerosis, left greater than right.  HEART RATE:      Regular.  VELOCITY MEASUREMENTS:                       Right CCA Peak Systolic Velocity:  47.99 cm/s             Right Proximal ICA Peak Systolic Velocity:  80.10 cm/s             Right Mid ICA Peak Systolic Velocity:  89.38 cm/s             Right Distal ICA Peak Systolic Velocity:  60.16 cm/s             Right ICA/CCA Velocity Ratio:  1.86                           Left CCA Peak Systolic Velocity:  61.07 cm/s             Left Proximal ICA Peak Systolic Velocity:  60.14 cm/s             Left Mid ICA Peak Systolic Velocity:  91.11 cm/s             Left Distal ICA Peak Systolic Velocity:  57.97 cm/s             Left ICA/CCA Velocity Ratio:  1.49  The vertebral arteries demonstrate antegrade flow. Using the consensus panel categories, 0-50% stenosis group bilaterally.             CONCLUSION:  Calcified plaque at both carotid bifurcations.  No evidence of hemodynamically significant stenosis.   LOCATION:   [place]     Dictated by (CST): Abran Erwin MD on 9/09/2024 at 12:42 PM     Finalized by (CST): Abran Erwin MD on 9/09/2024 at 12:43 PM            ASSESSMENT / PLAN:    # severe Ao stenosis s/p TAVR  Cards following  Echo, monitor on tele    # chronic systolic HF  Cad s/p pci  htn  Losartan  Hold bb due to bradycardia    # dm  Long acting insulin + ssi    # esrd on hd  Nephro consulting, planning hd today.    Outpatient records reviewed confirming patient's medical history and medications.     Further recommendations pending patient's clinical course.  [facility] [place] while in [place]    Patient and/or patient's family given opportunity to ask questions and note understanding and agreeing with therapeutic plan as outlined      Thank you for allowing me to participate in the care of this patient.     Thank You,  Ursula Saucedo MD  [facility] [place]  Pager [phone]  Answering Service number: [phone]

## 2024-09-17 NOTE — H&P
[facility]  Structural Heart Team  Pre-TAVR H&P Note     Jonny Haque Patient Status:  Inpatient    4/15/1945 MRN [id number]   Location [facility] CARDIOVASCULAR SURGERY Attending Satya Donald MD   Hosp Day # 0 PCP Gee Jimenez MD     Reason for Admission:  Severe non-rheumatic degenerative aortic stenosis    Impression:  1.Severe aortic stenosis  2. CAD s/p PCI  3. Hepatitis C w/cirrhosis  4. ESRD w/HD        Plan:  Preop orders  Consents to be signed and put in chart  All questions answered. Denies any concerns and is ready to proceed with the procedure. Patient verbalized understanding of the benefits and risk associated with the procedure as discussed with the doctor.      History of Present Illness:  Jonny Haque is a 79 year old who presented to [facility] for transcatheter aortic valve implantation.      Previous HX:       Denies the following:  Infective endocarditis  Pacemaker, ICD  CABG, PCI, other cardiac surgeries  Prior Aortic procedure  Prior MV procedure  CVA/TIA  Carotid stenosis, PAD  Immunocompromised  Previous radiation to chest  Currently on dialysis     Objective:  BP (!) 176/71 (BP Location: Right arm)   Pulse 57   Temp 96.6 °F (35.9 °C) (Temporal)   Resp 17   SpO2 99%   Temp (24hrs), Av.6 °F (35.9 °C), Min:96.6 °F (35.9 °C), Max:96.6 °F (35.9 °C)      Intake/Output Summary (Last 24 hours) at 2024 1158  Last data filed at 2024 1126  Gross per 24 hour   Intake 10 ml   Output --   Net 10 ml     Wt Readings from Last 3 Encounters:   24 212 lb 15.4 oz (96.6 kg)   24 204 lb (92.5 kg)   24 204 lb 1.6 oz (92.6 kg)       General:Awake and alert; in acute distress  Cardiac: Regular rate and regular rhythm; 3/6 murmurs, no rubs/gallops are appreciated  Lungs: Clear to auscultation bilaterally; no accessory muscle use  Abdomen: Soft, non-tender; bowel sounds are normoactive  Extremities: No clubbing/cyanosis; moves all 4 extremities to command  equally, pedal pulses ;   BLE edema  Groins: bilateral groin dressings clean, dry and intact, soft, non-tender, no hematoma, good cms distally    Current Facility-Administered Medications   Medication Dose Route Frequency    sodium chloride 0.9% infusion   Intravenous Continuous    sodium chloride 0.9% infusion   Intravenous Continuous     Facility-Administered Medications Ordered in Other Encounters   Medication Dose Route Frequency    midazolam (Versed) 2 MG/2ML injection   Intravenous PRN    propofol (Diprivan) 10 MG/ML injection   Intravenous PRN    rocuronium (Zemuron) 50 mg/5mL injection   Intravenous PRN    lidocaine PF (Xylocaine-MPF) 1% injection   Intravenous PRN    ceFAZolin (Ancef) 2g in 10mL IV syringe premix   Intravenous PRN    heparin (Porcine) 1000 UNIT/ML injection   Intravenous PRN       CAN FILL IN DATA BELOW FROM CLINIC NOTE  DIAGNOSTICS:   BMP:     Lab Results   Component Value Date    GLUCOSE 129 (H) 02/09/2022    GLUCOSE 128 (H) 06/07/2016    GLUCOSE 359 (H) 03/07/2013     Lab Results   Component Value Date    K 4.4 08/27/2024    K 4.9 08/14/2024    K 5.4 (H) 08/13/2024     Lab Results   Component Value Date     (H) 08/27/2024    BUN 48 (H) 08/14/2024    BUN 66 (H) 08/13/2024     Lab Results   Component Value Date    CREATSERUM 7.99 (H) 08/27/2024    CREATSERUM 5.55 (H) 08/14/2024    CREATSERUM 7.34 (H) 08/13/2024         [facility] (See full report)   1. Left ventricle: The cavity size is normal. Wall thickness is mildly      increased. Systolic function is moderately to severely reduced by visual      assessment. The estimated ejection fraction is 30-35%. Cannot assess LV      diastolic function.   2. Aortic valve: Trileaflet. The leaflets are moderately thickened and      moderately calcified. There is moderate stenosis. The peak systolic      velocity is 2.8m/sec. The mean systolic gradient is 17mm Hg. The peak      systolic gradient is 32mm Hg. The valve area  is 1.4cm^2. The valve area      index is 0.63cm^2/m^2.   3. Mitral valve: There is mild regurgitation.   4. Left atrium: The atrium is mildly to moderately dilated.   5. Right ventricle: The cavity size is normal. Wall thickness is normal.      Systolic function is normal by visual assessment. Estimation of the right      ventricular systolic pressure is within the normal range. The RV pressure      during systole is 23mm Hg.   6. Tricuspid valve: There is mild regurgitation.   7. Pericardium, extracardiac: A small hemodynamically insignificant      pericardial effusion is identified circumferential to the heart.       Laboratory Data:     Lab Results   Component Value Date    INR 1.10 05/08/2024    INR 1.10 04/16/2024    INR 1.13 03/31/2024          Telemetry: No malignant tachyarrhythmias or bradyarrhythmias    Sara Humes, APRN  9/17/2024  11:58 AM

## 2024-09-17 NOTE — ANESTHESIA PROCEDURE NOTES
Airway  Date/Time: 9/17/2024 11:10 AM  Urgency: elective      General Information and Staff    Patient location during procedure: OR  Anesthesiologist: Chato Valle MD  Performed: anesthesiologist   Performed by: Chato Valle MD  Authorized by: Chato Valle MD      Indications and Patient Condition  Indications for airway management: anesthesia  Sedation level: deep  Preoxygenated: yes  Patient position: sniffing  Mask difficulty assessment: 1 - vent by mask    Final Airway Details  Final airway type: endotracheal airway      Successful airway: ETT  Cuffed: yes   Successful intubation technique: direct laryngoscopy  Endotracheal tube insertion site: oral  Blade size: #3  ETT size (mm): 7.5    Cormack-Lehane Classification: grade I - full view of glottis  Placement verified by: capnometry   Measured from: lips  Number of attempts at approach: 1

## 2024-09-17 NOTE — CONSULTS
[facility]  Report of Consultation    Jonny Haque Patient Status:  Inpatient    4/15/1945 MRN [id number]   Location [facility] CARDIOVASCULAR SURGERY Attending Satya Donald MD   Hosp Day # 0 PCP Gee Jimenez MD     Reason for Consultation:  Inpatient ESRD management    History of Present Illness:  Jonny Haque is a a 79 year old male with history of aortic stenosis, cirrhosis, ESRD, DM2, HTN and HLD who presents for TAVR. Nephrology service consulted for inpatient ESRD management.     Patient has severe non-rheumatic degenerative aortic stenosis that was symptomatic so he was recommended to have TAVR. TAVR completed successfully on . He reports feeling well besides for back pain that resolved with repositioning. Reports that last HD session was Saturday.     History:  Past Medical History:    Aortic stenosis    Calculus of kidney    Coronary atherosclerosis    bare metal stents at [facility] 2021    Diabetes (HCC)    Disorder of thyroid    Esophageal varices without bleeding, unspecified esophageal varices type (HCC)    GIB (gastrointestinal bleeding)    Hepatitis, unspecified    hepatitis C-just completed taking Harvoni in 2016    High blood pressure    High cholesterol    Other disorders of plasma-protein metabolism, not elsewhere classified    Renal disorder    Visual impairment    reading glasses     Past Surgical History:   Procedure Laterality Date    Angiogram  08/15/2017    small caliber RCA with 80% mid lesion.  LAD and left circumflex with 50% lesions.  LV shows inferobasilar aneurysm with scar.  Overall LV function preserved at the exterior 65%.    Cath bare metal stent (bms)      Other surgical history      Upper GI surgery    Other surgical history  2017    [facility] Lees    Other surgical history  2019    [facility] Lees     Other surgical history  2020    [facility] Cysto (Dr. Lees)     Family History   Problem Relation Age of Onset    Cancer  Father     Hypertension Mother      Denies family history of kidney disease.    reports that he quit smoking about 44 years ago. His smoking use included cigarettes. He started smoking about 59 years ago. He has a 7.5 pack-year smoking history. He has never used smokeless tobacco. He reports that he does not drink alcohol and does not use drugs.    Allergies:  No Known Allergies    Medications:    Current Facility-Administered Medications:     sodium chloride 0.9% infusion, , Intravenous, Continuous    sodium chloride 0.9% infusion, , Intravenous, Continuous    lactated ringers infusion, , Intravenous, Continuous    atropine 0.1 MG/ML injection 0.5 mg, 0.5 mg, Intravenous, PRN    naloxone (Narcan) 0.4 MG/ML injection 0.08 mg, 0.08 mg, Intravenous, PRN    hydrALAzine (Apresoline) 20 mg/mL injection 10 mg, 10 mg, Intravenous, Q2H PRN    nitroGLYCERIN in dextrose 5% 50 mg/250mL infusion premix, 5-300 mcg/min, Intravenous, Continuous PRN    albumin human (Albumin) 5% injection 12.5 g, 12.5 g, Intravenous, Once PRN    norepinephrine (Levophed) 4 mg/250mL infusion premix, 0.5-30 mcg/min, Intravenous, Continuous PRN    sennosides (Senokot) tab 17.2 mg, 17.2 mg, Oral, Nightly PRN    amiodarone (Pacerone) tab 200 mg, 200 mg, Oral, BID with meals    aspirin DR tab 81 mg, 81 mg, Oral, QOD    carvedilol (Coreg) tab 6.25 mg, 6.25 mg, Oral, 2x Daily(Beta Blocker)    clopidogrel (Plavix) tab 75 mg, 75 mg, Oral, Daily    losartan (Cozaar) tab 25 mg, 25 mg, Oral, Daily    rosuvastatin (Crestor) tab 10 mg, 10 mg, Oral, Nightly    sodium chloride 0.9 % IV bolus 100 mL, 100 mL, Intravenous, Q30 Min PRN **AND** albumin human (Albumin) 25% injection 25 g, 25 g, Intravenous, PRN Dialysis  No current outpatient medications on file.       Review of Systems:  Please see HPI for pertinent positives. 10 point review of systems otherwise reviewed and negative.     Physical Exam:  /47   Pulse 52   Temp 97.1 °F (36.2 °C) (Temporal)    Resp 11   SpO2 96%   Temp (24hrs), Av.9 °F (36.1 °C), Min:96.6 °F (35.9 °C), Max:97.1 °F (36.2 °C)       Intake/Output Summary (Last 24 hours) at 2024 1523  Last data filed at 2024 1246  Gross per 24 hour   Intake 710 ml   Output --   Net 710 ml     Wt Readings from Last 3 Encounters:   24 212 lb 15.4 oz (96.6 kg)   24 204 lb (92.5 kg)   24 204 lb 1.6 oz (92.6 kg)     General: awake, alert  HEENT: No scleral icterus, MMM  Neck: Supple, no JADE or thyromegaly  Cardiac: Regular rate and rhythm, S1, S2 normal  Lungs: Decreased breath sounds at the bases bilaterally.   Extremities: Without clubbing, cyanosis; trace edema  Neurologic: Cranial nerves grossly intact, moving all extremities  Skin: Warm and dry, no rashes    Data:    Imaging:  All imaging studies reviewed, CXR with mild pulmonary edema      Assessment / Plan:    1) End-stage renal disease: He has ESRD due to long-standing DM2. Currently receiving HD TThS via tunneled HD catheter. Last HD , will plan for HD today per usual outpatient schedule.    2) HTN: controlled, home regimen includes losartan 25mg daily and coreg 6.25mg BID    3) Anemia: Due to CKD, goal hemoglobin 10-11    4) Aortic stenosis: Severe symptomatic aortic stenosis s/p TAVR with Dr. Donald     Thank you for allowing me to participate in the care of your patient.    Teetee Montano MD  2024

## 2024-09-17 NOTE — ANESTHESIA PROCEDURE NOTES
Procedure Performed: MAKEDA       Start Time:        End Time:      Preanesthesia Checklist:  Patient identified, IV assessed, risks and benefits discussed, monitors and equipment assessed, procedure being performed at surgeon's request and anesthesia consent obtained.    General Procedure Information  Diagnostic Indications for Echo:  assessment of ascending aorta  Physician Requesting Echo: Crescencio Donald  Location performed:  OR  Intubated  Bite block placed  Heart visualized  Probe Insertion:  Easy  Probe Type:  Multiplane  Modalities:  Color flow mapping, pulse wave Doppler and continuous wave Doppler        Anesthesia Information  Performed Personally  Anesthesiologist:  Chato Valle MD      Post  Post Intervention Follow-up Study:See addtional report                       Complications:None

## 2024-09-17 NOTE — ANESTHESIA POSTPROCEDURE EVALUATION
[facility]    Jonny Haque Patient Status:  Inpatient   Age/Gender 79 year old male MRN [id number]   Location [facility] CARDIOVASCULAR SURGERY Attending Satya Donald MD   Hosp Day # 0 PCP Gee Jimenez MD       Anesthesia Post-op Note    TRANSCATHETER AORTIC VALVE REPLACEMENT FEMORAL APPROACH, 26MMM АНДРЕЙ AORTIC TISSUE VALVE.INTRAOPERATIVE TRANSEOPHAGEAL ECHO; SEE CATH LAB CHARTING FOR DETAILS    Procedure Summary       Date: 09/17/24 Room / Location:  [facility] 03 HYBRID /  CVOR    Anesthesia Start: 1104 Anesthesia Stop: 1246    Procedure: TRANSCATHETER AORTIC VALVE REPLACEMENT FEMORAL APPROACH, 26MMM АНДРЕЙ AORTIC TISSUE VALVE.INTRAOPERATIVE TRANSEOPHAGEAL ECHO; SEE CATH LAB CHARTING FOR DETAILS Diagnosis: (AORTIC STENOSIS)    Surgeons: Satya Donald MD Anesthesiologist: Chato Valle MD    Anesthesia Type: general ASA Status: 4            Anesthesia Type: No value filed.    Vitals Value Taken Time   /56 09/17/24 1259   Temp 97.8 09/17/24 1300   Pulse 63 09/17/24 1257   Resp 17 09/17/24 1257   SpO2 100 % 09/17/24 1259   Vitals shown include unfiled device data.    Patient Location: PACU    Anesthesia Type: general    Airway Patency: patent    Postop Pain Control: adequate    Mental Status: mildly sedated but able to meaningfully participate in the post-anesthesia evaluation    Nausea/Vomiting: none    Cardiopulmonary/Hydration status: stable euvolemic    Complications: no apparent anesthesia related complications    Postop vital signs: stable    Dental Exam: Unchanged from Preop    Patient to be discharged from PACU when criteria met.

## 2024-09-17 NOTE — PROGRESS NOTES
[facility]  Structural Heart Team   Progress Note    Jonny Haque Patient Status:  Inpatient    4/15/1945 MRN [id number]   [facility] 6NE-A Attending Satya Donald MD   Hosp Day # 0 PCP Gee Jimenez MD     Impression:  1. Severe Aortic Stenosis S/P TAVR 26mm Beauchamp UR on 24  2. Chronic systolic heart failure  3. CAD s/p PCI  4. Hepatitis C w/Cirrhosis  5. ESRD (HD T/R/Sa)      Plan:  Post TAVR order set  Post procedure EKG, SB with IVB  Post procedure echo  Bedrest for 4 hours and then up in chair and ambulating this PM  Monitor groin sites for bleeding/hematoma and peripheral pulse checks. Hold direct pressure if any bleeding and call MD/NP for any concerns  Maintain SBP <150. PRN hydralazine ordered. Notify MD/NP if more than 3 doses needed.  Asa and plavix to resume today  HD per nephrology  Possible discharge tomorrow      Subjective:  The patient denies any chest pain or dyspnea at this time.  Sons at bedside. Post-op POC discussed with patient and family    Bilateral groin sites are soft, non-tender, no hematoma                   Echocardiogram:    Cardiac Cath:    Objective:  /47   Pulse 52   Temp 97.1 °F (36.2 °C) (Temporal)   Resp 11   SpO2 96%   Temp (24hrs), Av.9 °F (36.1 °C), Min:96.6 °F (35.9 °C), Max:97.1 °F (36.2 °C)      Intake/Output Summary (Last 24 hours) at 2024 1513  Last data filed at 2024 1246  Gross per 24 hour   Intake 710 ml   Output --   Net 710 ml     Wt Readings from Last 3 Encounters:   24 212 lb 15.4 oz (96.6 kg)   24 204 lb (92.5 kg)   24 204 lb 1.6 oz (92.6 kg)       General: Awake and alert; in no acute distress  Cardiac: Regular rate and regular rhythm; 1/6 JACQUELYN at LSB that radiates to the carotids;  Lungs: Clear to auscultation bilaterally; no accessory muscle use  Abdomen: Soft, non-tender; bowel sounds are normoactive  Extremities: No clubbing/cyanosis; moves all 4 extremities normally  Groins: bilateral  groin dressings clean, dry and intact, soft, non-tender, no hematoma, good cms distally    Current Facility-Administered Medications   Medication Dose Route Frequency    sodium chloride 0.9% infusion   Intravenous Continuous    sodium chloride 0.9% infusion   Intravenous Continuous    lactated ringers infusion   Intravenous Continuous    atropine 0.1 MG/ML injection 0.5 mg  0.5 mg Intravenous PRN    naloxone (Narcan) 0.4 MG/ML injection 0.08 mg  0.08 mg Intravenous PRN    hydrALAzine (Apresoline) 20 mg/mL injection 10 mg  10 mg Intravenous Q2H PRN    nitroGLYCERIN in dextrose 5% 50 mg/250mL infusion premix  5-300 mcg/min Intravenous Continuous PRN    albumin human (Albumin) 5% injection 12.5 g  12.5 g Intravenous Once PRN    norepinephrine (Levophed) 4 mg/250mL infusion premix  0.5-30 mcg/min Intravenous Continuous PRN    sennosides (Senokot) tab 17.2 mg  17.2 mg Oral Nightly PRN    amiodarone (Pacerone) tab 200 mg  200 mg Oral BID with meals    aspirin DR tab 81 mg  81 mg Oral QOD    carvedilol (Coreg) tab 6.25 mg  6.25 mg Oral 2x Daily(Beta Blocker)    clopidogrel (Plavix) tab 75 mg  75 mg Oral Daily    losartan (Cozaar) tab 25 mg  25 mg Oral Daily    rosuvastatin (Crestor) tab 10 mg  10 mg Oral Nightly       Laboratory Data:     Lab Results   Component Value Date    INR 1.10 05/08/2024    INR 1.10 04/16/2024    INR 1.13 03/31/2024          Telemetry: No malignant tachyarrhythmias or bradyarrhythmias    Sara Humes, APN   9/17/2024  3:13 PM

## 2024-09-17 NOTE — ANESTHESIA PROCEDURE NOTES
Peripheral IV  Date/Time: 9/17/2024 11:20 AM  Inserted by: Chato Valle MD    Placement  Needle size: 18 G  Laterality: right  Local anesthetic: none  Site prep: alcohol  Technique: ultrasound guided  Attempts: 1

## 2024-09-17 NOTE — PROCEDURES
PRE DIAGNOSIS: Aortic stenosis, severe  POST DIAGNOSIS: S/P TAVR  DATE OF SERVICE: 9/17/2024    PROCEDURE PERFORMED: Transcatheter aortic valve replacement, 26mm Beauchamp Albert 3 Ultra Resilia aortic valve via right, percutaneous transfemoral approach.     OPERATORS:   1. Cardiovascular surgeon, Dr. Ashwin Montgomery  2. Interventional Cardiology, Dr. Satya Donald and Dr. Hussain Marcelo    TRANSESOPHAGEAL ECHOCARDIOGRAPHER: Dr. Randal Correia.    INDICATIONS: Severe symptomatic calcific aortic stenosis NYHA class 3-4 symptoms.     PROCEDURE:   The patient was brought to the hybrid operating room and prepped in sterile fashion. Endotracheal intubation was performed by the anesthesia service.   Using modified Seldinger technique with ultrasound and fluoroscopic guidance, access was obtained in the right common femoral artery and left common femoral artery.  Through the left femoral arterial access a pigtail was advanced to the right coronary cusp and aortic angiography was performed to find a coplanar view. The right femoral arteriotomy was then \"pre-closed\" with Perclose sutures x 2. Over a stiff Amplatz wire, the 14F E-sheath was placed in the abdominal aorta. The valve was crossed with an AL1 catheter and J wire which was exchanged for a Saavy left ventricular pacing wire. The 26mm Albert 3 Ultra Resilia valve at nominal was advanced across the native aortic valve. During rapid pacing the valve was deployed. Postprocedural aortography and MAKEDA documented trivial perivalvular leak.    I used a JL4 diagnostic catheter and selectively engaged the left main due to the stent present and wanted to ensure that 1. It was still accessible despite the TAVR valve in place and 2. It was still widely patent and not disturbed after the wire and catheter manipulation in the aortic root.  The LM stent was unchange; it was widely patent with no ISR and fills the LAD and dominant CX.  Normal flow.  Arteriotomy site was Perclosed with  preclose technique. The left femoral arterial access was closed with a Perclose device.   Pulses were unchanged, and there were no immediate complications of the procedure. The estimated blood loss was less than 50 mL.     RESULT: Successful placement of 26mm Beauchamp Albert 3 Ultra Resilia aortic valve via right transfemoral approach.    Satya Donald MD

## 2024-09-17 NOTE — PLAN OF CARE
Received pt from pacu s/p tavr. Bilat groins soft, no hematoma. Dressings c/d/I. Palpable pulses. SB, HR 40-50s. BP stable. Straight cath x1. Adat. HD this afternoon. BP 80s during dialysis- albumin given x1 with good response. Pt and family updated with poc.

## 2024-09-17 NOTE — PROCEDURES
Transesophageal Echocardiogram Report    PREOPERATIVE DIAGNOSIS:   Severe aortic stenosis    POSTOPERATIVE DIAGNOSIS:  Transcatheter aortic valve replacement    PROCEDURE PERFORMED: Transesophageal echocardiogram with Doppler for interventional guidance of transcatheter aortic valve replacement (CPT code 10910)      PROCEDURAL COMMENTS: Complete Omniplane transesophageal echocardiography with live biplane imaging, Doppler and color flow mapping was performed in the hybrid operating room. The MAKEDA probe was placed by the anesthesiologist, prior to my arrival. The MAKEDA was done for procedural guidance of transcatheter aortic valve replacement. Views were obtained from the standard imaging windows and were of diagnostic quality.     Postprocedural imaging after TAVR shows:  LVEF: 65%  Mean aortic valve gradient: 5 mm Hg  Aortic valve area (continuity method): 2.8 cm2  Aortic regurgitation: Mild paravalvular  No pericardial effusion     CONCLUSIONS: Successful implantation of a 26 mm Albert 3 Ultra Resilia valve in the aortic position via a transfemoral approach.     Randal Correia MD, FACC  9/17/2024  12:15 PM

## 2024-09-17 NOTE — ANESTHESIA PROCEDURE NOTES
Arterial Line    Performed by: Chato Valle MD  Authorized by: Chato Valle MD    General Information and Staff    Procedure Start:   Anesthesiologist:  Chato Valle MD  Performed By:  Anesthesiologist  Patient Location:  OR  Indication: continuous blood pressure monitoring and blood sampling needed    Site Identification: real time ultrasound guided    Preanesthetic Checklist: 2 patient identifiers, IV checked, risks and benefits discussed, monitors and equipment checked, pre-op evaluation, timeout performed, anesthesia consent and sterile technique used    Procedure Details    Catheter Size:  20 G  Catheter Length:  1 and 3/4 inch  Catheter Type:  Arrow  Seldinger Technique?: Yes    Laterality:  Left  Site:  Radial artery  Site Prep: chlorhexidine    Line Secured:  Tape and Tegaderm    Assessment    Events: patient tolerated procedure well with no complications      Medications      Additional Comments

## 2024-09-18 VITALS
TEMPERATURE: 98 F | SYSTOLIC BLOOD PRESSURE: 150 MMHG | DIASTOLIC BLOOD PRESSURE: 58 MMHG | OXYGEN SATURATION: 97 % | BODY MASS INDEX: 29 KG/M2 | HEART RATE: 57 BPM | WEIGHT: 212.94 LBS | RESPIRATION RATE: 12 BRPM

## 2024-09-18 LAB
ANION GAP SERPL CALC-SCNC: 7 MMOL/L (ref 0–18)
ATRIAL RATE: 55 BPM
ATRIAL RATE: 55 BPM
BLOOD TYPE BARCODE: 5100
BUN BLD-MCNC: 46 MG/DL (ref 9–23)
CALCIUM BLD-MCNC: 8.8 MG/DL (ref 8.7–10.4)
CHLORIDE SERPL-SCNC: 101 MMOL/L (ref 98–112)
CO2 SERPL-SCNC: 27 MMOL/L (ref 21–32)
CREAT BLD-MCNC: 4.92 MG/DL
EGFRCR SERPLBLD CKD-EPI 2021: 11 ML/MIN/1.73M2 (ref 60–?)
ERYTHROCYTE [DISTWIDTH] IN BLOOD BY AUTOMATED COUNT: 17.3 %
GLUCOSE BLD-MCNC: 129 MG/DL (ref 70–99)
GLUCOSE BLD-MCNC: 132 MG/DL (ref 70–99)
HBV SURFACE AB SER QL: NONREACTIVE
HBV SURFACE AB SERPL IA-ACNC: 8.19 MIU/ML
HBV SURFACE AG SER-ACNC: <0.1 [IU]/L
HBV SURFACE AG SERPL QL IA: NONREACTIVE
HCT VFR BLD AUTO: 25 %
HGB BLD-MCNC: 8.6 G/DL
INR BLD: 1.13 (ref 0.8–1.2)
MCH RBC QN AUTO: 34.5 PG (ref 26–34)
MCHC RBC AUTO-ENTMCNC: 34.4 G/DL (ref 31–37)
MCV RBC AUTO: 100.4 FL
MRSA DNA SPEC QL NAA+PROBE: NEGATIVE
OSMOLALITY SERPL CALC.SUM OF ELEC: 294 MOSM/KG (ref 275–295)
P AXIS: 30 DEGREES
P AXIS: 68 DEGREES
P-R INTERVAL: 192 MS
P-R INTERVAL: 202 MS
PLATELET # BLD AUTO: 40 10(3)UL (ref 150–450)
POTASSIUM SERPL-SCNC: 3.9 MMOL/L (ref 3.5–5.1)
PROTHROMBIN TIME: 14.5 SECONDS (ref 11.6–14.8)
Q-T INTERVAL: 562 MS
Q-T INTERVAL: 566 MS
QRS DURATION: 134 MS
QRS DURATION: 136 MS
QTC CALCULATION (BEZET): 537 MS
QTC CALCULATION (BEZET): 541 MS
R AXIS: -9 DEGREES
R AXIS: -9 DEGREES
RBC # BLD AUTO: 2.49 X10(6)UL
SODIUM SERPL-SCNC: 135 MMOL/L (ref 136–145)
T AXIS: 91 DEGREES
T AXIS: 98 DEGREES
UNIT VOLUME: 350 ML
VENTRICULAR RATE: 55 BPM
VENTRICULAR RATE: 55 BPM
WBC # BLD AUTO: 3.7 X10(3) UL (ref 4–11)

## 2024-09-18 PROCEDURE — 99232 SBSQ HOSP IP/OBS MODERATE 35: CPT | Performed by: INTERNAL MEDICINE

## 2024-09-18 RX ORDER — AMIODARONE HYDROCHLORIDE 100 MG/1
100 TABLET ORAL DAILY
Qty: 90 TABLET | Refills: 3 | Status: SHIPPED | OUTPATIENT
Start: 2024-09-18

## 2024-09-18 RX ORDER — AMIODARONE HYDROCHLORIDE 100 MG/1
100 TABLET ORAL DAILY
Status: DISCONTINUED | OUTPATIENT
Start: 2024-09-19 | End: 2024-09-18

## 2024-09-18 RX ORDER — LEVOTHYROXINE SODIUM 100 UG/1
200 TABLET ORAL
Status: DISCONTINUED | OUTPATIENT
Start: 2024-09-18 | End: 2024-09-18

## 2024-09-18 RX ORDER — AMOXICILLIN 500 MG/1
500 CAPSULE ORAL AS NEEDED
Qty: 8 CAPSULE | Refills: 3 | Status: SHIPPED | OUTPATIENT
Start: 2024-09-18

## 2024-09-18 RX ORDER — TAMSULOSIN HYDROCHLORIDE 0.4 MG/1
0.4 CAPSULE ORAL DAILY
Status: SHIPPED | COMMUNITY
Start: 2024-09-18

## 2024-09-18 NOTE — OCCUPATIONAL THERAPY NOTE
OCCUPATIONAL THERAPY EVALUATION - INPATIENT    Room Number: 6618/6618-A  Evaluation Date: 9/18/2024     Type of Evaluation: Initial  Presenting Problem: TAVR 9/17    Physician Order: IP Consult to Occupational Therapy  Reason for Therapy:  ADL/IADL Dysfunction and Discharge Planning      OCCUPATIONAL THERAPY ASSESSMENT   Patient is a 79 year old male admitted on 9/17/2024 with Presenting Problem: TAVR 9/17. Co-Morbidities : HTN, ESRD, CAD, hep c with cirrhosis, HF, DM,subdural hematoma, ischemic cardiomyopathy  Patient is currently functioning near baseline with  all ADL .  Prior to admission, patient's baseline is independent.      Recent admissions:  8/3 to 8/14/24 for chest pain,CAD-> home PT  5/8-5/12 admitted due to fall with dizziness, PT recommended HHPT  4/16-4/17/24: chest pain of uncertain etiology > home  3/31-4/4/24: GIB, not seen by therapy  3/17-3/24/24: acute chest pain, not seen by therapy        WEIGHT BEARING RESTRICTION  Weight Bearing Restriction: None              EVALUATION SESSION:  Patient at start of session: supine  FUNCTIONAL TRANSFER ASSESSMENT  Sit to Stand: Edge of Bed  Edge of Bed: Independent    BED MOBILITY  Supine to Sit : Independent  Sit to Supine (OT): Independent    O2 SATURATIONS       COGNITION  Overall Cognitive Status:  WFL - within functional limits  COGNITION ASSESSMENTS       Upper Extremity:   ROM: within functional limits   Strength: is within functional limits     EDUCATION PROVIDED  Patient: Role of Occupational Therapy; Plan of Care; Posture/Positioning  Patient's Response to Education: Verbalized Understanding    Therapist comments: Educated the pt about post-TAVR activity guidelines that relate to ADL tasks. Used MD instruction sheet.  Verbalized understanding. Cueing provided for pacing. Completed pt education about energy conservation. Verbalized understanding. Independent supine to sit, independent donning shoes, seated at edge of bed.       Patient End of  Session: In bed;Needs met;Call light within reach;RN aware of session/findings;All patient questions and concerns addressed    OCCUPATIONAL PROFILE    HOME SITUATION  Type of Home: House  Home Layout: Multi-level (8+8)  Lives With: Son                     Drives: Yes  Patient Regularly Uses: Reading glasses    Prior Level of Function: independent with ADL and IADL.      PAIN ASSESSMENT  Ratin          OBJECTIVE  Precautions: None  Fall Risk: Standard fall risk    WEIGHT BEARING RESTRICTION  Weight Bearing Restriction: None                AM-PAC ‘6-Clicks’ Inpatient Daily Activity Short Form  -   Putting on and taking off regular lower body clothing?: A Little (supervision)  -   Bathing (including washing, rinsing, drying)?: A Little  -   Toileting, which includes using toilet, bedpan or urinal? : A Little (supervision)  -   Putting on and taking off regular upper body clothing?: None  -   Taking care of personal grooming such as brushing teeth?: None  -   Eating meals?: None    AM-PAC Score:  Score: 21  Approx Degree of Impairment: 32.79%  Standardized Score (AM-PAC Scale): 44.27      ADDITIONAL TESTS     NEUROLOGICAL FINDINGS        PLAN   Patient has been evaluated and presents with no skilled Occupational Therapy needs at this time.  Patient discharged from Occupational Therapy services.  Please re-order if a new functional limitation presents during this admission.      Patient Evaluation Complexity Level:   Occupational Profile/Medical History LOW - Brief history including review of medical or therapy records    Specific performance deficits impacting engagement in ADL/IADL LOW  1 - 3 performance deficits    Client Assessment/Performance Deficits MODERATE - Comorbidities and min to mod modifications of tasks    Clinical Decision Making LOW - Analysis of occupational profile, problem-focused assessments, limited treatment options    Overall Complexity LOW     OT Session Time: 17 minutes  Self-Care Home  Management: 8 minutes  Therapeutic Activity: 2 minutes

## 2024-09-18 NOTE — PROGRESS NOTES
Cardiology Progress Note    Jonny Haque Patient Status:  Inpatient    4/15/1945 MRN [id number]   [place] [id number]-A Attending Satya Donald MD   Hosp Day # 1 PCP Gee Jimenez MD     Impression:  Severe aortic stenosis s/p FADI with 26 S3 UR valve on 2024  CAD s/p S/P PCI to LM into LAD for ISR, and TRACY to OM in 2024  Hepatitis C w/cirrhosis  ESRD on HD  PAF on amiodarone; no ac likely 2/2 prior GIB and SDH   DM2    Plan:  Echo reviewed. Normal valve function. EF normalized.  EKG reviewed. Qtc is 525 (when corrected for QRS). It has been in this range for the past month. I would reduce amiodarone to 100mg daily, particularly given liver cirrhosis.   I personally examined the vascular access site; there was no evidence of hematoma. The patient ambulated in the hallway earlier today without difficulty. I reminded the patient about the importance of compliance with anti-platelet therapy given recent complex PCI. Failure to take anti-platelet therapy could result in stent thrombosis, myocardial infarction and death. The patient verbalized understanding. Accordingly, the patient may be discharged from my standpoint.    Subjective:  The patient denies any chest pain or dyspnea at this time.    Objective:  /51   Pulse 56   Temp 97.6 °F (36.4 °C) (Temporal)   Resp 14   Wt 212 lb 15.4 oz (96.6 kg)   SpO2 95%   BMI 28.88 kg/m²   Temp (24hrs), Av.3 °F (36.3 °C), Min:96.6 °F (35.9 °C), Max:97.8 °F (36.6 °C)      Intake/Output Summary (Last 24 hours) at 2024 1029  Last data filed at 2024 0841  Gross per 24 hour   Intake 960 ml   Output 250 ml   Net 710 ml     Wt Readings from Last 3 Encounters:   24 212 lb 15.4 oz (96.6 kg)   24 212 lb 15.4 oz (96.6 kg)   24 204 lb (92.5 kg)       General: Awake and alert; in no acute distress  Cardiac: Regular rate and rhythm; no murmurs/rubs/gallops are appreciated  Lungs: Clear to auscultation bilaterally; no accessory  muscle use  Abdomen: Soft, non-tender; bowel sounds are normoactive  Extremities: No clubbing/cyanosis/edema; moves all 4 extremities normally    Current Facility-Administered Medications   Medication Dose Route Frequency    levothyroxine (Synthroid) tab 200 mcg  200 mcg Oral Before breakfast    sodium chloride 0.9% infusion   Intravenous Continuous    sodium chloride 0.9% infusion   Intravenous Continuous    lactated ringers infusion   Intravenous Continuous    hydrALAzine (Apresoline) 20 mg/mL injection 10 mg  10 mg Intravenous Q2H PRN    nitroGLYCERIN in dextrose 5% 50 mg/250mL infusion premix  5-300 mcg/min Intravenous Continuous PRN    norepinephrine (Levophed) 4 mg/250mL infusion premix  0.5-30 mcg/min Intravenous Continuous PRN    sennosides (Senokot) tab 17.2 mg  17.2 mg Oral Nightly PRN    amiodarone (Pacerone) tab 200 mg  200 mg Oral BID with meals    aspirin DR tab 81 mg  81 mg Oral QOD    carvedilol (Coreg) tab 6.25 mg  6.25 mg Oral 2x Daily(Beta Blocker)    clopidogrel (Plavix) tab 75 mg  75 mg Oral Daily    losartan (Cozaar) tab 25 mg  25 mg Oral Daily    rosuvastatin (Crestor) tab 10 mg  10 mg Oral Nightly    sodium chloride 0.9 % IV bolus 100 mL  100 mL Intravenous Q30 Min PRN    And    albumin human (Albumin) 25% injection 25 g  25 g Intravenous PRN Dialysis    insulin degludec (Tresiba) 100 units/mL flextouch 15 Units  15 Units Subcutaneous Nightly    pantoprazole (Protonix) DR tab 40 mg  40 mg Oral BID AC    tamsulosin (Flomax) cap 0.4 mg  0.4 mg Oral Daily    zolpidem (Ambien) tab 5 mg  5 mg Oral Nightly PRN    glucose (Dex4) 15 GM/59ML oral liquid 15 g  15 g Oral Q15 Min PRN    Or    glucose (Glutose) 40% oral gel 15 g  15 g Oral Q15 Min PRN    Or    glucose-vitamin C (Dex-4) chewable tab 4 tablet  4 tablet Oral Q15 Min PRN    Or    dextrose 50% injection 50 mL  50 mL Intravenous Q15 Min PRN    Or    glucose (Dex4) 15 GM/59ML oral liquid 30 g  30 g Oral Q15 Min PRN    Or    glucose (Glutose) 40%  oral gel 30 g  30 g Oral Q15 Min PRN    Or    glucose-vitamin C (Dex-4) chewable tab 8 tablet  8 tablet Oral Q15 Min PRN    insulin aspart (NovoLOG) 100 Units/mL FlexPen 2-10 Units  2-10 Units Subcutaneous TID AC and HS    prochlorperazine (Compazine) 10 MG/2ML injection 10 mg  10 mg Intravenous Q6H PRN    acetaminophen (Tylenol) tab 650 mg  650 mg Oral Q4H PRN       Laboratory Data:  Lab Results   Component Value Date    WBC 3.7 09/18/2024    HGB 8.6 09/18/2024    HCT 25.0 09/18/2024    PLT 40.0 09/18/2024     Lab Results   Component Value Date    INR 1.13 09/18/2024    INR 1.10 05/08/2024    INR 1.10 04/16/2024     Lab Results   Component Value Date     09/18/2024    K 3.9 09/18/2024     09/18/2024    CO2 27.0 09/18/2024    BUN 46 09/18/2024    CREATSERUM 4.92 09/18/2024     09/18/2024    CA 8.8 09/18/2024       Telemetry: No malignant tachyarrhythmias or bradyarrhythmias      Thank you for allowing our practice to participate in the care of your patient. Please do not hesitate to contact me if you have any questions.    Randal Correia MD, FACC

## 2024-09-18 NOTE — PROGRESS NOTES
[facility]  Nephrology Progress Note    Jonny Haque Patient Status:  Inpatient    4/15/1945 MRN [id number]   Location [facility] 6NE-A Attending Satya Donald MD   Hosp Day # 1 PCP Gee Jimenez MD     Subjective:   Feeling well, reports back pain overnight due to having to sleep on his back. Hopeful to go home today. No issues with HD yesterday    Objective:  Vital signs: Blood pressure 121/51, pulse 61, temperature 97.6 °F (36.4 °C), temperature source Temporal, resp. rate 10, weight 212 lb 15.4 oz (96.6 kg), SpO2 96%.  General: No acute distress. Alert and oriented x 3.  HEENT: Moist mucous membranes. EOM-I.   Respiratory: Clear to auscultation bilaterally.  No increased work of breathing  Cardiovascular: S1, S2.  Regular rate and rhythm.  No murmurs.   Neurologic: No focal neurological deficits.  Musculoskeletal: Full range of motion of all extremities.  No swelling noted.  Integument: No lesions. No erythema.  Psychiatric: Appropriate mood and affect.    Current Facility-Administered Medications   Medication Dose Route Frequency    levothyroxine (Synthroid) tab 200 mcg  200 mcg Oral Before breakfast    sodium chloride 0.9% infusion   Intravenous Continuous    sodium chloride 0.9% infusion   Intravenous Continuous    lactated ringers infusion   Intravenous Continuous    hydrALAzine (Apresoline) 20 mg/mL injection 10 mg  10 mg Intravenous Q2H PRN    nitroGLYCERIN in dextrose 5% 50 mg/250mL infusion premix  5-300 mcg/min Intravenous Continuous PRN    norepinephrine (Levophed) 4 mg/250mL infusion premix  0.5-30 mcg/min Intravenous Continuous PRN    sennosides (Senokot) tab 17.2 mg  17.2 mg Oral Nightly PRN    amiodarone (Pacerone) tab 200 mg  200 mg Oral BID with meals    aspirin DR tab 81 mg  81 mg Oral QOD    carvedilol (Coreg) tab 6.25 mg  6.25 mg Oral 2x Daily(Beta Blocker)    clopidogrel (Plavix) tab 75 mg  75 mg Oral Daily    losartan (Cozaar) tab 25 mg  25 mg Oral Daily    rosuvastatin  (Crestor) tab 10 mg  10 mg Oral Nightly    sodium chloride 0.9 % IV bolus 100 mL  100 mL Intravenous Q30 Min PRN    And    albumin human (Albumin) 25% injection 25 g  25 g Intravenous PRN Dialysis    insulin degludec (Tresiba) 100 units/mL flextouch 15 Units  15 Units Subcutaneous Nightly    pantoprazole (Protonix) DR tab 40 mg  40 mg Oral BID AC    tamsulosin (Flomax) cap 0.4 mg  0.4 mg Oral Daily    zolpidem (Ambien) tab 5 mg  5 mg Oral Nightly PRN    glucose (Dex4) 15 GM/59ML oral liquid 15 g  15 g Oral Q15 Min PRN    Or    glucose (Glutose) 40% oral gel 15 g  15 g Oral Q15 Min PRN    Or    glucose-vitamin C (Dex-4) chewable tab 4 tablet  4 tablet Oral Q15 Min PRN    Or    dextrose 50% injection 50 mL  50 mL Intravenous Q15 Min PRN    Or    glucose (Dex4) 15 GM/59ML oral liquid 30 g  30 g Oral Q15 Min PRN    Or    glucose (Glutose) 40% oral gel 30 g  30 g Oral Q15 Min PRN    Or    glucose-vitamin C (Dex-4) chewable tab 8 tablet  8 tablet Oral Q15 Min PRN    insulin aspart (NovoLOG) 100 Units/mL FlexPen 2-10 Units  2-10 Units Subcutaneous TID AC and HS    prochlorperazine (Compazine) 10 MG/2ML injection 10 mg  10 mg Intravenous Q6H PRN    acetaminophen (Tylenol) tab 650 mg  650 mg Oral Q4H PRN       Recent Labs     09/18/24  0430   WBC 3.7*   HGB 8.6*   .4*   PLT 40.0*   INR 1.13       Recent Labs     09/18/24  0430   *   K 3.9      CO2 27.0   BUN 46*   CREATSERUM 4.92*   CA 8.8       Assessment/Plan:  1) End-stage renal disease: He has ESRD due to long-standing DM2. Currently receiving HD TThS via tunneled HD catheter. Last HD 9/17, will continue HD per usual outpatient schedule     2) HTN: controlled, home regimen includes losartan 25mg daily and coreg 6.25mg BID     3) Anemia: Due to CKD, goal hemoglobin 10-11. EPO with HD     4) Aortic stenosis: Severe symptomatic aortic stenosis s/p TAVR with Dr. Donald 9/17    Thank you for allowing me to participate in this patient's care. Please feel free  to call me with any questions or concerns.     Teetee Montano MD  09/18/24

## 2024-09-18 NOTE — CARDIAC REHAB
Pt seen post TAVR for Cardiac rehab. Information given. CR referred post stent was not interested then. Now he will think about it and accepted information. Has HD 3 x a week so states might too difficult. Described two day option. 1157 AVS discharge meds reviewed with patient and son .discharged per W/C .

## 2024-09-18 NOTE — PLAN OF CARE
Patient alert and oriented throughout the shift, complaints of headache and back ache resolved with tylenol.    Art line removed this am re: patient using that arm for his phone/ repositioning, slight swelling noted. Removed without incident, pressure applied.    Vitals remain within normal limits throughout shift. Patient up to chair with min assist     Problem: PAIN - ADULT  Goal: Verbalizes/displays adequate comfort level or patient's stated pain goal  Description: INTERVENTIONS:  - Encourage pt to monitor pain and request assistance  - Assess pain using appropriate pain scale  - Administer analgesics based on type and severity of pain and evaluate response  - Implement non-pharmacological measures as appropriate and evaluate response  - Consider cultural and social influences on pain and pain management  - Manage/alleviate anxiety  - Utilize distraction and/or relaxation techniques  - Monitor for opioid side effects  - Notify MD/LIP if interventions unsuccessful or patient reports new pain  - Anticipate increased pain with activity and pre-medicate as appropriate  Outcome: Progressing     Problem: RISK FOR INFECTION - ADULT  Goal: Absence of fever/infection during anticipated neutropenic period  Description: INTERVENTIONS  - Monitor WBC  - Administer growth factors as ordered  - Implement neutropenic guidelines  Outcome: Progressing

## 2024-09-18 NOTE — PHYSICAL THERAPY NOTE
PHYSICAL THERAPY EVALUATION - INPATIENT     Room Number: 6618/6618-A  Evaluation Date: 9/18/2024  Type of Evaluation: Initial  Physician Order: PT Eval and Treat    Presenting Problem: s/p TAVR 9/17  Co-Morbidities : dm, htn/hl, esrd on hd, cad s/p pci, hep c with cirrhosis, esrd on hd  Reason for Therapy: Mobility Dysfunction and Discharge Planning    PHYSICAL THERAPY ASSESSMENT   Patient is currently functioning near baseline with bed mobility, transfers, gait, and stair negotiation.  Prior to admission, patient's baseline is indep.      PLAN      Patient has met all skilled IPPT goals at this time. Patient will be discharged from Physical Therapy services.  Please re-order if a new functional limitation presents during this admission.              PHYSICAL THERAPY MEDICAL/SOCIAL HISTORY  History related to current admission: Patient is a 79 year old male admitted on 9/17/2024 from home for planned procedure s/p TAVR 9/17.    Recent Admits  8/3-8/14/24: exertional chest pain > [facility]  5/8-8/12/24: CT scan showing \"CT showed a large holohemispheric subdural hematoma with possible epidural hematoma measuring up to 2.2 cm and right to left midline shift of 6 mm\" after a fall yesterday in the bathroom > [facility]  4/16-4/17/24: chest pain of uncertain etiology > home  3/31-4/4/24: GIB, not seen by therapy  3/17-3/24/24: acute chest pain, not seen by therapy    HOME SITUATION  Type of Home: House   Home Layout: Multi-level (8+8)                Lives With: Son  Drives: Yes  Patient Owned Equipment: Rolling walker (does not use)  Patient Regularly Uses: Reading glasses    Prior Level of [place]: indep, denies difficulty with stairs, denies any falls in the past 6 months       OBJECTIVE  Precautions: None  Fall Risk: Standard fall risk    WEIGHT BEARING RESTRICTION  Weight Bearing Restriction: None                PAIN ASSESSMENT  Rating: Unable to rate  Location: B thigh  Management Techniques: Activity promotion;Body  mechanics;Repositioning    COGNITION  Overall Cognitive Status:  WFL - within functional limits    RANGE OF MOTION AND STRENGTH ASSESSMENT  Upper extremity ROM and strength are within functional limits     Lower extremity ROM is within functional limits     Lower extremity strength is within functional limits       BALANCE  Static Sitting: Good  Dynamic Sitting: Good  Static Standing: Good  Dynamic Standing: Good    ADDITIONAL TESTS                                    ACTIVITY TOLERANCE                         O2 WALK       NEUROLOGICAL FINDINGS                        AM-PAC '6-Clicks' INPATIENT SHORT FORM - BASIC MOBILITY  How much difficulty does the patient currently have...  Patient Difficulty: Turning over in bed (including adjusting bedclothes, sheets and blankets)?: None   Patient Difficulty: Sitting down on and standing up from a chair with arms (e.g., wheelchair, bedside commode, etc.): None   Patient Difficulty: Moving from lying on back to sitting on the side of the bed?: None   How much help from another person does the patient currently need...   Help from Another: Moving to and from a bed to a chair (including a wheelchair)?: None   Help from Another: Need to walk in hospital room?: None   Help from Another: Climbing 3-5 steps with a railing?: None       AM-PAC Score:  Raw Score: 24   Approx Degree of Impairment: 0%   Standardized Score (AM-PAC Scale): 61.14   CMS Modifier (G-Code): CH    FUNCTIONAL ABILITY STATUS  Gait Assessment   Functional Mobility/Gait Assessment  Gait Assistance: Supervision  Distance (ft): 150  Assistive Device: None  Pattern: Within Functional Limits  Stairs: Stairs  How Many Stairs: 12  Device: 1 Rail  Assist: Supervision  Pattern: Ascend and Descend  Ascend and Descend : Step to    Skilled Therapy Provided     Bed Mobility:  Rolling: indep  Supine to sit: indep      Transfer Mobility:  Sit to stand: SBA   Stand to sit: SBA  Gait = SBA    Therapist's Comments: able to  participate in gait training without device, educated on EC and pacing techniques and post op recommendations for activity modifications/body mechanics, participated in stair training with cues for use of rail and step to pattern for safety     Exercise/Education Provided:  Energy conservation  Functional activity tolerated  Gait training    Patient End of Session: Up in chair;Needs met;Call light within reach;RN aware of session/findings;All patient questions and concerns addressed      Patient Evaluation Complexity Level:  History Low - no personal factors and/or co-morbidities   Examination of body systems Low -  addressing 1-2 elements   Clinical Presentation Low- Stable   Clinical Decision Making Low Complexity       PT Session Time: 17 minutes  Gait Training: 10 minutes  Therapeutic Activity:  minutes  Neuromuscular Re-education:  minutes  Therapeutic Exercise:  minutes

## 2024-09-18 NOTE — PLAN OF CARE
Denies pain bilateral groins intact . Feels ready to go home today. S/P TAVR . HD yesterday. Up in chair to eat this am walked hallway . Anticipate home today . Waiting for Cardiology to round . Denies chest pain or sob.

## 2024-09-18 NOTE — DISCHARGE SUMMARY
[facility] Hospitalist Discharge Summary     Patient ID:  Jonny Haque  79 year old  4/15/1945    Admit date: 9/17/2024    Discharge date and time: 09/18/24     Attending Physician: Satya Donald MD     Primary Care Physician: Gee Jimenez MD     Discharge Diagnoses: AORTIC STENOSIS  Aortic stenosis    Please note that only IHP DMG and EMG patients enrolled in the Medicare ACO, BCBS ACO and [facility] HMOs will be handled by the [facility] Care Management team.  For all other patients, please follow usual protocol for discharge care transition.    Discharge Condition: stable    Disposition:  home    Important Follow up:  - PCP within 2 weeks       Follow-up Information       Sara Humes, APN Follow up on 9/24/2024.    Why: 3:00pm for office visit  Contact information:  [address]             [facility] Follow up on 10/15/2024.    Why: 1:30pm Echocardiogram  2:40pm office visit with Rachael  Contact information:  [address]                               Hospital Course:        79 year old male with PMH sig for dm, htn/hl, esrd on hd, cad s/p pci, hep c with cirrhosis, esrd on hd, who presented for TAVR for severe aortic stenosis. He is doing well currently. No sob/nausea/pain.     # severe Ao stenosis s/p TAVR  Cards following  Echo, monitor on tele     # chronic systolic HF  Cad s/p pci  PAF  htn  Losartan  Amio   Cardiology following      # dm  Long acting insulin + ssi     # esrd on hd  Nephro consulted for HD     Consults: IP CONSULT TO NEPHROLOGY  IP CONSULT TO HOSPITALIST  IP CONSULT TO CARDIAC REHAB    Operative Procedures: Procedure(s) (LRB):  TRANSCATHETER AORTIC VALVE REPLACEMENT FEMORAL APPROACH, 26MMM АНДРЕЙ AORTIC TISSUE VALVE.INTRAOPERATIVE TRANSEOPHAGEAL ECHO; SEE CATH LAB CHARTING FOR DETAILS (N/A)       Patient instructions:      I as the attending physician reconciled the current and discharge medications on day of  discharge.     Current Discharge Medication List        START taking these medications    Details   amoxicillin 500 MG Oral Cap Take 1 capsule (500 mg total) by mouth as needed (Four tablets one  hour prior to dental work/procedure).           CONTINUE these medications which have CHANGED    Details   amiodarone 100 MG Oral Tab Take 1 tablet (100 mg total) by mouth daily.      tamsulosin 0.4 MG Oral Cap Take 1 capsule (0.4 mg total) by mouth daily.           CONTINUE these medications which have NOT CHANGED    Details   clopidogrel 75 MG Oral Tab Take 1 tablet (75 mg total) by mouth daily.      carvedilol 6.25 MG Oral Tab Take 1 tablet (6.25 mg total) by mouth 2 (two) times daily with meals.      levothyroxine 175 MCG Oral Tab Take 1 tablet (175 mcg total) by mouth before breakfast.      Lanthanum Carbonate 1000 MG Oral Chew Tab Chew 1 tablet (1,000 mg total) by mouth 2 (two) times daily with meals.      pantoprazole 40 MG Oral Tab EC Take 1 tablet (40 mg total) by mouth 2 (two) times daily before meals.      Insulin Glargine, 2 Unit Dial, (TOUJEO MAX SOLOSTAR) 300 UNIT/ML Subcutaneous Solution Pen-injector Inject 20 Units into the skin nightly.      rosuvastatin 10 MG Oral Tab Take 1 tablet (10 mg total) by mouth nightly.      Insulin Lispro, 1 Unit Dial, (HUMALOG KWIKPEN) 100 UNIT/ML Subcutaneous Solution Pen-injector 12 units before meals with sliding scale. Max daily dose: 50 units.      losartan 25 MG Oral Tab Take 1 tablet (25 mg total) by mouth daily.      nitroglycerin 0.4 MG Sublingual SL Tab Place 1 tablet (0.4 mg total) under the tongue every 5 (five) minutes as needed for Chest pain.      aspirin 81 MG Oral Tab EC Take 1 tablet (81 mg total) by mouth every other day.      zolpidem 10 MG Oral Tab Take 1 tablet (10 mg total) by mouth nightly as needed for Sleep.      Ferric Citrate (AURYXIA) 1  MG(Fe) Oral Tab       Insulin Pen Needle (TRUEPLUS PEN NEEDLES) 31G X 5 MM Does not apply Misc Use 5 per  day      !! Glucose Blood (ONETOUCH ULTRA) In Vitro Strip 6 times a day      !! CONTOUR NEXT TEST In Vitro Strip TEST BLOOD SUGAR FOUR TIMES DAILY      Blood Glucose Monitoring Suppl (Gigamon CONTOUR NEXT MONITOR) W/DEVICE Does not apply Kit 1 Device by Does not apply route 4 (four) times daily.       !! - Potential duplicate medications found. Please discuss with provider.          Activity: activity as tolerated  Diet: regular diet  Wound Care: as directed  Code Status: Full Code      Discharge Exam:     General: no acute distress, alert and oriented x 3  Heart: RRR  Lungs: clear bilaterally, no active wheezing  Abdomen: nontender, nondistended, intact BS  Extremities: no pedal edema   Neuro: CN inact, no focal deficits      Total time coordinating care for discharge: Greater than 30 minutes    Yessenia Austin MD  [facility] Hospitalist

## 2024-09-20 NOTE — PAYOR COMM NOTE
--------------  DISCHARGE REVIEW    Payor: BCBS MEDICARE ADV PPO  Subscriber #:  [id number]  Authorization Number: [id number]    Admit date: 9/17/24  Admit time:   9:57 AM  Discharge Date: 9/18/2024 12:17 PM     Admitting Physician: Satya Donald MD  Attending Physician:  No att. providers found  Primary Care Physician: Gee Jimenez MD          Discharge Summary Notes        Discharge Summary signed by Rambo Austin MD at 9/18/2024 12:08 PM       Author: Rambo Austin MD Specialty: Internal Medicine Author Type: Physician    Filed: 9/18/2024 12:08 PM Date of Service: 9/18/2024 12:06 PM Status: Signed    : Rambo Austni MD (Physician)                                                          [facility] Hospitalist Discharge Summary     Patient ID:  Jonny Haque  79 year old  4/15/1945    Admit date: 9/17/2024    Discharge date and time: 09/18/24     Attending Physician: Satya Donald MD     Primary Care Physician: Gee Jimenez MD     Discharge Diagnoses: AORTIC STENOSIS  Aortic stenosis    Please note that only IHP DMG and EMG patients enrolled in the Medicare ACO, [facility] ACO and [facility] HMOs will be handled by the [facility] Care Management team.  For all other patients, please follow usual protocol for discharge care transition.    Discharge Condition: stable    Disposition:  home    Important Follow up:  - PCP within 2 weeks       Follow-up Information       Sara Humes, APN Follow up on 9/24/2024.    Why: 3:00pm for office visit  Contact information:  [address] [facility] [facility] Follow up on 10/15/2024.    Why: 1:30pm Echocardiogram  2:40pm office visit with Rachael  Contact information:  [address]                               Hospital Course:        79 year old male with PMH sig for dm, htn/hl, esrd on hd, cad s/p pci, hep c with cirrhosis, esrd on hd, who presented for TAVR for severe aortic stenosis. He is doing well  currently. No sob/nausea/pain.     # severe Ao stenosis s/p TAVR  Cards following  Echo, monitor on tele     # chronic systolic HF  Cad s/p pci  PAF  htn  Losartan  Amio   Cardiology following      # dm  Long acting insulin + ssi     # esrd on hd  Nephro consulted for HD     Consults: IP CONSULT TO NEPHROLOGY  IP CONSULT TO HOSPITALIST  IP CONSULT TO CARDIAC REHAB    Operative Procedures: Procedure(s) (LRB):  TRANSCATHETER AORTIC VALVE REPLACEMENT FEMORAL APPROACH, 26MMM АНДРЕЙ AORTIC TISSUE VALVE.INTRAOPERATIVE TRANSEOPHAGEAL ECHO; SEE CATH LAB CHARTING FOR DETAILS (N/A)       Patient instructions:      I as the attending physician reconciled the current and discharge medications on day of discharge.     Current Discharge Medication List        START taking these medications    Details   amoxicillin 500 MG Oral Cap Take 1 capsule (500 mg total) by mouth as needed (Four tablets one  hour prior to dental work/procedure).           CONTINUE these medications which have CHANGED    Details   amiodarone 100 MG Oral Tab Take 1 tablet (100 mg total) by mouth daily.      tamsulosin 0.4 MG Oral Cap Take 1 capsule (0.4 mg total) by mouth daily.           CONTINUE these medications which have NOT CHANGED    Details   clopidogrel 75 MG Oral Tab Take 1 tablet (75 mg total) by mouth daily.      carvedilol 6.25 MG Oral Tab Take 1 tablet (6.25 mg total) by mouth 2 (two) times daily with meals.      levothyroxine 175 MCG Oral Tab Take 1 tablet (175 mcg total) by mouth before breakfast.      Lanthanum Carbonate 1000 MG Oral Chew Tab Chew 1 tablet (1,000 mg total) by mouth 2 (two) times daily with meals.      pantoprazole 40 MG Oral Tab EC Take 1 tablet (40 mg total) by mouth 2 (two) times daily before meals.      Insulin Glargine, 2 Unit Dial, (TOUJEO MAX SOLOSTAR) 300 UNIT/ML Subcutaneous Solution Pen-injector Inject 20 Units into the skin nightly.      rosuvastatin 10 MG Oral Tab Take 1 tablet (10 mg total) by mouth nightly.       Insulin Lispro, 1 Unit Dial, (HUMALOG KWIKPEN) 100 UNIT/ML Subcutaneous Solution Pen-injector 12 units before meals with sliding scale. Max daily dose: 50 units.      losartan 25 MG Oral Tab Take 1 tablet (25 mg total) by mouth daily.      nitroglycerin 0.4 MG Sublingual SL Tab Place 1 tablet (0.4 mg total) under the tongue every 5 (five) minutes as needed for Chest pain.      aspirin 81 MG Oral Tab EC Take 1 tablet (81 mg total) by mouth every other day.      zolpidem 10 MG Oral Tab Take 1 tablet (10 mg total) by mouth nightly as needed for Sleep.      Ferric Citrate (AURYXIA) 1  MG(Fe) Oral Tab       Insulin Pen Needle (TRUEPLUS PEN NEEDLES) 31G X 5 MM Does not apply Misc Use 5 per day      !! Glucose Blood (ONETOUCH ULTRA) In Vitro Strip 6 times a day      !! CONTOUR NEXT TEST In Vitro Strip TEST BLOOD SUGAR FOUR TIMES DAILY      Blood Glucose Monitoring Suppl (Accertify CONTOUR NEXT MONITOR) W/DEVICE Does not apply Kit 1 Device by Does not apply route 4 (four) times daily.       !! - Potential duplicate medications found. Please discuss with provider.          Activity: activity as tolerated  Diet: regular diet  Wound Care: as directed  Code Status: Full Code      Discharge Exam:     General: no acute distress, alert and oriented x 3  Heart: RRR  Lungs: clear bilaterally, no active wheezing  Abdomen: nontender, nondistended, intact BS  Extremities: no pedal edema   Neuro: CN inact, no focal deficits      Total time coordinating care for discharge: Greater than 30 minutes    Yessenia Austin MD  [facility] Hospitalist        Electronically signed by Rambo Austin MD on 9/18/2024 12:08 PM         REVIEWER COMMENTS

## 2025-01-31 ENCOUNTER — APPOINTMENT (OUTPATIENT)
Dept: CT IMAGING | Facility: HOSPITAL | Age: 80
End: 2025-01-31
Attending: EMERGENCY MEDICINE
Payer: MEDICARE

## 2025-01-31 ENCOUNTER — APPOINTMENT (OUTPATIENT)
Dept: GENERAL RADIOLOGY | Facility: HOSPITAL | Age: 80
End: 2025-01-31
Payer: MEDICARE

## 2025-01-31 ENCOUNTER — HOSPITAL ENCOUNTER (EMERGENCY)
Facility: HOSPITAL | Age: 80
Discharge: HOME | End: 2025-01-31
Attending: EMERGENCY MEDICINE
Payer: MEDICARE

## 2025-01-31 VITALS
HEIGHT: 71 IN | DIASTOLIC BLOOD PRESSURE: 74 MMHG | SYSTOLIC BLOOD PRESSURE: 161 MMHG | RESPIRATION RATE: 18 BRPM | TEMPERATURE: 98 F | WEIGHT: 214 LBS | HEART RATE: 60 BPM | OXYGEN SATURATION: 97 % | BODY MASS INDEX: 29.96 KG/M2
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
## 2025-01-31 DIAGNOSIS — M54.50 ACUTE LEFT-SIDED LOW BACK PAIN WITHOUT SCIATICA: ICD-10-CM

## 2025-01-31 LAB
ALBUMIN SERPL-MCNC: 4.1 G/DL (ref 3.2–4.8)
ALBUMIN/GLOB SERPL: 1.4 {RATIO} (ref 1–2)
ALP LIVER SERPL-CCNC: 114 U/L
ALT SERPL-CCNC: 26 U/L
ANION GAP SERPL CALC-SCNC: 17 MMOL/L (ref 0–18)
AST SERPL-CCNC: 24 U/L (ref ?–34)
BASOPHILS # BLD AUTO: 0.03 X10(3) UL (ref 0–0.2)
BASOPHILS NFR BLD AUTO: 0.6 %
BILIRUB SERPL-MCNC: 0.5 MG/DL (ref 0.2–1.1)
BUN BLD-MCNC: 92 MG/DL (ref 9–23)
CALCIUM BLD-MCNC: 9 MG/DL (ref 8.7–10.6)
CHLORIDE SERPL-SCNC: 100 MMOL/L (ref 98–112)
CO2 SERPL-SCNC: 22 MMOL/L (ref 21–32)
CREAT BLD-MCNC: 9.78 MG/DL
EGFRCR SERPLBLD CKD-EPI 2021: 5 ML/MIN/1.73M2 (ref 60–?)
EOSINOPHIL # BLD AUTO: 0.23 X10(3) UL (ref 0–0.7)
EOSINOPHIL NFR BLD AUTO: 4.6 %
ERYTHROCYTE [DISTWIDTH] IN BLOOD BY AUTOMATED COUNT: 17 %
GLOBULIN PLAS-MCNC: 3 G/DL (ref 2–3.5)
GLUCOSE BLD-MCNC: 168 MG/DL (ref 70–99)
HCT VFR BLD AUTO: 32 %
HGB BLD-MCNC: 10.9 G/DL
IMM GRANULOCYTES # BLD AUTO: 0.03 X10(3) UL (ref 0–1)
IMM GRANULOCYTES NFR BLD: 0.6 %
LYMPHOCYTES # BLD AUTO: 0.96 X10(3) UL (ref 1–4)
LYMPHOCYTES NFR BLD AUTO: 19 %
MCH RBC QN AUTO: 35.7 PG (ref 26–34)
MCHC RBC AUTO-ENTMCNC: 34.1 G/DL (ref 31–37)
MCV RBC AUTO: 104.9 FL
MONOCYTES # BLD AUTO: 0.38 X10(3) UL (ref 0.1–1)
MONOCYTES NFR BLD AUTO: 7.5 %
NEUTROPHILS # BLD AUTO: 3.42 X10 (3) UL (ref 1.5–7.7)
NEUTROPHILS # BLD AUTO: 3.42 X10(3) UL (ref 1.5–7.7)
NEUTROPHILS NFR BLD AUTO: 67.7 %
OSMOLALITY SERPL CALC.SUM OF ELEC: 320 MOSM/KG (ref 275–295)
PLATELET # BLD AUTO: 40 10(3)UL (ref 150–450)
PLATELETS.RETICULATED NFR BLD AUTO: 0.9 % (ref 0–7)
POTASSIUM SERPL-SCNC: 5 MMOL/L (ref 3.5–5.1)
PROT SERPL-MCNC: 7.1 G/DL (ref 5.7–8.2)
RBC # BLD AUTO: 3.05 X10(6)UL
SODIUM SERPL-SCNC: 139 MMOL/L (ref 136–145)
WBC # BLD AUTO: 5.1 X10(3) UL (ref 4–11)

## 2025-01-31 PROCEDURE — 96375 TX/PRO/DX INJ NEW DRUG ADDON: CPT

## 2025-01-31 PROCEDURE — 99285 EMERGENCY DEPT VISIT HI MDM: CPT

## 2025-01-31 PROCEDURE — 93005 ELECTROCARDIOGRAM TRACING: CPT

## 2025-01-31 PROCEDURE — 74177 CT ABD & PELVIS W/CONTRAST: CPT | Performed by: EMERGENCY MEDICINE

## 2025-01-31 PROCEDURE — 72100 X-RAY EXAM L-S SPINE 2/3 VWS: CPT

## 2025-01-31 PROCEDURE — 80053 COMPREHEN METABOLIC PANEL: CPT | Performed by: EMERGENCY MEDICINE

## 2025-01-31 PROCEDURE — 85025 COMPLETE CBC W/AUTO DIFF WBC: CPT

## 2025-01-31 PROCEDURE — 93010 ELECTROCARDIOGRAM REPORT: CPT

## 2025-01-31 PROCEDURE — 80053 COMPREHEN METABOLIC PANEL: CPT

## 2025-01-31 PROCEDURE — 70450 CT HEAD/BRAIN W/O DYE: CPT | Performed by: EMERGENCY MEDICINE

## 2025-01-31 PROCEDURE — 96374 THER/PROPH/DIAG INJ IV PUSH: CPT

## 2025-01-31 PROCEDURE — 85025 COMPLETE CBC W/AUTO DIFF WBC: CPT | Performed by: EMERGENCY MEDICINE

## 2025-01-31 RX ORDER — ONDANSETRON 2 MG/ML
4 INJECTION INTRAMUSCULAR; INTRAVENOUS ONCE
Status: COMPLETED | OUTPATIENT
Start: 2025-01-31 | End: 2025-01-31

## 2025-01-31 RX ORDER — MORPHINE SULFATE 2 MG/ML
2 INJECTION, SOLUTION INTRAMUSCULAR; INTRAVENOUS EVERY 30 MIN PRN
Status: DISCONTINUED | OUTPATIENT
Start: 2025-01-31 | End: 2025-02-01

## 2025-01-31 RX ORDER — ONDANSETRON 4 MG/1
4 TABLET, ORALLY DISINTEGRATING ORAL EVERY 8 HOURS PRN
Qty: 20 TABLET | Refills: 0 | Status: SHIPPED | OUTPATIENT
Start: 2025-01-31 | End: 2025-02-09

## 2025-01-31 RX ORDER — HYDROCODONE BITARTRATE AND ACETAMINOPHEN 5; 325 MG/1; MG/1
1 TABLET ORAL EVERY 6 HOURS PRN
Qty: 15 TABLET | Refills: 0 | Status: SHIPPED | OUTPATIENT
Start: 2025-01-31 | End: 2025-02-09

## 2025-02-01 NOTE — ED PROVIDER NOTES
Patient Seen in: [facility] Emergency Department      History     Chief Complaint   Patient presents with    Fall     Stated Complaint: fall down one step yesterday at 6pm; lower back pain; no LOC, on plavix    Subjective:   HPI      Patient is a 79-year-old male presents to ED for evaluation of a fall.  Patient apparently fell down 2 days ago while walking down the stairs.  Patient states he missed 1 step and then fell down.  Denies hitting his head.  He has some lower back pain more on the left in the lower back since the fall.  He was supposed to have dialysis yesterday but was having trouble getting around so missed dialysis.  Patient denies chest or abdominal pain.  Son who I spoke with the bedside states he has been getting around but using a walker to do so normally does not need a walker.  Taking longer to get up and move around.  Patient denies any other complaint    Objective:     No pertinent past medical history.            No pertinent past surgical history.              No pertinent social history.                Physical Exam     ED Triage Vitals [01/31/25 1942]   BP (!) 169/67   Pulse 97   Resp 16   Temp 97.7 °F (36.5 °C)   Temp src Temporal   SpO2 97 %   O2 Device None (Room air)       Current Vitals:   Vital Signs  BP: (!) 161/74  Pulse: 60  Resp: 18  Temp: 97.7 °F (36.5 °C)  Temp src: Temporal  MAP (mmHg): 99    Oxygen Therapy  SpO2: 97 %  O2 Device: None (Room air)        Physical Exam  GENERAL: No acute distress, well appearing and non-toxic, Alert and oriented X 3   HEENT: Normocephalic, atraumatic.  Moist mucous membranes.  Pupils equal round reactive to light and accommodation, extraocular motion is intact, sclerae white, conjunctiva is pink.  Oropharynx is unremarkable, no exudate.  NECK: Supple, trachea midline, no lymphadenopathy.   LUNG: Lungs clear to auscultation bilaterally, no wheezing, no rales, no rhonchi.  CARDIOVASCULAR: Regular rate and rhythm.  Normal S1S2.  No S3S4 or  murmur.  ABDOMEN: Bowel sounds are present. Soft. nondistended, no pulsatile masses. nontender  MUSCULOSKELETAL: No calf tenderness.  Dorsalis and Posterior Tibial pulses present. No clubbing. No cyanosis.  No edema.  Patient has a bruise on his right elbow  Back: Patient has some mild tenderness at L1.  He has some left CVA tenderness.  Mild right CVA tenderness.  No bruising to the back  SKIN EXAMINATIoN: Warm and dry with normal appearance.  No rashes or lesions.  NEUROLOGICAL:  Motor strength intact all groups.  normal sensation, speech intact    ED Course     Labs Reviewed   CBC WITH DIFFERENTIAL WITH PLATELET - Abnormal; Notable for the following components:       Result Value    RBC 3.05 (*)     HGB 10.9 (*)     HCT 32.0 (*)     PLT 40.0 (*)     .9 (*)     MCH 35.7 (*)     Lymphocyte Absolute 0.96 (*)     All other components within normal limits   COMP METABOLIC PANEL (14) - Abnormal; Notable for the following components:    Glucose 168 (*)     BUN 92 (*)     Creatinine 9.78 (*)     Calculated Osmolality 320 (*)     eGFR-Cr 5 (*)     All other components within normal limits   RAINBOW DRAW BLUE   Hemoglobin 10.9.  BUN 92.  Creatinine 9.78.         I personally reviewd CT images of head and independent interpretation shows no acute bleed.  I also viewed formal radiology report as read by radiology with findings below:       CT ABDOMEN+PELVIS(CONTRAST ONLY)(CPT=74177)    Result Date: 1/31/2025  PROCEDURE:  CT ABDOMEN+PELVIS (CONTRAST ONLY) (CPT=74177)  COMPARISON:  [facility] , CT, CTA ABD/PEL (CPT=74174), 8/09/2024, 4:27 PM.  INDICATIONS:  fall down one step yesterday at 6pm; lower back pain; no LOC, on plavix  TECHNIQUE:  CT scanning was performed from the dome of the diaphragm to the pubic symphysis with non-ionic intravenous contrast material. Post contrast coronal MPR imaging was performed.  Dose reduction techniques were used. Dose information is transmitted to the ACR (American College of Radiology)  NRDR (National Radiology Data Registry) which includes the Dose Index Registry.  PATIENT STATED HISTORY:(As transcribed by Technologist)  Patient had a fall yesterday, now presents with lower back pain.   CONTRAST USED:  100cc of Isovue 370  FINDINGS:  LIVER:  Cirrhotic morphology liver is noted.  No gross mass is identified. BILIARY:  No visible dilatation or calcification.  PANCREAS:  No lesion, fluid collection, ductal dilatation, or atrophy.  SPLEEN:  No enlargement or focal lesion.  KIDNEYS:  The kidneys are atrophic in appearance.  No mass, obstruction, or calcification.  ADRENALS:  No mass or enlargement.  AORTA/VASCULAR:  No aneurysm or dissection.  Severe atherosclerotic disease is noted. RETROPERITONEUM:  No mass or adenopathy.  BOWEL/MESENTERY:  No visible mass, obstruction, or bowel wall thickening.  ABDOMINAL WALL:  Fat containing ventral hernia is noted (measuring up to 7.6 x 5.0 cm (image 74) URINARY BLADDER:  No visible focal wall thickening, lesion, or calculus.  PELVIC NODES:  No adenopathy.  PELVIC ORGANS:  Prominence of the prostate is noted BONES:  No bony lesion or fracture.  LUNG BASES:  Mild ground-glass opacities in the right lower lobe are noted. OTHER:  Negative.             CONCLUSION:  1. Fat containing ventral hernia. 2. Prominent prostate.  Correlate with PSA 3. No acute abnormality in the spine or back. 4. Cirrhotic morphology liver.   LOCATION:  [facility]   Dictated by (CST): Dusty Perkins MD on 1/31/2025 at 10:36 PM     Finalized by (CST): Dusty Perkins MD on 1/31/2025 at 10:41 PM       CT BRAIN OR HEAD (CPT=70450)    Result Date: 1/31/2025  PROCEDURE:  CT BRAIN OR HEAD (16913)  COMPARISON:  [facility] , CT, CT BRAIN OR HEAD (55048), 6/11/2024, 7:11 PM.  INDICATIONS:  fall down one step yesterday at 6pm; lower back pain; no LOC, on plavix  TECHNIQUE:  Noncontrast CT scanning is performed through the brain. Dose reduction techniques were used. Dose information is transmitted to the ACR  (American College of Radiology) NRDR (National Radiology Data Registry) which includes the Dose Index Registry.  PATIENT STATED HISTORY: (As transcribed by Technologist)  Fall on plavix.    FINDINGS: Volume loss is noted.  Ventricles are within normal limits.  There is no midline shift or mass-effect.  The basal cisterns are patent.  The gray-white matter differentiation is intact.  There is no acute intracranial hemorrhage or extra-axial fluid collection.  Hypodensities in the periventricular and subcortical white matter is compatible with chronic small vessel disease.  There is no evident fracture.  Mild mucosal thickening in the right maxillary sinus.            CONCLUSION:  No acute intracranial abnormality.    LOCATION:  [facility]   Dictated by (CST): Dusty Perkins MD on 1/31/2025 at 10:10 PM     Finalized by (CST): Dusty Perkins MD on 1/31/2025 at 10:13 PM       XR LUMBAR SPINE (MIN 2 VIEWS) (CPT=72100)    Result Date: 1/31/2025  PROCEDURE:  XR LUMBAR SPINE (MIN 2 VIEWS) (CPT=72100)  TECHNIQUE:  AP, lateral, and coned down L5-S1 views were obtained.  COMPARISON:  None.  INDICATIONS:  fall down one step yesterday at 6pm; lower back pain; no LOC, on plavix  PATIENT STATED HISTORY: (As transcribed by Technologist)  Patient states he had a fall Wednesday afternoon and has been having lower back pain since then.    FINDINGS:    BONES:  Normal vertebral body height and alignment.  Mild multilevel degenerative changes are present. DISC SPACES:  Mild multilevel degenerative changes are present.  No significant disc height narrowing, subluxation, or endplate abnormality. PARASPINOUS:  Negative.  No paraspinous abnormality is seen. OTHER:  Extensive atherosclerotic disease is noted in the abdominal aorta.             CONCLUSION:  No acute abnormality in the lumbar spine.   LOCATION:  [facility]   Dictated by (CST): Dusty Perkins MD on 1/31/2025 at 8:36 PM     Finalized by (CST): Dusty Perkins MD on 1/31/2025 at 8:37 PM         MDM      Patient is a 79-year-old male presents to ED for evaluation low back pain status post mechanical fall 2 days ago.  Denies hitting his head.  No headache.  Complains of left flank pain.  Differential includes traumatic renal injury, lumbar fracture, intracranial bleed, electrolyte abnormality.  Patient laboratory test performed showing hemoglobin of 10.9.  Patient has elevated BUN and creatinine of 92 and 9.78 consistent with his history of chronic kidney disease.  He is on dialysis.  Potassium level normal at 5.0.  Missed dialysis yesterday.  X-rays initially obtained of the lumbar spine that were negative for fracture.  I did perform a head CT that was negative.  CT of abdomen pelvis performed with IV contrast showing a fat-containing ventral hernia.  No lumbar fractures or renal injury noted.  Cirrhotic morphology of liver noted in this is known.  Patient.  Patient case discussed at length with him and his son.  Offered admission to hospital but son thought he could take him home as he is using a walker and could get him to dialysis later today.  Patient has no evidence for fluid overload.  Patient was given low-dose Norco for home.  Reiterated need for dialysis today but does not need to stay in the hospital for this.    Patient was screened and evaluated during this visit.   As a treating physician attending to the patient, I determined, within reasonable clinical confidence and prior to discharge, that an emergency medical condition was not or was no longer present.  There was no indication for further evaluation, treatment or admission on an emergency basis.  Comprehensive verbal and written discharge and follow-up instructions were provided to help prevent relapse or worsening.  Patient was instructed to follow-up with her primary care provider for further evaluation and treatment, but to return immediately to the ER for worsening, concerning, new, changing or persisting symptoms.  I discussed  the case with the patient and they had no questions, complaints, or concerns.  Patient felt comfortable going home.        Independent source(s) of information include son    Medical Decision Making      Disposition and Plan     Clinical Impression:  1. Fall, initial encounter    2. Acute left-sided low back pain without sciatica         Disposition:  Discharge  1/31/2025 11:22 pm    Follow-up:  Gee Jimenez MD  [address] [facility]  [address] [place] [id number] [phone]    Follow up  As needed          Medications Prescribed:  Discharge Medication List as of 1/31/2025 11:23 PM        START taking these medications    Details   HYDROcodone-acetaminophen 5-325 MG Oral Tab Take 1 tablet by mouth every 6 (six) hours as needed for Pain., Normal, Disp-15 tablet, R-0      ondansetron 4 MG Oral Tablet Dispersible Take 1 tablet (4 mg total) by mouth every 8 (eight) hours as needed for Nausea., Normal, Disp-20 tablet, R-0                 Supplementary Documentation:

## 2025-02-01 NOTE — ED INITIAL ASSESSMENT (HPI)
Fell 3 steps off the stairs 2 days ago, landing on his back. Persistent low back pain, unable tostand for long period due to back pain. On plavix denies any head injury

## 2025-02-01 NOTE — ED QUICK NOTES
Pt now vomiting in WR,TL made aware.     Pt denies vision changes, dizziness, no facial deficits noted.

## 2025-02-03 LAB
ATRIAL RATE: 52 BPM
P AXIS: 37 DEGREES
P-R INTERVAL: 156 MS
Q-T INTERVAL: 526 MS
QRS DURATION: 130 MS
QTC CALCULATION (BEZET): 489 MS
R AXIS: -16 DEGREES
T AXIS: 127 DEGREES
VENTRICULAR RATE: 52 BPM

## 2025-02-06 ENCOUNTER — APPOINTMENT (OUTPATIENT)
Dept: INTERVENTIONAL RADIOLOGY/VASCULAR | Facility: HOSPITAL | Age: 80
End: 2025-02-06
Attending: INTERNAL MEDICINE
Payer: MEDICARE

## 2025-02-06 ENCOUNTER — HOSPITAL ENCOUNTER (INPATIENT)
Facility: HOSPITAL | Age: 80
LOS: 3 days | Discharge: HOME | End: 2025-02-09
Attending: EMERGENCY MEDICINE | Admitting: HOSPITALIST
Payer: MEDICARE

## 2025-02-06 DIAGNOSIS — R52 PAIN: ICD-10-CM

## 2025-02-06 DIAGNOSIS — R07.9 EXERTIONAL CHEST PAIN: ICD-10-CM

## 2025-02-06 DIAGNOSIS — R07.9 CHEST PAIN OF UNCERTAIN ETIOLOGY: ICD-10-CM

## 2025-02-06 DIAGNOSIS — I25.9 CHEST PAIN DUE TO MYOCARDIAL ISCHEMIA: Primary | ICD-10-CM

## 2025-02-06 DIAGNOSIS — I47.20 V-TACH (HCC): ICD-10-CM

## 2025-02-06 LAB
ALBUMIN SERPL-MCNC: 4.2 G/DL (ref 3.2–4.8)
ALBUMIN/GLOB SERPL: 1.4 {RATIO} (ref 1–2)
ALP LIVER SERPL-CCNC: 105 U/L
ALT SERPL-CCNC: 23 U/L
ANION GAP SERPL CALC-SCNC: 18 MMOL/L (ref 0–18)
APTT PPP: 27.2 SECONDS (ref 23–36)
ARTERIAL PATENCY WRIST A: POSITIVE
AST SERPL-CCNC: 40 U/L (ref ?–34)
BASE EXCESS BLDA CALC-SCNC: 5.8 MMOL/L (ref ?–2)
BASE EXCESS BLDV CALC-SCNC: 7.2 MMOL/L
BASOPHILS # BLD AUTO: 0.08 X10(3) UL (ref 0–0.2)
BASOPHILS NFR BLD AUTO: 1.1 %
BILIRUB SERPL-MCNC: 0.7 MG/DL (ref 0.2–1.1)
BODY TEMPERATURE: 98.6 F
BUN BLD-MCNC: 22 MG/DL (ref 9–23)
CA-I BLD-SCNC: 1.2 MMOL/L (ref 0.95–1.32)
CALCIUM BLD-MCNC: 8.9 MG/DL (ref 8.7–10.6)
CHLORIDE SERPL-SCNC: 93 MMOL/L (ref 98–112)
CO2 SERPL-SCNC: 26 MMOL/L (ref 21–32)
COHGB MFR BLD: 1.5 % SAT (ref 0–3)
CREAT BLD-MCNC: 4.11 MG/DL
EGFRCR SERPLBLD CKD-EPI 2021: 14 ML/MIN/1.73M2 (ref 60–?)
EOSINOPHIL # BLD AUTO: 0.47 X10(3) UL (ref 0–0.7)
EOSINOPHIL NFR BLD AUTO: 6.2 %
ERYTHROCYTE [DISTWIDTH] IN BLOOD BY AUTOMATED COUNT: 16.1 %
GLOBULIN PLAS-MCNC: 3 G/DL (ref 2–3.5)
GLUCOSE BLD-MCNC: 146 MG/DL (ref 70–99)
GLUCOSE BLD-MCNC: 168 MG/DL (ref 70–99)
HCO3 BLDA-SCNC: 29.4 MEQ/L (ref 21–27)
HCO3 BLDV-SCNC: 30.4 MEQ/L (ref 22–26)
HCT VFR BLD AUTO: 32.2 %
HGB BLD-MCNC: 10.3 G/DL
HGB BLD-MCNC: 11 G/DL
IMM GRANULOCYTES # BLD AUTO: 0.03 X10(3) UL (ref 0–1)
IMM GRANULOCYTES NFR BLD: 0.4 %
INR BLD: 1.08 (ref 0.8–1.2)
L/M: 1.5 L/MIN
LACTATE BLD-SCNC: 2.7 MMOL/L (ref 0.5–2)
LYMPHOCYTES # BLD AUTO: 1.83 X10(3) UL (ref 1–4)
LYMPHOCYTES NFR BLD AUTO: 24.2 %
MCH RBC QN AUTO: 35.3 PG (ref 26–34)
MCHC RBC AUTO-ENTMCNC: 34.2 G/DL (ref 31–37)
MCV RBC AUTO: 103.2 FL
METHGB MFR BLD: 0.1 % SAT (ref 0.4–1.5)
MONOCYTES # BLD AUTO: 1.03 X10(3) UL (ref 0.1–1)
MONOCYTES NFR BLD AUTO: 13.6 %
MRSA DNA SPEC QL NAA+PROBE: NEGATIVE
NEUTROPHILS # BLD AUTO: 4.11 X10 (3) UL (ref 1.5–7.7)
NEUTROPHILS # BLD AUTO: 4.11 X10(3) UL (ref 1.5–7.7)
NEUTROPHILS NFR BLD AUTO: 54.5 %
OSMOLALITY SERPL CALC.SUM OF ELEC: 290 MOSM/KG (ref 275–295)
OXYHGB MFR BLDA: 94.6 % (ref 92–100)
OXYHGB MFR BLDV: 92.7 % (ref 72–78)
PCO2 BLDA: 40 MM HG (ref 35–45)
PCO2 BLDV: 35 MM HG (ref 38–50)
PH BLDA: 7.48 [PH] (ref 7.35–7.45)
PH BLDV: 7.54 [PH] (ref 7.33–7.43)
PLATELET # BLD AUTO: 101 10(3)UL (ref 150–450)
PLATELETS.RETICULATED NFR BLD AUTO: 2.6 % (ref 0–7)
PO2 BLDA: 78 MM HG (ref 80–100)
PO2 BLDV: 74 MM HG (ref 30–50)
POTASSIUM BLD-SCNC: 3.6 MMOL/L (ref 3.6–5.1)
POTASSIUM SERPL-SCNC: 4.6 MMOL/L (ref 3.5–5.1)
PROT SERPL-MCNC: 7.2 G/DL (ref 5.7–8.2)
PROTHROMBIN TIME: 14.1 SECONDS (ref 11.6–14.8)
RBC # BLD AUTO: 3.12 X10(6)UL
SODIUM BLD-SCNC: 138 MMOL/L (ref 135–145)
SODIUM SERPL-SCNC: 137 MMOL/L (ref 136–145)
TROPONIN I SERPL HS-MCNC: 35 NG/L
WBC # BLD AUTO: 7.6 X10(3) UL (ref 4–11)

## 2025-02-06 PROCEDURE — 94760 N-INVAS EAR/PLS OXIMETRY 1: CPT

## 2025-02-06 PROCEDURE — 99291 CRITICAL CARE FIRST HOUR: CPT

## 2025-02-06 PROCEDURE — 93005 ELECTROCARDIOGRAM TRACING: CPT

## 2025-02-06 PROCEDURE — 92960 CARDIOVERSION ELECTRIC EXT: CPT

## 2025-02-06 PROCEDURE — 84484 ASSAY OF TROPONIN QUANT: CPT

## 2025-02-06 PROCEDURE — 96374 THER/PROPH/DIAG INJ IV PUSH: CPT

## 2025-02-06 PROCEDURE — 84484 ASSAY OF TROPONIN QUANT: CPT | Performed by: EMERGENCY MEDICINE

## 2025-02-06 PROCEDURE — 027034Z DILATION OF CORONARY ARTERY, ONE ARTERY WITH DRUG-ELUTING INTRALUMINAL DEVICE, PERCUTANEOUS APPROACH: ICD-10-PCS | Performed by: INTERNAL MEDICINE

## 2025-02-06 PROCEDURE — 5A2204Z RESTORATION OF CARDIAC RHYTHM, SINGLE: ICD-10-PCS | Performed by: STUDENT IN AN ORGANIZED HEALTH CARE EDUCATION/TRAINING PROGRAM

## 2025-02-06 PROCEDURE — 93010 ELECTROCARDIOGRAM REPORT: CPT

## 2025-02-06 PROCEDURE — 85730 THROMBOPLASTIN TIME PARTIAL: CPT | Performed by: EMERGENCY MEDICINE

## 2025-02-06 PROCEDURE — 82375 ASSAY CARBOXYHB QUANT: CPT | Performed by: INTERNAL MEDICINE

## 2025-02-06 PROCEDURE — 83605 ASSAY OF LACTIC ACID: CPT | Performed by: INTERNAL MEDICINE

## 2025-02-06 PROCEDURE — 02713ZZ DILATION OF CORONARY ARTERY, TWO ARTERIES, PERCUTANEOUS APPROACH: ICD-10-PCS | Performed by: INTERNAL MEDICINE

## 2025-02-06 PROCEDURE — 85610 PROTHROMBIN TIME: CPT | Performed by: EMERGENCY MEDICINE

## 2025-02-06 PROCEDURE — 99152 MOD SED SAME PHYS/QHP 5/>YRS: CPT | Performed by: INTERNAL MEDICINE

## 2025-02-06 PROCEDURE — B241ZZ3 ULTRASONOGRAPHY OF MULTIPLE CORONARY ARTERIES, INTRAVASCULAR: ICD-10-PCS | Performed by: INTERNAL MEDICINE

## 2025-02-06 PROCEDURE — 93010 ELECTROCARDIOGRAM REPORT: CPT | Performed by: INTERNAL MEDICINE

## 2025-02-06 PROCEDURE — 82803 BLOOD GASES ANY COMBINATION: CPT

## 2025-02-06 PROCEDURE — 99153 MOD SED SAME PHYS/QHP EA: CPT | Performed by: INTERNAL MEDICINE

## 2025-02-06 PROCEDURE — 92978 ENDOLUMINL IVUS OCT C 1ST: CPT | Performed by: INTERNAL MEDICINE

## 2025-02-06 PROCEDURE — 84132 ASSAY OF SERUM POTASSIUM: CPT | Performed by: INTERNAL MEDICINE

## 2025-02-06 PROCEDURE — 82803 BLOOD GASES ANY COMBINATION: CPT | Performed by: INTERNAL MEDICINE

## 2025-02-06 PROCEDURE — 82803 BLOOD GASES ANY COMBINATION: CPT | Performed by: EMERGENCY MEDICINE

## 2025-02-06 PROCEDURE — 92920 PRQ TRLUML C ANGIOP 1ART&/BR: CPT | Performed by: INTERNAL MEDICINE

## 2025-02-06 PROCEDURE — 85730 THROMBOPLASTIN TIME PARTIAL: CPT

## 2025-02-06 PROCEDURE — 80053 COMPREHEN METABOLIC PANEL: CPT | Performed by: EMERGENCY MEDICINE

## 2025-02-06 PROCEDURE — 82330 ASSAY OF CALCIUM: CPT | Performed by: INTERNAL MEDICINE

## 2025-02-06 PROCEDURE — 84295 ASSAY OF SERUM SODIUM: CPT | Performed by: INTERNAL MEDICINE

## 2025-02-06 PROCEDURE — 36600 WITHDRAWAL OF ARTERIAL BLOOD: CPT | Performed by: INTERNAL MEDICINE

## 2025-02-06 PROCEDURE — 85025 COMPLETE CBC W/AUTO DIFF WBC: CPT | Performed by: EMERGENCY MEDICINE

## 2025-02-06 PROCEDURE — B2111ZZ FLUOROSCOPY OF MULTIPLE CORONARY ARTERIES USING LOW OSMOLAR CONTRAST: ICD-10-PCS | Performed by: INTERNAL MEDICINE

## 2025-02-06 PROCEDURE — 85610 PROTHROMBIN TIME: CPT

## 2025-02-06 PROCEDURE — 85018 HEMOGLOBIN: CPT | Performed by: INTERNAL MEDICINE

## 2025-02-06 PROCEDURE — 80053 COMPREHEN METABOLIC PANEL: CPT

## 2025-02-06 PROCEDURE — 93454 CORONARY ARTERY ANGIO S&I: CPT | Performed by: INTERNAL MEDICINE

## 2025-02-06 PROCEDURE — 87641 MR-STAPH DNA AMP PROBE: CPT | Performed by: HOSPITALIST

## 2025-02-06 PROCEDURE — 85025 COMPLETE CBC W/AUTO DIFF WBC: CPT

## 2025-02-06 PROCEDURE — 82962 GLUCOSE BLOOD TEST: CPT

## 2025-02-06 PROCEDURE — 83050 HGB METHEMOGLOBIN QUAN: CPT | Performed by: INTERNAL MEDICINE

## 2025-02-06 PROCEDURE — 96375 TX/PRO/DX INJ NEW DRUG ADDON: CPT

## 2025-02-06 RX ORDER — METOPROLOL TARTRATE 1 MG/ML
INJECTION, SOLUTION INTRAVENOUS
Status: COMPLETED
Start: 2025-02-06 | End: 2025-02-06

## 2025-02-06 RX ORDER — METOPROLOL TARTRATE 1 MG/ML
5 INJECTION, SOLUTION INTRAVENOUS ONCE
Status: COMPLETED | OUTPATIENT
Start: 2025-02-06 | End: 2025-02-06

## 2025-02-06 RX ORDER — ONDANSETRON 2 MG/ML
INJECTION INTRAMUSCULAR; INTRAVENOUS
Status: COMPLETED
Start: 2025-02-06 | End: 2025-02-06

## 2025-02-06 RX ORDER — CLOPIDOGREL BISULFATE 75 MG/1
75 TABLET ORAL DAILY
Status: DISCONTINUED | OUTPATIENT
Start: 2025-02-07 | End: 2025-02-09

## 2025-02-06 RX ORDER — INSULIN DEGLUDEC 100 U/ML
10 INJECTION, SOLUTION SUBCUTANEOUS NIGHTLY
Status: DISCONTINUED | OUTPATIENT
Start: 2025-02-06 | End: 2025-02-09

## 2025-02-06 RX ORDER — ETOMIDATE 2 MG/ML
INJECTION INTRAVENOUS
Status: DISPENSED
Start: 2025-02-06 | End: 2025-02-07

## 2025-02-06 RX ORDER — ASPIRIN 81 MG/1
81 TABLET ORAL DAILY
Status: DISCONTINUED | OUTPATIENT
Start: 2025-02-07 | End: 2025-02-09

## 2025-02-06 RX ORDER — IOPAMIDOL 755 MG/ML
200 INJECTION, SOLUTION INTRAVASCULAR
Status: COMPLETED | OUTPATIENT
Start: 2025-02-06 | End: 2025-02-06

## 2025-02-06 RX ORDER — ACETAMINOPHEN 325 MG/1
650 TABLET ORAL EVERY 4 HOURS PRN
Status: DISCONTINUED | OUTPATIENT
Start: 2025-02-06 | End: 2025-02-09

## 2025-02-06 RX ORDER — CALCIUM CHLORIDE 100 MG/ML
1 INJECTION INTRAVENOUS; INTRAVENTRICULAR ONCE
Status: COMPLETED | OUTPATIENT
Start: 2025-02-06 | End: 2025-02-06

## 2025-02-06 RX ORDER — LIDOCAINE HYDROCHLORIDE ANHYDROUS AND DEXTROSE MONOHYDRATE .8; 5 G/100ML; G/100ML
INJECTION, SOLUTION INTRAVENOUS CONTINUOUS
Status: DISCONTINUED | OUTPATIENT
Start: 2025-02-06 | End: 2025-02-09

## 2025-02-06 RX ORDER — CLOPIDOGREL BISULFATE 75 MG/1
TABLET ORAL
Status: COMPLETED
Start: 2025-02-06 | End: 2025-02-06

## 2025-02-06 RX ORDER — MIDAZOLAM HYDROCHLORIDE 1 MG/ML
INJECTION INTRAMUSCULAR; INTRAVENOUS
Status: COMPLETED
Start: 2025-02-06 | End: 2025-02-06

## 2025-02-06 RX ORDER — AMIODARONE HYDROCHLORIDE 150 MG/3ML
INJECTION, SOLUTION INTRAVENOUS
Status: COMPLETED | OUTPATIENT
Start: 2025-02-06 | End: 2025-02-06

## 2025-02-06 RX ORDER — HEPARIN SODIUM 5000 [USP'U]/ML
INJECTION, SOLUTION INTRAVENOUS; SUBCUTANEOUS
Status: COMPLETED
Start: 2025-02-06 | End: 2025-02-06

## 2025-02-06 RX ORDER — SODIUM CHLORIDE 9 MG/ML
INJECTION, SOLUTION INTRAVENOUS CONTINUOUS
Status: ACTIVE | OUTPATIENT
Start: 2025-02-06 | End: 2025-02-06

## 2025-02-06 RX ORDER — ACETAMINOPHEN 500 MG
500 TABLET ORAL EVERY 4 HOURS PRN
Status: DISCONTINUED | OUTPATIENT
Start: 2025-02-06 | End: 2025-02-06

## 2025-02-06 NOTE — ED QUICK NOTES
ER MD at bedside. Duly cards called to bedside per ER MD.    RT called to bedside.     ER pharamacy called to bedside.

## 2025-02-06 NOTE — SPIRITUAL CARE NOTE
Spiritual Care Visit Note    Patient Name: Jonny Hqaue Date of Spiritual Care Visit: 27   : 4/15/1945 Primary Dx: Chest pain due to myocardial ischemia       Referred By: Referral From: Nurse    Spiritual Care Taxonomy:    Intended Effects: Demonstrate caring and concern    Methods: Offer spiritual/Christianity support;Collaborate with care team member    Interventions: Active listening;Ask guided questions;Explain  role;Provide hospitality    Visit Type/Summary:     - Spiritual Care: Consulted with RN prior to visit. Offered empathic listening and emotional support. Patient and family expressed appreciation for  visit. Provided information regarding how to contact Spiritual Care and left a Spiritual Care information card.  remains available as needed for follow up. Spoke with the nurse prior to the visit, upon entering the patient's room I observed the patient lying in bed patient welcomed me. Patient's son is at bedside. Patient stated that he is doing fine.  Patient son stated that it was a scary moment but I am glad that my father is okay. Provided spiritual and emotional support to the patient and family.     Spiritual Care support can be requested via an Epic consult. For urgent/immediate needs, please contact the On Call  at: [facility]: ext [phone]    Chaplain Resident Ladonna Andrew MA

## 2025-02-06 NOTE — ED PROVIDER NOTES
Patient Seen in: [facility]      History     Chief Complaint   Patient presents with    Chest Pain Angina     Stated Complaint: chest pain, began during dialysis, son pulled pt out of dialysis and drove here    Subjective:   HPI      Patient brought in for chest pain during dialysis.  He apparently had been in dialysis for an hour and it was stopped due to the chest pain.  Brought here by son.    When I walked in the room he is in an irregular wide-complex rhythm, appears unwell, complains of some chest discomfort and shortness of breath, feels nauseated.      Patient has significant cardiac history, known CAD, known aortic stenosis with TAVR.  A-fib RVR, on aspirin and Plavix per family.   son believes the patient is usually in sinus rhythm.    Objective:     No pertinent past medical history.            No pertinent past surgical history.              No pertinent social history.                Physical Exam     ED Triage Vitals   BP 02/06/25 1452 122/74   Pulse 02/06/25 1452 (!) 149   Resp 02/06/25 1452 (!) 29   Temp --    Temp src --    SpO2 02/06/25 1452 98 %   O2 Device 02/06/25 1454 None (Room air)       Current Vitals:   Vital Signs  BP: 113/62  Pulse: 118  Resp: 20  MAP (mmHg): 75    Oxygen Therapy  SpO2: 96 %  O2 Device: None (Room air)        Physical Exam    Physical Exam   Constitutional: Awake, alert, well appearing  Head: Normocephalic and atraumatic.   Eyes: Conjunctivae are normal. Pupils are equal, round, and reactive to light.   Neck: Normal range of motion. No JVD  Cardiovascular: Tachycardic, regular  Pulmonary/Chest: Normal effort.  No accessory muscle use.  No cyanosis.  Abdominal: Soft. Not distended.  Neurological: Pt is alert and oriented to person, place, and time. no cranial nerve deficits. Speech fluent  catheter in place right chest      ED Course     Labs Reviewed   CBC WITH DIFFERENTIAL WITH PLATELET - Abnormal; Notable for the following components:        Result Value    RBC 3.12 (*)     HGB 11.0 (*)     HCT 32.2 (*)     .0 (*)     .2 (*)     MCH 35.3 (*)     Monocyte Absolute 1.03 (*)     All other components within normal limits   COMP METABOLIC PANEL (14) - Abnormal; Notable for the following components:    Glucose 146 (*)     Chloride 93 (*)     Creatinine 4.11 (*)     eGFR-Cr 14 (*)     AST 40 (*)     All other components within normal limits   VENOUS BLOOD GAS - Abnormal; Notable for the following components:    Venous pH 7.54 (*)     Venous pCO2 35 (*)     Venous pO2 74 (*)     Venous HCO3 30.4 (*)     Venous O2Hb 92.7 (*)     All other components within normal limits   ABG PANEL W ELECT AND LACTATE - Abnormal; Notable for the following components:    ABG pH 7.48 (*)     ABG pO2 78 (*)     ABG HCO3 29.4 (*)     ABG Base Excess 5.8 (*)     Total Hemoglobin 10.3 (*)     Methemoglobin 0.1 (*)     Lactic Acid (Blood Gas) 2.7 (*)     All other components within normal limits   TROPONIN I HIGH SENSITIVITY - Normal   PROTHROMBIN TIME (PT) - Normal   PTT, ACTIVATED - Normal   RAINBOW DRAW BLUE     EKG    Rate, intervals and axes as noted on EKG Report.  Rate: 136  Rhythm: Likely A-fib with aberrancy  Reading: Likely A-fib with aberrancy           I think the predominant rhythm on the patient first arrived was A-fib with a Cherry C.  V. tach was also consideration.  However it was obvious that he was having quick runs of V. tach as well.      Given his ESRD status I had given him a push of bicarb x 2 as well as calcium.  This was ineffective.    He began to look unwell.  We gave him a push of amiodarone and I asked for a drip to be prepared.  While this was being prepared to the patient not decompensated, his heart rate went to the upper 200s.  It seemed like he had lost his pulse.  He was already hooked up to the monitor, we charged him we defibrillated and probably within 10 seconds.  Chest compressions were began but the patient actually pushed the  nurse off of his chest.    He did go back into a more stable rhythm, most likely A-fib with aberrancy but he still had runs of V. tach through this.  At this point I had 2 g of magnesium administered.    Dr. Orosco came to bedside, please see his consult note for further details.         Blood work reviewed, ABG actually shows acceptable electrolytes.  Remainder labs noted.    Medications   dilTIAZem (cardIZEM) 100 mg in sodium chloride 0.9% 100 mL IVPB-ADDV (0 mg/hr Intravenous Hold 2/6/25 1453)   etomidate (Amidate) 2 mg/mL injection (  Not Given 2/6/25 1454)   amiodarone in dextrose 4.14% (Cordarone) 360 mg/200mL infusion premix (1 mg/min Intravenous New Bag 2/6/25 1503)   amiodarone (Cordarone) 150 MG/3ML injection (150 mg Intravenous Given 2/6/25 1455)   iopamidol (ISOVUE-370) 76 % injection 200 mL (has no administration in time range)   fentaNYL (Sublimaze) 50 mcg/mL injection 50 mcg (50 mcg Intravenous Given 2/6/25 1512)   metoprolol (Lopressor) 5 mg/5mL injection 5 mg (5 mg Intravenous Given 2/6/25 1515)   sodium bicarbonate injection 50 mEq (50 mEq Intravenous Given 2/6/25 1456)   sodium bicarbonate injection 50 mEq (50 mEq Intravenous Given 2/6/25 1452)   calcium chloride injection 1 g (1 g Intravenous Given 2/6/25 1452)   fentaNYL (Sublimaze) 50 mcg/mL injection (has no administration in time range)   midazolam (Versed) 2 MG/2ML injection (has no administration in time range)       Procedure note:  Emergent cardioversion:  Patient was in V. tach with  no palpable pulse.    Cardioverted with 120 J, synchronized    Reverted to A-fib with aberrancy       MDM            Differential diagnoses considered: A-fib with RVR, V. tach, V-fib, left bundle branch block, and acute coronary syndrome all considered    -Unstable arrhythmia as discussed above.  For now maintain amnio drip.  CNICU admission.    -Due to concern for acute coronary syndrome came to Cath Lab emergently by cardiology.      -Dr. Mejia,  nephrology consulted, I spoke with them.  Volume and electrolytes seem okay, I do not think will need emergent dialysis less something changes.       Patient will be admitted primarily to the On license of [facility] hospitalist.  Care has been discussed with the admitting physician.      A total of 45 minutes of critical care time (exclusive of billable procedures) was administered to manage the patient's cardiovascular instability due to his V. tach with loss of pulse requiring multiple medications and cardioversion.  This involved direct patient intervention, complex decision making, and/or extensive discussions with the patient, family, and clinical staff.          *Discussion of ongoing management of this patient's care included: Cardiology, nephrology, admitting physician  *Comorbidities contributing to the complexity of decision making: Aortic stenosis, A-fib, coronary disease    *Additional sources of history: some provided majority the history as patient was clinically unstable    Shared decision making was done by: patient, myself, Family, consultants  Admission disposition: 2/6/2025  3:37 PM           Medical Decision Making      Disposition and Plan     Clinical Impression:  1. Chest pain due to myocardial ischemia    2. V-tach (HCC)    3. Chest pain of uncertain etiology         Disposition:  Admit  2/6/2025  3:37 pm    Follow-up:  No follow-up provider specified.        Medications Prescribed:  Current Discharge Medication List              Supplementary Documentation:         Hospital Problems       Present on Admission  Date Reviewed: 8/7/2024            ICD-10-CM Noted POA    * (Principal) Chest pain due to myocardial ischemia I25.9 2/6/2025 Unknown    Chest pain of uncertain etiology R07.9 4/5/2023 Unknown    V-tach (HCC) I47.20 2/6/2025 Unknown

## 2025-02-06 NOTE — H&P
[facility] Hospitalist H&P    Chief Complaint   Patient presents with    Chest Pain Angina        PCP: Gee Jimenez MD    History of Present Illness: Patient is a 79 year old male with history of CAD s/p PCI, severe aortic stenosis s/p TAVR, PAF (not on AC), type II DM, ESRD on HD and cirrhosis who presented to ER after complaining of chest pain during dialysis session today. During initial presentation patient found to be in Vtach and had round of defibrillation. Subsequently, started on amiodarone gtt and taken to cath lab due to complex medical history.     Medical History:  Past Medical History:    Aortic stenosis    Calculus of kidney    Coronary atherosclerosis    bare metal stents at [facility] 2021    Diabetes (HCC)    Disorder of thyroid    Esophageal varices without bleeding, unspecified esophageal varices type (HCC)    GIB (gastrointestinal bleeding)    Hepatitis, unspecified    hepatitis C-just completed taking Harvoni in 2016    High blood pressure    High cholesterol    Other disorders of plasma-protein metabolism, not elsewhere classified    Renal disorder    Visual impairment    reading glasses      Past Surgical History:   Procedure Laterality Date    Angiogram  08/15/2017    small caliber RCA with 80% mid lesion.  LAD and left circumflex with 50% lesions.  LV shows inferobasilar aneurysm with scar.  Overall LV function preserved at the exterior 65%.    Cath bare metal stent (bms)      Other surgical history      Upper GI surgery    Other surgical history  2017    Caroline Lees    Other surgical history  2019    BTS Caroline Lees     Other surgical history  2020    BTS Caroline (Dr. Lees)      Social History     Tobacco Use    Smoking status: Former     Current packs/day: 0.00     Average packs/day: 0.5 packs/day for 15.0 years (7.5 ttl pk-yrs)     Types: Cigarettes     Start date: 1965     Quit date: 1980     Years since quittin.0    Smokeless  tobacco: Never    Tobacco comments:     quit at age 34   Substance Use Topics    Alcohol use: No      Family History   Problem Relation Age of Onset    Cancer Father     Hypertension Mother        Allergies[1]     Review of Systems  Comprehensive ROS reviewed and negative except for what is stated in HPI.      OBJECTIVE:  /62   Pulse 118   Resp 20   SpO2 96%   General: No distress.   HEENT:  EOMI, PERRLA.  Pulm:  Normal respiratory effort.  CV: Tachycardic   Abd: Soft, nontender, nondistended.   MSK: Full range of motion in extremities, no obvious deformities.    Skin: No lesions or rashes.  Neuro: No obvious focal deficits.    LABS:   Lab Results   Component Value Date    WBC 7.6 02/06/2025    HGB 11.0 02/06/2025    HCT 32.2 02/06/2025    .0 02/06/2025    CREATSERUM 4.11 02/06/2025    BUN 22 02/06/2025     02/06/2025    K 4.6 02/06/2025    CL 93 02/06/2025    CO2 26.0 02/06/2025     02/06/2025    CA 8.9 02/06/2025    ALB 4.2 02/06/2025    ALKPHO 105 02/06/2025    BILT 0.7 02/06/2025    TP 7.2 02/06/2025    AST 40 02/06/2025    ALT 23 02/06/2025    PTT 27.2 02/06/2025    INR 1.08 02/06/2025    PTP 14.1 02/06/2025       All lab work and imaging personally reviewed.    Assessment/ Plan:     #VT  #Chest pain  #Severe aortic stenosis s/p TAVR  #CAD s/p PCI  -s/p debrillation in th ER. Amio gtt  -cath lab today  -follow post procedure  -echo  -optimize cardiac meds, further recs per cards    #Type II DM  -Start with 10 u tresiba, mild SSI. Uptitrate as diet advanced.  -CHO controlled diet     #ESRD   -Nephrology on consult for HD    Disposition: inpt     DO Joselito Nino [facility] Hospitalist       [1] No Known Allergies

## 2025-02-06 NOTE — CONSULTS
Cardiology Consultation Note      Jonny Haque Patient Status:  Emergency    4/15/1945 MRN [id number]   Location [facility] EMERGENCY DEPARTMENT Attending Jaimie Perez MD   Hosp Day # 0 PCP Gee Jimenez MD     Reason for consultation:  VT      Impression:  VT: occurred after HD today. Episode of chest pain during dialysis with dipahoresis   Severe aortic stenosis s/p FADI with 26 S3 UR valve on 2024  CAD s/p S/P PCI to LM into LAD for ISR, and TRACY to OM in 2024  Hepatitis C w/cirrhosis  ESRD on HD  PAF on amiodarone; no ac likely 2/2 prior GIB and SDH   DM2   Echo in 2024: EF 65-70%    Plan:  Patient had defibrillation x 1 but went back into Vtach.  He has A-V dissociation which points more towards VT rather than AFIB or SVT with aberrancy.  Given his risk factors we will take him to the Cath Lab for coronary evaluation  Continue IV amiodarone for now        History of Present Illness:  Jonny Haque is a 79 year old male who presented to [facility] on 2025.    This is a patient of my partner: Dr. Donald .    The patient has a very complicated past medical history and presents with ventricular tachycardia from the dialysis center.  Was having chest pain today during dialysis and was brought by his son to [place].  Currently he is chest pain-free.  Did miss dialysis 1 session last week.    Cardiology consultation was requested.    Medications:  Current Facility-Administered Medications   Medication Dose Route Frequency    dilTIAZem (cardIZEM) 100 mg in sodium chloride 0.9% 100 mL IVPB-ADDV  5 mg/hr Intravenous Continuous    etomidate (Amidate) 2 mg/mL injection        amiodarone in dextrose 4.14% (Cordarone) 360 mg/200mL infusion premix  1 mg/min Intravenous Continuous    fentaNYL (Sublimaze) 50 mcg/mL injection 50 mcg  50 mcg Intravenous Once    metoprolol (Lopressor) 5 mg/5mL injection 5 mg  5 mg Intravenous Once       Past Medical History:    Aortic stenosis    Calculus of  kidney    Coronary atherosclerosis    bare metal stents at [facility] Jan 2021    Diabetes (HCC)    Disorder of thyroid    Esophageal varices without bleeding, unspecified esophageal varices type (HCC)    GIB (gastrointestinal bleeding)    Hepatitis, unspecified    hepatitis C-just completed taking Harvoni in November 2016    High blood pressure    High cholesterol    Other disorders of plasma-protein metabolism, not elsewhere classified    Renal disorder    Visual impairment    reading glasses       Past Surgical History:   Procedure Laterality Date    Angiogram  08/15/2017    small caliber RCA with 80% mid lesion.  LAD and left circumflex with 50% lesions.  LV shows inferobasilar aneurysm with scar.  Overall LV function preserved at the exterior 65%.    Cath bare metal stent (bms)      Other surgical history      Upper GI surgery    Other surgical history  12/11/2017    Cysto Dr. Lees    Other surgical history  07/18/2019    BTS Cysto Dr. Lees     Other surgical history  02/06/2020    BTS Cysto (Dr. Lees)       Family History  There is no family history of sudden cardiac death.    Social History   reports that he quit smoking about 45 years ago. His smoking use included cigarettes. He started smoking about 60 years ago. He has a 7.5 pack-year smoking history. He has never used smokeless tobacco. He reports that he does not drink alcohol and does not use drugs.     Allergies  Allergies[1]      Review of Systems:  Constitutional: negative for fevers  Eyes: negative for visual disturbance  Ears, nose, mouth, throat, and face: negative for epistaxis  Respiratory: negative for dyspnea on exertion  Cardiovascular: negative for chest pain  Gastrointestinal: negative for melena  Genitourinary:negative for hematuria  Hematologic/lymphatic: negative for bleeding  Musculoskeletal:negative for myalgias  Neurological: negative for dizziness and headaches  Endocrine: negative for temperature intolerance      Physical  Exam:  Blood pressure 122/74, pulse (!) 128, resp. rate 16, SpO2 98%.  No data recorded.    Wt Readings from Last 3 Encounters:   01/31/25 214 lb (97.1 kg)   09/17/24 212 lb 15.4 oz (96.6 kg)   08/14/24 212 lb 15.4 oz (96.6 kg)       General: Awake and alert; in no acute distress  HEENT: Extraocular movements are intact; sclerae are anicteric; scalp is atrauamatic; no thyromegaly  Neck: Supple; no JVD; no carotid bruits  Cardiac: Regular rate and regular rhythm; no murmurs/rubs/gallops are appreciated; PMI is non-displaced; there is no evidence of a sternal heave  Lungs: Clear to auscultation bilaterally; no accessory muscle use is noted  Abdomen: Soft, non-tender; bowel sounds are normoactive; no hepatosplenomegaly  Extremities: No clubbing or cyanosis; moves all 4 extremities normally  Psychiatric: Normal mood and affect; answers questions appropriately  Dermatologic: No rashes; normal skin turgor    Diagnostic testing:    EKG: VT    Labs:   Lab Results   Component Value Date    INR 1.08 02/06/2025    INR 1.13 09/18/2024        Lab Results   Component Value Date    WBC 7.6 02/06/2025    HGB 11.0 02/06/2025    HCT 32.2 02/06/2025    .0 02/06/2025    PTT 27.2 02/06/2025    INR 1.08 02/06/2025    PTP 14.1 02/06/2025         Thank you for allowing our practice to participate in the care of your patient. Please do not hesitate to contact me if you have any questions.    August Orosco MD           [1] No Known Allergies

## 2025-02-06 NOTE — PROCEDURES
[facility] Division of Cardiology   Cardiac Catheterization & Percutaneous Coronary Intervention     Jonny Haque Location: [facility] Cath Lab    CSN [id number] MRN [id number]   Admission Date 2/6/2025 Procedure Date 2/6/2025   Attending Physician No att. providers found Procedure Physician Satya Donald MD     PREOPERATIVE DIAGNOSIS:  Ventricular tachycardia, cardiac arrest, CAD  POSTOPERATIVE DIAGNOSIS:  CAD  PROCEDURE PERFORMED:  Coronary angiography, PCI to OM with TRACY, IVUS of CX and LAD, PTCA of LAD and CX      PROCEDURE:    Moderate sedation: The patient was brought to the cardiac catheterization lab in the fasting state.  Informed consent was previously obtained.  Moderate sedation was employed using 4mg IV Versed and 100mcg IV Fentanyl.  I directly observed the patient from 1621 to 1721, watching the heart rate, blood pressure, oximetry, and rhythm.  An independent, trained observer was present throughout the procedure and assisted in the monitoring of the patient's level of consciousness and physiologic states.  Please see the [facility] Catheterization Report (MCRTM) in [facility] for further, technical details.  Vascular access and catheter placement:  A 6F right radial, slender sheath was utilized after micropuncture access gained.  A Candis catheter was used for LM and for RCA engagement and subsequent angiography.  Standard over the wire technique was utilized.  Tajik and GURROLA angiographic views with caudal and cranial angulation were used for cineangiography.  Hemodynamics were measured in the standard fashion using fluid filled, pressure manometry via the [employer] device and analyzed with the [employer] catheterization software.  At the end of the case, a TRTM band was used for arterial hemostasis and our standard protocol was followed.  Coronary or endovascular intervention: We used a 7F femoral sheath.  We used a BMW wire down the LAD.  We used a Scion Blue into the CX/OM.  We  IVUS'd the LAD and there was a focal area of obstruction in the mid LAD which was nearly catheter occlusive.  The IVUS would not cross past the very proximal CX.  The wire in the OM was flow occlusive.  So we used a 3.5mm NC in the LAD and a 3.0mm NC in the CX and we stented the mid OM and then ballooned the CX and LAD and kissed to 12ATM in the left main.  We did each artery sequentially twice and kissed twice.  We did multiple inflations from mid to proximal vessel (in both LAD and CX) and again ended with kissing balloon angioplasty to 12ATM.  We then IVUS'd in the LAD and it was significantly improved.  We took final angios and ended the case.  PercloseTM device for hemostasis.       DIAGNOSTIC FINDINGS:    Coronary angiography:    LMCA: The left main artery is short and moderately diseased and stented into the LAD and CX.  LAD:  The left anterior descending artery is large and there is focal stenosis in the mid vessel which was catheter occlusive (70% stenosis) and there was significant, moderate irregularity in the left main and proximal LAD.  LCX: The left circumflex artery is dominant vessel.  Moderate to large OM1 which is stented.  There is a focal lesion of 90% in the OM and interestingly the wire was flow occlusive in the OM suggesting there was an area of subtotal occlusion which was not well seen in standard views.  We had great flow post PCI.   RCA:  The right coronary artery is non-dominant with moderate to severe mid vessel diseased.  Unchanged.    MEDICATIONS:  See nursing records, MCR, and EMR.     COMPLICATIONS:  None.     IMPRESSION:    Ventricular tachycardia.  ACS, focal LAD stenosis (ISR).  De yissel OM lesion.  S/P PTCA of LAD stenosis and PTCA of proximal vessels and LM stents.  S/P PCI of OM with TRACY.  Good result with TIMI3 flow and good angiographic improvement.    RECOMMENDATIONS:   Lidocaine drip (converted during case).  Plavix daily (DAPT) for now.  CCU.  Echo tonight.      Satya ACOSTA  MD [place]  General, Interventional  Structural & Endovascular  Cardiology

## 2025-02-07 ENCOUNTER — APPOINTMENT (OUTPATIENT)
Dept: CV DIAGNOSTICS | Facility: HOSPITAL | Age: 80
End: 2025-02-07
Attending: INTERNAL MEDICINE
Payer: MEDICARE

## 2025-02-07 PROBLEM — I25.119 CORONARY ARTERY DISEASE INVOLVING NATIVE CORONARY ARTERY OF NATIVE HEART WITH ANGINA PECTORIS: Status: ACTIVE | Noted: 2022-01-20

## 2025-02-07 LAB
ALBUMIN SERPL-MCNC: 3.7 G/DL (ref 3.2–4.8)
ALBUMIN/GLOB SERPL: 1.3 {RATIO} (ref 1–2)
ALP LIVER SERPL-CCNC: 99 U/L
ALT SERPL-CCNC: 18 U/L
ANION GAP SERPL CALC-SCNC: 13 MMOL/L (ref 0–18)
AST SERPL-CCNC: 51 U/L (ref ?–34)
ATRIAL RATE: 58 BPM
BASOPHILS # BLD AUTO: 0.03 X10(3) UL (ref 0–0.2)
BASOPHILS NFR BLD AUTO: 0.7 %
BILIRUB SERPL-MCNC: 0.4 MG/DL (ref 0.2–1.1)
BUN BLD-MCNC: 30 MG/DL (ref 9–23)
CALCIUM BLD-MCNC: 8.7 MG/DL (ref 8.7–10.6)
CHLORIDE SERPL-SCNC: 92 MMOL/L (ref 98–112)
CO2 SERPL-SCNC: 32 MMOL/L (ref 21–32)
CREAT BLD-MCNC: 5.33 MG/DL
EGFRCR SERPLBLD CKD-EPI 2021: 10 ML/MIN/1.73M2 (ref 60–?)
EOSINOPHIL # BLD AUTO: 0.22 X10(3) UL (ref 0–0.7)
EOSINOPHIL NFR BLD AUTO: 5.1 %
ERYTHROCYTE [DISTWIDTH] IN BLOOD BY AUTOMATED COUNT: 16.5 %
GLOBULIN PLAS-MCNC: 2.8 G/DL (ref 2–3.5)
GLUCOSE BLD-MCNC: 144 MG/DL (ref 70–99)
GLUCOSE BLD-MCNC: 156 MG/DL (ref 70–99)
GLUCOSE BLD-MCNC: 168 MG/DL (ref 70–99)
GLUCOSE BLD-MCNC: 211 MG/DL (ref 70–99)
GLUCOSE BLD-MCNC: 251 MG/DL (ref 70–99)
HCT VFR BLD AUTO: 29.7 %
HGB BLD-MCNC: 9.9 G/DL
IMM GRANULOCYTES # BLD AUTO: 0.02 X10(3) UL (ref 0–1)
IMM GRANULOCYTES NFR BLD: 0.5 %
LYMPHOCYTES # BLD AUTO: 0.81 X10(3) UL (ref 1–4)
LYMPHOCYTES NFR BLD AUTO: 18.8 %
MCH RBC QN AUTO: 35.4 PG (ref 26–34)
MCHC RBC AUTO-ENTMCNC: 33.3 G/DL (ref 31–37)
MCV RBC AUTO: 106.1 FL
MONOCYTES # BLD AUTO: 0.54 X10(3) UL (ref 0.1–1)
MONOCYTES NFR BLD AUTO: 12.5 %
NEUTROPHILS # BLD AUTO: 2.7 X10 (3) UL (ref 1.5–7.7)
NEUTROPHILS # BLD AUTO: 2.7 X10(3) UL (ref 1.5–7.7)
NEUTROPHILS NFR BLD AUTO: 62.4 %
OSMOLALITY SERPL CALC.SUM OF ELEC: 293 MOSM/KG (ref 275–295)
P AXIS: 74 DEGREES
P-R INTERVAL: 200 MS
PLATELET # BLD AUTO: 62 10(3)UL (ref 150–450)
PLATELET MORPHOLOGY: NORMAL
PLATELETS.RETICULATED NFR BLD AUTO: 3 % (ref 0–7)
POTASSIUM SERPL-SCNC: 4.1 MMOL/L (ref 3.5–5.1)
PROT SERPL-MCNC: 6.5 G/DL (ref 5.7–8.2)
Q-T INTERVAL: 550 MS
QRS DURATION: 136 MS
QTC CALCULATION (BEZET): 539 MS
R AXIS: 9 DEGREES
RBC # BLD AUTO: 2.8 X10(6)UL
SODIUM SERPL-SCNC: 137 MMOL/L (ref 136–145)
T AXIS: 90 DEGREES
VENTRICULAR RATE: 58 BPM
WBC # BLD AUTO: 4.3 X10(3) UL (ref 4–11)

## 2025-02-07 PROCEDURE — 93306 TTE W/DOPPLER COMPLETE: CPT | Performed by: INTERNAL MEDICINE

## 2025-02-07 PROCEDURE — 93005 ELECTROCARDIOGRAM TRACING: CPT

## 2025-02-07 PROCEDURE — 82962 GLUCOSE BLOOD TEST: CPT

## 2025-02-07 PROCEDURE — 99211 OFF/OP EST MAY X REQ PHY/QHP: CPT

## 2025-02-07 PROCEDURE — 80053 COMPREHEN METABOLIC PANEL: CPT | Performed by: STUDENT IN AN ORGANIZED HEALTH CARE EDUCATION/TRAINING PROGRAM

## 2025-02-07 PROCEDURE — 94760 N-INVAS EAR/PLS OXIMETRY 1: CPT

## 2025-02-07 PROCEDURE — 93010 ELECTROCARDIOGRAM REPORT: CPT | Performed by: INTERNAL MEDICINE

## 2025-02-07 PROCEDURE — 85025 COMPLETE CBC W/AUTO DIFF WBC: CPT | Performed by: STUDENT IN AN ORGANIZED HEALTH CARE EDUCATION/TRAINING PROGRAM

## 2025-02-07 RX ORDER — ALBUMIN (HUMAN) 12.5 G/50ML
25 SOLUTION INTRAVENOUS
Status: ACTIVE | OUTPATIENT
Start: 2025-02-07 | End: 2025-02-09

## 2025-02-07 RX ORDER — HEPARIN SODIUM 1000 [USP'U]/ML
1.5 INJECTION, SOLUTION INTRAVENOUS; SUBCUTANEOUS
Status: COMPLETED | OUTPATIENT
Start: 2025-02-07 | End: 2025-02-08

## 2025-02-07 RX ORDER — PANTOPRAZOLE SODIUM 40 MG/1
40 TABLET, DELAYED RELEASE ORAL
Status: DISCONTINUED | OUTPATIENT
Start: 2025-02-07 | End: 2025-02-09

## 2025-02-07 RX ORDER — POLYETHYLENE GLYCOL 3350 17 G/17G
17 POWDER, FOR SOLUTION ORAL DAILY
Status: DISCONTINUED | OUTPATIENT
Start: 2025-02-07 | End: 2025-02-09

## 2025-02-07 RX ORDER — LEVOTHYROXINE SODIUM 100 UG/1
200 TABLET ORAL
Status: DISCONTINUED | OUTPATIENT
Start: 2025-02-07 | End: 2025-02-09

## 2025-02-07 NOTE — PROGRESS NOTES
[facility] Hospitalist Progress Note    Subjective:  Feels better today. No further chest pain. Deciding about ICD.     Review of Systems  Comprehensive ROS reviewed and negative except for what is stated in HPI.      OBJECTIVE:  /56 (BP Location: Right arm)   Pulse 51   Temp 97.8 °F (36.6 °C) (Temporal)   Resp 12   Wt 215 lb 6.2 oz (97.7 kg)   SpO2 96%   BMI 30.04 kg/m²   General: No apparent distress.  Alert and oriented.  HEENT:  EOMI, PERRLA.  Pulm: Normal respiratory effort.  CV: Regular rate and rhythm, no murmur.   Abd: Soft, nontender, nondistended.  MSK: Full range of motion in extremities, no obvious deformities.    Skin: No lesions or rashes.  Neuro: No obvious focal deficits.    LABS:   Lab Results   Component Value Date    WBC 4.3 02/07/2025    HGB 9.9 02/07/2025    HCT 29.7 02/07/2025    PLT 62.0 02/07/2025    CREATSERUM 5.33 02/07/2025    BUN 30 02/07/2025     02/07/2025    K 4.1 02/07/2025    CL 92 02/07/2025    CO2 32.0 02/07/2025     02/07/2025    CA 8.7 02/07/2025    ALB 3.7 02/07/2025    ALKPHO 99 02/07/2025    BILT 0.4 02/07/2025    TP 6.5 02/07/2025    AST 51 02/07/2025    ALT 18 02/07/2025    PGLU 251 02/07/2025       All lab work and imaging personally reviewed.    Assessment/ Plan:     #VT  #CAD s/p PCI  #History of severe aortic stenosis s/p TAVR  -s/p debrillation in th ER. S/p LHC.  -Now off lidocaine gtt. Chest pain free.   -EP on consult, patient to decide for ICD , currently unsure.   -Continue close monitoring  -Echo today  -Cont DAPT      #Type II DM  -Start with 10 u tresiba, mild SSI. Uptitrate as diet advanced.  -CHO controlled diet      #ESRD T/TH/S  -Nephrology on consult for HD     Disposition: inpt      DO Joselito Nino [facility] Hospitalist

## 2025-02-07 NOTE — PROGRESS NOTES
Received pt from Cath lab @ 1945, s/p PCI 1 stent in OM.    Pt a/o x4, VSS.  Tele SB, HR in 40-50's.    O2 sat dip to 88% on room air while asleep.    Maintained O2 > 92% on 2LNC.  C/O chronic Rt lower back, relieved with PO PRN Acetaminophen and Lidocaine patch.  Denies chest pain.    Pt resting in bed.  (-) BM.    Str cath x1 this AM, 400 mL.  Amio gtt and Lidocaine gtt infusing as ordered.

## 2025-02-07 NOTE — PLAN OF CARE
Received this a.m., awake and alert, SB 50's on monitor, bp stable, amiodarone and lidocaine drips infusing. Lisa rothman'd this a.m.. Noted HR down to 40's throughout morning, updated , chris rothman'd. Echo done.  at bedside, will continue to monitor for ectopy. HD called for tomorrow. Plan of care updated with patient and son, questions answered, verbalized understanding.

## 2025-02-07 NOTE — PAYOR COMM NOTE
2/6 & 2/7  ADMISSION REVIEW     Payor: [employer] MEDICARE ADV PPO  Subscriber #:  [id number]  Authorization Number: [id number]    Admit date: 2/6/25  Admit time:  7:44 PM       REVIEW DOCUMENTATION:     ED Provider Notes        ED Provider Notes signed by Jaimie Perez MD at 2/6/2025  4:10 PM       Author: Jaimie Perez MD Service: Emergency Medicine Author Type: Physician    Filed: 2/6/2025  4:10 PM Date of Service: 2/6/2025  3:55 PM Status: Signed    : Jaimie Perez MD (Physician)           Patient Seen in: [facility]      History     Chief Complaint   Patient presents with    Chest Pain Angina     Stated Complaint: chest pain, began during dialysis, son pulled pt out of dialysis and drove here    Subjective:   HPI      Patient brought in for chest pain during dialysis.  He apparently had been in dialysis for an hour and it was stopped due to the chest pain.  Brought here by son.    When I walked in the room he is in an irregular wide-complex rhythm, appears unwell, complains of some chest discomfort and shortness of breath, feels nauseated.      Patient has significant cardiac history, known CAD, known aortic stenosis with TAVR.  A-fib RVR, on aspirin and Plavix per family.   son believes the patient is usually in sinus rhythm.    Objective:     No pertinent past medical history.            No pertinent past surgical history.              No pertinent social history.                Physical Exam     ED Triage Vitals   BP 02/06/25 1452 122/74   Pulse 02/06/25 1452 (!) 149   Resp 02/06/25 1452 (!) 29   Temp --    Temp src --    SpO2 02/06/25 1452 98 %   O2 Device 02/06/25 1454 None (Room air)       Current Vitals:   Vital Signs  BP: 113/62  Pulse: 118  Resp: 20  MAP (mmHg): 75    Oxygen Therapy  SpO2: 96 %  O2 Device: None (Room air)        Physical Exam    Physical Exam   Constitutional: Awake, alert, well appearing  Head: Normocephalic and atraumatic.   Eyes: Conjunctivae are normal.  Pupils are equal, round, and reactive to light.   Neck: Normal range of motion. No JVD  Cardiovascular: Tachycardic, regular  Pulmonary/Chest: Normal effort.  No accessory muscle use.  No cyanosis.  Abdominal: Soft. Not distended.  Neurological: Pt is alert and oriented to person, place, and time. no cranial nerve deficits. Speech fluent  catheter in place right chest      ED Course     Labs Reviewed   CBC WITH DIFFERENTIAL WITH PLATELET - Abnormal; Notable for the following components:       Result Value    RBC 3.12 (*)     HGB 11.0 (*)     HCT 32.2 (*)     .0 (*)     .2 (*)     MCH 35.3 (*)     Monocyte Absolute 1.03 (*)     All other components within normal limits   COMP METABOLIC PANEL (14) - Abnormal; Notable for the following components:    Glucose 146 (*)     Chloride 93 (*)     Creatinine 4.11 (*)     eGFR-Cr 14 (*)     AST 40 (*)     All other components within normal limits   VENOUS BLOOD GAS - Abnormal; Notable for the following components:    Venous pH 7.54 (*)     Venous pCO2 35 (*)     Venous pO2 74 (*)     Venous HCO3 30.4 (*)     Venous O2Hb 92.7 (*)     All other components within normal limits   ABG PANEL W ELECT AND LACTATE - Abnormal; Notable for the following components:    ABG pH 7.48 (*)     ABG pO2 78 (*)     ABG HCO3 29.4 (*)     ABG Base Excess 5.8 (*)     Total Hemoglobin 10.3 (*)     Methemoglobin 0.1 (*)     Lactic Acid (Blood Gas) 2.7 (*)     All other components within normal limits   TROPONIN I HIGH SENSITIVITY - Normal   PROTHROMBIN TIME (PT) - Normal   PTT, ACTIVATED - Normal   RAINBOW DRAW BLUE     EKG    Rate, intervals and axes as noted on EKG Report.  Rate: 136  Rhythm: Likely A-fib with aberrancy  Reading: Likely A-fib with aberrancy           I think the predominant rhythm on the patient first arrived was A-fib with a Cherry C.  V. tach was also consideration.  However it was obvious that he was having quick runs of V. tach as well.      Given his ESRD status I  had given him a push of bicarb x 2 as well as calcium.  This was ineffective.    He began to look unwell.  We gave him a push of amiodarone and I asked for a drip to be prepared.  While this was being prepared to the patient not decompensated, his heart rate went to the upper 200s.  It seemed like he had lost his pulse.  He was already hooked up to the monitor, we charged him we defibrillated and probably within 10 seconds.  Chest compressions were began but the patient actually pushed the nurse off of his chest.    He did go back into a more stable rhythm, most likely A-fib with aberrancy but he still had runs of V. tach through this.  At this point I had 2 g of magnesium administered.    Dr. Orosco came to bedside, please see his consult note for further details.         Blood work reviewed, ABG actually shows acceptable electrolytes.  Remainder labs noted.    Medications   dilTIAZem (cardIZEM) 100 mg in sodium chloride 0.9% 100 mL IVPB-ADDV (0 mg/hr Intravenous Hold 2/6/25 1453)   etomidate (Amidate) 2 mg/mL injection (  Not Given 2/6/25 1454)   amiodarone in dextrose 4.14% (Cordarone) 360 mg/200mL infusion premix (1 mg/min Intravenous New Bag 2/6/25 1503)   amiodarone (Cordarone) 150 MG/3ML injection (150 mg Intravenous Given 2/6/25 1455)   iopamidol (ISOVUE-370) 76 % injection 200 mL (has no administration in time range)   fentaNYL (Sublimaze) 50 mcg/mL injection 50 mcg (50 mcg Intravenous Given 2/6/25 1512)   metoprolol (Lopressor) 5 mg/5mL injection 5 mg (5 mg Intravenous Given 2/6/25 1515)   sodium bicarbonate injection 50 mEq (50 mEq Intravenous Given 2/6/25 1456)   sodium bicarbonate injection 50 mEq (50 mEq Intravenous Given 2/6/25 1452)   calcium chloride injection 1 g (1 g Intravenous Given 2/6/25 1452)   fentaNYL (Sublimaze) 50 mcg/mL injection (has no administration in time range)   midazolam (Versed) 2 MG/2ML injection (has no administration in time range)       Procedure note:  Emergent  cardioversion:  Patient was in V. tach with  no palpable pulse.    Cardioverted with 120 J, synchronized    Reverted to A-fib with aberrancy      MDM            Differential diagnoses considered: A-fib with RVR, V. tach, V-fib, left bundle branch block, and acute coronary syndrome all considered    -Unstable arrhythmia as discussed above.  For now maintain amnio drip.  CNICU admission.    -Due to concern for acute coronary syndrome came to Cath Lab emergently by cardiology.      -Dr. Mejia, nephrology consulted, I spoke with them.  Volume and electrolytes seem okay, I do not think will need emergent dialysis less something changes.       Patient will be admitted primarily to the [facility].  Care has been discussed with the admitting physician.      A total of 45 minutes of critical care time (exclusive of billable procedures) was administered to manage the patient's cardiovascular instability due to his V. tach with loss of pulse requiring multiple medications and cardioversion.  This involved direct patient intervention, complex decision making, and/or extensive discussions with the patient, family, and clinical staff.          *Discussion of ongoing management of this patient's care included: Cardiology, nephrology, admitting physician  *Comorbidities contributing to the complexity of decision making: Aortic stenosis, A-fib, coronary disease    *Additional sources of history: some provided majority the history as patient was clinically unstable    Shared decision making was done by: patient, myself, Family, consultants  Admission disposition: 2/6/2025  3:37 PM           Medical Decision Making      Disposition and Plan     Clinical Impression:  1. Chest pain due to myocardial ischemia    2. V-tach (HCC)    3. Chest pain of uncertain etiology         Disposition:  Admit  2/6/2025  3:37 pm    Follow-up:  No follow-up provider specified.        Medications Prescribed:  Current Discharge Medication List               Supplementary Documentation:         Hospital Problems       Present on Admission  Date Reviewed: 8/7/2024            ICD-10-CM Noted POA    * (Principal) Chest pain due to myocardial ischemia I25.9 2/6/2025 Unknown    Chest pain of uncertain etiology R07.9 4/5/2023 Unknown    V-tach (HCC) I47.20 2/6/2025 Unknown                                                               Signed by Jaimie Perez MD on 2/6/2025  4:10 PM         MEDICATIONS ADMINISTERED IN LAST 1 DAY:  acetaminophen (Tylenol) tab 650 mg       Date Action Dose Route User    2/7/2025 1231 Given 650 mg Oral Magda Victor RN    2/7/2025 0812 Given 650 mg Oral Magda Victor RN    2/6/2025 2232 Given 650 mg Oral Abe Castro RN          amiodarone in dextrose 4.14% (Cordarone) 360 mg/200mL infusion premix       Date Action Dose Route User    2/7/2025 0800 Rate/Dose Verify 1 mg/min Intravenous Magda Victor RN    2/7/2025 0643 New Bag 1 mg/min Intravenous Abe Castro RN    2/7/2025 0120 New Bag 1 mg/min Intravenous Abe Castro RN          aspirin DR tab 81 mg       Date Action Dose Route User    2/7/2025 0811 Given 81 mg Oral Magda Victor RN          clopidogrel (Plavix) 75 MG tab       Date Action Dose Route User    2/7/2025 0812 Given 75 mg Oral Magda Victor RN          clopidogrel (Plavix) tab 75 mg       Date Action Dose Route User    2/7/2025 0812 Given 75 mg Oral Magda iVctor RN          insulin aspart (NovoLOG) 100 Units/mL FlexPen 1-5 Units       Date Action Dose Route User    2/7/2025 1113 Given 3 Units Subcutaneous (Right Upper Arm) Magda Victor RN    2/7/2025 0635 Given 1 Units Subcutaneous (Right Upper Arm) Abe Castro RN    2/6/2025 2231 Given 1 Units Subcutaneous (Right Upper Arm) Abe Castro RN          insulin degludec (Tresiba) 100 units/mL flextouch 10 Units       Date Action Dose Route User    2/6/2025 2231 Given 10 Units Subcutaneous (Right Upper Arm) Abe Castro, RN          iopamidol  (ISOVUE-370) 76 % injection 200 mL       Date Action Dose Route User    2/6/2025 1718 Given 190 mL Injection Satya Donald MD          levothyroxine (Synthroid) tab 200 mcg       Date Action Dose Route User    2/7/2025 0634 Given 200 mcg Oral Abe Castro RN          lidocaine in dextrose 5% 8-5 MG/ML-% infusion premix (for cardiac)       Date Action Dose Route User    2/7/2025 1012 Rate/Dose Change 1 mg/min Intravenous Magda Victor RN    2/7/2025 0800 Rate/Dose Verify 2 mg/min Intravenous Magda Victor RN    2/7/2025 0645 New Bag 2 mg/min Intravenous Abe Castro RN          lidocaine-menthol 4-1 % patch 1 patch       Date Action Dose Route User    2/7/2025 1231 Patch Applied 1 patch Transdermal (Left Lower Back) Magda Victor RN    2/6/2025 2232 Patch Applied 1 patch Transdermal (Right Lower Back) Abe Castro RN          polyethylene glycol (PEG 3350) (Miralax) 17 g oral packet 17 g       Date Action Dose Route User    2/7/2025 1109 Given 17 g Oral Magda Victor RN          sodium chloride 0.9% infusion       Date Action Dose Route User    2/6/2025 2000 New Bag (none) Intravenous Abe Castro RN            Vitals (last day)       Date/Time Temp Pulse Resp BP SpO2 Weight O2 Device O2 Flow Rate (L/min) Who    02/07/25 1400 -- 51 13 134/53 96 % -- -- -- RR    02/07/25 1300 -- 53 17 149/59 94 % -- -- -- RR    02/07/25 1200 97.7 °F (36.5 °C) 48 12 136/60 96 % -- None (Room air) -- RR    02/07/25 1100 -- 46 11 130/53 97 % -- -- -- RR    02/07/25 1000 -- 45 12 124/42 96 % -- -- -- RR    02/07/25 0900 -- 49 12 132/48 98 % -- -- -- RR    02/07/25 0800 97.6 °F (36.4 °C) 49 15 124/60 95 % -- None (Room air) -- RR    02/07/25 0700 -- 50 13 146/66 96 % -- -- -- TK    02/07/25 0600 -- 50 11 139/57 98 % -- -- -- TK    02/07/25 0500 -- 50 22 135/57 88 % -- -- -- TK    02/07/25 0400 97.3 °F (36.3 °C) 51 16 131/66 92 % -- Nasal cannula 2 L/min TK    02/07/25 0300 -- 49 11 127/60 93 % -- -- -- TK    02/07/25 0200  -- 50 12 128/59 98 % -- -- -- TK    02/07/25 0100 -- 47 11 102/45 100 % -- -- -- TK    02/07/25 0000 96.7 °F (35.9 °C) 50 20 104/48 97 % -- Nasal cannula 2 L/min TK    02/06/25 2300 -- 56 15 149/59 98 % -- Nasal cannula -- TK    02/06/25 2200 -- 56 10 155/61 99 % -- Nasal cannula -- TK    02/06/25 2143 -- -- -- -- -- 215 lb 6.2 oz (97.7 kg) -- -- TK    02/06/25 2130 -- 52 11 144/76 100 % -- Nasal cannula -- TK    02/06/25 2100 -- 58 12 148/72 100 % -- Nasal cannula 2 L/min TK    02/06/25 2045 -- 53 13 149/66 98 % -- None (Room air) -- TK    02/06/25 2030 -- 58 12 138/66 93 % -- None (Room air) -- TK    02/06/25 2015 -- 54 16 127/52 96 % -- None (Room air) -- TK    02/06/25 2000 97.3 °F (36.3 °C) 56 9 143/85 99 % -- None (Room air) -- TK    02/06/25 1945 -- -- 12 143/85 -- -- -- -- TK    02/06/25 1537 -- 118 20 -- 96 % -- -- -- JT    02/06/25 1532 -- 86 12 113/62 99 % -- -- -- JT    02/06/25 1527 -- 124 9 96/72 98 % -- -- -- JT    02/06/25 1522 -- 101 13 107/67 99 % -- -- -- JT    02/06/25 1517 -- 96 11 -- 97 % -- -- -- JT    02/06/25 1512 -- 113 13 142/77 96 % -- -- -- JT    02/06/25 1507 -- 138 10 141/76 95 % -- -- -- JT    02/06/25 1502 -- 104 21 142/92 96 % -- -- -- JT    02/06/25 1457 -- 118 15 -- 97 % -- -- -- JT    02/06/25 1454 -- 128 16 122/74 98 % -- None (Room air) -- JT    02/06/25 1452 -- 149 29 122/74 98 % -- -- -- JT           H&P        History of Present Illness: Patient is a 79 year old male with history of CAD s/p PCI, severe aortic stenosis s/p TAVR, PAF (not on AC), type II DM, ESRD on HD and cirrhosis who presented to ER after complaining of chest pain during dialysis session today. During initial presentation patient found to be in Vtach and had round of defibrillation. Subsequently, started on amiodarone gtt and taken to cath lab due to complex medical history.        Assessment/ Plan:      #VT  #Chest pain  #Severe aortic stenosis s/p TAVR  #CAD s/p PCI  -s/p debrillation in th ER. Lisa  gtt  -cath lab today  -follow post procedure  -echo  -optimize cardiac meds, further recs per cards     #Type II DM  -Start with 10 u tresiba, mild SSI. Uptitrate as diet advanced.  -CHO controlled diet      #ESRD   -Nephrology on consult for HD     Disposition: inpt      2/6 CARDIOLOGY CONSULT NOTE    Reason for consultation:  VT        Impression:  VT: occurred after HD today. Episode of chest pain during dialysis with dipahoresis   Severe aortic stenosis s/p FADI with 26 S3 UR valve on 9/17/2024  CAD s/p S/P PCI to LM into LAD for ISR, and TRACY to OM in 08/2024  Hepatitis C w/cirrhosis  ESRD on HD  PAF on amiodarone; no ac likely 2/2 prior GIB and SDH   DM2   Echo in September 2024: EF 65-70%     Plan:  Patient had defibrillation x 1 but went back into Vtach.  He has A-V dissociation which points more towards VT rather than AFIB or SVT with aberrancy.  Given his risk factors we will take him to the Cath Lab for coronary evaluation  Continue IV amiodarone for now           History of Present Illness:  Jonny Haque is a 79 year old male who presented to [facility] on 2/6/2025.     This is a patient of my partner: Dr. Donald .     The patient has a very complicated past medical history and presents with ventricular tachycardia from the dialysis center.  Was having chest pain today during dialysis and was brought by his son to [place].  Currently he is chest pain-free.  Did miss dialysis 1 session last week.     Cardiology consultation was requested.     2/6  PREOPERATIVE DIAGNOSIS:  Ventricular tachycardia, cardiac arrest, CAD  POSTOPERATIVE DIAGNOSIS:  CAD  PROCEDURE PERFORMED:  Coronary angiography, PCI to OM with TRACY, IVUS of CX and LAD, PTCA of LAD and CX      PROCEDURE:    Moderate sedation: The patient was brought to the cardiac catheterization lab in the fasting state.  Informed consent was previously obtained.  Moderate sedation was employed using 4mg IV Versed and 100mcg IV Fentanyl.  I directly observed  the patient from 1621 to 1721, watching the heart rate, blood pressure, oximetry, and rhythm.  An independent, trained observer was present throughout the procedure and assisted in the monitoring of the patient's level of consciousness and physiologic states.  Please see the [facility] Catheterization Report (MCRTM) in [facility] for further, technical details.  Vascular access and catheter placement:  A 6F right radial, slender sheath was utilized after micropuncture access gained.  A Candis catheter was used for LM and for RCA engagement and subsequent angiography.  Standard over the wire technique was utilized.  Panamanian and GURROLA angiographic views with caudal and cranial angulation were used for cineangiography.  Hemodynamics were measured in the standard fashion using fluid filled, pressure manometry via the ASSISTTM device and analyzed with the [employer] catheterization software.  At the end of the case, a TRTM band was used for arterial hemostasis and our standard protocol was followed.  Coronary or endovascular intervention: We used a 7F femoral sheath.  We used a BMW wire down the LAD.  We used a Scion Blue into the CX/OM.  We IVUS'd the LAD and there was a focal area of obstruction in the mid LAD which was nearly catheter occlusive.  The IVUS would not cross past the very proximal CX.  The wire in the OM was flow occlusive.  So we used a 3.5mm NC in the LAD and a 3.0mm NC in the CX and we stented the mid OM and then ballooned the CX and LAD and kissed to 12ATM in the left main.  We did each artery sequentially twice and kissed twice.  We did multiple inflations from mid to proximal vessel (in both LAD and CX) and again ended with kissing balloon angioplasty to 12ATM.  We then IVUS'd in the LAD and it was significantly improved.  We took final angios and ended the case.  PercloseTM device for hemostasis.        DIAGNOSTIC FINDINGS:    Coronary angiography:    LMCA: The left main artery is short and moderately diseased and  stented into the LAD and CX.  LAD:  The left anterior descending artery is large and there is focal stenosis in the mid vessel which was catheter occlusive (70% stenosis) and there was significant, moderate irregularity in the left main and proximal LAD.  LCX: The left circumflex artery is dominant vessel.  Moderate to large OM1 which is stented.  There is a focal lesion of 90% in the OM and interestingly the wire was flow occlusive in the OM suggesting there was an area of subtotal occlusion which was not well seen in standard views.  We had great flow post PCI.   RCA:  The right coronary artery is non-dominant with moderate to severe mid vessel diseased.  Unchanged.     MEDICATIONS:  See nursing records, MCR, and EMR.     COMPLICATIONS:  None.     IMPRESSION:    Ventricular tachycardia.  ACS, focal LAD stenosis (ISR).  De yissel OM lesion.  S/P PTCA of LAD stenosis and PTCA of proximal vessels and LM stents.  S/P PCI of OM with TRACY.  Good result with TIMI3 flow and good angiographic improvement.     RECOMMENDATIONS:   Lidocaine drip (converted during case).  Plavix daily (DAPT) for now.  CCU.  Echo tonight.      NEPHROLOGY CONSULT NOTE    Reason for Consultation:  ESRD on HD     History of Present Illness:  Jonny Haque is a a(n) 79 year old man with mult med probs incl ESRD due to DM on HD [facility]THS at [facility], CAD s/p prior PCI, aortic stenosis s/p TAVR who began to experience chest pain during HD yest prompting him to come to ED for eval.  In ED he had pulseless VT requiring chest compressions and cardioversion with ROSC.  He was taken emergently to the cath lab where PCI was done         Physical Exam:   /42   Pulse (!) 45   Temp 97.6 °F (36.4 °C) (Temporal)   Resp 12   Wt 215 lb 6.2 oz (97.7 kg)   SpO2 96%   BMI 30.04 kg/m²   Temp (24hrs), Av.2 °F (36.2 °C), Min:96.7 °F (35.9 °C), Max:97.6 °F (36.4 °C)        Intake/Output Summary (Last 24 hours) at 2025 1101  Last data filed at 2025  0820      Gross per 24 hour   Intake 2330.97 ml   Output 400 ml   Net 1930.97 ml           Laboratory Data:        Lab Results   Component Value Date     WBC 4.3 02/07/2025     HGB 9.9 02/07/2025     HCT 29.7 02/07/2025     PLT 62.0 02/07/2025     CREATSERUM 5.33 02/07/2025     BUN 30 02/07/2025      02/07/2025     K 4.1 02/07/2025     CL 92 02/07/2025     CO2 32.0 02/07/2025      02/07/2025     CA 8.7 02/07/2025     ALB 3.7 02/07/2025     ALKPHO 99 02/07/2025     BILT 0.4 02/07/2025     TP 6.5 02/07/2025     AST 51 02/07/2025     ALT 18 02/07/2025          Impression/Plan:     #1.  ESRD- due to DM.  HD to cont per usual TTHS routine     #2.  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  JIE for goal hgb 10-11 gms     #3.  VT/CAD- s/p multivessel PCI on 2/6.  Mgmt per cards     2/7 EP CARDIOLOGY NOTE    Jonny Haque is a 79 year old year old male     He has as h/o coronary artery disease s/p PCI, ESRD on HD, hepatitis and cirrhosis, paroxysmal atrial fibrillation on amiodarone, severe aortic stenosis s/p FADI 9/2024, diabetes,      He came to ER yesterday with chest pain and ventricular tachycardia during HD. Was in VT and shcoked but had recurrence. Brought to cath lab and PTCA LAD and LM stents and PCI OM  Lidocaine gtt started     Only trop drawn was 35  ECG when in SR no acute changes     Feels well today. Off lidocaine     Denies chest pain, shortness of breath, palpitations, lightheadedness, syncope, orthopnea, paroxysmal nocturnal dyspnea, or edema.       Physical:  /60 (BP Location: Right arm)   Pulse (!) 48   Temp 97.7 °F (36.5 °C) (Temporal)   Resp 12   Wt 215 lb 6.2 oz (97.7 kg)   SpO2 96%   BMI 30.04 kg/m²       Intake/Output Summary (Last 24 hours) at 2/7/2025 1248  Last data filed at 2/7/2025 0820      Gross per 24 hour   Intake 2330.97 ml   Output 400 ml   Net 1930.97 ml           Impression:  ventricular tachycardia   coronary artery disease s/p PCI  ESRD on HD  Cirrhosis  S/p FADI 9/2024      Plan:  Unclear if ventricular tachycardia causing chest pain or chest pain causing ventricular tachycardia   Discussed opion of an ICD and he had his son are hesitant given his comorbidities  For now, check echo.  Observe on tele  I don't think he would do well with a lifevest

## 2025-02-07 NOTE — PROGRESS NOTES
Pharmacy Note:  Stress Ulcer Prophylaxis Initialization        Jonny Haque is a 79 year old male who meets criteria for the initiation of stress ulcer prophylaxis since he is on pantoprazole PTA.    Ordered pantoprazole 40mg q24hr per pharmacy protocol.        Thank you,   Adalid Mitchell, PharmD, BCPS  2/7/2025  11:02 AM

## 2025-02-07 NOTE — CONSULTS
[facility]  Report of Consultation    Jonny Haque Patient Status:  Inpatient    4/15/1945 MRN [id number]   Location [facility] 6NE-A Attending [place] Rodriges MD   Hosp Day # 1 PCP Gee Jimenez MD     Reason for Consultation:  ESRD on HD    History of Present Illness:  Jonny Haque is a a(n) 79 year old man with mult med probs incl ESRD due to DM on HD TTHS at [facility], CAD s/p prior PCI, aortic stenosis s/p TAVR who began to experience chest pain during HD yest prompting him to come to ED for eval.  In ED he had pulseless VT requiring chest compressions and cardioversion with ROSC.  He was taken emergently to the cath lab where PCI was done    History:  Past Medical History:    Aortic stenosis    Calculus of kidney    Coronary atherosclerosis    bare metal stents at [facility] 2021    Diabetes (HCC)    Disorder of thyroid    Esophageal varices without bleeding, unspecified esophageal varices type (HCC)    GIB (gastrointestinal bleeding)    Hepatitis, unspecified    hepatitis C-just completed taking Harvoni in 2016    High blood pressure    High cholesterol    Other disorders of plasma-protein metabolism, not elsewhere classified    Renal disorder    Visual impairment    reading glasses     Past Surgical History:   Procedure Laterality Date    Angiogram  08/15/2017    small caliber RCA with 80% mid lesion.  LAD and left circumflex with 50% lesions.  LV shows inferobasilar aneurysm with scar.  Overall LV function preserved at the exterior 65%.    Cath bare metal stent (bms)      Other surgical history      Upper GI surgery    Other surgical history  2017    Caroline Lees    Other surgical history  2019    BTS Caroline eLes     Other surgical history  2020    BTS Caroline (Dr. Lees)     Family History   Problem Relation Age of Onset    Cancer Father     Hypertension Mother       reports that he quit smoking about 45 years ago. His smoking use included  cigarettes. He started smoking about 60 years ago. He has a 7.5 pack-year smoking history. He has never used smokeless tobacco. He reports that he does not drink alcohol and does not use drugs.    Allergies:  Allergies[1]    Medications:    Current Facility-Administered Medications:     levothyroxine (Synthroid) tab 200 mcg, 200 mcg, Oral, Before breakfast    pantoprazole (Protonix) 40 mg in sodium chloride 0.9% PF 10 mL IV push, 40 mg, Intravenous, QAM AC **OR** pantoprazole (Protonix) DR tab 40 mg, 40 mg, Oral, QAM AC    polyethylene glycol (PEG 3350) (Miralax) 17 g oral packet 17 g, 17 g, Oral, Daily    lidocaine in dextrose 5% 8-5 MG/ML-% infusion premix (for cardiac), 1-4 mg/min, Intravenous, Continuous    insulin aspart (NovoLOG) 100 Units/mL FlexPen 1-5 Units, 1-5 Units, Subcutaneous, TID AC and HS    insulin degludec (Tresiba) 100 units/mL flextouch 10 Units, 10 Units, Subcutaneous, Nightly    clopidogrel (Plavix) tab 75 mg, 75 mg, Oral, Daily    aspirin DR tab 81 mg, 81 mg, Oral, Daily    acetaminophen (Tylenol) tab 650 mg, 650 mg, Oral, Q4H PRN    lidocaine-menthol 4-1 % patch 1 patch, 1 patch, Transdermal, Daily PRN  No current outpatient medications on file.       Review of Systems:  Denies fever/chills  Denies wt loss/gain  Denies HA or visual changes  + CP   Denies SOB/cough/hemoptysis  Denies abd or flank pain  Denies N/V/D  Denies change in urinary habits or gross hematuria  Denies LE edema  Denies skin rashes/myalgias/arthralgias      Physical Exam:   /42   Pulse (!) 45   Temp 97.6 °F (36.4 °C) (Temporal)   Resp 12   Wt 215 lb 6.2 oz (97.7 kg)   SpO2 96%   BMI 30.04 kg/m²   Temp (24hrs), Av.2 °F (36.2 °C), Min:96.7 °F (35.9 °C), Max:97.6 °F (36.4 °C)       Intake/Output Summary (Last 24 hours) at 2025 1101  Last data filed at 2025 0820  Gross per 24 hour   Intake 2330.97 ml   Output 400 ml   Net 1930.97 ml     Last 3 Weights   25 2143 215 lb 6.2 oz (97.7 kg)   25  1942 214 lb (97.1 kg)   09/17/24 2100 212 lb 15.4 oz (96.6 kg)   08/14/24 0500 212 lb 15.4 oz (96.6 kg)   08/13/24 0500 216 lb 7.9 oz (98.2 kg)   08/12/24 0600 215 lb 6.2 oz (97.7 kg)   08/09/24 0600 209 lb 14.1 oz (95.2 kg)   08/08/24 0300 208 lb 15.9 oz (94.8 kg)   08/07/24 2320 208 lb 15.9 oz (94.8 kg)   08/07/24 0500 206 lb 8 oz (93.7 kg)   08/05/24 0500 205 lb 11 oz (93.3 kg)   08/04/24 0257 208 lb 5.4 oz (94.5 kg)   08/03/24 1715 212 lb 4.9 oz (96.3 kg)   08/03/24 1433 210 lb (95.3 kg)   05/17/24 1132 204 lb (92.5 kg)     General: Alert and oriented in no apparent distress.  HEENT: No scleral icterus, MMM  Neck: Supple, no JADE or thyromegaly  Cardiac: Regular rate and rhythm, S1, S2 normal, no murmur or rub  Lungs: Clear without wheezes, rales, rhonchi.    Abdomen: Soft, non-tender. + bowel sounds, no palpable organomegaly  Extremities: Without clubbing, cyanosis or edema.  Neurologic:  moving all extremities  Skin: Warm and dry, no rashes      Laboratory Data:  Lab Results   Component Value Date    WBC 4.3 02/07/2025    HGB 9.9 02/07/2025    HCT 29.7 02/07/2025    PLT 62.0 02/07/2025    CREATSERUM 5.33 02/07/2025    BUN 30 02/07/2025     02/07/2025    K 4.1 02/07/2025    CL 92 02/07/2025    CO2 32.0 02/07/2025     02/07/2025    CA 8.7 02/07/2025    ALB 3.7 02/07/2025    ALKPHO 99 02/07/2025    BILT 0.4 02/07/2025    TP 6.5 02/07/2025    AST 51 02/07/2025    ALT 18 02/07/2025    PTT 27.2 02/06/2025    INR 1.08 02/06/2025    PTP 14.1 02/06/2025    PGLU 156 02/07/2025       Glucose   Date Value   02/09/2022 129 MG/DL (H)   06/07/2016 128 mg/dL (H)   03/07/2013 359 milligrams per deciliter (H)     BUN (mg/dL)   Date Value   02/07/2025 30 (H)   02/06/2025 22   01/31/2025 92 (H)     Blood Urea Nitrogen   Date Value   03/17/2022 68.0 mg/dL (H)   02/09/2022 35 MG/DL (H)   07/02/2021 82.0 mg/dL (H)   07/15/2020 109.0 mg/dL (H)   06/07/2016 33 mg/dL (H)   03/07/2013 26 milligrams per deciliter      Creatinine, Serum   Date Value   08/25/2016 1.48 mg/dL (H)   06/07/2016 1.21 mg/dL   03/07/2013 1.04 milligrams per deciliter     Creatinine (mg/dL)   Date Value   02/07/2025 5.33 (H)   02/06/2025 4.11 (H)   01/31/2025 9.78 (H)   03/17/2022 5.23 (H)   07/02/2021 3.87 (H)   07/15/2020 3.64 (H)       Malb/Cre Calc Ratio   Date Value Ref Range Status   09/04/2019 536.8 (H) 0.0 - 29.0 ug/mg Final     Malb/Cre Calc   Date Value Ref Range Status   02/28/2017 2,504.2 (H) <=30.0 ug/mg Final   08/19/2016 2,092.0 (H) <=30.0 ug/mg Final       Recent Labs   Lab 01/31/25 1957 02/06/25 1456 02/06/25 1459 02/07/25 0425   WBC 5.1 7.6  --  4.3   HGB 10.9* 11.0*  --  9.9*   .9* 103.2*  --  106.1*   PLT 40.0* 101.0*  --  62.0*   INR  --   --  1.08  --        Recent Labs   Lab 01/31/25 1957 02/06/25 1456 02/07/25 0426    137 137   K 5.0 4.6 4.1    93* 92*   CO2 22.0 26.0 32.0   BUN 92* 22 30*   CREATSERUM 9.78* 4.11* 5.33*   CA 9.0 8.9 8.7   * 146* 144*       Recent Labs   Lab 01/31/25 1957 02/06/25 1456 02/07/25  0426   ALT 26 23 18   AST 24 40* 51*   ALB 4.1 4.2 3.7       Recent Labs   Lab 02/06/25 1958 02/07/25  0634   PGLU 168* 156*           Impression/Plan:    #1.  ESRD- due to DM.  HD to cont per usual [facility] routine    #2.  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  JIE for goal hgb 10-11 gms    #3.  VT/CAD- s/p multivessel PCI on 2/6.  Mgmt per cards    Thank you for allowing me to participate in the care of your patient. Please do not hesitate to call with any questions or concerns.       Johnson Vera MD  2/7/2025  11:01 AM         [1] No Known Allergies

## 2025-02-07 NOTE — DIETARY NOTE
Clinical Nutrition     Dietitian consult received per cardiac rehab standing order. Pt to be educated by cardiac rehab staff and encouraged to attend outpatient classes taught by TRACEE. TRACEE available PRN.    Rachael Garza, TRACEE, LDN, [facility]  Clinical Dietitian  Spectra: [phone]

## 2025-02-07 NOTE — CONSULTS
[facility] Electrophysiology Consult      Jonny Haque Patient Status:  Inpatient    4/15/1945 MRN [id number]   Location [facility] 6NE-A Attending Ted Hoff, DO   Hosp Day # 1 PCP Gee Jimenez MD       Jonny Haque is a 79 year old year old male    He has as h/o coronary artery disease s/p PCI, ESRD on HD, hepatitis and cirrhosis, paroxysmal atrial fibrillation on amiodarone, severe aortic stenosis s/p FADI 2024, diabetes,     He came to ER yesterday with chest pain and ventricular tachycardia during HD. Was in VT and shcoked but had recurrence. Brought to cath lab and PTCA LAD and LM stents and PCI OM  Lidocaine gtt started    Only trop drawn was 35  ECG when in SR no acute changes    Feels well today. Off lidocaine    Denies chest pain, shortness of breath, palpitations, lightheadedness, syncope, orthopnea, paroxysmal nocturnal dyspnea, or edema.    ROS: All other systems were reviewed and were negative.    Past Medical History:  Past Medical History:    Aortic stenosis    Calculus of kidney    Coronary atherosclerosis    bare metal stents at [facility] 2021    Diabetes (HCC)    Disorder of thyroid    Esophageal varices without bleeding, unspecified esophageal varices type (HCC)    GIB (gastrointestinal bleeding)    Hepatitis, unspecified    hepatitis C-just completed taking Harvoni in 2016    High blood pressure    High cholesterol    Other disorders of plasma-protein metabolism, not elsewhere classified    Renal disorder    Visual impairment    reading glasses       Allergies:  Allergies[1]    Outpatient Medications:  Medications Ordered Prior to Encounter[2]     Scheduled Meds:   levothyroxine  200 mcg Oral Before breakfast    pantoprazole  40 mg Intravenous QAM AC    Or    pantoprazole  40 mg Oral QAM AC    polyethylene glycol (PEG 3350)  17 g Oral Daily    [START ON 2025] epoetin eunice  10,000 Units Intravenous Once in dialysis    heparin  1.5 mL  Intracatheter Once    insulin aspart  1-5 Units Subcutaneous TID AC and HS    insulin degludec  10 Units Subcutaneous Nightly    clopidogrel  75 mg Oral Daily    aspirin  81 mg Oral Daily       Infusion Meds:   lidocaine in dextrose 5% Stopped (02/07/25 1024)       Social:   reports that he quit smoking about 45 years ago. His smoking use included cigarettes. He started smoking about 60 years ago. He has a 7.5 pack-year smoking history. He has never used smokeless tobacco. He reports that he does not drink alcohol and does not use drugs.    Family History:  family history includes Cancer in his father; Hypertension in his mother.    Physical:  /60 (BP Location: Right arm)   Pulse (!) 48   Temp 97.7 °F (36.5 °C) (Temporal)   Resp 12   Wt 215 lb 6.2 oz (97.7 kg)   SpO2 96%   BMI 30.04 kg/m²      Intake/Output Summary (Last 24 hours) at 2/7/2025 1248  Last data filed at 2/7/2025 0820  Gross per 24 hour   Intake 2330.97 ml   Output 400 ml   Net 1930.97 ml     Wt Readings from Last 4 Encounters:   02/06/25 215 lb 6.2 oz (97.7 kg)   01/31/25 214 lb (97.1 kg)   09/17/24 212 lb 15.4 oz (96.6 kg)   08/14/24 212 lb 15.4 oz (96.6 kg)     GENERAL: well developed, well nourished, in no apparent distress  EYES: sclera anicteric  HEENT: normocephalic  NECK: no JVD, no carotid bruits, no thyromegaly  RESPIRATORY: clear to auscultation  CARDIOVASCULAR: S1, S2 normal, RRR; no S3, no S4; no click; no murmur  ABDOMEN: normal active BS, soft, nondistended; nontender  EXTREMITIES: no cyanosis, clubbing or edema, peripheral pulses intact  NEURO: no sensorimotor deficits  PSYCHIATRIC: alert and oriented x 3, affect normal  SKIN: no rashes    Data:  ECG: ventricular tachycardia 140 LBBB  Tele: SR  Echo: 9/2024: EF 65  Echo: pending    Labs:  Recent Labs   Lab 01/31/25  1957 02/06/25  1456 02/07/25  0425   WBC 5.1 7.6 4.3   HGB 10.9* 11.0* 9.9*   PLT 40.0* 101.0* 62.0*       Recent Labs   Lab 01/31/25 1957 02/06/25  1452  02/07/25  0426    137 137   K 5.0 4.6 4.1    93* 92*   CO2 22.0 26.0 32.0   BUN 92* 22 30*   CREATSERUM 9.78* 4.11* 5.33*   CA 9.0 8.9 8.7   * 146* 144*       Recent Labs   Lab 02/06/25  1459   INR 1.08       No results for input(s): \"TROP\" in the last 168 hours.    TSH   Date Value Ref Range Status   03/17/2022 6.910 (H) 0.270 - 4.200 mIU/L Final       Impression:  ventricular tachycardia   coronary artery disease s/p PCI  ESRD on HD  Cirrhosis  S/p FADI 9/2024    Plan:  Unclear if ventricular tachycardia causing chest pain or chest pain causing ventricular tachycardia   Discussed opion of an ICD and he had his son are hesitant given his comorbidities  For now, check echo.  Observe on tele  I don't think he would do well with a lifevest      Jero Hamilton MD  Cardiology / Clinical Cardiac Electrophysiology  [facility]         [1] No Known Allergies  [2]   Current Facility-Administered Medications on File Prior to Encounter   Medication Dose Route Frequency Provider Last Rate Last Admin    [COMPLETED] ondansetron (Zofran) 4 MG/2ML injection 4 mg  4 mg Intravenous Once Zeyad Paris MD   4 mg at 01/31/25 2126    [COMPLETED] iopamidol 76% (ISOVUE-370) injection for power injector  100 mL Intravenous ONCE PRN Zeyad Paris MD   100 mL at 01/31/25 2154     Current Outpatient Medications on File Prior to Encounter   Medication Sig Dispense Refill    amiodarone 100 MG Oral Tab Take 1 tablet (100 mg total) by mouth daily. (Patient taking differently: Take 1 tablet (100 mg total) by mouth 2 (two) times daily.) 90 tablet 3    clopidogrel 75 MG Oral Tab Take 1 tablet (75 mg total) by mouth daily. 30 tablet 11    losartan 25 MG Oral Tab Take 1 tablet (25 mg total) by mouth daily. 30 tablet 3    carvedilol 6.25 MG Oral Tab Take 1 tablet (6.25 mg total) by mouth 2 (two) times daily with meals. 60 tablet 3    nitroglycerin 0.4 MG Sublingual SL Tab Place 1 tablet (0.4 mg total) under the  tongue every 5 (five) minutes as needed for Chest pain. 10 tablet 3    levothyroxine 200 MCG Oral Tab Take 1 tablet (200 mcg total) by mouth before breakfast.      Lanthanum Carbonate 1000 MG Oral Chew Tab Chew 1 tablet (1,000 mg total) by mouth 3 (three) times daily with meals.      aspirin 81 MG Oral Tab EC Take 1 tablet (81 mg total) by mouth every other day.      Ferric Citrate (AURYXIA) 1  MG(Fe) Oral Tab       pantoprazole 40 MG Oral Tab EC Take 1 tablet (40 mg total) by mouth 2 (two) times daily before meals.      Insulin Glargine, 2 Unit Dial, (TOUJEO MAX SOLOSTAR) 300 UNIT/ML Subcutaneous Solution Pen-injector Inject 20 Units into the skin nightly. (Patient taking differently: Inject 30 Units into the skin nightly.) 6 mL 2    rosuvastatin 10 MG Oral Tab Take 1 tablet (10 mg total) by mouth nightly. (Patient taking differently: Take 1 tablet (10 mg total) by mouth nightly.) 90 tablet 0    Insulin Lispro, 1 Unit Dial, (HUMALOG KWIKPEN) 100 UNIT/ML Subcutaneous Solution Pen-injector 12 units before meals with sliding scale. Max daily dose: 50 units. (Patient taking differently: 10 Units. Three times a day. Before meals) 60 mL 3    [] HYDROcodone-acetaminophen 5-325 MG Oral Tab Take 1 tablet by mouth every 6 (six) hours as needed for Pain. 15 tablet 0    ondansetron 4 MG Oral Tablet Dispersible Take 1 tablet (4 mg total) by mouth every 8 (eight) hours as needed for Nausea. 20 tablet 0    tamsulosin 0.4 MG Oral Cap Take 1 capsule (0.4 mg total) by mouth daily.      zolpidem 10 MG Oral Tab Take 1 tablet (10 mg total) by mouth nightly as needed for Sleep.      Insulin Pen Needle (TRUEPLUS PEN NEEDLES) 31G X 5 MM Does not apply Misc Use 5 per day 500 each 2    Glucose Blood (ONETOUCH ULTRA) In Vitro Strip 6 times a day 400 each 3    CONTOUR NEXT TEST In Vitro Strip TEST BLOOD SUGAR FOUR TIMES DAILY 400 strip 3    Blood Glucose Monitoring Suppl ([employer] CONTOUR NEXT MONITOR) W/DEVICE Does not apply Kit 1  Device by Does not apply route 4 (four) times daily. 1 kit 0

## 2025-02-08 PROBLEM — I25.10 CORONARY ARTERY DISEASE INVOLVING NATIVE CORONARY ARTERY OF NATIVE HEART: Status: ACTIVE | Noted: 2022-01-20

## 2025-02-08 LAB
ATRIAL RATE: 134 BPM
ATRIAL RATE: 44 BPM
GLUCOSE BLD-MCNC: 137 MG/DL (ref 70–99)
GLUCOSE BLD-MCNC: 171 MG/DL (ref 70–99)
GLUCOSE BLD-MCNC: 225 MG/DL (ref 70–99)
GLUCOSE BLD-MCNC: 255 MG/DL (ref 70–99)
P AXIS: 71 DEGREES
P-R INTERVAL: 130 MS
P-R INTERVAL: 180 MS
Q-T INTERVAL: 402 MS
Q-T INTERVAL: 766 MS
QRS DURATION: 144 MS
QRS DURATION: 168 MS
QTC CALCULATION (BEZET): 600 MS
QTC CALCULATION (BEZET): 654 MS
R AXIS: -5 DEGREES
R AXIS: 6 DEGREES
T AXIS: 166 DEGREES
T AXIS: 177 DEGREES
VENTRICULAR RATE: 134 BPM
VENTRICULAR RATE: 44 BPM

## 2025-02-08 PROCEDURE — 90935 HEMODIALYSIS ONE EVALUATION: CPT

## 2025-02-08 PROCEDURE — 82962 GLUCOSE BLOOD TEST: CPT

## 2025-02-08 PROCEDURE — 5A1D70Z PERFORMANCE OF URINARY FILTRATION, INTERMITTENT, LESS THAN 6 HOURS PER DAY: ICD-10-PCS | Performed by: STUDENT IN AN ORGANIZED HEALTH CARE EDUCATION/TRAINING PROGRAM

## 2025-02-08 RX ORDER — CYCLOBENZAPRINE HCL 5 MG
5 TABLET ORAL EVERY 6 HOURS PRN
Status: DISCONTINUED | OUTPATIENT
Start: 2025-02-08 | End: 2025-02-09

## 2025-02-08 NOTE — PROGRESS NOTES
Assumed care at 1500. Patient is alert, oriented x4. Vital signs wnl, afebrile. Currently on room air with O2 sats %. . Tele-NSR. Denies any SOB, CP, nausea, lightheadedness or dizziness. Has chronic back pain-managed with prn tylenol and hot packs. Encouraged to get OOB and ambulate in the halls. Bathroom privileges. Fall risk, precautions in place. Has bruising in R groin and R arm, otherwise skin intact. Needs met at this time. Will continue to monitor.

## 2025-02-08 NOTE — PROGRESS NOTES
[facility]  Division of Cardiology  Progress Note    Jonny Haque Patient Status:  Hospitalized    4/15/1945 MRN [id number]   Location [facility] 6NE-A Attending Ted Hoff,    Hosp Day # 1 PCP Gee Jimenez MD     Impression:  VT 25  Possibly ischemic  Possibly scar based  Resolved with Lidocaine drip, did not improve with Amiodarone.  CAD  MV PCI in the past (LM, LAD, OM/CX)  PCI to ULM, LAD, CX with stent to OM 25  Worst lesion was OM 90% stenosis (but wire occlusive)  IVUS with focal mid LAD stenosis  Unclear if there was a \"culprit lesion:  Hx aortic stenosis  S/P TAVR   Echo with good valve function (mean gradient 9mmHg)  HFrEF/systolic dysfunction  Recent EF was 60% range  Echo 25 with EF 40% range  Inferolateral WMA (possibly OM/CX \"culprit lesion\"?)  ESRD  HD dependent  Anemia  Hx GIB  Has only partially tolerated DAPT in the past  Cirrhosis  Varices noted in some reports  PAF  On amiodarone for rhythm control  Has not tolerated AC due to GIB and SDH  DM  HTN  HX SDH      Plan:  CAD: DAPT.  Medical management for now.  Will need survellience angiogram in 3 months if no further work done during this admission.  VT: Defer to EP.  Rhythm quiet today.  May have been ischemia which triggered VT.  Or primary scar based VT.  Regardless, follow tele.  If quiet overnight, can xfer to floor.    TAVR: Valve looks good.    ESRD: HD per nephrology.  PAF: Currently sinus.  Given past hx (and need for antiplatelet agent), I don't think AC is a long term option.  Best if we can keep him in sinus.  Will need to continue low dose amiodarone.  Will restart prior to DC (but hold for now given admission rhythms).  HFrEF: Unclear what to glean from echo.  Regardless, PCI was done.  Rhythm has stopped.  I suspect it will improve in the coming weeks/months.  Given his comorbidities, \"GDMT\" is unlikely.  Watch volume and recheck limited echo prior to  DC.       Subjective:  No overnight events.  The patient denies any chest pain or dyspnea.  Some ambulation in room; hasn't walked halls yet.  He notes that he \"\" in the ER and remembers pieces of the experience yesterday.    Objective:  BP (!) 115/97   Pulse 50   Temp 97.8 °F (36.6 °C) (Temporal)   Resp 13   Wt 215 lb 6.2 oz (97.7 kg)   SpO2 90%   BMI 30.04 kg/m²   Temp (24hrs), Av.4 °F (36.3 °C), Min:96.7 °F (35.9 °C), Max:97.8 °F (36.6 °C)      Intake/Output Summary (Last 24 hours) at 2025  Last data filed at 2025 1300  Gross per 24 hour   Intake 2571.97 ml   Output 400 ml   Net 2171.97 ml     Wt Readings from Last 3 Encounters:   25 215 lb 6.2 oz (97.7 kg)   25 214 lb (97.1 kg)   24 212 lb 15.4 oz (96.6 kg)       Physical exam:  General: Well developed, well nourished male.  Pt is in no acute distress.  Neck:  No JVD.  Supple with normal ROM.  Cardiac: Regular rate and rhythm.  No murmurs, rubs, or gallops.   Lungs: Clear to ascultation bilaterally.    Abdomen: Soft.  Non-distended.  Non-tender.  Bowel sounds are present.  No guarding or rebound.   Extremities: Warm, no significant edema.  Palpable pulses.  Neurologic: Alert and oriented, normal affect.  No gross deficit appreciated.  Integument:  No visible rashes are appreciated.  Psych: The affect is appropriate.    Meds:    levothyroxine  200 mcg Oral Before breakfast    pantoprazole  40 mg Intravenous QAM AC    Or    pantoprazole  40 mg Oral QAM AC    polyethylene glycol (PEG 3350)  17 g Oral Daily    [START ON 2025] epoetin eunice  10,000 Units Intravenous Once in dialysis    heparin  1.5 mL Intracatheter Once    insulin aspart  1-5 Units Subcutaneous TID AC and HS    insulin degludec  10 Units Subcutaneous Nightly    clopidogrel  75 mg Oral Daily    aspirin  81 mg Oral Daily       Laboratory Data:  Lab Results   Component Value Date     2025    K 4.1 2025    CL 92 2025    CO2 32.0  02/07/2025    BUN 30 02/07/2025    CREATSERUM 5.33 02/07/2025     02/07/2025    CA 8.7 02/07/2025     Lab Results   Component Value Date    WBC 4.3 02/07/2025    HGB 9.9 02/07/2025    HCT 29.7 02/07/2025    PLT 62.0 02/07/2025     Lab Results   Component Value Date    INR 1.08 02/06/2025    INR 1.13 09/18/2024    INR 1.10 05/08/2024     Studies/results  Telemetry: No VT or malignant arrhythmia  Echo 2/7/2025   1. Left ventricle: The cavity size was normal. There was mild concentric      hypertrophy. Systolic function was moderately reduced. The estimated      ejection fraction was 35-40%, by visual assessment. Severe inferior,      posterior and lateral hypokinesis, the septal dyssynergy with underlying      LBBB. Left ventricular diastolic function is indeterminate.   2. Right ventricle: Systolic function was normal. The RV pressure during      systole is 35mm Hg.   3. Left atrium: The left atrial volume was markedly increased.   4. Aortic valve: Well seated, normal functioning 26mm Edward-Albert 3 Ultra      Resilia bioprosthetic (TAVR) valve. There was mild perivalvular      regurgitation. The peak systolic velocity was 2.15m/sec. The acceleration      time was 65ms. The mean systolic gradient was 9mm Hg. The dimensionless      index was 0.41. :    Thank you for allowing our group to care for your patient. Please contact me with any questions.     Satya Donald MD  General, Interventional  Structural & Endovascular  Cardiology    >35 min Critical Care time was spent on this complex ICU patient for review, assessment, exam, planning, and communication with team and family.

## 2025-02-08 NOTE — PLAN OF CARE
Assumed care of pt this PM. Pt Aox4 and follows commands.  Pt in SB HR 50s to 60s overnight with little to no ectopy noted. BP stable overnight. On 2L NC overnight, room air this morning.  Up to the chair this AM. Pt updated on plan of care.

## 2025-02-08 NOTE — PLAN OF CARE
Assumed pt care at 0730. Pt a/o x4 and sitting up in the chair. VSS. NSR. Pt c/o chronic back pain with some relief from pain medication and hot packs. R groin soft, no hematoma. Dressing c/d/I. Palpable pulses. Pt up as tolerated. IS and ambulation encouraged. HD- 2L out. Pt updated with poc. Transferring to [id number]. Report given to RN.

## 2025-02-08 NOTE — PROGRESS NOTES
[facility]   part of [facility]     Nephrology Progress Note    Jonny Haque Patient Status:  Inpatient    4/15/1945 MRN [id number]   Location [facility] 6NE-A Attending Ted Hoff, DO   Hosp Day # 2 PCP Gee Jimenez MD       SUBJECTIVE:  Pt stable this AM, awaiting dialysis, no overnight events noted        Physical Exam:   /62 (BP Location: Right arm)   Pulse 62   Temp 97.5 °F (36.4 °C) (Temporal)   Resp 11   Wt 213 lb (96.6 kg)   SpO2 98%   BMI 29.71 kg/m²   Temp (24hrs), Av.8 °F (36.6 °C), Min:97.1 °F (36.2 °C), Max:98.9 °F (37.2 °C)       Intake/Output Summary (Last 24 hours) at 2025 0907  Last data filed at 2025 0800  Gross per 24 hour   Intake 481 ml   Output 250 ml   Net 231 ml     Last 3 Weights   25 0600 213 lb (96.6 kg)   25 2143 215 lb 6.2 oz (97.7 kg)   25 1942 214 lb (97.1 kg)   24 2100 212 lb 15.4 oz (96.6 kg)   24 0500 212 lb 15.4 oz (96.6 kg)   24 0500 216 lb 7.9 oz (98.2 kg)   24 0600 215 lb 6.2 oz (97.7 kg)   24 0600 209 lb 14.1 oz (95.2 kg)   24 0300 208 lb 15.9 oz (94.8 kg)   24 2320 208 lb 15.9 oz (94.8 kg)   24 0500 206 lb 8 oz (93.7 kg)   24 0500 205 lb 11 oz (93.3 kg)   24 0257 208 lb 5.4 oz (94.5 kg)   24 1715 212 lb 4.9 oz (96.3 kg)   24 1433 210 lb (95.3 kg)   24 1132 204 lb (92.5 kg)     General: Alert and oriented in no apparent distress.  HEENT: No scleral icterus, MMM  Neck: Supple, no JADE or thyromegaly  Cardiac: Regular rate and rhythm, S1, S2 normal, no murmur or rub  Lungs: Clear without wheezes, rales, rhonchi.    Abdomen: Soft, non-tender. + bowel sounds, no palpable organomegaly  Extremities: Without clubbing, cyanosis or edema.  Neurologic: moving all extremities  Skin: Warm and dry, no rash        Labs:     Recent Labs   Lab 25  1456 25  1459 25  0425   WBC 7.6  --  4.3   HGB 11.0*  --  9.9*   .2*  --   106.1*   .0*  --  62.0*   INR  --  1.08  --        Recent Labs   Lab 02/06/25  1456 02/07/25  0426    137   K 4.6 4.1   CL 93* 92*   CO2 26.0 32.0   BUN 22 30*   CREATSERUM 4.11* 5.33*   CA 8.9 8.7   * 144*       Recent Labs   Lab 02/06/25  1456 02/07/25  0426   ALT 23 18   AST 40* 51*   ALB 4.2 3.7       Recent Labs   Lab 02/07/25  0634 02/07/25  1112 02/07/25  1737 02/07/25  2059 02/08/25  0641   PGLU 156* 251* 168* 211* 137*       Meds:   Current Facility-Administered Medications   Medication Dose Route Frequency    levothyroxine (Synthroid) tab 200 mcg  200 mcg Oral Before breakfast    pantoprazole (Protonix) 40 mg in sodium chloride 0.9% PF 10 mL IV push  40 mg Intravenous QAM AC    Or    pantoprazole (Protonix) DR tab 40 mg  40 mg Oral QAM AC    polyethylene glycol (PEG 3350) (Miralax) 17 g oral packet 17 g  17 g Oral Daily    epoetin eunice (Epogen, Procrit) 86254 UNIT/ML injection 10,000 Units  10,000 Units Intravenous Once in dialysis    sodium chloride 0.9 % IV bolus 100 mL  100 mL Intravenous Q30 Min PRN    And    albumin human (Albumin) 25% injection 25 g  25 g Intravenous PRN Dialysis    heparin (Porcine) 1000 UNIT/ML injection 1,500 Units  1.5 mL Intracatheter Once    lidocaine in dextrose 5% 8-5 MG/ML-% infusion premix (for cardiac)  1-4 mg/min Intravenous Continuous    insulin aspart (NovoLOG) 100 Units/mL FlexPen 1-5 Units  1-5 Units Subcutaneous TID AC and HS    insulin degludec (Tresiba) 100 units/mL flextouch 10 Units  10 Units Subcutaneous Nightly    clopidogrel (Plavix) tab 75 mg  75 mg Oral Daily    aspirin DR tab 81 mg  81 mg Oral Daily    acetaminophen (Tylenol) tab 650 mg  650 mg Oral Q4H PRN    lidocaine-menthol 4-1 % patch 1 patch  1 patch Transdermal Daily PRN         Impression/Plan:      #1.  ESRD- due to DM.  HD to cont per usual TTHS routine     #2.  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  JIE for goal hgb 10-11 gms     #3.  VT/CAD- s/p multivessel PCI on 2/6.  Mgmt per  cards        Questions/concerns were discussed with patient and/or family by bedside.          Johnson Vera MD  2/8/2025  9:07 AM

## 2025-02-08 NOTE — PROGRESS NOTES
[facility] Hospitalist Progress Note    Subjective:  Feels better today. No further chest pain. HD this morning. Unsure about ICD.      Review of Systems  Comprehensive ROS reviewed and negative except for what is stated in HPI.      OBJECTIVE:  /69   Pulse 60   Temp 97.5 °F (36.4 °C) (Temporal)   Resp 17   Wt 213 lb (96.6 kg)   SpO2 97%   BMI 29.71 kg/m²   General: No apparent distress.  Alert and oriented.  HEENT:  EOMI, PERRLA.  Pulm: Normal respiratory effort.  CV: Regular rate and rhythm, no murmur.   Abd: Soft, nontender, nondistended.  MSK: Full range of motion in extremities, no obvious deformities.    Skin: No lesions or rashes.  Neuro: No obvious focal deficits.    LABS:   Lab Results   Component Value Date    PGLU 137 02/08/2025       All lab work and imaging personally reviewed.    Assessment/ Plan:     #VT  #CAD s/p PCI  #History of severe aortic stenosis s/p TAVR  -s/p debrillation in th ER. S/p C.  -Now off lidocaine gtt. Chest pain free.   -EP on consult, patient to decide for ICD , currently unsure.   -Continue close monitoring  -Cont DAPT      #Type II DM  -Start with 10 u tresiba, mild SSI. Uptitrate as diet advanced.  -CHO controlled diet      #ESRD T/TH/S  -Nephrology on consult for HD     Disposition: inpt      DO Joselito Nino [facility] Hospitalist

## 2025-02-09 VITALS
BODY MASS INDEX: 30 KG/M2 | OXYGEN SATURATION: 94 % | SYSTOLIC BLOOD PRESSURE: 140 MMHG | DIASTOLIC BLOOD PRESSURE: 68 MMHG | RESPIRATION RATE: 19 BRPM | TEMPERATURE: 98 F | WEIGHT: 212.06 LBS | HEART RATE: 69 BPM

## 2025-02-09 LAB
ANION GAP SERPL CALC-SCNC: 14 MMOL/L (ref 0–18)
ATRIAL RATE: 67 BPM
BUN BLD-MCNC: 34 MG/DL (ref 9–23)
CALCIUM BLD-MCNC: 9.2 MG/DL (ref 8.7–10.6)
CHLORIDE SERPL-SCNC: 94 MMOL/L (ref 98–112)
CO2 SERPL-SCNC: 26 MMOL/L (ref 21–32)
CREAT BLD-MCNC: 5.99 MG/DL
EGFRCR SERPLBLD CKD-EPI 2021: 9 ML/MIN/1.73M2 (ref 60–?)
GLUCOSE BLD-MCNC: 173 MG/DL (ref 70–99)
GLUCOSE BLD-MCNC: 257 MG/DL (ref 70–99)
GLUCOSE BLD-MCNC: 269 MG/DL (ref 70–99)
OSMOLALITY SERPL CALC.SUM OF ELEC: 290 MOSM/KG (ref 275–295)
P AXIS: 48 DEGREES
P-R INTERVAL: 202 MS
POTASSIUM SERPL-SCNC: 4.5 MMOL/L (ref 3.5–5.1)
Q-T INTERVAL: 500 MS
QRS DURATION: 140 MS
QTC CALCULATION (BEZET): 528 MS
R AXIS: -14 DEGREES
SODIUM SERPL-SCNC: 134 MMOL/L (ref 136–145)
T AXIS: 110 DEGREES
VENTRICULAR RATE: 67 BPM

## 2025-02-09 PROCEDURE — 80048 BASIC METABOLIC PNL TOTAL CA: CPT | Performed by: INTERNAL MEDICINE

## 2025-02-09 PROCEDURE — 82962 GLUCOSE BLOOD TEST: CPT

## 2025-02-09 PROCEDURE — 93010 ELECTROCARDIOGRAM REPORT: CPT | Performed by: INTERNAL MEDICINE

## 2025-02-09 PROCEDURE — 93005 ELECTROCARDIOGRAM TRACING: CPT

## 2025-02-09 RX ORDER — AMIODARONE HYDROCHLORIDE 100 MG/1
100 TABLET ORAL 2 TIMES DAILY
Qty: 60 TABLET | Refills: 11 | Status: SHIPPED | OUTPATIENT
Start: 2025-02-09

## 2025-02-09 RX ORDER — TRAMADOL HYDROCHLORIDE 50 MG/1
50 TABLET ORAL EVERY 8 HOURS PRN
Qty: 10 TABLET | Refills: 0 | Status: SHIPPED | OUTPATIENT
Start: 2025-02-09 | End: 2025-02-16

## 2025-02-09 RX ORDER — TRAMADOL HYDROCHLORIDE 50 MG/1
50 TABLET ORAL EVERY 6 HOURS PRN
Status: DISCONTINUED | OUTPATIENT
Start: 2025-02-09 | End: 2025-02-09

## 2025-02-09 NOTE — PLAN OF CARE
NURSING DISCHARGE NOTE    Discharged Home via Wheelchair.  Accompanied by RN  Belongings Taken by patient/family.    Patient is stable to discharge home. Medically cleared by all consults and the hospitalist. Reviewed discharge instructions about medications and post-discharge follow up visits with the patient and pt's son at bedside. Reinforced home management of CHF. Patient verbalized understanding. Questions and concerns addressed. PIV line dc'ed, pressure and dressing applied. Clean/dry/intact. Discharge navigator completed. Discharge AVS printed out and given to patient. Tele box removed, cleaned, and returned to cabinet. All patient's belongings sent with patient.    Problem: CARDIOVASCULAR - ADULT  Goal: Maintains optimal cardiac output and hemodynamic stability  Description: INTERVENTIONS:  - Monitor vital signs, rhythm, and trends  - Monitor for bleeding, hypotension and signs of decreased cardiac output  - Evaluate effectiveness of vasoactive medications to optimize hemodynamic stability  - Monitor arterial and/or venous puncture sites for bleeding and/or hematoma  - Assess quality of pulses, skin color and temperature  - Assess for signs of decreased coronary artery perfusion - ex. Angina  - Evaluate fluid balance, assess for edema, trend weights  Outcome: Completed  Goal: Absence of cardiac arrhythmias or at baseline  Description: INTERVENTIONS:  - Continuous cardiac monitoring, monitor vital signs, obtain 12 lead EKG if indicated  - Evaluate effectiveness of antiarrhythmic and heart rate control medications as ordered  - Initiate emergency measures for life threatening arrhythmias  - Monitor electrolytes and administer replacement therapy as ordered  Outcome: Completed

## 2025-02-09 NOTE — PLAN OF CARE
Patient alert and oriented. Back pain with tylenol given. Vitals stable. Needs met. Kept clean and dry. Asssited with ADLs    Problem: CARDIOVASCULAR - ADULT  Goal: Maintains optimal cardiac output and hemodynamic stability  Description: INTERVENTIONS:  - Monitor vital signs, rhythm, and trends  - Monitor for bleeding, hypotension and signs of decreased cardiac output  - Evaluate effectiveness of vasoactive medications to optimize hemodynamic stability  - Monitor arterial and/or venous puncture sites for bleeding and/or hematoma  - Assess quality of pulses, skin color and temperature  - Assess for signs of decreased coronary artery perfusion - ex. Angina  - Evaluate fluid balance, assess for edema, trend weights  Outcome: Progressing  Goal: Absence of cardiac arrhythmias or at baseline  Description: INTERVENTIONS:  - Continuous cardiac monitoring, monitor vital signs, obtain 12 lead EKG if indicated  - Evaluate effectiveness of antiarrhythmic and heart rate control medications as ordered  - Initiate emergency measures for life threatening arrhythmias  - Monitor electrolytes and administer replacement therapy as ordered  Outcome: Progressing

## 2025-02-09 NOTE — PROGRESS NOTES
[facility]  Division of Cardiology  Progress Note    Jonny Haque Patient Status:  Hospitalized    4/15/1945 MRN [id number]   Location [facility] 6NE-A Attending Ted Hoff,    Hosp Day # 2 PCP Gee Jimenez MD     Impression:  VT 25  Possibly ischemic  Possibly scar based  Resolved with Lidocaine drip, did not improve with Amiodarone.  CAD  MV PCI in the past (LM, LAD, OM/CX)  PCI to ULM, LAD, CX with stent to OM 25  Worst lesion was OM 90% stenosis (but wire occlusive)  IVUS with focal mid LAD stenosis  Unclear if there was a \"culprit lesion:  Hx aortic stenosis  S/P TAVR   Echo with good valve function (mean gradient 9mmHg)  HFrEF/systolic dysfunction  Recent EF was 60% range  Echo 25 with EF 40% range  Inferolateral WMA (possibly OM/CX \"culprit lesion\"?)  ESRD  HD dependent  Anemia  Hx GIB  Has only partially tolerated DAPT in the past  Cirrhosis  Varices noted in some reports  PAF  On amiodarone for rhythm control  Has not tolerated AC due to GIB and SDH  DM  HTN  HX SDH      Plan:  CAD: DAPT.  Medical management for now.  Will need survellience angiogram in 3 months if no further work done during this admission.  VT: Rhythm has been quiet.  This might suggest that it was ischemic VT.  Xfer to the floor and observe overnight.  If nothing overnight, will touch base with EP and consider possible DC.  12 lead in AM.  TAVR: Valve looks good.    ESRD: HD per nephrology.  PAF: Currently sinus.  Given past hx (and need for antiplatelet agent), I don't think AC is a long term option.  Best if we can keep him in sinus.  Will need to continue low dose amiodarone.  Will restart prior to DC (but hold for now given admission rhythms).  HFrEF: Unclear what to glean from echo.  Regardless, PCI was done.  Rhythm has stopped.  I suspect it will improve in the coming weeks/months.  Given his comorbidities, \"GDMT\" is unlikely.  Watch volume and recheck  limited echo prior to DC.       Subjective:  No overnight events.  The patient denies any chest pain or dyspnea.  Ambulating.  Feels good.  Wants to go home.  Insistent that he be at home for the super bowl.    Objective:  /58 (BP Location: Right arm)   Pulse 67   Temp 99 °F (37.2 °C) (Oral)   Resp 17   Wt 212 lb 8.4 oz (96.4 kg)   SpO2 93%   BMI 29.64 kg/m²   Temp (24hrs), Av.9 °F (36.6 °C), Min:97.1 °F (36.2 °C), Max:99 °F (37.2 °C)      Intake/Output Summary (Last 24 hours) at 2025  Last data filed at 2025  Gross per 24 hour   Intake 600 ml   Output 2250 ml   Net -1650 ml     Wt Readings from Last 3 Encounters:   25 212 lb 8.4 oz (96.4 kg)   25 214 lb (97.1 kg)   24 212 lb 15.4 oz (96.6 kg)       Physical exam:  General: Well developed, well nourished male.  Pt is in no acute distress.  Neck:  No JVD.  Supple with normal ROM.  Cardiac: Regular rate and rhythm.  No murmurs, rubs, or gallops.   Lungs: Clear to ascultation bilaterally.    Abdomen: Soft.  Non-distended.  Non-tender.  Bowel sounds are present.  No guarding or rebound.   Extremities: Warm, no significant edema.  Palpable pulses.  Neurologic: Alert and oriented, normal affect.  No gross deficit appreciated.  Integument:  No visible rashes are appreciated.  Psych: The affect is appropriate.    Meds:    levothyroxine  200 mcg Oral Before breakfast    pantoprazole  40 mg Intravenous QAM AC    Or    pantoprazole  40 mg Oral QAM AC    polyethylene glycol (PEG 3350)  17 g Oral Daily    insulin aspart  1-5 Units Subcutaneous TID AC and HS    insulin degludec  10 Units Subcutaneous Nightly    clopidogrel  75 mg Oral Daily    aspirin  81 mg Oral Daily       Lab Results   Component Value Date    INR 1.08 2025    INR 1.13 2024    INR 1.10 2024     Studies/results  Telemetry: No VT or malignant arrhythmia  Echo 2025   1. Left ventricle: The cavity size was normal. There was mild concentric       hypertrophy. Systolic function was moderately reduced. The estimated      ejection fraction was 35-40%, by visual assessment. Severe inferior,      posterior and lateral hypokinesis, the septal dyssynergy with underlying      LBBB. Left ventricular diastolic function is indeterminate.   2. Right ventricle: Systolic function was normal. The RV pressure during      systole is 35mm Hg.   3. Left atrium: The left atrial volume was markedly increased.   4. Aortic valve: Well seated, normal functioning 26mm Edward-Albert 3 Ultra      Resilia bioprosthetic (TAVR) valve. There was mild perivalvular      regurgitation. The peak systolic velocity was 2.15m/sec. The acceleration      time was 65ms. The mean systolic gradient was 9mm Hg. The dimensionless      index was 0.41. :    Thank you for allowing our group to care for your patient. Please contact me with any questions.     Satya Donald MD  General, Interventional  Structural & Endovascular  Cardiology    >35 min Critical Care time was spent on this complex ICU patient for review, assessment, exam, planning, and communication with team and family.

## 2025-02-09 NOTE — PROGRESS NOTES
02/08/25 1426 02/08/25 1429 02/08/25 1431   Vital Signs   Pulse 63 66 73   /63 138/66 91/57   MAP (mmHg) 85 82 (!) 64   BP Location Right arm Right arm Right arm   BP Method Automatic Automatic Automatic   Patient Position Lying Sitting Standing     Pt just completed HD and then transferred to new unit. Orthostatic assessment above done per protocol. Pt denies any lightheadedness or dizziness with position changes.

## 2025-02-09 NOTE — DISCHARGE SUMMARY
[facility] Hospitalist Discharge Summary     Patient ID:  Jonny Haque  79 year old  4/15/1945    Admit date: 2/6/2025  Discharge date and time: 2/9/25  Attending Physician: Ted Hoff DO   Primary Care Physician: Gee Jimenez MD   Discharge Diagnoses: V-tach (HCC) [I47.20]  Chest pain of uncertain etiology [R07.9]  Chest pain due to myocardial ischemia [I25.9]    Discharge Condition: Stable    Disposition:  Home    Follow up:   -Cardiology/ EP in 2 weeks.     Hospital Course:  Patient is a 79 year old male with history of CAD s/p PCI, severe aortic stenosis s/p TAVR, PAF (not on AC), type II DM, ESRD on HD and cirrhosis who presented to ER after complaining of chest pain during dialysis session today. During initial presentation patient found to be in Vtach and had round of defibrillation. Subsequently, started on amiodarone gtt and taken to cath lab due to complex medical history.     #VT  #CAD s/p PCI  #History of severe aortic stenosis s/p TAVR  -s/p debrillation in th ER. S/p LHC.  -Now off lidocaine gtt. Chest pain free.   -EP on consult. Ok for dc with outpatient follow up. Multiple conversations encouraging ICD or lifevest. Patient would like to discuss with family and follow up outpatient for this.   -Cont DAPT, statin      #Type II DM  -Start with 10 u tresiba, mild SSI. Uptitrate as diet advanced.  -CHO controlled diet      #ESRD T/TH/S  -Nephrology on consult for HD    Exam on Day of DC:  /68 (BP Location: Left arm)   Pulse 69   Temp 98.3 °F (36.8 °C) (Oral)   Resp 19   Wt 212 lb 1.3 oz (96.2 kg)   SpO2 94%   BMI 29.58 kg/m²   General: No apparent distress.  Alert and oriented.  HEENT:  EOMI, PERRLA.  Pulm: Normal respiratory effort.  CV: Regular rate and rhythm, no murmur.   Abd: Soft, nontender, nondistended.  MSK: Full range of motion in extremities, no obvious deformities.    Skin: No lesions or rashes.  Neuro: No obvious focal deficits.    I as the attending physician reconciled the  current and discharge medications on day of discharge.     Current Discharge Medication List        START taking these medications    Details   traMADol 50 MG Oral Tab Take 1 tablet (50 mg total) by mouth every 8 (eight) hours as needed for Pain.           CONTINUE these medications which have CHANGED    Details   amiodarone 100 MG Oral Tab Take 1 tablet (100 mg total) by mouth 2 (two) times daily.           CONTINUE these medications which have NOT CHANGED    Details   clopidogrel 75 MG Oral Tab Take 1 tablet (75 mg total) by mouth daily.      losartan 25 MG Oral Tab Take 1 tablet (25 mg total) by mouth daily.      carvedilol 6.25 MG Oral Tab Take 1 tablet (6.25 mg total) by mouth 2 (two) times daily with meals.      nitroglycerin 0.4 MG Sublingual SL Tab Place 1 tablet (0.4 mg total) under the tongue every 5 (five) minutes as needed for Chest pain.      levothyroxine 200 MCG Oral Tab Take 1 tablet (200 mcg total) by mouth before breakfast.      Lanthanum Carbonate 1000 MG Oral Chew Tab Chew 1 tablet (1,000 mg total) by mouth 3 (three) times daily with meals.      aspirin 81 MG Oral Tab EC Take 1 tablet (81 mg total) by mouth every other day.      Ferric Citrate (AURYXIA) 1  MG(Fe) Oral Tab       pantoprazole 40 MG Oral Tab EC Take 1 tablet (40 mg total) by mouth 2 (two) times daily before meals.      Insulin Glargine, 2 Unit Dial, (TOUJEO MAX SOLOSTAR) 300 UNIT/ML Subcutaneous Solution Pen-injector Inject 20 Units into the skin nightly.      rosuvastatin 10 MG Oral Tab Take 1 tablet (10 mg total) by mouth nightly.      Insulin Lispro, 1 Unit Dial, (HUMALOG KWIKPEN) 100 UNIT/ML Subcutaneous Solution Pen-injector 12 units before meals with sliding scale. Max daily dose: 50 units.      tamsulosin 0.4 MG Oral Cap Take 1 capsule (0.4 mg total) by mouth daily.      zolpidem 10 MG Oral Tab Take 1 tablet (10 mg total) by mouth nightly as needed for Sleep.      Insulin Pen Needle (TRUEPLUS PEN NEEDLES) 31G X 5 MM  Does not apply Misc Use 5 per day      !! Glucose Blood (ONETOUCH ULTRA) In Vitro Strip 6 times a day      !! CONTOUR NEXT TEST In Vitro Strip TEST BLOOD SUGAR FOUR TIMES DAILY      Blood Glucose Monitoring Suppl ([employer] CONTOUR NEXT MONITOR) W/DEVICE Does not apply Kit 1 Device by Does not apply route 4 (four) times daily.       !! - Potential duplicate medications found. Please discuss with provider.        STOP taking these medications       HYDROcodone-acetaminophen 5-325 MG Oral Tab        ondansetron 4 MG Oral Tablet Dispersible              Activity: activity as tolerated  Diet: cardiac diet  Wound Care: as directed  Code Status: Full Code    Total time coordinating care for discharge: Greater than 32 minutes    Ted Hoff DO  [facility] Hospitalist

## 2025-02-09 NOTE — PROGRESS NOTES
[facility]  Division of Cardiology  Progress Note    Jonny Haque Patient Status:  Hospitalized    4/15/1945 MRN [id number]   Location [facility] 6NE-A Attending Ted Hoff, DO   Hosp Day # 3 PCP Gee Jimenez MD     Impression:  VT 25  Possibly ischemic  Possibly scar based  Resolved with Lidocaine drip, did not improve with Amiodarone.  CAD  MV PCI in the past (LM, LAD, OM/CX)  PCI to ULM, LAD, CX with stent to OM 25  Worst lesion was OM 90% stenosis (but wire occlusive)  IVUS with focal mid LAD stenosis  Unclear if there was a \"culprit lesion:  Hx aortic stenosis  S/P TAVR   Echo with good valve function (mean gradient 9mmHg)  HFrEF/systolic dysfunction  Recent EF was 60% range  Echo 25 with EF 40% range  Inferolateral WMA (possibly OM/CX \"culprit lesion\"?)  ESRD  HD dependent  Anemia  Hx GIB  Has only partially tolerated DAPT in the past  Cirrhosis  Varices noted in some reports  PAF  On amiodarone for rhythm control  Has not tolerated AC due to GIB and SDH  DM  HTN  HX SDH      Plan:  CAD: DAPT.  Medical management for now.  Will need survellience angiogram in 3 months.  I discussed with him and he agrees.  He has an appointment with me before then and I will see him then.   He plans on doing cardiac rehab and I will be involved and informed of his progress or any issues if they develop.  VT: Rhythm has been quiet.  I discussed with Dr. Hamilton from EP.  I offered the patient the options of ICD, Lifevest, and none of the above.  He wants to go home and discuss it with his family and FU with EP who will arrange a visit.  I discussed that if it was a scar based rhythm it could come back anytime.  But he notes the risks associated with the devices and that his LV may recover.  Given his complete stability for the least few days, I think he is safe for discharge with the understanding that statistical risks could appear at any time.  TAVR:  Valve looks good.    ESRD: HD per nephrology.  PAF: Currently sinus.  Given past hx (and need for antiplatelet agent), I don't think AC is a long term option.  Best if we can keep him in sinus.  Will need to continue low dose amiodarone.  Will restart prior to DC (but hold for now given admission rhythms).  HFrEF: EF with new reduction.  Inferior and lateral.  Rhythm has stopped.  I suspect it will improve in the coming weeks/months.  Given his comorbidities, \"GDMT\" is unlikely.  Watch volume and recheck limited echo as an outpatient.       Subjective:  No overnight events.  The patient denies any chest pain or dyspnea.  Ambulating.  Feels good.  Wants to go home.  Insistent that he be at home for the super bowl.  Plans on eating (low sodium) chili and wings.    Objective:  /68 (BP Location: Left arm)   Pulse 69   Temp 98.3 °F (36.8 °C) (Oral)   Resp 19   Wt 212 lb 1.3 oz (96.2 kg)   SpO2 94%   BMI 29.58 kg/m²   Temp (24hrs), Av.3 °F (36.8 °C), Min:97.2 °F (36.2 °C), Max:99 °F (37.2 °C)      Intake/Output Summary (Last 24 hours) at 2025 1151  Last data filed at 2025 0600  Gross per 24 hour   Intake 720 ml   Output 2000 ml   Net -1280 ml     Wt Readings from Last 3 Encounters:   25 212 lb 1.3 oz (96.2 kg)   25 214 lb (97.1 kg)   24 212 lb 15.4 oz (96.6 kg)       Physical exam:  General: Well developed, well nourished male.  Pt is in no acute distress.  Neck:  No JVD.  Supple with normal ROM.  Cardiac: Regular rate and rhythm.  No murmurs, rubs, or gallops.   Lungs: Clear to ascultation bilaterally.    Abdomen: Soft.  Non-distended.  Non-tender.  Bowel sounds are present.  No guarding or rebound.   Extremities: Warm, no significant edema.  Palpable pulses.  Neurologic: Alert and oriented, normal affect.  No gross deficit appreciated.  Integument:  No visible rashes are appreciated.  Psych: The affect is appropriate.    Meds:    levothyroxine  200 mcg Oral Before breakfast     pantoprazole  40 mg Intravenous QAM AC    Or    pantoprazole  40 mg Oral QAM AC    polyethylene glycol (PEG 3350)  17 g Oral Daily    insulin aspart  1-5 Units Subcutaneous TID AC and HS    insulin degludec  10 Units Subcutaneous Nightly    clopidogrel  75 mg Oral Daily    aspirin  81 mg Oral Daily       Lab Results   Component Value Date    INR 1.08 02/06/2025    INR 1.13 09/18/2024    INR 1.10 05/08/2024     Studies/results  Telemetry: No VT or malignant arrhythmia  Echo 2/7/2025   1. Left ventricle: The cavity size was normal. There was mild concentric      hypertrophy. Systolic function was moderately reduced. The estimated      ejection fraction was 35-40%, by visual assessment. Severe inferior,      posterior and lateral hypokinesis, the septal dyssynergy with underlying      LBBB. Left ventricular diastolic function is indeterminate.   2. Right ventricle: Systolic function was normal. The RV pressure during      systole is 35mm Hg.   3. Left atrium: The left atrial volume was markedly increased.   4. Aortic valve: Well seated, normal functioning 26mm Edward-Albert 3 Ultra      Resilia bioprosthetic (TAVR) valve. There was mild perivalvular      regurgitation. The peak systolic velocity was 2.15m/sec. The acceleration      time was 65ms. The mean systolic gradient was 9mm Hg. The dimensionless      index was 0.41.     Thank you for allowing our group to care for your patient. Please contact me with any questions.     Satya Donald MD  General, Interventional  Structural & Endovascular  Cardiology

## 2025-02-13 NOTE — PAYOR COMM NOTE
--------------  CONTINUED STAY REVIEW    Payor: [employer]  Subscriber #:  [id number]  Authorization Number: [id number]    Admit date: 2/6/25  Admit time:  7:44 PM    REVIEW DOCUMENTATION:  2-8-25     /69   Pulse 60   Temp 97.5 °F (36.4 °C) (Temporal)   Resp 17   Wt 213 lb (96.6 kg)   SpO2 97%   BMI 29.71 kg/m²   General: No apparent distress.  Alert and oriented.  HEENT:  EOMI, PERRLA.  Pulm: Normal respiratory effort.  CV: Regular rate and rhythm, no murmur.   Abd: Soft, nontender, nondistended.  MSK: Full range of motion in extremities, no obvious deformities.    Skin: No lesions or rashes.  Neuro: No obvious focal deficits.      PGLU 137 02/08/2025     Assessment/ Plan:      #VT  #CAD s/p PCI  #History of severe aortic stenosis s/p TAVR  -s/p debrillation in th ER. S/p C.  -Now off lidocaine gtt. Chest pain free.   -EP on consult, patient to decide for ICD , currently unsure.   -Continue close monitoring  -Cont DAPT      #Type II DM  -Start with 10 u tresiba, mild SSI. Uptitrate as diet advanced.  -CHO controlled diet      #ESRD T/TH/S  -Nephrology on consult for HD     Disposition: inpt          PER CARDS      Impression:  VT 2/6/25  Possibly ischemic  Possibly scar based  Resolved with Lidocaine drip, did not improve with Amiodarone.  CAD  MV PCI in the past (LM, LAD, OM/CX)  PCI to ULM, LAD, CX with stent to OM 2/6/25  Worst lesion was OM 90% stenosis (but wire occlusive)  IVUS with focal mid LAD stenosis  Unclear if there was a \"culprit lesion:  Hx aortic stenosis  S/P TAVR 2024  Echo with good valve function (mean gradient 9mmHg)  HFrEF/systolic dysfunction  Recent EF was 60% range  Echo 2/7/25 with EF 40% range  Inferolateral WMA (possibly OM/CX \"culprit lesion\"?)  ESRD  HD dependent  Anemia  Hx GIB  Has only partially tolerated DAPT in the past  Cirrhosis  Varices noted in some reports  PAF  On amiodarone for rhythm control  Has not tolerated AC due to GIB and SDH  DM  HTN  HX  SDH        Plan:  CAD: DAPT.  Medical management for now.  Will need survellience angiogram in 3 months if no further work done during this admission.  VT: Rhythm has been quiet.  This might suggest that it was ischemic VT.  Xfer to the floor and observe overnight.  If nothing overnight, will touch base with EP and consider possible DC.  12 lead in AM.  TAVR: Valve looks good.    ESRD: HD per nephrology.  PAF: Currently sinus.  Given past hx (and need for antiplatelet agent), I don't think AC is a long term option.  Best if we can keep him in sinus.  Will need to continue low dose amiodarone.  Will restart prior to DC (but hold for now given admission rhythms).  HFrEF: Unclear what to glean from echo.  Regardless, PCI was done.  Rhythm has stopped.  I suspect it will improve in the coming weeks/months.  Given his comorbidities, \"GDMT\" is unlikely.  Watch volume and recheck limited echo prior to DC.        MEDICATIONS ADMINISTERED IN LAST 1 DAY:            Vitals (last day) before discharge       Date/Time Temp Pulse Resp BP SpO2 Weight O2 Device O2 Flow Rate (L/min) Who    02/09/25 0855 98.3 °F (36.8 °C) 69 19 140/68 94 % -- None (Room air) 0 L/min KD    02/09/25 0500 98.3 °F (36.8 °C) 69 18 129/63 99 % 212 lb 1.3 oz (96.2 kg) None (Room air) -- ML    02/09/25 0015 98.9 °F (37.2 °C) 74 16 108/51 92 % -- -- -- EJ    02/08/25 0900 -- 60 17 126/69 97 % -- -- -- AM    02/08/25 0800 97.5 °F (36.4 °C) 62 11 132/62 98 % -- None (Room air) -- AM    02/08/25 0700 -- 57 10 141/56 97 % -- -- -- AM    02/08/25 0600 -- 57 12 135/53 98 % 213 lb (96.6 kg) None (Room air) -- HH    02/08/25 0500 -- 52 11 110/41 92 % -- -- -- HH    02/08/25 0406 -- 56 9 -- 95 % -- -- --     02/08/25 0400 97.1 °F (36.2 °C) 52 6 115/38 90 % -- Nasal cannula 2 L/min     02/08/25 0300 -- 55 15 141/45 99 % -- -- --     02/08/25 0200 -- 61 16 149/67 100 % -- -- --     02/08/25 0100 -- 54 12 132/64 100 % -- -- --     02/08/25 0000 97.9 °F (36.6  °C) 55 13 121/57 99 % -- Nasal cannula 2 L/min HH

## 2025-02-15 ENCOUNTER — HOSPITAL ENCOUNTER (INPATIENT)
Facility: HOSPITAL | Age: 80
LOS: 1 days | Discharge: HOME | End: 2025-02-16
Attending: EMERGENCY MEDICINE | Admitting: INTERNAL MEDICINE
Payer: MEDICARE

## 2025-02-15 ENCOUNTER — APPOINTMENT (OUTPATIENT)
Dept: GENERAL RADIOLOGY | Facility: HOSPITAL | Age: 80
End: 2025-02-15
Attending: EMERGENCY MEDICINE
Payer: MEDICARE

## 2025-02-15 DIAGNOSIS — I50.9 ACUTE ON CHRONIC CONGESTIVE HEART FAILURE, UNSPECIFIED HEART FAILURE TYPE (HCC): Primary | ICD-10-CM

## 2025-02-15 DIAGNOSIS — R79.89 ELEVATED TROPONIN: ICD-10-CM

## 2025-02-15 LAB
ALBUMIN SERPL-MCNC: 4.1 G/DL (ref 3.2–4.8)
ALBUMIN/GLOB SERPL: 1.4 {RATIO} (ref 1–2)
ALP LIVER SERPL-CCNC: 142 U/L
ALT SERPL-CCNC: 17 U/L
ANION GAP SERPL CALC-SCNC: 14 MMOL/L (ref 0–18)
AST SERPL-CCNC: 23 U/L (ref ?–34)
ATRIAL RATE: 61 BPM
BASOPHILS # BLD AUTO: 0.05 X10(3) UL (ref 0–0.2)
BASOPHILS NFR BLD AUTO: 0.9 %
BILIRUB SERPL-MCNC: 0.5 MG/DL (ref 0.2–1.1)
BUN BLD-MCNC: 61 MG/DL (ref 9–23)
CALCIUM BLD-MCNC: 9.8 MG/DL (ref 8.7–10.6)
CHLORIDE SERPL-SCNC: 93 MMOL/L (ref 98–112)
CHOLEST SERPL-MCNC: 199 MG/DL (ref ?–200)
CO2 SERPL-SCNC: 28 MMOL/L (ref 21–32)
CREAT BLD-MCNC: 8.22 MG/DL
EGFRCR SERPLBLD CKD-EPI 2021: 6 ML/MIN/1.73M2 (ref 60–?)
EOSINOPHIL # BLD AUTO: 0.39 X10(3) UL (ref 0–0.7)
EOSINOPHIL NFR BLD AUTO: 7.2 %
ERYTHROCYTE [DISTWIDTH] IN BLOOD BY AUTOMATED COUNT: 16 %
GLOBULIN PLAS-MCNC: 3 G/DL (ref 2–3.5)
GLUCOSE BLD-MCNC: 111 MG/DL (ref 70–99)
GLUCOSE BLD-MCNC: 123 MG/DL (ref 70–99)
GLUCOSE BLD-MCNC: 159 MG/DL (ref 70–99)
GLUCOSE BLD-MCNC: 231 MG/DL (ref 70–99)
GLUCOSE BLD-MCNC: 236 MG/DL (ref 70–99)
HBV SURFACE AG SER-ACNC: <0.1 [IU]/L
HBV SURFACE AG SERPL QL IA: NONREACTIVE
HCT VFR BLD AUTO: 29.6 %
HDLC SERPL-MCNC: 31 MG/DL (ref 40–59)
HGB BLD-MCNC: 10.3 G/DL
IMM GRANULOCYTES # BLD AUTO: 0.03 X10(3) UL (ref 0–1)
IMM GRANULOCYTES NFR BLD: 0.6 %
LDLC SERPL CALC-MCNC: 117 MG/DL (ref ?–100)
LYMPHOCYTES # BLD AUTO: 0.99 X10(3) UL (ref 1–4)
LYMPHOCYTES NFR BLD AUTO: 18.4 %
MCH RBC QN AUTO: 36.1 PG (ref 26–34)
MCHC RBC AUTO-ENTMCNC: 34.8 G/DL (ref 31–37)
MCV RBC AUTO: 103.9 FL
MONOCYTES # BLD AUTO: 0.63 X10(3) UL (ref 0.1–1)
MONOCYTES NFR BLD AUTO: 11.7 %
NEUTROPHILS # BLD AUTO: 3.29 X10 (3) UL (ref 1.5–7.7)
NEUTROPHILS # BLD AUTO: 3.29 X10(3) UL (ref 1.5–7.7)
NEUTROPHILS NFR BLD AUTO: 61.2 %
NONHDLC SERPL-MCNC: 168 MG/DL (ref ?–130)
NT-PROBNP SERPL-MCNC: ABNORMAL PG/ML (ref ?–450)
OSMOLALITY SERPL CALC.SUM OF ELEC: 299 MOSM/KG (ref 275–295)
P AXIS: 24 DEGREES
P-R INTERVAL: 176 MS
PLATELET # BLD AUTO: 61 10(3)UL (ref 150–450)
POTASSIUM SERPL-SCNC: 4.7 MMOL/L (ref 3.5–5.1)
PROT SERPL-MCNC: 7.1 G/DL (ref 5.7–8.2)
Q-T INTERVAL: 564 MS
QRS DURATION: 166 MS
QTC CALCULATION (BEZET): 567 MS
R AXIS: -25 DEGREES
RBC # BLD AUTO: 2.85 X10(6)UL
SODIUM SERPL-SCNC: 135 MMOL/L (ref 136–145)
T AXIS: 134 DEGREES
TRIGL SERPL-MCNC: 291 MG/DL (ref 30–149)
TROPONIN I SERPL HS-MCNC: 97 NG/L
VENTRICULAR RATE: 61 BPM
VLDLC SERPL CALC-MCNC: 52 MG/DL (ref 0–30)
WBC # BLD AUTO: 5.4 X10(3) UL (ref 4–11)

## 2025-02-15 PROCEDURE — 5A1D70Z PERFORMANCE OF URINARY FILTRATION, INTERMITTENT, LESS THAN 6 HOURS PER DAY: ICD-10-PCS | Performed by: INTERNAL MEDICINE

## 2025-02-15 PROCEDURE — 71045 X-RAY EXAM CHEST 1 VIEW: CPT | Performed by: EMERGENCY MEDICINE

## 2025-02-15 RX ORDER — LEVOTHYROXINE SODIUM 100 UG/1
200 TABLET ORAL
Status: DISCONTINUED | OUTPATIENT
Start: 2025-02-16 | End: 2025-02-16

## 2025-02-15 RX ORDER — TRAMADOL HYDROCHLORIDE 50 MG/1
50 TABLET ORAL ONCE
Status: COMPLETED | OUTPATIENT
Start: 2025-02-15 | End: 2025-02-15

## 2025-02-15 RX ORDER — NICOTINE POLACRILEX 4 MG
30 LOZENGE BUCCAL
Status: DISCONTINUED | OUTPATIENT
Start: 2025-02-15 | End: 2025-02-16

## 2025-02-15 RX ORDER — HEPARIN SODIUM 1000 [USP'U]/ML
1.5 INJECTION, SOLUTION INTRAVENOUS; SUBCUTANEOUS
Status: DISCONTINUED | OUTPATIENT
Start: 2025-02-15 | End: 2025-02-16

## 2025-02-15 RX ORDER — ONDANSETRON 2 MG/ML
4 INJECTION INTRAMUSCULAR; INTRAVENOUS EVERY 6 HOURS PRN
Status: DISCONTINUED | OUTPATIENT
Start: 2025-02-15 | End: 2025-02-16

## 2025-02-15 RX ORDER — HEPARIN SODIUM 5000 [USP'U]/ML
5000 INJECTION, SOLUTION INTRAVENOUS; SUBCUTANEOUS EVERY 8 HOURS SCHEDULED
Status: DISCONTINUED | OUTPATIENT
Start: 2025-02-15 | End: 2025-02-16

## 2025-02-15 RX ORDER — NICOTINE POLACRILEX 4 MG
15 LOZENGE BUCCAL
Status: DISCONTINUED | OUTPATIENT
Start: 2025-02-15 | End: 2025-02-16

## 2025-02-15 RX ORDER — LOSARTAN POTASSIUM 25 MG/1
25 TABLET ORAL DAILY
Status: DISCONTINUED | OUTPATIENT
Start: 2025-02-15 | End: 2025-02-16

## 2025-02-15 RX ORDER — TAMSULOSIN HYDROCHLORIDE 0.4 MG/1
0.4 CAPSULE ORAL DAILY
Status: DISCONTINUED | OUTPATIENT
Start: 2025-02-15 | End: 2025-02-15

## 2025-02-15 RX ORDER — ASPIRIN 81 MG/1
81 TABLET ORAL EVERY OTHER DAY
Status: DISCONTINUED | OUTPATIENT
Start: 2025-02-15 | End: 2025-02-16

## 2025-02-15 RX ORDER — ROSUVASTATIN CALCIUM 10 MG/1
10 TABLET, COATED ORAL NIGHTLY
Status: DISCONTINUED | OUTPATIENT
Start: 2025-02-15 | End: 2025-02-16

## 2025-02-15 RX ORDER — METOCLOPRAMIDE HYDROCHLORIDE 5 MG/ML
10 INJECTION INTRAMUSCULAR; INTRAVENOUS EVERY 8 HOURS PRN
Status: DISCONTINUED | OUTPATIENT
Start: 2025-02-15 | End: 2025-02-15

## 2025-02-15 RX ORDER — AMIODARONE HYDROCHLORIDE 100 MG/1
100 TABLET ORAL 2 TIMES DAILY
Status: DISCONTINUED | OUTPATIENT
Start: 2025-02-15 | End: 2025-02-16

## 2025-02-15 RX ORDER — PANTOPRAZOLE SODIUM 40 MG/1
40 TABLET, DELAYED RELEASE ORAL
Status: DISCONTINUED | OUTPATIENT
Start: 2025-02-15 | End: 2025-02-16

## 2025-02-15 RX ORDER — METOCLOPRAMIDE HYDROCHLORIDE 5 MG/ML
5 INJECTION INTRAMUSCULAR; INTRAVENOUS EVERY 8 HOURS PRN
Status: DISCONTINUED | OUTPATIENT
Start: 2025-02-15 | End: 2025-02-16

## 2025-02-15 RX ORDER — CARVEDILOL 6.25 MG/1
6.25 TABLET ORAL 2 TIMES DAILY WITH MEALS
Status: DISCONTINUED | OUTPATIENT
Start: 2025-02-15 | End: 2025-02-16

## 2025-02-15 RX ORDER — INSULIN DEGLUDEC 100 U/ML
20 INJECTION, SOLUTION SUBCUTANEOUS NIGHTLY
Status: DISCONTINUED | OUTPATIENT
Start: 2025-02-15 | End: 2025-02-16

## 2025-02-15 RX ORDER — DEXTROSE MONOHYDRATE 25 G/50ML
50 INJECTION, SOLUTION INTRAVENOUS
Status: DISCONTINUED | OUTPATIENT
Start: 2025-02-15 | End: 2025-02-16

## 2025-02-15 RX ORDER — ALBUMIN (HUMAN) 12.5 G/50ML
25 SOLUTION INTRAVENOUS
Status: DISCONTINUED | OUTPATIENT
Start: 2025-02-15 | End: 2025-02-16

## 2025-02-15 RX ORDER — HEPARIN SODIUM 1000 [USP'U]/ML
5000 INJECTION, SOLUTION INTRAVENOUS; SUBCUTANEOUS
Status: COMPLETED | OUTPATIENT
Start: 2025-02-15 | End: 2025-02-15

## 2025-02-15 RX ORDER — ACETAMINOPHEN 500 MG
500 TABLET ORAL EVERY 4 HOURS PRN
Status: DISCONTINUED | OUTPATIENT
Start: 2025-02-15 | End: 2025-02-16

## 2025-02-15 RX ORDER — CLOPIDOGREL BISULFATE 75 MG/1
75 TABLET ORAL DAILY
Status: DISCONTINUED | OUTPATIENT
Start: 2025-02-16 | End: 2025-02-16

## 2025-02-15 NOTE — PROGRESS NOTES
Patient is admitted for SOB and chest discomfort . Pt is AOX4.  VSS, afebrile and denies any pain. SpO2 maintained on RA. . Denies any SOB, cough. Tele-NSR/BBB. Heparin. Carb control diet/QID ACCU check.  Denies any n/v/d. Voids. Up with SBA.  2 nurse skin check done with Robson  . HD done. Right chest  perm cath. Will continue to monitor. Pt is updated with plan of care.

## 2025-02-15 NOTE — ED PROVIDER NOTES
Patient Seen in: [facility] Emergency Department      History     Chief Complaint   Patient presents with    Chest Pain Angina     Stated Complaint: chest discomfort, recent VT/cardiac issues    Subjective:   HPI      79-year-old man past medical history as below including recent admission for V. tach presents with shortness of breath and generalized weakness, fatigue, shortness of breath.  The last 2 days has been had more fatigue and more shortness of breath with exertion.  No chest pain.  No fevers.  No cough.  No abdominal pain.  No vomiting or diarrhea.    Objective:     Past Medical History:    Aortic stenosis    Calculus of kidney    Coronary atherosclerosis    bare metal stents at [facility] Jan 2021    Diabetes (HCC)    Disorder of thyroid    Esophageal varices without bleeding, unspecified esophageal varices type (HCC)    GIB (gastrointestinal bleeding)    Hepatitis, unspecified    hepatitis C-just completed taking Harvoni in November 2016    High blood pressure    High cholesterol    Other disorders of plasma-protein metabolism, not elsewhere classified    Renal disorder    Visual impairment    reading glasses              Past Surgical History:   Procedure Laterality Date    Angiogram  08/15/2017    small caliber RCA with 80% mid lesion.  LAD and left circumflex with 50% lesions.  LV shows inferobasilar aneurysm with scar.  Overall LV function preserved at the exterior 65%.    Cath bare metal stent (bms)      Other surgical history      Upper GI surgery    Other surgical history  12/11/2017    Caroline Lees    Other surgical history  07/18/2019    BTS Caroline Lees     Other surgical history  02/06/2020    BTS Cysto (Dr. Lees)                Social History     Socioeconomic History    Marital status:    Tobacco Use    Smoking status: Former     Current packs/day: 0.00     Average packs/day: 0.5 packs/day for 15.0 years (7.5 ttl pk-yrs)     Types: Cigarettes     Start date: 2/9/1965      Quit date: 1980     Years since quittin.0    Smokeless tobacco: Never    Tobacco comments:     quit at age 34   Vaping Use    Vaping status: Never Used   Substance and Sexual Activity    Alcohol use: No    Drug use: No     Social Drivers of Health     Food Insecurity: No Food Insecurity (2025)    NCSS - Food Insecurity     Worried About Running Out of Food in the Last Year: No     Ran Out of Food in the Last Year: No   Transportation Needs: No Transportation Needs (2025)    NCSS - Transportation     Lack of Transportation: No   Housing Stability: Not At Risk (2025)    NCSS - Housing/Utilities     Has Housing: Yes     Worried About Losing Housing: No     Unable to Get Utilities: No                  Physical Exam     ED Triage Vitals   BP 02/15/25 1115 (!) 152/96   Pulse 02/15/25 1115 62   Resp 02/15/25 1115 14   Temp 02/15/25 1120 98 °F (36.7 °C)   Temp src 02/15/25 1120 Oral   SpO2 02/15/25 1115 97 %   O2 Device 02/15/25 1115 None (Room air)       Current Vitals:   Vital Signs  BP: 148/72  Pulse: 57  Resp: (!) 9  Temp: 98 °F (36.7 °C)  Temp src: Oral  MAP (mmHg): 91    Oxygen Therapy  SpO2: 97 %  O2 Device: None (Room air)        Physical Exam  Constitutional:       General: Is not in acute distress.     Appearance: Is not ill-appearing.   HENT:      Head: Normocephalic and atraumatic.      Mouth/Throat:      Mouth: Mucous membranes are moist.   Eyes:      Conjunctiva/sclera: Conjunctivae normal.      Pupils: Pupils are equal, round, and reactive to light.   Cardiovascular:      Rate and Rhythm: Normal rate and regular rhythm.   Pulmonary:      Effort: Pulmonary effort is normal. No respiratory distress.      Breath sounds: Normal breath sounds.   Abdominal:      General: Abdomen is flat. There is no distension.      Tenderness: There is no abdominal tenderness.   Musculoskeletal:      Right lower leg: No edema.      Left lower leg: No edema.   Skin:     General: Skin is warm and dry.    Neurological:      General: No focal deficit present.      Mental Status: He is alert.       ED Course     Labs Reviewed   COMP METABOLIC PANEL (14) - Abnormal; Notable for the following components:       Result Value    Glucose 123 (*)     Sodium 135 (*)     Chloride 93 (*)     BUN 61 (*)     Creatinine 8.22 (*)     Calculated Osmolality 299 (*)     eGFR-Cr 6 (*)     Alkaline Phosphatase 142 (*)     All other components within normal limits   CBC WITH DIFFERENTIAL WITH PLATELET - Abnormal; Notable for the following components:    RBC 2.85 (*)     HGB 10.3 (*)     HCT 29.6 (*)     PLT 61.0 (*)     .9 (*)     MCH 36.1 (*)     Lymphocyte Absolute 0.99 (*)     All other components within normal limits   TROPONIN I HIGH SENSITIVITY - Abnormal; Notable for the following components:    Troponin I (High Sensitivity) 97 (*)     All other components within normal limits   PRO BETA NATRIURETIC PEPTIDE - Abnormal; Notable for the following components:    Pro-Beta Natriuretic Peptide 25,853 (*)     All other components within normal limits   LIPID PANEL - Abnormal; Notable for the following components:    HDL Cholesterol 31 (*)     Triglycerides 291 (*)     LDL Cholesterol 117 (*)     VLDL 52 (*)     Non HDL Chol 168 (*)     All other components within normal limits   RAINBOW DRAW LAVENDER   RAINBOW DRAW LIGHT GREEN   RAINBOW DRAW BLUE     EKG    Rate, intervals and axes as noted on EKG Report.  Rate: 61  Rhythm: Sinus rhythm  Reading: Left bundle branch block with no Sgarbossa                       MDM      Considered all emergent etiologies, differential includes but is not limited to: Heart failure, PE, ACS, pneumonia     I have independently visualized the radiology images, my focus/limited interpretation: No pneumothorax, infiltrate     Defer to radiologist for other/incidental findings    EKG left bundle branch, no Sgarbossa and unchanged from previous.  Labs notable for sequelae of his end-stage renal disease.   Troponin elevated, more than baseline.  BNP elevated also significantly more than baseline.  Reviewed his recent admission.  Had significant decrease in his cardiac function with decreased EF of 30% on his echocardiogram.  Do not suspect ACS at this time and troponin likely elevated secondary to demand.  Suspect decompensation related to his recent admission and significant cardiac event.  Discussed with cardiology Dr. Castro for consultation who evaluated the patient and is in agreement.  Discussed with Dr. Aguiar for admission. D/w Dr. Montano for nephrology        Medical Decision Making      Disposition and Plan     Clinical Impression:  1. Acute on chronic congestive heart failure, unspecified heart failure type (HCC)    2. Elevated troponin         Disposition:  There is no disposition on file for this visit.  There is no disposition time on file for this visit.    Follow-up:  No follow-up provider specified.        Medications Prescribed:  Current Discharge Medication List              Supplementary Documentation:

## 2025-02-15 NOTE — ED QUICK NOTES
Orders for admission, patient is aware of plan and ready to go upstairs. Any questions, please call ED RN Diana at extension [phone].     Patient Covid vaccination status: Fully vaccinated     COVID Test Ordered in ED: None    COVID Suspicion at Admission: N/A    Running Infusions:  None    Mental Status/LOC at time of transport: a/ox4    Other pertinent information:   CIWA score: N/A   NIH score:  N/A

## 2025-02-15 NOTE — ED INITIAL ASSESSMENT (HPI)
Pt woke today with increased SOB.  Pt is supposed to have dialysis today, pt was unable to make it due to SOB.  Pt denies chest pain.  Pt states only pain is in lower back due to a fall x 3 weeks ago that pt was seen for.

## 2025-02-15 NOTE — H&P
[facility] Hospitalist History and Physical     PCP: Gee Jimenez MD      Chief Complaint: SOB     History of Present Illness: Patient is a 79 year old male CAD s/p multiple PCIs last one on 2/2024, severe aortic stenosis s/p TAVR, PAF (not on AC), type II DM, ESRD on HD and cirrhosis here with SOB     Pt had recent admission from 2/6-2/9, he was found to have VT, s/p defibrillation in the ER, s/p LHC stent to the OM. Pt was advised on life vest and or ICD and pt hadn't decided needed time to discuss with family.   ECHO during that admission was notable for reduced EF.      Now pt presents to the ED on 2/15 with worsening SOB, worse with exertion, some mild chest discomfort. No nausea no vomiting noted. No fevers. Pt states he is compliant with his meds and with his diet.  Son at bedside, states when he was discharged he did well, these stymptoms of SOB started yesterday and continued into today, pt compliant with HD.  No dysuria, no cough no fevers no abdominal pain       In the ED, pt afebrile, HR 60s, RR 14, B 140s systolics, RA satting 97.  BP >21548, trop 97.  Cards and renal consluted.           Allergies[1]     Past Medical History:    Aortic stenosis    Calculus of kidney    Coronary atherosclerosis    bare metal stents at [facility] Jan 2021    Diabetes (HCC)    Disorder of thyroid    Esophageal varices without bleeding, unspecified esophageal varices type (HCC)    GIB (gastrointestinal bleeding)    Hepatitis, unspecified    hepatitis C-just completed taking Harvoni in November 2016    High blood pressure    High cholesterol    Other disorders of plasma-protein metabolism, not elsewhere classified    Renal disorder    Visual impairment    reading glasses      Past Surgical History:   Procedure Laterality Date    Angiogram  08/15/2017    small caliber RCA with 80% mid lesion.  LAD and left circumflex with 50% lesions.  LV shows inferobasilar aneurysm with scar.  Overall LV function preserved at the  exterior 65%.    Cath bare metal stent (bms)      Other surgical history      Upper GI surgery    Other surgical history  2017    Cysto Dr. Lees    Other surgical history  2019    BTS Cysto Dr. Lees     Other surgical history  2020    BTS Cysto (Dr. Lees)      Social History     Tobacco Use    Smoking status: Former     Current packs/day: 0.00     Average packs/day: 0.5 packs/day for 15.0 years (7.5 ttl pk-yrs)     Types: Cigarettes     Start date: 1965     Quit date: 1980     Years since quittin.0    Smokeless tobacco: Never    Tobacco comments:     quit at age 34   Substance Use Topics    Alcohol use: No      Family History   Problem Relation Age of Onset    Cancer Father     Hypertension Mother             Intake/Output:  No intake/output data recorded.  Wt Readings from Last 3 Encounters:   02/15/25 212 lb 1.3 oz (96.2 kg)   25 212 lb 1.3 oz (96.2 kg)   25 214 lb (97.1 kg)         Exam:  [unfilled]  Temp:  [98 °F (36.7 °C)] 98 °F (36.7 °C)  Pulse:  [54-62] 57  Resp:  [9-16] 9  BP: (136-152)/(64-96) 148/72  SpO2:  [97 %-99 %] 97 %  General:  Alert, no distress, appears stated age.   Head:  Normocephalic, without obvious abnormality, atraumatic.    Eyes:  Sclera anicteric, No conjunctival pallor, EOMs intact.    Throat: MMM     Neck: Supple, symmetrical, trachea midline    Lungs:   Clear to auscultation bilaterally. Normal effort    Chest wall:  No tenderness or deformity.   Heart:  Regular rate and rhythm, no peripheral edema    Abdomen:   Soft, NT/ND, +bs    MSK: Atraumatic, no cyanosis or edema.   Skin: No visible rashes or lesions.     Neurologic: Normal strength, no focal deficit appreciated           Labs:       Lab Results   Component Value Date    WBC 5.4 02/15/2025    HGB 10.3 02/15/2025    HCT 29.6 02/15/2025    PLT 61.0 02/15/2025    CREATSERUM 8.22 02/15/2025    BUN 61 02/15/2025     02/15/2025    K 4.7 02/15/2025    CL 93 02/15/2025    CO2 28.0  02/15/2025     02/15/2025    CA 9.8 02/15/2025    ALB 4.1 02/15/2025    ALKPHO 142 02/15/2025    BILT 0.5 02/15/2025    TP 7.1 02/15/2025    AST 23 02/15/2025    ALT 17 02/15/2025               Assessment/Plan:  Patient is a 79 year old male CAD s/p multiple PCIs last one on 2/2024, severe aortic stenosis s/p TAVR, PAF (not on AC), type II DM, ESRD on HD and cirrhosis here with SOB          Plan:    SOB/TOMLIN  - concern for acute on chronic systolic CHF  - daily weights, strict I /O, fluid restriction   - EKG okay  - trop up, trend per cards   - renal to see for HD    ESRD   - on HD  - renal following    CAD s/p multiple stents  Hx of VT required defibrillation 2/2024  aortic stenosis s/p TAVR    - last stent 2/2024  - DAPT   - cards to see     PAF  - not on AC  - rate contolled, amiodarone     DM2  - ssi           Prophylaxis:  DVT: heparin subcutaneous    Full code          Bette Agiuar MD             [1] No Known Allergies

## 2025-02-15 NOTE — CONSULTS
[facility] Cardiology  Consultation Note      Jonny Haque Patient Status:  Emergency    4/15/1945 MRN [id number]   Location [facility] EMERGENCY DEPARTMENT Attending Kelton Woodward MD   Hosp Day # 0 PCP Gee Jimenez MD     Impression:  1. shortness of breath, weakness in setting of complex cardiac disease--> markedly elevated pBNP, mild HS trop 90s    2. CAD/ICM LVEF 40%; extensive PCI hx, recent PCI- ULM, LAD/LCx/OM (25)    3. hx VT (), presumably ischemic/scar related- resolved on lidocaine--> ECG/tele: SR   LBBB    4. s/p TAVR    5. ESRD on HD    Recommendations:  - weakness/SOB--> told to come to ER to r/o acute cardiac condition, overall stable- SR without ventricular arrhythmia, no signs of acute ischemia/ACS. HD stable. No gross fluid overload on exam, CXR without congestion, pBNP markedly elevated.  Admit for observation. consult nephrology, consider HD session today.    - med tx: asa/clopidogrel, carvedilol, rosuvastatin, amiodarone.    - tele/will follow.          History of Present Illness:  Jonny Haque is a 79 year old male who presented to [facility] on 2/15/2025.    Seen in cardiology consultation for shortness of breath.  Complex cardiac hx, including CAD s/p multi-vessel PCI- most recently to [place], LAD/LCx/OM  ([place]) in setting of ventricular tachycardia. s/p TAVR, ESRD on HD.  LVEF 40% last admission, after PCI and VT settled- offered ICD vs lifevest- pt elected for neither, sent home 25.  Since discharge, felt bloated/constipated for a few days, better with stool softener.  Returns today with SOB and weakness, unable to make it to HD.  ER vitals 152/96, HR 62, ECG/tele SR LBBB.  mild trop elevation 97, pBNP 25K.     Medications:  No current facility-administered medications for this encounter.       Past Medical History:    Aortic stenosis    Calculus of kidney    Coronary atherosclerosis    bare metal stents at [facility] 2021    Diabetes (HCC)     Disorder of thyroid    Esophageal varices without bleeding, unspecified esophageal varices type (HCC)    GIB (gastrointestinal bleeding)    Hepatitis, unspecified    hepatitis C-just completed taking Harvoni in November 2016    High blood pressure    High cholesterol    Other disorders of plasma-protein metabolism, not elsewhere classified    Renal disorder    Visual impairment    reading glasses       Past Surgical History:   Procedure Laterality Date    Angiogram  08/15/2017    small caliber RCA with 80% mid lesion.  LAD and left circumflex with 50% lesions.  LV shows inferobasilar aneurysm with scar.  Overall LV function preserved at the exterior 65%.    Cath bare metal stent (bms)      Other surgical history      Upper GI surgery    Other surgical history  12/11/2017    Cysto Dr. Lees    Other surgical history  07/18/2019    BTS Cysto Dr. Lees     Other surgical history  02/06/2020    BTS Cysto (Dr. Lees)       Family History  family history includes Cancer in his father; Hypertension in his mother.    Social History   reports that he quit smoking about 45 years ago. His smoking use included cigarettes. He started smoking about 60 years ago. He has a 7.5 pack-year smoking history. He has never used smokeless tobacco. He reports that he does not drink alcohol and does not use drugs.     Allergies  Allergies[1]      Review of Systems:  Constitutional: negative for fevers  Eyes: negative for visual disturbance  Ears, nose, mouth, throat, and face: negative for epistaxis  Respiratory: negative for dyspnea on exertion  Cardiovascular: negative for chest pain  Gastrointestinal: negative for melena  Genitourinary:negative for hematuria  Hematologic/lymphatic: negative for bleeding  Musculoskeletal:negative for myalgias  Neurological: negative for dizziness and headaches  Endocrine: negative for temperature intolerance      Physical Exam:  Blood pressure 136/64, pulse 54, temperature 98 °F (36.7 °C), temperature source  Oral, resp. rate 16, weight 212 lb 1.3 oz (96.2 kg), SpO2 99%.  Temp (24hrs), Av °F (36.7 °C), Min:98 °F (36.7 °C), Max:98 °F (36.7 °C)    Wt Readings from Last 3 Encounters:   02/15/25 212 lb 1.3 oz (96.2 kg)   25 212 lb 1.3 oz (96.2 kg)   25 214 lb (97.1 kg)       General: Awake and alert; in no acute distress  HEENT: Extraocular movements are intact; sclerae are anicteric; scalp is atrauamatic; no thyromegaly  Neck: Supple; no JVD; no carotid bruits  Cardiac: Regular rate and regular rhythm; no murmurs/rubs/gallops are appreciated; PMI is non-displaced; there is no evidence of a sternal heave  Lungs: Clear to auscultation bilaterally; no accessory muscle use is noted  Abdomen: Soft, non-tender; bowel sounds are normoactive; no hepatosplenomegaly  Extremities: No clubbing or cyanosis; moves all 4 extremities normally  Psychiatric: Normal mood and affect; answers questions appropriately  Dermatologic: No rashes; normal skin turgor    Diagnostic testing:    EKG: Normal sinus rhythm    Labs:   Lab Results   Component Value Date    INR 1.08 2025    INR 1.13 2024        Lab Results   Component Value Date    WBC 5.4 02/15/2025    HGB 10.3 02/15/2025    HCT 29.6 02/15/2025    PLT 61.0 02/15/2025    CREATSERUM 8.22 02/15/2025    BUN 61 02/15/2025     02/15/2025    K 4.7 02/15/2025    CL 93 02/15/2025    CO2 28.0 02/15/2025     02/15/2025    CA 9.8 02/15/2025    ALB 4.1 02/15/2025    ALKPHO 142 02/15/2025    BILT 0.5 02/15/2025    TP 7.1 02/15/2025    AST 23 02/15/2025    ALT 17 02/15/2025         Thank you for allowing our practice to participate in the care of your patient. Please do not hesitate to contact me if you have any questions.    Johnson Castro MD  2/15/2025  12:09 PM         [1] No Known Allergies

## 2025-02-15 NOTE — PLAN OF CARE
NURSING ADMISSION NOTE      Patient admitted via Cart  Oriented to room.  Safety precautions initiated.  Bed in low position.  Call light in reach.      Problem: Diabetes/Glucose Control  Goal: Glucose maintained within prescribed range  Description: INTERVENTIONS:  - Monitor Blood Glucose as ordered  - Assess for signs and symptoms of hyperglycemia and hypoglycemia  - Administer ordered medications to maintain glucose within target range  - Assess barriers to adequate nutritional intake and initiate nutrition consult as needed  - Instruct patient on self management of diabetes  Outcome: Progressing     Problem: Patient/Family Goals  Goal: Patient/Family Long Term Goal  Description: Patient's Long Term Goal: DC to home     Interventions:  - Follow plan of care    - See additional Care Plan goals for specific interventions  Outcome: Progressing  Goal: Patient/Family Short Term Goal  Description: Patient's Short Term Goal: HD today     Interventions:   - MEds, HD , fall precaution     - See additional Care Plan goals for specific interventions  Outcome: Progressing     Problem: CARDIOVASCULAR - ADULT  Goal: Maintains optimal cardiac output and hemodynamic stability  Description: INTERVENTIONS:  - Monitor vital signs, rhythm, and trends  - Monitor for bleeding, hypotension and signs of decreased cardiac output  - Evaluate effectiveness of vasoactive medications to optimize hemodynamic stability  - Monitor arterial and/or venous puncture sites for bleeding and/or hematoma  - Assess quality of pulses, skin color and temperature  - Assess for signs of decreased coronary artery perfusion - ex. Angina  - Evaluate fluid balance, assess for edema, trend weights  Outcome: Progressing  Goal: Absence of cardiac arrhythmias or at baseline  Description: INTERVENTIONS:  - Continuous cardiac monitoring, monitor vital signs, obtain 12 lead EKG if indicated  - Evaluate effectiveness of antiarrhythmic and heart rate control  medications as ordered  - Initiate emergency measures for life threatening arrhythmias  - Monitor electrolytes and administer replacement therapy as ordered  Outcome: Progressing     Problem: RESPIRATORY - ADULT  Goal: Achieves optimal ventilation and oxygenation  Description: INTERVENTIONS:  - Assess for changes in respiratory status  - Assess for changes in mentation and behavior  - Position to facilitate oxygenation and minimize respiratory effort  - Oxygen supplementation based on oxygen saturation or ABGs  - Provide Smoking Cessation handout, if applicable  - Encourage broncho-pulmonary hygiene including cough, deep breathe, Incentive Spirometry  - Assess the need for suctioning and perform as needed  - Assess and instruct to report SOB or any respiratory difficulty  - Respiratory Therapy support as indicated  - Manage/alleviate anxiety  - Monitor for signs/symptoms of CO2 retention  Outcome: Progressing

## 2025-02-16 VITALS
HEART RATE: 58 BPM | WEIGHT: 203.06 LBS | SYSTOLIC BLOOD PRESSURE: 139 MMHG | TEMPERATURE: 98 F | OXYGEN SATURATION: 98 % | RESPIRATION RATE: 20 BRPM | DIASTOLIC BLOOD PRESSURE: 64 MMHG | BODY MASS INDEX: 28 KG/M2

## 2025-02-16 LAB
ALBUMIN SERPL-MCNC: 4.2 G/DL (ref 3.2–4.8)
ALBUMIN/GLOB SERPL: 1.5 {RATIO} (ref 1–2)
ALP LIVER SERPL-CCNC: 157 U/L
ALT SERPL-CCNC: 16 U/L
ANION GAP SERPL CALC-SCNC: 13 MMOL/L (ref 0–18)
AST SERPL-CCNC: 22 U/L (ref ?–34)
BASOPHILS # BLD AUTO: 0.05 X10(3) UL (ref 0–0.2)
BASOPHILS NFR BLD AUTO: 0.9 %
BILIRUB SERPL-MCNC: 0.5 MG/DL (ref 0.2–1.1)
BUN BLD-MCNC: 38 MG/DL (ref 9–23)
CALCIUM BLD-MCNC: 9.8 MG/DL (ref 8.7–10.6)
CHLORIDE SERPL-SCNC: 95 MMOL/L (ref 98–112)
CO2 SERPL-SCNC: 26 MMOL/L (ref 21–32)
CREAT BLD-MCNC: 6.3 MG/DL
EGFRCR SERPLBLD CKD-EPI 2021: 9 ML/MIN/1.73M2 (ref 60–?)
EOSINOPHIL # BLD AUTO: 0.42 X10(3) UL (ref 0–0.7)
EOSINOPHIL NFR BLD AUTO: 7.4 %
ERYTHROCYTE [DISTWIDTH] IN BLOOD BY AUTOMATED COUNT: 15.9 %
GLOBULIN PLAS-MCNC: 2.8 G/DL (ref 2–3.5)
GLUCOSE BLD-MCNC: 148 MG/DL (ref 70–99)
GLUCOSE BLD-MCNC: 154 MG/DL (ref 70–99)
GLUCOSE BLD-MCNC: 217 MG/DL (ref 70–99)
HCT VFR BLD AUTO: 30.3 %
HGB BLD-MCNC: 10.3 G/DL
IMM GRANULOCYTES # BLD AUTO: 0.02 X10(3) UL (ref 0–1)
IMM GRANULOCYTES NFR BLD: 0.4 %
LYMPHOCYTES # BLD AUTO: 1.12 X10(3) UL (ref 1–4)
LYMPHOCYTES NFR BLD AUTO: 19.6 %
MCH RBC QN AUTO: 35.8 PG (ref 26–34)
MCHC RBC AUTO-ENTMCNC: 34 G/DL (ref 31–37)
MCV RBC AUTO: 105.2 FL
MONOCYTES # BLD AUTO: 0.74 X10(3) UL (ref 0.1–1)
MONOCYTES NFR BLD AUTO: 13 %
NEUTROPHILS # BLD AUTO: 3.36 X10 (3) UL (ref 1.5–7.7)
NEUTROPHILS # BLD AUTO: 3.36 X10(3) UL (ref 1.5–7.7)
NEUTROPHILS NFR BLD AUTO: 58.7 %
OSMOLALITY SERPL CALC.SUM OF ELEC: 290 MOSM/KG (ref 275–295)
PLATELET # BLD AUTO: 49 10(3)UL (ref 150–450)
PLATELETS.RETICULATED NFR BLD AUTO: 2.2 % (ref 0–7)
POTASSIUM SERPL-SCNC: 4.6 MMOL/L (ref 3.5–5.1)
PROT SERPL-MCNC: 7 G/DL (ref 5.7–8.2)
RBC # BLD AUTO: 2.88 X10(6)UL
SODIUM SERPL-SCNC: 134 MMOL/L (ref 136–145)
WBC # BLD AUTO: 5.7 X10(3) UL (ref 4–11)

## 2025-02-16 PROCEDURE — 99223 1ST HOSP IP/OBS HIGH 75: CPT | Performed by: INTERNAL MEDICINE

## 2025-02-16 RX ORDER — TRAMADOL HYDROCHLORIDE 50 MG/1
50 TABLET ORAL ONCE
Status: COMPLETED | OUTPATIENT
Start: 2025-02-16 | End: 2025-02-16

## 2025-02-16 NOTE — PLAN OF CARE
Pt Alert x4, Denies any SOB or Chest pain. Pt C/O Lower back pain received one time dose of tramadol and lidocaine patch, pain improve over night. Pt up to bathroom had a BM. Tolerated HD removed 3000 fluid.     Problem: Diabetes/Glucose Control  Goal: Glucose maintained within prescribed range  Description: INTERVENTIONS:  - Monitor Blood Glucose as ordered  - Assess for signs and symptoms of hyperglycemia and hypoglycemia  - Administer ordered medications to maintain glucose within target range  - Assess barriers to adequate nutritional intake and initiate nutrition consult as needed  - Instruct patient on self management of diabetes  Outcome: Progressing     Problem: Patient/Family Goals  Goal: Patient/Family Long Term Goal  Description: Patient's Long Term Goal: DC to home     Interventions:  - Follow plan of care    - See additional Care Plan goals for specific interventions  Outcome: Progressing  Note: Go home  Goal: Patient/Family Short Term Goal  Description: Patient's Short Term Goal: HD today     Interventions:   - MEds, HD , fall precaution     - See additional Care Plan goals for specific interventions  Outcome: Progressing  Note: Improve SOB     Problem: CARDIOVASCULAR - ADULT  Goal: Maintains optimal cardiac output and hemodynamic stability  Description: INTERVENTIONS:  - Monitor vital signs, rhythm, and trends  - Monitor for bleeding, hypotension and signs of decreased cardiac output  - Evaluate effectiveness of vasoactive medications to optimize hemodynamic stability  - Monitor arterial and/or venous puncture sites for bleeding and/or hematoma  - Assess quality of pulses, skin color and temperature  - Assess for signs of decreased coronary artery perfusion - ex. Angina  - Evaluate fluid balance, assess for edema, trend weights  Outcome: Progressing  Goal: Absence of cardiac arrhythmias or at baseline  Description: INTERVENTIONS:  - Continuous cardiac monitoring, monitor vital signs, obtain 12 lead  EKG if indicated  - Evaluate effectiveness of antiarrhythmic and heart rate control medications as ordered  - Initiate emergency measures for life threatening arrhythmias  - Monitor electrolytes and administer replacement therapy as ordered  Outcome: Progressing     Problem: RESPIRATORY - ADULT  Goal: Achieves optimal ventilation and oxygenation  Description: INTERVENTIONS:  - Assess for changes in respiratory status  - Assess for changes in mentation and behavior  - Position to facilitate oxygenation and minimize respiratory effort  - Oxygen supplementation based on oxygen saturation or ABGs  - Provide Smoking Cessation handout, if applicable  - Encourage broncho-pulmonary hygiene including cough, deep breathe, Incentive Spirometry  - Assess the need for suctioning and perform as needed  - Assess and instruct to report SOB or any respiratory difficulty  - Respiratory Therapy support as indicated  - Manage/alleviate anxiety  - Monitor for signs/symptoms of CO2 retention  Outcome: Progressing

## 2025-02-16 NOTE — CM/SW NOTE
Order received for SDOH  Confirmed w/RN that order was in error and pt has no safety concerns    / to remain available for support and/or discharge planning.     Porsche Guadarrama MBA MSN, RN CTL/  [id number]

## 2025-02-16 NOTE — PROGRESS NOTES
[facility]  Progress Note    Jonny Haque Patient Status:  Inpatient    4/15/1945 MRN [id number]   [facility] 2NE-A Attending Bette Aguiar MD   Hosp Day # 1 PCP Gee Jimenez MD       Impression:  1. shortness of breath, weakness in setting of complex cardiac disease--> markedly elevated pBNP, mild HS trop 90s--> improved after HD yesterday.       2. CAD/ICM LVEF 40%; extensive PCI hx, recent PCI- ULM, LAD/LCx/OM (25)     3. hx VT (), presumably ischemic/scar related- resolved on lidocaine--> ECG/tele: SR   LBBB     4. s/p TAVR     5. ESRD on HD     Recommendations:  - weakness/SOB--> no acute cardiac decompensation, improved after HD session yesterday (3L removal). SR without ventricular arrhythmia, no signs of acute ischemia/ACS. HD stable.   - med tx: asa/clopidogrel, carvedilol, rosuvastatin, amiodarone.    - re-discussed ICD/lifevest- would like to continue to decline for now, but in consideration of vest, wants to talk further with Dr. Donald-outpatient.  - ok for dc from cv standpoint; f/u Dr. Donald 2 weeks as scheduled.      Subjective:  The patient denies any chest pain or dyspnea at this time. Feeling better today.  mild LBP- chronic.    Objective:  /76 (BP Location: Right arm)   Pulse 69   Temp 97.8 °F (36.6 °C) (Oral)   Resp 18   Wt 203 lb 0.7 oz (92.1 kg)   SpO2 97%   BMI 28.32 kg/m²   Temp (24hrs), Av.9 °F (36.6 °C), Min:97.6 °F (36.4 °C), Max:98.3 °F (36.8 °C)      Intake/Output Summary (Last 24 hours) at 2025 07  Last data filed at 2/15/2025 1955  Gross per 24 hour   Intake 720 ml   Output 3000 ml   Net -2280 ml     Wt Readings from Last 3 Encounters:   25 203 lb 0.7 oz (92.1 kg)   25 212 lb 1.3 oz (96.2 kg)   25 214 lb (97.1 kg)       General: Awake and alert; in no acute distress  Cardiac: Regular rate and regular rhythm; no murmurs/rubs/gallops are appreciated  Lungs: Clear to auscultation bilaterally; no  accessory muscle use  Abdomen: Soft, non-tender; bowel sounds are normoactive  Extremities: No clubbing/cyanosis; moves all 4 extremities normally    Current Facility-Administered Medications   Medication Dose Route Frequency    heparin (Porcine) 5000 UNIT/ML injection 5,000 Units  5,000 Units Subcutaneous Q8H ANNEL    acetaminophen (Tylenol Extra Strength) tab 500 mg  500 mg Oral Q4H PRN    ondansetron (Zofran) 4 MG/2ML injection 4 mg  4 mg Intravenous Q6H PRN    insulin aspart (NovoLOG) 100 Units/mL FlexPen 1-5 Units  1-5 Units Subcutaneous TID AC and HS    sodium chloride 0.9 % IV bolus 100 mL  100 mL Intravenous Q30 Min PRN    And    albumin human (Albumin) 25% injection 25 g  25 g Intravenous PRN Dialysis    amiodarone (Pacerone) tab 100 mg  100 mg Oral BID    carvedilol (Coreg) tab 6.25 mg  6.25 mg Oral BID with meals    aspirin DR tab 81 mg  81 mg Oral QOD    clopidogrel (Plavix) tab 75 mg  75 mg Oral Daily    levothyroxine (Synthroid) tab 200 mcg  200 mcg Oral Before breakfast    losartan (Cozaar) tab 25 mg  25 mg Oral Daily    pantoprazole (Protonix) DR tab 40 mg  40 mg Oral BID AC    rosuvastatin (Crestor) tab 10 mg  10 mg Oral Nightly    insulin degludec (Tresiba) 100 units/mL flextouch 20 Units  20 Units Subcutaneous Nightly    metoclopramide (Reglan) 5 mg/mL injection 5 mg  5 mg Intravenous Q8H PRN    glucose (Dex4) 15 GM/59ML oral liquid 15 g  15 g Oral Q15 Min PRN    Or    glucose (Glutose) 40% oral gel 15 g  15 g Oral Q15 Min PRN    Or    glucose-vitamin C (Dex-4) chewable tab 4 tablet  4 tablet Oral Q15 Min PRN    Or    dextrose 50% injection 50 mL  50 mL Intravenous Q15 Min PRN    Or    glucose (Dex4) 15 GM/59ML oral liquid 30 g  30 g Oral Q15 Min PRN    Or    glucose (Glutose) 40% oral gel 30 g  30 g Oral Q15 Min PRN    Or    glucose-vitamin C (Dex-4) chewable tab 8 tablet  8 tablet Oral Q15 Min PRN    heparin (Porcine) 1000 UNIT/ML injection 1,500 Units  1.5 mL Intracatheter PRN Dialysis     lidocaine-menthol 4-1 % patch 1 patch  1 patch Transdermal Once       Laboratory Data:  Lab Results   Component Value Date    WBC 5.7 02/16/2025    HGB 10.3 02/16/2025    HCT 30.3 02/16/2025    PLT 49.0 02/16/2025     Lab Results   Component Value Date    INR 1.08 02/06/2025    INR 1.13 09/18/2024    INR 1.10 05/08/2024     Lab Results   Component Value Date     02/16/2025    K 4.6 02/16/2025    CL 95 02/16/2025    CO2 26.0 02/16/2025    BUN 38 02/16/2025    CREATSERUM 6.30 02/16/2025     02/16/2025    CA 9.8 02/16/2025       Telemetry: No malignant tachyarrhythmias or bradyarrhythmias      Thank you for allowing our practice to participate in the care of your patient. Please do not hesitate to contact me if you have any questions.

## 2025-02-16 NOTE — DISCHARGE SUMMARY
[facility] Hospitalist Discharge Summary    Patient ID  Jonny Haque  [id number]  79 year old  4/15/1945  Admit date: 2/15/2025  Discharge date:  2/16/25  Attending: Bette Aguiar MD   Primary Care Physician: Gee Jimenez MD   Reason for admission:  (see HPI on HP for further detail)  Discharge condition: stable     Disposition: home    Important follow up:  -PCP and cards     Discharge med list     Medication List        CHANGE how you take these medications      Insulin Lispro (1 Unit Dial) 100 UNIT/ML Sopn  Commonly known as: HumaLOG KwikPen  12 units before meals with sliding scale. Max daily dose: 50 units.  What changed:   how much to take  additional instructions            CONTINUE taking these medications      amiodarone 100 MG Tabs  Commonly known as: Pacerone  Take 1 tablet (100 mg total) by mouth 2 (two) times daily.     aspirin 81 MG Tbec     Auryxia 1  MG(Fe) Tabs  Generic drug: Ferric Citrate     Vernell Contour Next Monitor w/Device Kit  1 Device by Does not apply route 4 (four) times daily.     carvedilol 6.25 MG Tabs  Commonly known as: Coreg  Take 1 tablet (6.25 mg total) by mouth 2 (two) times daily with meals.     clopidogrel 75 MG Tabs  Commonly known as: Plavix  Take 1 tablet (75 mg total) by mouth daily.     * Contour Next Test Strp  Generic drug: Glucose Blood  TEST BLOOD SUGAR FOUR TIMES DAILY     * OneTouch Ultra Strp  Generic drug: Glucose Blood  6 times a day     Lanthanum Carbonate 1000 MG Chew  Commonly known as: FOSRENOL     levothyroxine 200 MCG Tabs  Commonly known as: Synthroid     losartan 25 MG Tabs  Commonly known as: Cozaar  Take 1 tablet (25 mg total) by mouth daily.     nitroglycerin 0.4 MG Subl  Commonly known as: Nitrostat  Place 1 tablet (0.4 mg total) under the tongue every 5 (five) minutes as needed for Chest pain.     pantoprazole 40 MG Tbec  Commonly known as: Protonix     rosuvastatin 10 MG Tabs  Commonly known as: Crestor  Take 1 tablet (10 mg total) by mouth  nightly.     Benjy Doran SoloStar 300 UNIT/ML Sopn  Generic drug: Insulin Glargine (2 Unit Dial)  Inject 20 Units into the skin nightly.     TRUEplus Pen Needles 31G X 5 MM Misc  Generic drug: Insulin Pen Needle  Use 5 per day     zolpidem 10 MG Tabs  Commonly known as: Ambien           * This list has 2 medication(s) that are the same as other medications prescribed for you. Read the directions carefully, and ask your doctor or other care provider to review them with you.                STOP taking these medications      traMADol 50 MG Tabs  Commonly known as: Ultram              Discharge Diagnoses/Hospital course:   79 year old male CAD s/p multiple PCIs last one on 2/2024, severe aortic stenosis s/p TAVR, PAF (not on AC), type II DM, ESRD on HD and cirrhosis here with SOB            Plan:    SOB/TOMLIN  - concern for acute on chronic systolic CHF  - daily weights, strict I /O, fluid restriction   - EKG okay  - trop up, trend per cards   - renal to see for HD - s/p HD, and pt much improved, no more SOB feels stronger     ESRD   - on HD  - renal following     CAD s/p multiple stents  Hx of VT required defibrillation 2/2024  aortic stenosis s/p TAVR    - last stent 2/2024  - DAPT         PAF  - not on AC  - rate contolled, amiodarone      DM2  - ssi     Consults:  IP CONSULT TO CARDIOLOGY  IP CONSULT TO HOSPITALIST  IP CONSULT TO NEPHROLOGY  IP CONSULT TO SOCIAL WORK    Radiology:  XR CHEST AP PORTABLE  (CPT=71045)    Result Date: 2/15/2025  PROCEDURE:  XR CHEST AP PORTABLE  (CPT=71045)  TECHNIQUE:  AP chest radiograph was obtained.  COMPARISON:  [facility] , XR, XR CHEST AP PORTABLE  (CPT=71045), 9/17/2024, 1:50 PM.  INDICATIONS:  chest discomfort, recent VT/cardiac issues  PATIENT STATED HISTORY: (As transcribed by Technologist)     FINDINGS:  TAVR changes noted.  Mild atelectasis/scarring in the lower lungs.  Cardiomegaly with normal pulmonary vascularity. No pleural effusion or pneumothorax. No lobar consolidation.   Degenerative changes in the spine.            CONCLUSION:  No lobar pneumonia or overt congestive failure.  Mild atelectasis/scarring in the lower lungs.   LOCATION:  [facility]      Dictated by ([facility]): Christian Trujillo MD on 2/15/2025 at 12:27 PM     Finalized by (CST): Christian Trujillo MD on 2/15/2025 at 12:27 PM       CARD ECHO 2D DOPPLER (CPT=93306)    Result Date: 2025  Transthoracic Echocardiogram Name:Jonny Haque Date: 2025 :  04/15/1945 Ht:  (71in)  BP: 132 / 48 MRN:  [id number]    Age:  79years    Wt:  (215lb) HR: 47bpm Loc:  [facility]       Gndr: M          BSA: 2.17m^2 Sonographer: Susie FRANCO Ordering:    Satya Donald MD Consulting:  Ted Hoff ---------------------------------------------------------------------------- History/Indications:   Atrial fibrillation.  Status-post percutaneous coronary intervention. Prior 26mm Edward Albert 3 Ultra Resilia. end stage renal disease on hemodialysis.  Risk factors:  Hypertension. Diabetes mellitus. ---------------------------------------------------------------------------- Procedure information:  A transthoracic complete 2D study was performed. Additional evaluation included M-mode, complete spectral Doppler, and color Doppler.  Patient status:  Inpatient.  Location:  [facility]. Comparison was made to the study of 2024.    This was a routine study. Transthoracic echocardiography for ventricular function evaluation and assessment of valvular function. Image quality was adequate. ---------------------------------------------------------------------------- Conclusions: 1. Left ventricle: The cavity size was normal. There was mild concentric    hypertrophy. Systolic function was moderately reduced. The estimated    ejection fraction was 35-40%, by visual assessment. Severe inferior,    posterior and lateral hypokinesis, the septal dyssynergy with underlying    LBBB. Left ventricular diastolic function is indeterminate. 2. Right ventricle: Systolic  function was normal. The RV pressure during    systole is 35mm Hg. 3. Left atrium: The left atrial volume was markedly increased. 4. Aortic valve: Well seated, normal functioning 26mm Edward-Albert 3 Ultra    Resilia bioprosthetic (TAVR) valve. There was mild perivalvular    regurgitation. The peak systolic velocity was 2.15m/sec. The acceleration    time was 65ms. The mean systolic gradient was 9mm Hg. The dimensionless    index was 0.41. Impressions:  This study is compared with previous dated 09/17/2024: Left ventriular systolic function is now moderately depressed (prior LVEF 55-60%). * ---------------------------------------------------------------------------- * Findings: Left ventricle:  The cavity size was normal. There was mild concentric hypertrophy. Systolic function was moderately reduced. The estimated ejection fraction was 35-40%, by visual assessment. Severe inferior, posterior and lateral hypokinesis, the septal dyssynergy with underlying LBBB. Left ventricular diastolic function is indeterminate. Ventricular septum:   Septal motion was dyssynergic. These changes are consistent with a left bundle branch block. Left atrium:  The left atrial volume was markedly increased. Right ventricle:  The cavity size was normal. Systolic function was normal. Right atrium:  The atrium was normal in size. Mitral valve:  The annulus was mildly calcified. Leaflet separation was normal.  Doppler:  Transvalvular velocity was within the normal range. There was no evidence for stenosis. There was trace regurgitation. Aortic valve:  A 26mm Edward-Albert 3 Ultra Resilia bioprosthetic (TAVR) valve is present and functioning normally. The prosthesis had a normal range of motion. The sewing ring had no rocking motion and showed no evidence of dehiscence.  Doppler:  Transvalvular velocity was within the normal range, within limits of normal for the valve type. There was no evidence for stenosis.  There was mild perivalvular  regurgitation.      The valve area (VTI) was 2.01cm^2. The valve area (VTI) index was 0.92cm^2/m^2.    The mean systolic gradient was 9mm Hg. The dimensionless index was 0.41. The peak systolic gradient was 18mm Hg. Tricuspid valve:  The valve is structurally normal. Leaflet separation was normal.  Doppler:  Transvalvular velocity was within the normal range. There was no evidence for stenosis. There was trivial regurgitation. Pulmonic valve:    There was no significant valve disease.    Doppler: Transvalvular velocity was within the normal range. There was no evidence for stenosis. There was mild regurgitation. Pericardium:   There was no pericardial effusion. Aorta: Ascending aorta: The ascending aorta was normal. Pulmonary arteries: Not well visualized. Systolic pressure was at the upper limits of normal to, at most, mildly increased; in the range of 30 mm Hg to 35 mm Hg. Estimated pulmonary artery diastolic pressure was 12mm Hg. Systemic veins:  Central venous respirophasic diameter changes are in the normal range (>50%). Inferior vena cava: The IVC was normal-sized. ---------------------------------------------------------------------------- Measurements  Left              Value             Ref       09/17/2024  ventricle  IVS           (H) 1.3   cm          0.6 - 1.0 1.1  thickness,  ED, PLAX  LV ID, ED,        5.4   cm          4.2 - 5.8 6.0  PLAX  LV ID, ES,        3.8   cm          2.5 - 4.0 3.5  PLAX  LV PW         (H) 1.2   cm          0.6 - 1.0 1.1  thickness,  ED, PLAX  IVS/LV PW         1.08              --------- 1.02  ratio, ED,  PLAX  LV PW/LV ID       0.22              --------- 0.19  ratio, ED,  PLAX  LV ejection       58    %           52 - 72   71  fraction  Stroke            45    ml/m^2      --------- 61  volume/bsa,  2D  LV e',        (L) 4.5   cm/sec      >=10.0    ----------  lateral  LV E/e',      (H) 17                <=13      ----------  lateral  LV e', medial (L) 3.7   cm/sec       >=7.0     ----------  LV E/e',          21                --------- ----------  medial  LV e',            4.1   cm/sec      --------- ----------  average  LV E/e',      (H) 19                <=14      ----------  average  LVOT              Value             Ref       09/17/2024  LVOT ID           2.5   cm          --------- 2.3  LVOT peak         0.82  m/sec       --------- 1.14  velocity, S  LVOT VTI, S       19.7  cm          --------- 31.4  LVOT mean         1     mm Hg       --------- 2  gradient, S  Stroke volume     97    ml          --------- 130  (SV), LVOT DP  Cardiac           4.3   L/min       --------- ----------  output (Qs),  LVOT DP  Cardiac index     2     L/(min-m^2) --------- ----------  (Qs/bsa),  LVOT DP  Stroke index      44    ml/m^2      --------- 60  (SV/bsa),  LVOT DP  Aortic valve      Value             Ref       09/17/2024  Aortic valve      2.15  m/sec       --------- 1.71  peak  velocity, S  Aortic valve      48.1  cm          --------- 47.4  VTI, S  Aortic            65    ms          --------- 95  acceleration  time  Aortic mean       9     mm Hg       --------- 6  gradient, S  Aortic peak       18    mm Hg       --------- 13  gradient, S  Aortic valve      2.01  cm^2        --------- 2.78  area, VTI  Aortic valve      0.92  cm^2/m^2    --------- 1.27  area/bsa, VTI  Velocity          0.38              --------- 0.62  ratio, peak,  LVOT/AV  Ascending         Value             Ref       09/17/2024  aorta  Ascending         3.4   cm          2.2 - 3.8 3.2  aorta ID,  A-P, ED  Left atrium       Value             Ref       09/17/2024  LA ID, A-P,   (H) 4.2   cm          3.0 - 4.0 4.7  ES  LA volume, S  (H) 130   ml          18 - 58   102  LA            (H) 60    ml/m^2      16 - 34   47  volume/bsa, S  LA volume,    (H) 121   ml          18 - 58   95  ES, 1-p A4C  LA volume,    (H) 122   ml          18 - 58   111  ES, 1-p A2C  LA volume,        137   ml          --------- 109  ES, A/L  LA             (H) 63    ml/m^2      16 - 34   50  volume/bsa,  ES, A/L  Mitral valve      Value             Ref       09/17/2024  Mitral E-wave     0.77  m/sec       --------- ----------  peak velocity  Mitral A-wave     0.99  m/sec       --------- ----------  peak velocity  Mitral peak       2     mm Hg       --------- ----------  gradient, D  Mitral E/A        0.8               --------- ----------  ratio, peak  Pulmonary         Value             Ref       09/17/2024  artery  PA pressure,      35    mm Hg       --------- ----------  S, DP  PA pressure,      12    mm Hg       --------- ----------  ED, DP  Tricuspid         Value             Ref       09/17/2024  valve  Tricuspid     (H) 2.81  m/sec       <=2.8     2.74  regurg peak  velocity  Tricuspid         32    mm Hg       --------- 30  peak RV-RA  gradient  Systemic          Value             Ref       09/17/2024  veins  Estimated CVP     3     mm Hg       --------- 3  Right             Value             Ref       09/17/2024  ventricle  TAPSE, 2D         2.48  cm          >=1.70    2.90  RV pressure,      35    mm Hg       --------- 33  S, DP  Pulmonic          Value             Ref       09/17/2024  valve  Pulmonic          1.48  m/sec       --------- ----------  regurg  velocity, ED  Pulmonic          9     mm Hg       --------- ----------  regurg  gradient, ED Legend: (L)  and  (H)  gali values outside specified reference range. ---------------------------------------------------------------------------- Prepared and electronically signed by Johnson Castro 02/07/2025 14:11     CATH ANGIO    Result Date: 2/6/2025  This exam has been completed. Please refer to Notes for the results to this procedure.    CT ABDOMEN+PELVIS(CONTRAST ONLY)(CPT=74177)    Result Date: 1/31/2025  PROCEDURE:  CT ABDOMEN+PELVIS (CONTRAST ONLY) (CPT=74177)  COMPARISON:  [facility] , CT, CTA ABD/PEL (CPT=74174), 8/09/2024, 4:27 PM.  INDICATIONS:  fall down one step yesterday at 6pm; lower back  pain; no LOC, on plavix  TECHNIQUE:  CT scanning was performed from the dome of the diaphragm to the pubic symphysis with non-ionic intravenous contrast material. Post contrast coronal MPR imaging was performed.  Dose reduction techniques were used. Dose information is transmitted to the ACR (American College of Radiology) NRDR (National Radiology Data Registry) which includes the Dose Index Registry.  PATIENT STATED HISTORY:(As transcribed by Technologist)  Patient had a fall yesterday, now presents with lower back pain.   CONTRAST USED:  100cc of Isovue 370  FINDINGS:  LIVER:  Cirrhotic morphology liver is noted.  No gross mass is identified. BILIARY:  No visible dilatation or calcification.  PANCREAS:  No lesion, fluid collection, ductal dilatation, or atrophy.  SPLEEN:  No enlargement or focal lesion.  KIDNEYS:  The kidneys are atrophic in appearance.  No mass, obstruction, or calcification.  ADRENALS:  No mass or enlargement.  AORTA/VASCULAR:  No aneurysm or dissection.  Severe atherosclerotic disease is noted. RETROPERITONEUM:  No mass or adenopathy.  BOWEL/MESENTERY:  No visible mass, obstruction, or bowel wall thickening.  ABDOMINAL WALL:  Fat containing ventral hernia is noted (measuring up to 7.6 x 5.0 cm (image 74) URINARY BLADDER:  No visible focal wall thickening, lesion, or calculus.  PELVIC NODES:  No adenopathy.  PELVIC ORGANS:  Prominence of the prostate is noted BONES:  No bony lesion or fracture.  LUNG BASES:  Mild ground-glass opacities in the right lower lobe are noted. OTHER:  Negative.             CONCLUSION:  1. Fat containing ventral hernia. 2. Prominent prostate.  Correlate with PSA 3. No acute abnormality in the spine or back. 4. Cirrhotic morphology liver.   LOCATION:  [facility]   Dictated by (CST): Dusty Perkins MD on 1/31/2025 at 10:36 PM     Finalized by (CST): Dusty Perkins MD on 1/31/2025 at 10:41 PM       CT BRAIN OR HEAD (CPT=70450)    Result Date: 1/31/2025  PROCEDURE:  CT BRAIN OR  HEAD (30675)  COMPARISON:  [facility] , CT, CT BRAIN OR HEAD (85742), 6/11/2024, 7:11 PM.  INDICATIONS:  fall down one step yesterday at 6pm; lower back pain; no LOC, on plavix  TECHNIQUE:  Noncontrast CT scanning is performed through the brain. Dose reduction techniques were used. Dose information is transmitted to the ACR (American College of Radiology) NRDR (National Radiology Data Registry) which includes the Dose Index Registry.  PATIENT STATED HISTORY: (As transcribed by Technologist)  Fall on plavix.    FINDINGS: Volume loss is noted.  Ventricles are within normal limits.  There is no midline shift or mass-effect.  The basal cisterns are patent.  The gray-white matter differentiation is intact.  There is no acute intracranial hemorrhage or extra-axial fluid collection.  Hypodensities in the periventricular and subcortical white matter is compatible with chronic small vessel disease.  There is no evident fracture.  Mild mucosal thickening in the right maxillary sinus.            CONCLUSION:  No acute intracranial abnormality.    LOCATION:  [facility]   Dictated by (CST): Dusty Perkins MD on 1/31/2025 at 10:10 PM     Finalized by (CST): Dusty Perkins MD on 1/31/2025 at 10:13 PM       XR LUMBAR SPINE (MIN 2 VIEWS) (CPT=72100)    Result Date: 1/31/2025  PROCEDURE:  XR LUMBAR SPINE (MIN 2 VIEWS) (CPT=72100)  TECHNIQUE:  AP, lateral, and coned down L5-S1 views were obtained.  COMPARISON:  None.  INDICATIONS:  fall down one step yesterday at 6pm; lower back pain; no LOC, on plavix  PATIENT STATED HISTORY: (As transcribed by Technologist)  Patient states he had a fall Wednesday afternoon and has been having lower back pain since then.    FINDINGS:    BONES:  Normal vertebral body height and alignment.  Mild multilevel degenerative changes are present. DISC SPACES:  Mild multilevel degenerative changes are present.  No significant disc height narrowing, subluxation, or endplate abnormality. PARASPINOUS:  Negative.  No  paraspinous abnormality is seen. OTHER:  Extensive atherosclerotic disease is noted in the abdominal aorta.             CONCLUSION:  No acute abnormality in the lumbar spine.   LOCATION:  [facility]   Dictated by (CST): Dusty Perkins MD on 1/31/2025 at 8:36 PM     Finalized by (CST): Dusty Perkins MD on 1/31/2025 at 8:37 PM         Operative reports:          Day of discharge exam:  Vitals:    02/16/25 0555   BP: 132/76   Pulse: 69   Resp: 18   Temp: 97.8 °F (36.6 °C)     No acute distress   Lungs clear  Heart regular  Abdomen benign     Patient and/or family had opportunity to ask questions and expressed understanding and agreement with therapeutic plan as outlined      31 minutes spent on discharge    Bette Aguiar MD

## 2025-02-16 NOTE — PLAN OF CARE
Discharged  home via wheelchair  Accompanied by Support staff   Belongings taken by patient/family  PIV removed  Tele box removed & placed in the drawer  Discharge Navigator completed  Discharge instructions reviewed with patient a bedside  All questions & concerns addressed at his time.       Problem: Diabetes/Glucose Control  Goal: Glucose maintained within prescribed range  Description: INTERVENTIONS:  - Monitor Blood Glucose as ordered  - Assess for signs and symptoms of hyperglycemia and hypoglycemia  - Administer ordered medications to maintain glucose within target range  - Assess barriers to adequate nutritional intake and initiate nutrition consult as needed  - Instruct patient on self management of diabetes  Outcome: Progressing     Problem: CARDIOVASCULAR - ADULT  Goal: Maintains optimal cardiac output and hemodynamic stability  Description: INTERVENTIONS:  - Monitor vital signs, rhythm, and trends  - Monitor for bleeding, hypotension and signs of decreased cardiac output  - Evaluate effectiveness of vasoactive medications to optimize hemodynamic stability  - Monitor arterial and/or venous puncture sites for bleeding and/or hematoma  - Assess quality of pulses, skin color and temperature  - Assess for signs of decreased coronary artery perfusion - ex. Angina  - Evaluate fluid balance, assess for edema, trend weights  Outcome: Progressing  Goal: Absence of cardiac arrhythmias or at baseline  Description: INTERVENTIONS:  - Continuous cardiac monitoring, monitor vital signs, obtain 12 lead EKG if indicated  - Evaluate effectiveness of antiarrhythmic and heart rate control medications as ordered  - Initiate emergency measures for life threatening arrhythmias  - Monitor electrolytes and administer replacement therapy as ordered  Outcome: Progressing     Problem: RESPIRATORY - ADULT  Goal: Achieves optimal ventilation and oxygenation  Description: INTERVENTIONS:  - Assess for changes in respiratory status  -  Assess for changes in mentation and behavior  - Position to facilitate oxygenation and minimize respiratory effort  - Oxygen supplementation based on oxygen saturation or ABGs  - Provide Smoking Cessation handout, if applicable  - Encourage broncho-pulmonary hygiene including cough, deep breathe, Incentive Spirometry  - Assess the need for suctioning and perform as needed  - Assess and instruct to report SOB or any respiratory difficulty  - Respiratory Therapy support as indicated  - Manage/alleviate anxiety  - Monitor for signs/symptoms of CO2 retention  Outcome: Progressing

## 2025-02-16 NOTE — PHYSICAL THERAPY NOTE
PHYSICAL THERAPY EVALUATION - INPATIENT     Room Number: 2624/2624-A  Evaluation Date: 2/16/2025  Type of Evaluation: Initial  Physician Order: PT Eval and Treat    Presenting Problem: worsening SOB, weakness, mild chest discomfort  Co-Morbidities : CAD s/p mult PCI, severe aortic stenosis s/p TAVR, PAF, DM2, ESRD on HD  Reason for Therapy: Mobility Dysfunction and Discharge Planning    PHYSICAL THERAPY ASSESSMENT   Patient is a 79 year old male admitted 2/15/2025 for worsening SOB, weakness, and mild chest discomfort.   Patient is currently functioning near baseline with bed mobility, transfers, gait, and stair negotiation. Prior to admission, patient's baseline is mod I.     Patient will benefit from continued skilled PT Services at discharge to promote prior level of function.  Anticipate patient will return home with home health PT    PLAN  Patient has been evaluated and presents with no skilled Physical Therapy needs at this time.  Patient discharged from Physical Therapy services.  Please re-order if a new functional limitation presents during this admission.    PT Device Recommendation: Rolling walker    GOALS  Patient was able to achieve the following goals ...    Patient was able to transfer Safely and independently   Patient able to ambulate on level surfaces Safely and independently  With RW     HOME SITUATION  Type of Home: House  Home Layout: Multi-level           Stairs to Bedroom: 8    Railing: Yes    Lives With: Son    Drives: Yes   Patient Regularly Uses: Reading glasses;Rolling walker     Prior Level of Sanders: Pt typically mod I with all functional mobility; ambulates with RW.  Pt does report recent fall (~3 weeks ago) down a couple stairs when railing broke/came off wall while he was holding onto it; railing has been fixed.    SUBJECTIVE  Pt states he feels better today and able to tolerate more activity today.    OBJECTIVE  Precautions: Cardiac  Fall Risk: Standard fall risk    WEIGHT  BEARING RESTRICTION     PAIN ASSESSMENT  Ratin  Location: back  Management Techniques: Activity promotion;Body mechanics;Repositioning    COGNITION  Overall Cognitive Status:  WFL - within functional limits    RANGE OF MOTION AND STRENGTH ASSESSMENT  Upper extremity ROM and strength are within functional limits     Lower extremity ROM is within functional limits     Lower extremity strength is within functional limits     BALANCE  Static Sitting: Good  Dynamic Sitting: Good  Static Standing: Fair  Dynamic Standing: Fair -    ADDITIONAL TESTS                                    ACTIVITY TOLERANCE                         O2 WALK       NEUROLOGICAL FINDINGS                        AM-PAC '6-Clicks' INPATIENT SHORT FORM - BASIC MOBILITY  How much difficulty does the patient currently have...  Patient Difficulty: Turning over in bed (including adjusting bedclothes, sheets and blankets)?: None   Patient Difficulty: Sitting down on and standing up from a chair with arms (e.g., wheelchair, bedside commode, etc.): A Little   Patient Difficulty: Moving from lying on back to sitting on the side of the bed?: A Little   How much help from another person does the patient currently need...   Help from Another: Moving to and from a bed to a chair (including a wheelchair)?: A Little   Help from Another: Need to walk in hospital room?: A Little   Help from Another: Climbing 3-5 steps with a railing?: A Little       AM-PAC Score:  Raw Score: 19   Approx Degree of Impairment: 41.77%   Standardized Score (AM-PAC Scale): 45.44   CMS Modifier (G-Code): CK    FUNCTIONAL ABILITY STATUS  Gait Assessment   Functional Mobility/Gait Assessment  Gait Assistance: Supervision  Distance (ft): 290  Assistive Device: Rolling walker  Stairs: Stairs  How Many Stairs: 12  Device: 1 Rail  Assist: Supervision  Pattern: Ascend and Descend  Ascend and Descend : Step to    Skilled Therapy Provided     Bed Mobility:  Supine to sit: supervision - cued for  log roll to help protect back/spine      Transfer Mobility:  Sit to stand: mod I   Stand to sit: supervision; cued for hand placement and to be sure back of legs are against chair for safety  Gait = pt ambulated 290' with RW and supervision; cued for more upright posture and to avoid shoulder shrug    Therapist's comments:Pt lying in bed and agreeable to PT. Pt denies any dizziness, lightheadedness or SOB during session.     Exercise/Education Provided:  Body mechanics  Functional activity tolerated  Gait training  Posture  Transfer training    Patient End of Session: Up in chair;Needs met;Call light within reach;RN aware of session/findings;All patient questions and concerns addressed    Patient Evaluation Complexity Level:  History High - 3 or more personal factors and/or co-morbidities   Examination of body systems Low -  addressing 1-2 elements   Clinical Presentation Low- Stable   Clinical Decision Making Low Complexity       PT Session Time: 25 minutes  Gait Trainin minutes  Therapeutic Activity: 4 minutes

## 2025-02-16 NOTE — PLAN OF CARE
Patient is admitted for SOB and chest discomfort . Pt is AOX4.  VSS, afebrile and c/o back pain. Gave Toradol and Lidocaine patch. SpO2 maintained on RA. . Denies any SOB, cough. Tele-NSR/BBB. Heparin. Carb control diet/QID ACCU check.  Denies any n/v/d. Voids. Up with SBA.   Right chest  perm cath. Will continue to monitor. Pt is updated with plan of care.       Problem: Patient/Family Goals  Goal: Patient/Family Long Term Goal  Description: Patient's Long Term Goal: DC to home     Interventions:  - Follow plan of care    - See additional Care Plan goals for specific interventions  Outcome: Progressing  Goal: Patient/Family Short Term Goal  Description: Patient's Short Term Goal: HD today     Interventions:   - MEds, HD , fall precaution     - See additional Care Plan goals for specific interventions  Outcome: Progressing     Problem: CARDIOVASCULAR - ADULT  Goal: Maintains optimal cardiac output and hemodynamic stability  Description: INTERVENTIONS:  - Monitor vital signs, rhythm, and trends  - Monitor for bleeding, hypotension and signs of decreased cardiac output  - Evaluate effectiveness of vasoactive medications to optimize hemodynamic stability  - Monitor arterial and/or venous puncture sites for bleeding and/or hematoma  - Assess quality of pulses, skin color and temperature  - Assess for signs of decreased coronary artery perfusion - ex. Angina  - Evaluate fluid balance, assess for edema, trend weights  Outcome: Progressing  Goal: Absence of cardiac arrhythmias or at baseline  Description: INTERVENTIONS:  - Continuous cardiac monitoring, monitor vital signs, obtain 12 lead EKG if indicated  - Evaluate effectiveness of antiarrhythmic and heart rate control medications as ordered  - Initiate emergency measures for life threatening arrhythmias  - Monitor electrolytes and administer replacement therapy as ordered  Outcome: Progressing     Problem: RESPIRATORY - ADULT  Goal: Achieves optimal ventilation and  oxygenation  Description: INTERVENTIONS:  - Assess for changes in respiratory status  - Assess for changes in mentation and behavior  - Position to facilitate oxygenation and minimize respiratory effort  - Oxygen supplementation based on oxygen saturation or ABGs  - Provide Smoking Cessation handout, if applicable  - Encourage broncho-pulmonary hygiene including cough, deep breathe, Incentive Spirometry  - Assess the need for suctioning and perform as needed  - Assess and instruct to report SOB or any respiratory difficulty  - Respiratory Therapy support as indicated  - Manage/alleviate anxiety  - Monitor for signs/symptoms of CO2 retention  Outcome: Progressing

## 2025-02-16 NOTE — CONSULTS
[facility]  Report of Consultation    Jonny Haque Patient Status:  Inpatient    4/15/1945 MRN [id number]   Location [facility] 2NE-A Attending Bette Aguiar MD   Hosp Day # 1 PCP Gee Jimenez MD     Reason for Consultation:  ESRD    History of Present Illness:  Jonny Haque is a 79 year old male with history of ESRD, HTN, ICM who presented with shortness of breath. Nephrology consulted for inpatient ESRD management.     Presented to the ED yesterday due to shortness of breath, TOMLIN and chest pain. Was due for dialysis, last HD was Thursday. CXR without significant pulmonary edema noted, pro-BNP elevated. Received HD yesterday with 3L UF and feeling significant better this morning.     History:  Past Medical History:    Aortic stenosis    Calculus of kidney    Coronary atherosclerosis    bare metal stents at [facility] 2021    Diabetes (HCC)    Disorder of thyroid    Esophageal varices without bleeding, unspecified esophageal varices type (HCC)    GIB (gastrointestinal bleeding)    Hepatitis, unspecified    hepatitis C-just completed taking Harvoni in 2016    High blood pressure    High cholesterol    Other disorders of plasma-protein metabolism, not elsewhere classified    Renal disorder    Visual impairment    reading glasses     Past Surgical History:   Procedure Laterality Date    Angiogram  08/15/2017    small caliber RCA with 80% mid lesion.  LAD and left circumflex with 50% lesions.  LV shows inferobasilar aneurysm with scar.  Overall LV function preserved at the exterior 65%.    Cath bare metal stent (bms)      Cath transcatheter aortic valve replacement      Other surgical history      Upper GI surgery    Other surgical history  2017    [facility] Lees    Other surgical history  2019    [facility] Lees     Other surgical history  2020    [facility] Cysto (Dr. Lees)     Family History   Problem Relation Age of Onset    Cancer Father     Hypertension Mother       Denies family history of kidney disease.    reports that he quit smoking about 45 years ago. His smoking use included cigarettes. He started smoking about 60 years ago. He has a 7.5 pack-year smoking history. He has never used smokeless tobacco. He reports that he does not drink alcohol and does not use drugs.    Allergies:  Allergies[1]    Medications:    Current Facility-Administered Medications:     lidocaine-menthol 4-1 % patch 1 patch, 1 patch, Transdermal, Daily PRN    heparin (Porcine) 5000 UNIT/ML injection 5,000 Units, 5,000 Units, Subcutaneous, Q8H ANNEL    acetaminophen (Tylenol Extra Strength) tab 500 mg, 500 mg, Oral, Q4H PRN    ondansetron (Zofran) 4 MG/2ML injection 4 mg, 4 mg, Intravenous, Q6H PRN    insulin aspart (NovoLOG) 100 Units/mL FlexPen 1-5 Units, 1-5 Units, Subcutaneous, TID AC and HS    sodium chloride 0.9 % IV bolus 100 mL, 100 mL, Intravenous, Q30 Min PRN **AND** albumin human (Albumin) 25% injection 25 g, 25 g, Intravenous, PRN Dialysis    amiodarone (Pacerone) tab 100 mg, 100 mg, Oral, BID    carvedilol (Coreg) tab 6.25 mg, 6.25 mg, Oral, BID with meals    aspirin DR tab 81 mg, 81 mg, Oral, QOD    clopidogrel (Plavix) tab 75 mg, 75 mg, Oral, Daily    levothyroxine (Synthroid) tab 200 mcg, 200 mcg, Oral, Before breakfast    losartan (Cozaar) tab 25 mg, 25 mg, Oral, Daily    pantoprazole (Protonix) DR tab 40 mg, 40 mg, Oral, BID AC    rosuvastatin (Crestor) tab 10 mg, 10 mg, Oral, Nightly    insulin degludec (Tresiba) 100 units/mL flextouch 20 Units, 20 Units, Subcutaneous, Nightly    metoclopramide (Reglan) 5 mg/mL injection 5 mg, 5 mg, Intravenous, Q8H PRN    glucose (Dex4) 15 GM/59ML oral liquid 15 g, 15 g, Oral, Q15 Min PRN **OR** glucose (Glutose) 40% oral gel 15 g, 15 g, Oral, Q15 Min PRN **OR** glucose-vitamin C (Dex-4) chewable tab 4 tablet, 4 tablet, Oral, Q15 Min PRN **OR** dextrose 50% injection 50 mL, 50 mL, Intravenous, Q15 Min PRN **OR** glucose (Dex4) 15 GM/59ML oral  liquid 30 g, 30 g, Oral, Q15 Min PRN **OR** glucose (Glutose) 40% oral gel 30 g, 30 g, Oral, Q15 Min PRN **OR** glucose-vitamin C (Dex-4) chewable tab 8 tablet, 8 tablet, Oral, Q15 Min PRN    heparin (Porcine) 1000 UNIT/ML injection 1,500 Units, 1.5 mL, Intracatheter, PRN Dialysis  No current outpatient medications on file.       Review of Systems:  Please see HPI for pertinent positives. 10 point review of systems otherwise reviewed and negative.     Physical Exam:  /76 (BP Location: Right arm)   Pulse 69   Temp 97.8 °F (36.6 °C) (Oral)   Resp 18   Wt 203 lb 0.7 oz (92.1 kg)   SpO2 97%   BMI 28.32 kg/m²   Temp (24hrs), Av.9 °F (36.6 °C), Min:97.6 °F (36.4 °C), Max:98.3 °F (36.8 °C)       Intake/Output Summary (Last 24 hours) at 2025 1105  Last data filed at 2025 0900  Gross per 24 hour   Intake 1320 ml   Output 3000 ml   Net -1680 ml     Wt Readings from Last 3 Encounters:   25 203 lb 0.7 oz (92.1 kg)   25 212 lb 1.3 oz (96.2 kg)   25 214 lb (97.1 kg)     General: awake, alert  HEENT: No scleral icterus, MMM  Neck: Supple, no JADE or thyromegaly  Cardiac: Regular rate and rhythm, S1, S2 normal  Lungs: Decreased breath sounds at the bases bilaterally.   Abdomen: Soft, non-tender.   Extremities: Without clubbing, cyanosis; no edema  Neurologic: Cranial nerves grossly intact, moving all extremities  Skin: Warm and dry, no rashes    Laboratory Data:  Lab Results   Component Value Date    WBC 5.7 2025    HGB 10.3 2025    HCT 30.3 2025    PLT 49.0 2025    CREATSERUM 6.30 2025    BUN 38 2025     2025    K 4.6 2025    CL 95 2025    CO2 26.0 2025     2025    CA 9.8 2025    ALB 4.2 2025    ALKPHO 157 2025    BILT 0.5 2025    TP 7.0 2025    AST 22 2025    ALT 16 2025    PGLU 154 2025       Imaging:  All imaging studies reviewed.    Assessment / Plan:    1)  ESRD: Due to long-standing DM2. Continue HD per usual TThS schedule.     2) Anemia: due to ESRD. Continue JIE for goal hemoglobin 10-11    3) SOB: concern for acute on chronic HF, improved after receiving HD yesterday with 3L UF     No objection to discharge from renal perspective.    Thank you for allowing me to participate in this patient's care. Please feel free to call me with any questions or concerns.     Teetee Montano MD  02/16/25       [1] No Known Allergies

## 2025-02-18 NOTE — PAYOR COMM NOTE
2/15 THRU 2/16  ADMISSION REVIEW     Payor: [facility] MEDICARE ADV PPO  Subscriber #:  [id number]  Authorization Number: [id number]    Admit date: 2/15/25  Admit time:  1:52 PM       REVIEW DOCUMENTATION:     ED Provider Notes        ED Provider Notes signed by Kelton Woodward MD at 2/15/2025  1:06 PM       Author: Kelton Woodward MD Service: Emergency Medicine Author Type: Physician    Filed: 2/15/2025  1:06 PM Date of Service: 2/15/2025 11:17 AM Status: Signed    : Kelton Woodward MD (Physician)           Patient Seen in: [facility] Emergency Department      History     Chief Complaint   Patient presents with    Chest Pain Angina     Stated Complaint: chest discomfort, recent VT/cardiac issues    Subjective:   HPI      79-year-old man past medical history as below including recent admission for V. tach presents with shortness of breath and generalized weakness, fatigue, shortness of breath.  The last 2 days has been had more fatigue and more shortness of breath with exertion.  No chest pain.  No fevers.  No cough.  No abdominal pain.  No vomiting or diarrhea.    Objective:     Past Medical History:    Aortic stenosis    Calculus of kidney    Coronary atherosclerosis    bare metal stents at [facility] Jan 2021    Diabetes (HCC)    Disorder of thyroid    Esophageal varices without bleeding, unspecified esophageal varices type (HCC)    GIB (gastrointestinal bleeding)    Hepatitis, unspecified    hepatitis C-just completed taking Harvoni in November 2016    High blood pressure    High cholesterol    Other disorders of plasma-protein metabolism, not elsewhere classified    Renal disorder    Visual impairment    reading glasses              Past Surgical History:   Procedure Laterality Date    Angiogram  08/15/2017    small caliber RCA with 80% mid lesion.  LAD and left circumflex with 50% lesions.  LV shows inferobasilar aneurysm with scar.  Overall LV function preserved at the exterior 65%.    Cath bare metal  stent (bms)      Other surgical history      Upper GI surgery    Other surgical history  2017    Caroline Lees    Other surgical history  2019    BTS Cysto Dr. Lees     Other surgical history  2020    BTS Cysto (Dr. Lees)                Social History     Socioeconomic History    Marital status:    Tobacco Use    Smoking status: Former     Current packs/day: 0.00     Average packs/day: 0.5 packs/day for 15.0 years (7.5 ttl pk-yrs)     Types: Cigarettes     Start date: 1965     Quit date: 1980     Years since quittin.0    Smokeless tobacco: Never    Tobacco comments:     quit at age 34   Vaping Use    Vaping status: Never Used   Substance and Sexual Activity    Alcohol use: No    Drug use: No     Social Drivers of Health     Food Insecurity: No Food Insecurity (2025)    NCSS - Food Insecurity     Worried About Running Out of Food in the Last Year: No     Ran Out of Food in the Last Year: No   Transportation Needs: No Transportation Needs (2025)    NCSS - Transportation     Lack of Transportation: No   Housing Stability: Not At Risk (2025)    NCSS - Housing/Utilities     Has Housing: Yes     Worried About Losing Housing: No     Unable to Get Utilities: No                  Physical Exam     ED Triage Vitals   BP 02/15/25 1115 (!) 152/96   Pulse 02/15/25 1115 62   Resp 02/15/25 1115 14   Temp 02/15/25 1120 98 °F (36.7 °C)   Temp src 02/15/25 1120 Oral   SpO2 02/15/25 1115 97 %   O2 Device 02/15/25 1115 None (Room air)       Current Vitals:   Vital Signs  BP: 148/72  Pulse: 57  Resp: (!) 9  Temp: 98 °F (36.7 °C)  Temp src: Oral  MAP (mmHg): 91    Oxygen Therapy  SpO2: 97 %  O2 Device: None (Room air)        Physical Exam  Constitutional:       General: Is not in acute distress.     Appearance: Is not ill-appearing.   HENT:      Head: Normocephalic and atraumatic.      Mouth/Throat:      Mouth: Mucous membranes are moist.   Eyes:      Conjunctiva/sclera: Conjunctivae  normal.      Pupils: Pupils are equal, round, and reactive to light.   Cardiovascular:      Rate and Rhythm: Normal rate and regular rhythm.   Pulmonary:      Effort: Pulmonary effort is normal. No respiratory distress.      Breath sounds: Normal breath sounds.   Abdominal:      General: Abdomen is flat. There is no distension.      Tenderness: There is no abdominal tenderness.   Musculoskeletal:      Right lower leg: No edema.      Left lower leg: No edema.   Skin:     General: Skin is warm and dry.   Neurological:      General: No focal deficit present.      Mental Status: He is alert.       ED Course     Labs Reviewed   COMP METABOLIC PANEL (14) - Abnormal; Notable for the following components:       Result Value    Glucose 123 (*)     Sodium 135 (*)     Chloride 93 (*)     BUN 61 (*)     Creatinine 8.22 (*)     Calculated Osmolality 299 (*)     eGFR-Cr 6 (*)     Alkaline Phosphatase 142 (*)     All other components within normal limits   CBC WITH DIFFERENTIAL WITH PLATELET - Abnormal; Notable for the following components:    RBC 2.85 (*)     HGB 10.3 (*)     HCT 29.6 (*)     PLT 61.0 (*)     .9 (*)     MCH 36.1 (*)     Lymphocyte Absolute 0.99 (*)     All other components within normal limits   TROPONIN I HIGH SENSITIVITY - Abnormal; Notable for the following components:    Troponin I (High Sensitivity) 97 (*)     All other components within normal limits   PRO BETA NATRIURETIC PEPTIDE - Abnormal; Notable for the following components:    Pro-Beta Natriuretic Peptide 25,853 (*)     All other components within normal limits   LIPID PANEL - Abnormal; Notable for the following components:    HDL Cholesterol 31 (*)     Triglycerides 291 (*)     LDL Cholesterol 117 (*)     VLDL 52 (*)     Non HDL Chol 168 (*)     All other components within normal limits   RAINBOW DRAW LAVENDER   RAINBOW DRAW LIGHT GREEN   RAINBOW DRAW BLUE     EKG    Rate, intervals and axes as noted on EKG Report.  Rate: 61  Rhythm: Sinus  rhythm  Reading: Left bundle branch block with no Sgarbossa                      MDM      Considered all emergent etiologies, differential includes but is not limited to: Heart failure, PE, ACS, pneumonia     I have independently visualized the radiology images, my focus/limited interpretation: No pneumothorax, infiltrate     Defer to radiologist for other/incidental findings    EKG left bundle branch, no Sgarbossa and unchanged from previous.  Labs notable for sequelae of his end-stage renal disease.  Troponin elevated, more than baseline.  BNP elevated also significantly more than baseline.  Reviewed his recent admission.  Had significant decrease in his cardiac function with decreased EF of 30% on his echocardiogram.  Do not suspect ACS at this time and troponin likely elevated secondary to demand.  Suspect decompensation related to his recent admission and significant cardiac event.  Discussed with cardiology Dr. Castro for consultation who evaluated the patient and is in agreement.  Discussed with Dr. Aguiar for admission. D/w Dr. Montano for nephrology        Medical Decision Making      Disposition and Plan     Clinical Impression:  1. Acute on chronic congestive heart failure, unspecified heart failure type (HCC)    2. Elevated troponin     Signed by Kelton Woodward MD on 2/15/2025  1:06 PM        Date/Time Temp Pulse Resp BP SpO2 Weight O2 Device O2 Flow Rate (L/min) Who    02/16/25 1154 98.4 °F (36.9 °C) 58 20 139/64 98 % -- None (Room air) 0 L/min NS    02/16/25 0815 98 °F (36.7 °C) 60 16 142/74 97 % -- None (Room air) -- EG    02/16/25 0700 -- 55 -- -- 95 % -- -- -- EG    02/16/25 0555 -- -- -- -- -- -- None (Room air) -- ML    02/16/25 0555 97.8 °F (36.6 °C) 69 18 132/76 97 % 203 lb 0.7 oz (92.1 kg) -- -- ANA    02/16/25 0000 97.7 °F (36.5 °C) 94 18 154/87 96 % -- None (Room air) -- ML    02/15/25 1940 97.8 °F (36.6 °C) -- -- -- -- -- None (Room air) -- MP    02/15/25 1940 -- -- 18 -- -- -- -- -- ML    02/15/25  1940 -- 64 -- 134/65 100 % -- -- -- ANA    02/15/25 1900 -- 64 -- 114/58 95 % -- -- -- ML    02/15/25 1840 -- 60 -- 91/60 97 % -- -- -- EG    02/15/25 1532 98.2 °F (36.8 °C) 59 16 144/67 95 % -- None (Room air) -- AL    02/15/25 1406 -- 59 -- 154/72 -- -- -- -- AL    02/15/25 1405 -- 59 -- 153/91 100 % -- -- -- AL    02/15/25 1402 97.6 °F (36.4 °C) 58 16 156/73 100 % 210 lb 14.4 oz (95.7 kg) None (Room air) -- AL    02/15/25 1352 98.3 °F (36.8 °C) -- -- 167/80 -- -- -- -- EG    02/15/25 1330 -- 58 15 154/71 100 % -- None (Room air) -- MK    02/15/25 1300 -- 57 11 159/69 100 % -- None (Room air) -- MK    02/15/25 1200 -- 57 9 148/72 97 % -- None (Room air) -- MK    02/15/25 1130 -- 54 16 136/64 99 % -- None (Room air) -- MK    02/15/25 1125 -- -- -- -- -- -- None (Room air) -- MK    02/15/25 1120 98 °F (36.7 °C) -- -- -- -- -- -- -- CS    02/15/25 1118 -- -- -- -- -- 212 lb 1.3 oz (96.2 kg) -- -- CS    02/15/25 1115 -- 62 14 152/96 97 % -- None (Room air) -- CS         2/15 H&P    Chief Complaint: SOB      History of Present Illness: Patient is a 79 year old male CAD s/p multiple PCIs last one on 2/2024, severe aortic stenosis s/p TAVR, PAF (not on AC), type II DM, ESRD on HD and cirrhosis here with SOB      Pt had recent admission from 2/6-2/9, he was found to have VT, s/p defibrillation in the ER, s/p LHC stent to the OM. Pt was advised on life vest and or ICD and pt hadn't decided needed time to discuss with family.   ECHO during that admission was notable for reduced EF.       Now pt presents to the ED on 2/15 with worsening SOB, worse with exertion, some mild chest discomfort. No nausea no vomiting noted. No fevers. Pt states he is compliant with his meds and with his diet.  Son at bedside, states when he was discharged he did well, these stymptoms of SOB started yesterday and continued into today, pt compliant with HD.  No dysuria, no cough no fevers no abdominal pain         In the ED, pt afebrile, HR 60s, RR  14, B 140s systolics, RA satting 97.  BP >13638, trop 97.  Cards and renal consluted.           Assessment/Plan:  Patient is a 79 year old male CAD s/p multiple PCIs last one on 2/2024, severe aortic stenosis s/p TAVR, PAF (not on AC), type II DM, ESRD on HD and cirrhosis here with SOB            Plan:    SOB/TOMLIN  - concern for acute on chronic systolic CHF  - daily weights, strict I /O, fluid restriction   - EKG okay  - trop up, trend per cards   - renal to see for HD     ESRD   - on HD  - renal following     CAD s/p multiple stents  Hx of VT required defibrillation 2/2024  aortic stenosis s/p TAVR    - last stent 2/2024  - DAPT   - cards to see      PAF  - not on AC  - rate contolled, amiodarone      DM2  - ssi            Prophylaxis:  DVT: heparin subcutaneous     Full code     2/15 CARDIOLOGY CONSULT NOTE    Impression:  1. shortness of breath, weakness in setting of complex cardiac disease--> markedly elevated pBNP, mild HS trop 90s     2. CAD/ICM LVEF 40%; extensive PCI hx, recent PCI- ULM, LAD/LCx/OM (2/6/25)     3. hx VT (2/25), presumably ischemic/scar related- resolved on lidocaine--> ECG/tele: SR   LBBB     4. s/p TAVR     5. ESRD on HD     Recommendations:  - weakness/SOB--> told to come to ER to r/o acute cardiac condition, overall stable- SR without ventricular arrhythmia, no signs of acute ischemia/ACS. HD stable. No gross fluid overload on exam, CXR without congestion, pBNP markedly elevated.  Admit for observation. consult nephrology, consider HD session today.    - med tx: asa/clopidogrel, carvedilol, rosuvastatin, amiodarone.    - tele/will follow.             History of Present Illness:  Jonny Haque is a 79 year old male who presented to [facility] on 2/15/2025.     Seen in cardiology consultation for shortness of breath.  Complex cardiac hx, including CAD s/p multi-vessel PCI- most recently to [place], LAD/LCx/OM 2/25 ([place]) in setting of ventricular tachycardia. s/p TAVR, ESRD on HD.  LVEF 40%  last admission, after PCI and VT settled- offered ICD vs lifevest- pt elected for neither, sent home 25.  Since discharge, felt bloated/constipated for a few days, better with stool softener.  Returns today with SOB and weakness, unable to make it to HD.  ER vitals 152/96, HR 62, ECG/tele SR LBBB.  mild trop elevation 97, pBNP 25K.       CARDIOLOGY NOTE      Impression:  1. shortness of breath, weakness in setting of complex cardiac disease--> markedly elevated pBNP, mild HS trop 90s--> improved after HD yesterday.       2. CAD/ICM LVEF 40%; extensive PCI hx, recent PCI- ULM, LAD/LCx/OM (25)     3. hx VT (), presumably ischemic/scar related- resolved on lidocaine--> ECG/tele: SR   LBBB     4. s/p TAVR     5. ESRD on HD     Recommendations:  - weakness/SOB--> no acute cardiac decompensation, improved after HD session yesterday (3L removal). SR without ventricular arrhythmia, no signs of acute ischemia/ACS. HD stable.   - med tx: asa/clopidogrel, carvedilol, rosuvastatin, amiodarone.    - re-discussed ICD/lifevest- would like to continue to decline for now, but in consideration of vest, wants to talk further with Dr. Donald-outpatient.  - ok for dc from cv standpoint; f/u Dr. Donald 2 weeks as scheduled.        Subjective:  The patient denies any chest pain or dyspnea at this time. Feeling better today.  mild LBP- chronic.     Objective:  /76 (BP Location: Right arm)   Pulse 69   Temp 97.8 °F (36.6 °C) (Oral)   Resp 18   Wt 203 lb 0.7 oz (92.1 kg)   SpO2 97%   BMI 28.32 kg/m²   Temp (24hrs), Av.9 °F (36.6 °C), Min:97.6 °F (36.4 °C), Max:98.3 °F (36.8 °C)        Intake/Output Summary (Last 24 hours) at 2025 0752  Last data filed at 2/15/2025 1955      Gross per 24 hour   Intake 720 ml   Output 3000 ml   Net -2280 ml      NEPHROLOGY NOTE    Reason for Consultation:  ESRD     History of Present Illness:  Jonny Haque is a 79 year old male with history of ESRD, HTN, ICM who presented  with shortness of breath. Nephrology consulted for inpatient ESRD management.      Presented to the ED yesterday due to shortness of breath, TOMLIN and chest pain. Was due for dialysis, last HD was Thursday. CXR without significant pulmonary edema noted, pro-BNP elevated. Received HD yesterday with 3L UF and feeling significant better this morning.        Physical Exam:  /76 (BP Location: Right arm)   Pulse 69   Temp 97.8 °F (36.6 °C) (Oral)   Resp 18   Wt 203 lb 0.7 oz (92.1 kg)   SpO2 97%   BMI 28.32 kg/m²   Temp (24hrs), Av.9 °F (36.6 °C), Min:97.6 °F (36.4 °C), Max:98.3 °F (36.8 °C)        Intake/Output Summary (Last 24 hours) at 2025 1105  Last data filed at 2025 0900      Gross per 24 hour   Intake 1320 ml   Output 3000 ml   Net -1680 ml          Wt Readings from Last 3 Encounters:   25 203 lb 0.7 oz (92.1 kg)   25 212 lb 1.3 oz (96.2 kg)   25 214 lb (97.1 kg)      General: awake, alert  HEENT: No scleral icterus, MMM  Neck: Supple, no JADE or thyromegaly  Cardiac: Regular rate and rhythm, S1, S2 normal  Lungs: Decreased breath sounds at the bases bilaterally.   Abdomen: Soft, non-tender.   Extremities: Without clubbing, cyanosis; no edema  Neurologic: Cranial nerves grossly intact, moving all extremities  Skin: Warm and dry, no rashes     Laboratory Data:        Lab Results   Component Value Date     WBC 5.7 2025     HGB 10.3 2025     HCT 30.3 2025     PLT 49.0 2025     CREATSERUM 6.30 2025     BUN 38 2025      2025     K 4.6 2025     CL 95 2025     CO2 26.0 2025      2025     CA 9.8 2025     ALB 4.2 2025     ALKPHO 157 2025     BILT 0.5 2025     TP 7.0 2025     AST 22 2025     ALT 16 2025     PGLU 154 2025      Assessment / Plan:     1) ESRD: Due to long-standing DM2. Continue HD per usual TThS schedule.      2) Anemia: due to ESRD. Continue  JIE for goal hemoglobin 10-11     3) SOB: concern for acute on chronic HF, improved after receiving HD yesterday with 3L UF      No objection to discharge from renal perspective.    --------------  DISCHARGE REVIEW    Payor: [employer] MEDICARE ADV PPO  Subscriber #:  [id number]  Authorization Number: [id number]    Admit date: 2/15/25  Admit time:   1:52 PM  Discharge Date: 2/16/2025  3:29 PM     Admitting Physician: Bette Aguiar MD  Attending Physician:  No att. providers found  Primary Care Physician: Gee Jimenez MD          Discharge Summary Notes        Discharge Summary signed by Bette Aguiar MD at 2/16/2025 12:08 PM       Author: Bette Aguiar MD Specialty: HOSPITALIST, Internal Medicine Author Type: Physician    Filed: 2/16/2025 12:08 PM Date of Service: 2/16/2025 10:52 AM Status: Signed    : Bette Aguiar MD (Physician)         DM Hospitalist Discharge Summary    Patient ID  Jonny Haque  [id number]  79 year old  4/15/1945  Admit date: 2/15/2025  Discharge date:  2/16/25  Attending: Bette Aguiar MD   Primary Care Physician: Gee Jimenez MD   Reason for admission:  (see HPI on HP for further detail)  Discharge condition: stable     Disposition: home    Important follow up:  -PCP and cards     Discharge med list     Medication List        CHANGE how you take these medications      Insulin Lispro (1 Unit Dial) 100 UNIT/ML Sopn  Commonly known as: HumaLOG KwikPen  12 units before meals with sliding scale. Max daily dose: 50 units.  What changed:   how much to take  additional instructions            CONTINUE taking these medications      amiodarone 100 MG Tabs  Commonly known as: Pacerone  Take 1 tablet (100 mg total) by mouth 2 (two) times daily.     aspirin 81 MG Tbec     Auryxia 1  MG(Fe) Tabs  Generic drug: Ferric Citrate     Vernell Contour Next Monitor w/Device Kit  1 Device by Does not apply route 4 (four) times daily.     carvedilol 6.25 MG Tabs  Commonly known as: Coreg  Take 1 tablet  (6.25 mg total) by mouth 2 (two) times daily with meals.     clopidogrel 75 MG Tabs  Commonly known as: Plavix  Take 1 tablet (75 mg total) by mouth daily.     * Contour Next Test Strp  Generic drug: Glucose Blood  TEST BLOOD SUGAR FOUR TIMES DAILY     * OneTouch Ultra Strp  Generic drug: Glucose Blood  6 times a day     Lanthanum Carbonate 1000 MG Chew  Commonly known as: FOSRENOL     levothyroxine 200 MCG Tabs  Commonly known as: Synthroid     losartan 25 MG Tabs  Commonly known as: Cozaar  Take 1 tablet (25 mg total) by mouth daily.     nitroglycerin 0.4 MG Subl  Commonly known as: Nitrostat  Place 1 tablet (0.4 mg total) under the tongue every 5 (five) minutes as needed for Chest pain.     pantoprazole 40 MG Tbec  Commonly known as: Protonix     rosuvastatin 10 MG Tabs  Commonly known as: Crestor  Take 1 tablet (10 mg total) by mouth nightly.     Toujeo Max SoloStar 300 UNIT/ML Sopn  Generic drug: Insulin Glargine (2 Unit Dial)  Inject 20 Units into the skin nightly.     TRUEplus Pen Needles 31G X 5 MM Misc  Generic drug: Insulin Pen Needle  Use 5 per day     zolpidem 10 MG Tabs  Commonly known as: Ambien           * This list has 2 medication(s) that are the same as other medications prescribed for you. Read the directions carefully, and ask your doctor or other care provider to review them with you.                STOP taking these medications      traMADol 50 MG Tabs  Commonly known as: Ultram              Discharge Diagnoses/Hospital course:   79 year old male CAD s/p multiple PCIs last one on 2/2024, severe aortic stenosis s/p TAVR, PAF (not on AC), type II DM, ESRD on HD and cirrhosis here with SOB            Plan:    SOB/TOMLIN  - concern for acute on chronic systolic CHF  - daily weights, strict I /O, fluid restriction   - EKG okay  - trop up, trend per cards   - renal to see for HD - s/p HD, and pt much improved, no more SOB feels stronger     ESRD   - on HD  - renal following     CAD s/p multiple  stents  Hx of VT required defibrillation 2024  aortic stenosis s/p TAVR    - last stent 2024  - DAPT         PAF  - not on AC  - rate contolled, amiodarone      DM2  - ssi     Consults:  IP CONSULT TO CARDIOLOGY  IP CONSULT TO HOSPITALIST  IP CONSULT TO NEPHROLOGY  IP CONSULT TO SOCIAL WORK    Radiology:  XR CHEST AP PORTABLE  (CPT=71045)    Result Date: 2/15/2025  PROCEDURE:  XR CHEST AP PORTABLE  (CPT=71045)  TECHNIQUE:  AP chest radiograph was obtained.  COMPARISON:  [facility] , XR, XR CHEST AP PORTABLE  (CPT=71045), 2024, 1:50 PM.  INDICATIONS:  chest discomfort, recent VT/cardiac issues  PATIENT STATED HISTORY: (As transcribed by Technologist)     FINDINGS:  TAVR changes noted.  Mild atelectasis/scarring in the lower lungs.  Cardiomegaly with normal pulmonary vascularity. No pleural effusion or pneumothorax. No lobar consolidation.  Degenerative changes in the spine.            CONCLUSION:  No lobar pneumonia or overt congestive failure.  Mild atelectasis/scarring in the lower lungs.   LOCATION:  [facility]      Dictated by (CST): Christian Trujillo MD on 2/15/2025 at 12:27 PM     Finalized by (CST): Christian Trujillo MD on 2/15/2025 at 12:27 PM       CARD ECHO 2D DOPPLER (CPT=93306)    Result Date: 2025  Transthoracic Echocardiogram Name:Jonny Haque Date: 2025 :  04/15/1945 Ht:  (71in)  BP: 132 / 48 MRN:  [id number]    Age:  79years    Wt:  (215lb) HR: 47bpm Loc:  [facility]       Gndr: M          BSA: 2.17m^2 Sonographer: Susie FRANCO Ordering:    Satya Donald MD Consulting:  Ted Hoff ---------------------------------------------------------------------------- History/Indications:   Atrial fibrillation.  Status-post percutaneous coronary intervention. Prior 26mm Edward Albert 3 Ultra Resilia. end stage renal disease on hemodialysis.  Risk factors:  Hypertension. Diabetes mellitus. ---------------------------------------------------------------------------- Procedure information:  A transthoracic  complete 2D study was performed. Additional evaluation included M-mode, complete spectral Doppler, and color Doppler.  Patient status:  Inpatient.  Location:  [facility] Room 6609. Comparison was made to the study of 09/17/2024.    This was a routine study. Transthoracic echocardiography for ventricular function evaluation and assessment of valvular function. Image quality was adequate. ---------------------------------------------------------------------------- Conclusions: 1. Left ventricle: The cavity size was normal. There was mild concentric    hypertrophy. Systolic function was moderately reduced. The estimated    ejection fraction was 35-40%, by visual assessment. Severe inferior,    posterior and lateral hypokinesis, the septal dyssynergy with underlying    LBBB. Left ventricular diastolic function is indeterminate. 2. Right ventricle: Systolic function was normal. The RV pressure during    systole is 35mm Hg. 3. Left atrium: The left atrial volume was markedly increased. 4. Aortic valve: Well seated, normal functioning 26mm Edward-Albert 3 Ultra    Resilia bioprosthetic (TAVR) valve. There was mild perivalvular    regurgitation. The peak systolic velocity was 2.15m/sec. The acceleration    time was 65ms. The mean systolic gradient was 9mm Hg. The dimensionless    index was 0.41. Impressions:  This study is compared with previous dated 09/17/2024: Left ventriular systolic function is now moderately depressed (prior LVEF 55-60%). * ---------------------------------------------------------------------------- * Findings: Left ventricle:  The cavity size was normal. There was mild concentric hypertrophy. Systolic function was moderately reduced. The estimated ejection fraction was 35-40%, by visual assessment. Severe inferior, posterior and lateral hypokinesis, the septal dyssynergy with underlying LBBB. Left ventricular diastolic function is indeterminate. Ventricular septum:   Septal motion was dyssynergic. These  changes are consistent with a left bundle branch block. Left atrium:  The left atrial volume was markedly increased. Right ventricle:  The cavity size was normal. Systolic function was normal. Right atrium:  The atrium was normal in size. Mitral valve:  The annulus was mildly calcified. Leaflet separation was normal.  Doppler:  Transvalvular velocity was within the normal range. There was no evidence for stenosis. There was trace regurgitation. Aortic valve:  A 26mm Edward-Albert 3 Ultra Resilia bioprosthetic (TAVR) valve is present and functioning normally. The prosthesis had a normal range of motion. The sewing ring had no rocking motion and showed no evidence of dehiscence.  Doppler:  Transvalvular velocity was within the normal range, within limits of normal for the valve type. There was no evidence for stenosis.  There was mild perivalvular regurgitation.      The valve area (VTI) was 2.01cm^2. The valve area (VTI) index was 0.92cm^2/m^2.    The mean systolic gradient was 9mm Hg. The dimensionless index was 0.41. The peak systolic gradient was 18mm Hg. Tricuspid valve:  The valve is structurally normal. Leaflet separation was normal.  Doppler:  Transvalvular velocity was within the normal range. There was no evidence for stenosis. There was trivial regurgitation. Pulmonic valve:    There was no significant valve disease.    Doppler: Transvalvular velocity was within the normal range. There was no evidence for stenosis. There was mild regurgitation. Pericardium:   There was no pericardial effusion. Aorta: Ascending aorta: The ascending aorta was normal. Pulmonary arteries: Not well visualized. Systolic pressure was at the upper limits of normal to, at most, mildly increased; in the range of 30 mm Hg to 35 mm Hg. Estimated pulmonary artery diastolic pressure was 12mm Hg. Systemic veins:  Central venous respirophasic diameter changes are in the normal range (>50%). Inferior vena cava: The IVC was normal-sized.  ---------------------------------------------------------------------------- Measurements  Left              Value             Ref       09/17/2024  ventricle  IVS           (H) 1.3   cm          0.6 - 1.0 1.1  thickness,  ED, PLAX  LV ID, ED,        5.4   cm          4.2 - 5.8 6.0  PLAX  LV ID, ES,        3.8   cm          2.5 - 4.0 3.5  PLAX  LV PW         (H) 1.2   cm          0.6 - 1.0 1.1  thickness,  ED, PLAX  IVS/LV PW         1.08              --------- 1.02  ratio, ED,  PLAX  LV PW/LV ID       0.22              --------- 0.19  ratio, ED,  PLAX  LV ejection       58    %           52 - 72   71  fraction  Stroke            45    ml/m^2      --------- 61  volume/bsa,  2D  LV e',        (L) 4.5   cm/sec      >=10.0    ----------  lateral  LV E/e',      (H) 17                <=13      ----------  lateral  LV e', medial (L) 3.7   cm/sec      >=7.0     ----------  LV E/e',          21                --------- ----------  medial  LV e',            4.1   cm/sec      --------- ----------  average  LV E/e',      (H) 19                <=14      ----------  average  LVOT              Value             Ref       09/17/2024  LVOT ID           2.5   cm          --------- 2.3  LVOT peak         0.82  m/sec       --------- 1.14  velocity, S  LVOT VTI, S       19.7  cm          --------- 31.4  LVOT mean         1     mm Hg       --------- 2  gradient, S  Stroke volume     97    ml          --------- 130  (SV), LVOT DP  Cardiac           4.3   L/min       --------- ----------  output (Qs),  LVOT DP  Cardiac index     2     L/(min-m^2) --------- ----------  (Qs/bsa),  LVOT DP  Stroke index      44    ml/m^2      --------- 60  (SV/bsa),  LVOT DP  Aortic valve      Value             Ref       09/17/2024  Aortic valve      2.15  m/sec       --------- 1.71  peak  velocity, S  Aortic valve      48.1  cm          --------- 47.4  VTI, S  Aortic            65    ms          --------- 95  acceleration  time  Aortic mean       9     mm  Hg       --------- 6  gradient, S  Aortic peak       18    mm Hg       --------- 13  gradient, S  Aortic valve      2.01  cm^2        --------- 2.78  area, VTI  Aortic valve      0.92  cm^2/m^2    --------- 1.27  area/bsa, VTI  Velocity          0.38              --------- 0.62  ratio, peak,  LVOT/AV  Ascending         Value             Ref       09/17/2024  aorta  Ascending         3.4   cm          2.2 - 3.8 3.2  aorta ID,  A-P, ED  Left atrium       Value             Ref       09/17/2024  LA ID, A-P,   (H) 4.2   cm          3.0 - 4.0 4.7  ES  LA volume, S  (H) 130   ml          18 - 58   102  LA            (H) 60    ml/m^2      16 - 34   47  volume/bsa, S  LA volume,    (H) 121   ml          18 - 58   95  ES, 1-p A4C  LA volume,    (H) 122   ml          18 - 58   111  ES, 1-p A2C  LA volume,        137   ml          --------- 109  ES, A/L  LA            (H) 63    ml/m^2      16 - 34   50  volume/bsa,  ES, A/L  Mitral valve      Value             Ref       09/17/2024  Mitral E-wave     0.77  m/sec       --------- ----------  peak velocity  Mitral A-wave     0.99  m/sec       --------- ----------  peak velocity  Mitral peak       2     mm Hg       --------- ----------  gradient, D  Mitral E/A        0.8               --------- ----------  ratio, peak  Pulmonary         Value             Ref       09/17/2024  artery  PA pressure,      35    mm Hg       --------- ----------  S, DP  PA pressure,      12    mm Hg       --------- ----------  ED, DP  Tricuspid         Value             Ref       09/17/2024  valve  Tricuspid     (H) 2.81  m/sec       <=2.8     2.74  regurg peak  velocity  Tricuspid         32    mm Hg       --------- 30  peak RV-RA  gradient  Systemic          Value             Ref       09/17/2024  veins  Estimated CVP     3     mm Hg       --------- 3  Right             Value             Ref       09/17/2024  ventricle  TAPSE, 2D         2.48  cm          >=1.70    2.90  RV pressure,      35    mm Hg        --------- 33  S, DP  Pulmonic          Value             Ref       09/17/2024  valve  Pulmonic          1.48  m/sec       --------- ----------  regurg  velocity, ED  Pulmonic          9     mm Hg       --------- ----------  regurg  gradient, ED Legend: (L)  and  (H)  gali values outside specified reference range. ---------------------------------------------------------------------------- Prepared and electronically signed by Johnson Castro 02/07/2025 14:11     CATH ANGIO    Result Date: 2/6/2025  This exam has been completed. Please refer to Notes for the results to this procedure.    CT ABDOMEN+PELVIS(CONTRAST ONLY)(CPT=74177)    Result Date: 1/31/2025  PROCEDURE:  CT ABDOMEN+PELVIS (CONTRAST ONLY) (CPT=74177)  COMPARISON:  [facility] , CT, CTA ABD/PEL (CPT=74174), 8/09/2024, 4:27 PM.  INDICATIONS:  fall down one step yesterday at 6pm; lower back pain; no LOC, on plavix  TECHNIQUE:  CT scanning was performed from the dome of the diaphragm to the pubic symphysis with non-ionic intravenous contrast material. Post contrast coronal MPR imaging was performed.  Dose reduction techniques were used. Dose information is transmitted to the ACR (American College of Radiology) NRDR (National Radiology Data Registry) which includes the Dose Index Registry.  PATIENT STATED HISTORY:(As transcribed by Technologist)  Patient had a fall yesterday, now presents with lower back pain.   CONTRAST USED:  100cc of Isovue 370  FINDINGS:  LIVER:  Cirrhotic morphology liver is noted.  No gross mass is identified. BILIARY:  No visible dilatation or calcification.  PANCREAS:  No lesion, fluid collection, ductal dilatation, or atrophy.  SPLEEN:  No enlargement or focal lesion.  KIDNEYS:  The kidneys are atrophic in appearance.  No mass, obstruction, or calcification.  ADRENALS:  No mass or enlargement.  AORTA/VASCULAR:  No aneurysm or dissection.  Severe atherosclerotic disease is noted. RETROPERITONEUM:  No mass or adenopathy.  BOWEL/MESENTERY:   No visible mass, obstruction, or bowel wall thickening.  ABDOMINAL WALL:  Fat containing ventral hernia is noted (measuring up to 7.6 x 5.0 cm (image 74) URINARY BLADDER:  No visible focal wall thickening, lesion, or calculus.  PELVIC NODES:  No adenopathy.  PELVIC ORGANS:  Prominence of the prostate is noted BONES:  No bony lesion or fracture.  LUNG BASES:  Mild ground-glass opacities in the right lower lobe are noted. OTHER:  Negative.             CONCLUSION:  1. Fat containing ventral hernia. 2. Prominent prostate.  Correlate with PSA 3. No acute abnormality in the spine or back. 4. Cirrhotic morphology liver.   LOCATION:  [facility]   Dictated by (CST): Dusty Perkins MD on 1/31/2025 at 10:36 PM     Finalized by (CST): Dusty Perkins MD on 1/31/2025 at 10:41 PM       CT BRAIN OR HEAD (CPT=70450)    Result Date: 1/31/2025  PROCEDURE:  CT BRAIN OR HEAD (65526)  COMPARISON:  [facility] , CT, CT BRAIN OR HEAD (85936), 6/11/2024, 7:11 PM.  INDICATIONS:  fall down one step yesterday at 6pm; lower back pain; no LOC, on plavix  TECHNIQUE:  Noncontrast CT scanning is performed through the brain. Dose reduction techniques were used. Dose information is transmitted to the ACR (American College of Radiology) NRDR (National Radiology Data Registry) which includes the Dose Index Registry.  PATIENT STATED HISTORY: (As transcribed by Technologist)  Fall on plavix.    FINDINGS: Volume loss is noted.  Ventricles are within normal limits.  There is no midline shift or mass-effect.  The basal cisterns are patent.  The gray-white matter differentiation is intact.  There is no acute intracranial hemorrhage or extra-axial fluid collection.  Hypodensities in the periventricular and subcortical white matter is compatible with chronic small vessel disease.  There is no evident fracture.  Mild mucosal thickening in the right maxillary sinus.            CONCLUSION:  No acute intracranial abnormality.    LOCATION:  [facility]   Dictated by (CST): Dusty  MD Gregorio on 1/31/2025 at 10:10 PM     Finalized by (CST): Dusty Perkisn MD on 1/31/2025 at 10:13 PM       XR LUMBAR SPINE (MIN 2 VIEWS) (CPT=72100)    Result Date: 1/31/2025  PROCEDURE:  XR LUMBAR SPINE (MIN 2 VIEWS) (CPT=72100)  TECHNIQUE:  AP, lateral, and coned down L5-S1 views were obtained.  COMPARISON:  None.  INDICATIONS:  fall down one step yesterday at 6pm; lower back pain; no LOC, on plavix  PATIENT STATED HISTORY: (As transcribed by Technologist)  Patient states he had a fall Wednesday afternoon and has been having lower back pain since then.    FINDINGS:    BONES:  Normal vertebral body height and alignment.  Mild multilevel degenerative changes are present. DISC SPACES:  Mild multilevel degenerative changes are present.  No significant disc height narrowing, subluxation, or endplate abnormality. PARASPINOUS:  Negative.  No paraspinous abnormality is seen. OTHER:  Extensive atherosclerotic disease is noted in the abdominal aorta.             CONCLUSION:  No acute abnormality in the lumbar spine.   LOCATION:  [facility]   Dictated by (CST): Dusty Perkins MD on 1/31/2025 at 8:36 PM     Finalized by (CST): Dusty Perkins MD on 1/31/2025 at 8:37 PM         Operative reports:          Day of discharge exam:  Vitals:    02/16/25 0555   BP: 132/76   Pulse: 69   Resp: 18   Temp: 97.8 °F (36.6 °C)     No acute distress   Lungs clear  Heart regular  Abdomen benign     Patient and/or family had opportunity to ask questions and expressed understanding and agreement with therapeutic plan as outlined      31 minutes spent on discharge    Bette Aguiar MD    Electronically signed by Bette Aguiar MD on 2/16/2025 12:08 PM         REVIEWER COMMENTS

## 2025-04-07 NOTE — ED INITIAL ASSESSMENT (HPI)
Patient arrived to the ED from home with c/o GI bleed that started today. Bright red stool noticed this morning that has gotten darker as the day has went on. Son states they went to [address] [place] [address],[address] and left to come here. Worried about hgb level. Patient has a history of GI bleeds. On baby aspirin. AAOx4 and denies pain. Assistance with ambulation/Assistance OOB with selected safe patient handling equipment/Communicate Risk of Fall with Harm to all staff/Discuss with provider need for PT consult/Monitor gait and stability/Provide patient with walking aids - walker, cane, crutches/Reinforce activity limits and safety measures with patient and family/Tailored Fall Risk Interventions/Visual Cue: Yellow wristband and red socks/Bed in lowest position, wheels locked, appropriate side rails in place/Call bell, personal items and telephone in reach/Instruct patient to call for assistance before getting out of bed or chair/Non-slip footwear when patient is out of bed/[place] to call system/Physically safe environment - no spills, clutter or unnecessary equipment/Purposeful Proactive Rounding/Room/bathroom lighting operational, light cord in reach

## 2025-05-12 ENCOUNTER — TELEPHONE (OUTPATIENT)
Dept: CASE MANAGEMENT | Facility: HOSPITAL | Age: 80
End: 2025-05-12

## 2025-05-13 ENCOUNTER — HOSPITAL ENCOUNTER (INPATIENT)
Facility: HOSPITAL | Age: 80
LOS: 8 days | Discharge: NURSING, REHAB OR LONG-TERM CARE | End: 2025-05-21
Attending: HOSPITALIST | Admitting: HOSPITALIST
Payer: MEDICARE

## 2025-05-13 DIAGNOSIS — F41.9 ANXIETY: ICD-10-CM

## 2025-05-13 DIAGNOSIS — I25.9 CHEST PAIN DUE TO MYOCARDIAL ISCHEMIA, UNSPECIFIED ISCHEMIC CHEST PAIN TYPE: Primary | ICD-10-CM

## 2025-05-13 PROBLEM — I46.9 CARDIAC ARREST (HCC): Status: ACTIVE | Noted: 2025-05-13

## 2025-05-13 LAB — GLUCOSE BLD-MCNC: 225 MG/DL (ref 70–99)

## 2025-05-13 RX ORDER — ROSUVASTATIN CALCIUM 10 MG/1
10 TABLET, COATED ORAL NIGHTLY
Status: DISCONTINUED | OUTPATIENT
Start: 2025-05-13 | End: 2025-05-21

## 2025-05-13 RX ORDER — CARVEDILOL 6.25 MG/1
6.25 TABLET ORAL 2 TIMES DAILY WITH MEALS
Status: DISCONTINUED | OUTPATIENT
Start: 2025-05-14 | End: 2025-05-21

## 2025-05-13 RX ORDER — HYDROCODONE BITARTRATE AND ACETAMINOPHEN 5; 325 MG/1; MG/1
1 TABLET ORAL EVERY 4 HOURS PRN
Refills: 0 | Status: DISCONTINUED | OUTPATIENT
Start: 2025-05-13 | End: 2025-05-21

## 2025-05-13 RX ORDER — INSULIN DEGLUDEC 100 U/ML
20 INJECTION, SOLUTION SUBCUTANEOUS NIGHTLY
Status: DISCONTINUED | OUTPATIENT
Start: 2025-05-13 | End: 2025-05-21

## 2025-05-13 RX ORDER — ONDANSETRON 2 MG/ML
4 INJECTION INTRAMUSCULAR; INTRAVENOUS EVERY 6 HOURS PRN
Status: DISCONTINUED | OUTPATIENT
Start: 2025-05-13 | End: 2025-05-21

## 2025-05-13 RX ORDER — HEPARIN SODIUM 5000 [USP'U]/ML
5000 INJECTION, SOLUTION INTRAVENOUS; SUBCUTANEOUS EVERY 12 HOURS SCHEDULED
Status: DISCONTINUED | OUTPATIENT
Start: 2025-05-13 | End: 2025-05-21

## 2025-05-13 RX ORDER — AMIODARONE HYDROCHLORIDE 100 MG/1
100 TABLET ORAL 2 TIMES DAILY
Status: DISCONTINUED | OUTPATIENT
Start: 2025-05-13 | End: 2025-05-21

## 2025-05-13 RX ORDER — ASPIRIN 81 MG/1
81 TABLET ORAL EVERY OTHER DAY
Status: DISCONTINUED | OUTPATIENT
Start: 2025-05-15 | End: 2025-05-21

## 2025-05-13 RX ORDER — POLYETHYLENE GLYCOL 3350 17 G/17G
17 POWDER, FOR SOLUTION ORAL DAILY PRN
Status: DISCONTINUED | OUTPATIENT
Start: 2025-05-13 | End: 2025-05-21

## 2025-05-13 RX ORDER — LOSARTAN POTASSIUM 25 MG/1
25 TABLET ORAL DAILY
Status: DISCONTINUED | OUTPATIENT
Start: 2025-05-14 | End: 2025-05-21

## 2025-05-13 RX ORDER — LANTHANUM CARBONATE 1000 MG/1
1000 TABLET, CHEWABLE ORAL
Status: DISCONTINUED | OUTPATIENT
Start: 2025-05-14 | End: 2025-05-17

## 2025-05-13 RX ORDER — NICOTINE POLACRILEX 4 MG
15 LOZENGE BUCCAL
Status: DISCONTINUED | OUTPATIENT
Start: 2025-05-13 | End: 2025-05-21

## 2025-05-13 RX ORDER — HYDROMORPHONE HYDROCHLORIDE 1 MG/ML
0.2 INJECTION, SOLUTION INTRAMUSCULAR; INTRAVENOUS; SUBCUTANEOUS EVERY 6 HOURS PRN
Refills: 0 | Status: DISCONTINUED | OUTPATIENT
Start: 2025-05-13 | End: 2025-05-21

## 2025-05-13 RX ORDER — PANTOPRAZOLE SODIUM 40 MG/1
40 TABLET, DELAYED RELEASE ORAL
Status: DISCONTINUED | OUTPATIENT
Start: 2025-05-14 | End: 2025-05-21

## 2025-05-13 RX ORDER — SENNOSIDES 8.6 MG
17.2 TABLET ORAL NIGHTLY PRN
Status: DISCONTINUED | OUTPATIENT
Start: 2025-05-13 | End: 2025-05-21

## 2025-05-13 RX ORDER — HYDROMORPHONE HYDROCHLORIDE 1 MG/ML
0.4 INJECTION, SOLUTION INTRAMUSCULAR; INTRAVENOUS; SUBCUTANEOUS EVERY 6 HOURS PRN
Refills: 0 | Status: DISCONTINUED | OUTPATIENT
Start: 2025-05-13 | End: 2025-05-21

## 2025-05-13 RX ORDER — NICOTINE POLACRILEX 4 MG
30 LOZENGE BUCCAL
Status: DISCONTINUED | OUTPATIENT
Start: 2025-05-13 | End: 2025-05-21

## 2025-05-13 RX ORDER — HYDROMORPHONE HYDROCHLORIDE 1 MG/ML
0.1 INJECTION, SOLUTION INTRAMUSCULAR; INTRAVENOUS; SUBCUTANEOUS EVERY 6 HOURS PRN
Refills: 0 | Status: DISCONTINUED | OUTPATIENT
Start: 2025-05-13 | End: 2025-05-21

## 2025-05-13 RX ORDER — HYDROCODONE BITARTRATE AND ACETAMINOPHEN 5; 325 MG/1; MG/1
2 TABLET ORAL EVERY 4 HOURS PRN
Refills: 0 | Status: DISCONTINUED | OUTPATIENT
Start: 2025-05-13 | End: 2025-05-21

## 2025-05-13 RX ORDER — METOCLOPRAMIDE HYDROCHLORIDE 5 MG/ML
5 INJECTION INTRAMUSCULAR; INTRAVENOUS EVERY 8 HOURS PRN
Status: DISCONTINUED | OUTPATIENT
Start: 2025-05-13 | End: 2025-05-21

## 2025-05-13 RX ORDER — SODIUM PHOSPHATE, DIBASIC AND SODIUM PHOSPHATE, MONOBASIC 7; 19 G/230ML; G/230ML
1 ENEMA RECTAL ONCE AS NEEDED
Status: DISCONTINUED | OUTPATIENT
Start: 2025-05-13 | End: 2025-05-21

## 2025-05-13 RX ORDER — DEXTROSE MONOHYDRATE 25 G/50ML
50 INJECTION, SOLUTION INTRAVENOUS
Status: DISCONTINUED | OUTPATIENT
Start: 2025-05-13 | End: 2025-05-21

## 2025-05-13 RX ORDER — ACETAMINOPHEN 500 MG
500 TABLET ORAL EVERY 4 HOURS PRN
Status: DISCONTINUED | OUTPATIENT
Start: 2025-05-13 | End: 2025-05-21

## 2025-05-13 RX ORDER — CLOPIDOGREL BISULFATE 75 MG/1
75 TABLET ORAL DAILY
Status: DISCONTINUED | OUTPATIENT
Start: 2025-05-14 | End: 2025-05-21

## 2025-05-13 RX ORDER — BISACODYL 10 MG
10 SUPPOSITORY, RECTAL RECTAL
Status: DISCONTINUED | OUTPATIENT
Start: 2025-05-13 | End: 2025-05-21

## 2025-05-13 RX ORDER — LEVOTHYROXINE SODIUM 100 UG/1
200 TABLET ORAL
Status: DISCONTINUED | OUTPATIENT
Start: 2025-05-14 | End: 2025-05-21

## 2025-05-14 ENCOUNTER — APPOINTMENT (OUTPATIENT)
Facility: HOSPITAL | Age: 80
End: 2025-05-14
Attending: HOSPITALIST
Payer: MEDICARE

## 2025-05-14 LAB
ALBUMIN SERPL-MCNC: 3.5 G/DL (ref 3.2–4.8)
ALP LIVER SERPL-CCNC: 237 U/L (ref 45–117)
ALT SERPL-CCNC: 79 U/L (ref 10–49)
ANION GAP SERPL CALC-SCNC: 10 MMOL/L (ref 0–18)
AST SERPL-CCNC: 45 U/L (ref ?–34)
BILIRUB DIRECT SERPL-MCNC: 1.8 MG/DL (ref ?–0.3)
BILIRUB SERPL-MCNC: 2.6 MG/DL (ref 0.2–1.1)
BUN BLD-MCNC: 51 MG/DL (ref 9–23)
CALCIUM BLD-MCNC: 9.1 MG/DL (ref 8.7–10.6)
CHLORIDE SERPL-SCNC: 100 MMOL/L (ref 98–112)
CO2 SERPL-SCNC: 28 MMOL/L (ref 21–32)
CREAT BLD-MCNC: 4.95 MG/DL (ref 0.7–1.3)
EGFRCR SERPLBLD CKD-EPI 2021: 11 ML/MIN/1.73M2 (ref 60–?)
ERYTHROCYTE [DISTWIDTH] IN BLOOD BY AUTOMATED COUNT: 17.5 %
GLUCOSE BLD-MCNC: 153 MG/DL (ref 70–99)
GLUCOSE BLD-MCNC: 172 MG/DL (ref 70–99)
GLUCOSE BLD-MCNC: 182 MG/DL (ref 70–99)
GLUCOSE BLD-MCNC: 196 MG/DL (ref 70–99)
GLUCOSE BLD-MCNC: 221 MG/DL (ref 70–99)
HCT VFR BLD AUTO: 25.1 % (ref 39–53)
HGB BLD-MCNC: 8.2 G/DL (ref 13–17.5)
MCH RBC QN AUTO: 35 PG (ref 26–34)
MCHC RBC AUTO-ENTMCNC: 32.7 G/DL (ref 31–37)
MCV RBC AUTO: 107.3 FL (ref 80–100)
OSMOLALITY SERPL CALC.SUM OF ELEC: 303 MOSM/KG (ref 275–295)
PLATELET # BLD AUTO: 55 10(3)UL (ref 150–450)
PLATELETS.RETICULATED NFR BLD AUTO: 3.6 % (ref 0–7)
POTASSIUM SERPL-SCNC: 3.6 MMOL/L (ref 3.5–5.1)
PROT SERPL-MCNC: 6.2 G/DL (ref 5.7–8.2)
RBC # BLD AUTO: 2.34 X10(6)UL (ref 3.8–5.8)
SODIUM SERPL-SCNC: 138 MMOL/L (ref 136–145)
WBC # BLD AUTO: 4.2 X10(3) UL (ref 4–11)

## 2025-05-14 PROCEDURE — 99222 1ST HOSP IP/OBS MODERATE 55: CPT | Performed by: INTERNAL MEDICINE

## 2025-05-14 RX ORDER — HEPARIN SODIUM 1000 [USP'U]/ML
1.5 INJECTION, SOLUTION INTRAVENOUS; SUBCUTANEOUS
Status: COMPLETED | OUTPATIENT
Start: 2025-05-15 | End: 2025-05-15

## 2025-05-14 RX ORDER — ALBUMIN (HUMAN) 12.5 G/50ML
25 SOLUTION INTRAVENOUS
Status: DISPENSED | OUTPATIENT
Start: 2025-05-15 | End: 2025-05-16

## 2025-05-14 NOTE — CM/SW NOTE
PASRR completed, letter uploaded to [facility].    MARGARET/CM to remain available for support and/or discharge planning.    Wandy GOMEZ LCSW  Discharge Planner

## 2025-05-14 NOTE — PROGRESS NOTES
[facility] Hospitalist note    PCP: Gee Jimenez MD    Chief Complaint:  F/u cardiac arrest    SUBJECTIVE:  Pt getting seen by speech therapy during my visit  Feels reasonably. States that chest wall pain a bit better.    OBJECTIVE:  Temp:  [96.4 °F (35.8 °C)-98.2 °F (36.8 °C)] 97.9 °F (36.6 °C)  Pulse:  [65-71] 71  Resp:  [16-18] 18  BP: (122-157)/(56-65) 150/65  SpO2:  [96 %-99 %] 96 %    Intake/Output:    Intake/Output Summary (Last 24 hours) at 5/14/2025 1147  Last data filed at 5/14/2025 0705  Gross per 24 hour   Intake 0 ml   Output 0 ml   Net 0 ml       Last 3 Weights   05/14/25 0452 199 lb 8.3 oz (90.5 kg)   05/13/25 1926 195 lb 15.8 oz (88.9 kg)   02/16/25 0555 203 lb 0.7 oz (92.1 kg)   02/15/25 1402 210 lb 14.4 oz (95.7 kg)   02/15/25 1118 212 lb 1.3 oz (96.2 kg)   02/09/25 0500 212 lb 1.3 oz (96.2 kg)   02/08/25 1431 212 lb 8.4 oz (96.4 kg)   02/08/25 0600 213 lb (96.6 kg)   02/06/25 2143 215 lb 6.2 oz (97.7 kg)       Exam  Gen: No acute distress  HEENT: anicteric sclera, MMM  Pulm: Lungs clear, normal respiratory effort  CV: Heart with regular rate and rhythm, no peripheral edema  Abd: Abdomen soft, nontender, nondistended, no organomegaly, bowel sounds present  MSK: Full range of motion in extremities, no clubbing, no cyanosis  Skin: no rashes or lesions  Neuro: A&OX3, no focal deficits    Data Review:         Labs:     Recent Labs   Lab 05/14/25  0503   WBC 4.2   HGB 8.2*   .3*   PLT 55.0*       Recent Labs   Lab 05/14/25  0503      K 3.6      CO2 28.0   BUN 51*   CREATSERUM 4.95*   CA 9.1   *       Recent Labs   Lab 05/14/25  0503   ALT 79*   AST 45*   ALB 3.5       No results for input(s): \"TROP\", \"CK\", \"PBNP\", \"PCT\" in the last 168 hours.    No results for input(s): \"CRP\", \"NEELAM\", \"LDH\", \"DDIMER\" in the last 168 hours.    Recent Labs   Lab 05/13/25  2103 05/14/25  0636   PGLU 225* 172*       Meds:   Scheduled Medication:Scheduled Medications[1]  Continuous Infusing  Medication:Medication Infusions[2]  PRN Medication:PRN Medications[3]       Microbiology:    No results found for this visit on 05/13/25.    Lab Results   Component Value Date    COVID19 Not Detected 04/16/2024    COVID19 Not Detected 03/17/2024    COVID19 Not Detected 04/05/2023        Assessment/Plan:     81 yo man with h/o tavr, cad, dm who presented as osh transfer for continued management in setting of cardiac arrest     Cardiac arrest  Vtach/nsvt  EF 40%, ICM  CAD with recent angio, h/o stents  - cardiology to consult, possible defibrillator placement  - will cont meds from outside facility including amio, coreg, cozaar, asa, plavix  - prn pain meds for chest wall pain- will give norco, lidocaine patches. Avoid morphine.     ESRD on HD  - HD on t, th, sat. Consulted nephro     Thrombocytopenia  HCV cirrhosis, h/o esophageal banding  - stable in 50s. Dropped as low as 30s at OSH  - per notes, had been seen hematology before and felt to have the thrombocytopenia 2/2 cirrhosis     Resp failure in setting of cardiac arrest  Treated for PNA  - continue IS, NC     Subcutaneous heparin, SCDs        Ursula Saucedo MD  [facility] Hospitalist  Pager: [phone]         [1]    [START ON 5/15/2025] heparin  1.5 mL Intracatheter Once    [START ON 5/15/2025] epoetin eunice  10,000 Units Intravenous Once    heparin  5,000 Units Subcutaneous 2 times per day    lidocaine-menthol  1 patch Transdermal Daily    amiodarone  100 mg Oral BID    [START ON 5/15/2025] aspirin  81 mg Oral QOD    carvedilol  6.25 mg Oral BID with meals    clopidogrel  75 mg Oral Daily    insulin degludec  20 Units Subcutaneous Nightly    [Held by provider] Lanthanum Carbonate  1,000 mg Oral TID CC    levothyroxine  200 mcg Oral Before breakfast    losartan  25 mg Oral Daily    pantoprazole  40 mg Oral BID AC    rosuvastatin  10 mg Oral Nightly    insulin aspart  2-10 Units Subcutaneous TID AC and HS   [2] [3]   [START ON 5/15/2025] sodium chloride **AND** [START  ON 5/15/2025] albumin human    acetaminophen    polyethylene glycol (PEG 3350)    sennosides    bisacodyl    fleet enema    ondansetron    metoclopramide    HYDROcodone-acetaminophen **OR** HYDROcodone-acetaminophen    HYDROmorphone **OR** HYDROmorphone **OR** HYDROmorphone    glucose **OR** glucose **OR** glucose-vitamin C **OR** dextrose **OR** glucose **OR** glucose **OR** glucose-vitamin C

## 2025-05-14 NOTE — OCCUPATIONAL THERAPY NOTE
OCCUPATIONAL THERAPY EVALUATION - INPATIENT     Room Number: 8621/8621-A  Evaluation Date: 5/14/2025  Type of Evaluation: Initial  Presenting Problem: cardiac arrest    Physician Order: IP Consult to Occupational Therapy  Reason for Therapy: ADL/IADL Dysfunction and Discharge Planning    OCCUPATIONAL THERAPY ASSESSMENT   Patient is currently functioning below baseline with toileting, bathing, upper body dressing, lower body dressing, bed mobility, and transfers. Prior to admission, patient's baseline is mod I with BADL.  Patient is requiring dependent assistance as a result of the following impairments: decreased functional strength, decreased functional reach, decreased endurance, pain, impaired sitting and standing balance, decreased muscular endurance, and medical status. Occupational Therapy will continue to follow for duration of hospitalization.    Patient will benefit from continued skilled OT Services to promote return to prior level of function and safety with continuous assistance and gradual rehabilitative therapy    History Related to Current Admission: Patient is a 80 year old male admitted on 5/13/2025 with Presenting Problem: cardiac arrest. Co-Morbidities : CHF, TAVR, ESRD on dialysis, HTN, CAD with PCI, DM2    Recent admit to [facility] with transfer: cardiac arrest with resuscitation x3 and s/p cardiac cath on 5/8     Recommendations for nursing staff:   Transfers: shaila lift   Toileting location: bed     EVALUATION SESSION    Patient Start of Session: Pt was found in his bed with his son at the bedside.     FUNCTIONAL TRANSFER ASSESSMENT  Sit to Stand: Edge of Bed  Edge of Bed: Dependent (max Ax2)    BED MOBILITY  Supine to Sit : Maximum Assist  Sit to Supine (OT): Maximum Assist    BALANCE ASSESSMENT     FUNCTIONAL ADL ASSESSMENT  LB Dressing Seated: Dependent  Toileting Seated: Dependent    ACTIVITY TOLERANCE:                          O2 SATURATIONS       Cognition  Follows simple commands only      Upper extremity  B UE shoulder weakness     EDUCATION PROVIDED  Patient Education : Role of Occupational Therapy; Plan of Care; Discharge Recommendations; Functional Transfer Techniques; Fall Prevention  Patient's Response to Education: Verbalized Understanding  Family/Caregiver Education : Role of Occupational Therapy; Plan of Care; Discharge Recommendations; Functional Transfer Techniques; Fall Prevention  Family/Caregiver's Response to Education: Verbalized Understanding    Equipment used: gait belt, RW, SaraStedy  Demonstrates functional use, Would benefit from additional trial     Therapist comments: supine>sit EOB>attempted sit>stand to RW>pt requiring max Ax2>attempted T/F with SaraStedy, pt with too much chest pain>sit>supine in bed     Patient End of Session: In bed, Needs met, Call light within reach, RN aware of session/findings, All patient questions and concerns addressed, Hospital anti-slip socks, Alarm set, Family present    OCCUPATIONAL PROFILE    HOME SITUATION  Type of Home: House  Home Layout: Multi-level  Lives With: Son    Toilet and Equipment: Standard height toilet  Shower/Tub and Equipment: Walk-in shower       Occupation/Status: Retired  Hand Dominance: Right  Drives: Yes (driving locally to store and dialysis)  Patient Regularly Uses: Rolling walker    Prior Level of Function: Pt lives with his son who works outside the home. Pt is typically mod I with BADL and has assist with IADL. Pt has had recent falls at home. Pt only drives locally to HD.     SUBJECTIVE   \"He had a fall at home and had a subdural hematoma and everything back in January.\" Per pt's son     \"My chest hurts.\" Per pt     \"He has been very out of it. This is the first day he seems more with it.' Per pt's son     PAIN ASSESSMENT  Ratin  Location: back/chest  Management Techniques: Relaxation, Repositioning    OBJECTIVE  Precautions: Cardiac, Bed/chair alarm, Aspiration, Fluid restriction   Fall Risk: High fall  risk    WEIGHT BEARING RESTRICTION       ASSESSMENTS    AM-PAC '6-Clicks' Inpatient Daily Activity Short Form  -   Putting on and taking off regular lower body clothing?: Total  -   Bathing (including washing, rinsing, drying)?: A Lot  -   Toileting, which includes using toilet, bedpan or urinal? : Total  -   Putting on and taking off regular upper body clothing?: A Lot  -   Taking care of personal grooming such as brushing teeth?: A Lot  -   Eating meals?: A Little    AM-PAC Score:  Score: 11  Approx Degree of Impairment: 70.42%  Standardized Score (AM-PAC Scale): 29.04    PLAN  OT Device Recommendations: TBD  OT Treatment Plan: Balance activities, Energy conservation/work simplification techniques, ADL training, Functional transfer training, Endurance training, Patient/Family education, Patient/Family training, Equipment eval/education, Neuromuscluar reeducation, Compensatory technique education, Continued evaluation  Rehab Potential : Fair  Frequency: 5x/week  Number of Visits to Meet Established Goals: 5    ADL Goals  Patient will perform toileting with mod A and AE PRN  Patient will perform LB dressing with mod A and AE PRN    Functional Transfer Goals  Patient will perform bed mobility supine to sit with min A  Patient will perform bed mobility sit to supine with min A  Patient will perform toilet transfer with mod A        Patient Evaluation Complexity Level:   Occupational Profile/Medical History MODERATE - Expanded review of history including review of medical or therapy record   Specific performance deficits impacting engagement in ADL/IADL MODERATE  3 - 5 performance deficits   Client Assessment/Performance Deficits MODERATE - Comorbidities and min to mod modifications of tasks    Clinical Decision Making MODERATE - Analysis of occupational profile, detailed assessments, several treatment options    Overall Complexity MODERATE     OT Session Time: 30 minutes  Therapeutic Activity: 15 minutes

## 2025-05-14 NOTE — CM/SW NOTE
SW was made aware that PT is anticipating [facility] at discharge. [facility] requested. [facility] Will have to complete PAS[facility]. Noted that pt is outpatient HD 3x week. Family is requesting [facility] for [facility], unsure if [facility] will accept for [facility]. Message sent to facility.     Rosana MEADOWS, LCSW  Discharge Planner

## 2025-05-14 NOTE — CONSULTS
[facility]    Division of Cardiology    Consultation Note      Jonny Haque Patient Status:  Hospitalized    4/15/1945 MRN [id number]   Location [facility] 8NE-A Attending Ursula Saucdeo MD   Hosp Day # 1 PCP Gee Jimenez MD   Primary Cards        Impression:  VT   Possibly ischemic  Possibly scar based  Planning EP consult tomorrow and possible ICD placement Friday  CAD  MV PCI in the past (LM, LAD, OM/CX)  PCI to LAD recently in the setting of VT, unclear if lesion related  Hx aortic stenosis  S/P TAVR   Echo with good valve function (mean gradient 9mmHg)  HFrEF/systolic dysfunction  EF at best up to 60% range  Echo 2025 with EF 40% range  ESRD  HD dependent  Anemia  Hx GIB  Has only partially tolerated DAPT in the past  Cirrhosis  Varices noted in some reports  PAF  On amiodarone for rhythm control  Has not tolerated AC due to GIB and SDH  DM  HTN  HX SDH    Plan:  DAPT.  Dialysis tomorrow.  EP consult tomorrow hopefully tomorrow.   ICD possibly Friday.            Chief Complaint: VT    History of Present Illness: Jonny Haque is a(n) 80 year old male with CAD, TAVR, VT who was at [facility] for VT and cardiac arret.  See transfer records for details.        History:  Past Medical History:    Aortic stenosis    Calculus of kidney    Coronary atherosclerosis    bare metal stents at [facility] 2021    Diabetes (HCC)    Disorder of thyroid    Esophageal varices without bleeding, unspecified esophageal varices type (HCC)    GIB (gastrointestinal bleeding)    Hepatitis, unspecified    hepatitis C-just completed taking Harvoni in 2016    High blood pressure    High cholesterol    Other disorders of plasma-protein metabolism, not elsewhere classified    Renal disorder    Visual impairment    reading glasses     Past Surgical History:   Procedure Laterality Date    Angiogram  08/15/2017    small caliber RCA with 80% mid lesion.  LAD and left  circumflex with 50% lesions.  LV shows inferobasilar aneurysm with scar.  Overall LV function preserved at the exterior 65%.    Cath bare metal stent (bms)      Cath transcatheter aortic valve replacement      Other surgical history      Upper GI surgery    Other surgical history  2017    Caroline Lees    Other surgical history  2019    [facility]TS Caroline eLes     Other surgical history  2020    [facility] Cysto (Dr. Lees)     Social History     Socioeconomic History    Marital status:    Tobacco Use    Smoking status: Former     Current packs/day: 0.00     Average packs/day: 0.5 packs/day for 15.0 years (7.5 ttl pk-yrs)     Types: Cigarettes     Start date: 1965     Quit date: 1980     Years since quittin.2    Smokeless tobacco: Never    Tobacco comments:     quit at age 34   Vaping Use    Vaping status: Never Used   Substance and Sexual Activity    Alcohol use: No    Drug use: No     Family History   Problem Relation Age of Onset    Cancer Father     Hypertension Mother         Inpatient Medications:    [START ON 5/15/2025] heparin  1.5 mL Intracatheter Once    [START ON 5/15/2025] epoetin eunice  10,000 Units Intravenous Once    heparin  5,000 Units Subcutaneous 2 times per day    lidocaine-menthol  1 patch Transdermal Daily    amiodarone  100 mg Oral BID    [START ON 5/15/2025] aspirin  81 mg Oral QOD    carvedilol  6.25 mg Oral BID with meals    clopidogrel  75 mg Oral Daily    insulin degludec  20 Units Subcutaneous Nightly    [Held by provider] Lanthanum Carbonate  1,000 mg Oral TID CC    levothyroxine  200 mcg Oral Before breakfast    losartan  25 mg Oral Daily    pantoprazole  40 mg Oral BID AC    rosuvastatin  10 mg Oral Nightly    insulin aspart  2-10 Units Subcutaneous TID AC and HS       Continuous Infusions:       PRN Medications:     [START ON 5/15/2025] sodium chloride **AND** [START ON 5/15/2025] albumin human    acetaminophen    polyethylene glycol (PEG 3350)     sennosides    bisacodyl    fleet enema    ondansetron    metoclopramide    HYDROcodone-acetaminophen **OR** HYDROcodone-acetaminophen    HYDROmorphone **OR** HYDROmorphone **OR** HYDROmorphone    glucose **OR** glucose **OR** glucose-vitamin C **OR** dextrose **OR** glucose **OR** glucose **OR** glucose-vitamin C    Outpatient Medications:   Current Facility-Administered Medications on File Prior to Encounter   Medication Dose Route Frequency Provider Last Rate Last Admin    [COMPLETED] traMADol (Ultram) tab 50 mg  50 mg Oral Once Bette Aguiar MD   50 mg at 02/16/25 0926    [COMPLETED] traMADol (Ultram) tab 50 mg  50 mg Oral Once Kelton Woodward MD   50 mg at 02/15/25 1140    [COMPLETED] heparin (Porcine) 1000 UNIT/ML injection 5,000 Units  5,000 Units Intracatheter Once Clement Jimenez MD   4,100 Units at 02/15/25 1839    [COMPLETED] lidocaine-menthol 4-1 % patch 1 patch  1 patch Transdermal Once Zelda Espitia MD   1 patch at 02/15/25 2114    [COMPLETED] traMADol (Ultram) tab 50 mg  50 mg Oral Once Zelda Espitia MD   50 mg at 02/15/25 2219     Current Outpatient Medications on File Prior to Encounter   Medication Sig Dispense Refill    amiodarone 100 MG Oral Tab Take 1 tablet (100 mg total) by mouth 2 (two) times daily. 60 tablet 11    clopidogrel 75 MG Oral Tab Take 1 tablet (75 mg total) by mouth daily. 30 tablet 11    losartan 25 MG Oral Tab Take 1 tablet (25 mg total) by mouth daily. 30 tablet 3    carvedilol 6.25 MG Oral Tab Take 1 tablet (6.25 mg total) by mouth 2 (two) times daily with meals. 60 tablet 3    levothyroxine 200 MCG Oral Tab Take 1 tablet (200 mcg total) by mouth before breakfast.      Lanthanum Carbonate 1000 MG Oral Chew Tab Chew 1 tablet (1,000 mg total) by mouth in the morning and 1 tablet (1,000 mg total) at noon and 1 tablet (1,000 mg total) in the evening. Chew with meals.      aspirin 81 MG Oral Tab EC Take 1 tablet (81 mg total) by mouth every other day.      Ferric  Citrate (AURYXIA) 1  MG(Fe) Oral Tab       pantoprazole 40 MG Oral Tab EC Take 1 tablet (40 mg total) by mouth in the morning and 1 tablet (40 mg total) in the evening. Take before meals.      Insulin Pen Needle (TRUEPLUS PEN NEEDLES) 31G X 5 MM Does not apply Misc Use 5 per day 500 each 2    Insulin Glargine, 2 Unit Dial, (TOUJEO MAX SOLOSTAR) 300 UNIT/ML Subcutaneous Solution Pen-injector Inject 20 Units into the skin nightly. (Patient taking differently: Inject 30 Units into the skin nightly.) 6 mL 2    rosuvastatin 10 MG Oral Tab Take 1 tablet (10 mg total) by mouth nightly. (Patient taking differently: Take 1 tablet (10 mg total) by mouth nightly.) 90 tablet 0    Glucose Blood (ONETOUCH ULTRA) In Vitro Strip 6 times a day 400 each 3    Insulin Lispro, 1 Unit Dial, (HUMALOG KWIKPEN) 100 UNIT/ML Subcutaneous Solution Pen-injector 12 units before meals with sliding scale. Max daily dose: 50 units. (Patient taking differently: 10 Units. Three times a day. Before meals) 60 mL 3    CONTOUR NEXT TEST In Vitro Strip TEST BLOOD SUGAR FOUR TIMES DAILY 400 strip 3    Blood Glucose Monitoring Suppl (DidLog CONTOUR NEXT MONITOR) W/DEVICE Does not apply Kit 1 Device by Does not apply route 4 (four) times daily. 1 kit 0    nitroglycerin 0.4 MG Sublingual SL Tab Place 1 tablet (0.4 mg total) under the tongue every 5 (five) minutes as needed for Chest pain. 10 tablet 3    zolpidem 10 MG Oral Tab Take 1 tablet (10 mg total) by mouth nightly as needed for Sleep.         Home Medications:  Outpatient Medications Marked as Taking for the 5/13/25 encounter (Hospital Encounter)   Medication Sig Dispense Refill    amiodarone 100 MG Oral Tab Take 1 tablet (100 mg total) by mouth 2 (two) times daily. 60 tablet 11    clopidogrel 75 MG Oral Tab Take 1 tablet (75 mg total) by mouth daily. 30 tablet 11    losartan 25 MG Oral Tab Take 1 tablet (25 mg total) by mouth daily. 30 tablet 3    carvedilol 6.25 MG Oral Tab Take 1 tablet (6.25  mg total) by mouth 2 (two) times daily with meals. 60 tablet 3    levothyroxine 200 MCG Oral Tab Take 1 tablet (200 mcg total) by mouth before breakfast.      Lanthanum Carbonate 1000 MG Oral Chew Tab Chew 1 tablet (1,000 mg total) by mouth in the morning and 1 tablet (1,000 mg total) at noon and 1 tablet (1,000 mg total) in the evening. Chew with meals.      aspirin 81 MG Oral Tab EC Take 1 tablet (81 mg total) by mouth every other day.      Ferric Citrate (AURYXIA) 1  MG(Fe) Oral Tab       pantoprazole 40 MG Oral Tab EC Take 1 tablet (40 mg total) by mouth in the morning and 1 tablet (40 mg total) in the evening. Take before meals.      Insulin Pen Needle (TRUEPLUS PEN NEEDLES) 31G X 5 MM Does not apply Misc Use 5 per day 500 each 2    Insulin Glargine, 2 Unit Dial, (TOUJEO MAX SOLOSTAR) 300 UNIT/ML Subcutaneous Solution Pen-injector Inject 20 Units into the skin nightly. (Patient taking differently: Inject 30 Units into the skin nightly.) 6 mL 2    rosuvastatin 10 MG Oral Tab Take 1 tablet (10 mg total) by mouth nightly. (Patient taking differently: Take 1 tablet (10 mg total) by mouth nightly.) 90 tablet 0    Glucose Blood (ONETOUCH ULTRA) In Vitro Strip 6 times a day 400 each 3    Insulin Lispro, 1 Unit Dial, (HUMALOG KWIKPEN) 100 UNIT/ML Subcutaneous Solution Pen-injector 12 units before meals with sliding scale. Max daily dose: 50 units. (Patient taking differently: 10 Units. Three times a day. Before meals) 60 mL 3    CONTOUR NEXT TEST In Vitro Strip TEST BLOOD SUGAR FOUR TIMES DAILY 400 strip 3    Blood Glucose Monitoring Suppl (ANTERIOS CONTOUR NEXT MONITOR) W/DEVICE Does not apply Kit 1 Device by Does not apply route 4 (four) times daily. 1 kit 0       Allergies:   No Known Allergies     Review of Systems:   Ten point review of systems is unremarkable except as mentioned above.       Vitals:   /50 (BP Location: Right arm)   Pulse 63   Temp 97.8 °F (36.6 °C) (Oral)   Resp 18   Wt 199 lb 8.3 oz  (90.5 kg)   SpO2 (!) 84%   BMI 27.83 kg/m²   Wt Readings from Last 3 Encounters:   05/14/25 199 lb 8.3 oz (90.5 kg)   02/16/25 203 lb 0.7 oz (92.1 kg)   02/09/25 212 lb 1.3 oz (96.2 kg)       Examination:  General: Well developed, well nourished male.  No distress.  Neck:  No JVD.  Normal ROM.  Cardiac: Regular rate and rhythm.  No murmurs, rubs, or gallops.   Lungs: Clear to ascultation bilaterally.    Abdomen: Soft.  Non-distended.  Non-tender.  Bowel sounds present.    Extremities: Warm, no significant edema.  Palpable pulses.  Neurologic: Alert and oriented.  No gross deficits.  Integument:  No visible rashes identified.  Psych: The patient's affect is appropriate.        Labs:   Recent Labs   Lab 05/14/25  0503   WBC 4.2   HGB 8.2*   .3*   PLT 55.0*     Recent Labs   Lab 05/14/25  0503      K 3.6      CO2 28.0   BUN 51*   CREATSERUM 4.95*   *   CA 9.1     Recent Labs   Lab 05/14/25  0503   ALT 79*   AST 45*   ALB 3.5     Lab Results   Component Value Date    A1C 7.0 (H) 08/03/2024    A1C 6.0 (H) 03/17/2024    A1C 5.4 10/25/2022     Lab Results   Component Value Date    CHOLEST 199 02/15/2025    HDL 31 (L) 02/15/2025     (H) 02/15/2025    TRIG 291 (H) 02/15/2025               Thank you for allowing [facility] to care for your patient. Please contact me with any questions!     Satya Donald MD  General, Interventional  Structural & Endovascular  Cardiology

## 2025-05-14 NOTE — PLAN OF CARE
Assumed care of patient at 0700. Pt A/Ox 2-3, forgetful at times. O2 sats maintained on room air. NSR with BBB on tele. Last BM 5/13 per pt. External catheter in place, no urine output, bladder scan 412 mL, MD aware, pt asymptomatic. Pt reports moderate-severe sternal pain, PRN Norco given. Pt up max assist x2 to stand per physical therapy. Pt and son updated on plan of care. Care needs met. Bed in lowest position, Call light within reach. Bed alarm on. Family updated on plan of care bedside.     POC: cardiology consult, possible ICD placement, HD Tues/thurs/sat, SLP eval    1350: Per cards NP, no procedure today, pt can eat. SLP bedside to evaluate. Son updated.     Problem: Diabetes/Glucose Control  Goal: Glucose maintained within prescribed range  Description: INTERVENTIONS:- Monitor Blood Glucose as ordered- Assess for signs and symptoms of hyperglycemia and hypoglycemia- Administer ordered medications to maintain glucose within target range- Assess barriers to adequate nutritional intake and initiate nutrition consult as needed- Instruct patient on self management of diabetes  Outcome: Progressing     Problem: Patient/Family Goals  Goal: Patient/Family Long Term Goal  Description: Patient's Long Term Goal: Stay out of hospital. Interventions: Follow up appointments, medication compliance- See additional Care Plan goals for specific interventions  Outcome: Progressing  Goal: Patient/Family Short Term Goal  Description: Patient's Short Term Goal: Discharge. Interventions: ICD placement - See additional Care Plan goals for specific interventions  Outcome: Progressing     Problem: CARDIOVASCULAR - ADULT  Goal: Maintains optimal cardiac output and hemodynamic stability  Description: INTERVENTIONS:- Monitor vital signs, rhythm, and trends- Monitor for bleeding, hypotension and signs of decreased cardiac output- Evaluate effectiveness of vasoactive medications to optimize hemodynamic stability- Monitor arterial and/or  venous puncture sites for bleeding and/or hematoma- Assess quality of pulses, skin color and temperature- Assess for signs of decreased coronary artery perfusion - ex. Angina- Evaluate fluid balance, assess for edema, trend weights  Outcome: Progressing  Goal: Absence of cardiac arrhythmias or at baseline  Description: INTERVENTIONS:- Continuous cardiac monitoring, monitor vital signs, obtain 12 lead EKG if indicated- Evaluate effectiveness of antiarrhythmic and heart rate control medications as ordered- Initiate emergency measures for life threatening arrhythmias- Monitor electrolytes and administer replacement therapy as ordered  Outcome: Progressing     Problem: SKIN/TISSUE INTEGRITY - ADULT  Goal: Skin integrity remains intact  Description: INTERVENTIONS- Assess and document risk factors for pressure ulcer development- Assess and document skin integrity- Monitor for areas of redness and/or skin breakdown- Initiate interventions, skin care algorithm/standards of care as needed  Outcome: Progressing

## 2025-05-14 NOTE — PLAN OF CARE
NURSING ADMISSION NOTE     Patient transferred from [facility].  Oriented to room.  Safety precautions initiated.  Bed in low position.  Call light in reach.     Admission assessment complete.  A&O x1-2. Back pain managed w/ prn meds. Denies SOB & cardiac symptoms.  Maintaining O2 on RA. NSR on tele, S1&S2 present.  Bowel sounds hypoactive. Incontinent. Brief and purewick in place.  Skin assessment w/ Deborah RN. Generalized bruising. Redness to sacrum.    Admission navigator complete.  Admission orders received and initiated.   Pt & son updated on plan of care.     Addendum: No urine OP overnight. Bladder scan 400 mL    Problem: CARDIOVASCULAR - ADULT  Goal: Maintains optimal cardiac output and hemodynamic stability  Description: INTERVENTIONS:- Monitor vital signs, rhythm, and trends- Monitor for bleeding, hypotension and signs of decreased cardiac output- Evaluate effectiveness of vasoactive medications to optimize hemodynamic stability- Monitor arterial and/or venous puncture sites for bleeding and/or hematoma- Assess quality of pulses, skin color and temperature- Assess for signs of decreased coronary artery perfusion - ex. Angina- Evaluate fluid balance, assess for edema, trend weights  Outcome: Progressing  Goal: Absence of cardiac arrhythmias or at baseline  Description: INTERVENTIONS:- Continuous cardiac monitoring, monitor vital signs, obtain 12 lead EKG if indicated- Evaluate effectiveness of antiarrhythmic and heart rate control medications as ordered- Initiate emergency measures for life threatening arrhythmias- Monitor electrolytes and administer replacement therapy as ordered  Outcome: Progressing     Problem: SKIN/TISSUE INTEGRITY - ADULT  Goal: Skin integrity remains intact  Description: INTERVENTIONS- Assess and document risk factors for pressure ulcer development- Assess and document skin integrity- Monitor for areas of redness and/or skin breakdown- Initiate interventions, skin care  algorithm/standards of care as needed  Outcome: Progressing     Problem: Diabetes/Glucose Control  Goal: Glucose maintained within prescribed range  Description: INTERVENTIONS:- Monitor Blood Glucose as ordered- Assess for signs and symptoms of hyperglycemia and hypoglycemia- Administer ordered medications to maintain glucose within target range- Assess barriers to adequate nutritional intake and initiate nutrition consult as needed- Instruct patient on self management of diabetes  Outcome: Progressing     Problem: Patient/Family Goals  Goal: Patient/Family Long Term Goal  Description: Patient's Long Term Goal: Stay out of hospital. Interventions: Follow up appointments, medication compliance- See additional Care Plan goals for specific interventions  Outcome: Progressing  Goal: Patient/Family Short Term Goal  Description: Patient's Short Term Goal: Discharge. Interventions: ICD placement - See additional Care Plan goals for specific interventions  Outcome: Progressing

## 2025-05-14 NOTE — H&P
.CC: No chief complaint on file.       PCP: Gee Jimenez MD    History of Present Illness: Patient is a 80 year old male with PMH sig for cad with h/o stents, h/o tavr, DM, htn/hl who was transferred from [facility] in [place] for further management. Pt had presented there with cardiac arrest  on 5/5. He had cpr/shocked 5 times per report. Extubated 5/10. He had a cardiac cath 5/8 and had angioplasty. EF 40%. He was noted to have some nsvt and seen by EP and started on amio. He was treated for PNA with abx including zosyn. He was reportedly given morphine for pain related to cpr. And was noted to be confused. He had been receiving HD, his initial K after cardiac arrest had been 8 but this had resolved. He was noted to be thrombocytopenic and was seen by hematology- this was attributed to meds and ?underlying liver disease. He was on telemetry on transfer, on 2L NC.    He was tired from HD when I saw him but making sense in response to questions. He reported some soreness in chest area. Not feeling very hungry.      PMH  Past Medical History:    Aortic stenosis    Calculus of kidney    Coronary atherosclerosis    bare metal stents at [facility] Jan 2021    Diabetes (HCC)    Disorder of thyroid    Esophageal varices without bleeding, unspecified esophageal varices type (HCC)    GIB (gastrointestinal bleeding)    Hepatitis, unspecified    hepatitis C-just completed taking Harvoni in November 2016    High blood pressure    High cholesterol    Other disorders of plasma-protein metabolism, not elsewhere classified    Renal disorder    Visual impairment    reading glasses        PSH  Past Surgical History:   Procedure Laterality Date    Angiogram  08/15/2017    small caliber RCA with 80% mid lesion.  LAD and left circumflex with 50% lesions.  LV shows inferobasilar aneurysm with scar.  Overall LV function preserved at the exterior 65%.    Cath bare metal stent (bms)      Cath transcatheter aortic valve  replacement      Other surgical history      Upper GI surgery    Other surgical history  2017    Cysto Dr. Lees    Other surgical history  2019    BTS Cysto Dr. Lees     Other surgical history  2020    BTS Cysto (Dr. Lees)        ALL:  Allergies[1]     Home Medications:  Medications Taking[2]      Soc Hx  Social History     Tobacco Use    Smoking status: Former     Current packs/day: 0.00     Average packs/day: 0.5 packs/day for 15.0 years (7.5 ttl pk-yrs)     Types: Cigarettes     Start date: 1965     Quit date: 1980     Years since quittin.2    Smokeless tobacco: Never    Tobacco comments:     quit at age 34   Substance Use Topics    Alcohol use: No        Fam Hx  Family History[3]    Review of Systems  Comprehensive ROS reviewed and negative except for what's stated above.       OBJECTIVE:  /65 (BP Location: Right arm)   Pulse 71   Temp 97.9 °F (36.6 °C) (Oral)   Resp 18   Wt 199 lb 8.3 oz (90.5 kg)   SpO2 96%   BMI 27.83 kg/m²     Gen- NAD, appears stated age  HEENT- NCAT, anicteric sclera, MMM, OP clear  Lymph- no cervical LAD  CV- RRR no murmurs. No BERNICE  Lungs- CTAB, good respiratory effort  Abd- soft, ntnd, no organomegaly, BS+  Derm- no rashes  MSK- good muscle strength and tone, no joint swelling  Neuro- slow responses, but accurate      Diagnostic Data:    CBC/Chem  Recent Labs   Lab 25  0503   WBC 4.2   HGB 8.2*   .3*   PLT 55.0*       Recent Labs   Lab 25  0503      K 3.6      CO2 28.0   BUN 51*   CREATSERUM 4.95*   *   CA 9.1       Recent Labs   Lab 25  0503   ALT 79*   AST 45*   ALB 3.5       No results for input(s): \"TROP\" in the last 168 hours.      ASSESSMENT / PLAN:     81 yo man with h/o tavr, cad, dm who presented as osh transfer for continued management in setting of cardiac arrest    Cardiac arrest  Vtach/nsvt  EF 40%, ICM  CAD with recent angio, h/o stents  - cardiology to consult  - will cont meds from  outside facility including amio, coreg, cozaar, asa, plavix  - prn pain meds for chest wall pain- will give norco, lidocaine patches. Avoid morphine.    ESRD on HD  - HD on t, th, sat. Consult nephro    Thrombocytopenia  - ?possibly 2/2 critical illness, meds, ?liver disease  - will f/u labs, may need to review further records from OSH    Resp failure in setting of cardiac arrest  Treated for PNA  - continue IS, NC    Subcutaneous heparin, SCDs      **Certification      PHYSICIAN Certification of Need for Inpatient Hospitalization - Initial Certification    Patient will require inpatient services that will reasonably be expected to span two midnight's based on the clinical documentation in H+P.   Based on patients current state of illness, I anticipate that, after discharge, patient will require TBD.      Ursula Saucedo MD  [facility] Hospitalist  Pager [phone]  Answering Service number: [phone]         [1] No Known Allergies  [2]   Outpatient Medications Marked as Taking for the 5/13/25 encounter (Hospital Encounter)   Medication Sig Dispense Refill    amiodarone 100 MG Oral Tab Take 1 tablet (100 mg total) by mouth 2 (two) times daily. 60 tablet 11    clopidogrel 75 MG Oral Tab Take 1 tablet (75 mg total) by mouth daily. 30 tablet 11    losartan 25 MG Oral Tab Take 1 tablet (25 mg total) by mouth daily. 30 tablet 3    carvedilol 6.25 MG Oral Tab Take 1 tablet (6.25 mg total) by mouth 2 (two) times daily with meals. 60 tablet 3    levothyroxine 200 MCG Oral Tab Take 1 tablet (200 mcg total) by mouth before breakfast.      Lanthanum Carbonate 1000 MG Oral Chew Tab Chew 1 tablet (1,000 mg total) by mouth in the morning and 1 tablet (1,000 mg total) at noon and 1 tablet (1,000 mg total) in the evening. Chew with meals.      aspirin 81 MG Oral Tab EC Take 1 tablet (81 mg total) by mouth every other day.      Ferric Citrate (AURYXIA) 1  MG(Fe) Oral Tab       pantoprazole 40 MG Oral Tab EC Take 1 tablet (40 mg  total) by mouth in the morning and 1 tablet (40 mg total) in the evening. Take before meals.      Insulin Pen Needle (TRUEPLUS PEN NEEDLES) 31G X 5 MM Does not apply Misc Use 5 per day 500 each 2    Insulin Glargine, 2 Unit Dial, (TOUJEO MAX SOLOSTAR) 300 UNIT/ML Subcutaneous Solution Pen-injector Inject 20 Units into the skin nightly. (Patient taking differently: Inject 30 Units into the skin nightly.) 6 mL 2    rosuvastatin 10 MG Oral Tab Take 1 tablet (10 mg total) by mouth nightly. (Patient taking differently: Take 1 tablet (10 mg total) by mouth nightly.) 90 tablet 0    Glucose Blood (ONETOUCH ULTRA) In Vitro Strip 6 times a day 400 each 3    Insulin Lispro, 1 Unit Dial, (HUMALOG KWIKPEN) 100 UNIT/ML Subcutaneous Solution Pen-injector 12 units before meals with sliding scale. Max daily dose: 50 units. (Patient taking differently: 10 Units. Three times a day. Before meals) 60 mL 3    CONTOUR NEXT TEST In Vitro Strip TEST BLOOD SUGAR FOUR TIMES DAILY 400 strip 3    Blood Glucose Monitoring Suppl (MOMENTFACE SRO CONTOUR NEXT MONITOR) W/DEVICE Does not apply Kit 1 Device by Does not apply route 4 (four) times daily. 1 kit 0   [3]   Family History  Problem Relation Age of Onset    Cancer Father     Hypertension Mother

## 2025-05-14 NOTE — SLP NOTE
ADULT SWALLOWING EVALUATION    ASSESSMENT    ASSESSMENT/OVERALL IMPRESSION:  Order received for bedside swallow evaluation to r/o aspiration per modified diet consistency. Pt transferred to [facility] from [facility] following cardiac arrest. Pmhx includes CHF, TAVR, ESRD on dialysis, HTN, CAD with PCI and DM. Pt familiar to this department following previous video swallow study 5/12/24 which revealed functional oropharyngeal swallow with recommendation for regular consistency diet and thin liquids then downgrade to soft diet with thin liquids during readmission on 5/9/24. Spoke with son via phone and he reported that patient was placed on a pureed diet with thickened liquids following video swallow study at [facility] on 5/12/25. He has been NPO currently due to possible ICD placement procedure. RN contacted SLP to inform procedure will not be completed this date and requesting bedside swallow evaluation.    Pt found lying semi-upright in bed; awake and able to follow simple commands but difficulty with lengthier/more complex and question validity as historian. Oral mech/motor exam revealed patient with his own dentition which appeared intact & functional. Oral cavity appeared clean & moist. No significant oral motor deficits discovered. Clear vocal quality, volitional swallow and congested cough elicited. Head of bed elevated to 90 degrees and patient fed po trials of thin via spoon & cup, nectar via spoon & cup, pureed and hard solid consistencies. Adequate oral retrieval & containment with mildly prolonged mastication & transit; however no significant oral residue post swallow. Continued delay in pharyngeal response suspected with reduced hyolaryngeal elevation and immediate as well as delayed cough observed intermittently following po trials of thin via spoon & cup. He appeared to tolerate nectar thick, pureed and solids.     Mild oral with probable pharyngeal dysphagia - clinical signs of  aspiration noted with thin liquids. Recommend soft and bites sized diet with nectar thick liquids in small, single controlled sips - 1:1 feeder for safety. Crushed or given pills whole in pureed.     Following exam, discussed results and recommendations with patient however question his level of understanding. Informed RN of results and agreed on plan. Will continue to follow as per goals.         RECOMMENDATIONS   Diet Recommendations - Solids: Mechanical soft chopped/ Soft & Bite Sized  Diet Recommendations - Liquids: Nectar thick liquids/ Mildly thick                        Compensatory Strategies Recommended: Alternate consistencies, Extra sauce/gravy  Aspiration Precautions: Upright position, Slow rate, Small bites, Small sips, 1:1 feeding  Medication Administration Recommendations: Whole in puree, Crushed in puree  Treatment Plan/Recommendations: Dysphagia therapy, SLP to reassess, Aspiration precautions    HISTORY   MEDICAL HISTORY  Reason for Referral: Altered diet consistency    Problem List  Active Problems:    Cardiac arrest (HCC)      Past Medical History  Past Medical History[1]    Prior Living Situation:  (transferred from [facility])  Diet Prior to Admission: Puree, Nectar thick liquids/ Mildly thick  Precautions: Aspiration    Patient/Family Goals: safest/least restrictive diet    SWALLOWING HISTORY  Current Diet Consistency: NPO  Dysphagia History: see above  Imaging Results: CXR 2/15/25  CONCLUSION:  No lobar pneumonia or overt congestive failure.  Mild atelectasis/scarring in the lower lungs.   SUBJECTIVE       OBJECTIVE   ORAL MOTOR EXAMINATION  Dentition: Natural, Functional  Symmetry: Within Functional Limits  Strength: Within Functional Limits  Tone: Within Functional Limits  Range of Motion: Within Functional Limits  Rate of Motion: Within Functional Limits    Voice Quality: Clear  Respiratory Status: Unlabored  Consistencies Trialed: Thin liquids, Nectar thick liquids, Puree, Hard  solid  Method of Presentation: Staff/Clinician assistance, Spoon, Cup, Single sips  Patient Positioned: Upright, Midline    Oral Phase of Swallow: Impaired  Bolus Retrieval: Intact  Bilabial Seal: Intact  Bolus Formation: Intact  Bolus Propulsion: Intact  Mastication: Impaired       Pharyngeal Phase of Swallow: Appears Impaired  Laryngeal Elevation Timing: Appears impaired  Laryngeal Elevation Strength: Appears impaired  Laryngeal Elevation Coordination: Appears intact  (Please note: Silent aspiration cannot be evaluated clinically. Videofluoroscopic Swallow Study is required to rule-out silent aspiration.)    Esophageal Phase of Swallow: No complaints consistent with possible esophageal involvement  Comments:               GOALS  Goal #1 The patient will tolerate soft and bites sized consistency and nectar thick liquids without overt signs or symptoms of aspiration with 95 % accuracy over 3 session(s).  New goal   Goal #2 The patient/family/caregiver will demonstrate understanding and implementation of aspiration precautions and swallow strategies independently over 3 session(s).    New goal   Goal #3 The patient will utilize compensatory strategies as outlined by  BSSE (clinical evaluation) including Slow rate, Small bites, Small sips, Alternate liquids/solids, Upright 90 degrees, Feed patient with moderate assistance 100 % of the time across 2 sessions.  New goal                              FOLLOW UP  Treatment Plan/Recommendations: Dysphagia therapy, SLP to reassess, Aspiration precautions  Duration: 1 week  Follow Up Needed (Documentation Required): Yes  SLP Follow-up Date: 05/15/25    Thank you for your referral.   If you have any questions, please contact YOLANDE Cruz MA, CCC-SLP  Pager [phone]                     [1]   Past Medical History:   Aortic stenosis    Calculus of kidney    Coronary atherosclerosis    bare metal stents at [facility] Jan 2021    Diabetes (HCC)    Disorder  of thyroid    Esophageal varices without bleeding, unspecified esophageal varices type (HCC)    GIB (gastrointestinal bleeding)    Hepatitis, unspecified    hepatitis C-just completed taking Harvoni in November 2016    High blood pressure    High cholesterol    Other disorders of plasma-protein metabolism, not elsewhere classified    Renal disorder    Visual impairment    reading glasses

## 2025-05-14 NOTE — PHYSICAL THERAPY NOTE
PHYSICAL THERAPY EVALUATION - INPATIENT     Room Number: 8621/8621-A  Evaluation Date: 5/14/2025  Type of Evaluation: Initial  Physician Order: PT Eval and Treat    Presenting Problem: Cardiac Arrest  Co-Morbidities : CHF, TAVR, ESRD on dialysis, HTN, CAD with PCI, DM2  Reason for Therapy: Mobility Dysfunction and Discharge Planning    PHYSICAL THERAPY ASSESSMENT   Patient is a 80 year old male admitted 5/13/2025 following cardiac arrest.  Prior to admission, patient's baseline is independent ambulation.  Patient is currently functioning below baseline with bed mobility, transfers, maintaining seated position, and standing prolonged periods.  Patient in currently unable to ambulate. Patient is requiring moderate assist, maximum assist, and dependent as a result of the following impairments: decreased functional strength, decreased endurance/aerobic capacity, pain, impaired sitting and standing balance, decreased muscular endurance, medical status, and increased O2 needs from baseline.  Physical therapy will continue to follow for duration of hospitalization.    Patient will benefit from continued skilled PT Services to promote return to prior level of function and safety with continuous assistance and gradual rehabilitative therapy .    PLAN DURING HOSPITALIZATION  Nursing Mobility Recommendation : Lift Equipment  PT Device Recommendation: Rolling walker  PT Treatment Plan: Bed mobility, Endurance, Energy conservation, Patient education, Gait training, Strengthening, Stair training, Transfer training, Balance training  Rehab Potential : Fair  Frequency (Obs): 3-5x/week     CURRENT GOALS    Goal #1 Patient is able to demonstrate supine - sit EOB @ level: minimum assistance     Goal #2 Patient is able to demonstrate transfers Sit to/from Stand at assistance level: moderate assistance     Goal #3 Patient is able to ambulate 20 feet with assist device: walker - rolling at assistance level: moderate assistance     Goal  #4    Goal #5    Goal #6    Goal Comments: Goals established on 2025      PHYSICAL THERAPY MEDICAL/SOCIAL HISTORY  History related to current admission: Patient is a 80 year old male admitted as a transfer on 2025 from [facility] following cardiac arrest.      HOME SITUATION  Type of Home: House  Home Layout: Multi-level  Stairs to Enter : 2   Railing: No    Stairs to Bedroom: 8    Railing: Yes    Lives With: Son    Drives: Yes (driving locally to store and dialysis)   Patient Regularly Uses: Rolling walker      Prior Level of [place]: The pt is typically independent with ambulation and IADL's.  Pt last fall was in January.    SUBJECTIVE  Sitting at EOB following first standing repetition, \"I feel faint.\"    OBJECTIVE  Precautions: Cardiac, Bed/chair alarm  Fall Risk: High fall risk    WEIGHT BEARING RESTRICTION     PAIN ASSESSMENT  Ratin  Location: chest pain  Management Techniques: Relaxation, Repositioning    COGNITION  Overall Cognitive Status:  Impaired  Arousal/Alertness:  lethargic  Memory:  decreased recall of biographical information, decreased recall of precautions, and decreased recall of recent events  Following Commands:  follows one step commands with increased time and follows one step commands with repetition    RANGE OF MOTION AND STRENGTH ASSESSMENT  Upper extremity ROM is within functional limits, strength grossly limited 3/5, pain with AROM    Lower extremity ROM is within functional limits     Lower extremity strength is grossly limited 4-/5    BALANCE  Static Sitting: Poor  Dynamic Sitting: Poor  Static Standing: Dependent  Dynamic Standing:  (Unable to achieve)         ACTIVITY TOLERANCE  Pulse: 66 at rest, 75 with activity  Heart Rate Source: Monitor     BP: 150/65  BP Location: Right arm  BP Method: Automatic  Patient Position: Sitting    O2 WALK  Oxygen Therapy  SPO2% on Room Air at Rest: 96      AM-PAC '6-Clicks' INPATIENT SHORT FORM - BASIC MOBILITY  How much  difficulty does the patient currently have...  Patient Difficulty: Turning over in bed (including adjusting bedclothes, sheets and blankets)?: A Lot   Patient Difficulty: Sitting down on and standing up from a chair with arms (e.g., wheelchair, bedside commode, etc.): Unable   Patient Difficulty: Moving from lying on back to sitting on the side of the bed?: A Lot   How much help from another person does the patient currently need...   Help from Another: Moving to and from a bed to a chair (including a wheelchair)?: Total   Help from Another: Need to walk in hospital room?: Total   Help from Another: Climbing 3-5 steps with a railing?: Total     AM-PAC Score:  Raw Score: 8   Approx Degree of Impairment: 86.62%   Standardized Score (AM-PAC Scale): 28.58   CMS Modifier (G-Code): CM    FUNCTIONAL ABILITY STATUS  Gait Assessment   Functional Mobility/Gait Assessment  Gait Assistance:  (Unable to achieve)    Skilled Therapy Provided     Bed Mobility:  Rolling: Mod A  Supine to sit: Max A   Sit to supine: Max A x2 persons     Transfer Mobility:  Sit to stand: Max A x2 persons   Stand to sit: Max A  Gait = Unable to achieve    Therapist's Comments: Pt completed supine>partial sidelying with Mod>sit with Max A and Mod A to scoot to EOB.  Pt sat upright at EOB x10 minutes with Min A initially but progressive decline with posterior left lean required Mod-Max A to maintain.  Pt able to complete sit>stand to RW x1 rep with 2 person Max A, but able to maintain static stand at RW only briefly prior to fatigue and return to sit.     Exercise/Education Provided:  Bed mobility  Functional activity tolerated  Neuromuscular re-educate  Posture  Strengthening  Transfer training    Patient End of Session: In bed, Needs met, Call light within reach, RN aware of session/findings, All patient questions and concerns addressed, Hospital anti-slip socks, Alarm set, Family present      Patient Evaluation Complexity Level:  History High - 3 or  more personal factors and/or co-morbidities   Examination of body systems Moderate - addressing a total of 3 or more elements   Clinical Presentation  Moderate - Evolving   Clinical Decision Making Moderate Complexity       PT Session Time: 30 minutes  Therapeutic Activity: 15 minutes

## 2025-05-14 NOTE — SLP NOTE
Order received for bedside swallow evaluation. Attempted to complete however patient currently NPO for possible procedure. Will re-attempt as appropriate and available.    Hailey Andrew MA, CCC-SLP  Pager [phone]

## 2025-05-14 NOTE — CONSULTS
[facility]  Report of Consultation    Jonny Haque Patient Status:  Inpatient    4/15/1945 MRN [id number]   Location [facility] 8NE-A Attending Ursula Saucedo MD   Hosp Day # 1 PCP Gee Jimenez MD     Reason for Consultation:  ESRD    History of Present Illness:  Jonny Haque is a a(n) 80 year old male with ESRD on HD (TTS), ICM, s/p TAVR  HTN, who is admitted for ongoing cardiac care; he was transferred from [facility]. He had cardiac arrest on  (Monday - day before HD). He received care at the OSH in the interim time.  Last HD yesterday; pt is a limited historian  Care reviewed w/ son @ bedside      History:  Past Medical History[1]  Past Surgical History[2]  Family History[3]   reports that he quit smoking about 45 years ago. His smoking use included cigarettes. He started smoking about 60 years ago. He has a 7.5 pack-year smoking history. He has never used smokeless tobacco. He reports that he does not drink alcohol and does not use drugs.    Allergies:  Allergies[4]    Current Medications:  Current Hospital Medications[5]  Home Medications:  Prior to Admission Medications[6]    Review of Systems:  See HPI; A total of 12 systems reviewed and otherwise unremarkable.    Physical Exam:  Vital signs: Blood pressure 150/65, pulse 71, temperature 97.9 °F (36.6 °C), temperature source Oral, resp. rate 18, weight 199 lb 8.3 oz (90.5 kg), SpO2 96%.  General: No acute distress. Alert and oriented   HEENT: Moist mucous membranes. EOM-I. PERRL  Neck: No lymphadenopathy.  No JVD. No carotid bruits.  Respiratory: Clear to auscultation bilaterally.  No wheezes. No rhonchi.  Cardiovascular: S1, S2.  Regular rate and rhythm.  No murmurs. Equal pulses   Abdomen: Soft, nontender, nondistended.  Positive bowel sounds. No rebound tenderness   Neurologic: No focal neurological deficits.   Musculoskeletal: Full range of motion of all extremities.  No swelling noted.   Integument: No lesions. No  erythema.  Psychiatric: Appropriate mood and affect.  PC intact ( R neck)    Laboratory:  Lab Results   Component Value Date    WBC 4.2 05/14/2025    HGB 8.2 05/14/2025    HCT 25.1 05/14/2025    PLT 55.0 05/14/2025    CREATSERUM 4.95 05/14/2025    BUN 51 05/14/2025     05/14/2025    K 3.6 05/14/2025     05/14/2025    CO2 28.0 05/14/2025     05/14/2025    CA 9.1 05/14/2025    ALB 3.5 05/14/2025    ALKPHO 237 05/14/2025    BILT 2.6 05/14/2025    TP 6.2 05/14/2025    AST 45 05/14/2025    ALT 79 05/14/2025    PGLU 172 05/14/2025          BUN (mg/dL)   Date Value   05/14/2025 51 (H)   02/16/2025 38 (H)   02/15/2025 61 (H)     Blood Urea Nitrogen   Date Value   03/17/2022 68.0 mg/dL (H)   02/09/2022 35 MG/DL (H)   07/02/2021 82.0 mg/dL (H)   07/15/2020 109.0 mg/dL (H)   06/07/2016 33 mg/dL (H)   03/07/2013 26 milligrams per deciliter     Creatinine, Serum   Date Value   08/25/2016 1.48 mg/dL (H)   06/07/2016 1.21 mg/dL   03/07/2013 1.04 milligrams per deciliter     Creatinine (mg/dL)   Date Value   05/14/2025 4.95 (H)   02/16/2025 6.30 (H)   02/15/2025 8.22 (H)   03/17/2022 5.23 (H)   07/02/2021 3.87 (H)   07/15/2020 3.64 (H)         Imaging:  Reviewed     Impression:  ESRD due to DM; on HD TTS- continue routine schedule; volume/labs stable- plan for HD tomorrow   Recent caridac arrest  Anemia due to CKD; JIE w/ HD ; goal hgb 10-11  Hx of ICM; Cardiac arrest 5/5; s/p angioplasty @ OSH; noted to have Vtach/NVST (on amiodrone)  - cardiology following          Thank you for allowing me to participate in this patient's care.  Please feel free to call me with any questions or concerns.    Ashwin Lunsford MD  5/14/2025  11:23 AM         [1]   Past Medical History:   Aortic stenosis    Calculus of kidney    Coronary atherosclerosis    bare metal stents at [facility] Jan 2021    Diabetes (HCC)    Disorder of thyroid    Esophageal varices without bleeding, unspecified esophageal varices type (HCC)    GIB  (gastrointestinal bleeding)    Hepatitis, unspecified    hepatitis C-just completed taking Harvoni in November 2016    High blood pressure    High cholesterol    Other disorders of plasma-protein metabolism, not elsewhere classified    Renal disorder    Visual impairment    reading glasses   [2]   Past Surgical History:  Procedure Laterality Date    Angiogram  08/15/2017    small caliber RCA with 80% mid lesion.  LAD and left circumflex with 50% lesions.  LV shows inferobasilar aneurysm with scar.  Overall LV function preserved at the exterior 65%.    Cath bare metal stent (bms)      Cath transcatheter aortic valve replacement      Other surgical history      Upper GI surgery    Other surgical history  12/11/2017    Cysto Dr. Lees    Other surgical history  07/18/2019    BTS Cysto Dr. Lees     Other surgical history  02/06/2020    BTS Cysto (Dr. Lees)   [3]   Family History  Problem Relation Age of Onset    Cancer Father     Hypertension Mother    [4] No Known Allergies  [5]   Current Facility-Administered Medications:     heparin (Porcine) 5000 UNIT/ML injection 5,000 Units, 5,000 Units, Subcutaneous, 2 times per day    acetaminophen (Tylenol Extra Strength) tab 500 mg, 500 mg, Oral, Q4H PRN    polyethylene glycol (PEG 3350) (Miralax) 17 g oral packet 17 g, 17 g, Oral, Daily PRN    sennosides (Senokot) tab 17.2 mg, 17.2 mg, Oral, Nightly PRN    bisacodyl (Dulcolax) 10 MG rectal suppository 10 mg, 10 mg, Rectal, Daily PRN    fleet enema (Fleet) rectal enema 133 mL, 1 enema, Rectal, Once PRN    ondansetron (Zofran) 4 MG/2ML injection 4 mg, 4 mg, Intravenous, Q6H PRN    metoclopramide (Reglan) 5 mg/mL injection 5 mg, 5 mg, Intravenous, Q8H PRN    HYDROcodone-acetaminophen (Norco) 5-325 MG per tab 1 tablet, 1 tablet, Oral, Q4H PRN **OR** HYDROcodone-acetaminophen (Norco) 5-325 MG per tab 2 tablet, 2 tablet, Oral, Q4H PRN    HYDROmorphone (Dilaudid) 1 MG/ML injection 0.1 mg, 0.1 mg, Intravenous, Q6H PRN **OR**  HYDROmorphone (Dilaudid) 1 MG/ML injection 0.2 mg, 0.2 mg, Intravenous, Q6H PRN **OR** HYDROmorphone (Dilaudid) 1 MG/ML injection 0.4 mg, 0.4 mg, Intravenous, Q6H PRN    lidocaine-menthol 4-1 % patch 1 patch, 1 patch, Transdermal, Daily    amiodarone (Pacerone) tab 100 mg, 100 mg, Oral, BID    [START ON 5/15/2025] aspirin DR tab 81 mg, 81 mg, Oral, QOD    carvedilol (Coreg) tab 6.25 mg, 6.25 mg, Oral, BID with meals    clopidogrel (Plavix) tab 75 mg, 75 mg, Oral, Daily    insulin degludec (Tresiba) 100 units/mL flextouch 20 Units, 20 Units, Subcutaneous, Nightly    [Held by provider] Lanthanum Carbonate (FOSRENOL) chewable tab 1,000 mg - Pt's Supply, 1,000 mg, Oral, TID CC    levothyroxine (Synthroid) tab 200 mcg, 200 mcg, Oral, Before breakfast    losartan (Cozaar) tab 25 mg, 25 mg, Oral, Daily    pantoprazole (Protonix) DR tab 40 mg, 40 mg, Oral, BID AC    rosuvastatin (Crestor) tab 10 mg, 10 mg, Oral, Nightly    glucose (Dex4) 15 GM/59ML oral liquid 15 g, 15 g, Oral, Q15 Min PRN **OR** glucose (Glutose) 40% oral gel 15 g, 15 g, Oral, Q15 Min PRN **OR** glucose-vitamin C (Dex-4) chewable tab 4 tablet, 4 tablet, Oral, Q15 Min PRN **OR** dextrose 50% injection 50 mL, 50 mL, Intravenous, Q15 Min PRN **OR** glucose (Dex4) 15 GM/59ML oral liquid 30 g, 30 g, Oral, Q15 Min PRN **OR** glucose (Glutose) 40% oral gel 30 g, 30 g, Oral, Q15 Min PRN **OR** glucose-vitamin C (Dex-4) chewable tab 8 tablet, 8 tablet, Oral, Q15 Min PRN    insulin aspart (NovoLOG) 100 Units/mL FlexPen 2-10 Units, 2-10 Units, Subcutaneous, TID AC and HS  [6]   No current outpatient medications on file.

## 2025-05-15 ENCOUNTER — APPOINTMENT (OUTPATIENT)
Dept: GENERAL RADIOLOGY | Facility: HOSPITAL | Age: 80
End: 2025-05-15
Attending: HOSPITALIST
Payer: MEDICARE

## 2025-05-15 LAB
ANION GAP SERPL CALC-SCNC: 13 MMOL/L (ref 0–18)
BASOPHILS # BLD AUTO: 0.01 X10(3) UL (ref 0–0.2)
BASOPHILS NFR BLD AUTO: 0.3 %
BUN BLD-MCNC: 58 MG/DL (ref 9–23)
CALCIUM BLD-MCNC: 9.2 MG/DL (ref 8.7–10.6)
CHLORIDE SERPL-SCNC: 101 MMOL/L (ref 98–112)
CO2 SERPL-SCNC: 23 MMOL/L (ref 21–32)
CREAT BLD-MCNC: 6.61 MG/DL (ref 0.7–1.3)
EGFRCR SERPLBLD CKD-EPI 2021: 8 ML/MIN/1.73M2 (ref 60–?)
EOSINOPHIL # BLD AUTO: 0.08 X10(3) UL (ref 0–0.7)
EOSINOPHIL NFR BLD AUTO: 2 %
ERYTHROCYTE [DISTWIDTH] IN BLOOD BY AUTOMATED COUNT: 17.8 %
GLUCOSE BLD-MCNC: 143 MG/DL (ref 70–99)
GLUCOSE BLD-MCNC: 143 MG/DL (ref 70–99)
GLUCOSE BLD-MCNC: 153 MG/DL (ref 70–99)
GLUCOSE BLD-MCNC: 175 MG/DL (ref 70–99)
GLUCOSE BLD-MCNC: 189 MG/DL (ref 70–99)
HBV SURFACE AG SER-ACNC: 0.46 [IU]/L
HBV SURFACE AG SERPL QL IA: NONREACTIVE
HCT VFR BLD AUTO: 22.2 % (ref 39–53)
HGB BLD-MCNC: 7.4 G/DL (ref 13–17.5)
IMM GRANULOCYTES # BLD AUTO: 0.05 X10(3) UL (ref 0–1)
IMM GRANULOCYTES NFR BLD: 1.3 %
LYMPHOCYTES # BLD AUTO: 0.54 X10(3) UL (ref 1–4)
LYMPHOCYTES NFR BLD AUTO: 13.5 %
MCH RBC QN AUTO: 35.7 PG (ref 26–34)
MCHC RBC AUTO-ENTMCNC: 33.3 G/DL (ref 31–37)
MCV RBC AUTO: 107.2 FL (ref 80–100)
MONOCYTES # BLD AUTO: 0.27 X10(3) UL (ref 0.1–1)
MONOCYTES NFR BLD AUTO: 6.8 %
NEUTROPHILS # BLD AUTO: 3.04 X10 (3) UL (ref 1.5–7.7)
NEUTROPHILS # BLD AUTO: 3.04 X10(3) UL (ref 1.5–7.7)
NEUTROPHILS NFR BLD AUTO: 76.1 %
OSMOLALITY SERPL CALC.SUM OF ELEC: 303 MOSM/KG (ref 275–295)
PLATELET # BLD AUTO: 43 10(3)UL (ref 150–450)
PLATELETS.RETICULATED NFR BLD AUTO: 4.7 % (ref 0–7)
POTASSIUM SERPL-SCNC: 4.1 MMOL/L (ref 3.5–5.1)
RBC # BLD AUTO: 2.07 X10(6)UL (ref 3.8–5.8)
SODIUM SERPL-SCNC: 137 MMOL/L (ref 136–145)
WBC # BLD AUTO: 4 X10(3) UL (ref 4–11)

## 2025-05-15 PROCEDURE — 90935 HEMODIALYSIS ONE EVALUATION: CPT | Performed by: INTERNAL MEDICINE

## 2025-05-15 PROCEDURE — 74230 X-RAY XM SWLNG FUNCJ C+: CPT | Performed by: HOSPITALIST

## 2025-05-15 PROCEDURE — 5A1D70Z PERFORMANCE OF URINARY FILTRATION, INTERMITTENT, LESS THAN 6 HOURS PER DAY: ICD-10-PCS | Performed by: STUDENT IN AN ORGANIZED HEALTH CARE EDUCATION/TRAINING PROGRAM

## 2025-05-15 NOTE — PROGRESS NOTES
[facility]  Progress Note    Jonny Haque Patient Status:  Inpatient    4/15/1945 MRN [id number]   Location [facility] 8NE-A Attending Ursula Saucedo MD   Hosp Day # 2 PCP Gee Jimenez MD        No acute events    Current Hospital Medications[1]          Physical Exam:  Vital signs: Blood pressure 112/49, pulse 63, temperature 97.4 °F (36.3 °C), temperature source Oral, resp. rate 18, weight 201 lb 15.1 oz (91.6 kg), SpO2 98%.  General: No acute distress. Alert and oriented   HEENT: Moist mucous membranes. EOM-I. PERRL  Neck: No lymphadenopathy.  No JVD. No carotid bruits.  Respiratory: Clear to auscultation bilaterally.  No wheezes. No rhonchi.  Cardiovascular: S1, S2.  Regular rate and rhythm.  No murmurs. Equal pulses   Abdomen: Soft, nontender, nondistended.  Positive bowel sounds. No rebound tenderness   Neurologic: No focal neurological deficits.   Musculoskeletal: Full range of motion of all extremities.  No swelling noted.   Integument: No lesions. No erythema.  Psychiatric: Appropriate mood and affect.  PC intact ( R neck)  Recent Labs     25  0503   WBC 4.2   HGB 8.2*   .3*   PLT 55.0*       Recent Labs     25  0503 05/15/25  0610    137   K 3.6 4.1    101   CO2 28.0 23.0   BUN 51* 58*   CREATSERUM 4.95* 6.61*   CA 9.1 9.2       Recent Labs     25  0503   ALT 79*   AST 45*   ALB 3.5             Imaging:  Reviewed     Impression:  ESRD due to DM; on HD TTS- continue routine schedule; volume/labs stable- HD today    Recent caridac arrest  Anemia due to CKD; JIE w/ HD ; goal hgb 10-11  Hx of ICM; Cardiac arrest ; s/p angioplasty @ OSH; noted to have Vtach/NVST    - cardiology following ;EP consult; plan for ICD         Thank you for allowing me to participate in this patient's care.  Please feel free to call me with any questions or concerns.    Ashwin Lunsford MD  5/15/2025           [1]   Current Facility-Administered Medications   Medication Dose Route  Frequency    sodium chloride 0.9 % IV bolus 100 mL  100 mL Intravenous Q30 Min PRN    And    albumin human (Albumin) 25% injection 25 g  25 g Intravenous PRN Dialysis    heparin (Porcine) 1000 UNIT/ML injection 1,500 Units  1.5 mL Intracatheter Once    epoetin eunice (Epogen, Procrit) 57831 UNIT/ML injection 10,000 Units  10,000 Units Intravenous Once    heparin (Porcine) 5000 UNIT/ML injection 5,000 Units  5,000 Units Subcutaneous 2 times per day    acetaminophen (Tylenol Extra Strength) tab 500 mg  500 mg Oral Q4H PRN    polyethylene glycol (PEG 3350) (Miralax) 17 g oral packet 17 g  17 g Oral Daily PRN    sennosides (Senokot) tab 17.2 mg  17.2 mg Oral Nightly PRN    bisacodyl (Dulcolax) 10 MG rectal suppository 10 mg  10 mg Rectal Daily PRN    fleet enema (Fleet) rectal enema 133 mL  1 enema Rectal Once PRN    ondansetron (Zofran) 4 MG/2ML injection 4 mg  4 mg Intravenous Q6H PRN    metoclopramide (Reglan) 5 mg/mL injection 5 mg  5 mg Intravenous Q8H PRN    HYDROcodone-acetaminophen (Norco) 5-325 MG per tab 1 tablet  1 tablet Oral Q4H PRN    Or    HYDROcodone-acetaminophen (Norco) 5-325 MG per tab 2 tablet  2 tablet Oral Q4H PRN    HYDROmorphone (Dilaudid) 1 MG/ML injection 0.1 mg  0.1 mg Intravenous Q6H PRN    Or    HYDROmorphone (Dilaudid) 1 MG/ML injection 0.2 mg  0.2 mg Intravenous Q6H PRN    Or    HYDROmorphone (Dilaudid) 1 MG/ML injection 0.4 mg  0.4 mg Intravenous Q6H PRN    lidocaine-menthol 4-1 % patch 1 patch  1 patch Transdermal Daily    amiodarone (Pacerone) tab 100 mg  100 mg Oral BID    aspirin DR tab 81 mg  81 mg Oral QOD    carvedilol (Coreg) tab 6.25 mg  6.25 mg Oral BID with meals    clopidogrel (Plavix) tab 75 mg  75 mg Oral Daily    insulin degludec (Tresiba) 100 units/mL flextouch 20 Units  20 Units Subcutaneous Nightly    [Held by provider] Lanthanum Carbonate (FOSRENOL) chewable tab 1,000 mg - Pt's Supply  1,000 mg Oral TID CC    levothyroxine (Synthroid) tab 200 mcg  200 mcg Oral Before  breakfast    losartan (Cozaar) tab 25 mg  25 mg Oral Daily    pantoprazole (Protonix) DR tab 40 mg  40 mg Oral BID AC    rosuvastatin (Crestor) tab 10 mg  10 mg Oral Nightly    glucose (Dex4) 15 GM/59ML oral liquid 15 g  15 g Oral Q15 Min PRN    Or    glucose (Glutose) 40% oral gel 15 g  15 g Oral Q15 Min PRN    Or    glucose-vitamin C (Dex-4) chewable tab 4 tablet  4 tablet Oral Q15 Min PRN    Or    dextrose 50% injection 50 mL  50 mL Intravenous Q15 Min PRN    Or    glucose (Dex4) 15 GM/59ML oral liquid 30 g  30 g Oral Q15 Min PRN    Or    glucose (Glutose) 40% oral gel 30 g  30 g Oral Q15 Min PRN    Or    glucose-vitamin C (Dex-4) chewable tab 8 tablet  8 tablet Oral Q15 Min PRN    insulin aspart (NovoLOG) 100 Units/mL FlexPen 2-10 Units  2-10 Units Subcutaneous TID AC and HS

## 2025-05-15 NOTE — PLAN OF CARE
Pt is A&O x1-2. Forgetful. Back/chest pain managed w/ prn meds.   Maintaining O2 on RA. NS w/ BBB, S1&S2 present. Denies SOB & cardiac symptoms.  Bowel sounds hypoactive. Brief and purewick in place. No UOP. Bladder scan: 382 mL.  Pt coughing after nectar thick liquid. Speech to re eval.  Pt updated on plan of care. Bed in lowest position. Bed alarm on. Call light within reach.     0625: Bladder scan 514 mL    Problem: CARDIOVASCULAR - ADULT  Goal: Maintains optimal cardiac output and hemodynamic stability  Description: INTERVENTIONS:- Monitor vital signs, rhythm, and trends- Monitor for bleeding, hypotension and signs of decreased cardiac output- Evaluate effectiveness of vasoactive medications to optimize hemodynamic stability- Monitor arterial and/or venous puncture sites for bleeding and/or hematoma- Assess quality of pulses, skin color and temperature- Assess for signs of decreased coronary artery perfusion - ex. Angina- Evaluate fluid balance, assess for edema, trend weights  Outcome: Progressing  Goal: Absence of cardiac arrhythmias or at baseline  Description: INTERVENTIONS:- Continuous cardiac monitoring, monitor vital signs, obtain 12 lead EKG if indicated- Evaluate effectiveness of antiarrhythmic and heart rate control medications as ordered- Initiate emergency measures for life threatening arrhythmias- Monitor electrolytes and administer replacement therapy as ordered  Outcome: Progressing     Problem: SKIN/TISSUE INTEGRITY - ADULT  Goal: Skin integrity remains intact  Description: INTERVENTIONS- Assess and document risk factors for pressure ulcer development- Assess and document skin integrity- Monitor for areas of redness and/or skin breakdown- Initiate interventions, skin care algorithm/standards of care as needed  Outcome: Progressing     Problem: Diabetes/Glucose Control  Goal: Glucose maintained within prescribed range  Description: INTERVENTIONS:- Monitor Blood Glucose as ordered- Assess for signs  and symptoms of hyperglycemia and hypoglycemia- Administer ordered medications to maintain glucose within target range- Assess barriers to adequate nutritional intake and initiate nutrition consult as needed- Instruct patient on self management of diabetes  Outcome: Progressing     Problem: Patient/Family Goals  Goal: Patient/Family Long Term Goal  Description: Patient's Long Term Goal: Stay out of hospital. Interventions: Follow up appointments, medication compliance- See additional Care Plan goals for specific interventions  Outcome: Progressing  Goal: Patient/Family Short Term Goal  Description: Patient's Short Term Goal: Discharge. Interventions: ICD placement - See additional Care Plan goals for specific interventions  Outcome: Progressing

## 2025-05-15 NOTE — SLP NOTE
SPEECH DAILY NOTE - INPATIENT    ASSESSMENT & PLAN   ASSESSMENT  Pt seen for dysphagia tx to assess tolerance with recommended diet, reassess swallow fxn d/t RN report patient coughing with nectar thick liquids, ensure utilization of aspiration precautions and provide pt/family education. Pt found positioned upright close to 90 degrees with breakfast present in room. Tray contained recommended soft and bites sized diet with nectar thick liquids. Head of bed positioned fully upright to 90 degrees. Patient required tray set-up and assist with feeding due to cognitive deficits. Observed with po trials of nectar thick liquids via spoon & cup, pureed and soft solid consistencies. Occasional delayed cough noted during meal; difficult to assess if related to eating. Pt required cues to decrease rate and amount of intake. Discussed patient's status with RN and agreed to repeat video swallow study to more objectively assess current swallow function, r/o aspiration and determine safest/least restrictive diet. Continue recommended soft and bite sized diet with nectar thick liquids with 1:1 feeder utilizing strict aspiration precautions until results obtained. Administer pills whole or crushed in pureed.    Diet Recommendations - Solids: Mechanical soft chopped/ Soft & Bite Sized  Diet Recommendations - Liquids: Nectar thick liquids/ Mildly thick    Compensatory Strategies Recommended: Alternate consistencies, Extra sauce/gravy  Aspiration Precautions: Upright position, Slow rate, Small bites, Small sips, 1:1 feeding  Medication Administration Recommendations: Whole in puree, Crushed in puree    Patient Experiencing Pain: No                Treatment Plan  Treatment Plan/Recommendations: Dysphagia therapy, SLP to reassess, Aspiration precautions    Interdisciplinary Communication: Discussed with RN            GOALS  Goal #1 Complete video swallow study - further goals pending results of exam  To be completed                                         FOLLOW UP  Follow Up Needed (Documentation Required): Yes  SLP Follow-up Date: 05/15/25  Duration: 1 week    Session: 1    If you have any questions, please contact YOLANDE Cruz MA, CCC-SLP  Pager [phone]

## 2025-05-15 NOTE — PROGRESS NOTES
[facility] Hospitalist note    PCP: Gee Jimenez MD    Chief Complaint:  F/u cardiac arrest    SUBJECTIVE:  Video swallow study this morning.     OBJECTIVE:  Temp:  [97.4 °F (36.3 °C)-98.8 °F (37.1 °C)] 98.8 °F (37.1 °C)  Pulse:  [61-66] 65  Resp:  [18] 18  BP: (111-128)/(49-63) 119/63  SpO2:  [84 %-100 %] 93 %    Intake/Output:    Intake/Output Summary (Last 24 hours) at 5/15/2025 1205  Last data filed at 5/15/2025 0505  Gross per 24 hour   Intake 340 ml   Output 0 ml   Net 340 ml       Last 3 Weights   05/15/25 0505 201 lb 15.1 oz (91.6 kg)   05/14/25 0452 199 lb 8.3 oz (90.5 kg)   05/13/25 1926 195 lb 15.8 oz (88.9 kg)   02/16/25 0555 203 lb 0.7 oz (92.1 kg)   02/15/25 1402 210 lb 14.4 oz (95.7 kg)   02/15/25 1118 212 lb 1.3 oz (96.2 kg)   02/09/25 0500 212 lb 1.3 oz (96.2 kg)   02/08/25 1431 212 lb 8.4 oz (96.4 kg)   02/08/25 0600 213 lb (96.6 kg)   02/06/25 2143 215 lb 6.2 oz (97.7 kg)       Exam  Gen: No acute distress  HEENT: anicteric sclera, MMM  Pulm: Lungs clear, normal respiratory effort  CV: Heart with regular rate and rhythm, no peripheral edema  Abd: Abdomen soft, nontender, nondistended  MSK: Moves extremities spontaneously.  Skin: no rashes or lesions  Neuro: A&OX3, no focal deficits    Data Review:         Labs:     Recent Labs   Lab 05/14/25  0503   WBC 4.2   HGB 8.2*   .3*   PLT 55.0*       Recent Labs   Lab 05/14/25  0503 05/15/25  0610    137   K 3.6 4.1    101   CO2 28.0 23.0   BUN 51* 58*   CREATSERUM 4.95* 6.61*   CA 9.1 9.2   * 143*       Recent Labs   Lab 05/14/25  0503   ALT 79*   AST 45*   ALB 3.5       No results for input(s): \"TROP\", \"CK\", \"PBNP\", \"PCT\" in the last 168 hours.    No results for input(s): \"CRP\", \"NEELAM\", \"LDH\", \"DDIMER\" in the last 168 hours.    Recent Labs   Lab 05/14/25  0636 05/14/25  1303 05/14/25  1856 05/14/25  2104 05/15/25  0538   PGLU 172* 182* 221* 196* 153*       Meds:   Scheduled Medication:Scheduled Medications[1]  Continuous  Infusing Medication:Medication Infusions[2]  PRN Medication:PRN Medications[3]       Microbiology:    No results found for this visit on 05/13/25.    Lab Results   Component Value Date    COVID19 Not Detected 04/16/2024    COVID19 Not Detected 03/17/2024    COVID19 Not Detected 04/05/2023        Assessment/Plan:     81 yo man with h/o tavr, cad, dm who presented as osh transfer for continued management in setting of cardiac arrest     Cardiac arrest  Vtach/nsvt  EF 40%, ICM  CAD with recent angio, h/o stents  - cardiology to consult, possible defibrillator placement  - will cont meds from outside facility including amio, coreg, cozaar, asa, plavix  - prn pain meds for chest wall pain- will give norco, lidocaine patches. Avoid morphine.     ESRD on HD  - HD on t, th, sat. Consulted nephro     Thrombocytopenia  HCV cirrhosis, h/o esophageal banding  - stable in 50s. Dropped as low as 30s at OSH  - per notes, had been seen hematology before and felt to have the thrombocytopenia 2/2 cirrhosis     Resp failure in setting of cardiac arrest  Treated for PNA  - continue IS, NC     Subcutaneous heparin, SCDs    DO Josel[facility] Hospitalist         [1]    heparin  1.5 mL Intracatheter Once    epoetin eunice  10,000 Units Intravenous Once    heparin  5,000 Units Subcutaneous 2 times per day    lidocaine-menthol  1 patch Transdermal Daily    amiodarone  100 mg Oral BID    aspirin  81 mg Oral QOD    carvedilol  6.25 mg Oral BID with meals    clopidogrel  75 mg Oral Daily    insulin degludec  20 Units Subcutaneous Nightly    [Held by provider] Lanthanum Carbonate  1,000 mg Oral TID CC    levothyroxine  200 mcg Oral Before breakfast    losartan  25 mg Oral Daily    pantoprazole  40 mg Oral BID AC    rosuvastatin  10 mg Oral Nightly    insulin aspart  2-10 Units Subcutaneous TID AC and HS   [2] [3]   sodium chloride **AND** albumin human    acetaminophen    polyethylene glycol (PEG 3350)    sennosides    bisacodyl    fleet  enema    ondansetron    metoclopramide    HYDROcodone-acetaminophen **OR** HYDROcodone-acetaminophen    HYDROmorphone **OR** HYDROmorphone **OR** HYDROmorphone    glucose **OR** glucose **OR** glucose-vitamin C **OR** dextrose **OR** glucose **OR** glucose **OR** glucose-vitamin C

## 2025-05-15 NOTE — PAYOR COMM NOTE
--------------  ADMISSION REVIEW     Payor: [employer] MEDICARE ADV PPO  Subscriber #:  [id number]  Authorization Number: [id number]    Admit date: 5/13/25  Admit time:  7:10 PM        Patient is a 80 year old male with PMH sig for cad with h/o stents, h/o tavr, DM, htn/hl who was transferred from [facility] in [place] for further management  . Pt had presented there with cardiac arrest  on 5/5. He had cpr/shocked 5 times per report. Extubated 5/10. He had a cardiac cath 5/8 and had angioplasty. EF 40%. He was noted to have some nsvt and seen by EP and started on amio. He was treated for PNA with abx including zosyn. He was reportedly given morphine for pain related to cpr. And was noted to be confused. He had been receiving HD, his initial K after cardiac arrest had been 8 but this had resolved. He was noted to be thrombocytopenic and was seen by hematology- this was attributed to meds and ?underlying liver disease.   He was on telemetry on transfer, on 2L NC.    81 yo man with h/o tavr, cad, dm who presented as osh transfer for continued management in setting of cardiac arrest     Cardiac arrest  Vtach/nsvt  EF 40%, ICM  CAD with recent angio, h/o stents  - cardiology to consult  - will cont meds from outside facility including amio, coreg, cozaar, asa, plavix  - prn pain meds for chest wall pain- will give norco, lidocaine patches. Avoid morphine.     ESRD on HD  - HD on t, th, sat. Consult nephro     Thrombocytopenia  - ?possibly 2/2 critical illness, meds, ?liver disease  - will f/u labs, may need to review further records from OSH     Resp failure in setting of cardiac arrest  Treated for PNA  - continue IS, NC    5/14   NEPHROLOGY    Jonny Haque is a a(n) 80 year old male with ESRD on HD (TTS), ICM, s/p TAVR  HTN, who is admitted for ongoing cardiac care; he was transferred from [facility]. He had cardiac arrest on 5/5 (Monday - day before HD). He received care at the OSH in the interim  time.  Last HD yesterday; pt is a limited historian    ESRD due to DM; on HD TTS- continue routine schedule; volume/labs stable- plan for HD tomorrow   Recent caridac arrest  Anemia due to CKD; JIE w/ HD ; goal hgb 10-11  Hx of ICM; Cardiac arrest 5/5; s/p angioplasty @ OSH; noted to have Vtach/NVST (on amiodrone)  - cardiology following     .ATTENDING  CHEST WALL PAIN BETTER      Cardiac arrest  Vtach/nsvt  EF 40%, ICM  CAD with recent angio, h/o stents  - cardiology to consult, possible defibrillator placement  - will cont meds from outside facility including amio, coreg, cozaar, asa, plavix  - prn pain meds for chest wall pain- will give norco, lidocaine patches. Avoid morphine.     ESRD on HD  - HD on t, th, sat. Consulted nephro     Thrombocytopenia  HCV cirrhosis, h/o esophageal banding  - stable in 50s. Dropped as low as 30s at OSH  - per notes, had been seen hematology before and felt to have the thrombocytopenia 2/2 cirrhosis     Resp failure in setting of cardiac arrest  Treated for PNA  - continue IS, NC     Subcutaneous heparin, SCDs      CARDIOLOGY  Impression:  VT   Possibly ischemic  Possibly scar based  Planning EP consult tomorrow and possible ICD placement Friday                           MEDICATIONS ADMINISTERED IN LAST 1 DAY:  amiodarone (Pacerone) tab 100 mg       Date Action Dose Route User    5/15/2025 0843 Given 100 mg Oral Blaine Arceo RN    5/14/2025 2105 Given 100 mg Oral Rosana Chaudhry RN          aspirin DR tab 81 mg       Date Action Dose Route User    5/15/2025 0843 Given 81 mg Oral Blaine Areco RN          carvedilol (Coreg) tab 6.25 mg       Date Action Dose Route User    5/15/2025 0842 Given 6.25 mg Oral Blaine Arceo RN    5/14/2025 1700 Given 6.25 mg Oral Franklin Santos, RN          clopidogrel (Plavix) tab 75 mg       Date Action Dose Route User    5/15/2025 0842 Given 75 mg Oral Blaine Arceo RN          heparin (Porcine) 5000 UNIT/ML injection 5,000 Units        Date Action Dose Route User    5/15/2025 0842 Given 5,000 Units Subcutaneous (Left Upper Abdomen) Blaine Arceo RN          HYDROcodone-acetaminophen (Norco) 5-325 MG per tab 1 tablet       Date Action Dose Route User    5/14/2025 1658 Given 1 tablet Oral Franklin Santos RN          HYDROcodone-acetaminophen (Norco) 5-325 MG per tab 2 tablet       Date Action Dose Route User    5/15/2025 0545 Given 2 tablet Oral Rosana Chaudhry RN    5/14/2025 2105 Given 2 tablet Oral Rosana Chaudhry RN          HYDROmorphone (Dilaudid) 1 MG/ML injection 0.4 mg       Date Action Dose Route User    5/15/2025 0847 Given 0.4 mg Intravenous Blaine Arceo RN    5/15/2025 0010 Given 0.4 mg Intravenous Rosana Chaudhry RN          insulin aspart (NovoLOG) 100 Units/mL FlexPen 2-10 Units       Date Action Dose Route User    5/15/2025 0544 Given 2 Units Subcutaneous (Right Upper Arm) Rosana Chaudhry RN    5/14/2025 2105 Given 3 Units Subcutaneous (Left Upper Arm) Rosana Chaudhry RN    5/14/2025 1859 Given 6 Units Subcutaneous (Left Upper Arm) Franklin Santos RN    5/14/2025 1400 Given 3 Units Subcutaneous (Left Upper Arm) Franklin Santos RN          insulin degludec (Tresiba) 100 units/mL flextouch 20 Units       Date Action Dose Route User    5/14/2025 2105 Given 20 Units Subcutaneous (Left Upper Arm) Rosana Chaudhry RN          levothyroxine (Synthroid) tab 200 mcg       Date Action Dose Route User    5/15/2025 0537 Given 200 mcg Oral Rosana Chaudhry RN          lidocaine-menthol 4-1 % patch 1 patch       Date Action Dose Route User    5/15/2025 0842 Patch Applied 1 patch Transdermal (Left Lateral Chest) Blaine Arceo RN          losartan (Cozaar) tab 25 mg       Date Action Dose Route User    5/15/2025 0843 Given 25 mg Oral Blaine Arceo RN          pantoprazole (Protonix) DR tab 40 mg       Date Action Dose Route User    5/15/2025 0537 Given 40 mg Oral Rosana Chaudhry RN    5/14/2025 1658 Given 40 mg Oral Demetrio Santos  FRANCIA Reid          rosuvastatin (Crestor) tab 10 mg       Date Action Dose Route User    5/14/2025 2105 Given 10 mg Oral Rosana Chaudhry RN          sennosides (Senokot) tab 17.2 mg       Date Action Dose Route User    5/14/2025 2105 Given 17.2 mg Oral Rosana Chaudhry RN            Vitals (last day)       Date/Time Temp Pulse Resp BP SpO2 Weight O2 Device O2 Flow Rate (L/min) [facility]    05/15/25 0816 98.8 °F (37.1 °C) 65 18 119/63 93 % -- None (Room air) -- ML    05/15/25 0505 97.4 °F (36.3 °C) 63 18 112/49 98 % 201 lb 15.1 oz (91.6 kg) -- -- KB    05/14/25 2326 97.4 °F (36.3 °C) 61 18 121/55 98 % -- -- -- KB    05/14/25 1928 98 °F (36.7 °C) 61 18 127/56 95 % -- -- -- KB    05/14/25 1652 -- 63 -- -- 84 % -- -- -- NB    05/14/25 1643 97.8 °F (36.6 °C) 66 18 111/50 100 % -- None (Room air) -- MV    05/14/25 1300 97.9 °F (36.6 °C) 61 18 128/62 93 % -- None (Room air) 0 L/min NB    05/14/25 1005 -- -- -- 150/65 -- -- -- -- NL    05/14/25 1005 -- 71 -- -- 96 % -- -- -- MV    05/14/25 0953 -- 66 -- -- -- -- -- -- NL    05/14/25 0705 -- 68 -- 122/64 97 % -- -- -- MV    05/14/25 0705 97.9 °F (36.6 °C) -- 18 -- -- -- Nasal cannula 2 L/min NB    05/14/25 0500 -- 65 -- -- 96 % -- -- -- ANA    05/14/25 0452 96.4 °F (35.8 °C) 68 16 157/65 99 % 199 lb 8.3 oz (90.5 kg) -- -- KB          CIWA Scores (since admission)       None

## 2025-05-15 NOTE — PLAN OF CARE
.Received patient at 0730. Patient alert and oriented x2. Tele Rhythm NSR w/BBB. O2 sats on RA. Lungs clear. Bed is locked and low position. Call light & personal belongings within reach. Denies chest pain at this time. Patient voiding per purwick. Patient max assist. Skin dry and intact - bruises noted. Reviewed plan of care with patient and verbalized understanding.     POC:  Video swallow today  Monitor UOP   Pain control  ICD tomorrow?          Problem: Diabetes/Glucose Control  Goal: Glucose maintained within prescribed range  Description: INTERVENTIONS:- Monitor Blood Glucose as ordered- Assess for signs and symptoms of hyperglycemia and hypoglycemia- Administer ordered medications to maintain glucose within target range- Assess barriers to adequate nutritional intake and initiate nutrition consult as needed- Instruct patient on self management of diabetes  Outcome: Progressing     Problem: Patient/Family Goals  Goal: Patient/Family Long Term Goal  Description: Patient's Long Term Goal: Stay out of hospital. Interventions: Follow up appointments, medication compliance- See additional Care Plan goals for specific interventions  Outcome: Progressing  Goal: Patient/Family Short Term Goal  Description: Patient's Short Term Goal: Discharge. Interventions: ICD placement - See additional Care Plan goals for specific interventions  Outcome: Progressing

## 2025-05-15 NOTE — CONSULTS
[facility]  Florence Schmidt MD  ?  Patient: Jonny Haque  MRN: [id number]     Primary Care Physician: Dr. Gee Jimenez MD  Referring Physician : Dr. Donald  Reason for Consultation: VT/Cardiac arrest     HPI:  Jonny Haque is a 80-year-old gentleman with history of hypertension, dyslipidemia, uncontrolled diabetes, ESRD on HD, aortic stenosis status post TAVR CAD status post recent PCI ICMP (40%), and sustained VT.  He also carries a history of possible liver disease/cirrhosis.    More recently, he was transferred to [facility] from [facility] in [place] following an episode of witnessed VT/cardiac arrest on 5/5/2025 requiring multiple shocks/intubation.  He did undergo cardiac catheterization on 5/8 with subsequent angioplasty of mid-LAD.  Review of the records show that he did have another episode of VT during that hospitalization.  He was evaluated by EP at that time with recommendation for ICD.    This afternoon, the patient states that he feels quite fatigued however denies any recent episodes of chest discomfort or shortness of breath.  He does have mild rib discomfort.  No episodes of lightheadedness or dizziness are reported. He cannot fully recall the events that transpired over the last several days.  He does state that his son Ashwin has been involved in his care.    ----------------------------------------------------------  A/P:  Jonny Haque is a 80-year-old gentleman with history of hypertension, dyslipidemia, diabetes, ESRD on HD, aortic stenosis status post TAVR CAD status post recent PCI ICMP (40%), and VT. he also carries a history of possible liver disease/cirrhosis.    VT/cardiac arrest  -Recent episode noted with subsequent angioplasty along with 2nd episode during hospitalization   -prior episode of VT noted in 2/25 (ICD refused on that visit)  -Hyperkalemia noted on that admission (>6.)  -ECG 2/15/2025 shows sinus rhythm with rate of 61.  Left bundle branch block noted  with QRS duration 166 ms.  -Echo 5/6/25 with EF 40% and otherwise normal functioning aortic valve prosthesis  -LHC 5/8/25 with angioplasty of mid-LAD stent  -Amiodarone 100 twice daily (AST 45 and ALT 79 on 5/14/2025; TSH elevated 4/25 with free T4 WNL)  -Potassium 4.1 and creatinine 6.61 (HD patient)    CAD  -Recent PCI noted  -Echo with EF 35-40% as noted above  -Aspirin 81, Coreg, and statin    Atrial fibrillation  -[id number] of at least 6 -not presently anticoagulated  -On amiodarone 100 twice daily along with Coreg 6.25 twice daily    To summarize, the patient presented again with sustained VT.  He did undergo angioplasty however unclear if this was true culprit lesion.  Additionally, prior history of VT noted in 2/25 as well.    As such, secondary prevention ICD would be recommended.  That said, bleeding risk would be a concern given significant thrombocytopenia with platelet count slowly improving (55 on last check) along with underlying ESRD, along with ongoing confusion.    Will discuss further with  POA/deborah Christopher.    Summary of recommendations  Continue to monitor on telemetry  Recommend repeating CBC to check platelet count    Thank you for allowing me to participate in the care of your patient.    Florence Schmidt MD  [facility]    -----------------------------------------------------------------------------      Past Medical History[1]  Active Problems:    Cardiac arrest (HCC)          Medications:  .Medications Ordered Prior to Encounter[2]  Allergies: Allergies[3]  Social History     Socioeconomic History    Marital status:      Spouse name: Not on file    Number of children: Not on file    Years of education: Not on file    Highest education level: Not on file   Occupational History    Not on file   Tobacco Use    Smoking status: Former     Current packs/day: 0.00     Average packs/day: 0.5 packs/day for 15.0 years (7.5 ttl pk-yrs)     Types: Cigarettes     Start date:  1965     Quit date: 1980     Years since quittin.2    Smokeless tobacco: Never    Tobacco comments:     quit at age 34   Vaping Use    Vaping status: Never Used   Substance and Sexual Activity    Alcohol use: No    Drug use: No    Sexual activity: Not on file   Other Topics Concern    Not on file   Social History Narrative    Not on file     Social Drivers of Health     Food Insecurity: No Food Insecurity (2025)    NCSS - Food Insecurity     Worried About Running Out of Food in the Last Year: No     Ran Out of Food in the Last Year: No   Transportation Needs: No Transportation Needs (2025)    NCSS - Transportation     Lack of Transportation: No   Housing Stability: Not At Risk (2025)    NCSS - Housing/Utilities     Has Housing: Yes     Worried About Losing Housing: No     Unable to Get Utilities: No     Family History[4]  SH and FH were reviewed and updated.     Review of Systems  Constitutional: negative for fatigue, negative for change in appetite, chills, fevers, sweats (diaphoresis), weight loss or weight gain     Eyes: negative for irritation, redness and visual disturbance     Ears, nose, mouth, throat, and face: negative for hearing loss and sinus problems; negative for dental problems, negative for epistaxis, nasal congestion and sore throat     Respiratory: negative for cough, hemoptysis, pleurisy/chest pain, sputum and wheezing; negative for dyspnea upon exertion     Cardiovascular: See HPI     Gastrointestinal: negative for vomiting, nausea, heartburn(sensation) and abdominal bloating. Negative for diarrhea, constipation or melena     Genitourinary:negative for dysuria and hematuria     Integumentary: negative for rash and skin lesion(s)     Hematologic/lymphatic: negative for bleeding,easy bruising and lymphadenopathy     Musculoskeletal:negative for back pain, myalgias and muscle cramps/weakness     Neurological: negative for gait/coordination problems, headaches, memory  problems, seizure, vertigo, dizziness and lightheadedness     Behavioral/Psych: negative for anxiety and depression     Endocrine: negative for diabetic symptoms including blurry vision, increased fatigue, polydipsia and polyuria and temperature intolerance     Allergic/Immunologic: negative for anaphylaxis, hay fever and pruritus     Physical Exam:  Blood pressure 112/49, pulse 63, temperature 97.4 °F (36.3 °C), temperature source Oral, resp. rate 18, weight 201 lb 15.1 oz (91.6 kg), SpO2 98%.  ?  Alert and Oriented times x 1 (name only), pleasant, no distress, conversant, appears stated age  No JVD or No carotid bruits  Lungs are CTAB, no wheezes or crackles, normal respiratory effort  Heart exam is regular, no murmurs or S3, PMI is non-displaced  Abdomen is soft, nontender, bowel sounds are present, no HSM, no bruits  Lower extremities without edema, no clubbing  1+ pedal and femoral pulses bilaterally  Normal skin turgor, texture, no rashes or lesions?    LABS:  .  Lab Results   Component Value Date    WBC 4.2 05/14/2025    HGB 8.2 (L) 05/14/2025    HCT 25.1 (L) 05/14/2025    .3 (H) 05/14/2025    PLT 55.0 (L) 05/14/2025     .  Lab Results   Component Value Date    BUN 58 (H) 05/15/2025     05/15/2025    K 4.1 05/15/2025     05/15/2025    CO2 23.0 05/15/2025     .  Lab Results   Component Value Date    INR 1.08 02/06/2025    INR 1.13 09/18/2024    INR 1.10 05/08/2024    PROTIME 10.1 07/28/2017     .No components found for: \"CHLPL\"  Lab Results   Component Value Date    HDL 31 (L) 02/15/2025    HDL 33 (L) 03/19/2024    HDL 44 03/17/2024     Lab Results   Component Value Date     (H) 02/15/2025    LDL 72 03/19/2024    LDL 93 03/17/2024     Lab Results   Component Value Date    TRIG 291 (H) 02/15/2025    TRIG 304 (H) 03/19/2024    TRIG 215 (H) 03/17/2024     Lab Results   Component Value Date    CHOLHDL 4 07/02/2021     .  Lab Results   Component Value Date    ALT 79 (H) 05/14/2025    AST  45 (H) 05/14/2025     .  Lab Results   Component Value Date    TSH 6.910 (H) 03/17/2022          Thank you for allowing me to participate in the care of your patient.    Florence Schmidt MD  [facility]    -----------------------------------------------------------------------------         [1]   Past Medical History:   Aortic stenosis    Calculus of kidney    Coronary atherosclerosis    bare metal stents at [facility] Jan 2021    Diabetes (HCC)    Disorder of thyroid    Esophageal varices without bleeding, unspecified esophageal varices type (HCC)    GIB (gastrointestinal bleeding)    Hepatitis, unspecified    hepatitis C-just completed taking Harvoni in November 2016    High blood pressure    High cholesterol    Other disorders of plasma-protein metabolism, not elsewhere classified    Renal disorder    Visual impairment    reading glasses   [2]   Current Facility-Administered Medications on File Prior to Encounter   Medication Dose Route Frequency Provider Last Rate Last Admin    [COMPLETED] traMADol (Ultram) tab 50 mg  50 mg Oral Once Bette Aguiar MD   50 mg at 02/16/25 0926    [COMPLETED] traMADol (Ultram) tab 50 mg  50 mg Oral Once Kelton Woodward MD   50 mg at 02/15/25 1140    [COMPLETED] heparin (Porcine) 1000 UNIT/ML injection 5,000 Units  5,000 Units Intracatheter Once Clement Jimenez MD   4,100 Units at 02/15/25 1839    [COMPLETED] lidocaine-menthol 4-1 % patch 1 patch  1 patch Transdermal Once Zelda Espitia MD   1 patch at 02/15/25 2114    [COMPLETED] traMADol (Ultram) tab 50 mg  50 mg Oral Once Zelda Espitia MD   50 mg at 02/15/25 2219     Current Outpatient Medications on File Prior to Encounter   Medication Sig Dispense Refill    amiodarone 100 MG Oral Tab Take 1 tablet (100 mg total) by mouth 2 (two) times daily. 60 tablet 11    clopidogrel 75 MG Oral Tab Take 1 tablet (75 mg total) by mouth daily. 30 tablet 11    losartan 25 MG Oral Tab Take 1 tablet (25 mg  total) by mouth daily. 30 tablet 3    carvedilol 6.25 MG Oral Tab Take 1 tablet (6.25 mg total) by mouth 2 (two) times daily with meals. 60 tablet 3    levothyroxine 200 MCG Oral Tab Take 1 tablet (200 mcg total) by mouth before breakfast.      Lanthanum Carbonate 1000 MG Oral Chew Tab Chew 1 tablet (1,000 mg total) by mouth in the morning and 1 tablet (1,000 mg total) at noon and 1 tablet (1,000 mg total) in the evening. Chew with meals.      aspirin 81 MG Oral Tab EC Take 1 tablet (81 mg total) by mouth every other day.      Ferric Citrate (AURYXIA) 1  MG(Fe) Oral Tab       pantoprazole 40 MG Oral Tab EC Take 1 tablet (40 mg total) by mouth in the morning and 1 tablet (40 mg total) in the evening. Take before meals.      Insulin Pen Needle (TRUEPLUS PEN NEEDLES) 31G X 5 MM Does not apply Misc Use 5 per day 500 each 2    Insulin Glargine, 2 Unit Dial, (TOUJEO MAX SOLOSTAR) 300 UNIT/ML Subcutaneous Solution Pen-injector Inject 20 Units into the skin nightly. (Patient taking differently: Inject 30 Units into the skin nightly.) 6 mL 2    rosuvastatin 10 MG Oral Tab Take 1 tablet (10 mg total) by mouth nightly. (Patient taking differently: Take 1 tablet (10 mg total) by mouth nightly.) 90 tablet 0    Glucose Blood (ONETOUCH ULTRA) In Vitro Strip 6 times a day 400 each 3    Insulin Lispro, 1 Unit Dial, (HUMALOG KWIKPEN) 100 UNIT/ML Subcutaneous Solution Pen-injector 12 units before meals with sliding scale. Max daily dose: 50 units. (Patient taking differently: 10 Units. Three times a day. Before meals) 60 mL 3    CONTOUR NEXT TEST In Vitro Strip TEST BLOOD SUGAR FOUR TIMES DAILY 400 strip 3    Blood Glucose Monitoring Suppl ([place] CONTOUR NEXT MONITOR) W/DEVICE Does not apply Kit 1 Device by Does not apply route 4 (four) times daily. 1 kit 0    nitroglycerin 0.4 MG Sublingual SL Tab Place 1 tablet (0.4 mg total) under the tongue every 5 (five) minutes as needed for Chest pain. 10 tablet 3    zolpidem 10 MG Oral  Tab Take 1 tablet (10 mg total) by mouth nightly as needed for Sleep.     [3] No Known Allergies  [4]   Family History  Problem Relation Age of Onset    Cancer Father     Hypertension Mother

## 2025-05-15 NOTE — CONGREGATE LIVING REVIEW
[facility] Living Authorization    The [facility] Review Committee has reviewed this case and the patient IS APPROVED for discharge to a facility for Short Term Skilled once the following procedure is followed:     - The physician discharge instructions (contained within the [facility] note for SNF) must inlcude the below appropriate and approved COVID instructions to the facility    For questions regarding CLRC approval process, please contact the CM assigned to the case.  For questions regarding RN discharge workflow, please contact the unit Clinical Leader.

## 2025-05-15 NOTE — VIDEO SWALLOW STUDY NOTE
ADULT VIDEOFLUOROSCOPIC SWALLOWING STUDY    Admission Date: 5/13/2025  Evaluation Date: 05/15/25  Radiologist: Dr Trujillo    RECOMMENDATIONS   Diet Recommendations - Solids: Puree  Diet Recommendations - Liquids: Nectar thick liquids/ Mildly thick    Aspiration Precautions: Upright position, 1:1 feeding, No straw    Compensatory Strategies Recommended: Liquids via spoon,  cues for EFFORTFUL SWALLOW    Medication Administration Recommendations: One pill at a time, Crushed versus Whole in puree, may trial with thickened liquid as needed/able to tolerate      Further Follow-up:  Follow Up Needed (Documentation Required): Yes  SLP Follow-up Date: 05/16/25  Treatment Plan/Recommendations: Dysphagia therapy, Aspiration precautions    HISTORY     Problem List  Active Problems:    Cardiac arrest (HCC)      Past Medical History  Past Medical History[1]    Current Diet Consistency: Mechanical soft chopped/ Soft & Bite Sized, Nectar thick liquids/ Mildly thick  Prior Level of Function: Dependent  Prior Living Situation:  (transferred from [facility])  History of Recent: Pneumonia  Precautions: Aspiration      Reason for Referral: R/O aspiration; cough during PO intake      Family/Patient Goals: \"yeah. Ok\"    ASSESSMENT   DYSPHAGIA ASSESSMENT  Test completed in conjunction with Radiologist.  Patient Positioned: Upright, Midline, NIURKA chair.  Patient Viewed: Lateral.  Patient Alertness: Fully alert, Constant cues requied to stay on task.  Consistencies Presented: Thin liquids, Nectar thick liquids/ Mildly thick, Honey thick liquids/ Moderately thick, Puree, Soft solid to assess oropharyngeal swallow function and assess for compensatory strategies to improve safe swallow function.    THIN LIQUIDS  Method of Presentation: Teaspoon  Oral Phase of Swallow (VFSS - Thin Liquids): Impaired  Bolus Retrieval (VFSS - Thin Liquids): Intact  Bilabial Seal (VFSS - Thin Liquids): Intact  Bolus Formation (VFSS - Thin Liquids):  Impaired  Bolus Propulsion (VFSS - Thin Liquids): Impaired  Residue Severity, Location: Mild, Throughout pharynx  Laryngeal Penetration: Before the swallow  Tracheal Aspiration: None  Cough/Throat Clear Response: No  NECTAR THICK LIQUIDS/ MILDLY THICK  Method of Presentation: Teaspoon, Cup  Oral Phase of Swallow (VFSS - Nectar Thick Liquids): Impaired  Bolus Retrieval (VFSS - Nectar Thick Liquids): Intact  Bilabial Seal (VFSS - Nectar Thick Liquids): Intact  Bolus Formation (VFSS - Nectar Thick Liquids): Impaired  Bolus Propulsion (VFSS - Nectar Thick Liquids): Impaired  Residue Severity, Location: Mild/Mod, Throughout pharynx  Laryngeal Penetration: During the swallow (large cup sip)  Tracheal Aspiration: None  Cough/Throat Clear Response: No  HONEY THICK LIQUIDS/ MODERATELY THICK   Method of Presentation: Teaspoon  Oral Phase of Swallow (VFSS - Honey Thick Liquids): Impaired  Bolus Retrieval (VFSS - Honey Thick Liquids): Intact  Bilabial Seal (VFSS - Honey Thick Liquids): Intact  Bolus Formation (VFSS - Honey Thick Liquids): Impaired  Bolus Propulsion (VFSS - Honey Thick Liquids): Impaired  Residue Severity, Location: Moderate, Throughout pharynx  Laryngeal Penetration: None  Tracheal Aspiration: None  PUREE  Oral Phase of Swallow (VFSS - Puree): Within Functional Limits  Residue Severity, Location: Mild, Valleculae  Laryngeal Penetration: None  Tracheal Aspiration: None  SOFT SOLID  Oral Phase of Swallow (VFSS - Soft Solid): Impaired  Bolus Retrieval (VFSS - Soft Solid): Intact  Bilabial Seal (VFSS - Soft Solid): Intact  Bolus Formation (VFSS - Soft Solid): Impaired  Bolus Propulsion (VFSS - Soft Solid): Impaired  Retention (VFSS - Soft Solid): Impaired  Delay (seconds): SEVERE HOLDING WITHIN VALLECULA  Laryngeal Penetration: None  Tracheal Aspiration: None     Penetration Aspiration Scale Score: Score 3: Material enters the airway, remains above the vocal folds, and is not ejected from the airway       Overall  Impression: Mild/Moderate to Moderate OroPharyngeal Dysphagia 2/2 reduced bolus formation, delayed A-P transit, decreased base of tongue retraction/squeeze to PPW, delayed initiation of pharyngeal swallow response with severe bolus holding within vallecula with advanced solid texture trial, reduced epiglottic inversion/laryngeal elevation resulting in reduced LV closure at height of swallow, and reduced pharyngeal wave stripping.  Swallow efficiency deemed impaired as mild/moderate to moderate amounts of post swallow residue observed across all trials, greater for moderately thick texture.  Observed laryngeal penetration during the swallow of mild amount with mildly thick liquid via large cup sip intake as well as suspect trace during thin liquid intake.  Penetrated material did not clear with cessation of swallow and appeared to seep further into laryngeal vestibule with subsequent trials.  There was no evidence of tracheal aspiration observed within scope of this study however significant verbal cues to redirect patient awareness/attention to swallow was required.                GOALS  Goal #1 VFSS completed MET   Goal #2 The patient will tolerate puree consistency and mildly thick via TSPN liquids without overt signs or symptoms of aspiration with 90 % accuracy over 2-3 session(s).    In Progress   Goal #3 The patient will utilize compensatory strategies as outlined by  VFSS including Slow rate, Small bites, Small sips, No straws, Upright 90 degrees, Liquids via tsp amount only, Feed patient with 1:1 assistance 90 % of the time across 2 sessions.  In Progress   Goal #4 The patient will tolerate trial upgrade of ground/chopped consistency and thin liquids without overt signs or symptoms of aspiration with 90 % accuracy over 3-4 session(s).    In Progress   Goal #5 The patient will improve pharyngeal swallow efficiency via therapeutic dysphagia exercises x10-15 each, max assist   New        EDUCATION/INSTRUCTION  Reviewed results and recommendations with patient/RN.   Agreement/Understanding verbalized and all questions answered to their apparent satisfaction.  Called placed to son (Ashwin) however no answer.     INTERDISCIPLINARY COMMUNICATION  Reviewed results with Radiologist; agreement verbalized.    Patient Experiencing Pain: No     FOLLOW UP  Treatment Plan/Recommendations: Dysphagia therapy, Aspiration precautions  Duration: 1 week      Thank you for your referral.   If you have any questions, please contact YOLANDE Royal, MS CCC-SLP/L  Speech-Language Pathologist  Spectra-Link [phone]           [1]   Past Medical History:   Aortic stenosis    Calculus of kidney    Coronary atherosclerosis    bare metal stents at [facility] Jan 2021    Diabetes (HCC)    Disorder of thyroid    Esophageal varices without bleeding, unspecified esophageal varices type (HCC)    GIB (gastrointestinal bleeding)    Hepatitis, unspecified    hepatitis C-just completed taking Harvoni in November 2016    High blood pressure    High cholesterol    Other disorders of plasma-protein metabolism, not elsewhere classified    Renal disorder    Visual impairment    reading glasses

## 2025-05-16 ENCOUNTER — HOSPITAL ENCOUNTER (OUTPATIENT)
Dept: INTERVENTIONAL RADIOLOGY/VASCULAR | Facility: HOSPITAL | Age: 80
Discharge: HOME | End: 2025-05-16
Attending: INTERNAL MEDICINE
Payer: MEDICARE

## 2025-05-16 PROBLEM — D63.1 ANEMIA IN CHRONIC KIDNEY DISEASE, ON CHRONIC DIALYSIS (HCC): Status: ACTIVE | Noted: 2024-08-03

## 2025-05-16 PROBLEM — N18.6 ANEMIA IN CHRONIC KIDNEY DISEASE, ON CHRONIC DIALYSIS (HCC): Status: ACTIVE | Noted: 2024-08-03

## 2025-05-16 PROBLEM — Z99.2 ANEMIA IN CHRONIC KIDNEY DISEASE, ON CHRONIC DIALYSIS (HCC): Status: ACTIVE | Noted: 2024-08-03

## 2025-05-16 LAB
GLUCOSE BLD-MCNC: 126 MG/DL (ref 70–99)
GLUCOSE BLD-MCNC: 150 MG/DL (ref 70–99)
GLUCOSE BLD-MCNC: 157 MG/DL (ref 70–99)
GLUCOSE BLD-MCNC: 168 MG/DL (ref 70–99)
GLUCOSE BLD-MCNC: 177 MG/DL (ref 70–99)

## 2025-05-16 PROCEDURE — 99232 SBSQ HOSP IP/OBS MODERATE 35: CPT | Performed by: INTERNAL MEDICINE

## 2025-05-16 NOTE — PLAN OF CARE
Assumed care of patient @ 1930    Patient alert and oriented x1-2. On RA during the day, 2L O2 at night. NSR on tele. Incontinent bowel and bladder, primofit in place. Patient observed moaning/groaning while touching sternum, norco/dilaudid PRN. Up max assist. Call light within reach. Fall precautions in place. Makes needs known    0530  Bladder scan this morning 682cc. Straight cath x1-- 500cc out.    Plan  ICD placement  - Life vest bridge?  HD  Pain management    Problem: Diabetes/Glucose Control  Goal: Glucose maintained within prescribed range  Description: INTERVENTIONS:- Monitor Blood Glucose as ordered- Assess for signs and symptoms of hyperglycemia and hypoglycemia- Administer ordered medications to maintain glucose within target range- Assess barriers to adequate nutritional intake and initiate nutrition consult as needed- Instruct patient on self management of diabetes  Outcome: Progressing     Problem: Patient/Family Goals  Goal: Patient/Family Long Term Goal  Description: Patient's Long Term Goal: Stay out of hospital. Interventions: Follow up appointments, medication compliance- See additional Care Plan goals for specific interventions  Outcome: Progressing  Goal: Patient/Family Short Term Goal  Description: Patient's Short Term Goal: Discharge. Interventions: ICD placement - See additional Care Plan goals for specific interventions  Outcome: Progressing     Problem: CARDIOVASCULAR - ADULT  Goal: Maintains optimal cardiac output and hemodynamic stability  Description: INTERVENTIONS:- Monitor vital signs, rhythm, and trends- Monitor for bleeding, hypotension and signs of decreased cardiac output- Evaluate effectiveness of vasoactive medications to optimize hemodynamic stability- Monitor arterial and/or venous puncture sites for bleeding and/or hematoma- Assess quality of pulses, skin color and temperature- Assess for signs of decreased coronary artery perfusion - ex. Angina- Evaluate fluid balance,  assess for edema, trend weights  Outcome: Progressing  Goal: Absence of cardiac arrhythmias or at baseline  Description: INTERVENTIONS:- Continuous cardiac monitoring, monitor vital signs, obtain 12 lead EKG if indicated- Evaluate effectiveness of antiarrhythmic and heart rate control medications as ordered- Initiate emergency measures for life threatening arrhythmias- Monitor electrolytes and administer replacement therapy as ordered  Outcome: Progressing

## 2025-05-16 NOTE — PROGRESS NOTES
[facility]    Division of Cardiology    Consultation Note      Jonny Haque Patient Status:  Hospitalized    4/15/1945 MRN [id number]   Location [facility] 8NE-A Attending Ursula Saucedo MD   Hosp Day # 3 PCP Gee Jimenez MD   Primary Cards        Impression:  VT   Possibly ischemic  Possibly scar based  Only one episode clearly correlates to electrolyte abnormalities/hyperkalemia  Has had several episodes of VT (2025 and then 5/2025 x 2)  CAD  MV PCI in the past (LM, LAD, OM/CX)  PCI to LAD recently in the setting of VT, unclear if lesion related ( believes it wasn't the \"culprit\" for the arrhythmia).  Hx aortic stenosis  S/P TAVR   Echo with good valve function (mean gradient 9mmHg)  HFrEF/systolic dysfunction  EF at best up to 60% range  Echo 2025 with EF 40% range  Ejection fraction has been up and down  Limited ability to titrate \"GDMT\" given comorbidities  ESRD  HD dependent  Anemia  Hx GIB  Has only partially tolerated DAPT in the past  Has had to hold DAPT/plavix several times in the past  Cirrhosis  Varices noted in some reports  PAF  On amiodarone for rhythm control  Has not tolerated AC due to GIB and SDH  DM  HTN  HX SDH    Plan:  No plans for ICD tomorrow (Friday).  DAPT.  Dialysis for volume control.  Will continue ongoing discussion re: ICD with EP and Mr. Haque.  Ultimately I firmly believe an ICD is medically indicated and he has had cardiac arrest without a convincing trigger/etiology.  Likewise, there are always possible etiologies in a complex patient with CKD on HD.  I spoke with his son Ashwin and the patient has clearly stated he \"wants to live longer\".  Clearly there are risks in this patient, including thrombocytopenia, possible infection, unclear longterm prognosis, etc.  However if the patient is willing to accept these risks, device placement appears reasonable.  Ultimately, this discussion needs to happen between an  electrophysiologist and Mr. Haque.  To continue tomorrow.  If timing is problematic or decision unable to be reached, I would suggest a lifevest to bridge him.  In the past this has been contentious.  Follow CBC/platelets.  PT/OT/OOB/ambulation.          Inpatient Medications:    [START ON 5/17/2025] epoetin eunice  10,000 Units Intravenous Once    [Held by provider] heparin  5,000 Units Subcutaneous 2 times per day    lidocaine-menthol  1 patch Transdermal Daily    amiodarone  100 mg Oral BID    aspirin  81 mg Oral QOD    carvedilol  6.25 mg Oral BID with meals    clopidogrel  75 mg Oral Daily    insulin degludec  20 Units Subcutaneous Nightly    [Held by provider] Lanthanum Carbonate  1,000 mg Oral TID CC    levothyroxine  200 mcg Oral Before breakfast    losartan  25 mg Oral Daily    pantoprazole  40 mg Oral BID AC    rosuvastatin  10 mg Oral Nightly    insulin aspart  2-10 Units Subcutaneous TID AC and HS            Vitals:   /54 (BP Location: Right arm)   Pulse 65   Temp 97.9 °F (36.6 °C) (Oral)   Resp 18   Wt 203 lb 7.8 oz (92.3 kg)   SpO2 97%   BMI 28.38 kg/m²   Wt Readings from Last 3 Encounters:   05/16/25 203 lb 7.8 oz (92.3 kg)   02/16/25 203 lb 0.7 oz (92.1 kg)   02/09/25 212 lb 1.3 oz (96.2 kg)       Examination:  General: Well developed, well nourished male.  No distress.  Neck:  No JVD.  Normal ROM.  Cardiac: Regular rate and rhythm.  No murmurs, rubs, or gallops.   Lungs: Clear to ascultation bilaterally.    Abdomen: Soft.  Non-distended.  Non-tender.  Bowel sounds present.    Extremities: Warm, no significant edema.  Palpable pulses.  Neurologic: Alert and oriented.  No gross deficits.  Integument:  No visible rashes identified.  Psych: The patient's affect is appropriate.        Labs:   Recent Labs   Lab 05/14/25  0503 05/15/25  1417   WBC 4.2 4.0   HGB 8.2* 7.4*   .3* 107.2*   PLT 55.0* 43.0*     Recent Labs   Lab 05/14/25  0503 05/15/25  0610    137   K 3.6 4.1    101    CO2 28.0 23.0   BUN 51* 58*   CREATSERUM 4.95* 6.61*   * 143*   CA 9.1 9.2     Recent Labs   Lab 05/14/25  0503   ALT 79*   AST 45*   ALB 3.5     Lab Results   Component Value Date    A1C 7.0 (H) 08/03/2024    A1C 6.0 (H) 03/17/2024    A1C 5.4 10/25/2022     Lab Results   Component Value Date    CHOLEST 199 02/15/2025    HDL 31 (L) 02/15/2025     (H) 02/15/2025    TRIG 291 (H) 02/15/2025               Thank you for allowing [facility] to care for your patient. Please contact me with any questions!     Satya Donald MD  General, Interventional  Structural & Endovascular  Cardiology

## 2025-05-16 NOTE — PROGRESS NOTES
[facility] Electrophysiology Progress Note      Jonny Haque Patient Status:  Inpatient    4/15/1945 MRN [id number]   Location [facility] 8NE-A Attending Ted Hoff,    Hosp Day # 3 PCP Gee Jimenez MD     Subjective:    No new events      Scheduled Meds:  Scheduled Medications[1]    Infusion Meds:  Medication Infusions[2]    Physical:  Vitals:    25 0611 25 0759 25 0900 25 1228   BP:  159/66  154/55   BP Location:  Right arm  Right arm   Pulse: 62 64 65 67   Resp:  17  18   Temp:  97.9 °F (36.6 °C)  97.8 °F (36.6 °C)   TempSrc:  Oral  Oral   SpO2: 95% 100% 99% 96%   Weight: 203 lb 7.8 oz (92.3 kg)          Intake/Output Summary (Last 24 hours) at 2025 1329  Last data filed at 2025 0850  Gross per 24 hour   Intake 180 ml   Output 500 ml   Net -320 ml     Wt Readings from Last 4 Encounters:   25 203 lb 7.8 oz (92.3 kg)   25 203 lb 0.7 oz (92.1 kg)   25 212 lb 1.3 oz (96.2 kg)   25 214 lb (97.1 kg)     GENERAL: well developed, well nourished, in no apparent distress  EYES: sclera anicteric  HEENT: normocephalic  NECK: no JVD, no carotid bruits, no thyromegaly  RESPIRATORY: clear to auscultation  CARDIOVASCULAR: S1, S2 normal, RRR; no S3, no S4; no click; no murmur  ABDOMEN: normal active BS, soft, nondistended; nontender  EXTREMITIES: no cyanosis, clubbing or edema, peripheral pulses intact  SKIN: no rashes    Data:  Tele:     Labs:  Recent Labs   Lab 25  0503 05/15/25  1417   WBC 4.2 4.0   HGB 8.2* 7.4*   PLT 55.0* 43.0*       Recent Labs   Lab 25  0503 05/15/25  0610    137   K 3.6 4.1    101   CO2 28.0 23.0   BUN 51* 58*   CREATSERUM 4.95* 6.61*   CA 9.1 9.2   * 143*       No results for input(s): \"INR\" in the last 168 hours.    No results for input(s): \"TROP\" in the last 168 hours.    TSH   Date Value Ref Range Status   2022 6.910 (H) 0.270 - 4.200 mIU/L Final       Impression:  Recurrent  ventricular tachycardia arrest  ischemic cardiomyopathy   paroxysmal atrial fibrillation   ESRD on HD  cirrhosis  Thrombocytopenia with plt 43  LBBB    Plan:  Attemepted to contact son, CORINNE Christopher. No answer  Per discussion with Dr Schmidt, he had discussed plans with son and Dr Donald. At this point, plan for lifevest, outpt follow up, and consideration of future ICD. He is not a well man, with a bad heart, liver, and no kidneys. A VT arrest is not a bad way to go.  [id number]    ADDENDUM:  Spoke with son. Long discussion about options: essentially ICD or end of life discussions with DNR/hospice. He says his dad does not want to pursue hospice and wants an ICD. We dicsussed the increased risks of placing a ICD in someone with extensive comorbidities. The risks (including, but not limited to, hematoma, infection, pneumothorax, tamponade, renal failure from IV dye, damage to any existing leads, myocardial infarction, stroke, and death), benefits (prevention of sudden death), and alternatives (as above) of the procedure were discussed. The patient and son understand and agree to proceed.    Plan for Monday afternoon  Will need plt transfusion if they are below 50 on Monday      Jero Hamilton MD  Cardiology / Clinical Cardiac Electrophysiology  [facility]         [1]    [START ON 5/17/2025] epoetin eunice  10,000 Units Intravenous Once    [Held by provider] heparin  5,000 Units Subcutaneous 2 times per day    lidocaine-menthol  1 patch Transdermal Daily    amiodarone  100 mg Oral BID    aspirin  81 mg Oral QOD    carvedilol  6.25 mg Oral BID with meals    clopidogrel  75 mg Oral Daily    insulin degludec  20 Units Subcutaneous Nightly    [Held by provider] Lanthanum Carbonate  1,000 mg Oral TID CC    levothyroxine  200 mcg Oral Before breakfast    losartan  25 mg Oral Daily    pantoprazole  40 mg Oral BID AC    rosuvastatin  10 mg Oral Nightly    insulin aspart  2-10 Units Subcutaneous TID AC and HS    [2]

## 2025-05-16 NOTE — PROGRESS NOTES
[facility]    Division of Cardiology    Consultation Note      Jonny Haque Patient Status:  Hospitalized    4/15/1945 MRN [id number]   Location [facility] 8NE-A Attending Ursula Saucedo MD   Hosp Day # 2 PCP Gee Jimenez MD   Primary Cards        Impression:  VT   Possibly ischemic  Possibly scar based  Only one episode clearly correlates to electrolyte abnormalities/hyperkalemia  Has had several episodes of VT (2025 and then 5/2025 x 2)  CAD  MV PCI in the past (LM, LAD, OM/CX)  PCI to LAD recently in the setting of VT, unclear if lesion related ( believes it wasn't the \"culprit\" for the arrhythmia).  Hx aortic stenosis  S/P TAVR   Echo with good valve function (mean gradient 9mmHg)  HFrEF/systolic dysfunction  EF at best up to 60% range  Echo 2025 with EF 40% range  Ejection fraction has been up and down  Limited ability to titrate \"GDMT\" given comorbidities  ESRD  HD dependent  Anemia  Hx GIB  Has only partially tolerated DAPT in the past  Has had to hold DAPT/plavix several times in the past  Cirrhosis  Varices noted in some reports  PAF  On amiodarone for rhythm control  Has not tolerated AC due to GIB and SDH  DM  HTN  HX SDH    Plan:  No plans for ICD tomorrow (Friday).  DAPT.  Dialysis for volume control.  Will continue ongoing discussion re: ICD with EP and Mr. Haque.  Ultimately I firmly believe an ICD is medically indicated and he has had cardiac arrest without a convincing trigger/etiology.  Likewise, there are always possible etiologies in a complex patient with CKD on HD.  I spoke with his son Ashwin and the patient has clearly stated he \"wants to live longer\".  Clearly there are risks in this patient, including thrombocytopenia, possible infection, unclear longterm prognosis, etc.  However if the patient is willing to accept these risks, device placement appears reasonable.  Ultimately, this discussion needs to happen between an  electrophysiologist and Mr. Haque.  To continue tomorrow.  If timing is problematic or decision unable to be reached, I would suggest a lifevest to bridge him.  In the past this has been contentious.  Follow CBC/platelets.  PT/OT/OOB/ambulation.          Inpatient Medications:    [Held by provider] heparin  5,000 Units Subcutaneous 2 times per day    lidocaine-menthol  1 patch Transdermal Daily    amiodarone  100 mg Oral BID    aspirin  81 mg Oral QOD    carvedilol  6.25 mg Oral BID with meals    clopidogrel  75 mg Oral Daily    insulin degludec  20 Units Subcutaneous Nightly    [Held by provider] Lanthanum Carbonate  1,000 mg Oral TID CC    levothyroxine  200 mcg Oral Before breakfast    losartan  25 mg Oral Daily    pantoprazole  40 mg Oral BID AC    rosuvastatin  10 mg Oral Nightly    insulin aspart  2-10 Units Subcutaneous TID AC and HS            Vitals:   /82 (BP Location: Right arm)   Pulse 67   Temp 98.6 °F (37 °C) (Oral)   Resp 18   Wt 201 lb 15.1 oz (91.6 kg)   SpO2 (!) 85%   BMI 28.17 kg/m²   Wt Readings from Last 3 Encounters:   05/15/25 201 lb 15.1 oz (91.6 kg)   02/16/25 203 lb 0.7 oz (92.1 kg)   02/09/25 212 lb 1.3 oz (96.2 kg)       Examination:  General: Well developed, well nourished male.  No distress.  Neck:  No JVD.  Normal ROM.  Cardiac: Regular rate and rhythm.  No murmurs, rubs, or gallops.   Lungs: Clear to ascultation bilaterally.    Abdomen: Soft.  Non-distended.  Non-tender.  Bowel sounds present.    Extremities: Warm, no significant edema.  Palpable pulses.  Neurologic: Alert and oriented.  No gross deficits.  Integument:  No visible rashes identified.  Psych: The patient's affect is appropriate.        Labs:   Recent Labs   Lab 05/14/25  0503 05/15/25  1417   WBC 4.2 4.0   HGB 8.2* 7.4*   .3* 107.2*   PLT 55.0* 43.0*     Recent Labs   Lab 05/14/25  0503 05/15/25  0610    137   K 3.6 4.1    101   CO2 28.0 23.0   BUN 51* 58*   CREATSERUM 4.95* 6.61*   *  143*   CA 9.1 9.2     Recent Labs   Lab 05/14/25  0503   ALT 79*   AST 45*   ALB 3.5     Lab Results   Component Value Date    A1C 7.0 (H) 08/03/2024    A1C 6.0 (H) 03/17/2024    A1C 5.4 10/25/2022     Lab Results   Component Value Date    CHOLEST 199 02/15/2025    HDL 31 (L) 02/15/2025     (H) 02/15/2025    TRIG 291 (H) 02/15/2025               Thank you for allowing [facility] to care for your patient. Please contact me with any questions!     Satya Donald MD  General, Interventional  Structural & Endovascular  Cardiology

## 2025-05-16 NOTE — PHYSICAL THERAPY NOTE
PHYSICAL THERAPY TREATMENT NOTE - INPATIENT    Room Number: 8621/8621-A     Session: 1     Number of Visits to Meet Established Goals: 5    Presenting Problem: Cardiac Arrest  Co-Morbidities : CHF, TAVR, ESRD on dialysis, HTN, CAD with PCI, DM2    PHYSICAL THERAPY MEDICAL/SOCIAL HISTORY  History related to current admission: Patient is a 80 year old male admitted as a transfer on 5/13/2025 from [facility] following cardiac arrest.       HOME SITUATION  Type of Home: House  Home Layout: Multi-level  Stairs to Enter : 2   Railing: No    Stairs to Bedroom: 8    Railing: Yes    Lives With: Son    Drives: Yes (driving locally to store and dialysis)   Patient Regularly Uses: Rolling walker       Prior Level of [place]: The pt is typically independent with ambulation and IADL's.  Pt last fall was in January.    PHYSICAL THERAPY ASSESSMENT   Patient demonstrates limited progress this session, goals  remain in progress.      Patient is requiring maximum assist and dependent as a result of the following impairments: decreased functional strength, decreased endurance/aerobic capacity, and medical status.     Patient continues to function below baseline with bed mobility and transfers.  Next session anticipate patient to progress bed mobility and transfers.  Physical Therapy will continue to follow patient for duration of hospitalization.    Patient continues to benefit from continued skilled PT services: to promote return to prior level of function and safety with continuous assistance and gradual rehabilitative therapy .    PLAN DURING HOSPITALIZATION  Nursing Mobility Recommendation : Lift Equipment  PT Device Recommendation: Rolling walker  PT Treatment Plan: Bed mobility, Endurance, Energy conservation, Patient education, Gait training, Strengthening, Stair training, Transfer training, Balance training  Frequency (Obs): 3-5x/week     CURRENT GOALS     Goal #1 Patient is able to demonstrate supine - sit EOB  @ level: minimum assistance      Goal #2 Patient is able to demonstrate transfers Sit to/from Stand at assistance level: moderate assistance      Goal #3 Patient is able to ambulate 20 feet with assist device: walker - rolling at assistance level: moderate assistance      Goal #4     Goal #5     Goal #6     Goal Comments: Goals established on 5/14/2025 5/16/2025 all goals ongoing    SUBJECTIVE  \"I am just exhausted\"    OBJECTIVE  Precautions: Cardiac, Bed/chair alarm    WEIGHT BEARING RESTRICTION     PAIN ASSESSMENT   Rating: Unable to rate  Location: chest pain  Management Techniques: Relaxation, Repositioning    BALANCE                                                                                                                       Static Sitting: Poor  Dynamic Sitting: Poor           Static Standing: Dependent  Dynamic Standing:  (Unable to achieve)    ACTIVITY TOLERANCE                         O2 WALK       AM-PAC '6-Clicks' INPATIENT SHORT FORM - BASIC MOBILITY  How much difficulty does the patient currently have...  Patient Difficulty: Turning over in bed (including adjusting bedclothes, sheets and blankets)?: A Lot   Patient Difficulty: Sitting down on and standing up from a chair with arms (e.g., wheelchair, bedside commode, etc.): Unable   Patient Difficulty: Moving from lying on back to sitting on the side of the bed?: A Lot   How much help from another person does the patient currently need...   Help from Another: Moving to and from a bed to a chair (including a wheelchair)?: Total   Help from Another: Need to walk in hospital room?: Total   Help from Another: Climbing 3-5 steps with a railing?: Total     AM-PAC Score:  Raw Score: 8   Approx Degree of Impairment: 86.62%   Standardized Score (AM-PAC Scale): 28.58   CMS Modifier (G-Code): CM    FUNCTIONAL ABILITY STATUS  Gait Assessment   Functional Mobility/Gait Assessment  Gait Assistance: Not tested  Distance (ft): 0    Skilled Therapy  Provided    Bed Mobility:  Rolling: Max A x 4 attempts - Pt was total A for boosting towards HOB.  Pt unable to reach UE up to bed railing to assist with boost and too weak to bend knees to assist with leg drive   Supine<>Sit:    Sit<>Supine:      Transfer Mobility:  Sit<>Stand:    Stand<>Sit:    Gait:     Therapist's Comments: shaila lift up to chair for meals.  Discussed with RN/PCT      THERAPEUTIC EXERCISES  Lower Extremity Ankle pumps  Leg slides     Upper Extremity Elbow flex/ext and  - open/close     Position Supine     Repetitions   10   Sets   1     Patient End of Session: In bed, Needs met, Call light within reach, RN aware of session/findings, All patient questions and concerns addressed, Hospital anti-slip socks    PT Session Time: 15 minutes  Gait Trainin minutes  Therapeutic Activity: 10 minutes  Therapeutic Exercise: 5 minutes   Neuromuscular Re-education: 0 minutes

## 2025-05-16 NOTE — PROGRESS NOTES
[facility] Hospitalist note    PCP: Gee Jimenez MD    Chief Complaint:  F/u cardiac arrest    SUBJECTIVE:  No acute events, denies n/v, chest pain or dyspnea. Tells me he is not sure about ICD placement still.     OBJECTIVE:  Temp:  [97.4 °F (36.3 °C)-98.6 °F (37 °C)] 97.8 °F (36.6 °C)  Pulse:  [62-69] 67  Resp:  [17-18] 18  BP: (118-159)/(55-82) 154/55  SpO2:  [85 %-100 %] 96 %    Intake/Output:    Intake/Output Summary (Last 24 hours) at 5/16/2025 1343  Last data filed at 5/16/2025 0850  Gross per 24 hour   Intake 180 ml   Output 500 ml   Net -320 ml       Last 3 Weights   05/16/25 0611 203 lb 7.8 oz (92.3 kg)   05/15/25 0505 201 lb 15.1 oz (91.6 kg)   05/14/25 0452 199 lb 8.3 oz (90.5 kg)   05/13/25 1926 195 lb 15.8 oz (88.9 kg)   02/16/25 0555 203 lb 0.7 oz (92.1 kg)   02/15/25 1402 210 lb 14.4 oz (95.7 kg)   02/15/25 1118 212 lb 1.3 oz (96.2 kg)   02/09/25 0500 212 lb 1.3 oz (96.2 kg)   02/08/25 1431 212 lb 8.4 oz (96.4 kg)   02/08/25 0600 213 lb (96.6 kg)   02/06/25 2143 215 lb 6.2 oz (97.7 kg)       Exam  Gen: No acute distress  HEENT: anicteric sclera, MMM  Pulm: Lungs clear, normal respiratory effort  CV: Heart with regular rate and rhythm, no peripheral edema  Abd: Abdomen soft, nontender, nondistended  MSK: Moves extremities spontaneously.  Skin: no rashes or lesions  Neuro: A&OX3, no focal deficits    Data Review:         Labs:     Recent Labs   Lab 05/14/25  0503 05/15/25  1417   WBC 4.2 4.0   HGB 8.2* 7.4*   .3* 107.2*   PLT 55.0* 43.0*   NE  --  3.04   LYMABS  --  0.54*       Recent Labs   Lab 05/14/25  0503 05/15/25  0610    137   K 3.6 4.1    101   CO2 28.0 23.0   BUN 51* 58*   CREATSERUM 4.95* 6.61*   CA 9.1 9.2   * 143*       Recent Labs   Lab 05/14/25  0503   ALT 79*   AST 45*   ALB 3.5       No results for input(s): \"TROP\", \"CK\", \"PBNP\", \"PCT\" in the last 168 hours.    No results for input(s): \"CRP\", \"NEELAM\", \"LDH\", \"DDIMER\" in the last 168 hours.    Recent Labs   Lab  05/15/25  1219 05/15/25  1751 05/15/25  2150 05/16/25  0532 05/16/25  1225   PGLU 189* 143* 175* 126* 150*       Meds:   Scheduled Medication:Scheduled Medications[1]  Continuous Infusing Medication:Medication Infusions[2]  PRN Medication:PRN Medications[3]       Microbiology:    No results found for this visit on 05/13/25.    Lab Results   Component Value Date    COVID19 Not Detected 04/16/2024    COVID19 Not Detected 03/17/2024    COVID19 Not Detected 04/05/2023        Assessment/Plan:     81 yo man with h/o tavr, cad, dm who presented as osh transfer for continued management in setting of cardiac arrest     Cardiac arrest  Vtach/nsvt  EF 40%, ICM  CAD with recent angio, h/o stents  - cardiology to consult, possible defibrillator placement-- patient hesitant, EP to evaluate, possibly may just have life vest in interim.   -cont amio, coreg, cozaar, asa, plavix  - prn pain meds for chest wall pain- will give norco, lidocaine patches.     ESRD on HD  - HD on t, th, sat. Consulted nephro     Thrombocytopenia  HCV cirrhosis, h/o esophageal banding  - stable in 50s. Dropped as low as 30s at OSH  - per notes, had been seen hematology before and felt to have the thrombocytopenia 2/2 cirrhosis     Resp failure in setting of cardiac arrest  Treated for PNA  - continue IS, NC     Subcutaneous heparin, SCDs    DO Joselito Nino Hospitalist         [1]    [START ON 5/17/2025] epoetin eunice  10,000 Units Intravenous Once    [Held by provider] heparin  5,000 Units Subcutaneous 2 times per day    lidocaine-menthol  1 patch Transdermal Daily    amiodarone  100 mg Oral BID    aspirin  81 mg Oral QOD    carvedilol  6.25 mg Oral BID with meals    clopidogrel  75 mg Oral Daily    insulin degludec  20 Units Subcutaneous Nightly    [Held by provider] Lanthanum Carbonate  1,000 mg Oral TID CC    levothyroxine  200 mcg Oral Before breakfast    losartan  25 mg Oral Daily    pantoprazole  40 mg Oral BID AC    rosuvastatin  10 mg Oral  Nightly    insulin aspart  2-10 Units Subcutaneous TID AC and HS   [2] [3]   sodium chloride **AND** albumin human    acetaminophen    polyethylene glycol (PEG 3350)    sennosides    bisacodyl    fleet enema    ondansetron    metoclopramide    HYDROcodone-acetaminophen **OR** HYDROcodone-acetaminophen    HYDROmorphone **OR** HYDROmorphone **OR** HYDROmorphone    glucose **OR** glucose **OR** glucose-vitamin C **OR** dextrose **OR** glucose **OR** glucose **OR** glucose-vitamin C

## 2025-05-16 NOTE — CM/SW NOTE
SW briefly spoke with son/POA, Ashwin, over the phone. SW will meet with son when bedside in pt's room.     Rosana MEADOWS, LCSW  Discharge Planner

## 2025-05-16 NOTE — PLAN OF CARE
Patient is alert to self, very fatigue, answers when spoken to. RA, lung rales d/t broken ribs. NSR. Positive bowel sounds. Soft, rounded abdomen. Mepilex on sacrum. +1 pedals. Left hand bruised.  1100 bladder scan 24 ml.   Zoll ordered but put on hold since patient is still in hospital and will have ICD placed during hospital stay.  Plan: ICD placement on Monday. Son notified.   1245 [facility] dialysis called and scheduled for tomorrow.  Son will bring Fosrenol from home tomorrow since he is on his way here to see his dad.    Problem: Diabetes/Glucose Control  Goal: Glucose maintained within prescribed range  Description: INTERVENTIONS:- Monitor Blood Glucose as ordered- Assess for signs and symptoms of hyperglycemia and hypoglycemia- Administer ordered medications to maintain glucose within target range- Assess barriers to adequate nutritional intake and initiate nutrition consult as needed- Instruct patient on self management of diabetes  Outcome: Progressing     Problem: Patient/Family Goals  Goal: Patient/Family Long Term Goal  Description: Patient's Long Term Goal: Stay out of hospital. Interventions: Follow up appointments, medication compliance- See additional Care Plan goals for specific interventions  Outcome: Progressing  Goal: Patient/Family Short Term Goal  Description: Patient's Short Term Goal: Discharge. Interventions: ICD placement - See additional Care Plan goals for specific interventions  Outcome: Progressing     Problem: CARDIOVASCULAR - ADULT  Goal: Maintains optimal cardiac output and hemodynamic stability  Description: INTERVENTIONS:- Monitor vital signs, rhythm, and trends- Monitor for bleeding, hypotension and signs of decreased cardiac output- Evaluate effectiveness of vasoactive medications to optimize hemodynamic stability- Monitor arterial and/or venous puncture sites for bleeding and/or hematoma- Assess quality of pulses, skin color and temperature- Assess for signs of decreased  coronary artery perfusion - ex. Angina- Evaluate fluid balance, assess for edema, trend weights  Outcome: Progressing  Goal: Absence of cardiac arrhythmias or at baseline  Description: INTERVENTIONS:- Continuous cardiac monitoring, monitor vital signs, obtain 12 lead EKG if indicated- Evaluate effectiveness of antiarrhythmic and heart rate control medications as ordered- Initiate emergency measures for life threatening arrhythmias- Monitor electrolytes and administer replacement therapy as ordered  Outcome: Progressing     Problem: SKIN/TISSUE INTEGRITY - ADULT  Goal: Skin integrity remains intact  Description: INTERVENTIONS- Assess and document risk factors for pressure ulcer development- Assess and document skin integrity- Monitor for areas of redness and/or skin breakdown- Initiate interventions, skin care algorithm/standards of care as needed  Outcome: Progressing

## 2025-05-16 NOTE — PAYOR COMM NOTE
--------------  CONTINUED STAY REVIEW    Payor: [employer] MEDICARE ADV PPO  Subscriber #:  [id number]  Authorization Number: [id number]    Admit date: 5/13/25  Admit time:  7:10 PM    5/15    9 yo man with h/o tavr, cad, dm who presented as osh transfer for continued management in setting of cardiac arrest     Cardiac arrest  Vtach/nsvt  EF 40%, ICM  CAD with recent angio, h/o stents  - cardiology to consult, possible defibrillator placement  - will cont meds from outside facility including amio, coreg, cozaar, asa, plavix  - prn pain meds for chest wall pain- will give norco, lidocaine patches. Avoid morphine.     ESRD on HD  - HD on t, th, sat. Consulted nephro     Thrombocytopenia  HCV cirrhosis, h/o esophageal banding  - stable in 50s. Dropped as low as 30s at OSH  - per notes, had been seen hematology before and felt to have the thrombocytopenia 2/2 cirrhosis     Resp failure in setting of cardiac arrest  Treated for PNA  - continue IS, NC    5/16  CARDS    VT   Possibly ischemic  Possibly scar based  Only one episode clearly correlates to electrolyte abnormalities/hyperkalemia  Has had several episodes of VT (2/2025 and then 5/2025 x 2)  CAD  MV PCI in the past (LM, LAD, OM/CX)  PCI to LAD recently in the setting of VT, unclear if lesion related ( believes it wasn't the \"culprit\" for the arrhythmia).  Hx aortic stenosis  S/P TAVR 2024  Echo with good valve function (mean gradient 9mmHg)  HFrEF/systolic dysfunction  EF at best up to 60% range  Echo 5/2025 with EF 40% range  Ejection fraction has been up and down  Limited ability to titrate \"GDMT\" given comorbidities  ESRD  HD dependent  Anemia  Hx GIB    Plan:  No plans for ICD tomorrow (Friday).  DAPT.  Dialysis for volume control.  Will continue ongoing discussion re: ICD with EP and Mr. Haque.  Ultimately I firmly believe an ICD is medically indicated and he has had cardiac arrest without a convincing trigger/etiology.  Likewise, there are always possible  etiologies in a complex patient with CKD on HD.  I spoke with his son Ashwin and the patient has clearly stated he \"wants to live longer\".  Clearly there are risks in this patient, including thrombocytopenia, possible infection, unclear longterm prognosis, etc.  However if the patient is willing to accept these risks, device placement appears reasonable.  Ultimately, this discussion needs to happen between an electrophysiologist and Mr. Talavera  To continue tomorrow.  If timing is problematic or decision unable to be reached, I would suggest a lifevest to bridge him.  In the past this has been contentious.  Follow CBC/platelets.  PT/OT/OOB/ambulation.       MEDICATIONS ADMINISTERED IN LAST 1 DAY:  albumin human (Albumin) 25% injection 25 g       Date Action Dose Route User    5/15/2025 1601 New Bag 25 g Intravenous Blaine Arceo RN          amiodarone (Pacerone) tab 100 mg       Date Action Dose Route User    5/16/2025 0845 Given 100 mg Oral Deborah Dash RN    5/15/2025 2142 Given 100 mg Oral Donaldo Keller RN          carvedilol (Coreg) tab 6.25 mg       Date Action Dose Route User    5/16/2025 0846 Given 6.25 mg Oral Deborah Dash RN    5/15/2025 1758 Given 6.25 mg Oral Blaine Arceo RN          clopidogrel (Plavix) tab 75 mg       Date Action Dose Route User    5/16/2025 0847 Given 75 mg Oral Deborah Dash RN          epoetin eunice (Epogen, Procrit) 58357 UNIT/ML injection 10,000 Units       Date Action Dose Route User    5/15/2025 1602 Given 10,000 Units Intravenous Blaine Arceo RN          heparin (Porcine) 1000 UNIT/ML injection 1,500 Units       Date Action Dose Route User    5/15/2025 1602 Given 1,500 Units Intracatheter Blaine Arceo RN          HYDROcodone-acetaminophen (Norco) 5-325 MG per tab 1 tablet       Date Action Dose Route User    5/15/2025 2142 Given 1 tablet Oral Donaldo Keller RN          HYDROmorphone (Dilaudid) 1 MG/ML injection 0.4 mg       Date Action Dose Route User    5/16/2025 0019  Given 0.4 mg Intravenous Donaldo Keller RN          insulin aspart (NovoLOG) 100 Units/mL FlexPen 2-10 Units       Date Action Dose Route User    5/15/2025 2158 Given 2 Units Subcutaneous (Left Upper Arm) Donaldo Keller RN    5/15/2025 1758 Given 2 Units Subcutaneous (Left Lower Abdomen) Blaine Arceo RN          insulin degludec (Tresiba) 100 units/mL flextouch 20 Units       Date Action Dose Route User    5/15/2025 2158 Given 20 Units Subcutaneous (Right Upper Arm) Donaldo Keller RN          levothyroxine (Synthroid) tab 200 mcg       Date Action Dose Route User    5/16/2025 0546 Given 200 mcg Oral Donaldo Keller RN          lidocaine-menthol 4-1 % patch 1 patch       Date Action Dose Route User    5/16/2025 0847 Patch Applied 1 patch Transdermal (Right Lateral Chest) Deborah Dash RN          losartan (Cozaar) tab 25 mg       Date Action Dose Route User    5/16/2025 0847 Given 25 mg Oral Deborah Dash RN          ondansetron (Zofran) 4 MG/2ML injection 4 mg       Date Action Dose Route User    5/15/2025 1601 Given 4 mg Intravenous Blaine Arceo RN          pantoprazole (Protonix) DR tab 40 mg       Date Action Dose Route User    5/16/2025 0550 Given 40 mg Oral Donaldo Keller RN    5/15/2025 1758 Given 40 mg Oral Blaine Arceo RN          rosuvastatin (Crestor) tab 10 mg       Date Action Dose Route User    5/15/2025 2142 Given 10 mg Oral Donaldo Keller RN            Vitals (last day)       Date/Time Temp Pulse Resp BP SpO2 Weight O2 Device O2 Flow Rate (L/min) [facility]    05/16/25 1228 97.8 °F (36.6 °C) 67 18 154/55 96 % -- None (Room air) --     05/16/25 0900 -- 65 -- -- 99 % -- None (Room air) -- AW    05/16/25 0759 97.9 °F (36.6 °C) 64 17 159/66 100 % -- Nasal cannula 2 L/min     05/16/25 0611 -- 62 -- -- 95 % 203 lb 7.8 oz (92.3 kg) -- -- BV    05/16/25 0400 98.1 °F (36.7 °C) 62 18 149/58 99 % -- Nasal cannula 2 L/min BV    05/16/25 0005 -- -- -- -- -- -- Nasal cannula 2 L/min     05/16/25  0005 97.5 °F (36.4 °C) 63 18 158/59 90 % -- -- -- BV    05/16/25 0000 -- -- -- -- 94 % -- Nasal cannula 2 L/min DH    05/15/25 2232 -- 67 -- -- 85 % -- -- -- ML    05/15/25 2121 98.6 °F (37 °C) 69 18 150/82 92 % -- None (Room air) -- ML    05/15/25 1958 97.4 °F (36.3 °C) 68 17 134/65 95 % -- None (Room air) -- ML    05/15/25 1607 97.4 °F (36.3 °C) 65 17 118/58 100 % -- None (Room air) -- ML    05/15/25 1606 -- 64 -- -- 100 % -- -- -- ML    05/15/25 1200 98.2 °F (36.8 °C) 61 18 124/52 98 % -- None (Room air) -- ML    05/15/25 0816 98.8 °F (37.1 °C) 65 18 119/63 93 % -- None (Room air) -- ML    05/15/25 0505 97.4 °F (36.3 °C) 63 18 112/49 98 % 201 lb 15.1 oz (91.6 kg) -- -- KB          CIWA Scores (since admission)       None

## 2025-05-16 NOTE — PROGRESS NOTES
[facility]  Progress Note    Jonny Haque Patient Status:  Inpatient    4/15/1945 MRN [id number]   Location [facility] 8NE-A Attending Ursula Saucedo MD   Hosp Day # 3 PCP Gee Jimenez MD        No acute events    Current Hospital Medications[1]          Physical Exam:  Vital signs: Blood pressure 154/55, pulse 67, temperature 97.8 °F (36.6 °C), temperature source Oral, resp. rate 18, weight 203 lb 7.8 oz (92.3 kg), SpO2 96%.  General: No acute distress. Alert and oriented   HEENT: Moist mucous membranes. EOM-I. PERRL  Neck: No lymphadenopathy.  No JVD. No carotid bruits.  Respiratory: Clear to auscultation bilaterally.  No wheezes. No rhonchi.  Cardiovascular: S1, S2.  Regular rate and rhythm.  No murmurs. Equal pulses   Abdomen: Soft, nontender, nondistended.  Positive bowel sounds. No rebound tenderness   Neurologic: No focal neurological deficits.   Musculoskeletal: Full range of motion of all extremities.  No swelling noted.   Integument: No lesions. No erythema.  Psychiatric: Appropriate mood and affect.  PC intact ( R neck)    Recent Labs     25  0503 05/15/25  1417   WBC 4.2 4.0   HGB 8.2* 7.4*   .3* 107.2*   PLT 55.0* 43.0*       Recent Labs     25  0503 05/15/25  0610    137   K 3.6 4.1    101   CO2 28.0 23.0   BUN 51* 58*   CREATSERUM 4.95* 6.61*   CA 9.1 9.2       Recent Labs     25  0503   ALT 79*   AST 45*   ALB 3.5               Imaging:  Reviewed     Impression:  ESRD due to DM; on HD TTS- continue routine schedule; volume/labs stable- HD tomorrow     Recent caridac arrest  Anemia due to CKD; JIE w/ HD ; goal hgb 10-11  Hx of ICM; Cardiac arrest ; s/p angioplasty @ OSH; noted to have Vtach/NVST    - cardiology following ;EP consult          Thank you for allowing me to participate in this patient's care.  Please feel free to call me with any questions or concerns.    Ashwin Lunsford MD  2025             [1]   Current Facility-Administered  Consent was obtained from the patient. The risks, benefits and alternatives to therapy were discussed in detail. Specifically, the risks of infection, scarring, bleeding, prolonged wound healing, nerve injury, incomplete removal, allergy to anesthesia and recurrence were addressed. Alternatives to ED&C, such as: surgical removal and XRT were also discussed.  Prior to the procedure, the treatment site was clearly identified and confirmed by the patient. All components of Universal Protocol/PAUSE Rule completed. Medications   Medication Dose Route Frequency    sodium chloride 0.9 % IV bolus 100 mL  100 mL Intravenous Q30 Min PRN    And    albumin human (Albumin) 25% injection 25 g  25 g Intravenous PRN Dialysis    [Held by provider] heparin (Porcine) 5000 UNIT/ML injection 5,000 Units  5,000 Units Subcutaneous 2 times per day    acetaminophen (Tylenol Extra Strength) tab 500 mg  500 mg Oral Q4H PRN    polyethylene glycol (PEG 3350) (Miralax) 17 g oral packet 17 g  17 g Oral Daily PRN    sennosides (Senokot) tab 17.2 mg  17.2 mg Oral Nightly PRN    bisacodyl (Dulcolax) 10 MG rectal suppository 10 mg  10 mg Rectal Daily PRN    fleet enema (Fleet) rectal enema 133 mL  1 enema Rectal Once PRN    ondansetron (Zofran) 4 MG/2ML injection 4 mg  4 mg Intravenous Q6H PRN    metoclopramide (Reglan) 5 mg/mL injection 5 mg  5 mg Intravenous Q8H PRN    HYDROcodone-acetaminophen (Norco) 5-325 MG per tab 1 tablet  1 tablet Oral Q4H PRN    Or    HYDROcodone-acetaminophen (Norco) 5-325 MG per tab 2 tablet  2 tablet Oral Q4H PRN    HYDROmorphone (Dilaudid) 1 MG/ML injection 0.1 mg  0.1 mg Intravenous Q6H PRN    Or    HYDROmorphone (Dilaudid) 1 MG/ML injection 0.2 mg  0.2 mg Intravenous Q6H PRN    Or    HYDROmorphone (Dilaudid) 1 MG/ML injection 0.4 mg  0.4 mg Intravenous Q6H PRN    lidocaine-menthol 4-1 % patch 1 patch  1 patch Transdermal Daily    amiodarone (Pacerone) tab 100 mg  100 mg Oral BID    aspirin DR tab 81 mg  81 mg Oral QOD    carvedilol (Coreg) tab 6.25 mg  6.25 mg Oral BID with meals    clopidogrel (Plavix) tab 75 mg  75 mg Oral Daily    insulin degludec (Tresiba) 100 units/mL flextouch 20 Units  20 Units Subcutaneous Nightly    [Held by provider] Lanthanum Carbonate (FOSRENOL) chewable tab 1,000 mg - Pt's Supply  1,000 mg Oral TID CC    levothyroxine (Synthroid) tab 200 mcg  200 mcg Oral Before breakfast    losartan (Cozaar) tab 25 mg  25 mg Oral Daily    pantoprazole (Protonix) DR tab 40 mg  40 mg Oral BID AC    rosuvastatin  (Crestor) tab 10 mg  10 mg Oral Nightly    glucose (Dex4) 15 GM/59ML oral liquid 15 g  15 g Oral Q15 Min PRN    Or    glucose (Glutose) 40% oral gel 15 g  15 g Oral Q15 Min PRN    Or    glucose-vitamin C (Dex-4) chewable tab 4 tablet  4 tablet Oral Q15 Min PRN    Or    dextrose 50% injection 50 mL  50 mL Intravenous Q15 Min PRN    Or    glucose (Dex4) 15 GM/59ML oral liquid 30 g  30 g Oral Q15 Min PRN    Or    glucose (Glutose) 40% oral gel 30 g  30 g Oral Q15 Min PRN    Or    glucose-vitamin C (Dex-4) chewable tab 8 tablet  8 tablet Oral Q15 Min PRN    insulin aspart (NovoLOG) 100 Units/mL FlexPen 2-10 Units  2-10 Units Subcutaneous TID AC and HS      Cautery Type: electrodesiccation Anesthesia Volume In Cc: 3 Add Intralesional Injection: No Total Volume (Ccs): 1 Body Location Override (Optional - Billing Will Still Be Based On Selected Body Map Location If Applicable): left trapezius Bill As A Line Item Or As Units: Line Item Post-Care Instructions: I reviewed with the patient in detail post-care instructions. Patient is to keep the area dry for 48 hours, and not to engage in any swimming until the area is healed. Should the patient develop any fevers, chills, bleeding, severe pain patient will contact the office immediately. Anesthesia Type: 1% lidocaine with 1:200,000 epinephrine Size Of Lesion In Cm: 0.8 Additional Information: (Optional): The wound was cleaned, and a pressure dressing was applied.  The patient received detailed post-op instructions. Render Post-Care Instructions In Note?: yes What Was Performed First?: Curettage Size Of Lesion After Curettage: 1.6 Detail Level: Detailed

## 2025-05-16 NOTE — CM/SW NOTE
05/16/25 1500   CM/SW Referral Data   Referral Source Social Work (self-referral)   Reason for Referral Discharge planning   Informant Patient;Son   Patient Info   Patient's Current Mental Status at Time of Assessment Alert;Oriented   Patient's Home Environment House   Patient Status Prior to Admission   Services in place prior to admission Dialysis   Dialysis Clinic [facility]   Discharge Needs   Anticipated D/C needs Subacute rehab   Services Requested   [facility]   PASRR Level 1 Submitted Yes       SW met with pt and son at bedside to assess for discharge planning needs. Pt is a 81 y/o male who was admitted for aortic stenosis. Pt presented as alert and oriented. SW discussed with pt and son that pt would benefit from [facility] at discharge. Family was hoping for [facility] - explained that pt would be best supported in a [facility] facility that has onsite dialysis to alleviate the transportation and transfers to/from the facility. Son understanding. SW provided son with the [facility] list w/ onsite HD. Encouraged to review options and contact SW with choice. Explained insurance auth will need to be obtained closer to discharge.     SW chart reviewed that pt is going to require a [employer] [facility] at discharge. SW requested RN to speak with Cardiology for clarification and to update son about this.     Emotional support provided to pt and son.      &  to remain available and supportive for discharge planning needs.    Rosana MEADOWS, MLW  Discharge Planner

## 2025-05-16 NOTE — CM/SW NOTE
Received call from Opal at [facility]: [phone] stating that Dr Schmidt has ordered pt a lifevest at discharge. MARGARET sent clinical info to [facility] via [employer].     [facility] is still looking at pt's clinical information to see if they can accommodate pt for rehab.     Rosana MEADOWS, LCSW  Discharge Planner

## 2025-05-17 LAB
ANION GAP SERPL CALC-SCNC: 13 MMOL/L (ref 0–18)
BUN BLD-MCNC: 50 MG/DL (ref 9–23)
CALCIUM BLD-MCNC: 9.4 MG/DL (ref 8.7–10.6)
CHLORIDE SERPL-SCNC: 104 MMOL/L (ref 98–112)
CO2 SERPL-SCNC: 24 MMOL/L (ref 21–32)
CREAT BLD-MCNC: 5.83 MG/DL (ref 0.7–1.3)
EGFRCR SERPLBLD CKD-EPI 2021: 9 ML/MIN/1.73M2 (ref 60–?)
GLUCOSE BLD-MCNC: 117 MG/DL (ref 70–99)
GLUCOSE BLD-MCNC: 129 MG/DL (ref 70–99)
GLUCOSE BLD-MCNC: 144 MG/DL (ref 70–99)
GLUCOSE BLD-MCNC: 187 MG/DL (ref 70–99)
GLUCOSE BLD-MCNC: 191 MG/DL (ref 70–99)
MAGNESIUM SERPL-MCNC: 2.5 MG/DL (ref 1.6–2.6)
OSMOLALITY SERPL CALC.SUM OF ELEC: 306 MOSM/KG (ref 275–295)
POTASSIUM SERPL-SCNC: 3.8 MMOL/L (ref 3.5–5.1)
SODIUM SERPL-SCNC: 141 MMOL/L (ref 136–145)

## 2025-05-17 PROCEDURE — 99232 SBSQ HOSP IP/OBS MODERATE 35: CPT | Performed by: INTERNAL MEDICINE

## 2025-05-17 RX ORDER — HEPARIN SODIUM 1000 [USP'U]/ML
1.5 INJECTION, SOLUTION INTRAVENOUS; SUBCUTANEOUS
Status: COMPLETED | OUTPATIENT
Start: 2025-05-17 | End: 2025-05-17

## 2025-05-17 RX ORDER — LANTHANUM CARBONATE 1000 MG/1
1000 TABLET, CHEWABLE ORAL
Status: DISCONTINUED | OUTPATIENT
Start: 2025-05-17 | End: 2025-05-21

## 2025-05-17 RX ORDER — ALPRAZOLAM 0.25 MG
0.25 TABLET ORAL ONCE
Status: COMPLETED | OUTPATIENT
Start: 2025-05-17 | End: 2025-05-17

## 2025-05-17 NOTE — PROGRESS NOTES
[facility]    Division of Cardiology    Consultation Note      Jonny Haque Patient Status:  Hospitalized    4/15/1945 MRN [id number]   Location [facility] 8NE-A Attending Ursula Saucedo MD   Hosp Day # 4 PCP Gee Jimenez MD   Primary Cards        Impression:  VT   Possibly ischemic  Possibly scar based  Only one episode clearly correlates to electrolyte abnormalities/hyperkalemia  Has had several episodes of VT (2025 and then 5/2025 x 2)  CAD  MV PCI in the past (LM, LAD, OM/CX)  PCI to LAD recently in the setting of VT, unclear if lesion related ( believes it wasn't the \"culprit\" for the arrhythmia).  Hx aortic stenosis  S/P TAVR   Echo with good valve function (mean gradient 9mmHg)  HFrEF/systolic dysfunction  EF at best up to 60% range  Echo 2025 with EF 40% range  Ejection fraction has been up and down  Limited ability to titrate \"GDMT\" given comorbidities  ESRD  HD dependent  Anemia  Hx GIB  Has only partially tolerated DAPT in the past  Has had to hold DAPT/plavix several times in the past  Cirrhosis  Varices noted in some reports  PAF  On amiodarone for rhythm control  Has not tolerated AC due to GIB and SDH  DM  HTN  HX SDH    Plan:  DAPT  Appreciate EP recs. Plan for ICD afternoon of . Will need plt transfusion if <50 on Monday morning   Follow CBC/platelets.  PT/OT/OOB/ambulation.          Inpatient Medications:    epoetin eunice  10,000 Units Intravenous Once    [Held by provider] heparin  5,000 Units Subcutaneous 2 times per day    lidocaine-menthol  1 patch Transdermal Daily    amiodarone  100 mg Oral BID    aspirin  81 mg Oral QOD    carvedilol  6.25 mg Oral BID with meals    clopidogrel  75 mg Oral Daily    insulin degludec  20 Units Subcutaneous Nightly    [Held by provider] Lanthanum Carbonate  1,000 mg Oral TID CC    levothyroxine  200 mcg Oral Before breakfast    losartan  25 mg Oral Daily    pantoprazole  40 mg Oral BID  AC    rosuvastatin  10 mg Oral Nightly    insulin aspart  2-10 Units Subcutaneous TID AC and HS            Vitals:   /52 (BP Location: Right arm)   Pulse 62   Temp 97.6 °F (36.4 °C) (Oral)   Resp 12   Wt 199 lb 4.7 oz (90.4 kg)   SpO2 95%   BMI 27.80 kg/m²   Wt Readings from Last 3 Encounters:   05/17/25 199 lb 4.7 oz (90.4 kg)   02/16/25 203 lb 0.7 oz (92.1 kg)   02/09/25 212 lb 1.3 oz (96.2 kg)       Examination:  General: Well developed, well nourished male.  No distress.  Neck:  No JVD.  Normal ROM.  Cardiac: Regular rate and rhythm.  No murmurs, rubs, or gallops.   Lungs: Clear to ascultation bilaterally.    Abdomen: Soft.  Non-distended.  Non-tender.  Bowel sounds present.    Extremities: Warm, no significant edema.  Palpable pulses.  Neurologic: Alert and oriented.  No gross deficits.  Integument:  No visible rashes identified.  Psych: The patient's affect is appropriate.        Labs:   Recent Labs   Lab 05/14/25  0503 05/15/25  1417   WBC 4.2 4.0   HGB 8.2* 7.4*   .3* 107.2*   PLT 55.0* 43.0*     Recent Labs   Lab 05/14/25  0503 05/15/25  0610 05/17/25  0503    137 141   K 3.6 4.1 3.8    101 104   CO2 28.0 23.0 24.0   BUN 51* 58* 50*   CREATSERUM 4.95* 6.61* 5.83*   * 143* 117*   CA 9.1 9.2 9.4   MG  --   --  2.5     Recent Labs   Lab 05/14/25  0503   ALT 79*   AST 45*   ALB 3.5     Lab Results   Component Value Date    A1C 7.0 (H) 08/03/2024    A1C 6.0 (H) 03/17/2024    A1C 5.4 10/25/2022     Lab Results   Component Value Date    CHOLEST 199 02/15/2025    HDL 31 (L) 02/15/2025     (H) 02/15/2025    TRIG 291 (H) 02/15/2025     HERB Meraz, 05/17/25, 9:47 AM

## 2025-05-17 NOTE — PROGRESS NOTES
[facility]  Progress Note    Jonny Haque Patient Status:  Inpatient    4/15/1945 MRN [id number]   Location [facility] 8NE-A Attending Ursula Saucedo MD   Hosp Day # 4 PCP Gee Jimenez MD      Eating breakfast this morning, on room air    Current Hospital Medications[1]       Physical Exam:  Vital signs: Blood pressure 122/52, pulse 62, temperature 97.6 °F (36.4 °C), temperature source Oral, resp. rate 12, weight 199 lb 4.7 oz (90.4 kg), SpO2 95%.  General: No acute distress. Alert  HEENT: Moist mucous membranes. EOM-I.   Neck: No lymphadenopathy.  No JVD. No carotid bruits.  Respiratory: Clear to auscultation bilaterally.   Cardiovascular: S1, S2.  Regular rate and rhythm.   Abdomen: Soft, nontender  Neurologic: Awake  Musculoskeletal:  No swelling noted.   Integument: No lesions. No erythema.  Psychiatric: Appropriate mood and affect.  PC intact ( R neck)    Recent Labs     05/15/25  1417   WBC 4.0   HGB 7.4*   .2*   PLT 43.0*       Recent Labs     05/15/25  0610 25  0503    141   K 4.1 3.8    104   CO2 23.0 24.0   BUN 58* 50*   CREATSERUM 6.61* 5.83*   CA 9.2 9.4   MG  --  2.5     Imaging:  Reviewed     Impression:  ESRD due to DM; on HD TTS- continue routine schedule; HD today     Recent caridac arrest  Anemia due to CKD; JIE w/ HD ; goal hgb 10-11  Hx of ICM; Cardiac arrest ; s/p angioplasty @ OSH; noted to have Vtach/NVST  - cardiology following ;EP consult - plan for ICD     Thank you for allowing me to participate in this patient's care. Please feel free to call me with any questions or concerns.     Teetee Montano MD  25       [1]   Current Facility-Administered Medications   Medication Dose Route Frequency    epoetin eunice (Epogen, Procrit) 82992 UNIT/ML injection 10,000 Units  10,000 Units Intravenous Once    [Held by provider] heparin (Porcine) 5000 UNIT/ML injection 5,000 Units  5,000 Units Subcutaneous 2 times per day    acetaminophen (Tylenol Extra  Strength) tab 500 mg  500 mg Oral Q4H PRN    polyethylene glycol (PEG 3350) (Miralax) 17 g oral packet 17 g  17 g Oral Daily PRN    sennosides (Senokot) tab 17.2 mg  17.2 mg Oral Nightly PRN    bisacodyl (Dulcolax) 10 MG rectal suppository 10 mg  10 mg Rectal Daily PRN    fleet enema (Fleet) rectal enema 133 mL  1 enema Rectal Once PRN    ondansetron (Zofran) 4 MG/2ML injection 4 mg  4 mg Intravenous Q6H PRN    metoclopramide (Reglan) 5 mg/mL injection 5 mg  5 mg Intravenous Q8H PRN    HYDROcodone-acetaminophen (Norco) 5-325 MG per tab 1 tablet  1 tablet Oral Q4H PRN    Or    HYDROcodone-acetaminophen (Norco) 5-325 MG per tab 2 tablet  2 tablet Oral Q4H PRN    HYDROmorphone (Dilaudid) 1 MG/ML injection 0.1 mg  0.1 mg Intravenous Q6H PRN    Or    HYDROmorphone (Dilaudid) 1 MG/ML injection 0.2 mg  0.2 mg Intravenous Q6H PRN    Or    HYDROmorphone (Dilaudid) 1 MG/ML injection 0.4 mg  0.4 mg Intravenous Q6H PRN    lidocaine-menthol 4-1 % patch 1 patch  1 patch Transdermal Daily    amiodarone (Pacerone) tab 100 mg  100 mg Oral BID    aspirin DR tab 81 mg  81 mg Oral QOD    carvedilol (Coreg) tab 6.25 mg  6.25 mg Oral BID with meals    clopidogrel (Plavix) tab 75 mg  75 mg Oral Daily    insulin degludec (Tresiba) 100 units/mL flextouch 20 Units  20 Units Subcutaneous Nightly    [Held by provider] Lanthanum Carbonate (FOSRENOL) chewable tab 1,000 mg - Pt's Supply  1,000 mg Oral TID CC    levothyroxine (Synthroid) tab 200 mcg  200 mcg Oral Before breakfast    losartan (Cozaar) tab 25 mg  25 mg Oral Daily    pantoprazole (Protonix) DR tab 40 mg  40 mg Oral BID AC    rosuvastatin (Crestor) tab 10 mg  10 mg Oral Nightly    glucose (Dex4) 15 GM/59ML oral liquid 15 g  15 g Oral Q15 Min PRN    Or    glucose (Glutose) 40% oral gel 15 g  15 g Oral Q15 Min PRN    Or    glucose-vitamin C (Dex-4) chewable tab 4 tablet  4 tablet Oral Q15 Min PRN    Or    dextrose 50% injection 50 mL  50 mL Intravenous Q15 Min PRN    Or    glucose  (Dex4) 15 GM/59ML oral liquid 30 g  30 g Oral Q15 Min PRN    Or    glucose (Glutose) 40% oral gel 30 g  30 g Oral Q15 Min PRN    Or    glucose-vitamin C (Dex-4) chewable tab 8 tablet  8 tablet Oral Q15 Min PRN    insulin aspart (NovoLOG) 100 Units/mL FlexPen 2-10 Units  2-10 Units Subcutaneous TID AC and HS

## 2025-05-17 NOTE — PROGRESS NOTES
[facility] Hospitalist note    PCP: Gee Jimenez MD    Chief Complaint:  F/u cardiac arrest    SUBJECTIVE:  No acute events, denies n/v, chest pain or dyspnea.  No new complaints endorsed.     OBJECTIVE:  Temp:  [97.6 °F (36.4 °C)-98 °F (36.7 °C)] 97.6 °F (36.4 °C)  Pulse:  [59-67] 62  Resp:  [12-18] 12  BP: (116-158)/(51-61) 122/52  SpO2:  [92 %-97 %] 95 %    Intake/Output:    Intake/Output Summary (Last 24 hours) at 5/17/2025 0941  Last data filed at 5/17/2025 0750  Gross per 24 hour   Intake 240 ml   Output 0 ml   Net 240 ml       Last 3 Weights   05/17/25 0407 199 lb 4.7 oz (90.4 kg)   05/16/25 0611 203 lb 7.8 oz (92.3 kg)   05/15/25 0505 201 lb 15.1 oz (91.6 kg)   05/14/25 0452 199 lb 8.3 oz (90.5 kg)   05/13/25 1926 195 lb 15.8 oz (88.9 kg)   02/16/25 0555 203 lb 0.7 oz (92.1 kg)   02/15/25 1402 210 lb 14.4 oz (95.7 kg)   02/15/25 1118 212 lb 1.3 oz (96.2 kg)   02/09/25 0500 212 lb 1.3 oz (96.2 kg)   02/08/25 1431 212 lb 8.4 oz (96.4 kg)   02/08/25 0600 213 lb (96.6 kg)   02/06/25 2143 215 lb 6.2 oz (97.7 kg)       Exam  Gen: No acute distress  HEENT: anicteric sclera, MMM  Pulm: Lungs clear, normal respiratory effort  CV: Heart with regular rate and rhythm, no peripheral edema  Abd: Abdomen soft, nontender, nondistended  MSK: Moves extremities spontaneously.  Skin: no rashes or lesions  Neuro: A&OX3, no focal deficits    Data Review:         Labs:     Recent Labs   Lab 05/14/25  0503 05/15/25  1417   WBC 4.2 4.0   HGB 8.2* 7.4*   .3* 107.2*   PLT 55.0* 43.0*   NE  --  3.04   LYMABS  --  0.54*       Recent Labs   Lab 05/14/25  0503 05/15/25  0610 05/17/25  0503    137 141   K 3.6 4.1 3.8    101 104   CO2 28.0 23.0 24.0   BUN 51* 58* 50*   CREATSERUM 4.95* 6.61* 5.83*   CA 9.1 9.2 9.4   MG  --   --  2.5   * 143* 117*       Recent Labs   Lab 05/14/25  0503   ALT 79*   AST 45*   ALB 3.5       No results for input(s): \"TROP\", \"CK\", \"PBNP\", \"PCT\" in the last 168 hours.    No results for  input(s): \"CRP\", \"NEELAM\", \"LDH\", \"DDIMER\" in the last 168 hours.    Recent Labs   Lab 05/16/25  1225 05/16/25  1737 05/16/25  2109 05/16/25  2157 05/17/25  0526   PGLU 150* 157* 177* 168* 129*       Meds:   Scheduled Medication:Scheduled Medications[1]  Continuous Infusing Medication:Medication Infusions[2]  PRN Medication:PRN Medications[3]       Microbiology:    No results found for this visit on 05/13/25.    Lab Results   Component Value Date    COVID19 Not Detected 04/16/2024    COVID19 Not Detected 03/17/2024    COVID19 Not Detected 04/05/2023        Assessment/Plan:     81 yo man with h/o tavr, cad, dm who presented as osh transfer for continued management in setting of cardiac arrest     Cardiac arrest  Vtach/nsvt  EF 40%, ICM  CAD with recent angio, h/o stents  - cardiology to consult, possible defibrillator placement-- patient hesitant, EP to evaluate, possibly may just have life vest in interim.   -cont amio, coreg, cozaar, asa, plavix  - prn pain meds for chest wall pain- will give norco, lidocaine patches.     ESRD on HD  - HD on t, th, sat. Consulted nephro     Thrombocytopenia  HCV cirrhosis, h/o esophageal banding  - stable in 50s. Dropped as low as 30s at OSH  - per notes, had been seen hematology before and felt to have the thrombocytopenia 2/2 cirrhosis     Resp failure in setting of cardiac arrest  Treated for PNA  - continue IS, NC     Subcutaneous heparin, SCDs  Dispo: pending ICD / vs life vest arrangement    DO Joselito Nino Hospitalist         [1]    epoetin eunice  10,000 Units Intravenous Once    [Held by provider] heparin  5,000 Units Subcutaneous 2 times per day    lidocaine-menthol  1 patch Transdermal Daily    amiodarone  100 mg Oral BID    aspirin  81 mg Oral QOD    carvedilol  6.25 mg Oral BID with meals    clopidogrel  75 mg Oral Daily    insulin degludec  20 Units Subcutaneous Nightly    [Held by provider] Lanthanum Carbonate  1,000 mg Oral TID CC    levothyroxine  200 mcg Oral  Before breakfast    losartan  25 mg Oral Daily    pantoprazole  40 mg Oral BID AC    rosuvastatin  10 mg Oral Nightly    insulin aspart  2-10 Units Subcutaneous TID AC and HS   [2] [3]   acetaminophen    polyethylene glycol (PEG 3350)    sennosides    bisacodyl    fleet enema    ondansetron    metoclopramide    HYDROcodone-acetaminophen **OR** HYDROcodone-acetaminophen    HYDROmorphone **OR** HYDROmorphone **OR** HYDROmorphone    glucose **OR** glucose **OR** glucose-vitamin C **OR** dextrose **OR** glucose **OR** glucose **OR** glucose-vitamin C

## 2025-05-18 LAB
GLUCOSE BLD-MCNC: 134 MG/DL (ref 70–99)
GLUCOSE BLD-MCNC: 154 MG/DL (ref 70–99)
GLUCOSE BLD-MCNC: 177 MG/DL (ref 70–99)
GLUCOSE BLD-MCNC: 189 MG/DL (ref 70–99)

## 2025-05-18 RX ORDER — CHLORHEXIDINE GLUCONATE 40 MG/ML
SOLUTION TOPICAL
Status: COMPLETED | OUTPATIENT
Start: 2025-05-19 | End: 2025-05-19

## 2025-05-18 RX ORDER — SODIUM CHLORIDE 9 MG/ML
INJECTION, SOLUTION INTRAVENOUS
Status: ACTIVE | OUTPATIENT
Start: 2025-05-19 | End: 2025-05-19

## 2025-05-18 NOTE — PROGRESS NOTES
[facility]    Division of Cardiology    Consultation Note      Jonny Haque Patient Status:  Hospitalized    4/15/1945 MRN [id number]   Location [facility] 8NE-A Attending Ursula Saucedo MD   Hosp Day # 5 PCP Gee Jimenez MD   Primary Cards        Impression:  VT   Possibly ischemic  Possibly scar based  Only one episode clearly correlates to electrolyte abnormalities/hyperkalemia  Has had several episodes of VT (2025 and then 5/2025 x 2)  CAD  MV PCI in the past (LM, LAD, OM/CX)  PCI to LAD recently in the setting of VT, unclear if lesion related ( believes it wasn't the \"culprit\" for the arrhythmia).  Hx aortic stenosis  S/P TAVR   Echo with good valve function (mean gradient 9mmHg)  HFrEF/systolic dysfunction  EF at best up to 60% range  Echo 2025 with EF 40% range  Ejection fraction has been up and down  Limited ability to titrate \"GDMT\" given comorbidities  ESRD  HD dependent  Anemia  Hx GIB  Has only partially tolerated DAPT in the past  Has had to hold DAPT/plavix several times in the past  Cirrhosis  Varices noted in some reports  PAF  On amiodarone for rhythm control  Has not tolerated AC due to GIB and SDH  DM  HTN  HX SDH    Plan:  DAPT  Appreciate EP recs. Plan for ICD afternoon of . Will need plt transfusion if <50 on Monday morning   Follow CBC/platelets.  PT/OT/OOB/ambulation.          Inpatient Medications:    Lanthanum Carbonate  1,000 mg Oral TID CC    [Held by provider] heparin  5,000 Units Subcutaneous 2 times per day    lidocaine-menthol  1 patch Transdermal Daily    amiodarone  100 mg Oral BID    aspirin  81 mg Oral QOD    carvedilol  6.25 mg Oral BID with meals    clopidogrel  75 mg Oral Daily    insulin degludec  20 Units Subcutaneous Nightly    levothyroxine  200 mcg Oral Before breakfast    losartan  25 mg Oral Daily    pantoprazole  40 mg Oral BID AC    rosuvastatin  10 mg Oral Nightly    insulin aspart  2-10  Units Subcutaneous TID AC and HS            Vitals:   /71 (BP Location: Right arm)   Pulse 64   Temp 97.9 °F (36.6 °C) (Oral)   Resp 18   Wt 199 lb 15.3 oz (90.7 kg)   SpO2 98%   BMI 27.89 kg/m²   Wt Readings from Last 3 Encounters:   05/18/25 199 lb 15.3 oz (90.7 kg)   02/16/25 203 lb 0.7 oz (92.1 kg)   02/09/25 212 lb 1.3 oz (96.2 kg)       Examination:  General: Well developed, well nourished male.  No distress.  Neck:  No JVD.  Normal ROM.  Cardiac: Regular rate and rhythm.  No murmurs, rubs, or gallops.   Lungs: Clear to ascultation bilaterally.    Abdomen: Soft.  Non-distended.  Non-tender.  Bowel sounds present.    Extremities: Warm, no significant edema.  Palpable pulses.  Neurologic: Alert and oriented.  No gross deficits.  Integument:  No visible rashes identified.  Psych: The patient's affect is appropriate.        Labs:   Recent Labs   Lab 05/14/25  0503 05/15/25  1417   WBC 4.2 4.0   HGB 8.2* 7.4*   .3* 107.2*   PLT 55.0* 43.0*     Recent Labs   Lab 05/14/25  0503 05/15/25  0610 05/17/25  0503    137 141   K 3.6 4.1 3.8    101 104   CO2 28.0 23.0 24.0   BUN 51* 58* 50*   CREATSERUM 4.95* 6.61* 5.83*   * 143* 117*   CA 9.1 9.2 9.4   MG  --   --  2.5     Recent Labs   Lab 05/14/25  0503   ALT 79*   AST 45*   ALB 3.5     Lab Results   Component Value Date    A1C 7.0 (H) 08/03/2024    A1C 6.0 (H) 03/17/2024    A1C 5.4 10/25/2022     Lab Results   Component Value Date    CHOLEST 199 02/15/2025    HDL 31 (L) 02/15/2025     (H) 02/15/2025    TRIG 291 (H) 02/15/2025     HERB Meraz, 05/18/25, 9:10 AM

## 2025-05-18 NOTE — PLAN OF CARE
Alert and oriented x1-2 on tele monitor hr 60's sinus rhythm. Norco 2 tabs given prn for pin with relief. Kept clean and dry at all times. All needs attended and will continue to monitor. Call light within reach.  Problem: Diabetes/Glucose Control  Goal: Glucose maintained within prescribed range  Description: INTERVENTIONS:- Monitor Blood Glucose as ordered- Assess for signs and symptoms of hyperglycemia and hypoglycemia- Administer ordered medications to maintain glucose within target range- Assess barriers to adequate nutritional intake and initiate nutrition consult as needed- Instruct patient on self management of diabetes  Outcome: Progressing     Problem: Patient/Family Goals  Goal: Patient/Family Long Term Goal  Description: Patient's Long Term Goal: Stay out of hospital. Interventions: Follow up appointments, medication compliance- See additional Care Plan goals for specific interventions  Outcome: Progressing  Goal: Patient/Family Short Term Goal  Description: Patient's Short Term Goal: Discharge. Interventions: ICD placement - See additional Care Plan goals for specific interventions  Outcome: Progressing     Problem: CARDIOVASCULAR - ADULT  Goal: Maintains optimal cardiac output and hemodynamic stability  Description: INTERVENTIONS:- Monitor vital signs, rhythm, and trends- Monitor for bleeding, hypotension and signs of decreased cardiac output- Evaluate effectiveness of vasoactive medications to optimize hemodynamic stability- Monitor arterial and/or venous puncture sites for bleeding and/or hematoma- Assess quality of pulses, skin color and temperature- Assess for signs of decreased coronary artery perfusion - ex. Angina- Evaluate fluid balance, assess for edema, trend weights  Outcome: Progressing  Goal: Absence of cardiac arrhythmias or at baseline  Description: INTERVENTIONS:- Continuous cardiac monitoring, monitor vital signs, obtain 12 lead EKG if indicated- Evaluate effectiveness of  antiarrhythmic and heart rate control medications as ordered- Initiate emergency measures for life threatening arrhythmias- Monitor electrolytes and administer replacement therapy as ordered  Outcome: Progressing     Problem: SKIN/TISSUE INTEGRITY - ADULT  Goal: Skin integrity remains intact  Description: INTERVENTIONS- Assess and document risk factors for pressure ulcer development- Assess and document skin integrity- Monitor for areas of redness and/or skin breakdown- Initiate interventions, skin care algorithm/standards of care as needed  Outcome: Progressing

## 2025-05-18 NOTE — PROGRESS NOTES
[facility] Hospitalist note    PCP: Gee Jimenez MD    Chief Complaint:  F/u cardiac arrest    SUBJECTIVE:  No acute events. Denies acute anginal symptoms.     OBJECTIVE:  Temp:  [97.4 °F (36.3 °C)-98.5 °F (36.9 °C)] 97.9 °F (36.6 °C)  Pulse:  [62-70] 64  Resp:  [16-18] 18  BP: (118-152)/(43-71) 139/71  SpO2:  [97 %-100 %] 98 %    Intake/Output:    Intake/Output Summary (Last 24 hours) at 5/18/2025 1105  Last data filed at 5/18/2025 1014  Gross per 24 hour   Intake 120 ml   Output 1500 ml   Net -1380 ml       Last 3 Weights   05/18/25 0439 199 lb 15.3 oz (90.7 kg)   05/17/25 0407 199 lb 4.7 oz (90.4 kg)   05/16/25 0611 203 lb 7.8 oz (92.3 kg)   05/15/25 0505 201 lb 15.1 oz (91.6 kg)   05/14/25 0452 199 lb 8.3 oz (90.5 kg)   05/13/25 1926 195 lb 15.8 oz (88.9 kg)   02/16/25 0555 203 lb 0.7 oz (92.1 kg)   02/15/25 1402 210 lb 14.4 oz (95.7 kg)   02/15/25 1118 212 lb 1.3 oz (96.2 kg)   02/09/25 0500 212 lb 1.3 oz (96.2 kg)   02/08/25 1431 212 lb 8.4 oz (96.4 kg)   02/08/25 0600 213 lb (96.6 kg)   02/06/25 2143 215 lb 6.2 oz (97.7 kg)       Exam  Gen: No acute distress  HEENT: anicteric sclera, MMM  Pulm: Lungs clear, normal respiratory effort  CV: Heart with regular rate and rhythm, no peripheral edema  Abd: Abdomen soft, nontender, nondistended  MSK: Moves extremities spontaneously.  Skin: no rashes or lesions  Neuro: A&OX3, no focal deficits    Data Review:         Labs:     Recent Labs   Lab 05/14/25  0503 05/15/25  1417   WBC 4.2 4.0   HGB 8.2* 7.4*   .3* 107.2*   PLT 55.0* 43.0*   NE  --  3.04   LYMABS  --  0.54*       Recent Labs   Lab 05/14/25  0503 05/15/25  0610 05/17/25  0503    137 141   K 3.6 4.1 3.8    101 104   CO2 28.0 23.0 24.0   BUN 51* 58* 50*   CREATSERUM 4.95* 6.61* 5.83*   CA 9.1 9.2 9.4   MG  --   --  2.5   * 143* 117*       Recent Labs   Lab 05/14/25  0503   ALT 79*   AST 45*   ALB 3.5       No results for input(s): \"TROP\", \"CK\", \"PBNP\", \"PCT\" in the last 168  hours.    No results for input(s): \"CRP\", \"NEELAM\", \"LDH\", \"DDIMER\" in the last 168 hours.    Recent Labs   Lab 05/17/25  0526 05/17/25  1220 05/17/25  1737 05/17/25  2046 05/18/25  0513   PGLU 129* 144* 191* 187* 134*       Meds:   Scheduled Medication:Scheduled Medications[1]  Continuous Infusing Medication:Medication Infusions[2]  PRN Medication:PRN Medications[3]       Microbiology:    No results found for this visit on 05/13/25.    Lab Results   Component Value Date    COVID19 Not Detected 04/16/2024    COVID19 Not Detected 03/17/2024    COVID19 Not Detected 04/05/2023        Assessment/Plan:     79 yo man with h/o tavr, cad, dm who presented as osh transfer for continued management in setting of cardiac arrest     Cardiac arrest  Vtach/nsvt  EF 40%, ICM  CAD with recent angio, h/o stents  - cardiology to consult, possible defibrillator placement-- patient hesitant, EP to evaluate, possibly may just have life vest in interim.   -cont amio, coreg, cozaar, asa, plavix  - prn pain meds for chest wall pain- will give norco, lidocaine patches.     ESRD on HD  - HD on t, th, sat. Consulted nephro     Thrombocytopenia  HCV cirrhosis, h/o esophageal banding  - stable in 50s. Dropped as low as 30s at OSH  - per notes, had been seen hematology before and felt to have the thrombocytopenia 2/2 cirrhosis     Resp failure in setting of cardiac arrest  Treated for PNA  - continue IS, NC     Subcutaneous heparin, SCDs  Dispo: pending ICD placement tentatively tomorrow - will monitor plts closely     DO Joselito [facility] Hospitalist           [1]    Lanthanum Carbonate  1,000 mg Oral TID CC    [Held by provider] heparin  5,000 Units Subcutaneous 2 times per day    lidocaine-menthol  1 patch Transdermal Daily    amiodarone  100 mg Oral BID    aspirin  81 mg Oral QOD    carvedilol  6.25 mg Oral BID with meals    clopidogrel  75 mg Oral Daily    insulin degludec  20 Units Subcutaneous Nightly    levothyroxine  200 mcg Oral Before  breakfast    losartan  25 mg Oral Daily    pantoprazole  40 mg Oral BID AC    rosuvastatin  10 mg Oral Nightly    insulin aspart  2-10 Units Subcutaneous TID AC and HS   [2] [3]   acetaminophen    polyethylene glycol (PEG 3350)    sennosides    bisacodyl    fleet enema    ondansetron    metoclopramide    HYDROcodone-acetaminophen **OR** HYDROcodone-acetaminophen    HYDROmorphone **OR** HYDROmorphone **OR** HYDROmorphone    glucose **OR** glucose **OR** glucose-vitamin C **OR** dextrose **OR** glucose **OR** glucose **OR** glucose-vitamin C

## 2025-05-18 NOTE — PLAN OF CARE
Patient alert and oriented x 2. On RA. Total care; shaila lift for OOB. NSR on tele. Incontinent of bowel/bladder. No complaints of shortness of breath. Complains of chest pain; lidocaine patch applied. POC: ICD placement tomorrow, monitor labs, pain control. Call light within reach. Fall precautions in place.     Problem: Diabetes/Glucose Control  Goal: Glucose maintained within prescribed range  Description: INTERVENTIONS:- Monitor Blood Glucose as ordered- Assess for signs and symptoms of hyperglycemia and hypoglycemia- Administer ordered medications to maintain glucose within target range- Assess barriers to adequate nutritional intake and initiate nutrition consult as needed- Instruct patient on self management of diabetes  Outcome: Progressing     Problem: Patient/Family Goals  Goal: Patient/Family Long Term Goal  Description: Patient's Long Term Goal: Stay out of hospital. Interventions: Follow up appointments, medication compliance- See additional Care Plan goals for specific interventions  Outcome: Progressing  Goal: Patient/Family Short Term Goal  Description: Patient's Short Term Goal: Discharge. Interventions: ICD placement - See additional Care Plan goals for specific interventions  Outcome: Progressing     Problem: CARDIOVASCULAR - ADULT  Goal: Maintains optimal cardiac output and hemodynamic stability  Description: INTERVENTIONS:- Monitor vital signs, rhythm, and trends- Monitor for bleeding, hypotension and signs of decreased cardiac output- Evaluate effectiveness of vasoactive medications to optimize hemodynamic stability- Monitor arterial and/or venous puncture sites for bleeding and/or hematoma- Assess quality of pulses, skin color and temperature- Assess for signs of decreased coronary artery perfusion - ex. Angina- Evaluate fluid balance, assess for edema, trend weights  Outcome: Progressing  Goal: Absence of cardiac arrhythmias or at baseline  Description: INTERVENTIONS:- Continuous cardiac  monitoring, monitor vital signs, obtain 12 lead EKG if indicated- Evaluate effectiveness of antiarrhythmic and heart rate control medications as ordered- Initiate emergency measures for life threatening arrhythmias- Monitor electrolytes and administer replacement therapy as ordered  Outcome: Progressing     Problem: SKIN/TISSUE INTEGRITY - ADULT  Goal: Skin integrity remains intact  Description: INTERVENTIONS- Assess and document risk factors for pressure ulcer development- Assess and document skin integrity- Monitor for areas of redness and/or skin breakdown- Initiate interventions, skin care algorithm/standards of care as needed  Outcome: Progressing

## 2025-05-19 ENCOUNTER — APPOINTMENT (OUTPATIENT)
Dept: INTERVENTIONAL RADIOLOGY/VASCULAR | Facility: HOSPITAL | Age: 80
End: 2025-05-19
Attending: INTERNAL MEDICINE
Payer: MEDICARE

## 2025-05-19 LAB
ANION GAP SERPL CALC-SCNC: 12 MMOL/L (ref 0–18)
BUN BLD-MCNC: 48 MG/DL (ref 9–23)
CALCIUM BLD-MCNC: 9.4 MG/DL (ref 8.7–10.6)
CHLORIDE SERPL-SCNC: 103 MMOL/L (ref 98–112)
CO2 SERPL-SCNC: 25 MMOL/L (ref 21–32)
CREAT BLD-MCNC: 6.12 MG/DL (ref 0.7–1.3)
EGFRCR SERPLBLD CKD-EPI 2021: 9 ML/MIN/1.73M2 (ref 60–?)
ERYTHROCYTE [DISTWIDTH] IN BLOOD BY AUTOMATED COUNT: 17.2 %
GLUCOSE BLD-MCNC: 129 MG/DL (ref 70–99)
GLUCOSE BLD-MCNC: 133 MG/DL (ref 70–99)
GLUCOSE BLD-MCNC: 136 MG/DL (ref 70–99)
GLUCOSE BLD-MCNC: 146 MG/DL (ref 70–99)
GLUCOSE BLD-MCNC: 167 MG/DL (ref 70–99)
HCT VFR BLD AUTO: 25.4 % (ref 39–53)
HGB BLD-MCNC: 8.3 G/DL (ref 13–17.5)
MAGNESIUM SERPL-MCNC: 2.4 MG/DL (ref 1.6–2.6)
MCH RBC QN AUTO: 34.7 PG (ref 26–34)
MCHC RBC AUTO-ENTMCNC: 32.7 G/DL (ref 31–37)
MCV RBC AUTO: 106.3 FL (ref 80–100)
OSMOLALITY SERPL CALC.SUM OF ELEC: 304 MOSM/KG (ref 275–295)
PLATELET # BLD AUTO: 54 10(3)UL (ref 150–450)
PLATELETS.RETICULATED NFR BLD AUTO: 3.4 % (ref 0–7)
POTASSIUM SERPL-SCNC: 3.8 MMOL/L (ref 3.5–5.1)
RBC # BLD AUTO: 2.39 X10(6)UL (ref 3.8–5.8)
SODIUM SERPL-SCNC: 140 MMOL/L (ref 136–145)
WBC # BLD AUTO: 5.1 X10(3) UL (ref 4–11)

## 2025-05-19 PROCEDURE — 0JH608Z INSERTION OF DEFIBRILLATOR GENERATOR INTO CHEST SUBCUTANEOUS TISSUE AND FASCIA, OPEN APPROACH: ICD-10-PCS | Performed by: INTERNAL MEDICINE

## 2025-05-19 PROCEDURE — B51N1ZZ FLUOROSCOPY OF LEFT UPPER EXTREMITY VEINS USING LOW OSMOLAR CONTRAST: ICD-10-PCS | Performed by: INTERNAL MEDICINE

## 2025-05-19 PROCEDURE — 02H63KZ INSERTION OF DEFIBRILLATOR LEAD INTO RIGHT ATRIUM, PERCUTANEOUS APPROACH: ICD-10-PCS | Performed by: INTERNAL MEDICINE

## 2025-05-19 PROCEDURE — 02HK3KZ INSERTION OF DEFIBRILLATOR LEAD INTO RIGHT VENTRICLE, PERCUTANEOUS APPROACH: ICD-10-PCS | Performed by: INTERNAL MEDICINE

## 2025-05-19 PROCEDURE — 99232 SBSQ HOSP IP/OBS MODERATE 35: CPT | Performed by: INTERNAL MEDICINE

## 2025-05-19 RX ORDER — WATER 10 ML/10ML
INJECTION INTRAMUSCULAR; INTRAVENOUS; SUBCUTANEOUS
Status: COMPLETED
Start: 2025-05-19 | End: 2025-05-19

## 2025-05-19 RX ORDER — VANCOMYCIN HYDROCHLORIDE 1 G/20ML
INJECTION, POWDER, LYOPHILIZED, FOR SOLUTION INTRAVENOUS
Status: COMPLETED
Start: 2025-05-19 | End: 2025-05-19

## 2025-05-19 RX ORDER — ONDANSETRON 2 MG/ML
4 INJECTION INTRAMUSCULAR; INTRAVENOUS EVERY 6 HOURS PRN
Status: DISCONTINUED | OUTPATIENT
Start: 2025-05-19 | End: 2025-05-21

## 2025-05-19 RX ORDER — MIDAZOLAM HYDROCHLORIDE 1 MG/ML
INJECTION INTRAMUSCULAR; INTRAVENOUS
Status: COMPLETED
Start: 2025-05-19 | End: 2025-05-19

## 2025-05-19 RX ORDER — ALBUMIN (HUMAN) 12.5 G/50ML
25 SOLUTION INTRAVENOUS
Status: DISCONTINUED | OUTPATIENT
Start: 2025-05-20 | End: 2025-05-20

## 2025-05-19 RX ORDER — LIDOCAINE HYDROCHLORIDE 10 MG/ML
INJECTION, SOLUTION EPIDURAL; INFILTRATION; INTRACAUDAL; PERINEURAL
Status: COMPLETED
Start: 2025-05-19 | End: 2025-05-19

## 2025-05-19 NOTE — PLAN OF CARE
Patient alert and oriented x 2-3. On RA. Up with max assist; [employer] life for OOB. NSR on tele. Incontinent of bowel/bladder. No complaints of shortness of breath. POC: ICD placement today, HD as ordered. Call light within reach. Fall precautions in place.     Problem: Diabetes/Glucose Control  Goal: Glucose maintained within prescribed range  Description: INTERVENTIONS:- Monitor Blood Glucose as ordered- Assess for signs and symptoms of hyperglycemia and hypoglycemia- Administer ordered medications to maintain glucose within target range- Assess barriers to adequate nutritional intake and initiate nutrition consult as needed- Instruct patient on self management of diabetes  Outcome: Progressing     Problem: Patient/Family Goals  Goal: Patient/Family Long Term Goal  Description: Patient's Long Term Goal: Stay out of hospital. Interventions: Follow up appointments, medication compliance- See additional Care Plan goals for specific interventions  Outcome: Progressing  Goal: Patient/Family Short Term Goal  Description: Patient's Short Term Goal: Discharge. Interventions: ICD placement - See additional Care Plan goals for specific interventions  Outcome: Progressing     Problem: CARDIOVASCULAR - ADULT  Goal: Maintains optimal cardiac output and hemodynamic stability  Description: INTERVENTIONS:- Monitor vital signs, rhythm, and trends- Monitor for bleeding, hypotension and signs of decreased cardiac output- Evaluate effectiveness of vasoactive medications to optimize hemodynamic stability- Monitor arterial and/or venous puncture sites for bleeding and/or hematoma- Assess quality of pulses, skin color and temperature- Assess for signs of decreased coronary artery perfusion - ex. Angina- Evaluate fluid balance, assess for edema, trend weights  Outcome: Progressing  Goal: Absence of cardiac arrhythmias or at baseline  Description: INTERVENTIONS:- Continuous cardiac monitoring, monitor vital signs, obtain 12 lead EKG if  indicated- Evaluate effectiveness of antiarrhythmic and heart rate control medications as ordered- Initiate emergency measures for life threatening arrhythmias- Monitor electrolytes and administer replacement therapy as ordered  Outcome: Progressing     Problem: SKIN/TISSUE INTEGRITY - ADULT  Goal: Skin integrity remains intact  Description: INTERVENTIONS- Assess and document risk factors for pressure ulcer development- Assess and document skin integrity- Monitor for areas of redness and/or skin breakdown- Initiate interventions, skin care algorithm/standards of care as needed  Outcome: Progressing

## 2025-05-19 NOTE — OCCUPATIONAL THERAPY NOTE
OCCUPATIONAL THERAPY TREATMENT NOTE - INPATIENT     Room Number: 8621/8621-A  Session: 1   Number of Visits to Meet Established Goals: 5    Presenting Problem: cardiac arrest    HOME SITUATION  Type of Home: House  Home Layout: Multi-level  Lives With: Son     Toilet and Equipment: Standard height toilet  Shower/Tub and Equipment: Walk-in shower     Occupation/Status: Retired  Hand Dominance: Right  Drives: Yes (driving locally to store and dialysis)  Patient Regularly Uses: Rolling walker     Prior Level of Function: Pt lives with his son who works outside the home. Pt is typically mod I with BADL and has assist with IADL. Pt has had recent falls at home. Pt only drives locally to HD.     ASSESSMENT   Patient demonstrates fair progress this session, goals remain in progress.    Patient continues to function below baseline with ADLs and functional transfers.   Contributing factors to remaining limitations include decreased functional strength, decreased functional reach, decreased endurance, and decreased muscular endurance.  Next session anticipate patient to progress toileting, lower body dressing, bed mobility, transfers, static sitting balance, dynamic sitting balance, static standing balance, dynamic standing balance, maintaining seated position, functional standing tolerance, and energy conservation strategies.  Occupational Therapy will continue to follow patient for duration of hospitalization.    Patient continues to benefit from continued skilled OT services: to promote return to prior level of function and safety with continuous assistance and gradual rehabilitative therapy.     History: Patient is a 80 year old male admitted on 5/13/2025 with Presenting Problem: cardiac arrest. Co-Morbidities : CHF, TAVR, ESRD on dialysis, HTN, CAD with PCI, DM2     Recent admit to [facility] with transfer: cardiac arrest with resuscitation x3 and s/p cardiac cath on 5/8     WEIGHT BEARING RESTRICTION       TREATMENT  SESSION:  Patient Start of Session: semi supine in bed    FUNCTIONAL TRANSFER ASSESSMENT  Sit to Stand: Edge of Bed  Edge of Bed: Not Tested    BED MOBILITY  Rolling: Maximum Assist  Supine to Sit : Not tested  Sit to Supine (OT): Not Tested    BALANCE ASSESSMENT     FUNCTIONAL ADL ASSESSMENT  LB Dressing Seated: Not Tested  Toileting Seated: Not Tested    ACTIVITY TOLERANCE: fair                         O2 SATURATIONS       EDUCATION PROVIDED  Patient Education : Role of Occupational Therapy; Plan of Care; Discharge Recommendations; Functional Transfer Techniques; Fall Prevention; Posture/Positioning; Energy Conservation; Proper Body Mechanics  Patient's Response to Education: Requires Further Education  Family/Caregiver Education : Role of Occupational Therapy; Plan of Care; Discharge Recommendations; Functional Transfer Techniques; Fall Prevention  Family/Caregiver's Response to Education: Verbalized Understanding    THERAPEUTIC EXERCISES  Lower Extremity      Upper Extremity Shoulder raises  Forward punches  Elbow flex/extend  Scap squeeze  Hand pumps     Position Semi supine in bed     Repetitions 10   Sets 1     Therapist comments: Pt received semi supine in bed, pleasant and cooperative for OT session. Pt performs bed level exercises to improve strength for ADLs and functional transfers.    Patient End of Session: Needs met, Call light within reach, RN aware of session/findings, All patient questions and concerns addressed, Hospital anti-slip socks, Alarm set, In bed    SUBJECTIVE  Pt pleasant and cooperative.     PAIN ASSESSMENT  Rating: Unable to rate  Location: back/chest  Management Techniques: Relaxation, Repositioning     OBJECTIVE  Precautions: Cardiac, Bed/chair alarm    AM-PAC ‘6-Clicks’ Inpatient Daily Activity Short Form  -   Putting on and taking off regular lower body clothing?: Total  -   Bathing (including washing, rinsing, drying)?: A Lot  -   Toileting, which includes using toilet, bedpan or  urinal? : Total  -   Putting on and taking off regular upper body clothing?: A Lot  -   Taking care of personal grooming such as brushing teeth?: A Lot  -   Eating meals?: A Little    AM-PAC Score:  Score: 11  Approx Degree of Impairment: 70.42%  Standardized Score (AM-PAC Scale): 29.04    PLAN  OT Device Recommendations: TBD  OT Treatment Plan: Balance activities, Energy conservation/work simplification techniques, ADL training, Functional transfer training, Endurance training, Patient/Family education, Patient/Family training, Equipment eval/education, Neuromuscluar reeducation, Compensatory technique education, Continued evaluation  Rehab Potential : Fair  Frequency: 5x/week    OT Goals:     All goals ongoing 05/19    ADL Goals  Patient will perform toileting with mod A and AE PRN  Patient will perform LB dressing with mod A and AE PRN     Functional Transfer Goals  Patient will perform bed mobility supine to sit with min A  Patient will perform bed mobility sit to supine with min A  Patient will perform toilet transfer with mod A    OT Session Time: 15 minutes  Therapeutic Exercise: 15 minutes

## 2025-05-19 NOTE — PROCEDURES
Defibrillator Implantation      History:  80 year old male with recurrent VT arrest, ischemic cardiomyopathy EF 40%, parox AF who presents for a dual chamber ICD implant. The risks (including, but not limited to, hematoma, infection, pneumothorax, tamponade, renal failure from IV dye, damage to any existing leads, myocardial infarction, stroke, and death), benefits (prevention of sudden death), and alternatives (no device) of the procedure were discussed. The patient understands and agrees to proceed.    Procedure:  The patient was brought to the electrophysiology laboratory in a fasting and nonsedated state. The left chest was prepped and draped in the usual sterile fashion. Ancef was given for antibiotic prophylaxis. 50mcg of Fentanyl and 3mg of Versed were administered by certified nursing staff and supervised directly by me from 14:31 to 15:17. 1% lidocaine was used for skin anesthesia. A left upper extremity venogram was performed by the administration of 10cc of IV dye into the arm. The cephalic, axillary, and subclavian veins were patent. An incision was made below the left clavicle with a scalpel and a pocket was created using sharp and blunt dissection. Access to the left subclavian vein was obtained two times using a micropuncture needle via the extrathoracic approach guided by the venogram. The RV lead was placed in the RV apex. The RA lead was placed in the RAA region. Lead data was evaluated as below. Pacing was performed at 10V on the leads to ensure no direct diaphragmatic or phrenic nerve stimulation. The leads were secured to the underlying fascia with 0-Ethibond suture. The pocket was irrigated with antibiotic solution. The leads were connected to the new pulse generator in an antibiotic pouch and placed in the pocket. The pocket was closed in layers with 2-0 Vicryl for the deep fascia and dermabond for the skin. The wound was dressed and the left arm was placed in a sling.    Device data:            Model Number Serial Number Sensing(mV) Impedance Pacing   Generator [place] [employer] [id number]      RA [place] [employer] [id number] 2.1 468 1.3V @ 0.4ms   RV [place] [employer] [id number] 7.1 522 0.9V @ 0.4ms       Conclusions:  Successful dual chamber defibrillator implant  Adequate RA and RV sensing and pacing thresholds  Defibrillation threshold testing was deferred      Jero Hamilton MD  Clinical Cardiac Electrophysiology  [facility]

## 2025-05-19 NOTE — PROGRESS NOTES
[facility]  Progress Note    Jonny Haque Patient Status:  Inpatient    4/15/1945 MRN [id number]   Location [facility] 8NE-A Attending Ursula Saucedo MD   Hosp Day # 6 PCP eGe Jimenez MD      Plan for ICD later today. He denies any complaints    Current Hospital Medications[1]       Physical Exam:  Vital signs: Blood pressure 146/65, pulse 61, temperature 97.5 °F (36.4 °C), temperature source Oral, resp. rate 19, weight 202 lb 2.6 oz (91.7 kg), SpO2 98%.  General: No acute distress. Alert  HEENT: Moist mucous membranes. EOM-I.   Respiratory: Clear to auscultation bilaterally.   Cardiovascular: S1, S2.  Regular rate and rhythm.   Abdomen: Soft, nontender  Neurologic: Awake  Musculoskeletal:  No swelling noted.   Integument: No lesions. No erythema.  Psychiatric: Appropriate mood and affect.  PC intact ( R neck)    Recent Labs     25  0607   WBC 5.1   HGB 8.3*   .3*   PLT 54.0*       Recent Labs     25  0503 25  0607    140   K 3.8 3.8    103   CO2 24.0 25.0   BUN 50* 48*   CREATSERUM 5.83* 6.12*   CA 9.4 9.4   MG 2.5 2.4     Imaging:  Reviewed     Impression:  ESRD due to DM; on HD TTS- continue routine schedule; HD tomorrow  Recent cardiac arrest  Anemia due to CKD; JIE w/ HD ; goal hgb 10-11  Hx of ICM; Cardiac arrest ; s/p angioplasty @ OSH; noted to have Vtach/NVST  - cardiology following ;EP consult - plan for ICD     Thank you for allowing me to participate in this patient's care. Please feel free to call me with any questions or concerns.     Teetee Montano MD  25         [1]   Current Facility-Administered Medications   Medication Dose Route Frequency    sodium chloride 0.9% infusion   Intravenous On Call    ceFAZolin (Ancef) 2g in 10mL IV syringe premix  2 g Intravenous Once    Lanthanum Carbonate (FOSRENOL) chewable tab 1,000 mg **Patient supplied**  1,000 mg Oral TID CC    [Held by provider] heparin (Porcine) 5000 UNIT/ML injection 5,000  Units  5,000 Units Subcutaneous 2 times per day    acetaminophen (Tylenol Extra Strength) tab 500 mg  500 mg Oral Q4H PRN    polyethylene glycol (PEG 3350) (Miralax) 17 g oral packet 17 g  17 g Oral Daily PRN    sennosides (Senokot) tab 17.2 mg  17.2 mg Oral Nightly PRN    bisacodyl (Dulcolax) 10 MG rectal suppository 10 mg  10 mg Rectal Daily PRN    fleet enema (Fleet) rectal enema 133 mL  1 enema Rectal Once PRN    ondansetron (Zofran) 4 MG/2ML injection 4 mg  4 mg Intravenous Q6H PRN    metoclopramide (Reglan) 5 mg/mL injection 5 mg  5 mg Intravenous Q8H PRN    HYDROcodone-acetaminophen (Norco) 5-325 MG per tab 1 tablet  1 tablet Oral Q4H PRN    Or    HYDROcodone-acetaminophen (Norco) 5-325 MG per tab 2 tablet  2 tablet Oral Q4H PRN    HYDROmorphone (Dilaudid) 1 MG/ML injection 0.1 mg  0.1 mg Intravenous Q6H PRN    Or    HYDROmorphone (Dilaudid) 1 MG/ML injection 0.2 mg  0.2 mg Intravenous Q6H PRN    Or    HYDROmorphone (Dilaudid) 1 MG/ML injection 0.4 mg  0.4 mg Intravenous Q6H PRN    lidocaine-menthol 4-1 % patch 1 patch  1 patch Transdermal Daily    amiodarone (Pacerone) tab 100 mg  100 mg Oral BID    aspirin DR tab 81 mg  81 mg Oral QOD    carvedilol (Coreg) tab 6.25 mg  6.25 mg Oral BID with meals    clopidogrel (Plavix) tab 75 mg  75 mg Oral Daily    insulin degludec (Tresiba) 100 units/mL flextouch 20 Units  20 Units Subcutaneous Nightly    levothyroxine (Synthroid) tab 200 mcg  200 mcg Oral Before breakfast    losartan (Cozaar) tab 25 mg  25 mg Oral Daily    pantoprazole (Protonix) DR tab 40 mg  40 mg Oral BID AC    rosuvastatin (Crestor) tab 10 mg  10 mg Oral Nightly    glucose (Dex4) 15 GM/59ML oral liquid 15 g  15 g Oral Q15 Min PRN    Or    glucose (Glutose) 40% oral gel 15 g  15 g Oral Q15 Min PRN    Or    glucose-vitamin C (Dex-4) chewable tab 4 tablet  4 tablet Oral Q15 Min PRN    Or    dextrose 50% injection 50 mL  50 mL Intravenous Q15 Min PRN    Or    glucose (Dex4) 15 GM/59ML oral liquid 30 g   30 g Oral Q15 Min PRN    Or    glucose (Glutose) 40% oral gel 30 g  30 g Oral Q15 Min PRN    Or    glucose-vitamin C (Dex-4) chewable tab 8 tablet  8 tablet Oral Q15 Min PRN    insulin aspart (NovoLOG) 100 Units/mL FlexPen 2-10 Units  2-10 Units Subcutaneous TID AC and HS

## 2025-05-19 NOTE — PLAN OF CARE
Assumed care of patient at 1930 from day shift RN. Pt is Aox3, resting comfortably in bed. Complains of some pain, prn pain meds given per order.  Complains of no difficulty breathing.  Lung sounds diminished. NSR on tele.  Pt is pureed, nectar thick diet, 1 to 1 feeds.  QID accuchecks. NPO at midnight for procedure.  Pt is incontinent, purewick in place.  Bed at lowest position, room cleared of clutter, bed alarm is on, and call light is within reach. POC discussed, fall precautions reviewed, and questions answered.      Problem: Diabetes/Glucose Control  Goal: Glucose maintained within prescribed range  Description: INTERVENTIONS:- Monitor Blood Glucose as ordered- Assess for signs and symptoms of hyperglycemia and hypoglycemia- Administer ordered medications to maintain glucose within target range- Assess barriers to adequate nutritional intake and initiate nutrition consult as needed- Instruct patient on self management of diabetes  Outcome: Progressing     Problem: Patient/Family Goals  Goal: Patient/Family Long Term Goal  Description: Patient's Long Term Goal: Stay out of hospital. Interventions: Follow up appointments, medication compliance- See additional Care Plan goals for specific interventions  Outcome: Progressing  Goal: Patient/Family Short Term Goal  Description: Patient's Short Term Goal: Discharge. Interventions: ICD placement - See additional Care Plan goals for specific interventions  Outcome: Progressing     Problem: CARDIOVASCULAR - ADULT  Goal: Maintains optimal cardiac output and hemodynamic stability  Description: INTERVENTIONS:- Monitor vital signs, rhythm, and trends- Monitor for bleeding, hypotension and signs of decreased cardiac output- Evaluate effectiveness of vasoactive medications to optimize hemodynamic stability- Monitor arterial and/or venous puncture sites for bleeding and/or hematoma- Assess quality of pulses, skin color and temperature- Assess for signs of decreased coronary  artery perfusion - ex. Angina- Evaluate fluid balance, assess for edema, trend weights  Outcome: Progressing  Goal: Absence of cardiac arrhythmias or at baseline  Description: INTERVENTIONS:- Continuous cardiac monitoring, monitor vital signs, obtain 12 lead EKG if indicated- Evaluate effectiveness of antiarrhythmic and heart rate control medications as ordered- Initiate emergency measures for life threatening arrhythmias- Monitor electrolytes and administer replacement therapy as ordered  Outcome: Progressing     Problem: SKIN/TISSUE INTEGRITY - ADULT  Goal: Skin integrity remains intact  Description: INTERVENTIONS- Assess and document risk factors for pressure ulcer development- Assess and document skin integrity- Monitor for areas of redness and/or skin breakdown- Initiate interventions, skin care algorithm/standards of care as needed  Outcome: Progressing

## 2025-05-19 NOTE — CM/SW NOTE
MARGARET spoke with pt's son, Ashwin, over the phone for discharge planning and [facility] choice. Agreeable to [facility] as choice #1 and [facility] as choice #2. Reserved [facility] [place]. Will await to submit for insurance authorization, most likely will submit on Tuesday. RN updated.     Addendum: MARGARET requested DSC to submit for insurance authorization.     Rosana MEADOWS, LCSW  Discharge Planner

## 2025-05-19 NOTE — SLP NOTE
Attempted to see for follow up however patient NPO and going for ICD placement. Will continue to follow as available and appropriate.    Hailey Andrew MA, CCC-SLP  Pager [phone]

## 2025-05-19 NOTE — PROGRESS NOTES
[facility]    Division of Cardiology    Consultation Note      Jonny Haque Patient Status:  Hospitalized    4/15/1945 MRN [id number]   Location [facility] 8NE-A Attending Ursula Saucedo MD   Hosp Day # 6 PCP Gee Jimenez MD   Primary Cards        Impression:  VT   Possibly ischemic  Possibly scar based  Only one episode clearly correlates to electrolyte abnormalities/hyperkalemia  Has had several episodes of VT (2025 and then 5/2025 x 2)  CAD  MV PCI in the past (LM, LAD, OM/CX)  PCI to LAD recently in the setting of VT, unclear if lesion related ( believes it wasn't the \"culprit\" for the arrhythmia).  Hx aortic stenosis  S/P TAVR   Echo with good valve function (mean gradient 9mmHg)  HFrEF/systolic dysfunction  EF at best up to 60% range  Echo 2025 with EF 40% range  Ejection fraction has been up and down  Limited ability to titrate \"GDMT\" given comorbidities  Euvolemic by exam. Weight up 3# overnight.  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 Plan for ICD this afternoon  Follow CBC/platelets. Platelets >50 this AM. H/H stable.   PT/OT/OOB/ambulation.          Inpatient Medications:    sodium chloride   Intravenous On Call    ceFAZolin  2 g Intravenous Once    Lanthanum Carbonate  1,000 mg Oral TID CC    [Held by provider] heparin  5,000 Units Subcutaneous 2 times per day    lidocaine-menthol  1 patch Transdermal Daily    amiodarone  100 mg Oral BID    aspirin  81 mg Oral QOD    carvedilol  6.25 mg Oral BID with meals    clopidogrel  75 mg Oral  Daily    insulin degludec  20 Units Subcutaneous Nightly    levothyroxine  200 mcg Oral Before breakfast    losartan  25 mg Oral Daily    pantoprazole  40 mg Oral BID AC    rosuvastatin  10 mg Oral Nightly    insulin aspart  2-10 Units Subcutaneous TID AC and HS            Vitals:   /65 (BP Location: Left arm)   Pulse 61   Temp 97.5 °F (36.4 °C) (Oral)   Resp 19   Wt 202 lb 2.6 oz (91.7 kg)   SpO2 98%   BMI 28.20 kg/m²   Wt Readings from Last 3 Encounters:   05/19/25 202 lb 2.6 oz (91.7 kg)   02/16/25 203 lb 0.7 oz (92.1 kg)   02/09/25 212 lb 1.3 oz (96.2 kg)       Examination:  General: Well developed, well nourished male.  No distress.  Neck:  No JVD.  Normal ROM.  Cardiac: Regular rate and rhythm.  No murmurs, rubs, or gallops.   Lungs: Clear to ascultation bilaterally.    Abdomen: Soft.  Non-distended.  Non-tender.  Bowel sounds present.    Extremities: Warm, no significant edema.  Palpable pulses.  Neurologic: Alert and oriented.  No gross deficits.  Integument:  No visible rashes identified.  Psych: The patient's affect is appropriate.        Labs:   Recent Labs   Lab 05/14/25  0503 05/15/25  1417 05/19/25  0607   WBC 4.2 4.0 5.1   HGB 8.2* 7.4* 8.3*   .3* 107.2* 106.3*   PLT 55.0* 43.0* 54.0*     Recent Labs   Lab 05/14/25  0503 05/15/25  0610 05/17/25  0503 05/19/25  0607    137 141 140   K 3.6 4.1 3.8 3.8    101 104 103   CO2 28.0 23.0 24.0 25.0   BUN 51* 58* 50* 48*   CREATSERUM 4.95* 6.61* 5.83* 6.12*   * 143* 117* 129*   CA 9.1 9.2 9.4 9.4   MG  --   --  2.5 2.4     Recent Labs   Lab 05/14/25  0503   ALT 79*   AST 45*   ALB 3.5     Lab Results   Component Value Date    A1C 7.0 (H) 08/03/2024    A1C 6.0 (H) 03/17/2024    A1C 5.4 10/25/2022     Lab Results   Component Value Date    CHOLEST 199 02/15/2025    HDL 31 (L) 02/15/2025     (H) 02/15/2025    TRIG 291 (H) 02/15/2025     Avril Schrader, APRMARCUS  5/19/2025  8:47 AM

## 2025-05-19 NOTE — CM/SW NOTE
Prior Authorization - Destination  Destination Type: Skilled nursing facility  Service Provider: [facility]  Payer Communication Destination Comments: [place] [phone]  Prior Authorization Status: Submitted/Pending      Cristina Fitch DSC

## 2025-05-19 NOTE — PROGRESS NOTES
[facility] Hospitalist note    PCP: Gee Jimenez MD    Chief Complaint:  F/u cardiac arrest    SUBJECTIVE:  No acute events. Plan for ICD placement this afternoon.     OBJECTIVE:  Temp:  [97.5 °F (36.4 °C)-98.3 °F (36.8 °C)] 97.5 °F (36.4 °C)  Pulse:  [60-62] 61  Resp:  [18-20] 19  BP: (109-160)/(52-65) 146/65  SpO2:  [94 %-100 %] 98 %    Intake/Output:    Intake/Output Summary (Last 24 hours) at 5/19/2025 1301  Last data filed at 5/19/2025 0759  Gross per 24 hour   Intake 418 ml   Output 0 ml   Net 418 ml       Last 3 Weights   05/19/25 0500 202 lb 2.6 oz (91.7 kg)   05/18/25 0439 199 lb 15.3 oz (90.7 kg)   05/17/25 0407 199 lb 4.7 oz (90.4 kg)   05/16/25 0611 203 lb 7.8 oz (92.3 kg)   05/15/25 0505 201 lb 15.1 oz (91.6 kg)   05/14/25 0452 199 lb 8.3 oz (90.5 kg)   05/13/25 1926 195 lb 15.8 oz (88.9 kg)   02/16/25 0555 203 lb 0.7 oz (92.1 kg)   02/15/25 1402 210 lb 14.4 oz (95.7 kg)   02/15/25 1118 212 lb 1.3 oz (96.2 kg)   02/09/25 0500 212 lb 1.3 oz (96.2 kg)   02/08/25 1431 212 lb 8.4 oz (96.4 kg)   02/08/25 0600 213 lb (96.6 kg)   02/06/25 2143 215 lb 6.2 oz (97.7 kg)       Exam  Gen: No acute distress  HEENT: anicteric sclera, MMM  Pulm: Lungs clear, normal respiratory effort  CV: Heart with regular rate and rhythm, no peripheral edema  Abd: Abdomen soft, nontender, nondistended  MSK: Moves extremities spontaneously.  Skin: no rashes or lesions  Neuro: A&OX3, no focal deficits    Data Review:         Labs:     Recent Labs   Lab 05/14/25  0503 05/15/25  1417 05/19/25  0607   WBC 4.2 4.0 5.1   HGB 8.2* 7.4* 8.3*   .3* 107.2* 106.3*   PLT 55.0* 43.0* 54.0*   NE  --  3.04  --    LYMABS  --  0.54*  --        Recent Labs   Lab 05/14/25  0503 05/15/25  0610 05/17/25  0503 05/19/25  0607    137 141 140   K 3.6 4.1 3.8 3.8    101 104 103   CO2 28.0 23.0 24.0 25.0   BUN 51* 58* 50* 48*   CREATSERUM 4.95* 6.61* 5.83* 6.12*   CA 9.1 9.2 9.4 9.4   MG  --   --  2.5 2.4   * 143* 117* 129*        Recent Labs   Lab 05/14/25  0503   ALT 79*   AST 45*   ALB 3.5       No results for input(s): \"TROP\", \"CK\", \"PBNP\", \"PCT\" in the last 168 hours.    No results for input(s): \"CRP\", \"NEELAM\", \"LDH\", \"DDIMER\" in the last 168 hours.    Recent Labs   Lab 05/18/25  0513 05/18/25  1218 05/18/25  1712 05/18/25  2117 05/19/25  0457   PGLU 134* 189* 154* 177* 136*       Meds:   Scheduled Medication:Scheduled Medications[1]  Continuous Infusing Medication:Medication Infusions[2]  PRN Medication:PRN Medications[3]       Microbiology:    No results found for this visit on 05/13/25.    Lab Results   Component Value Date    COVID19 Not Detected 04/16/2024    COVID19 Not Detected 03/17/2024    COVID19 Not Detected 04/05/2023        Assessment/Plan:     79 yo man with h/o tavr, cad, dm who presented as osh transfer for continued management in setting of cardiac arrest     Cardiac arrest  Vtach/nsvt  EF 40%, ICM  CAD with recent angio, h/o stents  - plan for ICD placement this afternoon  - continue GDMT as tolerated- coreg and losartan   - continue DAPT  - prn pain meds for chest wall pain     ESRD on HD  - HD on t, th, sat. Consulted nephro     Thrombocytopenia  HCV cirrhosis, h/o esophageal banding  - stable in 50s. Dropped as low as 30s at OSH  - per notes, had been seen hematology before and felt to have the thrombocytopenia 2/2 cirrhosis     Resp failure in setting of cardiac arrest  Treated for PNA  - continue IS, NC     Subcutaneous heparin, SCDs  Dispo: ICD placement today     DO Joselito Nino Hospitalist        [1]    [START ON 5/20/2025] epoetin eunice  10,000 Units Intravenous Once in dialysis    ceFAZolin  2 g Intravenous Once    Lanthanum Carbonate  1,000 mg Oral TID CC    [Held by provider] heparin  5,000 Units Subcutaneous 2 times per day    lidocaine-menthol  1 patch Transdermal Daily    amiodarone  100 mg Oral BID    aspirin  81 mg Oral QOD    carvedilol  6.25 mg Oral BID with meals    clopidogrel  75 mg Oral  Daily    insulin degludec  20 Units Subcutaneous Nightly    levothyroxine  200 mcg Oral Before breakfast    losartan  25 mg Oral Daily    pantoprazole  40 mg Oral BID AC    rosuvastatin  10 mg Oral Nightly    insulin aspart  2-10 Units Subcutaneous TID AC and HS   [2] [3]   [START ON 5/20/2025] sodium chloride **AND** [START ON 5/20/2025] albumin human    acetaminophen    polyethylene glycol (PEG 3350)    sennosides    bisacodyl    fleet enema    ondansetron    metoclopramide    HYDROcodone-acetaminophen **OR** HYDROcodone-acetaminophen    HYDROmorphone **OR** HYDROmorphone **OR** HYDROmorphone    glucose **OR** glucose **OR** glucose-vitamin C **OR** dextrose **OR** glucose **OR** glucose **OR** glucose-vitamin C

## 2025-05-19 NOTE — CM/SW NOTE
Briefly spoke with son, Ashwin, over the phone. Ashwin will call MARGARET back.     Pt going for ICD placement today. MARGARET sent HD flowsheets in [place].     Rosana MEADOWS, LCSW  Discharge Planner

## 2025-05-20 ENCOUNTER — APPOINTMENT (OUTPATIENT)
Dept: GENERAL RADIOLOGY | Facility: HOSPITAL | Age: 80
End: 2025-05-20
Attending: INTERNAL MEDICINE
Payer: MEDICARE

## 2025-05-20 LAB
ERYTHROCYTE [DISTWIDTH] IN BLOOD BY AUTOMATED COUNT: 17.3 %
GLUCOSE BLD-MCNC: 152 MG/DL (ref 70–99)
GLUCOSE BLD-MCNC: 155 MG/DL (ref 70–99)
GLUCOSE BLD-MCNC: 159 MG/DL (ref 70–99)
GLUCOSE BLD-MCNC: 199 MG/DL (ref 70–99)
HCT VFR BLD AUTO: 25.3 % (ref 39–53)
HGB BLD-MCNC: 8.2 G/DL (ref 13–17.5)
MCH RBC QN AUTO: 35 PG (ref 26–34)
MCHC RBC AUTO-ENTMCNC: 32.4 G/DL (ref 31–37)
MCV RBC AUTO: 108.1 FL (ref 80–100)
PLATELET # BLD AUTO: 54 10(3)UL (ref 150–450)
PLATELETS.RETICULATED NFR BLD AUTO: 3.5 % (ref 0–7)
RBC # BLD AUTO: 2.34 X10(6)UL (ref 3.8–5.8)
WBC # BLD AUTO: 6.6 X10(3) UL (ref 4–11)

## 2025-05-20 PROCEDURE — 71046 X-RAY EXAM CHEST 2 VIEWS: CPT | Performed by: INTERNAL MEDICINE

## 2025-05-20 PROCEDURE — 99232 SBSQ HOSP IP/OBS MODERATE 35: CPT | Performed by: INTERNAL MEDICINE

## 2025-05-20 RX ORDER — HEPARIN SODIUM 1000 [USP'U]/ML
10000 INJECTION, SOLUTION INTRAVENOUS; SUBCUTANEOUS ONCE
Status: COMPLETED | OUTPATIENT
Start: 2025-05-20 | End: 2025-05-20

## 2025-05-20 RX ORDER — ALBUMIN (HUMAN) 12.5 G/50ML
25 SOLUTION INTRAVENOUS
Status: DISCONTINUED | OUTPATIENT
Start: 2025-05-20 | End: 2025-05-21

## 2025-05-20 RX ORDER — ALPRAZOLAM 0.5 MG
0.5 TABLET ORAL 3 TIMES DAILY PRN
Qty: 9 TABLET | Refills: 0 | Status: SHIPPED | OUTPATIENT
Start: 2025-05-20 | End: 2025-05-23

## 2025-05-20 RX ORDER — HYDROCODONE BITARTRATE AND ACETAMINOPHEN 5; 325 MG/1; MG/1
1-2 TABLET ORAL EVERY 8 HOURS PRN
Qty: 6 TABLET | Refills: 0 | Status: SHIPPED | OUTPATIENT
Start: 2025-05-20 | End: 2025-05-23

## 2025-05-20 NOTE — PHYSICAL THERAPY NOTE
IP PT following. Pt occupied c transport to go for xray and then will have  HD . Will continue to follow.

## 2025-05-20 NOTE — PROGRESS NOTES
[facility]  Progress Note    Jonny Haque Patient Status:  Inpatient    4/15/1945 MRN [id number]   Location [facility] 8NE-A Attending Ursula Saucedo MD   Hosp Day # 7 PCP Gee Jimenez MD      S/p ICD yesterday, reports some soreness but otherwise feeling well.    Current Hospital Medications[1]       Physical Exam:  Vital signs: Blood pressure 104/87, pulse 60, temperature 98.2 °F (36.8 °C), temperature source Oral, resp. rate 20, weight 198 lb 10.2 oz (90.1 kg), SpO2 100%.  General: No acute distress. Alert  HEENT: Moist mucous membranes. EOM-I.   Respiratory: Clear to auscultation bilaterally.   Cardiovascular: S1, S2.  Regular rate and rhythm.   Abdomen: Soft, nontender  Neurologic: Awake  Musculoskeletal:  No edema noted.   Integument: No lesions. No erythema.  Psychiatric: Appropriate mood and affect.  PC intact ( R neck)    Recent Labs     25  0607 25  0439   WBC 5.1 6.6   HGB 8.3* 8.2*   .3* 108.1*   PLT 54.0* 54.0*       Recent Labs     25  0607      K 3.8      CO2 25.0   BUN 48*   CREATSERUM 6.12*   CA 9.4   MG 2.4       Imaging:  Reviewed     Impression:  ESRD due to DM; on HD TTS- continue routine schedule; HD today  Recent cardiac arrest  Anemia due to CKD; JIE w/ HD ; goal hgb 10-11  Hx of ICM; Cardiac arrest ; s/p angioplasty @ OSH; noted to have Vtach/NVST  - cardiology following ;EP consult - s/p ICD     Thank you for allowing me to participate in this patient's care. Please feel free to call me with any questions or concerns.     Teetee Montano MD  25       [1]   Current Facility-Administered Medications   Medication Dose Route Frequency    sodium chloride 0.9 % IV bolus 100 mL  100 mL Intravenous Q30 Min PRN    And    albumin human (Albumin) 25% injection 25 g  25 g Intravenous PRN Dialysis    epoetin eunice (Epogen, Procrit) 52489 UNIT/ML injection 10,000 Units  10,000 Units Intravenous Once in dialysis    ondansetron (Zofran) 4  MG/2ML injection 4 mg  4 mg Intravenous Q6H PRN    ceFAZolin (Ancef) 1 g in dextrose 5% 100mL IVPB-ADD  1 g Intravenous Q24H    Lanthanum Carbonate (FOSRENOL) chewable tab 1,000 mg **Patient supplied**  1,000 mg Oral TID CC    [Held by provider] heparin (Porcine) 5000 UNIT/ML injection 5,000 Units  5,000 Units Subcutaneous 2 times per day    acetaminophen (Tylenol Extra Strength) tab 500 mg  500 mg Oral Q4H PRN    polyethylene glycol (PEG 3350) (Miralax) 17 g oral packet 17 g  17 g Oral Daily PRN    sennosides (Senokot) tab 17.2 mg  17.2 mg Oral Nightly PRN    bisacodyl (Dulcolax) 10 MG rectal suppository 10 mg  10 mg Rectal Daily PRN    fleet enema (Fleet) rectal enema 133 mL  1 enema Rectal Once PRN    ondansetron (Zofran) 4 MG/2ML injection 4 mg  4 mg Intravenous Q6H PRN    metoclopramide (Reglan) 5 mg/mL injection 5 mg  5 mg Intravenous Q8H PRN    HYDROcodone-acetaminophen (Norco) 5-325 MG per tab 1 tablet  1 tablet Oral Q4H PRN    Or    HYDROcodone-acetaminophen (Norco) 5-325 MG per tab 2 tablet  2 tablet Oral Q4H PRN    HYDROmorphone (Dilaudid) 1 MG/ML injection 0.1 mg  0.1 mg Intravenous Q6H PRN    Or    HYDROmorphone (Dilaudid) 1 MG/ML injection 0.2 mg  0.2 mg Intravenous Q6H PRN    Or    HYDROmorphone (Dilaudid) 1 MG/ML injection 0.4 mg  0.4 mg Intravenous Q6H PRN    lidocaine-menthol 4-1 % patch 1 patch  1 patch Transdermal Daily    amiodarone (Pacerone) tab 100 mg  100 mg Oral BID    aspirin DR tab 81 mg  81 mg Oral QOD    carvedilol (Coreg) tab 6.25 mg  6.25 mg Oral BID with meals    clopidogrel (Plavix) tab 75 mg  75 mg Oral Daily    insulin degludec (Tresiba) 100 units/mL flextouch 20 Units  20 Units Subcutaneous Nightly    levothyroxine (Synthroid) tab 200 mcg  200 mcg Oral Before breakfast    losartan (Cozaar) tab 25 mg  25 mg Oral Daily    pantoprazole (Protonix) DR tab 40 mg  40 mg Oral BID AC    rosuvastatin (Crestor) tab 10 mg  10 mg Oral Nightly    glucose (Dex4) 15 GM/59ML oral liquid 15 g  15  g Oral Q15 Min PRN    Or    glucose (Glutose) 40% oral gel 15 g  15 g Oral Q15 Min PRN    Or    glucose-vitamin C (Dex-4) chewable tab 4 tablet  4 tablet Oral Q15 Min PRN    Or    dextrose 50% injection 50 mL  50 mL Intravenous Q15 Min PRN    Or    glucose (Dex4) 15 GM/59ML oral liquid 30 g  30 g Oral Q15 Min PRN    Or    glucose (Glutose) 40% oral gel 30 g  30 g Oral Q15 Min PRN    Or    glucose-vitamin C (Dex-4) chewable tab 8 tablet  8 tablet Oral Q15 Min PRN    insulin aspart (NovoLOG) 100 Units/mL FlexPen 2-10 Units  2-10 Units Subcutaneous TID AC and HS

## 2025-05-20 NOTE — CM/SW NOTE
Prior Authorization - Destination  Destination Type: Skilled nursing facility  Service Provider: [facility]  Payer Communication Destination Comments: [place] [phone]  Prior Authorization Status: Approved  Prior Authorization Start Date: 05/19/25 5/19 TO 5/23      Cristina Fitch DSC

## 2025-05-20 NOTE — PROGRESS NOTES
[facility]    Division of Cardiology    Consultation Note      Jonny Haque Patient Status:  Hospitalized    4/15/1945 MRN [id number]   Location [facility] 8NE-A Attending Ursual Saucedo MD   Hosp Day # 7 PCP Gee Jimenez MD   Primary Cards        Impression:  VT   Possibly ischemic  Possibly scar based  Only one episode clearly correlates to electrolyte abnormalities/hyperkalemia  Has had several episodes of VT (2025 and then 5/2025 x 2)  CAD  MV PCI in the past (LM, LAD, OM/CX)  PCI to LAD recently in the setting of VT, unclear if lesion related ( believes it wasn't the \"culprit\" for the arrhythmia).  Hx aortic stenosis  S/P TAVR   Echo with good valve function (mean gradient 9mmHg)  HFrEF/systolic dysfunction  EF at best up to 60% range  Echo 2025 with EF 40% range  Ejection fraction has been up and down  Limited ability to titrate \"GDMT\" given comorbidities  Euvolemic by exam. Weight up 3# overnight.  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 Plan for ICD this afternoon  Follow CBC/platelets. Platelets >50 this AM. H/H stable.   Ok to discharge          Inpatient Medications:    heparin  10,000 Units Intracatheter Once    epoetin eunice  10,000 Units Intravenous Once in dialysis    ceFAZolin  1 g Intravenous Q24H    Lanthanum Carbonate  1,000 mg Oral TID CC    [Held by provider] heparin  5,000 Units Subcutaneous 2 times per day    lidocaine-menthol  1 patch Transdermal Daily    amiodarone  100 mg Oral BID    aspirin  81 mg Oral QOD    carvedilol   6.25 mg Oral BID with meals    clopidogrel  75 mg Oral Daily    insulin degludec  20 Units Subcutaneous Nightly    levothyroxine  200 mcg Oral Before breakfast    losartan  25 mg Oral Daily    pantoprazole  40 mg Oral BID AC    rosuvastatin  10 mg Oral Nightly    insulin aspart  2-10 Units Subcutaneous TID AC and HS            Vitals:   /57 (BP Location: Right arm)   Pulse 74   Temp 98 °F (36.7 °C) (Oral)   Resp 18   Wt 198 lb 10.2 oz (90.1 kg)   SpO2 100%   BMI 27.70 kg/m²   Wt Readings from Last 3 Encounters:   05/20/25 198 lb 10.2 oz (90.1 kg)   02/16/25 203 lb 0.7 oz (92.1 kg)   02/09/25 212 lb 1.3 oz (96.2 kg)       Examination:  General: Well developed, well nourished male.  No distress.  Neck:  No JVD.  Normal ROM.  Cardiac: Regular rate and rhythm.  No murmurs, rubs, or gallops.   Lungs: Clear to ascultation bilaterally.    Abdomen: Soft.  Non-distended.  Non-tender.  Bowel sounds present.    Extremities: Warm, no significant edema.  Palpable pulses.  Neurologic: Alert and oriented.  No gross deficits.  Integument:  No visible rashes identified.  Psych: The patient's affect is appropriate.        Labs:   Recent Labs   Lab 05/14/25  0503 05/15/25  1417 05/19/25  0607 05/20/25  0439   WBC 4.2 4.0 5.1 6.6   HGB 8.2* 7.4* 8.3* 8.2*   .3* 107.2* 106.3* 108.1*   PLT 55.0* 43.0* 54.0* 54.0*     Recent Labs   Lab 05/14/25  0503 05/15/25  0610 05/17/25  0503 05/19/25  0607    137 141 140   K 3.6 4.1 3.8 3.8    101 104 103   CO2 28.0 23.0 24.0 25.0   BUN 51* 58* 50* 48*   CREATSERUM 4.95* 6.61* 5.83* 6.12*   * 143* 117* 129*   CA 9.1 9.2 9.4 9.4   MG  --   --  2.5 2.4     Recent Labs   Lab 05/14/25  0503   ALT 79*   AST 45*   ALB 3.5     Lab Results   Component Value Date    A1C 7.0 (H) 08/03/2024    A1C 6.0 (H) 03/17/2024    A1C 5.4 10/25/2022     Lab Results   Component Value Date    CHOLEST 199 02/15/2025    HDL 31 (L) 02/15/2025     (H) 02/15/2025    TRIG 291 (H)  02/15/2025          Satya Donald MD  General, Interventional  Structural & Endovascular  Cardiology

## 2025-05-20 NOTE — PLAN OF CARE
Problem: Diabetes/Glucose Control  Goal: Glucose maintained within prescribed range  Description: INTERVENTIONS:- Monitor Blood Glucose as ordered- Assess for signs and symptoms of hyperglycemia and hypoglycemia- Administer ordered medications to maintain glucose within target range- Assess barriers to adequate nutritional intake and initiate nutrition consult as needed- Instruct patient on self management of diabetes  Outcome: Progressing     Problem: Patient/Family Goals  Goal: Patient/Family Long Term Goal  Description: Patient's Long Term Goal: Stay out of hospital. Interventions: Follow up appointments, medication compliance- See additional Care Plan goals for specific interventions  Outcome: Progressing  Goal: Patient/Family Short Term Goal  Description: Patient's Short Term Goal: Discharge. Interventions: ICD placement - See additional Care Plan goals for specific interventions  Outcome: Progressing     Problem: CARDIOVASCULAR - ADULT  Goal: Maintains optimal cardiac output and hemodynamic stability  Description: INTERVENTIONS:- Monitor vital signs, rhythm, and trends- Monitor for bleeding, hypotension and signs of decreased cardiac output- Evaluate effectiveness of vasoactive medications to optimize hemodynamic stability- Monitor arterial and/or venous puncture sites for bleeding and/or hematoma- Assess quality of pulses, skin color and temperature- Assess for signs of decreased coronary artery perfusion - ex. Angina- Evaluate fluid balance, assess for edema, trend weights  Outcome: Progressing  Goal: Absence of cardiac arrhythmias or at baseline  Description: INTERVENTIONS:- Continuous cardiac monitoring, monitor vital signs, obtain 12 lead EKG if indicated- Evaluate effectiveness of antiarrhythmic and heart rate control medications as ordered- Initiate emergency measures for life threatening arrhythmias- Monitor electrolytes and administer replacement therapy as ordered  Outcome: Progressing     Problem:  SKIN/TISSUE INTEGRITY - ADULT  Goal: Skin integrity remains intact  Description: INTERVENTIONS- Assess and document risk factors for pressure ulcer development- Assess and document skin integrity- Monitor for areas of redness and/or skin breakdown- Initiate interventions, skin care algorithm/standards of care as needed  Outcome: Progressing

## 2025-05-20 NOTE — SLP NOTE
SPEECH DAILY NOTE - INPATIENT    ASSESSMENT & PLAN   ASSESSMENT  Pt seen for dysphagia tx to assess tolerance with recommended diet, ensure utilization of aspiration precautions, provide ongoing reassessment of swallow fxn and pt/family education. Pt undergoing dialysis when clinician entered room. Tech approved dysphagia tx session and patient willing to participate. Reviewed results and recommendations provided following recent video swallow study however question extent of  patient's understanding. Head of bed positioned fully upright to 90 degrees and patient fed po trials of pureed and nectar thick liquids via spoon. Pt instructed to utilize bolus hold and hard swallow strategies. He reported good clearing with all consistencies and no overt clinical s/s of aspiration were observed. Patient with c/o chest pain and reported as 8/10 although behavior did not match up with expressed level. Dialysis tech stated she would inform RN. Recommend patient continue present diet as safest/least restrictive at this time. Will continue to follow as per plan.     Diet Recommendations - Solids: Puree  Diet Recommendations - Liquids: Nectar thick liquids/ Mildly thick    Compensatory Strategies Recommended: Liquids via spoon (EFFORTFUL SWALLOW)  Aspiration Precautions: Upright position, 1:1 feeding, No straw  Medication Administration Recommendations: One pill at a time, Crushed in puree, Present with thickened liquid, Whole in puree    Patient Experiencing Pain: Yes  Pain Ratin  Pain Location: chest pain  Pain Control: PO meds  Nursing Aware of Pain?:  (dialysis tech stated she will inform RN)    Treatment Plan  Treatment Plan/Recommendations: Dysphagia therapy, Aspiration precautions    Interdisciplinary Communication: Disussed with other staff         GOALS  Goal #1 VFSS completed MET   Goal #2 The patient will tolerate puree consistency and mildly thick via TSPN liquids without overt signs or symptoms of aspiration with  90 % accuracy over 2-3 session(s).     In Progress   Goal #3 The patient will utilize compensatory strategies as outlined by  VFSS including Slow rate, Small bites, Small sips, No straws, Upright 90 degrees, Liquids via tsp amount only, Feed patient with 1:1 assistance 90 % of the time across 2 sessions.  In Progress   Goal #4 The patient will tolerate trial upgrade of ground/chopped consistency and thin liquids without overt signs or symptoms of aspiration with 90 % accuracy over 3-4 session(s).     In Progress   Goal #5 The patient will improve pharyngeal swallow efficiency via therapeutic dysphagia exercises x10-15 each, max assist    In progress           FOLLOW UP  Follow Up Needed (Documentation Required): Yes  SLP Follow-up Date: 05/21/25  Duration: 1 week    Session: 1    If you have any questions, please contact Hailey COMER, SLP    Hailey Andrew MA, CCC-SLP  Pager [phone]

## 2025-05-20 NOTE — DIETARY NOTE
[facility]   part of [facility]   CLINICAL NUTRITION    Jonny Haque     Admitting diagnosis:  aortic stenosis  Cardiac arrest (HCC)    Ht:  5'11'  Wt: 90.1 kg (198 lb 10.2 oz).   Body mass index is 27.7 kg/m².  IBW: 78.2kg    Wt Readings from Last 6 Encounters:   05/20/25 90.1 kg (198 lb 10.2 oz)   02/16/25 92.1 kg (203 lb 0.7 oz)   02/09/25 96.2 kg (212 lb 1.3 oz)   01/31/25 97.1 kg (214 lb)   09/17/24 96.6 kg (212 lb 15.4 oz)   08/14/24 96.6 kg (212 lb 15.4 oz)        Labs/Meds reviewed    Diet:       Procedures    Cardiac diet Cardiac; Fluid Consistency: Nectar Thick / Mildly Thick Liquids; Texture Consistency: Pureed; Is Patient on Accuchecks? Yes; Is Patient on Suicide Precautions? No     Percent Meals Eaten (last 3 days)       Date/Time Percent Meals Eaten (%)    05/17/25 0750 100 %    05/17/25 1300 0 %     Percent Meals Eaten (%): pt declined at 05/17/25 1300    05/17/25 1855 0 %    05/18/25 0842 75 %    05/18/25 1400 0 %     Percent Meals Eaten (%): pt refused at 05/18/25 1400    05/18/25 1800 75 %    05/19/25 1700 100 %              Pt chart reviewed d/t LOS.  80 year old male presented 5/13 as osh transfer for continued management in setting of cardiac arrest. S/p ICD 5/19.  Nursing notes reports Percent Meals Eaten (%): 100 % intake for last meal.  Tolerating po diet without diarrhea, emesis.  Last BM 5/13. Miralax and Senokot given.   No significant weight changes noted.     PMHx  ESRD on HD, tavr, cad, dm     Patient is at low nutrition risk at this time.    Please consult if patient status changes or nutrition issues arise.    Fany Crews RD, LDN  Clinical Nutrition  [phone]

## 2025-05-20 NOTE — PAYOR COMM NOTE
CONTINUED STAY REVIEW    Payor: [employer] MEDICARE ADV PPO  Subscriber #:  [id number]  Authorization Number: [id number]    Admit date: 5/13/25  Admit time:  7:10 PM    REVIEW DOCUMENTATION:        5/17    HOSPITALIST:   OBJECTIVE:  Temp:  [97.6 °F (36.4 °C)-98 °F (36.7 °C)] 97.6 °F (36.4 °C)  Pulse:  [59-67] 62  Resp:  [12-18] 12  BP: (116-158)/(51-61) 122/52  SpO2:  [92 %-97 %] 95 %      Assessment/Plan:      81 yo man with h/o tavr, cad, dm who presented as osh transfer for continued management in setting of cardiac arrest     Cardiac arrest  Vtach/nsvt  EF 40%, ICM  CAD with recent angio, h/o stents  - cardiology to consult, possible defibrillator placement-- patient hesitant, EP to evaluate, possibly may just have life vest in interim.   -cont amio, coreg, cozaar, asa, plavix  - prn pain meds for chest wall pain- will give norco, lidocaine patches.     ESRD on HD  - HD on t, th, sat. Consulted nephro     Thrombocytopenia  HCV cirrhosis, h/o esophageal banding  - stable in 50s. Dropped as low as 30s at OSH  - per notes, had been seen hematology before and felt to have the thrombocytopenia 2/2 cirrhosis     Resp failure in setting of cardiac arrest  Treated for PNA  - continue IS, NC     Subcutaneous heparin, SCDs  Dispo: pending ICD / vs life vest arrangement     Ted Hoff DO          CARDIOLOGY:     Impression:  VT   Possibly ischemic  Possibly scar based  Only one episode clearly correlates to electrolyte abnormalities/hyperkalemia  Has had several episodes of VT (2/2025 and then 5/2025 x 2)  CAD  MV PCI in the past (LM, LAD, OM/CX)  PCI to LAD recently in the setting of VT, unclear if lesion related ( believes it wasn't the \"culprit\" for the arrhythmia).  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 Plan for ICD afternoon of 5/19. Will need plt transfusion if <50 on Monday morning   Follow CBC/platelets.  PT/OT/OOB/ambulation.             Inpatient Medications:   Scheduled Medications    epoetin eunice  10,000 Units Intravenous Once    [Held by provider] heparin  5,000 Units Subcutaneous 2 times per day    lidocaine-menthol  1 patch Transdermal Daily    amiodarone  100 mg Oral BID    aspirin  81 mg Oral QOD    carvedilol  6.25 mg Oral BID with meals    clopidogrel  75 mg Oral Daily    insulin degludec  20 Units Subcutaneous Nightly    [Held by provider] Lanthanum Carbonate  1,000 mg Oral TID CC    levothyroxine  200 mcg Oral Before breakfast    losartan  25 mg Oral Daily    pantoprazole  40 mg Oral BID AC    rosuvastatin  10 mg Oral Nightly    insulin aspart  2-10 Units Subcutaneous TID AC and HS                   Vitals:   /52 (BP Location: Right arm)   Pulse 62   Temp 97.6 °F (36.4 °C) (Oral)   Resp 12   Wt 199 lb 4.7 oz (90.4 kg)   SpO2 95%   BMI 27.80 kg/m²       Wt Readings from Last 3 Encounters:   05/17/25 199 lb 4.7 oz (90.4 kg)   02/16/25 203 lb 0.7 oz (92.1 kg)   02/09/25 212 lb 1.3 oz (96.2 kg)         Examination:  General: Well developed, well nourished male.  No distress.  Neck:  No JVD.  Normal ROM.  Cardiac: Regular rate and rhythm.  No murmurs, rubs, or gallops.   Lungs: Clear to ascultation bilaterally.    Abdomen: Soft.  Non-distended.  Non-tender.  Bowel sounds present.    Extremities: Warm, no significant edema.  Palpable pulses.  Neurologic: Alert and oriented.  No gross deficits.  Integument:  No visible rashes identified.  Psych: The patient's  affect is appropriate.          Labs:        Recent Labs   Lab 05/14/25  0503 05/15/25  1417   WBC 4.2 4.0   HGB 8.2* 7.4*   .3* 107.2*   PLT 55.0* 43.0*            Recent Labs   Lab 05/14/25  0503 05/15/25  0610 05/17/25  0503    137 141   K 3.6 4.1 3.8    101 104   CO2 28.0 23.0 24.0   BUN 51* 58* 50*   CREATSERUM 4.95* 6.61* 5.83*   * 143* 117*   CA 9.1 9.2 9.4   MG  --   --  2.5          Recent Labs   Lab 05/14/25  0503   ALT 79*   AST 45*   ALB 3.5            Lab Results   Component Value Date     A1C 7.0 (H) 08/03/2024     A1C 6.0 (H) 03/17/2024     A1C 5.4 10/25/2022            Lab Results   Component Value Date     CHOLEST 199 02/15/2025     HDL 31 (L) 02/15/2025      (H) 02/15/2025     TRIG 291 (H) 02/15/2025         5/18      HOSPITALIST:     OBJECTIVE:  Temp:  [97.4 °F (36.3 °C)-98.5 °F (36.9 °C)] 97.9 °F (36.6 °C)  Pulse:  [62-70] 64  Resp:  [16-18] 18  BP: (118-152)/(43-71) 139/71  SpO2:  [97 %-100 %] 98 %       Assessment/Plan:      79 yo man with h/o tavr, cad, dm who presented as osh transfer for continued management in setting of cardiac arrest     Cardiac arrest  Vtach/nsvt  EF 40%, ICM  CAD with recent angio, h/o stents  - cardiology to consult, possible defibrillator placement-- patient hesitant, EP to evaluate, possibly may just have life vest in interim.   -cont amio, coreg, cozaar, asa, plavix  - prn pain meds for chest wall pain- will give norco, lidocaine patches.     ESRD on HD  - HD on t, th, sat. Consulted nephro     Thrombocytopenia  HCV cirrhosis, h/o esophageal banding  - stable in 50s. Dropped as low as 30s at OSH  - per notes, had been seen hematology before and felt to have the thrombocytopenia 2/2 cirrhosis     Resp failure in setting of cardiac arrest  Treated for PNA  - continue IS, NC     Subcutaneous heparin, SCDs  Dispo: pending ICD placement tentatively tomorrow - will monitor plts closely      Ted Hoff DO        CARDIOLOGY:        mpression:  VT   Possibly ischemic  Possibly scar based  Only one episode clearly correlates to electrolyte abnormalities/hyperkalemia  Has had several episodes of VT (2/2025 and then 5/2025 x 2)  CAD  MV PCI in the past (LM, LAD, OM/CX)  PCI to LAD recently in the setting of VT, unclear if lesion related ( believes it wasn't the \"culprit\" for the arrhythmia).  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 Plan for ICD afternoon of 5/19. Will need plt transfusion if <50 on Monday morning   Follow CBC/platelets.  PT/OT/OOB/ambulation.       Vitals:   /71 (BP Location: Right arm)   Pulse 64   Temp 97.9 °F (36.6 °C) (Oral)   Resp 18   Wt 199 lb 15.3 oz (90.7 kg)   SpO2 98%   BMI 27.89 kg/m²       Wt Readings from Last 3 Encounters:   05/18/25 199 lb 15.3 oz (90.7 kg)   02/16/25 203 lb 0.7 oz (92.1 kg)   02/09/25 212 lb 1.3 oz (96.2 kg)             5/19       HOSPITALIST:     OBJECTIVE:  Temp:  [97.5 °F (36.4 °C)-98.3 °F (36.8 °C)] 97.5 °F (36.4 °C)  Pulse:  [60-62] 61  Resp:  [18-20] 19  BP: (109-160)/(52-65) 146/65  SpO2:  [94 %-100 %] 98 %        Assessment/Plan:      81 yo man with h/o tavr, cad, dm who presented as osh transfer for continued management in setting of cardiac arrest     Cardiac arrest  Vtach/nsvt  EF 40%, ICM  CAD with recent angio, h/o stents  - plan for ICD placement this afternoon  - continue GDMT as tolerated- coreg and losartan   - continue  DAPT  - prn pain meds for chest wall pain     ESRD on HD  - HD on t, th, sat. Consulted nephro     Thrombocytopenia  HCV cirrhosis, h/o esophageal banding  - stable in 50s. Dropped as low as 30s at OSH  - per notes, had been seen hematology before and felt to have the thrombocytopenia 2/2 cirrhosis     Resp failure in setting of cardiac arrest  Treated for PNA  - continue IS, NC     Subcutaneous heparin, SCDs  Dispo: ICD placement today      Ted Hoff DO         CARDIOLOGY:     Impression:  VT   Possibly ischemic  Possibly scar based  Only one episode clearly correlates to electrolyte abnormalities/hyperkalemia  Has had several episodes of VT (2/2025 and then 5/2025 x 2)  CAD  MV PCI in the past (LM, LAD, OM/CX)  PCI to LAD recently in the setting of VT, unclear if lesion related ( believes it wasn't the \"culprit\" for the arrhythmia).  Hx aortic stenosis  S/P TAVR 2024  Echo with good valve function (mean gradient 9mmHg)  HFrEF/systolic dysfunction  EF at best up to 60% range  Echo 5/2025 with EF 40% range  Ejection fraction has been up and down  Limited ability to titrate \"GDMT\" given comorbidities  Euvolemic by exam. Weight up 3# overnight.  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 Plan for ICD this afternoon  Follow CBC/platelets. Platelets >50 this AM. H/H stable.   PT/OT/OOB/ambulation.               Recent Labs   Lab 05/14/25  0503 05/15/25  0610 05/17/25  0503 05/19/25  0607    137 141 140   K 3.6 4.1 3.8 3.8    101 104 103   CO2 28.0 23.0 24.0 25.0   BUN 51* 58* 50* 48*   CREATSERUM  4.95* 6.61* 5.83* 6.12*   * 143* 117* 129*   CA 9.1 9.2 9.4 9.4   MG  --   --  2.5 2.4             MEDICATIONS ADMINISTERED IN LAST 1 DAY:  amiodarone (Pacerone) tab 100 mg       Date Action Dose Route User    5/19/2025 2046 Given 100 mg Oral Torey Pratt RN    5/19/2025 0855 Given 100 mg Oral Yanique Alcocer RN          aspirin DR tab 81 mg       Date Action Dose Route User    5/19/2025 0855 Given 81 mg Oral Yanique Alcocer RN          carvedilol (Coreg) tab 6.25 mg       Date Action Dose Route User    5/19/2025 1736 Given 6.25 mg Oral Yanique Alcocer RN    5/19/2025 0855 Given 6.25 mg Oral Yanique Alcocer RN          clopidogrel (Plavix) tab 75 mg       Date Action Dose Route User    5/19/2025 0855 Given 75 mg Oral Yanique Alcocer RN          HYDROcodone-acetaminophen (Norco) 5-325 MG per tab 2 tablet       Date Action Dose Route User    5/20/2025 0238 Given 2 tablet Oral Torey Pratt RN    5/19/2025 2110 Given 2 tablet Oral Rosana Chaudhry RN          HYDROmorphone (Dilaudid) 1 MG/ML injection 0.4 mg       Date Action Dose Route User    5/20/2025 0602 Given 0.4 mg Intravenous Torey Pratt RN    5/19/2025 2325 Given 0.4 mg Intravenous Torey Pratt RN          insulin aspart (NovoLOG) 100 Units/mL FlexPen 2-10 Units       Date Action Dose Route User    5/20/2025 0608 Given 2 Units Subcutaneous (Right Lower Abdomen) Torey Pratt RN    5/19/2025 2036 Given 2 Units Subcutaneous (Left Lower Abdomen) Torey Pratt RN    5/19/2025 1342 Given 2 Units Subcutaneous (Right Upper Arm) Yanique Alcocer RN          insulin degludec (Tresiba) 100 units/mL flextouch 20 Units       Date Action Dose Route User    5/19/2025 2036 Given 20 Units Subcutaneous (Left Lower Abdomen) Terence, Lyliana, RN          Lanthanum Carbonate (FOSRENOL) chewable tab 1,000 mg **Patient supplied**       Date Action Dose Route User    5/19/2025 8330 Given 1,000 mg Oral Yanique Alcocer, RN           levothyroxine (Synthroid) tab 200 mcg       Date Action Dose Route User    5/20/2025 0602 Given 200 mcg Oral Torey Pratt RN          losartan (Cozaar) tab 25 mg       Date Action Dose Route User    5/19/2025 0855 Given 25 mg Oral Yanique Alcocer RN          pantoprazole (Protonix) DR tab 40 mg       Date Action Dose Route User    5/20/2025 0602 Given 40 mg Oral Torey Pratt RN    5/19/2025 1736 Given 40 mg Oral Yanique Alcocer RN          polyethylene glycol (PEG 3350) (Miralax) 17 g oral packet 17 g       Date Action Dose Route User    5/19/2025 2203 Given 17 g Oral Torey Pratt RN          rosuvastatin (Crestor) tab 10 mg       Date Action Dose Route User    5/19/2025 2046 Given 10 mg Oral Torey Pratt RN            Vitals (last day)       Date/Time Temp Pulse Resp BP SpO2 Weight O2 Device O2 Flow Rate (L/min) [facility]    05/20/25 0808 98.2 °F (36.8 °C) 60 20 104/87 100 % -- None (Room air) -- NC    05/20/25 0540 97.3 °F (36.3 °C) 61 16 104/47 89 % 198 lb 10.2 oz (90.1 kg) None (Room air) 0 L/min     05/19/25 2307 97.5 °F (36.4 °C) 60 18 112/55 98 % -- None (Room air) 0 L/min     05/19/25 2046 -- 62 -- -- 99 % -- -- -- LM    05/19/25 1914 97.6 °F (36.4 °C) 66 18 148/71 93 % -- None (Room air) 0 L/min     05/19/25 1730 -- 64 -- 132/58 96 % -- -- -- GD    05/19/25 1630 98.2 °F (36.8 °C) -- 18 -- -- -- -- -- EB    05/19/25 1630 -- 63 -- 138/64 98 % -- None (Room air) -- GD    05/19/25 1615 -- 62 -- 165/77 88 % -- None (Room air) -- GD    05/19/25 1600 -- 61 -- 123/53 99 % -- None (Room air) -- GD    05/19/25 1545 -- 60 -- 135/53 100 % -- None (Room air) -- GD    05/19/25 1257 97.5 °F (36.4 °C) -- -- -- -- -- -- -- EB    05/19/25 1257 -- 59 18 116/48 100 % -- None (Room air) -- GD    05/19/25 0759 97.5 °F (36.4 °C) 61 19 146/65 98 % -- None (Room air) -- EB    05/19/25 0635 98 °F (36.7 °C) 60 20 160/58 94 % -- None (Room air) -- MF    05/19/25 0500 -- -- -- -- -- 202 lb 2.6 oz (91.7  kg) -- --             05/18/25 2330 97.9 °F (36.6 °C) 60 19 109/52 100 % -- -- --    05/18/25 2330 -- -- -- -- -- -- None (Room air) --    05/18/25 1930 98.3 °F (36.8 °C) 62 19 137/61 100 % -- None (Room air) --    05/18/25 1619 98 °F (36.7 °C) 60 18 120/55 100 % -- None (Room air) --        05/18/25 0806 97.9 °F (36.6 °C) 64 18 139/71 98 % -- None (Room air) --    05/18/25 0439 98 °F (36.7 °C) 63 18 118/43 -- 199 lb 15.3 oz (90.7 kg) None (Room air) --    05/17/25 2310 98 °F (36.7 °C) 62 16 134/54 100 % -- None (Room air) --    05/17/25 1935 98.5 °F (36.9 °C) 70 16 146/58 99 % -- -- --       Latest Reference Range & Units 05/14/25 05:03 05/15/25 06:10   Glucose 70 - 99 mg/dL 153 (H) 143 (H)   Sodium 136 - 145 mmol/L 138 137   Potassium 3.5 - 5.1 mmol/L 3.6 4.1   Chloride 98 - 112 mmol/L 100 101   Carbon Dioxide, Total 21.0 - 32.0 mmol/L 28.0 23.0   BUN 9 - 23 mg/dL 51 (H) 58 (H)   CREATININE 0.70 - 1.30 mg/dL 4.95 (H) 6.61 (H)   CALCIUM 8.7 - 10.6 mg/dL 9.1 9.2   EGFR >=60 mL/min/1.73m2 11 (L) 8 (L)   (H): Data is abnormally high  (L): Data is abnormally low   Latest Reference Range & Units 05/14/25 05:03 05/15/25 06:10   ANION GAP 0 - 18 mmol/L 10 13   CALCULATED OSMOLALITY 275 - 295 mOsm/kg 303 (H) 303 (H)   ALKALINE PHOSPHATASE 45 - 117 U/L 237 (H)    AST (SGOT) <34 U/L 45 (H)    ALT (SGPT) 10 - 49 U/L 79 (H)    Total Bilirubin 0.2 - 1.1 mg/dL 2.6 (H)    Bilirubin, Direct <=0.3 mg/dL 1.8 (H)    (H): Data is abnormally high

## 2025-05-20 NOTE — PROGRESS NOTES
Problem: cardiac arrest, ICD placement     Data: Pt is A&Ox3. Forgetful at times.  RA. On tele. A- paced. Took pills crushed in apple sauce. Urinal at bedside. Brief. No BM. PRN miralax given. PRN norco given for pain L arm precautions Post ICD placement. Mepilex intact. No bruising, hematoma or bleeding noted.  Total. Pt turned and mepilex applied to sacrum.     Intervention: Pain management, X-ray AM post ICD placement.     Edu: VSS so far and call light within reach bed alarm on.

## 2025-05-21 VITALS
WEIGHT: 200.63 LBS | SYSTOLIC BLOOD PRESSURE: 137 MMHG | HEART RATE: 62 BPM | OXYGEN SATURATION: 95 % | BODY MASS INDEX: 28 KG/M2 | TEMPERATURE: 97 F | RESPIRATION RATE: 18 BRPM | DIASTOLIC BLOOD PRESSURE: 64 MMHG

## 2025-05-21 LAB
GLUCOSE BLD-MCNC: 139 MG/DL (ref 70–99)
GLUCOSE BLD-MCNC: 230 MG/DL (ref 70–99)

## 2025-05-21 PROCEDURE — 99232 SBSQ HOSP IP/OBS MODERATE 35: CPT | Performed by: INTERNAL MEDICINE

## 2025-05-21 RX ORDER — ALBUMIN (HUMAN) 12.5 G/50ML
25 SOLUTION INTRAVENOUS
Status: DISCONTINUED | OUTPATIENT
Start: 2025-05-21 | End: 2025-05-21

## 2025-05-21 RX ORDER — HEPARIN SODIUM 1000 [USP'U]/ML
1.5 INJECTION, SOLUTION INTRAVENOUS; SUBCUTANEOUS
Status: DISCONTINUED | OUTPATIENT
Start: 2025-05-21 | End: 2025-05-21

## 2025-05-21 NOTE — PLAN OF CARE
NURSING DISCHARGE NOTE    Pt cleared for discharge by all consults.   [facility]  Accompanied by Family member and Support staff  Belongings including [employer] supplies Taken by patient/family.    Tele and IV discontinued without any complications. Pt and son provided with discharge paperwork and educated on discharge instructions including medication changes and follow up appointments. Report called to FRANCIA Vail at [facility]. Senokot and Norco given before discharge. Left arm in sling, Mepilex in place to Left upper chest. Post ICD instructions reviewed and placed on AVS. Printed scripts for Norco and Xanax sent with paperwork to [facility], copies made for son. All questions answered.

## 2025-05-21 NOTE — CM/SW NOTE
05/21/25 1000   Discharge disposition   Expected discharge disposition subacute   Post Acute Care Provider   ([facility])   Discharge transportation [facility]     Pt cleared to discharge to [facility] today. Rosalee barnett [facility] aware of the plan. RN updated. MARGARET set up BLS transport for 1:30pm. PCS form completed/printed. MARGARET spoke to son, Ashwin, to provide update on plan. Ashwin agreeable and aware. Ashwin is bringing clothes for pt. Pt will get HD on Friday at the facility.     RN to call report: [facility] Phone [phone]     [facility]: [phone] or [phone]    Rosana BA MSW, LCSW  Discharge Planner

## 2025-05-21 NOTE — PLAN OF CARE
Received bedside report on this Pt at 0730. Pt awake, A&Ox4, can be forgetful at times. Pt is A-Paced on Tele monitor, sats greater than 92% on RA. PRN Norco administered x2 for chest pain/incisional pain, effective, see MAR and pain flow sheet.  Pt turned and repositioned in bed multiple times.  Pt to radiology for CXR from 7995-5211.  Pt had HD from 3510-8140, had 1.1L out per HD RN report.  Pt's SBP did drop into the 80s during HD, notified Dr. Montano and orders received for Albumin.  Dr. Donald came to see Pt this afternoon, said Pt can be discharged, asked him to please call Pt's son Ashwin for an update and plan.  Notified Dr. Donald of BP dropping during the HD, and AM BP meds were held.  Saw Dr. Donald again at around 1800 reminded him to call Ashwin.  Pt sat up in bed for meals, fed himself lunch and dinner. Pt has only one dose of FOSRENOL left, notified Pt's son and Dr. Montano, Young Harris, was told that Pt will received more FOSRENOL when he will be receiving HD at the [facility] after discharge.  Pt not discharged today, SW aware and Dr. Donald notified.     Problem: Diabetes/Glucose Control  Goal: Glucose maintained within prescribed range  Description: INTERVENTIONS:- Monitor Blood Glucose as ordered- Assess for signs and symptoms of hyperglycemia and hypoglycemia- Administer ordered medications to maintain glucose within target range- Assess barriers to adequate nutritional intake and initiate nutrition consult as needed- Instruct patient on self management of diabetes  Outcome: Progressing     Problem: Patient/Family Goals  Goal: Patient/Family Long Term Goal  Description: Patient's Long Term Goal: Stay out of hospital. Interventions: Follow up appointments, medication compliance- See additional Care Plan goals for specific interventions  Outcome: Progressing  Goal: Patient/Family Short Term Goal  Description: Patient's Short Term Goal: Discharge. Interventions: ICD placement - See additional Care Plan goals for  specific interventions  Outcome: Progressing     Problem: CARDIOVASCULAR - ADULT  Goal: Maintains optimal cardiac output and hemodynamic stability  Description: INTERVENTIONS:- Monitor vital signs, rhythm, and trends- Monitor for bleeding, hypotension and signs of decreased cardiac output- Evaluate effectiveness of vasoactive medications to optimize hemodynamic stability- Monitor arterial and/or venous puncture sites for bleeding and/or hematoma- Assess quality of pulses, skin color and temperature- Assess for signs of decreased coronary artery perfusion - ex. Angina- Evaluate fluid balance, assess for edema, trend weights  Outcome: Progressing  Goal: Absence of cardiac arrhythmias or at baseline  Description: INTERVENTIONS:- Continuous cardiac monitoring, monitor vital signs, obtain 12 lead EKG if indicated- Evaluate effectiveness of antiarrhythmic and heart rate control medications as ordered- Initiate emergency measures for life threatening arrhythmias- Monitor electrolytes and administer replacement therapy as ordered  Outcome: Progressing     Problem: SKIN/TISSUE INTEGRITY - ADULT  Goal: Skin integrity remains intact  Description: INTERVENTIONS- Assess and document risk factors for pressure ulcer development- Assess and document skin integrity- Monitor for areas of redness and/or skin breakdown- Initiate interventions, skin care algorithm/standards of care as needed  Outcome: Progressing

## 2025-05-21 NOTE — PROGRESS NOTES
[facility]   part of [facility]     Nephrology Progress Note    Jonny Haque Patient Status:  Inpatient    4/15/1945 MRN [id number]   Location [facility] 8NE-A Attending Isai Bacon, DO   Hosp Day # 8 PCP Gee Jimenez MD       SUBJECTIVE:  Stable this AM        Physical Exam:   /63 (BP Location: Right arm)   Pulse 62   Temp 98.1 °F (36.7 °C) (Oral)   Resp 18   Wt 200 lb 9.9 oz (91 kg)   SpO2 99%   BMI 27.98 kg/m²   Temp (24hrs), Av °F (36.7 °C), Min:97.9 °F (36.6 °C), Max:98.2 °F (36.8 °C)       Intake/Output Summary (Last 24 hours) at 2025 0802  Last data filed at 2025 0751  Gross per 24 hour   Intake 420 ml   Output 1100 ml   Net -680 ml     Last 3 Weights   25 0500 200 lb 9.9 oz (91 kg)   25 0540 198 lb 10.2 oz (90.1 kg)   25 0500 202 lb 2.6 oz (91.7 kg)   25 0439 199 lb 15.3 oz (90.7 kg)   25 0407 199 lb 4.7 oz (90.4 kg)   25 0611 203 lb 7.8 oz (92.3 kg)   05/15/25 0505 201 lb 15.1 oz (91.6 kg)   25 0452 199 lb 8.3 oz (90.5 kg)   25 1926 195 lb 15.8 oz (88.9 kg)   25 0555 203 lb 0.7 oz (92.1 kg)   02/15/25 1402 210 lb 14.4 oz (95.7 kg)   02/15/25 1118 212 lb 1.3 oz (96.2 kg)   25 0500 212 lb 1.3 oz (96.2 kg)   25 1431 212 lb 8.4 oz (96.4 kg)   25 0600 213 lb (96.6 kg)   25 2143 215 lb 6.2 oz (97.7 kg)   25 1942 214 lb (97.1 kg)   24 2100 212 lb 15.4 oz (96.6 kg)     General: Alert and in no apparent distress.  HEENT: No scleral icterus, MMM  Neck: Supple, no JADE or thyromegaly  Cardiac: Regular rate and rhythm, S1, S2 normal, no murmur or rub  Lungs: Clear without wheezes, rales, rhonchi.    Abdomen: Soft, non-tender. + bowel sounds, no palpable organomegaly  Extremities: Without clubbing, cyanosis or edema.  Neurologic:  moving all extremities  Skin: Warm and dry, no rash        Labs:     Recent Labs   Lab 05/15/25  1417 25  0607 25  0439   WBC 4.0 5.1 6.6    HGB 7.4* 8.3* 8.2*   .2* 106.3* 108.1*   PLT 43.0* 54.0* 54.0*       Recent Labs   Lab 05/15/25  0610 05/17/25  0503 05/19/25  0607    141 140   K 4.1 3.8 3.8    104 103   CO2 23.0 24.0 25.0   BUN 58* 50* 48*   CREATSERUM 6.61* 5.83* 6.12*   CA 9.2 9.4 9.4   MG  --  2.5 2.4   * 117* 129*       No results for input(s): \"ALT\", \"AST\", \"ALB\", \"AMYLASE\", \"LIPASE\", \"LDH\" in the last 168 hours.    Invalid input(s): \"ALPHOS\", \"TBIL\", \"DBIL\", \"TPROT\"    Recent Labs   Lab 05/20/25  0545 05/20/25  1445 05/20/25  1813 05/20/25  2142 05/21/25  0513   PGLU 155* 159* 152* 199* 139*       Meds:   Current Hospital Medications[1]      Impression/Plan:      ESRD due to DM; cont HD per usual TTHS routine\  Recent cardiac arrest  Anemia due to CKD; JIE w/ HD ; goal hgb 10-11  Hx of ICM; Cardiac arrest 5/5; s/p angioplasty @ OSH; noted to have Vtach/NVST  - cardiology following ;EP consult - s/p ICD 5/19    Questions/concerns were discussed with patient and/or family by bedside.    OK for discharge from nephrology standpoint      Johnson Vera MD  5/21/2025  8:02 AM         [1]   Current Facility-Administered Medications   Medication Dose Route Frequency    albumin human (Albumin) 25% injection 25 g  25 g Intravenous PRN Dialysis    sodium chloride 0.9 % IV bolus 100 mL  100 mL Intravenous Q30 Min PRN    ondansetron (Zofran) 4 MG/2ML injection 4 mg  4 mg Intravenous Q6H PRN    Lanthanum Carbonate (FOSRENOL) chewable tab 1,000 mg **Patient supplied**  1,000 mg Oral TID CC    [Held by provider] heparin (Porcine) 5000 UNIT/ML injection 5,000 Units  5,000 Units Subcutaneous 2 times per day    acetaminophen (Tylenol Extra Strength) tab 500 mg  500 mg Oral Q4H PRN    polyethylene glycol (PEG 3350) (Miralax) 17 g oral packet 17 g  17 g Oral Daily PRN    sennosides (Senokot) tab 17.2 mg  17.2 mg Oral Nightly PRN    bisacodyl (Dulcolax) 10 MG rectal suppository 10 mg  10 mg Rectal Daily PRN    fleet enema (Fleet)  rectal enema 133 mL  1 enema Rectal Once PRN    ondansetron (Zofran) 4 MG/2ML injection 4 mg  4 mg Intravenous Q6H PRN    metoclopramide (Reglan) 5 mg/mL injection 5 mg  5 mg Intravenous Q8H PRN    HYDROcodone-acetaminophen (Norco) 5-325 MG per tab 1 tablet  1 tablet Oral Q4H PRN    Or    HYDROcodone-acetaminophen (Norco) 5-325 MG per tab 2 tablet  2 tablet Oral Q4H PRN    HYDROmorphone (Dilaudid) 1 MG/ML injection 0.1 mg  0.1 mg Intravenous Q6H PRN    Or    HYDROmorphone (Dilaudid) 1 MG/ML injection 0.2 mg  0.2 mg Intravenous Q6H PRN    Or    HYDROmorphone (Dilaudid) 1 MG/ML injection 0.4 mg  0.4 mg Intravenous Q6H PRN    lidocaine-menthol 4-1 % patch 1 patch  1 patch Transdermal Daily    amiodarone (Pacerone) tab 100 mg  100 mg Oral BID    aspirin DR tab 81 mg  81 mg Oral QOD    carvedilol (Coreg) tab 6.25 mg  6.25 mg Oral BID with meals    clopidogrel (Plavix) tab 75 mg  75 mg Oral Daily    insulin degludec (Tresiba) 100 units/mL flextouch 20 Units  20 Units Subcutaneous Nightly    levothyroxine (Synthroid) tab 200 mcg  200 mcg Oral Before breakfast    losartan (Cozaar) tab 25 mg  25 mg Oral Daily    pantoprazole (Protonix) DR tab 40 mg  40 mg Oral BID AC    rosuvastatin (Crestor) tab 10 mg  10 mg Oral Nightly    glucose (Dex4) 15 GM/59ML oral liquid 15 g  15 g Oral Q15 Min PRN    Or    glucose (Glutose) 40% oral gel 15 g  15 g Oral Q15 Min PRN    Or    glucose-vitamin C (Dex-4) chewable tab 4 tablet  4 tablet Oral Q15 Min PRN    Or    dextrose 50% injection 50 mL  50 mL Intravenous Q15 Min PRN    Or    glucose (Dex4) 15 GM/59ML oral liquid 30 g  30 g Oral Q15 Min PRN    Or    glucose (Glutose) 40% oral gel 30 g  30 g Oral Q15 Min PRN    Or    glucose-vitamin C (Dex-4) chewable tab 8 tablet  8 tablet Oral Q15 Min PRN    insulin aspart (NovoLOG) 100 Units/mL FlexPen 2-10 Units  2-10 Units Subcutaneous TID AC and HS

## 2025-05-21 NOTE — PLAN OF CARE
Pt is A&O x2-3. Pain managed w/ PRN meds. Denies SOB & cardiac symptoms.  Maintaining O2 on RA. NS / A paced on tele, S1&S2 present.  Bowel sounds present. Continent. Last BM unknown. Suppository given.  Pt updated on plan of care. Bed in lowest position. Bed alarm on. Call light within reach.     Problem: CARDIOVASCULAR - ADULT  Goal: Maintains optimal cardiac output and hemodynamic stability  Description: INTERVENTIONS:- Monitor vital signs, rhythm, and trends- Monitor for bleeding, hypotension and signs of decreased cardiac output- Evaluate effectiveness of vasoactive medications to optimize hemodynamic stability- Monitor arterial and/or venous puncture sites for bleeding and/or hematoma- Assess quality of pulses, skin color and temperature- Assess for signs of decreased coronary artery perfusion - ex. Angina- Evaluate fluid balance, assess for edema, trend weights  Outcome: Progressing  Goal: Absence of cardiac arrhythmias or at baseline  Description: INTERVENTIONS:- Continuous cardiac monitoring, monitor vital signs, obtain 12 lead EKG if indicated- Evaluate effectiveness of antiarrhythmic and heart rate control medications as ordered- Initiate emergency measures for life threatening arrhythmias- Monitor electrolytes and administer replacement therapy as ordered  Outcome: Progressing     Problem: SKIN/TISSUE INTEGRITY - ADULT  Goal: Skin integrity remains intact  Description: INTERVENTIONS- Assess and document risk factors for pressure ulcer development- Assess and document skin integrity- Monitor for areas of redness and/or skin breakdown- Initiate interventions, skin care algorithm/standards of care as needed  Outcome: Progressing     Problem: Diabetes/Glucose Control  Goal: Glucose maintained within prescribed range  Description: INTERVENTIONS:- Monitor Blood Glucose as ordered- Assess for signs and symptoms of hyperglycemia and hypoglycemia- Administer ordered medications to maintain glucose within target  range- Assess barriers to adequate nutritional intake and initiate nutrition consult as needed- Instruct patient on self management of diabetes  Outcome: Progressing     Problem: Patient/Family Goals  Goal: Patient/Family Long Term Goal  Description: Patient's Long Term Goal: Stay out of hospital. Interventions: Follow up appointments, medication compliance- See additional Care Plan goals for specific interventions  Outcome: Progressing  Goal: Patient/Family Short Term Goal  Description: Patient's Short Term Goal: Discharge. Interventions: ICD placement - See additional Care Plan goals for specific interventions  Outcome: Progressing

## 2025-05-21 NOTE — PROGRESS NOTES
[facility] Hospitalist note    PCP: Gee Jimenez MD    Chief Complaint:  F/u cardiac arrest    SUBJECTIVE:  No acute events. ICD placed yesterday, no issues.     OBJECTIVE:  Temp:  [97.9 °F (36.6 °C)-98.1 °F (36.7 °C)] 98.1 °F (36.7 °C)  Pulse:  [62-74] 62  Resp:  [16-20] 18  BP: (124-142)/(47-89) 126/63  SpO2:  [94 %-100 %] 99 %    Intake/Output:    Intake/Output Summary (Last 24 hours) at 5/21/2025 1136  Last data filed at 5/21/2025 0751  Gross per 24 hour   Intake 300 ml   Output 1100 ml   Net -800 ml       Last 3 Weights   05/21/25 0500 200 lb 9.9 oz (91 kg)   05/20/25 0540 198 lb 10.2 oz (90.1 kg)   05/19/25 0500 202 lb 2.6 oz (91.7 kg)   05/18/25 0439 199 lb 15.3 oz (90.7 kg)   05/17/25 0407 199 lb 4.7 oz (90.4 kg)   05/16/25 0611 203 lb 7.8 oz (92.3 kg)   05/15/25 0505 201 lb 15.1 oz (91.6 kg)   05/14/25 0452 199 lb 8.3 oz (90.5 kg)   05/13/25 1926 195 lb 15.8 oz (88.9 kg)   02/16/25 0555 203 lb 0.7 oz (92.1 kg)   02/15/25 1402 210 lb 14.4 oz (95.7 kg)   02/15/25 1118 212 lb 1.3 oz (96.2 kg)   02/09/25 0500 212 lb 1.3 oz (96.2 kg)   02/08/25 1431 212 lb 8.4 oz (96.4 kg)   02/08/25 0600 213 lb (96.6 kg)   02/06/25 2143 215 lb 6.2 oz (97.7 kg)       Exam  Gen: No acute distress  HEENT: anicteric sclera, MMM  Pulm: Lungs clear, normal respiratory effort  CV: Heart with regular rate and rhythm, no peripheral edema  Abd: Abdomen soft, nontender, nondistended  MSK: Moves extremities spontaneously.  Skin: no rashes or lesions  Neuro: A&OX3, no focal deficits    Data Review:         Labs:     Recent Labs   Lab 05/15/25  1417 05/19/25  0607 05/20/25  0439   WBC 4.0 5.1 6.6   HGB 7.4* 8.3* 8.2*   .2* 106.3* 108.1*   PLT 43.0* 54.0* 54.0*   NE 3.04  --   --    LYMABS 0.54*  --   --        Recent Labs   Lab 05/15/25  0610 05/17/25  0503 05/19/25  0607    141 140   K 4.1 3.8 3.8    104 103   CO2 23.0 24.0 25.0   BUN 58* 50* 48*   CREATSERUM 6.61* 5.83* 6.12*   CA 9.2 9.4 9.4   MG  --  2.5 2.4   GLU  143* 117* 129*       No results for input(s): \"ALT\", \"AST\", \"ALB\", \"AMYLASE\", \"LIPASE\" in the last 168 hours.    Invalid input(s): \"ALPHOS\", \"TBIL\", \"DBIL\", \"TPROT\"      No results for input(s): \"TROP\", \"CK\", \"PBNP\", \"PCT\" in the last 168 hours.    No results for input(s): \"CRP\", \"NEELAM\", \"LDH\", \"DDIMER\" in the last 168 hours.    Recent Labs   Lab 05/20/25  0545 05/20/25  1445 05/20/25  1813 05/20/25  2142 05/21/25  0513   PGLU 155* 159* 152* 199* 139*       Meds:   Scheduled Medication:Scheduled Medications[1]  Continuous Infusing Medication:Medication Infusions[2]  PRN Medication:PRN Medications[3]       Microbiology:    No results found for this visit on 05/13/25.    Lab Results   Component Value Date    COVID19 Not Detected 04/16/2024    COVID19 Not Detected 03/17/2024    COVID19 Not Detected 04/05/2023        Assessment/Plan:     79 yo man with h/o tavr, cad, dm who presented as osh transfer for continued management in setting of cardiac arrest     Cardiac arrest 2/2 ischemia vs scar  Vtach  EF 35-40%, ICM  CAD with recent angio, h/o stents  - continue GDMT as tolerated- coreg and losartan  - continue DAPT  - prn pain meds for chest wall pain  - s/p ICD 5/19/25     PAF  -Cont amio, coreg. Not on anticoagulation.      ESRD on HD  - HD on t, th, sat. Consulted nephro. Resume dialysis schedule at [facility].      Thrombocytopenia  HCV cirrhosis, h/o esophageal banding  - stable in 50s. Dropped as low as 30s at OSH  - per notes, had been seen hematology before and felt to have the thrombocytopenia 2/2 cirrhosis     Resp failure in setting of cardiac arrest  -Treated for PNA w/ abx recently  - continue IS, NC     Subcutaneous heparin, SCDs  Dispo: PRISCA Bacon DO  [facility] Hospitalist         [1]    [START ON 5/22/2025] epoetin eunice  10,000 Units Intravenous Once in dialysis    heparin  1.5 mL Intracatheter Once    Lanthanum Carbonate  1,000 mg Oral TID CC    [Held by provider] heparin  5,000 Units  Subcutaneous 2 times per day    lidocaine-menthol  1 patch Transdermal Daily    amiodarone  100 mg Oral BID    aspirin  81 mg Oral QOD    carvedilol  6.25 mg Oral BID with meals    clopidogrel  75 mg Oral Daily    insulin degludec  20 Units Subcutaneous Nightly    levothyroxine  200 mcg Oral Before breakfast    losartan  25 mg Oral Daily    pantoprazole  40 mg Oral BID AC    rosuvastatin  10 mg Oral Nightly    insulin aspart  2-10 Units Subcutaneous TID AC and HS   [2] [3]   sodium chloride **AND** albumin human    albumin human    sodium chloride **AND** [DISCONTINUED] albumin human    ondansetron    acetaminophen    polyethylene glycol (PEG 3350)    sennosides    bisacodyl    fleet enema    ondansetron    metoclopramide    HYDROcodone-acetaminophen **OR** HYDROcodone-acetaminophen    HYDROmorphone **OR** HYDROmorphone **OR** HYDROmorphone    glucose **OR** glucose **OR** glucose-vitamin C **OR** dextrose **OR** glucose **OR** glucose **OR** glucose-vitamin C

## 2025-05-21 NOTE — PLAN OF CARE
Assumed care of patient at 0700. Pt A/Ox 3-4, forgetful. O2 sats maintained on room air. NSR/A-paced on tele. Last BM unknown, \"2 days ago per pt\" Active bowel sounds, passing gas. Diminished urine production, HD. Pt reports mild pain, Lidocaine patch applied. Pt up with lift device. Pt and son updated on plan of care. Care needs met. Bed in lowest position, Call light within reach. Bed alarm on. Specialty bed. ICD with mepilex, no bruising or hematoma.     POC: discharge to [facility]    Problem: Diabetes/Glucose Control  Goal: Glucose maintained within prescribed range  Description: INTERVENTIONS:- Monitor Blood Glucose as ordered- Assess for signs and symptoms of hyperglycemia and hypoglycemia- Administer ordered medications to maintain glucose within target range- Assess barriers to adequate nutritional intake and initiate nutrition consult as needed- Instruct patient on self management of diabetes  Outcome: Progressing     Problem: Patient/Family Goals  Goal: Patient/Family Long Term Goal  Description: Patient's Long Term Goal: Stay out of hospital. Interventions: Follow up appointments, medication compliance- See additional Care Plan goals for specific interventions  Outcome: Progressing  Goal: Patient/Family Short Term Goal  Description: Patient's Short Term Goal: Discharge. Interventions: ICD placement - See additional Care Plan goals for specific interventions  Outcome: Progressing     Problem: CARDIOVASCULAR - ADULT  Goal: Maintains optimal cardiac output and hemodynamic stability  Description: INTERVENTIONS:- Monitor vital signs, rhythm, and trends- Monitor for bleeding, hypotension and signs of decreased cardiac output- Evaluate effectiveness of vasoactive medications to optimize hemodynamic stability- Monitor arterial and/or venous puncture sites for bleeding and/or hematoma- Assess quality of pulses, skin color and temperature- Assess for signs of decreased coronary artery perfusion - ex. Angina-  Evaluate fluid balance, assess for edema, trend weights  Outcome: Progressing  Goal: Absence of cardiac arrhythmias or at baseline  Description: INTERVENTIONS:- Continuous cardiac monitoring, monitor vital signs, obtain 12 lead EKG if indicated- Evaluate effectiveness of antiarrhythmic and heart rate control medications as ordered- Initiate emergency measures for life threatening arrhythmias- Monitor electrolytes and administer replacement therapy as ordered  Outcome: Progressing     Problem: SKIN/TISSUE INTEGRITY - ADULT  Goal: Skin integrity remains intact  Description: INTERVENTIONS- Assess and document risk factors for pressure ulcer development- Assess and document skin integrity- Monitor for areas of redness and/or skin breakdown- Initiate interventions, skin care algorithm/standards of care as needed  Outcome: Progressing

## 2025-05-21 NOTE — DISCHARGE SUMMARY
[facility] Internal Medicine Hospitalist Discharge Summary     Patient ID:  Jonny Haque  80 year old  4/15/1945    Admission Date/Time  5/13/2025  7:10 PM  Discharge Date  05/21/25    PCP  Gee Jimenez MD     Discharging Hospitalist:  Isai Bacon DO    Disposition:  [facility] ([facility])    Follow Up Appointments  Gee Jimenez MD  [address]  [address]  [facility] [address] [phone]    Follow up      Avril Liu, APRN  [address]  [address]  [facility] [address]  [phone]    Schedule an appointment as soon as possible for a visit in 2 week(s)        Primary Hospital Problems/Hospital Course Summary  79 yo man with h/o tavr, cad, dm who presented as osh transfer for continued management in setting of cardiac arrest     Cardiac arrest 2/2 ischemia vs scar  Vtach  EF 35-40%, ICM  CAD with recent angio, h/o stents  - continue GDMT as tolerated- coreg and losartan  - continue DAPT  - prn pain meds for chest wall pain  - s/p ICD 5/19/25    PAF  -Cont amio, coreg. Not on anticoagulation.      ESRD on HD  - HD on t, th, sat. Consulted nephro. Resume dialysis schedule at [facility].      Chronic Anemia  Thrombocytopenia  HCV cirrhosis, h/o esophageal banding  - stable in 50s. Dropped as low as 30s at OSH  - per notes, had seen hematology before and   felt to have the thrombocytopenia 2/2 cirrhosis  -Trend CBC, transfuse as needed     Resp failure in setting of cardiac arrest  -Treated for PNA w/ abx recently  - continue IS, NC    Medication Changes  Norco and Xanax as needed while at rehab    Important Follow Up Items  NA    Procedures/Diagnostics  NA    Operative Procedures:   ICD 5/19/25    Change in Code Status: Full      Hospital Course/Secondary Diagnoses:        Consults: IP CONSULT TO CARDIOLOGY  IP CONSULT TO NEPHROLOGY  IP CONSULT TO VASCULAR ACCESS TEAM    Discharge Medications       Medication List        START  taking these medications      ALPRAZolam 0.5 MG Tabs  Commonly known as: Xanax  Take 1 tablet (0.5 mg total) by mouth 3 (three) times daily as needed for Anxiety.     HYDROcodone-acetaminophen 5-325 MG Tabs  Commonly known as: Norco  Take 1-2 tablets by mouth every 8 (eight) hours as needed for Pain.            CHANGE how you take these medications      Insulin Lispro (1 Unit Dial) 100 UNIT/ML Sopn  Commonly known as: HumaLOG KwikPen  12 units before meals with sliding scale. Max daily dose: 50 units.  What changed:   how much to take  additional instructions            CONTINUE taking these medications      amiodarone 100 MG Tabs  Commonly known as: Pacerone  Take 1 tablet (100 mg total) by mouth 2 (two) times daily.     aspirin 81 MG Tbec     Auryxia 1  MG(Fe) Tabs  Generic drug: Ferric Citrate     [employer] Contour Next Monitor w/Device Kit  1 Device by Does not apply route 4 (four) times daily.     carvedilol 6.25 MG Tabs  Commonly known as: Coreg  Take 1 tablet (6.25 mg total) by mouth 2 (two) times daily with meals.     clopidogrel 75 MG Tabs  Commonly known as: Plavix  Take 1 tablet (75 mg total) by mouth daily.     * Contour Next Test Strp  Generic drug: Glucose Blood  TEST BLOOD SUGAR FOUR TIMES DAILY     * OneTouch Ultra Strp  Generic drug: Glucose Blood  6 times a day     Lanthanum Carbonate 1000 MG Chew  Commonly known as: FOSRENOL     levothyroxine 200 MCG Tabs  Commonly known as: Synthroid     losartan 25 MG Tabs  Commonly known as: Cozaar  Take 1 tablet (25 mg total) by mouth daily.     nitroglycerin 0.4 MG Subl  Commonly known as: Nitrostat  Place 1 tablet (0.4 mg total) under the tongue every 5 (five) minutes as needed for Chest pain.     pantoprazole 40 MG Tbec  Commonly known as: Protonix     rosuvastatin 10 MG Tabs  Commonly known as: Crestor  Take 1 tablet (10 mg total) by mouth nightly.     Toujeo Max SoloStar 300 UNIT/ML Sopn  Generic drug: Insulin Glargine (2 Unit Dial)  Inject 20 Units  into the skin nightly.     TRUEplus Pen Needles 31G X 5 MM Misc  Generic drug: Insulin Pen Needle  Use 5 per day     zolpidem 10 MG Tabs  Commonly known as: Ambien           * This list has 2 medication(s) that are the same as other medications prescribed for you. Read the directions carefully, and ask your doctor or other care provider to review them with you.                   Where to Get Your Medications        You can get these medications from any pharmacy    Bring a paper prescription for each of these medications  ALPRAZolam 0.5 MG Tabs  HYDROcodone-acetaminophen 5-325 MG Tabs       I reconciled current and discharge medications on the day of discharge.    Imaging/Diagnostic Reports  No results found.      Patient instructions:      I as the attending physician reconciled the current and discharge medications on day of discharge.     Current Discharge Medication List        START taking these medications    Details   HYDROcodone-acetaminophen 5-325 MG Oral Tab Take 1-2 tablets by mouth every 8 (eight) hours as needed for Pain.      ALPRAZolam 0.5 MG Oral Tab Take 1 tablet (0.5 mg total) by mouth 3 (three) times daily as needed for Anxiety.           CONTINUE these medications which have NOT CHANGED    Details   amiodarone 100 MG Oral Tab Take 1 tablet (100 mg total) by mouth 2 (two) times daily.      clopidogrel 75 MG Oral Tab Take 1 tablet (75 mg total) by mouth daily.      losartan 25 MG Oral Tab Take 1 tablet (25 mg total) by mouth daily.      carvedilol 6.25 MG Oral Tab Take 1 tablet (6.25 mg total) by mouth 2 (two) times daily with meals.      levothyroxine 200 MCG Oral Tab Take 1 tablet (200 mcg total) by mouth before breakfast.      Lanthanum Carbonate 1000 MG Oral Chew Tab Chew 1 tablet (1,000 mg total) by mouth in the morning and 1 tablet (1,000 mg total) at noon and 1 tablet (1,000 mg total) in the evening. Chew with meals.      aspirin 81 MG Oral Tab EC Take 1 tablet (81 mg total) by mouth every  other day.      Ferric Citrate (AURYXIA) 1  MG(Fe) Oral Tab       pantoprazole 40 MG Oral Tab EC Take 1 tablet (40 mg total) by mouth in the morning and 1 tablet (40 mg total) in the evening. Take before meals.      Insulin Pen Needle (TRUEPLUS PEN NEEDLES) 31G X 5 MM Does not apply Misc Use 5 per day      Insulin Glargine, 2 Unit Dial, (TOUJEO MAX SOLOSTAR) 300 UNIT/ML Subcutaneous Solution Pen-injector Inject 20 Units into the skin nightly.      rosuvastatin 10 MG Oral Tab Take 1 tablet (10 mg total) by mouth nightly.      !! Glucose Blood (ONETOUCH ULTRA) In Vitro Strip 6 times a day      Insulin Lispro, 1 Unit Dial, (HUMALOG KWIKPEN) 100 UNIT/ML Subcutaneous Solution Pen-injector 12 units before meals with sliding scale. Max daily dose: 50 units.      !! CONTOUR NEXT TEST In Vitro Strip TEST BLOOD SUGAR FOUR TIMES DAILY      Blood Glucose Monitoring Suppl ([employer] CONTOUR NEXT MONITOR) W/DEVICE Does not apply Kit 1 Device by Does not apply route 4 (four) times daily.      nitroglycerin 0.4 MG Sublingual SL Tab Place 1 tablet (0.4 mg total) under the tongue every 5 (five) minutes as needed for Chest pain.      zolpidem 10 MG Oral Tab Take 1 tablet (10 mg total) by mouth nightly as needed for Sleep.       !! - Potential duplicate medications found. Please discuss with provider.          Activity: activity as tolerated  Diet: cardiac diet, nectar thick      Exam on day of discharge:     Vitals:    05/21/25 0751   BP: 126/63   Pulse: 62   Resp: 18   Temp: 98.1 °F (36.7 °C)       Physical Exam:   General: no acute distress  Heart: RRR  Lungs: CTAB  Abd: Soft, NT, ND  Neuro: no focal deficits      Total time coordinating care for discharge: 32 minutes    Isai Bacon DO

## 2025-05-21 NOTE — PROGRESS NOTES
[facility]    Division of Cardiology    Consultation Note      Jonny Haque Patient Status:  Hospitalized    4/15/1945 MRN [id number]   Location [facility] 8NE-A Attending Ursula Saucedo MD   Hosp Day # 8 PCP Gee Jimenez MD   Primary Cards        Impression:  VT   Possibly ischemic  Possibly scar based  Only one episode clearly correlates to electrolyte abnormalities/hyperkalemia  Has had several episodes of VT (2025 and then 5/2025 x 2  S/p ICD 25  CAD  MV PCI in the past (LM, LAD, OM/CX)  PCI to LAD recently in the setting of VT, unclear if lesion related ( believes it wasn't the \"culprit\" for the arrhythmia).  Hx aortic stenosis  S/P TAVR   Echo with good valve function (mean gradient 9mmHg)  HFrEF/systolic dysfunction  EF at best up to 60% range  Echo 2025 with EF 40% range  Ejection fraction has been up and down  Limited ability to titrate \"GDMT\" given comorbidities  Euvolemic by exam. Weight up 3# overnight.  ESRD  HD dependent  Anemia  Hx GIB  Has only partially tolerated DAPT in the past  Has had to hold DAPT/plavix several times in the past  Cirrhosis  Varices noted in some reports  PAF  On amiodarone for rhythm control  Has not tolerated AC due to GIB and SDH  DM  HTN  HX SDH    Plan:  DAPT  GDMT: coreg, losartan- will try for additional GDMT optimization as OP  Euvolemic by exam- hold off on diuretic today   Strict I &O  Appreciate EP recs  Ok to discharge from CV standpoint when cleared by other services.           Inpatient Medications:    [START ON 2025] epoetin eunice  10,000 Units Intravenous Once in dialysis    heparin  1.5 mL Intracatheter Once    Lanthanum Carbonate  1,000 mg Oral TID CC    [Held by provider] heparin  5,000 Units Subcutaneous 2 times per day    lidocaine-menthol  1 patch Transdermal Daily    amiodarone  100 mg Oral BID    aspirin  81 mg Oral QOD    carvedilol  6.25 mg Oral BID with meals     clopidogrel  75 mg Oral Daily    insulin degludec  20 Units Subcutaneous Nightly    levothyroxine  200 mcg Oral Before breakfast    losartan  25 mg Oral Daily    pantoprazole  40 mg Oral BID AC    rosuvastatin  10 mg Oral Nightly    insulin aspart  2-10 Units Subcutaneous TID AC and HS            Vitals:   /63 (BP Location: Right arm)   Pulse 62   Temp 98.1 °F (36.7 °C) (Oral)   Resp 18   Wt 200 lb 9.9 oz (91 kg)   SpO2 99%   BMI 27.98 kg/m²   Wt Readings from Last 3 Encounters:   05/21/25 200 lb 9.9 oz (91 kg)   02/16/25 203 lb 0.7 oz (92.1 kg)   02/09/25 212 lb 1.3 oz (96.2 kg)       Examination:  General: Well developed, well nourished male.  No distress.  Neck:  No JVD.  Normal ROM.  Cardiac: Regular rate and rhythm.  No murmurs, rubs, or gallops.   Lungs: Clear to ascultation bilaterally.    Abdomen: Soft.  Non-distended.  Non-tender.  Bowel sounds present.    Extremities: Warm, no significant edema.  Palpable pulses.  Neurologic: Alert and oriented.  No gross deficits.  Integument:  No visible rashes identified.  Psych: The patient's affect is appropriate.        Labs:   Recent Labs   Lab 05/15/25  1417 05/19/25  0607 05/20/25  0439   WBC 4.0 5.1 6.6   HGB 7.4* 8.3* 8.2*   .2* 106.3* 108.1*   PLT 43.0* 54.0* 54.0*     Recent Labs   Lab 05/15/25  0610 05/17/25  0503 05/19/25  0607    141 140   K 4.1 3.8 3.8    104 103   CO2 23.0 24.0 25.0   BUN 58* 50* 48*   CREATSERUM 6.61* 5.83* 6.12*   * 117* 129*   CA 9.2 9.4 9.4   MG  --  2.5 2.4     No results for input(s): \"ALT\", \"AST\", \"ALB\", \"AMYLASE\", \"LIPASE\", \"LDH\" in the last 168 hours.    Invalid input(s): \"ALPHOS\", \"TBIL\", \"DBIL\", \"TPROT\"    Lab Results   Component Value Date    A1C 7.0 (H) 08/03/2024    A1C 6.0 (H) 03/17/2024    A1C 5.4 10/25/2022     Lab Results   Component Value Date    CHOLEST 199 02/15/2025    HDL 31 (L) 02/15/2025     (H) 02/15/2025    TRIG 291 (H) 02/15/2025          Avril Schrader,  APRN  Cardiology

## 2025-05-21 NOTE — PHYSICAL THERAPY NOTE
PHYSICAL THERAPY TREATMENT NOTE - INPATIENT    Room Number: 8621/8621-A       Session: 2     Number of Visits to Meet Established Goals: 5    Presenting Problem: Cardiac Arrest  Co-Morbidities : CHF, TAVR, ESRD on dialysis, HTN, CAD with PCI, DM2    PHYSICAL THERAPY MEDICAL/SOCIAL HISTORY  History related to current admission: Patient is a 80 year old male admitted as a transfer on 5/13/2025 from [facility] following cardiac arrest.       HOME SITUATION  Type of Home: House  Home Layout: Multi-level  Stairs to Enter : 2   Railing: No    Stairs to Bedroom: 8    Railing: Yes    Lives With: Son    Drives: Yes (driving locally to store and dialysis)   Patient Regularly Uses: Rolling walker       Prior Level of [place]: The pt is typically independent with ambulation and IADL's.  Pt last fall was in January.    PHYSICAL THERAPY ASSESSMENT   Patient demonstrates good  progress this session, goals  remain in progress.      Patient is requiring minimal assist and maximum assist as a result of the following impairments: decreased functional strength, decreased endurance/aerobic capacity, and medical status.     Patient continues to function below baseline with bed mobility and transfers.  Next session anticipate patient to progress bed mobility and transfers.  Physical Therapy will continue to follow patient for duration of hospitalization.    Patient continues to benefit from continued skilled PT services: to promote return to prior level of function and safety with continuous assistance and gradual rehabilitative therapy .    PLAN DURING HOSPITALIZATION  Nursing Mobility Recommendation : Lift Equipment  PT Device Recommendation: Rolling walker  PT Treatment Plan: Bed mobility, Endurance, Energy conservation, Patient education, Gait training, Strengthening, Stair training, Transfer training, Balance training  Frequency (Obs): 3-5x/week     CURRENT GOALS     Goal #1 Patient is able to demonstrate supine -  sit EOB @ level: minimum assistance      Goal #2 Patient is able to demonstrate transfers Sit to/from Stand at assistance level: moderate assistance      Goal #3 Patient is able to ambulate 20 feet with assist device: walker - rolling at assistance level: moderate assistance      Goal #4     Goal #5     Goal #6     Goal Comments: Goals established on 5/14/2025 5/21/2025 all goals ongoing    SUBJECTIVE  \"I was able to stand!\"    OBJECTIVE  Precautions: Cardiac, Bed/chair alarm    WEIGHT BEARING RESTRICTION     PAIN ASSESSMENT   Rating: Unable to rate  Location: chest pain  Management Techniques: Relaxation, Repositioning    BALANCE                                                                                                                       Static Sitting: Good  Dynamic Sitting: Fair +           Static Standing: Fair -  Dynamic Standing: Poor +    ACTIVITY TOLERANCE                         O2 WALK       AM-PAC '6-Clicks' INPATIENT SHORT FORM - BASIC MOBILITY  How much difficulty does the patient currently have...  Patient Difficulty: Turning over in bed (including adjusting bedclothes, sheets and blankets)?: A Lot   Patient Difficulty: Sitting down on and standing up from a chair with arms (e.g., wheelchair, bedside commode, etc.): A Lot   Patient Difficulty: Moving from lying on back to sitting on the side of the bed?: A Lot   How much help from another person does the patient currently need...   Help from Another: Moving to and from a bed to a chair (including a wheelchair)?: Total   Help from Another: Need to walk in hospital room?: Total   Help from Another: Climbing 3-5 steps with a railing?: Total     AM-PAC Score:  Raw Score: 9   Approx Degree of Impairment: 81.38%   Standardized Score (AM-PAC Scale): 30.55   CMS Modifier (G-Code): CM    FUNCTIONAL ABILITY STATUS  Gait Assessment   Functional Mobility/Gait Assessment  Gait Assistance: Not tested  Distance (ft): 0    Skilled Therapy Provided    Bed  Mobility:  Rolling: Mod A  Supine<>Sit: Mod A  Sit<>Supine:      Transfer Mobility:  Sit<>Stand:  Able to weight bear through BLE on willa  Stand<>Sit:    Gait:     Therapist's Comments: improved BLE strength/trunk control - worked on dynamic reaching and perturbations.      Pt required increased time for each transitional movement, but improved strength when given the time and opportunity    PCT Cecelia present for session -      THERAPEUTIC EXERCISES  Lower Extremity Ankle pumps  Leg slides     Upper Extremity Elbow flex/ext and  - open/close     Position Supine     Repetitions   10   Sets   2     Patient End of Session: Up in chair, Needs met, Call light within reach, RN aware of session/findings, All patient questions and concerns addressed, Hospital anti-slip socks    PT Session Time: 15 minutes  Gait Trainin minutes  Therapeutic Activity: 15 minutes  Therapeutic Exercise: 8 minutes   Neuromuscular Re-education: 0 minutes

## 2025-05-21 NOTE — DISCHARGE INSTRUCTIONS
Diet Recommendations - Solids: Puree  Diet Recommendations - Liquids: Nectar thick liquids/ Mildly thick     Compensatory Strategies Recommended: Liquids via spoon (EFFORTFUL SWALLOW)  Aspiration Precautions: Upright position, 1:1 feeding, No straw  Medication Administration Recommendations: One pill at a time, Crushed in puree, Present with thickened liquid, Whole in puree

## 2025-05-22 NOTE — PAYOR COMM NOTE
DISCHARGE REVIEW    Payor: BCBS MEDICARE ADV PPO  Subscriber #:  [id number]  Authorization Number: [id number]    Admit date: 5/13/25  Admit time:   7:10 PM  Discharge Date: 5/21/2025  2:06 PM     Admitting Physician: Ursula Saucedo MD  Attending Physician:  No att. providers found  Primary Care Physician: Gee Jimenez MD          Discharge Summary Notes        Discharge Summary signed by Isai Bacon DO at 5/21/2025 11:50 AM       Author: Isai Bacon DO Specialty: HOSPITALIST Author Type: Physician    Filed: 5/21/2025 11:50 AM Date of Service: 5/21/2025 10:48 AM Status: Addendum    : Isai Bacon DO (Physician)    Related Notes: Original Note by Isai Bacon DO (Physician) filed at 5/21/2025 10:57 AM                                                          [facility] Internal Medicine Hospitalist Discharge Summary     Patient ID:  Jonny Haque  80 year old  4/15/1945    Admission Date/Time  5/13/2025  7:10 PM  Discharge Date  05/21/25    PCP  Gee Jimenez MD     Discharging Hospitalist:  Isai Bacon DO    Disposition:  [facility] ([facility])    Follow Up Appointments  Gee Jimenez MD  [address]  [address]  [place] [address] [phone]    Follow up      Avril Liu APRN  [address]  [address]  [place] [address]  [phone]    Schedule an appointment as soon as possible for a visit in 2 week(s)        Primary Hospital Problems/Hospital Course Summary  79 yo man with h/o tavr, cad, dm who presented as osh transfer for continued management in setting of cardiac arrest     Cardiac arrest 2/2 ischemia vs scar  Vtach  EF 35-40%, ICM  CAD with recent angio, h/o stents  - continue GDMT as tolerated- coreg and losartan  - continue DAPT  - prn pain meds for chest wall pain  - s/p ICD 5/19/25    PAF  -Cont amio, coreg. Not on anticoagulation.      ESRD on HD  - HD on t, th, sat. Consulted nephro. Resume dialysis schedule at [facility].       Chronic Anemia  Thrombocytopenia  HCV cirrhosis, h/o esophageal banding  - stable in 50s. Dropped as low as 30s at OSH  - per notes, had seen hematology before and   felt to have the thrombocytopenia 2/2 cirrhosis  -Trend CBC, transfuse as needed     Resp failure in setting of cardiac arrest  -Treated for PNA w/ abx recently  - continue IS, NC    Medication Changes  Norco and Xanax as needed while at rehab    Important Follow Up Items  NA    Procedures/Diagnostics  NA    Operative Procedures:   ICD 5/19/25    Change in Code Status: Full      Hospital Course/Secondary Diagnoses:        Consults: IP CONSULT TO CARDIOLOGY  IP CONSULT TO NEPHROLOGY  IP CONSULT TO VASCULAR ACCESS TEAM    Discharge Medications       Medication List        START taking these medications      ALPRAZolam 0.5 MG Tabs  Commonly known as: Xanax  Take 1 tablet (0.5 mg total) by mouth 3 (three) times daily as needed for Anxiety.     HYDROcodone-acetaminophen 5-325 MG Tabs  Commonly known as: Norco  Take 1-2 tablets by mouth every 8 (eight) hours as needed for Pain.            CHANGE how you take these medications      Insulin Lispro (1 Unit Dial) 100 UNIT/ML Sopn  Commonly known as: HumaLOG KwikPen  12 units before meals with sliding scale. Max daily dose: 50 units.  What changed:   how much to take  additional instructions            CONTINUE taking these medications      amiodarone 100 MG Tabs  Commonly known as: Pacerone  Take 1 tablet (100 mg total) by mouth 2 (two) times daily.     aspirin 81 MG Tbec     Auryxia 1  MG(Fe) Tabs  Generic drug: Ferric Citrate     Vernell Contour Next Monitor w/Device Kit  1 Device by Does not apply route 4 (four) times daily.     carvedilol 6.25 MG Tabs  Commonly known as: Coreg  Take 1 tablet (6.25 mg total) by mouth 2 (two) times daily with meals.     clopidogrel 75 MG Tabs  Commonly known as: Plavix  Take 1 tablet (75 mg total) by mouth daily.     * Contour Next Test Strp  Generic drug: Glucose  Blood  TEST BLOOD SUGAR FOUR TIMES DAILY     * OneTouch Ultra Strp  Generic drug: Glucose Blood  6 times a day     Lanthanum Carbonate 1000 MG Chew  Commonly known as: FOSRENOL     levothyroxine 200 MCG Tabs  Commonly known as: Synthroid     losartan 25 MG Tabs  Commonly known as: Cozaar  Take 1 tablet (25 mg total) by mouth daily.     nitroglycerin 0.4 MG Subl  Commonly known as: Nitrostat  Place 1 tablet (0.4 mg total) under the tongue every 5 (five) minutes as needed for Chest pain.     pantoprazole 40 MG Tbec  Commonly known as: Protonix     rosuvastatin 10 MG Tabs  Commonly known as: Crestor  Take 1 tablet (10 mg total) by mouth nightly.     Toujeo Max SoloStar 300 UNIT/ML Sopn  Generic drug: Insulin Glargine (2 Unit Dial)  Inject 20 Units into the skin nightly.     TRUEplus Pen Needles 31G X 5 MM Misc  Generic drug: Insulin Pen Needle  Use 5 per day     zolpidem 10 MG Tabs  Commonly known as: Ambien           * This list has 2 medication(s) that are the same as other medications prescribed for you. Read the directions carefully, and ask your doctor or other care provider to review them with you.                   Where to Get Your Medications        You can get these medications from any pharmacy    Bring a paper prescription for each of these medications  ALPRAZolam 0.5 MG Tabs  HYDROcodone-acetaminophen 5-325 MG Tabs       I reconciled current and discharge medications on the day of discharge.    Imaging/Diagnostic Reports  No results found.      Patient instructions:      I as the attending physician reconciled the current and discharge medications on day of discharge.     Current Discharge Medication List        START taking these medications    Details   HYDROcodone-acetaminophen 5-325 MG Oral Tab Take 1-2 tablets by mouth every 8 (eight) hours as needed for Pain.      ALPRAZolam 0.5 MG Oral Tab Take 1 tablet (0.5 mg total) by mouth 3 (three) times daily as needed for Anxiety.           CONTINUE these  medications which have NOT CHANGED    Details   amiodarone 100 MG Oral Tab Take 1 tablet (100 mg total) by mouth 2 (two) times daily.      clopidogrel 75 MG Oral Tab Take 1 tablet (75 mg total) by mouth daily.      losartan 25 MG Oral Tab Take 1 tablet (25 mg total) by mouth daily.      carvedilol 6.25 MG Oral Tab Take 1 tablet (6.25 mg total) by mouth 2 (two) times daily with meals.      levothyroxine 200 MCG Oral Tab Take 1 tablet (200 mcg total) by mouth before breakfast.      Lanthanum Carbonate 1000 MG Oral Chew Tab Chew 1 tablet (1,000 mg total) by mouth in the morning and 1 tablet (1,000 mg total) at noon and 1 tablet (1,000 mg total) in the evening. Chew with meals.      aspirin 81 MG Oral Tab EC Take 1 tablet (81 mg total) by mouth every other day.      Ferric Citrate (AURYXIA) 1  MG(Fe) Oral Tab       pantoprazole 40 MG Oral Tab EC Take 1 tablet (40 mg total) by mouth in the morning and 1 tablet (40 mg total) in the evening. Take before meals.      Insulin Pen Needle (TRUEPLUS PEN NEEDLES) 31G X 5 MM Does not apply Misc Use 5 per day      Insulin Glargine, 2 Unit Dial, (TOUJEO MAX SOLOSTAR) 300 UNIT/ML Subcutaneous Solution Pen-injector Inject 20 Units into the skin nightly.      rosuvastatin 10 MG Oral Tab Take 1 tablet (10 mg total) by mouth nightly.      !! Glucose Blood (ONETOUCH ULTRA) In Vitro Strip 6 times a day      Insulin Lispro, 1 Unit Dial, (HUMALOG KWIKPEN) 100 UNIT/ML Subcutaneous Solution Pen-injector 12 units before meals with sliding scale. Max daily dose: 50 units.      !! CONTOUR NEXT TEST In Vitro Strip TEST BLOOD SUGAR FOUR TIMES DAILY      Blood Glucose Monitoring Suppl (Tribogenics CONTOUR NEXT MONITOR) W/DEVICE Does not apply Kit 1 Device by Does not apply route 4 (four) times daily.      nitroglycerin 0.4 MG Sublingual SL Tab Place 1 tablet (0.4 mg total) under the tongue every 5 (five) minutes as needed for Chest pain.      zolpidem 10 MG Oral Tab Take 1 tablet (10 mg total) by  mouth nightly as needed for Sleep.       !! - Potential duplicate medications found. Please discuss with provider.          Activity: activity as tolerated  Diet: cardiac diet, nectar thick      Exam on day of discharge:     Vitals:    05/21/25 0751   BP: 126/63   Pulse: 62   Resp: 18   Temp: 98.1 °F (36.7 °C)       Physical Exam:   General: no acute distress  Heart: RRR  Lungs: CTAB  Abd: Soft, NT, ND  Neuro: no focal deficits      Total time coordinating care for discharge: 32 minutes    Isai Bacon DO           Electronically signed by Isai Bacon DO on 5/21/2025 11:50 AM         REVIEWER COMMENTS

## 2025-05-22 NOTE — PROGRESS NOTES
Physician Clarification    Additional information related to the patient's respiratory status    Acute hypoxic respiratory failure ruled out     This note is part of the patient's medical record.

## 2025-05-22 NOTE — PROGRESS NOTES
Physician Clarification    Additional information related to diagnosis of heart failure    Chronic HFrEF     This note is part of the patient's medical record.

## 2025-05-22 NOTE — PAYOR COMM NOTE
--------------  CONTINUED STAY REVIEW    Payor: BCBS MEDICARE ADV PPO  Subscriber #:  [id number]  Authorization Number: [id number]    Admit date: 5/13/25  Admit time:  7:10 PM    REVIEW DOCUMENTATION:          5/20      .HOSPITALIST:         Chief Complaint:  F/u cardiac arrest     SUBJECTIVE:  No acute events. ICD placed yesterday, no issues.      OBJECTIVE:  Temp:  [97.9 °F (36.6 °C)-98.1 °F (36.7 °C)] 98.1 °F (36.7 °C)  Pulse:  [62-74] 62  Resp:  [16-20] 18  BP: (124-142)/(47-89) 126/63  SpO2:  [94 %-100 %] 99 %            Exam  Gen: No acute distress  HEENT: anicteric sclera, MMM  Pulm: Lungs clear, normal respiratory effort  CV: Heart with regular rate and rhythm, no peripheral edema  Abd: Abdomen soft, nontender, nondistended  MSK: Moves extremities spontaneously.  Skin: no rashes or lesions  Neuro: A&OX3, no focal deficits               Recent Labs   Lab 05/15/25  1417 05/19/25  0607 05/20/25  0439   WBC 4.0 5.1 6.6   HGB 7.4* 8.3* 8.2*   .2* 106.3* 108.1*   PLT 43.0* 54.0* 54.0*   NE 3.04  --   --    LYMABS 0.54*  --   --                Recent Labs   Lab 05/15/25  0610 05/17/25  0503 05/19/25  0607    141 140   K 4.1 3.8 3.8    104 103   CO2 23.0 24.0 25.0   BUN 58* 50* 48*   CREATSERUM 6.61* 5.83* 6.12*   CA 9.2 9.4 9.4   MG  --  2.5 2.4   * 117* 129*        Assessment/Plan:      81 yo man with h/o tavr, cad, dm who presented as osh transfer for continued management in setting of cardiac arrest     Cardiac arrest 2/2 ischemia vs scar  Vtach  EF 35-40%, ICM  CAD with recent angio, h/o stents  - continue GDMT as tolerated- coreg and losartan  - continue DAPT  - prn pain meds for chest wall pain  - s/p ICD 5/19/25     PAF  -Cont amio, coreg. Not on anticoagulation.      ESRD on HD  - HD on t, th, sat. Consulted nephro. Resume dialysis schedule at [facility].      Thrombocytopenia  HCV cirrhosis, h/o esophageal banding  - stable in 50s. Dropped as low as 30s at OSH  - per notes, had been  seen hematology before and felt to have the thrombocytopenia 2/2 cirrhosis     Resp failure in setting of cardiac arrest  -Treated for PNA w/ abx recently  - continue IS, NC     Subcutaneous heparin, SCDs  Dispo: [facility]            CARDIOLOGY:       Impression:  VT   Possibly ischemic  Possibly scar based  Only one episode clearly correlates to electrolyte abnormalities/hyperkalemia  Has had several episodes of VT (2/2025 and then 5/2025 x 2)    CAD  MV PCI in the past (LM, LAD, OM/CX)  PCI to LAD recently in the setting of VT, unclear if lesion related ( believes it wasn't the \"culprit\" for the arrhythmia).  Hx aortic stenosis  S/P TAVR 2024  Echo with good valve function (mean gradient 9mmHg)  HFrEF/systolic dysfunction  EF at best up to 60% range  Echo 5/2025 with EF 40% range  Ejection fraction has been up and down  Limited ability to titrate \"GDMT\" given comorbidities  Euvolemic by exam. Weight up 3# overnight.  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 Plan for ICD this afternoon  Follow CBC/platelets. Platelets >50 this AM. H/H stable.   Ok to discharge       MEDICATIONS ADMINISTERED IN LAST 1 DAY:  Administration History                                           carvedilol (Coreg) tab 6.25 mg  Dose: 6.25 mg  Freq: 2 times daily with meals Route: OR  Start: 05/14/25 0800 End: 05/21/25 1606      1037 MV-Given     1700 MV-Given      0842 GL-Given     1758 GL-Given      0846 AW-Given     1732 AW-Given      (0851 GD)-Not Given     1738 GD-Given      0832 GD-Given     1715 GD-Given      0855 GD-Given     1736  GD-Given      (1116 YB)-Not Given     1749 YB-Given      0921 MV-Given     1606-D/C'd      ceFAZolin (Ancef) 1 g in dextrose 5% 100mL IVPB-ADD  Dose: 1 g  Freq: Every 24 hours Route: IV  Last Dose: 1 g (05/20/25 1534)  Start: 05/20/25 1400 End: 05/20/25 1604   Admin Instructions:   Pharmacist may adjust for renal function to provide 24 hours of post-procedural coverage.   Order specific questions:               1534 YB-New Bag               amiodarone (Pacerone) tab 100 mg  Dose: 100 mg  Freq: 2 times daily Route: OR  Start: 05/13/25 2100 End: 05/21/25 1606     2208 VW-Given      1037 MV-Given     2105 VW-Given      0843 GL-Given     2142 DH-Given      0845 AW-Given     2153 HB-Given      0840 GD-Given     2044 SHERI-Given      0832 GD-Given     2157 MF-Given      0855 GD-Given     2046 LM-Given      (0900 YB)-Not Given [C]     2156 VW-Given      0921 MV-Given     1606-D/C'd      aspirin DR tab 81 mg  Dose: 81 mg  Freq: Every other day Route: OR  Start: 05/15/25 0900 End: 05/21/25 1606   Admin Instructions:   Do not crush       0843 GL-Given       0840 GD-Given       0855 GD-Given       0921 MV-Given            clopidogrel (Plavix) tab 75 mg  Dose: 75 mg  Freq: Daily Route: OR  Start: 05/14/25 0930 End: 05/21/25 1606      1037 MV-Given      0842 GL-Given      0847 AW-Given      0840 GD-Given      0832 GD-Given      0855 GD-Given      1137 YB-Given      0921 MV-Given            epoetin eunice (Epogen, Procrit) 84766 UNIT/ML injection 10,000 Units  Dose: 10,000 Units  Freq: Once in dialysis Route: IV  Start: 05/20/25 1600 End: 05/20/25 1330   Admin Instructions:   Keep refrigerated   Order specific questions:               1330 YB-Given [C]            heparin (Porcine) 1000 UNIT/ML injection 10,000 Units  Dose: 10,000 Units  Freq: Once Route: IF  Start: 05/20/25 1330 End: 05/20/25 1333            1330 YB-Given [C]              albumin human (Albumin) 25% injection 25 g  Dose: 25 g  Freq: As needed with Dialysis Route:  IV  PRN Comment: For SBP < 90 during dialysis  Start: 05/20/25 1116 End: 05/21/25 1606   Admin Instructions:   If SBP still less than 90 mmHg after NS boluses, give albumin x 1 dose.  Notify provider if SBP continues to be less than 90 mmHg after albumin dose.   Order specific questions:               1136 YB-New Bag [C]      1606-           HYDROcodone-acetaminophen (Norco) 5-325 MG per tab 1 tablet  Dose: 1 tablet  Freq: Every 4 hours PRN Route: OR  PRN Reason: mild pain  Start: 05/13/25 2020 End: 05/21/25 1606     2227 VW-Given      0506 VW-Given          1658 MV-Given                     2142 DH-Given      1746 AW-Given      0408 HB-Given                                            1242 MV-Given     1606-D/C'd      Or   HYDROcodone-acetaminophen (Norco) 5-325 MG per tab 2 tablet  Dose: 2 tablet  Freq: Every 4 hours PRN Route: OR  PRN Reason: moderate pain  Start: 05/13/25 2020 End: 05/21/25 1606                1041 MV-Given [C]          2105 VW-Given      0545 VW-Given     1237 GL-Given                      2048 SHERI-Given      2157 MF-Given      2110 VW-Given      0238 LM-Given     1137 YB-Given     1749 YB-Given      0656 VW-          Or   HYDROmorphone (Dilaudid) 1 MG/ML injection 0.4 mg  Dose: 0.4 mg  Freq: Every 6 hours PRN Route: IV  PRN Reason: severe pain  Start: 05/13/25 2021 End: 05/21/25 1606   Admin Instructions:   Use PRN reason as a guide and follow range order policy. If oral pain meds are ordered and patient can tolerate oral intake, start with PRN oral pain medications first.            0010 VW-Given     0847 GL-Given      0019 DH-Given        2325 LM-Given      0602 LM-Given     2139 VW-Given      1606-              Vitals (last day) before discharge       Date/Time Temp Pulse Resp BP SpO2 Weight O2 Device O2 Flow Rate (L/min) [facility]    05/21/25 1213 97.4 °F (36.3 °C) 62 18 137/64 95 % -- None (Room air) -- MG    05/21/25 0900 -- 65 -- -- 86 % -- -- -- MG    05/21/25 0751 98.1 °F (36.7 °C) 62 18  126/63 99 % -- None (Room air) -- MG    05/21/25 0500 98 °F (36.7 °C) 62 18 142/89 100 % 200 lb 9.9 oz (91 kg) None (Room air) 0 L/min JT    05/20/25 2311 98 °F (36.7 °C) 66 17 138/53 100 % -- None (Room air) 0 L/min JT    05/20/25 2019 98.1 °F (36.7 °C) 64 17 130/50 98 % -- None (Room air) 0 L/min JT    05/20/25 1744 -- 66 16 139/57 98 % -- None (Room air) -- YB    05/20/25 1600 97.9 °F (36.6 °C) -- -- -- -- -- -- -- YB    05/20/25 1600 -- 64 20 135/57 100 % -- None (Room air) -- NC    05/20/25 1357 -- 65 16 133/47 94 % -- None (Room air) -- YB    05/20/25 1141 -- 74 18 124/57 -- -- -- -- YB    05/20/25 1141 98 °F (36.7 °C) -- -- -- -- -- -- -- NC    05/20/25 1126 -- 63 -- 92/51 -- -- -- -- YB    05/20/25 1115 -- 63 -- 94/45 -- -- -- -- YB    05/20/25 1111 -- 63 -- 83/51 -- -- -- -- YB    05/20/25 0808 98.2 °F (36.8 °C) 60 20 104/87 100 % -- None (Room air) -- NC    05/20/25 0540 97.3 °F (36.3 °C) 61 16 104/47 89 % 198 lb 10.2 oz (90.1 kg) None (Room air) 0 L/min [facility] Reference Range & Units 05/20/25 04:39   WBC 4.0 - 11.0 x10(3) uL 6.6   Hemoglobin 13.0 - 17.5 g/dL 8.2 (L)   Hematocrit 39.0 - 53.0 % 25.3 (L)   Platelet Count 150.0 - 450.0 10(3)uL 54.0 (L)   RBC 3.80 - 5.80 x10(6)uL 2.34 (L)   MCH 26.0 - 34.0 pg 35.0 (H)   MCHC 31.0 - 37.0 g/dL 32.4   MCV 80.0 - 100.0 fL 108.1 (H)   (L): Data is abnormally low  (H): Data is abnormally high

## 2025-05-27 ENCOUNTER — NURSING HOME VISIT (OUTPATIENT)
Dept: NEPHROLOGY | Age: 80
End: 2025-05-27

## 2025-05-27 VITALS
WEIGHT: 194.4 LBS | SYSTOLIC BLOOD PRESSURE: 144 MMHG | OXYGEN SATURATION: 97 % | HEART RATE: 67 BPM | DIASTOLIC BLOOD PRESSURE: 56 MMHG | RESPIRATION RATE: 19 BRPM | HEIGHT: 71 IN | TEMPERATURE: 97.7 F | BODY MASS INDEX: 27.22 KG/M2

## 2025-05-27 DIAGNOSIS — D63.1 ANEMIA DUE TO CHRONIC KIDNEY DISEASE, ON CHRONIC DIALYSIS  (CMD): ICD-10-CM

## 2025-05-27 DIAGNOSIS — N18.6 ESRD (END STAGE RENAL DISEASE)  (CMD): Primary | ICD-10-CM

## 2025-05-27 DIAGNOSIS — Z99.2 ANEMIA DUE TO CHRONIC KIDNEY DISEASE, ON CHRONIC DIALYSIS  (CMD): ICD-10-CM

## 2025-05-27 DIAGNOSIS — N18.6 ANEMIA DUE TO CHRONIC KIDNEY DISEASE, ON CHRONIC DIALYSIS  (CMD): ICD-10-CM

## 2025-05-27 PROCEDURE — 99309 SBSQ NF CARE MODERATE MDM 30: CPT | Performed by: NURSE PRACTITIONER

## 2025-06-02 ENCOUNTER — NURSING HOME VISIT (OUTPATIENT)
Dept: NEPHROLOGY | Age: 80
End: 2025-06-02

## 2025-06-02 VITALS
RESPIRATION RATE: 18 BRPM | TEMPERATURE: 97.7 F | WEIGHT: 200 LBS | HEART RATE: 62 BPM | DIASTOLIC BLOOD PRESSURE: 67 MMHG | SYSTOLIC BLOOD PRESSURE: 157 MMHG | BODY MASS INDEX: 27.89 KG/M2 | OXYGEN SATURATION: 96 %

## 2025-06-02 DIAGNOSIS — I10 PRIMARY HYPERTENSION: ICD-10-CM

## 2025-06-02 DIAGNOSIS — Z99.2 ANEMIA DUE TO CHRONIC KIDNEY DISEASE, ON CHRONIC DIALYSIS  (CMD): ICD-10-CM

## 2025-06-02 DIAGNOSIS — N18.6 ESRD (END STAGE RENAL DISEASE)  (CMD): Primary | ICD-10-CM

## 2025-06-02 DIAGNOSIS — N18.6 ANEMIA DUE TO CHRONIC KIDNEY DISEASE, ON CHRONIC DIALYSIS  (CMD): ICD-10-CM

## 2025-06-02 DIAGNOSIS — D63.1 ANEMIA DUE TO CHRONIC KIDNEY DISEASE, ON CHRONIC DIALYSIS  (CMD): ICD-10-CM

## 2025-06-02 PROCEDURE — 99309 SBSQ NF CARE MODERATE MDM 30: CPT | Performed by: NURSE PRACTITIONER

## 2025-06-09 ENCOUNTER — NURSING HOME VISIT (OUTPATIENT)
Dept: NEPHROLOGY | Age: 80
End: 2025-06-09

## 2025-06-09 VITALS
SYSTOLIC BLOOD PRESSURE: 143 MMHG | TEMPERATURE: 97.5 F | WEIGHT: 197.8 LBS | HEART RATE: 61 BPM | RESPIRATION RATE: 19 BRPM | BODY MASS INDEX: 27.59 KG/M2 | DIASTOLIC BLOOD PRESSURE: 70 MMHG | OXYGEN SATURATION: 95 %

## 2025-06-09 DIAGNOSIS — N18.6 ANEMIA DUE TO CHRONIC KIDNEY DISEASE, ON CHRONIC DIALYSIS  (CMD): ICD-10-CM

## 2025-06-09 DIAGNOSIS — D63.1 ANEMIA DUE TO CHRONIC KIDNEY DISEASE, ON CHRONIC DIALYSIS  (CMD): ICD-10-CM

## 2025-06-09 DIAGNOSIS — Z99.2 ANEMIA DUE TO CHRONIC KIDNEY DISEASE, ON CHRONIC DIALYSIS  (CMD): ICD-10-CM

## 2025-06-09 DIAGNOSIS — N18.6 ESRD (END STAGE RENAL DISEASE)  (CMD): Primary | ICD-10-CM

## 2025-06-09 DIAGNOSIS — I10 PRIMARY HYPERTENSION: ICD-10-CM

## 2025-06-09 PROCEDURE — 99309 SBSQ NF CARE MODERATE MDM 30: CPT | Performed by: NURSE PRACTITIONER

## (undated) DEVICE — CYSTO CDS-LF: Brand: MEDLINE INDUSTRIES, INC.

## (undated) DEVICE — URINE DRAINAGE BAG,BAG, NEEDLE SAMPLING, DRAIN TUBE: Brand: DOVER

## (undated) DEVICE — GLOVE SURG SENSICARE SZ 7-1/2

## (undated) DEVICE — ZIPWIRE GUIDEWIRE .038X150 ANG

## (undated) DEVICE — 3M™ TEGADERM™ TRANSPARENT FILM DRESSING FRAME STYLE, 1626W, 4 IN X 4-3/4 IN (10 CM X 12 CM), 50/CT 4CT/CASE: Brand: 3M™ TEGADERM™

## (undated) DEVICE — OR TOWEL, 17" X 26" STERILE, BLUE: Brand: PREMIERPRO

## (undated) DEVICE — PACK UNIVERSAL DRAPE

## (undated) DEVICE — Device

## (undated) DEVICE — REM POLYHESIVE ADULT PATIENT RETURN ELECTRODE: Brand: VALLEYLAB

## (undated) DEVICE — TIBURON DRAPE TOWELS: Brand: CONVERTORS

## (undated) DEVICE — SOLUTION PREP 26ML 0.7% POVACRYLEX 74% ISO

## (undated) DEVICE — 3M™ IOBAN™ 2 ANTIMICROBIAL INCISE DRAPE 6650EZ: Brand: IOBAN™ 2

## (undated) DEVICE — X-RAY DETECTABLE SPONGES,16 PLY: Brand: VISTEC

## (undated) DEVICE — FLUID JUMPSUIT DISP SD-100

## (undated) DEVICE — SOL H2O 3000ML IRRIG

## (undated) DEVICE — PACK ANGIOGRAPHY CUSTOM

## (undated) DEVICE — NON-ADHERENT PAD PREPACK: Brand: TELFA

## (undated) DEVICE — PREMIERPRO LAP SPONGE 18"X18" STERILE, 5/PK: Brand: PREMIERPRO

## (undated) DEVICE — GOWN,SIRUS,FABRIC-REINFORCED,X-LARGE: Brand: MEDLINE

## (undated) DEVICE — SOL H2O 1000ML BTL

## (undated) DEVICE — HF-RESECTION ELECTRODE PLASMALOOP LOOP, MEDIUM, 24 FR., 12°/16°, ESG TURIS: Brand: OLYMPUS

## (undated) DEVICE — ELECTRODE DEFIB AD SLD GEL MULTFUNC RADLUC

## (undated) DEVICE — CATH URET CONE TIP 8FR 138008

## (undated) DEVICE — KENDALL SCD EXPRESS SLEEVES, KNEE LENGTH, MEDIUM: Brand: KENDALL SCD

## (undated) DEVICE — SUT PERMA- 0 18IN FSL NABSRB BLK L30MM 3/8

## (undated) DEVICE — KIT INFL DEV 20ML W/ INSRT TOOL 3 W STPCOCK

## (undated) DEVICE — SPONGE 4X4 10PK

## (undated) DEVICE — HF-RESECTION ELECTRODE PLASMALOOP LOOP, LARGE, 24 FR., 12°/16°, ESG TURIS: Brand: OLYMPUS

## (undated) DEVICE — ZIPWIRE GUIDEWIRE .038X150 STR

## (undated) DEVICE — SOL  .9 3000ML

## (undated) DEVICE — 3M™ IOBAN™ 2 ANTIMICROBIAL INCISE DRAPE 6651EZ: Brand: IOBAN™ 2

## (undated) DEVICE — GUIDEWIRE ANGIO PERIPH 0.035IN X 145CM STR

## (undated) DEVICE — PLASTC TOOMEY SYRNG DISP

## (undated) DEVICE — ANGIOGRAPHIC CATHETER: Brand: IMPULSE™

## (undated) DEVICE — CATH F6 ST+ JL 4 100CM: Brand: SUPER TORQUE

## (undated) DEVICE — 3M™ BAIR HUGGER® UNDERBODY BLANKET, FULL ACCESS, 10 PER CASE 63500: Brand: BAIR HUGGER™

## (undated) DEVICE — 1010 S-DRAPE TOWEL DRAPE 10/BX: Brand: STERI-DRAPE™

## (undated) DEVICE — WIRE SENS XSM TIP L2.9CM 3.2CM SAVVYWIRE

## (undated) DEVICE — SYRINGE MED 30ML STD CLR PLAS LL TIP N CTRL

## (undated) DEVICE — CHEMOTHERAPY CONTAINER,SLIDE LID, YELLOW: Brand: SHARPSAFETY

## (undated) DEVICE — FIXED CORE WIRE GUIDE SAFE-T-J, CURVED: Brand: COOK

## (undated) DEVICE — COVER,TABLE,44X90,STERILE: Brand: MEDLINE

## (undated) DEVICE — GUIDEWIRE: Brand: AMPLATZ SUPER STIFF™

## (undated) DEVICE — GLIDESHEATH SLENDER STAINLESS STEEL KIT: Brand: GLIDESHEATH SLENDER

## (undated) DEVICE — PERCLOSE™ PROSTYLE™ SUTURE-MEDIATED CLOSURE AND REPAIR SYSTEM: Brand: PERCLOSE™ PROSTYLE™

## (undated) DEVICE — MICROPUNCTURE INTRODUCER SET SILHOUETTE TRANSITIONLESS WITH NITINOL WIRE GUIDE: Brand: MICROPUNCTURE

## (undated) DEVICE — GWIRE PTFE 035X260CM FX 3M J

## (undated) NOTE — LETTER
[address] [facility] FOR BLOOD OR BLOOD COMPONENTS      In the course of your treatment, it may become necessary to administer a transfusion of blood or blood components. This form provides basic information concerning this procedure and, if signed by you, authorizes its performance by qualified medical personnel. DESCRIPTION OF PROCEDURE:  Blood is introduced into one of your veins, commonly in the arm, using a sterilized disposable needle. The amount of blood transfused, and whether the transfusion will be of blood or blood components is a judgment the physician will make based on your particular needs. RISKS:  The transfusion is a common procedure of low risk. MINOR AND TEMPORARY REACTIONS ARE NOT UNCOMMON, including a slight bruise, swelling or local reaction in the area where the needle pierces your skin, or a non-serious reaction to the transfused material itself, including headache, fever or a mild skin reaction, such as rash. Serious reactions are possible, though very unlikely and include severe allergic reaction (shock)  and destruction (hemolysis) of transfused blood cells. Infectious diseases which are known to be transmitted by blood transfusion include CERTAIN TYPES OF VIRAL HEPATITIS, a viral infection of the liver, HUMAN IMMUNODEFICIENCY VIRUS (HIV-1,2) infection, a viral infection known to cause ACQUIRED IMMUNODEFICIENCY SYNDROME (AIDS) AS WELL AS CERTAIN OTHER BACTERIAL, VIRAL AND PARASITIC DISEASES. While a minimal risk of acquiring an infectious disease from transfused blood exists, in accordance with Federal and State law all due care has been taken in donor selection and testing to avoid transmission of disease. ALTERNATIVES:  If loss of blood poses serious threats in the course of your treatment, THERE IS NO EFFECTIVE ALTERNATIVE TO BLOOD TRANSFUSION.  However, if you have any further questions on this matter, your physician will fully explain the alternatives to you if it has not already been done. I,Jonny Haque, have read/had read to me the above. I understand the matters bearing on the decision whether or not to authorize a transfusion of blood or blood components. I have no questions which have not been answered to my full satisfaction.  I hereby consent to such transfusion as  my physician may deem necessary or advisable in the course of my treatment.        _______________   __________________________________________________  Date     Signature of Patient, Parent or Legal Guardian      ([place])      __________________________________________  Witness to Signature (title or relationship to patient)    Patient Name: Terri Queen     :                  Printed: 2023     Medical Record #: [id number]                    Page 1 of 1

## (undated) NOTE — IP AVS SNAPSHOT
[facility] [place] [address] [place], [address] ~ [phone]                Discharge Summary   1/16/2017    Davida Boone           Admission Information        Provider Department    1/16/2017 Peral Perry MD  Pre-Op / Ascc      Kamari Wall Commonly known as:  HUMALOG KWIKPEN        INJECT [address] PLUS SLIDING SCALE    Charley June     [    ]    [    ]    [    ]    [    ]       Insulin Pen Needle 31G X 5 MM Misc   Commonly known as:  BD PEN NEEDLE MINI U/F        To use 4 · Refrain from physical exercise or gym workouts for one month, if you had a biopsy or tissue removed. · You can go up and down the stairs as tolerated. Use common sense  · You cannot return to work, if your work requires strenuous activity.     Driving Alcoholic beverages should be avoided for:  · 24 hours after Anesthesia/Sedation    Call the doctor for:  · Elevated Temperature  · Bleeding  · Nausea/Vomiting  · Pain not relieved with pain medication    For life threatening emergencies (unexpected chest - If you are a smoker or have smoked in the last 12 months, we encourage you to explore options for quitting.     - If you have concerns related to behavioral health issues or thoughts of harming yourself, contact [facility] a

## (undated) NOTE — LETTER
[address]  [address]  Authorization for Surgical Operation and Procedure     Date:___________                                                                                                         Time:__________  I hereby authorize * Surgery not found *, my physician and his/her assistants (if applicable), which may include medical students, residents, and/or fellows, to perform the following surgical operation/ procedure and administer such anesthesia as may be determined necessary by my physician:  Operation/Procedure name (s)  on Jonny Haque   2.   I recognize that during the surgical operation/procedure, unforeseen conditions may necessitate additional or different procedures than those listed above.  I, therefore, further authorize and request that the above-named surgeon, assistants, or designees perform such procedures as are, in their judgment, necessary and desirable.    3.   My surgeon/physician has discussed prior to my surgery the potential benefits, risks and side effects of this procedure; the likelihood of achieving goals; and potential problems that might occur during recuperation.  They also discussed reasonable alternatives to the procedure, including risks, benefits, and side effects related to the alternatives and risks related to not receiving this procedure.  I have had all my questions answered and I acknowledge that no guarantee has been made as to the result that may be obtained.    4.   Should the need arise during my operation/procedure, which includes change of level of care prior to discharge, I also consent to the administration of blood and/or blood products.  Further, I understand that despite careful testing and screening of blood or blood products by collecting agencies, I may still be subject to ill effects as a result of receiving a blood transfusion and/or blood products.  The following are some, but not all, of the potential risks that  can occur: fever and allergic reactions, hemolytic reactions, transmission of diseases such as Hepatitis, AIDS and Cytomegalovirus (CMV) and fluid overload.  In the event that I wish to have an autologous transfusion of my own blood, or a directed donor transfusion, I will discuss this with my physician.  Check only if Refusing Blood or Blood Products  I understand refusal of blood or blood products as deemed necessary by my physician may have serious consequences to my condition to include possible death. I hereby assume responsibility for my refusal and release the hospital, its personnel, and my physicians from any responsibility for the consequences of my refusal.          o  Refuse      5.   I authorize the use of any specimen, organs, tissues, body parts or foreign objects that may be removed from my body during the operation/procedure for diagnosis, research or teaching purposes and their subsequent disposal by hospital authorities.  I also authorize the release of specimen test results and/or written reports to my treating physician on the hospital medical staff or other referring or consulting physicians involved in my care, at the discretion of the Pathologist or my treating physician.    6.   I consent to the photographing or videotaping of the operations or procedures to be performed, including appropriate portions of my body for medical, scientific, or educational purposes, provided my identity is not revealed by the pictures or by descriptive texts accompanying them.  If the procedure has been photographed/videotaped, the surgeon will obtain the original picture, image, videotape or CD.  The hospital will not be responsible for storage, release or maintenance of the picture, image, tape or CD.    7.   I consent to the presence of a  or observers in the operating room as deemed necessary by my physician or their designees.    8.   I recognize that in the event my procedure results in  extended X-Ray/fluoroscopy time, I may develop a skin reaction.    9. If I have a Do Not Attempt Resuscitation (DNAR) order in place, that status will be suspended while in the operating room, procedural suite, and during the recovery period unless otherwise explicitly stated by me (or a person authorized to consent on my behalf). The surgeon or my attending physician will determine when the applicable recovery period ends for purposes of reinstating the DNAR order.  10. Patients having a sterilization procedure: I understand that if the procedure is successful the results will be permanent and it will therefore be impossible for me to inseminate, conceive, or bear children.  I also understand that the procedure is intended to result in sterility, although the result has not been guaranteed.   11. I acknowledge that my physician has explained sedation/analgesia administration to me including the risk and benefits I consent to the administration of sedation/analgesia as may be necessary or desirable in the judgment of my physician.    I CERTIFY THAT I HAVE READ AND FULLY UNDERSTAND THE ABOVE CONSENT TO OPERATION and/or OTHER PROCEDURE.    _________________________________________  __________________________________  Signature of Patient     Signature of Responsible Person         ___________________________________         Printed Name of Responsible Person           _________________________________                 Relationship to Patient  _________________________________________  ______________________________  Signature of Witness          Date  Time      Patient Name: Jonny Haque     : 4/15/1945                 Printed: 2024     Medical Record #: [id number]                     Page 1 of [address] [place] [address]  [id number]    Consent for Anesthesia    I, Jonny Garland agree to be cared for by an anesthesiologist, who is specially trained to  monitor me and give me medicine to put me to sleep or keep me comfortable during my procedure    I understand that my anesthesiologist is not an employee or agent of [facility] or [employer]. He or she works for [employer].    As the patient asking for anesthesia services, I agree to:  Allow the anesthesiologist (anesthesia doctor) to give me medicine and do additional procedures as necessary. Some examples are: Starting or using an “IV” to give me medicine, fluids or blood during my procedure, and having a breathing tube placed to help me breathe when I’m asleep (intubation). In the event that my heart stops working properly, I understand that my anesthesiologist will make every effort to sustain my life, unless otherwise directed by [facility] Do Not Resuscitate documents.  Tell my anesthesia doctor before my procedure:  If I am pregnant.  The last time that I ate or drank.  All of the medicines I take (including prescriptions, herbal supplements, and pills I can buy without a prescription (including street drugs/illegal medications). Failure to inform my anesthesiologist about these medicines may increase my risk of anesthetic complications.  If I am allergic to anything or have had a reaction to anesthesia before.  I understand how the anesthesia medicine will help me (benefits).  I understand that with any type of anesthesia medicine there are risks:  The most common risks are: nausea, vomiting, sore throat, muscle soreness, damage to my eyes, mouth, or teeth (from breathing tube placement).  Rare risks include: remembering what happened during my procedure, allergic reactions to medications, injury to my airway, heart, lungs, vision, nerves, or muscles and in extremely rare instances death.  My doctor has explained to me other choices available to me for my care (alternatives).  Pregnant Patients (“epidural”):  I understand that the risks of having an epidural (medicine  given into my back to help control pain during labor), include itching, low blood pressure, difficulty urinating, headache or slowing of the baby’s heart. Very rare risks include infection, bleeding, seizure, irregular heart rhythms and nerve injury.  Regional Anesthesia (“spinal”, “epidural”, & “nerve blocks”):  I understand that rare but potential complications include headache, bleeding, infection, seizure, irregular heart rhythms, and nerve injury.    I can change my mind about having anesthesia services at any time before I get the medicine.    _____________________________________________________________________________  Patient (or Representative) Signature/Relationship to Patient  Date   Time    _____________________________________________________________________________   Name (if used)    Language/Organization   Time    _____________________________________________________________________________  Anesthesiologist Signature     Date   Time  I have discussed the procedure and information above with the patient (or patient’s representative) and answered their questions. The patient or their representative has agreed to have anesthesia services.    _____________________________________________________________________________  Witness        Date   Time  I have verified that the signature is that of the patient or patient’s representative, and that it was signed before the procedure  Patient Name: Jonny Haque     : 4/15/1945                 Printed: 2024     Medical Record #: [id number]                     Page 2 of 2

## (undated) NOTE — Clinical Note
[address]  [address]  Authorization for Surgical Operation and Procedure     Date:___________                                                                                                         Time:__________  1. I hereby authorize * Surgery not found *, my physician and his/her assistants (if applicable), which may include medical students, residents, and/or fellows, to perform the following surgical operation/ procedure and administer such anesthesia as may be determined necessary by my physician:  Operation/Procedure name (s)  on Jonny Haque   2.   I recognize that during the surgical operation/procedure, unforeseen conditions may necessitate additional or different procedures than those listed above.  I, therefore, further authorize and request that the above-named surgeon, assistants, or designees perform such procedures as are, in their judgment, necessary and desirable.    3.   My surgeon/physician has discussed prior to my surgery the potential benefits, risks and side effects of this procedure; the likelihood of achieving goals; and potential problems that might occur during recuperation.  They also discussed reasonable alternatives to the procedure, including risks, benefits, and side effects related to the alternatives and risks related to not receiving this procedure.  I have had all my questions answered and I acknowledge that no guarantee has been made as to the result that may be obtained.    4.   Should the need arise during my operation/procedure, which includes change of level of care prior to discharge, I also consent to the administration of blood and/or blood products.  Further, I understand that despite careful testing and screening of blood or blood products by collecting agencies, I may still be subject to ill effects as a result of receiving a blood transfusion and/or blood products.  The following are some, but not all, of the potential risks  that can occur: fever and allergic reactions, hemolytic reactions, transmission of diseases such as Hepatitis, AIDS and Cytomegalovirus (CMV) and fluid overload.  In the event that I wish to have an autologous transfusion of my own blood, or a directed donor transfusion, I will discuss this with my physician.  Check only if Refusing Blood or Blood Products  I understand refusal of blood or blood products as deemed necessary by my physician may have serious consequences to my condition to include possible death. I hereby assume responsibility for my refusal and release the hospital, its personnel, and my physicians from any responsibility for the consequences of my refusal.          o  Refuse      5.   I authorize the use of any specimen, organs, tissues, body parts or foreign objects that may be removed from my body during the operation/procedure for diagnosis, research or teaching purposes and their subsequent disposal by hospital authorities.  I also authorize the release of specimen test results and/or written reports to my treating physician on the hospital medical staff or other referring or consulting physicians involved in my care, at the discretion of the Pathologist or my treating physician.    6.   I consent to the photographing or videotaping of the operations or procedures to be performed, including appropriate portions of my body for medical, scientific, or educational purposes, provided my identity is not revealed by the pictures or by descriptive texts accompanying them.  If the procedure has been photographed/videotaped, the surgeon will obtain the original picture, image, videotape or CD.  The hospital will not be responsible for storage, release or maintenance of the picture, image, tape or CD.    7.   I consent to the presence of a  or observers in the operating room as deemed necessary by my physician or their designees.    8.   I recognize that in the event my procedure results  in extended X-Ray/fluoroscopy time, I may develop a skin reaction.    9. If I have a Do Not Attempt Resuscitation (DNAR) order in place, that status will be suspended while in the operating room, procedural suite, and during the recovery period unless otherwise explicitly stated by me (or a person authorized to consent on my behalf). The surgeon or my attending physician will determine when the applicable recovery period ends for purposes of reinstating the DNAR order.  10. Patients having a sterilization procedure: I understand that if the procedure is successful the results will be permanent and it will therefore be impossible for me to inseminate, conceive, or bear children.  I also understand that the procedure is intended to result in sterility, although the result has not been guaranteed.   11. I acknowledge that my physician has explained sedation/analgesia administration to me including the risk and benefits I consent to the administration of sedation/analgesia as may be necessary or desirable in the judgment of my physician.    I CERTIFY THAT I HAVE READ AND FULLY UNDERSTAND THE ABOVE CONSENT TO OPERATION and/or OTHER PROCEDURE.    _________________________________________  __________________________________  Signature of Patient     Signature of Responsible Person         ___________________________________         Printed Name of Responsible Person           _________________________________                 Relationship to Patient  _________________________________________  ______________________________  Signature of Witness          Date  Time      Patient Name: Jonny Haque     : 4/15/1945                 Printed: May 15, 2025     Medical Record #: [id number]                     Page 1 of [address] [place] [address]  [id number]    Consent for Anesthesia    1. IJonny agree to be cared for by an anesthesiologist, who is specially trained to  monitor me and give me medicine to put me to sleep or keep me comfortable during my procedure    I understand that my anesthesiologist is not an employee or agent of [facility] or [employer]. He or she works for [employer].    2. As the patient asking for anesthesia services, I agree to:  a. Allow the anesthesiologist (anesthesia doctor) to give me medicine and do additional procedures as necessary. Some examples are: Starting or using an “IV” to give me medicine, fluids or blood during my procedure, and having a breathing tube placed to help me breathe when I’m asleep (intubation). In the event that my heart stops working properly, I understand that my anesthesiologist will make every effort to sustain my life, unless otherwise directed by [facility] Do Not Resuscitate documents.  b. Tell my anesthesia doctor before my procedure:  i. If I am pregnant.  ii. The last time that I ate or drank.  iii. All of the medicines I take (including prescriptions, herbal supplements, and pills I can buy without a prescription (including street drugs/illegal medications). Failure to inform my anesthesiologist about these medicines may increase my risk of anesthetic complications.  iv. If I am allergic to anything or have had a reaction to anesthesia before.  3. I understand how the anesthesia medicine will help me (benefits).  4. I understand that with any type of anesthesia medicine there are risks:  a. The most common risks are: nausea, vomiting, sore throat, muscle soreness, damage to my eyes, mouth, or teeth (from breathing tube placement).  b. Rare risks include: remembering what happened during my procedure, allergic reactions to medications, injury to my airway, heart, lungs, vision, nerves, or muscles and in extremely rare instances death.  5. My doctor has explained to me other choices available to me for my care (alternatives).  6. Pregnant Patients (“epidural”):  I understand  that the risks of having an epidural (medicine given into my back to help control pain during labor), include itching, low blood pressure, difficulty urinating, headache or slowing of the baby’s heart. Very rare risks include infection, bleeding, seizure, irregular heart rhythms and nerve injury.  7. Regional Anesthesia (“spinal”, “epidural”, & “nerve blocks”):  I understand that rare but potential complications include headache, bleeding, infection, seizure, irregular heart rhythms, and nerve injury.    I can change my mind about having anesthesia services at any time before I get the medicine.    _____________________________________________________________________________  Patient (or Representative) Signature/Relationship to Patient  Date   Time    _____________________________________________________________________________   Name (if used)    Language/Organization   Time    _____________________________________________________________________________  Anesthesiologist Signature     Date   Time  I have discussed the procedure and information above with the patient (or patient’s representative) and answered their questions. The patient or their representative has agreed to have anesthesia services.    _____________________________________________________________________________  Witness        Date   Time  I have verified that the signature is that of the patient or patient’s representative, and that it was signed before the procedure  Patient Name: Jonny Haque     : 4/15/1945                 Printed: May 15, 2025     Medical Record #: [id number]                     Page 2 of 2

## (undated) NOTE — LETTER
Last Revised 02/07/06  Obstructive Sleep Apnea Questionnaire    Clinical signs and symptoms suggesting the possibility of CIRILO    1. Predisposing physical characteristics (positive with any of the following present)  ? BMI 35kg/m²  ?  Craniofacial abnormalit pauses which are frightening to the observer, patient regularly falls asleep within minutes after being left unstimulated) in which case they should be treated as though they have severe sleep apnea.     The sleep laboratory’s assessment (none, mild, modera C. Requirement for postoperative opioids.                Opioid requirement             Points   None 0    Low dose oral opiod 1    High dose oral opioids, parenteral or neuraxial opiods 3      Point Total for C        Estimation of Perioperative Ris

## (undated) NOTE — LETTER
[facility]  [address] [address]  [phone]    Blood Transfusion Consent    In the course of your treatment, it may become necessary to administer a transfusion of blood or blood components. This form provides basic information concerning this procedure and, if signed by you, authorizes its administration. By signing this form, you agree that all of your questions about the administration of blood or blood products have been answered by the ordering medical professional or designee.    Description of Procedure  Blood is introduced into one of your veins, commonly in the arm, using a sterilized disposable needle. The amount of blood transfused, and whether the transfusion will be of blood or blood components is a judgement the physician will make based on your particular needs.    Risks  The transfusion is a common procedure of low risk.  MINOR AND TEMPORARY REACTIONS ARE NOT UNCOMMON, including a slight bruise, swelling or local reaction in the area where the needle pierces your skin, or a nonserious reaction to the transfused material itself, including headache, fever or mild skin reaction, such as rash.  Serious reactions are possible, though very unlikely, and include severe allergic reaction (shock) and destruction (hemolysis) of transfused blood cells.  Infectious diseases which are known to be transmitted by blood transfusion include certain types of viral Hepatitis(liver infection from a virus), Human Immunodeficiency Virus (HIV-1,2) infection, a viral infection known to cause Acquired Immunodeficiency Syndrome (AIDS), as well as certain other bacterial, viral, and parasitic diseases. While a minimal risk of acquiring an infectious disease from transfused blood exists, in accordance with the Federal and State law, all due care has been taken in donor selection and testing to avoid transmission of disease.    Alternatives  If loss of blood poses serious threats during your  treatment, THERE IS NO EFFECTIVE ALTERNATIVE TO BLOOD TRANSFUSION. However, if you have any further questions on this matter, your provider will fully explain the alternatives to you if it has not already been done.    I, ______________________________, have read/had read to me the above. I understand the matters bearing on the decision whether or not to authorize a transfusion of blood or blood components. I have no questions which have not been answered to my full satisfaction. I hereby consent to such transfusion as my physician may deem necessary or advisable in the course of my treatment.    ______________________________________________                    ___________________________  (Signature of Patient or Responsible party in case of minor,                 (Printed Name of Patient or incompetent, or unconscious patient)              Responsible Party)    ___________________________               _____________________  (Relationship to Patient if not self)                                    (Date and Time)    __________________________                                                           ______________________              (Signature of Witness)               (Printed Name of Witness)     Language line ()    Telephone/Verbal/Video Consent    __________________________                     ____________________  (Signature of 2nd Witness           (Printed Name of 2nd  Telephone/Verbal/Video Consent)           Witness)    Patient Name: Jonny Haque     : 4/15/1945                 Printed: 2024     Medical Record #: [id number]      Rev: 2023

## (undated) NOTE — LETTER
[address]  [address]  Consent for Procedure/Sedation  Date: 03/19/2024         Time: 0945    I hereby authorize MD Donald, my physician and his/her assistants (if applicable), which may include medical students, residents, and/or fellows, to perform the following surgical operation/ procedure and administer such anesthesia as may be determined necessary by my physician:  Operation/Procedure name (s)  Cardiac Catheterization, Left Ventricular Cineangiography, Bilateral Selective Coronary Angiography and/or Right Heart Catheterization; possible Percutaneous Transluminal Coronary Angioplasty, Coronary Atherectomy, Coronary Stent, Intracoronary Thrombolytic therapy, Antiplatelet therapy and/or Intravascular Ultrasound with Impella support on Jonny Haque   2.   I recognize that during the surgical operation/procedure, unforeseen conditions may necessitate additional or different procedures than those listed above.  I, therefore, further authorize and request that the above-named surgeon, assistants, or designees perform such procedures as are, in their judgment, necessary and desirable.    3.   My surgeon/physician has discussed prior to my surgery the potential benefits, risks and side effects of this procedure; the likelihood of achieving goals; and potential problems that might occur during recuperation.  They also discussed reasonable alternatives to the procedure, including risks, benefits, and side effects related to the alternatives and risks related to not receiving this procedure.  I have had all my questions answered and I acknowledge that no guarantee has been made as to the result that may be obtained.    4.   Should the need arise during my operation/procedure, which includes change of level of care prior to discharge, I also consent to the administration of blood and/or blood products.  Further, I understand that despite careful testing and screening of blood or blood  products by collecting agencies, I may still be subject to ill effects as a result of receiving a blood transfusion and/or blood products.  The following are some, but not all, of the potential risks that can occur: fever and allergic reactions, hemolytic reactions, transmission of diseases such as Hepatitis, AIDS and Cytomegalovirus (CMV) and fluid overload.  In the event that I wish to have an autologous transfusion of my own blood, or a directed donor transfusion, I will discuss this with my physician.  Check only if Refusing Blood or Blood Products  I understand refusal of blood or blood products as deemed necessary by my physician may have serious consequences to my condition to include possible death. I hereby assume responsibility for my refusal and release the hospital, its personnel, and my physicians from any responsibility for the consequences of my refusal.          o  Refuse      5.   I authorize the use of any specimen, organs, tissues, body parts or foreign objects that may be removed from my body during the operation/procedure for diagnosis, research or teaching purposes and their subsequent disposal by hospital authorities.  I also authorize the release of specimen test results and/or written reports to my treating physician on the hospital medical staff or other referring or consulting physicians involved in my care, at the discretion of the Pathologist or my treating physician.    6.   I consent to the photographing or videotaping of the operations or procedures to be performed, including appropriate portions of my body for medical, scientific, or educational purposes, provided my identity is not revealed by the pictures or by descriptive texts accompanying them.  If the procedure has been photographed/videotaped, the surgeon will obtain the original picture, image, videotape or CD.  The hospital will not be responsible for storage, release or maintenance of the picture, image, tape or CD.    7.    I consent to the presence of a  or observers in the operating room as deemed necessary by my physician or their designees.    8.   I recognize that in the event my procedure results in extended X-Ray/fluoroscopy time, I may develop a skin reaction.    9. If I have a Do Not Attempt Resuscitation (DNAR) order in place, that status will be suspended while in the operating room, procedural suite, and during the recovery period unless otherwise explicitly stated by me (or a person authorized to consent on my behalf). The surgeon or my attending physician will determine when the applicable recovery period ends for purposes of reinstating the DNAR order.  10. Patients having a sterilization procedure: I understand that if the procedure is successful the results will be permanent and it will therefore be impossible for me to inseminate, conceive, or bear children.  I also understand that the procedure is intended to result in sterility, although the result has not been guaranteed.   11. I acknowledge that my physician has explained sedation/analgesia administration to me including the risk and benefits I consent to the administration of sedation/analgesia as may be necessary or desirable in the judgment of my physician.    I CERTIFY THAT I HAVE READ AND FULLY UNDERSTAND THE ABOVE CONSENT TO OPERATION and/or OTHER PROCEDURE.      ____________________________________       _________________________________      ______________________________  Signature of Patient         Signature of Responsible Person        Printed Name of Responsible Person   ____________________________________      _________________________________      ______________________________       Signature of Witness          Relationship to Patient                       Date                                       Time  Patient Name: Jonny Haque  : 4/15/1945    Reviewed: 2024   Printed: 2024  Medical Record #: [id number] Page  1 of 1

## (undated) NOTE — LETTER
05/22/24    Patient: Jonny Haque  YOB: 1945   Member ID: [id number]  Case Reference Number: [id number]     To Whom It May Concern:    I am writing to provide additional information to support my claim of treatment of Jonny Haque with a history of a subdural hematoma and Post-traumatic headache. In brief, treatment of Jonny Haque with a CT BRAIN OR HEAD is medically appropriate and necessary and should be a covered and reimbursed service given his recent fall on dual antiplatelet therapy with large right acute SDH.      My records indicate that Jonny Haque is a 79 year old male who has been treated by our Neurosurgery Team since 5.9.24.  At the time of initial consultation, Jonny Haque presented with reports of a mild posterior headache and posterior neck pain after a traumatic fall. Mr. Haque takes Aspirin every other day and Plavix daily.  Jonny Haque was recommended to complete a CT Head on 5.9.24 and again on 5.10.24 while inpatient in our hospital. Additionally, Jonny Haque should be evaluated for follow up to determine if his subdural hematoma has resolved and when he may be safe to resume dual antiplatelet therapy.      The initial CT on 5.9.24 revealed:     \"1. Stable right-sided extra-axial hematoma which is holocephalic with a bulbous central portion at the level of the right superior temporal lobe and frontal lobe maximum thickness at this level 2.2 cm with local mass effect with only 2 mm of right to   left midline shift.  The basal cisterns are patent.  2. New trace amount of blood in the posterior left occipital horn.  No hydrocephalus.  3. Trace amount of blood within the subdural space of the interhemispheric fissure and along the right tentorium of the cerebellum.\"    The following day, the 5.10.24 CT revealed:  \"CONCLUSION:  A right subdural hemorrhage appears minimally increased in size from the prior exam.     Possible trace left subarachnoid hemorrhage versus artifact present.   Continued follow-up is suggested.\"    Jonny Haque returned to our clinic on 5.17.24 with continued headaches and neck pain.   \"ASSESSMENT:  Acute SDH, Headache  My PLAN:  Pt is scheduled for follow up CT on 5/22.  Hold ASA and Plavix at minimum until results are completed.  2.   Will determine if additional follow up is needed after imaging.     Based on the current symptoms and imaging reports revealing a subdural hematoma with a midline shift, Jonny Haque was recommended to complete CT Head for evaluation and monitoring his serious brain condition.      The requested imaging was denied due your feedback stating you did not receive a detailed history that shows this study is needed.  Jonny Haque has suffered a traumatic fall and sustained an injury to the brain's vasculature which must be monitored by Head CT.  As Jonny Haque is a good candidate for CT Brain, our office would like to respectfully request a reconsideration of the denial issued to cover this service.     Given the patient's history, condition, and data provided to you, it is my professional opinion that evaluation of Jonny Haque with a Head CT is medically appropriate and necessary.       If this office may be of further assistance, please do not hesitate to contact us. I look forward to receiving your timely response and approval of this claim.     Sincerely,         AVELINA Trejo  [facility]  5/22/2024  3:11 PM

## (undated) NOTE — LETTER
[facility]  [address] [address]  [phone]    Blood Transfusion Consent    In the course of your treatment, it may become necessary to administer a transfusion of blood or blood components. This form provides basic information concerning this procedure and, if signed by you, authorizes its administration. By signing this form, you agree that all of your questions about the administration of blood or blood products have been answered by the ordering medical professional or designee.    Description of Procedure  Blood is introduced into one of your veins, commonly in the arm, using a sterilized disposable needle. The amount of blood transfused, and whether the transfusion will be of blood or blood components is a judgement the physician will make based on your particular needs.    Risks  The transfusion is a common procedure of low risk.  MINOR AND TEMPORARY REACTIONS ARE NOT UNCOMMON, including a slight bruise, swelling or local reaction in the area where the needle pierces your skin, or a nonserious reaction to the transfused material itself, including headache, fever or mild skin reaction, such as rash.  Serious reactions are possible, though very unlikely, and include severe allergic reaction (shock) and destruction (hemolysis) of transfused blood cells.  Infectious diseases which are known to be transmitted by blood transfusion include certain types of viral Hepatitis(liver infection from a virus), Human Immunodeficiency Virus (HIV-1,2) infection, a viral infection known to cause Acquired Immunodeficiency Syndrome (AIDS), as well as certain other bacterial, viral, and parasitic diseases. While a minimal risk of acquiring an infectious disease from transfused blood exists, in accordance with the Federal and State law, all due care has been taken in donor selection and testing to avoid transmission of disease.    Alternatives  If loss of blood poses serious threats during your  treatment, THERE IS NO EFFECTIVE ALTERNATIVE TO BLOOD TRANSFUSION. However, if you have any further questions on this matter, your provider will fully explain the alternatives to you if it has not already been done.    I, ______________________________, have read/had read to me the above. I understand the matters bearing on the decision whether or not to authorize a transfusion of blood or blood components. I have no questions which have not been answered to my full satisfaction. I hereby consent to such transfusion as my physician may deem necessary or advisable in the course of my treatment.    ______________________________________________                    ___________________________  (Signature of Patient or Responsible party in case of minor,                 (Printed Name of Patient or incompetent, or unconscious patient)              Responsible Party)    ___________________________               _____________________  (Relationship to Patient if not self)                                    (Date and Time)    __________________________                                                           ______________________              (Signature of Witness)               (Printed Name of Witness)     Language line ()    Telephone/Verbal/Video Consent    __________________________                     ____________________  (Signature of 2nd Witness           (Printed Name of 2nd  Telephone/Verbal/Video Consent)           Witness)    Patient Name: Jonny Haque     : 4/15/1945                 Printed: August 10, 2024     Medical Record #: [id number]      Rev: 2023

## (undated) NOTE — IP AVS SNAPSHOT
[facility] [place] [address] [place], [address] ~ [phone]                Discharge Summary   2/15/2017    Rashawn Flower           Admission Information        Provider Department    2/15/2017 Lina Neff MD  Pre-Op / Ascc      Daniel Austin carvedilol 12.5 MG Tabs   Commonly known as:  COREG        Take 1 tablet (12.5 mg total) by mouth 2 (two) times daily with meals.     Andree Guadarrama     [    ]    [    ]    [    ]    [    ]       [facility]YLE L[facility] TEST Strp   Generic drug:  Glucose Blood        TE - [address] AT [facility], [phone], [address] [place] [address],[facility] [address]     Phone:  [phone]    - cephALEXin 500 MG Caps              Patient Instructions         Home Care Instructions Following Your · You can return to work tomorrow, if your work does not involve strenuous activity. · Contact Dr. Aziza Reeder office, if you need a medical note. Follow-up Appointment with Dr. Yaya Varma  · Call Dr. Aziza Reeder office tomorrow for an appointment as discussed .     · · You may have some burning when you urinate  · This should subside shortly  · Drink plenty of water    Thank you for choosing [facility]  It was a pleasure taking care of you.   Happy Karla Laura RN    Instructions and Information about Your Heal -- (01/13/17)  99 (01/13/17)  50 (H) (02/10/17)  1.72 (H) (01/13/17)  8.8 (01/26/17)  136 (H) (01/13/17)  25      Metabolic Lab Results  (Last result in the past 90 days)    ALT Bilirubin,Total Total Protein Albumin Sodium Potassium Chloride    (01/13/17) Visits < Visit Summaries. MyChart questions? Call [phone] for help. ChiScan is NOT to be used for urgent needs. For medical emergencies, dial 911.

## (undated) NOTE — LETTER
[address]  [address]  Consent for Procedure/Sedation  Date: 5/18/25         Time: 1630    I hereby authorize Dr. Hamilton, my physician and his/her assistants (if applicable), which may include medical students, residents, and/or fellows, to perform the following surgical operation/ procedure and administer such anesthesia as may be determined necessary by my physician: Dual Chamber Internal Cardiac Defibrillator on Jonny Haque  2.   I recognize that during the surgical operation/procedure, unforeseen conditions may necessitate additional or different procedures than those listed above.  I, therefore, further authorize and request that the above-named surgeon, assistants, or designees perform such procedures as are, in their judgment, necessary and desirable.    3.   My surgeon/physician has discussed prior to my surgery the potential benefits, risks and side effects of this procedure; the likelihood of achieving goals; and potential problems that might occur during recuperation.  They also discussed reasonable alternatives to the procedure, including risks, benefits, and side effects related to the alternatives and risks related to not receiving this procedure.  I have had all my questions answered and I acknowledge that no guarantee has been made as to the result that may be obtained.    4.   Should the need arise during my operation/procedure, which includes change of level of care prior to discharge, I also consent to the administration of blood and/or blood products.  Further, I understand that despite careful testing and screening of blood or blood products by collecting agencies, I may still be subject to ill effects as a result of receiving a blood transfusion and/or blood products.  The following are some, but not all, of the potential risks that can occur: fever and allergic reactions, hemolytic reactions, transmission of diseases such as Hepatitis, AIDS and  Cytomegalovirus (CMV) and fluid overload.  In the event that I wish to have an autologous transfusion of my own blood, or a directed donor transfusion, I will discuss this with my physician.   Check only if Refusing Blood or Blood Products  I understand refusal of blood or blood products as deemed necessary by my physician may have serious consequences to my condition to include possible death. I hereby assume responsibility for my refusal and release the hospital, its personnel, and my physicians from any responsibility for the consequences of my refusal.         o  Refuse         5.   I authorize the use of any specimen, organs, tissues, body parts or foreign objects that may be removed from my body during the operation/procedure for diagnosis, research or teaching purposes and their subsequent disposal by hospital authorities.  I also authorize the release of specimen test results and/or written reports to my treating physician on the hospital medical staff or other referring or consulting physicians involved in my care, at the discretion of the Pathologist or my treating physician.    6.   I consent to the photographing or videotaping of the operations or procedures to be performed, including appropriate portions of my body for medical, scientific, or educational purposes, provided my identity is not revealed by the pictures or by descriptive texts accompanying them.  If the procedure has been photographed/videotaped, the surgeon will obtain the original picture, image, videotape or CD.  The hospital will not be responsible for storage, release or maintenance of the picture, image, tape or CD.    7.   I consent to the presence of a  or observers in the operating room as deemed necessary by my physician or their designees.    8.   I recognize that in the event my procedure results in extended X-Ray/fluoroscopy time, I may develop a skin reaction.    9. If I have a Do Not Attempt Resuscitation  (DNAR) order in place, that status will be suspended while in the operating room, procedural suite, and during the recovery period unless otherwise explicitly stated by me (or a person authorized to consent on my behalf). The surgeon or my attending physician will determine when the applicable recovery period ends for purposes of reinstating the DNAR order.  10. Patients having a sterilization procedure: I understand that if the procedure is successful the results will be permanent and it will therefore be impossible for me to inseminate, conceive, or bear children.  I also understand that the procedure is intended to result in sterility, although the result has not been guaranteed.   11. I acknowledge that my physician has explained sedation/analgesia administration to me including the risk and benefits I consent to the administration of sedation/analgesia as may be necessary or desirable in the judgment of my physician.    I CERTIFY THAT I HAVE READ AND FULLY UNDERSTAND THE ABOVE CONSENT TO OPERATION and/or OTHER PROCEDURE.        ____________________________________       _________________________________      ______________________________  Signature of Patient         Signature of Responsible Person        Printed Name of Responsible Person        ____________________________________      _________________________________      ______________________________       Signature of Witness          Relationship to Patient                       Date                                       Time  Patient Name: Jonny Haque  : 4/15/1945    Reviewed: 2024   Printed: May 18, 2025  Medical Record #: [id number] Page 1 of 1

## (undated) NOTE — LETTER
[facility]  Phone: [phone]  Right Fax: [phone]  [facility] 20 By:  Faith Saravia RN Date: 17    Patient Name: Jian King  Surgery Date: 2017    CSN: [id number]  Medical Record: [id number]   : [birth year]

## (undated) NOTE — LETTER
[address]  [address]  Authorization for Surgical Operation and Procedure     Date:___________                                                                                                         Time:__________  I hereby authorize * Surgery not found *, my physician and his/her assistants (if applicable), which may include medical students, residents, and/or fellows, to perform the following surgical operation/ procedure and administer such anesthesia as may be determined necessary by my physician:  Operation/Procedure name (s)  on Jonny Haque   2.   I recognize that during the surgical operation/procedure, unforeseen conditions may necessitate additional or different procedures than those listed above.  I, therefore, further authorize and request that the above-named surgeon, assistants, or designees perform such procedures as are, in their judgment, necessary and desirable.    3.   My surgeon/physician has discussed prior to my surgery the potential benefits, risks and side effects of this procedure; the likelihood of achieving goals; and potential problems that might occur during recuperation.  They also discussed reasonable alternatives to the procedure, including risks, benefits, and side effects related to the alternatives and risks related to not receiving this procedure.  I have had all my questions answered and I acknowledge that no guarantee has been made as to the result that may be obtained.    4.   Should the need arise during my operation/procedure, which includes change of level of care prior to discharge, I also consent to the administration of blood and/or blood products.  Further, I understand that despite careful testing and screening of blood or blood products by collecting agencies, I may still be subject to ill effects as a result of receiving a blood transfusion and/or blood products.  The following are some, but not all, of the potential risks that  can occur: fever and allergic reactions, hemolytic reactions, transmission of diseases such as Hepatitis, AIDS and Cytomegalovirus (CMV) and fluid overload.  In the event that I wish to have an autologous transfusion of my own blood, or a directed donor transfusion, I will discuss this with my physician.  Check only if Refusing Blood or Blood Products  I understand refusal of blood or blood products as deemed necessary by my physician may have serious consequences to my condition to include possible death. I hereby assume responsibility for my refusal and release the hospital, its personnel, and my physicians from any responsibility for the consequences of my refusal.          o  Refuse      5.   I authorize the use of any specimen, organs, tissues, body parts or foreign objects that may be removed from my body during the operation/procedure for diagnosis, research or teaching purposes and their subsequent disposal by hospital authorities.  I also authorize the release of specimen test results and/or written reports to my treating physician on the hospital medical staff or other referring or consulting physicians involved in my care, at the discretion of the Pathologist or my treating physician.    6.   I consent to the photographing or videotaping of the operations or procedures to be performed, including appropriate portions of my body for medical, scientific, or educational purposes, provided my identity is not revealed by the pictures or by descriptive texts accompanying them.  If the procedure has been photographed/videotaped, the surgeon will obtain the original picture, image, videotape or CD.  The hospital will not be responsible for storage, release or maintenance of the picture, image, tape or CD.    7.   I consent to the presence of a  or observers in the operating room as deemed necessary by my physician or their designees.    8.   I recognize that in the event my procedure results in  extended X-Ray/fluoroscopy time, I may develop a skin reaction.    9. If I have a Do Not Attempt Resuscitation (DNAR) order in place, that status will be suspended while in the operating room, procedural suite, and during the recovery period unless otherwise explicitly stated by me (or a person authorized to consent on my behalf). The surgeon or my attending physician will determine when the applicable recovery period ends for purposes of reinstating the DNAR order.  10. Patients having a sterilization procedure: I understand that if the procedure is successful the results will be permanent and it will therefore be impossible for me to inseminate, conceive, or bear children.  I also understand that the procedure is intended to result in sterility, although the result has not been guaranteed.   11. I acknowledge that my physician has explained sedation/analgesia administration to me including the risk and benefits I consent to the administration of sedation/analgesia as may be necessary or desirable in the judgment of my physician.    I CERTIFY THAT I HAVE READ AND FULLY UNDERSTAND THE ABOVE CONSENT TO OPERATION and/or OTHER PROCEDURE.    _________________________________________  __________________________________  Signature of Patient     Signature of Responsible Person         ___________________________________         Printed Name of Responsible Person           _________________________________                 Relationship to Patient  _________________________________________  ______________________________  Signature of Witness          Date  Time      Patient Name: Jonny Haque     : 4/15/1945                 Printed: 2024     Medical Record #: [id number]                     Page 1 of [address] [place] [address]  [id number]    Consent for Anesthesia    I, Jonny Garland agree to be cared for by an anesthesiologist, who is specially trained to  monitor me and give me medicine to put me to sleep or keep me comfortable during my procedure    I understand that my anesthesiologist is not an employee or agent of [facility] or [employer]. He or she works for [employer].    As the patient asking for anesthesia services, I agree to:  Allow the anesthesiologist (anesthesia doctor) to give me medicine and do additional procedures as necessary. Some examples are: Starting or using an “IV” to give me medicine, fluids or blood during my procedure, and having a breathing tube placed to help me breathe when I’m asleep (intubation). In the event that my heart stops working properly, I understand that my anesthesiologist will make every effort to sustain my life, unless otherwise directed by [facility] Do Not Resuscitate documents.  Tell my anesthesia doctor before my procedure:  If I am pregnant.  The last time that I ate or drank.  All of the medicines I take (including prescriptions, herbal supplements, and pills I can buy without a prescription (including street drugs/illegal medications). Failure to inform my anesthesiologist about these medicines may increase my risk of anesthetic complications.  If I am allergic to anything or have had a reaction to anesthesia before.  I understand how the anesthesia medicine will help me (benefits).  I understand that with any type of anesthesia medicine there are risks:  The most common risks are: nausea, vomiting, sore throat, muscle soreness, damage to my eyes, mouth, or teeth (from breathing tube placement).  Rare risks include: remembering what happened during my procedure, allergic reactions to medications, injury to my airway, heart, lungs, vision, nerves, or muscles and in extremely rare instances death.  My doctor has explained to me other choices available to me for my care (alternatives).  Pregnant Patients (“epidural”):  I understand that the risks of having an epidural (medicine  given into my back to help control pain during labor), include itching, low blood pressure, difficulty urinating, headache or slowing of the baby’s heart. Very rare risks include infection, bleeding, seizure, irregular heart rhythms and nerve injury.  Regional Anesthesia (“spinal”, “epidural”, & “nerve blocks”):  I understand that rare but potential complications include headache, bleeding, infection, seizure, irregular heart rhythms, and nerve injury.    I can change my mind about having anesthesia services at any time before I get the medicine.    _____________________________________________________________________________  Patient (or Representative) Signature/Relationship to Patient  Date   Time    _____________________________________________________________________________   Name (if used)    Language/Organization   Time    _____________________________________________________________________________  Anesthesiologist Signature     Date   Time  I have discussed the procedure and information above with the patient (or patient’s representative) and answered their questions. The patient or their representative has agreed to have anesthesia services.    _____________________________________________________________________________  Witness        Date   Time  I have verified that the signature is that of the patient or patient’s representative, and that it was signed before the procedure  Patient Name: Jonny Haque     : 4/15/1945                 Printed: 2024     Medical Record #: [id number]                     Page 2 of 2

## (undated) NOTE — LETTER
[address] [place] [address] Department  Phone: [phone]  Right Fax: [phone]  [facility] Bora Fernando RN Date: 17    Patient Name: Glenys Bachelor  Surgery Date: 2/15/2017    CSN: [id number]  Medical Record: [id number]   : [birth year]

## (undated) NOTE — LETTER
[address]  [address]  Consent for Procedure/Sedation  Date: 8/4/24         Time: 1200    I hereby authorize Satya Donald, my physician and his/her assistants (if applicable), which may include medical students, residents, and/or fellows, to perform the following surgical operation/ procedure and administer such anesthesia as may be determined necessary by my physician:  Operation/Procedure name (s)  Cardiac Catheterization, Left Ventricular Cineangiography, Bilateral Selective Coronary Angiography and/or Right Heart Catheterization; possible Percutaneous Transluminal Coronary Angioplasty, Coronary Atherectomy, Coronary Stent, Intracoronary Thrombolytic therapy, Antiplatelet therapy and/or Intravascular Ultrasound on Jonny Won   2.   I recognize that during the surgical operation/procedure, unforeseen conditions may necessitate additional or different procedures than those listed above.  I, therefore, further authorize and request that the above-named surgeon, assistants, or designees perform such procedures as are, in their judgment, necessary and desirable.    3.   My surgeon/physician has discussed prior to my surgery the potential benefits, risks and side effects of this procedure; the likelihood of achieving goals; and potential problems that might occur during recuperation.  They also discussed reasonable alternatives to the procedure, including risks, benefits, and side effects related to the alternatives and risks related to not receiving this procedure.  I have had all my questions answered and I acknowledge that no guarantee has been made as to the result that may be obtained.    4.   Should the need arise during my operation/procedure, which includes change of level of care prior to discharge, I also consent to the administration of blood and/or blood products.  Further, I understand that despite careful testing and screening of blood or blood products by collecting  agencies, I may still be subject to ill effects as a result of receiving a blood transfusion and/or blood products.  The following are some, but not all, of the potential risks that can occur: fever and allergic reactions, hemolytic reactions, transmission of diseases such as Hepatitis, AIDS and Cytomegalovirus (CMV) and fluid overload.  In the event that I wish to have an autologous transfusion of my own blood, or a directed donor transfusion, I will discuss this with my physician.  Check only if Refusing Blood or Blood Products  I understand refusal of blood or blood products as deemed necessary by my physician may have serious consequences to my condition to include possible death. I hereby assume responsibility for my refusal and release the hospital, its personnel, and my physicians from any responsibility for the consequences of my refusal.          o  Refuse      5.   I authorize the use of any specimen, organs, tissues, body parts or foreign objects that may be removed from my body during the operation/procedure for diagnosis, research or teaching purposes and their subsequent disposal by hospital authorities.  I also authorize the release of specimen test results and/or written reports to my treating physician on the hospital medical staff or other referring or consulting physicians involved in my care, at the discretion of the Pathologist or my treating physician.    6.   I consent to the photographing or videotaping of the operations or procedures to be performed, including appropriate portions of my body for medical, scientific, or educational purposes, provided my identity is not revealed by the pictures or by descriptive texts accompanying them.  If the procedure has been photographed/videotaped, the surgeon will obtain the original picture, image, videotape or CD.  The hospital will not be responsible for storage, release or maintenance of the picture, image, tape or CD.    7.   I consent to the  presence of a  or observers in the operating room as deemed necessary by my physician or their designees.    8.   I recognize that in the event my procedure results in extended X-Ray/fluoroscopy time, I may develop a skin reaction.    9. If I have a Do Not Attempt Resuscitation (DNAR) order in place, that status will be suspended while in the operating room, procedural suite, and during the recovery period unless otherwise explicitly stated by me (or a person authorized to consent on my behalf). The surgeon or my attending physician will determine when the applicable recovery period ends for purposes of reinstating the DNAR order.  10. Patients having a sterilization procedure: I understand that if the procedure is successful the results will be permanent and it will therefore be impossible for me to inseminate, conceive, or bear children.  I also understand that the procedure is intended to result in sterility, although the result has not been guaranteed.   11. I acknowledge that my physician has explained sedation/analgesia administration to me including the risk and benefits I consent to the administration of sedation/analgesia as may be necessary or desirable in the judgment of my physician.    I CERTIFY THAT I HAVE READ AND FULLY UNDERSTAND THE ABOVE CONSENT TO OPERATION and/or OTHER PROCEDURE.      ____________________________________       _________________________________      ______________________________  Signature of Patient         Signature of Responsible Person        Printed Name of Responsible Person   ____________________________________      _________________________________      ______________________________       Signature of Witness          Relationship to Patient                       Date                                       Time  Patient Name: Jonny Haque  : 4/15/1945    Reviewed: 2024   Printed: 2024  Medical Record #: [id number] Page 1 of 1

## (undated) NOTE — LETTER
[address]  [address]  Authorization for Surgical Operation and Procedure     Date:___________                                                                                                         Time:__________  I hereby authorize Dr. Satya Donald my physician and his/her assistants (if applicable), which may include medical students, residents, and/or fellows, to perform the following surgical operation/ procedure and administer such anesthesia as may be determined necessary by my physician:  Cardiac catheterization, left ventricular cineangiography, bilateral selective coronary arteriography and/or right heart catheterization.  Possible percutaneous transluminal coronary angioplasty, coronary atherectomy, coronary stent, intracoronary thrombolytic therapy   on  [facility]   2.   I recognize that during the surgical operation/procedure, unforeseen conditions may necessitate additional or different procedures than those listed above.  I, therefore, further authorize and request that the above-named surgeon, assistants, or designees perform such procedures as are, in their judgment, necessary and desirable.    3.   My surgeon/physician has discussed prior to my surgery the potential benefits, risks and side effects of this procedure; the likelihood of achieving goals; and potential problems that might occur during recuperation.  They also discussed reasonable alternatives to the procedure, including risks, benefits, and side effects related to the alternatives and risks related to not receiving this procedure.  I have had all my questions answered and I acknowledge that no guarantee has been made as to the result that may be obtained.    4.   Should the need arise during my operation/procedure, which includes change of level of care prior to discharge, I also consent to the administration of blood and/or blood products.  Further, I understand that despite careful testing  and screening of blood or blood products by collecting agencies, I may still be subject to ill effects as a result of receiving a blood transfusion and/or blood products.  The following are some, but not all, of the potential risks that can occur: fever and allergic reactions, hemolytic reactions, transmission of diseases such as Hepatitis, AIDS and Cytomegalovirus (CMV) and fluid overload.  In the event that I wish to have an autologous transfusion of my own blood, or a directed donor transfusion, I will discuss this with my physician.  Check only if Refusing Blood or Blood Products  I understand refusal of blood or blood products as deemed necessary by my physician may have serious consequences to my condition to include possible death. I hereby assume responsibility for my refusal and release the hospital, its personnel, and my physicians from any responsibility for the consequences of my refusal.          o  Refuse      5.   I authorize the use of any specimen, organs, tissues, body parts or foreign objects that may be removed from my body during the operation/procedure for diagnosis, research or teaching purposes and their subsequent disposal by hospital authorities.  I also authorize the release of specimen test results and/or written reports to my treating physician on the hospital medical staff or other referring or consulting physicians involved in my care, at the discretion of the Pathologist or my treating physician.    6.   I consent to the photographing or videotaping of the operations or procedures to be performed, including appropriate portions of my body for medical, scientific, or educational purposes, provided my identity is not revealed by the pictures or by descriptive texts accompanying them.  If the procedure has been photographed/videotaped, the surgeon will obtain the original picture, image, videotape or CD.  The hospital will not be responsible for storage, release or maintenance of the  picture, image, tape or CD.    7.   I consent to the presence of a  or observers in the operating room as deemed necessary by my physician or their designees.    8.   I recognize that in the event my procedure results in extended X-Ray/fluoroscopy time, I may develop a skin reaction.    9. If I have a Do Not Attempt Resuscitation (DNAR) order in place, that status will be suspended while in the operating room, procedural suite, and during the recovery period unless otherwise explicitly stated by me (or a person authorized to consent on my behalf). The surgeon or my attending physician will determine when the applicable recovery period ends for purposes of reinstating the DNAR order.  10. Patients having a sterilization procedure: I understand that if the procedure is successful the results will be permanent and it will therefore be impossible for me to inseminate, conceive, or bear children.  I also understand that the procedure is intended to result in sterility, although the result has not been guaranteed.   11. I acknowledge that my physician has explained sedation/analgesia administration to me including the risk and benefits I consent to the administration of sedation/analgesia as may be necessary or desirable in the judgment of my physician.    I CERTIFY THAT I HAVE READ AND FULLY UNDERSTAND THE ABOVE CONSENT TO OPERATION and/or OTHER PROCEDURE.    _________________________________________  __________________________________  Signature of Patient     Signature of Responsible Person         ___________________________________         Printed Name of Responsible Person           _________________________________                 Relationship to Patient  _________________________________________  ______________________________  Signature of Witness          Date  Time      Patient Name: Jonny Haque     : 4/15/1945                 Printed: 2024     Medical Record #: [id number]                      Page 1 of 2                                    [address]  [address]    Consent for Anesthesia    IJonny agree to be cared for by an anesthesiologist, who is specially trained to monitor me and give me medicine to put me to sleep or keep me comfortable during my procedure    I understand that my anesthesiologist is not an employee or agent of [facility] or [employer]. He or she works for [employer].    As the patient asking for anesthesia services, I agree to:  Allow the anesthesiologist (anesthesia doctor) to give me medicine and do additional procedures as necessary. Some examples are: Starting or using an “IV” to give me medicine, fluids or blood during my procedure, and having a breathing tube placed to help me breathe when I’m asleep (intubation). In the event that my heart stops working properly, I understand that my anesthesiologist will make every effort to sustain my life, unless otherwise directed by [facility] Do Not Resuscitate documents.  Tell my anesthesia doctor before my procedure:  If I am pregnant.  The last time that I ate or drank.  All of the medicines I take (including prescriptions, herbal supplements, and pills I can buy without a prescription (including street drugs/illegal medications). Failure to inform my anesthesiologist about these medicines may increase my risk of anesthetic complications.  If I am allergic to anything or have had a reaction to anesthesia before.  I understand how the anesthesia medicine will help me (benefits).  I understand that with any type of anesthesia medicine there are risks:  The most common risks are: nausea, vomiting, sore throat, muscle soreness, damage to my eyes, mouth, or teeth (from breathing tube placement).  Rare risks include: remembering what happened during my procedure, allergic reactions to medications, injury to my airway, heart, lungs, vision,  nerves, or muscles and in extremely rare instances death.  My doctor has explained to me other choices available to me for my care (alternatives).  Pregnant Patients (“epidural”):  I understand that the risks of having an epidural (medicine given into my back to help control pain during labor), include itching, low blood pressure, difficulty urinating, headache or slowing of the baby’s heart. Very rare risks include infection, bleeding, seizure, irregular heart rhythms and nerve injury.  Regional Anesthesia (“spinal”, “epidural”, & “nerve blocks”):  I understand that rare but potential complications include headache, bleeding, infection, seizure, irregular heart rhythms, and nerve injury.    I can change my mind about having anesthesia services at any time before I get the medicine.    _____________________________________________________________________________  Patient (or Representative) Signature/Relationship to Patient  Date   Time    _____________________________________________________________________________   Name (if used)    Language/Organization   Time    _____________________________________________________________________________  Anesthesiologist Signature     Date   Time  I have discussed the procedure and information above with the patient (or patient’s representative) and answered their questions. The patient or their representative has agreed to have anesthesia services.    _____________________________________________________________________________  Witness        Date   Time  I have verified that the signature is that of the patient or patient’s representative, and that it was signed before the procedure  Patient Name: Jonny Haque     : 4/15/1945                 Printed: 2024     Medical Record #: [id number]                     Page 2 of 2

## (undated) NOTE — LETTER
[address]  [address]  Authorization for Surgical Operation and Procedure     Date:___________                                                                                                         Time:__________  I hereby authorize , my physician and his/her assistants (if applicable), which may include medical students, residents, and/or fellows, to perform the following surgical operation/ procedure and administer such anesthesia as may be determined necessary by my physician:  Operation/Procedure name (s)  on Jonny Haque   2.   I recognize that during the surgical operation/procedure, unforeseen conditions may necessitate additional or different procedures than those listed above.  I, therefore, further authorize and request that the above-named surgeon, assistants, or designees perform such procedures as are, in their judgment, necessary and desirable.    3.   My surgeon/physician has discussed prior to my surgery the potential benefits, risks and side effects of this procedure; the likelihood of achieving goals; and potential problems that might occur during recuperation.  They also discussed reasonable alternatives to the procedure, including risks, benefits, and side effects related to the alternatives and risks related to not receiving this procedure.  I have had all my questions answered and I acknowledge that no guarantee has been made as to the result that may be obtained.    4.   Should the need arise during my operation/procedure, which includes change of level of care prior to discharge, I also consent to the administration of blood and/or blood products.  Further, I understand that despite careful testing and screening of blood or blood products by collecting agencies, I may still be subject to ill effects as a result of receiving a blood transfusion and/or blood products.  The following are some, but not all, of the potential risks that can occur: fever and  allergic reactions, hemolytic reactions, transmission of diseases such as Hepatitis, AIDS and Cytomegalovirus (CMV) and fluid overload.  In the event that I wish to have an autologous transfusion of my own blood, or a directed donor transfusion, I will discuss this with my physician.  Check only if Refusing Blood or Blood Products  I understand refusal of blood or blood products as deemed necessary by my physician may have serious consequences to my condition to include possible death. I hereby assume responsibility for my refusal and release the hospital, its personnel, and my physicians from any responsibility for the consequences of my refusal.          o  Refuse      5.   I authorize the use of any specimen, organs, tissues, body parts or foreign objects that may be removed from my body during the operation/procedure for diagnosis, research or teaching purposes and their subsequent disposal by hospital authorities.  I also authorize the release of specimen test results and/or written reports to my treating physician on the hospital medical staff or other referring or consulting physicians involved in my care, at the discretion of the Pathologist or my treating physician.    6.   I consent to the photographing or videotaping of the operations or procedures to be performed, including appropriate portions of my body for medical, scientific, or educational purposes, provided my identity is not revealed by the pictures or by descriptive texts accompanying them.  If the procedure has been photographed/videotaped, the surgeon will obtain the original picture, image, videotape or CD.  The hospital will not be responsible for storage, release or maintenance of the picture, image, tape or CD.    7.   I consent to the presence of a  or observers in the operating room as deemed necessary by my physician or their designees.    8.   I recognize that in the event my procedure results in extended  X-Ray/fluoroscopy time, I may develop a skin reaction.    9. If I have a Do Not Attempt Resuscitation (DNAR) order in place, that status will be suspended while in the operating room, procedural suite, and during the recovery period unless otherwise explicitly stated by me (or a person authorized to consent on my behalf). The surgeon or my attending physician will determine when the applicable recovery period ends for purposes of reinstating the DNAR order.  10. Patients having a sterilization procedure: I understand that if the procedure is successful the results will be permanent and it will therefore be impossible for me to inseminate, conceive, or bear children.  I also understand that the procedure is intended to result in sterility, although the result has not been guaranteed.   11. I acknowledge that my physician has explained sedation/analgesia administration to me including the risk and benefits I consent to the administration of sedation/analgesia as may be necessary or desirable in the judgment of my physician.    I CERTIFY THAT I HAVE READ AND FULLY UNDERSTAND THE ABOVE CONSENT TO OPERATION and/or OTHER PROCEDURE.    _________________________________________  __________________________________  Signature of Patient     Signature of Responsible Person         ___________________________________         Printed Name of Responsible Person           _________________________________                 Relationship to Patient  _________________________________________  ______________________________  Signature of Witness          Date  Time      Patient Name: Jonny Haque     : 4/15/1945                 Printed: May 15, 2025     Medical Record #: [id number]                     Page [address] of [address] [place] [address]  [address]    Consent for Anesthesia    I, Jonny Garland agree to be cared for by an anesthesiologist, who is specially trained to monitor me  and give me medicine to put me to sleep or keep me comfortable during my procedure    I understand that my anesthesiologist is not an employee or agent of [facility] [employer]. He or she works for [employer].    As the patient asking for anesthesia services, I agree to:  Allow the anesthesiologist (anesthesia doctor) to give me medicine and do additional procedures as necessary. Some examples are: Starting or using an “IV” to give me medicine, fluids or blood during my procedure, and having a breathing tube placed to help me breathe when I’m asleep (intubation). In the event that my heart stops working properly, I understand that my anesthesiologist will make every effort to sustain my life, unless otherwise directed by [facility] Do Not Resuscitate documents.  Tell my anesthesia doctor before my procedure:  If I am pregnant.  The last time that I ate or drank.  All of the medicines I take (including prescriptions, herbal supplements, and pills I can buy without a prescription (including street drugs/illegal medications). Failure to inform my anesthesiologist about these medicines may increase my risk of anesthetic complications.  If I am allergic to anything or have had a reaction to anesthesia before.  I understand how the anesthesia medicine will help me (benefits).  I understand that with any type of anesthesia medicine there are risks:  The most common risks are: nausea, vomiting, sore throat, muscle soreness, damage to my eyes, mouth, or teeth (from breathing tube placement).  Rare risks include: remembering what happened during my procedure, allergic reactions to medications, injury to my airway, heart, lungs, vision, nerves, or muscles and in extremely rare instances death.  My doctor has explained to me other choices available to me for my care (alternatives).  Pregnant Patients (“epidural”):  I understand that the risks of having an epidural (medicine given into  my back to help control pain during labor), include itching, low blood pressure, difficulty urinating, headache or slowing of the baby’s heart. Very rare risks include infection, bleeding, seizure, irregular heart rhythms and nerve injury.  Regional Anesthesia (“spinal”, “epidural”, & “nerve blocks”):  I understand that rare but potential complications include headache, bleeding, infection, seizure, irregular heart rhythms, and nerve injury.    I can change my mind about having anesthesia services at any time before I get the medicine.    _____________________________________________________________________________  Patient (or Representative) Signature/Relationship to Patient  Date   Time    _____________________________________________________________________________   Name (if used)    Language/Organization   Time    _____________________________________________________________________________  Anesthesiologist Signature     Date   Time  I have discussed the procedure and information above with the patient (or patient’s representative) and answered their questions. The patient or their representative has agreed to have anesthesia services.    _____________________________________________________________________________  Witness        Date   Time  I have verified that the signature is that of the patient or patient’s representative, and that it was signed before the procedure  Patient Name: Jonny Haque     : 4/15/1945                 Printed: May 15, 2025     Medical Record #: [id number]                     Page 2 of 2

## (undated) NOTE — LETTER
[facility]  Phone: [phone]  OUTSIDE TESTING RESULT REQUEST      TO:   Dr. Kori Hwang Today's Date: 8/3/17    FAX #: [phone]     IMPORTANT: FOR YOUR IMMEDIATE ATTENTION  Please FAX all test results listed below t

## (undated) NOTE — LETTER
[address]  [address]  Consent for Procedure/Sedation  Date: 3/22/2024         Time: 1158    I hereby authorize Dr Satya Donald, my physician and his/her assistants (if applicable), which may include medical students, residents, and/or fellows, to perform the following surgical operation/ procedure and administer such anesthesia as may be determined necessary by my physician:  Operation/Procedure name (s)  Cardiac Catheterization, Left Ventricular Cineangiography, Bilateral Selective Coronary Angiography and/or Right Heart Catheterization; possible Percutaneous Transluminal Coronary Angioplasty, Coronary Atherectomy, Coronary Stent, Intracoronary Thrombolytic therapy, Antiplatelet therapy and/or Intravascular Ultrasound on [facility]   2.   I recognize that during the surgical operation/procedure, unforeseen conditions may necessitate additional or different procedures than those listed above.  I, therefore, further authorize and request that the above-named surgeon, assistants, or designees perform such procedures as are, in their judgment, necessary and desirable.    3.   My surgeon/physician has discussed prior to my surgery the potential benefits, risks and side effects of this procedure; the likelihood of achieving goals; and potential problems that might occur during recuperation.  They also discussed reasonable alternatives to the procedure, including risks, benefits, and side effects related to the alternatives and risks related to not receiving this procedure.  I have had all my questions answered and I acknowledge that no guarantee has been made as to the result that may be obtained.    4.   Should the need arise during my operation/procedure, which includes change of level of care prior to discharge, I also consent to the administration of blood and/or blood products.  Further, I understand that despite careful testing and screening of blood or blood products by  collecting agencies, I may still be subject to ill effects as a result of receiving a blood transfusion and/or blood products.  The following are some, but not all, of the potential risks that can occur: fever and allergic reactions, hemolytic reactions, transmission of diseases such as Hepatitis, AIDS and Cytomegalovirus (CMV) and fluid overload.  In the event that I wish to have an autologous transfusion of my own blood, or a directed donor transfusion, I will discuss this with my physician.  Check only if Refusing Blood or Blood Products  I understand refusal of blood or blood products as deemed necessary by my physician may have serious consequences to my condition to include possible death. I hereby assume responsibility for my refusal and release the hospital, its personnel, and my physicians from any responsibility for the consequences of my refusal.          o  Refuse      5.   I authorize the use of any specimen, organs, tissues, body parts or foreign objects that may be removed from my body during the operation/procedure for diagnosis, research or teaching purposes and their subsequent disposal by hospital authorities.  I also authorize the release of specimen test results and/or written reports to my treating physician on the hospital medical staff or other referring or consulting physicians involved in my care, at the discretion of the Pathologist or my treating physician.    6.   I consent to the photographing or videotaping of the operations or procedures to be performed, including appropriate portions of my body for medical, scientific, or educational purposes, provided my identity is not revealed by the pictures or by descriptive texts accompanying them.  If the procedure has been photographed/videotaped, the surgeon will obtain the original picture, image, videotape or CD.  The hospital will not be responsible for storage, release or maintenance of the picture, image, tape or CD.    7.   I consent  to the presence of a  or observers in the operating room as deemed necessary by my physician or their designees.    8.   I recognize that in the event my procedure results in extended X-Ray/fluoroscopy time, I may develop a skin reaction.    9. If I have a Do Not Attempt Resuscitation (DNAR) order in place, that status will be suspended while in the operating room, procedural suite, and during the recovery period unless otherwise explicitly stated by me (or a person authorized to consent on my behalf). The surgeon or my attending physician will determine when the applicable recovery period ends for purposes of reinstating the DNAR order.  10. Patients having a sterilization procedure: I understand that if the procedure is successful the results will be permanent and it will therefore be impossible for me to inseminate, conceive, or bear children.  I also understand that the procedure is intended to result in sterility, although the result has not been guaranteed.   11. I acknowledge that my physician has explained sedation/analgesia administration to me including the risk and benefits I consent to the administration of sedation/analgesia as may be necessary or desirable in the judgment of my physician.    I CERTIFY THAT I HAVE READ AND FULLY UNDERSTAND THE ABOVE CONSENT TO OPERATION and/or OTHER PROCEDURE.      ____________________________________       _________________________________      ______________________________  Signature of Patient         Signature of Responsible Person        Printed Name of Responsible Person   ____________________________________      _________________________________      ______________________________       Signature of Witness          Relationship to Patient                       Date                                       Time  Patient Name: Jonny Haque  : 4/15/1945    Reviewed: 2024   Printed: 2024  Medical Record #: [id number] Page 1 of 1

## (undated) NOTE — LETTER
[address]  [address]  Authorization for Surgical Operation and Procedure     Date:__3/22/2024_________                                                                                                         Time:__1156________  I hereby authorize * Surgery not found *, my physician and his/her assistants (if applicable), which may include medical students, residents, and/or fellows, to perform the following surgical operation/ procedure and administer such anesthesia as may be determined necessary by my physician:  Operation/Procedure name (s)  on Jonny Haque   2.   I recognize that during the surgical operation/procedure, unforeseen conditions may necessitate additional or different procedures than those listed above.  I, therefore, further authorize and request that the above-named surgeon, assistants, or designees perform such procedures as are, in their judgment, necessary and desirable.    3.   My surgeon/physician has discussed prior to my surgery the potential benefits, risks and side effects of this procedure; the likelihood of achieving goals; and potential problems that might occur during recuperation.  They also discussed reasonable alternatives to the procedure, including risks, benefits, and side effects related to the alternatives and risks related to not receiving this procedure.  I have had all my questions answered and I acknowledge that no guarantee has been made as to the result that may be obtained.    4.   Should the need arise during my operation/procedure, which includes change of level of care prior to discharge, I also consent to the administration of blood and/or blood products.  Further, I understand that despite careful testing and screening of blood or blood products by collecting agencies, I may still be subject to ill effects as a result of receiving a blood transfusion and/or blood products.  The following are some, but not all, of the  potential risks that can occur: fever and allergic reactions, hemolytic reactions, transmission of diseases such as Hepatitis, AIDS and Cytomegalovirus (CMV) and fluid overload.  In the event that I wish to have an autologous transfusion of my own blood, or a directed donor transfusion, I will discuss this with my physician.  Check only if Refusing Blood or Blood Products  I understand refusal of blood or blood products as deemed necessary by my physician may have serious consequences to my condition to include possible death. I hereby assume responsibility for my refusal and release the hospital, its personnel, and my physicians from any responsibility for the consequences of my refusal.          o  Refuse      5.   I authorize the use of any specimen, organs, tissues, body parts or foreign objects that may be removed from my body during the operation/procedure for diagnosis, research or teaching purposes and their subsequent disposal by hospital authorities.  I also authorize the release of specimen test results and/or written reports to my treating physician on the hospital medical staff or other referring or consulting physicians involved in my care, at the discretion of the Pathologist or my treating physician.    6.   I consent to the photographing or videotaping of the operations or procedures to be performed, including appropriate portions of my body for medical, scientific, or educational purposes, provided my identity is not revealed by the pictures or by descriptive texts accompanying them.  If the procedure has been photographed/videotaped, the surgeon will obtain the original picture, image, videotape or CD.  The hospital will not be responsible for storage, release or maintenance of the picture, image, tape or CD.    7.   I consent to the presence of a  or observers in the operating room as deemed necessary by my physician or their designees.    8.   I recognize that in the event my  procedure results in extended X-Ray/fluoroscopy time, I may develop a skin reaction.    9. If I have a Do Not Attempt Resuscitation (DNAR) order in place, that status will be suspended while in the operating room, procedural suite, and during the recovery period unless otherwise explicitly stated by me (or a person authorized to consent on my behalf). The surgeon or my attending physician will determine when the applicable recovery period ends for purposes of reinstating the DNAR order.  10. Patients having a sterilization procedure: I understand that if the procedure is successful the results will be permanent and it will therefore be impossible for me to inseminate, conceive, or bear children.  I also understand that the procedure is intended to result in sterility, although the result has not been guaranteed.   11. I acknowledge that my physician has explained sedation/analgesia administration to me including the risk and benefits I consent to the administration of sedation/analgesia as may be necessary or desirable in the judgment of my physician.    I CERTIFY THAT I HAVE READ AND FULLY UNDERSTAND THE ABOVE CONSENT TO OPERATION and/or OTHER PROCEDURE.    _________________________________________  __________________________________  Signature of Patient     Signature of Responsible Person         ___________________________________         Printed Name of Responsible Person           _________________________________                 Relationship to Patient  _________________________________________  ______________________________  Signature of Witness          Date  Time      Patient Name: Jonny Haque     : 4/15/1945                 Printed: 2024     Medical Record #: [id number]                     Page 1 [address]  [address]    Consent for Anesthesia    I, Jonny Garland agree to be cared for by an anesthesiologist, who is  specially trained to monitor me and give me medicine to put me to sleep or keep me comfortable during my procedure    I understand that my anesthesiologist is not an employee or agent of [facility] or [employer]. He or she works for [employer].    As the patient asking for anesthesia services, I agree to:  Allow the anesthesiologist (anesthesia doctor) to give me medicine and do additional procedures as necessary. Some examples are: Starting or using an “IV” to give me medicine, fluids or blood during my procedure, and having a breathing tube placed to help me breathe when I’m asleep (intubation). In the event that my heart stops working properly, I understand that my anesthesiologist will make every effort to sustain my life, unless otherwise directed by [facility] Do Not Resuscitate documents.  Tell my anesthesia doctor before my procedure:  If I am pregnant.  The last time that I ate or drank.  All of the medicines I take (including prescriptions, herbal supplements, and pills I can buy without a prescription (including street drugs/illegal medications). Failure to inform my anesthesiologist about these medicines may increase my risk of anesthetic complications.  If I am allergic to anything or have had a reaction to anesthesia before.  I understand how the anesthesia medicine will help me (benefits).  I understand that with any type of anesthesia medicine there are risks:  The most common risks are: nausea, vomiting, sore throat, muscle soreness, damage to my eyes, mouth, or teeth (from breathing tube placement).  Rare risks include: remembering what happened during my procedure, allergic reactions to medications, injury to my airway, heart, lungs, vision, nerves, or muscles and in extremely rare instances death.  My doctor has explained to me other choices available to me for my care (alternatives).  Pregnant Patients (“epidural”):  I understand that the risks of having  an epidural (medicine given into my back to help control pain during labor), include itching, low blood pressure, difficulty urinating, headache or slowing of the baby’s heart. Very rare risks include infection, bleeding, seizure, irregular heart rhythms and nerve injury.  Regional Anesthesia (“spinal”, “epidural”, & “nerve blocks”):  I understand that rare but potential complications include headache, bleeding, infection, seizure, irregular heart rhythms, and nerve injury.    I can change my mind about having anesthesia services at any time before I get the medicine.    _____________________________________________________________________________  Patient (or Representative) Signature/Relationship to Patient  Date   Time    _____________________________________________________________________________   Name (if used)    Language/Organization   Time    _____________________________________________________________________________  Anesthesiologist Signature     Date   Time  I have discussed the procedure and information above with the patient (or patient’s representative) and answered their questions. The patient or their representative has agreed to have anesthesia services.    _____________________________________________________________________________  Witness        Date   Time  I have verified that the signature is that of the patient or patient’s representative, and that it was signed before the procedure  Patient Name: Jonny Haque     : 4/15/1945                 Printed: 2024     Medical Record #: [id number]                     Page 2 of 2

## (undated) NOTE — LETTER
[facility]  Phone: [phone]  Right Fax: [phone]  [facility] By:  Tiffany Ruth Date: 8/3/17    Patient Name: Kendall Yasmani  Surgery Date: 2017    CSN: [id number]  Medical Record: [id number]   : [birth year]